# Patient Record
Sex: MALE | Race: BLACK OR AFRICAN AMERICAN | Employment: OTHER | URBAN - METROPOLITAN AREA
[De-identification: names, ages, dates, MRNs, and addresses within clinical notes are randomized per-mention and may not be internally consistent; named-entity substitution may affect disease eponyms.]

---

## 2018-08-01 ENCOUNTER — OFFICE VISIT (OUTPATIENT)
Dept: PAIN MANAGEMENT | Age: 48
End: 2018-08-01
Payer: MEDICARE

## 2018-08-01 ENCOUNTER — PREP FOR PROCEDURE (OUTPATIENT)
Dept: PAIN MANAGEMENT | Age: 48
End: 2018-08-01

## 2018-08-01 ENCOUNTER — HOSPITAL ENCOUNTER (OUTPATIENT)
Age: 48
Discharge: HOME | End: 2018-08-03
Payer: MEDICARE

## 2018-08-01 VITALS
HEART RATE: 88 BPM | HEIGHT: 69 IN | SYSTOLIC BLOOD PRESSURE: 138 MMHG | TEMPERATURE: 98.6 F | RESPIRATION RATE: 18 BRPM | DIASTOLIC BLOOD PRESSURE: 82 MMHG | WEIGHT: 254 LBS | BODY MASS INDEX: 37.62 KG/M2

## 2018-08-01 DIAGNOSIS — M47.816 LUMBAR SPONDYLOSIS: ICD-10-CM

## 2018-08-01 DIAGNOSIS — M54.16 LUMBAR RADICULOPATHY: ICD-10-CM

## 2018-08-01 DIAGNOSIS — M47.816 LUMBAR FACET ARTHROPATHY: Primary | ICD-10-CM

## 2018-08-01 DIAGNOSIS — G89.4 CHRONIC PAIN SYNDROME: ICD-10-CM

## 2018-08-01 DIAGNOSIS — M51.9 LUMBAR DISC DISORDER: ICD-10-CM

## 2018-08-01 PROCEDURE — G0480 DRUG TEST DEF 1-7 CLASSES: HCPCS

## 2018-08-01 PROCEDURE — 81005 URINALYSIS: CPT

## 2018-08-01 PROCEDURE — 80307 DRUG TEST PRSMV CHEM ANLYZR: CPT

## 2018-08-01 PROCEDURE — G8417 CALC BMI ABV UP PARAM F/U: HCPCS | Performed by: PAIN MEDICINE

## 2018-08-01 PROCEDURE — 99203 OFFICE O/P NEW LOW 30 MIN: CPT | Performed by: PAIN MEDICINE

## 2018-08-01 PROCEDURE — G8427 DOCREV CUR MEDS BY ELIG CLIN: HCPCS | Performed by: PAIN MEDICINE

## 2018-08-01 PROCEDURE — 4004F PT TOBACCO SCREEN RCVD TLK: CPT | Performed by: PAIN MEDICINE

## 2018-08-01 PROCEDURE — 3017F COLORECTAL CA SCREEN DOC REV: CPT | Performed by: PAIN MEDICINE

## 2018-08-01 PROCEDURE — 99204 OFFICE O/P NEW MOD 45 MIN: CPT | Performed by: PAIN MEDICINE

## 2018-08-01 RX ORDER — SODIUM CHLORIDE 0.9 % (FLUSH) 0.9 %
10 SYRINGE (ML) INJECTION PRN
Status: CANCELLED | OUTPATIENT
Start: 2018-08-01 | End: 2019-08-01

## 2018-08-01 RX ORDER — NABUMETONE 500 MG/1
500 TABLET, FILM COATED ORAL 2 TIMES DAILY
Qty: 60 TABLET | Refills: 0 | Status: SHIPPED
Start: 2018-08-01 | End: 2020-04-20

## 2018-08-01 RX ORDER — SODIUM CHLORIDE 0.9 % (FLUSH) 0.9 %
10 SYRINGE (ML) INJECTION EVERY 12 HOURS SCHEDULED
Status: CANCELLED | OUTPATIENT
Start: 2018-08-01 | End: 2019-08-01

## 2018-08-01 NOTE — PROGRESS NOTES
[address]        [address] [employer], [address] [facility]      [phone]          Consult Note      Patient:  Roro Pérez,  1970    Date of Service:  18    Requesting Physician:  TERRY Wheeler    Reason for Consult:      Patient presents with complaints of Left thigh pain that started 1 years ago and has been getting worse    HISTORY OF PRESENT ILLNESS:      Pain is constant and is described as aching, sharp, stabbing and shooting. Pain does not radiate. He  has numbness, tingling of the Left thigh and does not have bladder or bowel dysfunction. Alleviating factors include: nothing. Aggravating factors include:  walking, standing, sitting. He has not been on anticoagulation medications to include none and has not been on herbal supplements. He is not diabetic. Imaging: MRI Lumbar spine 2018  At L4-5 there is a small Left foraminal disc protrusion abutting the exiting L4 nerve  There is moderate to severe right foraminal stenosis   At L5-S1 there is small central disc protrusion indenting the thecal. There is oderat right and mild Left foraminal stenosis     Previous treatments: medications. .      Past Medical History:   Diagnosis Date    Acid reflux     Asthma     Asthma     Hypertension     Pancreatitis     Prediabetes     Psychiatric problem     depressed    Substance abuse     alcohol and cocaine       Past Surgical History:   Procedure Laterality Date    COLONOSCOPY       neg    WISDOM TOOTH EXTRACTION         Prior to Admission medications    Medication Sig Start Date End Date Taking? Authorizing Provider   LYRICA 75 MG capsule Take 75 mg by mouth daily. . 18  Yes Historical Provider, MD   ipratropium-albuterol (DUONEB) 0.5-2.5 (3) MG/3ML SOLN nebulizer solution Take 3 mLs by nebulization every 4 hours as needed for Shortness of Breath 18  Yes Sarthak Mosqueda MD   tamsulosin (FLOMAX) 0.4 MG capsule Take 0.4 mg by mouth daily   Yes Historical Provider, MD   Fluticasone Furoate-Vilanterol 200-25 MCG/INH AEPB Inhale 1 puff into the lungs daily 1/16/18  Yes Kyle Merida MD   Umeclidinium Bromide 62.5 MCG/INH AEPB Inhale 1 puff into the lungs daily 1/16/18  Yes Kyle Merida MD   metoprolol succinate (TOPROL XL) 100 MG extended release tablet Take by mouth daily   Yes Historical Provider, MD   RaNITidine HCl (ZANTAC PO) Take by mouth   Yes Historical Provider, MD   Fluticasone Propionate (FLONASE NA) by Nasal route   Yes Historical Provider, MD   B Complex Vitamins (B-COMPLEX/B-12) TABS Take  by mouth. Yes Historical Provider, MD   montelukast (SINGULAIR) 10 MG tablet Take 1 tablet by mouth nightly. 1/16/15  Yes Savana Maya MD   Multiple Vitamins-Minerals (RA ONE DAILY MENS MULTI) TABS Take 1 tablet by mouth daily. 1/16/15  Yes Savana Maya MD   buPROPion SR [facility] SR) 150 MG SR tablet Take 1 tablet by mouth 2 times daily. 1/16/15  Yes Savana Maya MD   QUEtiapine (SEROQUEL) 25 MG tablet Take 1 tablet by mouth daily as needed. Patient taking differently: Take 100 mg by mouth daily as needed  1/16/15  Yes Savana Maya MD   albuterol (PROVENTIL HFA) 108 (90 BASE) MCG/ACT inhaler Inhale 2 puffs into the lungs every 6 hours as needed for Wheezing. 1/16/15  Yes Savana Maya MD   amLODIPine (NORVASC) 10 MG tablet Take 1 tablet by mouth daily.  1/16/15  Yes Savana Maya MD       No Known Allergies    Social History     Social History    Marital status: Single     Spouse name: N/A    Number of children: N/A    Years of education: N/A     Occupational History          Social History Main Topics    Smoking status: Current Every Day Smoker     Packs/day: 1.00     Years: 25.00     Types: Cigarettes    Smokeless tobacco: Never Used    Alcohol use Yes      Comment: once per month    Drug use: No    Sexual activity: Not on file     Other Topics Concern    Not on file Social History Narrative    No narrative on file       Family History   Problem Relation Age of Onset    Other Sister     High Blood Pressure Maternal Grandmother     Other Maternal Grandmother        REVIEW OF SYSTEMS:     Patient specifically denies fever/chills, chest pain, shortness of breath, new bowel or bladder complaints. All other review of systems was negative. PHYSICAL EXAMINATION:      /82   Pulse 88   Temp 98.6 °F (37 °C)   Resp 18   Ht 5' 9\" (1.753 m)   Wt 254 lb (115.2 kg)   BMI 37.51 kg/m²     General:      General appearance:   pleasant and well-hydrated. , in moderate distress and A & O x3  Build:Overweight  Function:Rises from a seated position easily. HEENT:    Head:normocephalic and atraumatic  Pupils:regular, round and equal.  Sclera: icterus absent,     Lungs:    Breathing:Breathing Pattern: regular, no distress    Abdomen:    Shape:obese and non-distended  Tenderness:none  Guarding:none    Lumbar spine:    Spine inspection:normal   CVA tenderness:No   Palpation:normal, facet loading, left, right and negative. Range of motion:normal not Lateral bending, flexion, extension rotation bilateral and is not painful. Musculoskeletal:    Trigger points in Paraveteral:absent bilaterally  SI joint tenderness:negative right, negative left              MONE test:not done right, not done             left  Piriformis tenderness:negative right, negative left  Trochanteric bursa tenderness:negative right, negative left  SLR:negative right, negative left, sitting     Extremities:    Tremors:None bilaterally upper and lower  Range of motion:pain with internal rotation of hips negative.   Intact:Yes  Edema:Normal    Neurological:    Sensory:diminished to light touch Left L4 dermatome   Motor:                  Right Quadriceps5/5          Left Quadriceps5/5           Right Gastrocnemius5/5    Left Gastrocnemius5/5  Right Ant Tibialis5/5  Left Ant Tibialis5/5  Reflexes:    Right

## 2018-08-02 LAB — SPECIFIC GRAVITY UA: >=1.03 (ref 1–1.03)

## 2018-08-04 LAB
7-AMINOCLONAZEPAM, URINE: <5 NG/ML
ALPHA-HYDROXYALPRAZOLAM, URINE: <5 NG/ML
ALPHA-HYDROXYMIDAZOLAM, URINE: <20 NG/ML
ALPRAZOLAM, URINE: <5 NG/ML
CHLORDIAZEPOXIDE, URINE: <20 NG/ML
CLONAZEPAM, URINE: <5 NG/ML
DIAZEPAM, URINE: <20 NG/ML
LORAZEPAM, URINE: <20 NG/ML
MIDAZOLAM, URINE: <20 NG/ML
NORDIAZEPAM, URINE: <20 NG/ML
OXAZEPAM, URINE: <20 NG/ML
TEMAZEPAM, URINE: <20 NG/ML

## 2018-08-05 LAB
6AM URINE: <10 NG/ML
CODEINE, URINE: <20 NG/ML
HYDROCODONE, URINE: <20 NG/ML
HYDROMORPHONE, URINE: <20 NG/ML
MORPHINE URINE: <20 NG/ML
NORHYDROCODONE, URINE: <20 NG/ML
NOROXYCODONE, URINE: <20 NG/ML
NOROXYMORPHONE, URINE: <20 NG/ML
OXYCODONE, URINE CONFIRMATION: <20 NG/ML
OXYMORPHONE, URINE: <20 NG/ML

## 2018-08-06 LAB
Lab: NORMAL
REPORT: NORMAL
THIS TEST SENT TO: NORMAL

## 2018-08-23 RX ORDER — TRAMADOL HYDROCHLORIDE 50 MG/1
50 TABLET ORAL EVERY 6 HOURS PRN
COMMUNITY
End: 2018-10-24 | Stop reason: SDUPTHER

## 2018-08-27 ENCOUNTER — HOSPITAL ENCOUNTER (OUTPATIENT)
Dept: OPERATING ROOM | Age: 48
Setting detail: OUTPATIENT SURGERY
Discharge: HOME | End: 2018-08-27
Attending: PAIN MEDICINE
Payer: MEDICARE

## 2018-08-27 ENCOUNTER — HOSPITAL ENCOUNTER (OUTPATIENT)
Age: 48
Setting detail: OUTPATIENT SURGERY
Discharge: HOME | End: 2018-08-27
Attending: PAIN MEDICINE | Admitting: PAIN MEDICINE
Payer: MEDICARE

## 2018-08-27 VITALS
RESPIRATION RATE: 16 BRPM | TEMPERATURE: 98 F | OXYGEN SATURATION: 93 % | HEART RATE: 96 BPM | DIASTOLIC BLOOD PRESSURE: 80 MMHG | SYSTOLIC BLOOD PRESSURE: 130 MMHG

## 2018-08-27 DIAGNOSIS — M54.16 LUMBAR RADICULOPATHY: ICD-10-CM

## 2018-08-27 PROCEDURE — 2500000003 HC RX 250 WO HCPCS: Performed by: PAIN MEDICINE

## 2018-08-27 PROCEDURE — 3600000005 HC SURGERY LEVEL 5 BASE: Performed by: PAIN MEDICINE

## 2018-08-27 PROCEDURE — 3209999900 FLUORO FOR SURGICAL PROCEDURES

## 2018-08-27 PROCEDURE — 2709999900 HC NON-CHARGEABLE SUPPLY: Performed by: PAIN MEDICINE

## 2018-08-27 PROCEDURE — 62323 NJX INTERLAMINAR LMBR/SAC: CPT | Performed by: PAIN MEDICINE

## 2018-08-27 PROCEDURE — 6360000004 HC RX CONTRAST MEDICATION: Performed by: PAIN MEDICINE

## 2018-08-27 PROCEDURE — 6360000002 HC RX W HCPCS: Performed by: PAIN MEDICINE

## 2018-08-27 PROCEDURE — 7100000011 HC PHASE II RECOVERY - ADDTL 15 MIN: Performed by: PAIN MEDICINE

## 2018-08-27 PROCEDURE — 7100000010 HC PHASE II RECOVERY - FIRST 15 MIN: Performed by: PAIN MEDICINE

## 2018-08-27 RX ORDER — LIDOCAINE HYDROCHLORIDE 5 MG/ML
INJECTION, SOLUTION INFILTRATION; INTRAVENOUS PRN
Status: DISCONTINUED | OUTPATIENT
Start: 2018-08-27 | End: 2018-08-27 | Stop reason: HOSPADM

## 2018-08-27 RX ORDER — SODIUM CHLORIDE 0.9 % (FLUSH) 0.9 %
10 SYRINGE (ML) INJECTION PRN
Status: DISCONTINUED | OUTPATIENT
Start: 2018-08-27 | End: 2018-08-27 | Stop reason: HOSPADM

## 2018-08-27 RX ORDER — SODIUM CHLORIDE 0.9 % (FLUSH) 0.9 %
10 SYRINGE (ML) INJECTION EVERY 12 HOURS SCHEDULED
Status: DISCONTINUED | OUTPATIENT
Start: 2018-08-27 | End: 2018-08-27 | Stop reason: HOSPADM

## 2018-08-27 ASSESSMENT — PAIN - FUNCTIONAL ASSESSMENT: PAIN_FUNCTIONAL_ASSESSMENT: 0-10

## 2018-08-27 ASSESSMENT — PAIN DESCRIPTION - DESCRIPTORS: DESCRIPTORS: ACHING;DISCOMFORT

## 2018-08-27 ASSESSMENT — PAIN SCALES - GENERAL
PAINLEVEL_OUTOF10: 0
PAINLEVEL_OUTOF10: 0

## 2018-08-27 NOTE — OP NOTE
2018    Patient: Christy Perez  :  1970  Age:  46 y.o. Sex:  male     PRE-OPERATIVE DIAGNOSIS: Lumbar disc displacement, lumbar radiculopathy. POST-OPERATIVE DIAGNOSIS: Same. PROCEDURE: Fluoroscopic guided therapeutic lumbar epidural steroid injection at the L4-5 level (# 1). SURGEON: GREGORY Kumar M.D.. ANESTHESIA: Local    ESTIMATED BLOOD LOSS: None.  ______________________________________________________________________    BRIEF HISTORY:  Christy Perez comes in today for first lumbar epidural injection at L4-5 level. The potential complications of this procedure were discussed with him again today. He has elected to undergo the aforementioned procedure. Ro complete History & Physical examination were reviewed in depth, a copy of which is in the chart. DESCRIPTION OF PROCEDURE:    After confirming written and informed consent, a time-out was performed and Ro name and date of birth, the procedure to be performed as well as the plan for the location of the needle insertion were confirmed. The patient was brought into the procedure room and placed in the prone position on the fluoroscopy table. A pillow was placed under the patient's lower abdomen/upper pelvis to increase lumbar interlaminar space. Standard monitors were placed, and vital signs were observed throughout the procedure. The area of the lumbar spine was prepped with chloraprep and draped in a sterile manner. The L4-5 interspace was identified and marked under AP fluoroscopy. The skin and subcutaneous tissues at the above level were anesthestized with 0.5% lidocaine. With intermittent fluoroscopy, an # 18 gauge 3 1/2 tuohy epidural needle was inserted and directed toward the interlaminar space. The needle was slowly advanced using loss of resistance technique and 5 cc glass syringe  until the tip of the epidural needle has passed through the ligamentum flavum and entered the epidural space.  AP and lateral fluoroscopic imaging is performed to verify that the epidural needle is properly placed. Negative aspiration of blood and CSF was confirmed. 0.5 ml of omnipaque 240 was used for confirmation of even epidural spread under both live and AP fluoroscopy. After negative aspiration, a solution of 0.5 % Lidocaine 3 ml and 40 mg DepoMedrol was easily injected. The needle was gently removed intact . The patient neck was cleaned and a Band-Aid was placed over the needle insertion point. Disposition the patient tolerated the procedure well and there were no complications . Vital signs remained stable throughout the procedure. The patient was escorted to the recovery area where they remained until discharge and written discharge instructions for the procedure were given. Plan: Skye Naqvi will return to our pain management center as scheduled.      Kim Vaca MD

## 2018-08-27 NOTE — H&P
[address]  0 [facility], [address] [place] [address], [facility]  [phone]    Procedure History & Physical      Ruth Ann Ortega     HPI:    Patient  is here for low back and Left leg for LESI L4-5  Labs/imaging studies reviewed   All question and concerns addressed including R/B/A associated with the procedure    Past Medical History:   Diagnosis Date    Acid reflux     Asthma     Asthma     Hypertension     Pancreatitis     Prediabetes     Psychiatric problem     depressed    Substance abuse     alcohol and cocaine       Past Surgical History:   Procedure Laterality Date    COLONOSCOPY      2010 neg    WISDOM TOOTH EXTRACTION         Prior to Admission medications    Medication Sig Start Date End Date Taking? Authorizing Provider   traMADol (ULTRAM) 50 MG tablet Take 50 mg by mouth every 6 hours as needed for Pain. .   Yes Historical Provider, MD   aspirin 81 MG tablet Take 81 mg by mouth daily   Yes Historical Provider, MD   nabumetone (RELAFEN) 500 MG tablet Take 1 tablet by mouth 2 times daily 8/1/18 8/31/18 Yes Dm Davidson MD   ipratropium-albuterol (DUONEB) 0.5-2.5 (3) MG/3ML SOLN nebulizer solution Take 3 mLs by nebulization every 4 hours as needed for Shortness of Breath 2/22/18  Yes Caroline Springer MD   tamsulosin (FLOMAX) 0.4 MG capsule Take 0.4 mg by mouth daily   Yes Historical Provider, MD   Fluticasone Furoate-Vilanterol 200-25 MCG/INH AEPB Inhale 1 puff into the lungs daily 1/16/18  Yes Caroline Springer MD   Umeclidinium Bromide 62.5 MCG/INH AEPB Inhale 1 puff into the lungs daily 1/16/18  Yes Caroline Springer MD   metoprolol succinate (TOPROL XL) 100 MG extended release tablet Take by mouth daily   Yes Historical Provider, MD   RaNITidine HCl (ZANTAC PO) Take by mouth   Yes Historical Provider, MD   Fluticasone Propionate (FLONASE NA) by Nasal route   Yes Historical Provider, MD   montelukast (SINGULAIR) 10 MG tablet Take 1 tablet by mouth nightly. 1/16/15  Yes Savana Maya MD   Multiple Vitamins-Minerals (RA ONE DAILY MENS MULTI) TABS Take 1 tablet by mouth daily. 1/16/15  Yes Savana Maya MD   buPROPion SR [facility] SR) 150 MG SR tablet Take 1 tablet by mouth 2 times daily. 1/16/15  Yes Savana Maya MD   QUEtiapine (SEROQUEL) 25 MG tablet Take 1 tablet by mouth daily as needed. Patient taking differently: Take 100 mg by mouth daily as needed  1/16/15  Yes Savana Maya MD   albuterol (PROVENTIL HFA) 108 (90 BASE) MCG/ACT inhaler Inhale 2 puffs into the lungs every 6 hours as needed for Wheezing. 1/16/15  Yes Savana Maya MD   amLODIPine (NORVASC) 10 MG tablet Take 1 tablet by mouth daily. 1/16/15  Yes Savana Maya MD       No Known Allergies    Social History     Social History    Marital status: Single     Spouse name: N/A    Number of children: N/A    Years of education: N/A     Occupational History          Social History Main Topics    Smoking status: Current Every Day Smoker     Packs/day: 1.00     Years: 25.00     Types: Cigarettes    Smokeless tobacco: Never Used    Alcohol use Yes      Comment: once per month    Drug use: No    Sexual activity: Not on file     Other Topics Concern    Not on file     Social History Narrative    No narrative on file       Family History   Problem Relation Age of Onset    Other Sister     High Blood Pressure Maternal Grandmother     Other Maternal Grandmother          REVIEW OF SYSTEMS:    CONSTITUTIONAL:  negative for  fevers, chills, sweats and fatigue    RESPIRATORY:  negative for  dry cough, cough with sputum, dyspnea, wheezing and chest pain    CARDIOVASCULAR:  negative for chest pain, dyspnea, palpitations, syncope    GASTROINTESTINAL:  negative for nausea, vomiting, change in bowel habits, diarrhea, constipation and abdominal pain    MUSCULOSKELETAL: negative for muscle weakness    SKIN: negative for itching or rashes.     BEHAVIOR/PSYCH:  negative for poor appetite, increased

## 2018-09-27 ENCOUNTER — OFFICE VISIT (OUTPATIENT)
Dept: PAIN MANAGEMENT | Age: 48
End: 2018-09-27
Payer: MEDICARE

## 2018-09-27 VITALS
SYSTOLIC BLOOD PRESSURE: 112 MMHG | HEART RATE: 78 BPM | DIASTOLIC BLOOD PRESSURE: 84 MMHG | RESPIRATION RATE: 18 BRPM | TEMPERATURE: 98 F

## 2018-09-27 DIAGNOSIS — M47.816 LUMBAR SPONDYLOSIS: ICD-10-CM

## 2018-09-27 DIAGNOSIS — M47.816 LUMBAR FACET ARTHROPATHY: ICD-10-CM

## 2018-09-27 DIAGNOSIS — M54.16 LUMBAR RADICULOPATHY: ICD-10-CM

## 2018-09-27 DIAGNOSIS — M51.9 LUMBAR DISC DISORDER: Primary | ICD-10-CM

## 2018-09-27 DIAGNOSIS — G89.4 CHRONIC PAIN SYNDROME: ICD-10-CM

## 2018-09-27 PROCEDURE — 99213 OFFICE O/P EST LOW 20 MIN: CPT | Performed by: PAIN MEDICINE

## 2018-09-27 PROCEDURE — G8417 CALC BMI ABV UP PARAM F/U: HCPCS | Performed by: PAIN MEDICINE

## 2018-09-27 PROCEDURE — G8427 DOCREV CUR MEDS BY ELIG CLIN: HCPCS | Performed by: PAIN MEDICINE

## 2018-09-27 PROCEDURE — 99214 OFFICE O/P EST MOD 30 MIN: CPT | Performed by: PAIN MEDICINE

## 2018-09-27 PROCEDURE — 4004F PT TOBACCO SCREEN RCVD TLK: CPT | Performed by: PAIN MEDICINE

## 2018-09-27 PROCEDURE — 3017F COLORECTAL CA SCREEN DOC REV: CPT | Performed by: PAIN MEDICINE

## 2018-09-27 RX ORDER — TRAMADOL HYDROCHLORIDE 50 MG/1
50 TABLET ORAL DAILY PRN
Qty: 30 TABLET | Refills: 0 | Status: SHIPPED | OUTPATIENT
Start: 2018-09-27 | End: 2018-10-27

## 2018-09-27 RX ORDER — IBUPROFEN 200 MG
400 TABLET ORAL EVERY 6 HOURS PRN
Status: ON HOLD | COMMUNITY
End: 2020-11-14

## 2018-09-27 RX ORDER — ACETAMINOPHEN 500 MG
1500 TABLET ORAL PRN
Status: ON HOLD | COMMUNITY
End: 2020-04-23 | Stop reason: HOSPADM

## 2018-09-27 NOTE — PROGRESS NOTES
[address]  [address] [facility], [address] [place] [address] Johnny  [phone]    Follow up Note      Cherise Salinas     Date of Visit:  9/27/2018    CC:  Patient presents for follow up   Chief Complaint   Patient presents with    Other     left thigh       HPI:    Pain is unchanged    Change in quality of symptoms:no. Medication side effects:not applicable . Recent diagnostic testing:none. Recent interventional procedures:LESI moderate but will    He has not been on anticoagulation medications to include none and has not been on herbal supplements. He is not diabetic.     Imaging: MRI Lumbar spine 01/2018  At L4-5 there is a small Left foraminal disc protrusion abutting the exiting L4 nerve  There is moderate to severe right foraminal stenosis   At L5-S1 there is small central disc protrusion indenting the thecal. There is moderat right and mild Left foraminal stenosis      Previous treatments: medications. .        Potential Aberrant Drug-Related Behavior:  No     Urine Drug Screening:  First office visit urine screen showed no narcotics    OARRS report:  09/2018 consistent     Past Medical History:   Diagnosis Date    Acid reflux     Asthma     Asthma     Hypertension     Pancreatitis     Prediabetes     Psychiatric problem     depressed    Substance abuse     alcohol and cocaine       Past Surgical History:   Procedure Laterality Date    COLONOSCOPY      2010 neg    NERVE BLOCK Left 08/27/2018    lumbar epidural #1 paramedian    [place] NJX DX/THER SBST INTRLMNR LMBR/SAC W/IMG GDN Left 8/27/2018    LUMBAR EPIDURAL STEROID INJECTION L4-5 LEFT PARAMEDIAN #1 performed by Blanca Menon MD at [place] [address]         Prior to Admission medications    Medication Sig Start Date End Date Taking? Authorizing Provider   traMADol (ULTRAM) 50 MG tablet Take 50 mg by mouth every 6 hours as needed for Pain. Estela Farias     Historical Provider, MD   aspirin 81 MG tablet Take Smoker     Packs/day: 1.00     Years: 25.00     Types: Cigarettes    Smokeless tobacco: Never Used    Alcohol use Yes      Comment: once per month    Drug use: No    Sexual activity: Not on file     Other Topics Concern    Not on file     Social History Narrative    No narrative on file       Family History   Problem Relation Age of Onset    Other Sister     High Blood Pressure Maternal Grandmother     Other Maternal Grandmother        REVIEW OF SYSTEMS:     Aki Humphrey denies fever/chills, chest pain, shortness of breath, new bowel or bladder complaints. All other review of systems was negative. PHYSICAL EXAMINATION:      /84   Pulse 78   Temp 98 °F (36.7 °C) (Oral)   Resp 18        General:       General appearance:   pleasant and well-hydrated. , in moderate distress and A & O x3  Build:Overweight  Function:Rises from a seated position easily.     HEENT:     Head:normocephalic and atraumatic  Pupils:regular, round and equal.  Sclera: icterus absent,      Lungs:     Breathing:Breathing Pattern: regular, no distress     Abdomen:     Shape:obese and non-distended  Tenderness:none  Guarding:none     Lumbar spine:     Spine inspection:normal   CVA tenderness:No   Palpation:normal, facet loading, left, right and negative. Range of motion:normal not Lateral bending, flexion, extension rotation bilateral and is not painful.     Musculoskeletal:     Trigger points in Paraveteral:absent bilaterally  SI joint tenderness:negative right, negative left              MONE test:not done right, not done             left  Piriformis tenderness:negative right, negative left  Trochanteric bursa tenderness:negative right, negative left  SLR:negative right, negative left, sitting      Extremities:     Tremors:None bilaterally upper and lower  Range of motion:pain with internal rotation of hips negative.   Intact:Yes  Edema:Normal     Neurological:     Sensory:diminished to light touch lateral aspect of Left thigh  Motor:                  Right Quadriceps5/5          Left Quadriceps5/5           Right Gastrocnemius5/5    Left Gastrocnemius5/5  Right Ant Tibialis5/5  Left Ant Tibialis5/5  Reflexes:    Right Quadriceps reflex2+  Left Quadriceps reflex2+  Right Achilles reflex2+  Left Achilles reflex2+  Gait:normal     Dermatology:     Skin:no unusual rashes and no skin lesions    Assessment/Plan:  Left thigh pain, lumbar radiculopathy vs lateral femoral cutaneous nerve neuralgia   Patient is s/p LESI with moderate response but it didn't last   Will hold off on repeating   Will refer patient to [address] [place] [address] for EMG  Consider referral  for evaluation   Patient had tried and failed Neurontin and Lyrica  Will start patient on Ultram 50 mg QD PRN   Patient didn't dispense Relafen 500 mg BID  Reviewed first office visit urine screen   OARRS report reviewed  Patient encouraged to stay active and to lose weight, will re consider referral to physical therapy next office visit   Treatment plan discussed with the patient including medications side effects         ccreferring francheska Muller M.D.

## 2018-09-28 ENCOUNTER — TELEPHONE (OUTPATIENT)
Dept: PHYSICAL MEDICINE AND REHAB | Age: 48
End: 2018-09-28

## 2018-10-17 ENCOUNTER — OFFICE VISIT (OUTPATIENT)
Dept: PHYSICAL MEDICINE AND REHAB | Age: 48
End: 2018-10-17
Payer: MEDICARE

## 2018-10-17 VITALS — BODY MASS INDEX: 38.51 KG/M2 | HEIGHT: 69 IN | WEIGHT: 260 LBS

## 2018-10-17 DIAGNOSIS — M54.16 LUMBAR RADICULOPATHY: ICD-10-CM

## 2018-10-17 PROCEDURE — 99202 OFFICE O/P NEW SF 15 MIN: CPT | Performed by: PHYSICAL MEDICINE & REHABILITATION

## 2018-10-17 PROCEDURE — 95886 MUSC TEST DONE W/N TEST COMP: CPT | Performed by: PHYSICAL MEDICINE & REHABILITATION

## 2018-10-17 PROCEDURE — G8417 CALC BMI ABV UP PARAM F/U: HCPCS | Performed by: PHYSICAL MEDICINE & REHABILITATION

## 2018-10-17 PROCEDURE — 95909 NRV CNDJ TST 5-6 STUDIES: CPT | Performed by: PHYSICAL MEDICINE & REHABILITATION

## 2018-10-17 PROCEDURE — G8427 DOCREV CUR MEDS BY ELIG CLIN: HCPCS | Performed by: PHYSICAL MEDICINE & REHABILITATION

## 2018-10-17 PROCEDURE — 3017F COLORECTAL CA SCREEN DOC REV: CPT | Performed by: PHYSICAL MEDICINE & REHABILITATION

## 2018-10-17 PROCEDURE — 4004F PT TOBACCO SCREEN RCVD TLK: CPT | Performed by: PHYSICAL MEDICINE & REHABILITATION

## 2018-10-17 PROCEDURE — G8484 FLU IMMUNIZE NO ADMIN: HCPCS | Performed by: PHYSICAL MEDICINE & REHABILITATION

## 2018-10-17 NOTE — PROGRESS NOTES
A. Ing ligament - Thigh   32       Motor NCS      Nerve / Sites Muscle Onset Amplitude Segments Distance Velocity Temp.     ms mV  cm m/s °C   L Peroneal - EDB      Ankle EDB 3.96 9.0 Ankle - EDB 8  31.5      Fib head EDB 11.67 8.5 Fib head - Ankle 35 45 31.5      Above Knee EDB 13.91 8.0 Above Knee - Fib head 10 45 31.5   L Tibial - AH      Ankle AH 3.91 2.6* Ankle - AH 8  32.3      Pop fossa AH 13.18 1.7* Pop fossa - Ankle 41 44 32.3   L Femoral - Vastus Med      B. Ing Lig Vastus Med 3.80 11.0 B. Ing Lig - Vastus Med   32.1       F Wave      Nerve Fmin % F    ms %   L Peroneal - EDB 48.33 70   L Tibial - AH 50.68 80       H Reflex      Nerve H Lat    ms   R Tibial - Soleus 30.47   L Tibial - Soleus 34.11*     EMG      EMG Summary Table     Spontaneous MUAP Recruitment   Muscle Nerve Roots IA Fib PSW Fasc Amp Dur. PPP Pattern   L. Vastus medialis Femoral L2-L4 N None None None N N N N   L. Tibialis anterior Deep peroneal (Fibular) L4-L5 N None None None N N N N   L. Gastrocnemius (Medial head) Tibial S1-S2 N None None None N N N N   L. Extensor hallucis longus Deep peroneal (Fibular) L5-S1 N None 2+ None N N N Reduced   L. Lumbar paraspinals (mid)  - N None None None N N N N   L. Lumbar paraspinals (low)  - N 1+ 1+ None N N N N   L. Gluteus medius Superior gluteal L4-S1 N None None None N N N N   L. Gluteus milagros Inferior gluteal L5-S2 N None None None N N N Reduced          Pain was assessed/reassessed during EMG and managed with appropriate intervention throughout the entire procedure. The patient was instructed in post procedure care. Technical Considerations:  Obesity  Yes, Poor tolerance to test Yes, Skin callous No, Skin breakdown No, Edema No    Summary of Findings:   Nerve conduction studies:   · The following nerve conduction studies were abnormal:   · Left tibial motor amplitude was reduced. · The left tibial h reflex was prolonged.    · All other nerve conduction studies listed in the table above

## 2018-10-24 ENCOUNTER — OFFICE VISIT (OUTPATIENT)
Dept: PAIN MANAGEMENT | Age: 48
End: 2018-10-24
Payer: MEDICARE

## 2018-10-24 VITALS
DIASTOLIC BLOOD PRESSURE: 74 MMHG | RESPIRATION RATE: 18 BRPM | SYSTOLIC BLOOD PRESSURE: 110 MMHG | TEMPERATURE: 98.1 F | HEIGHT: 69 IN | HEART RATE: 72 BPM | WEIGHT: 262 LBS | BODY MASS INDEX: 38.8 KG/M2

## 2018-10-24 DIAGNOSIS — M54.16 LUMBAR RADICULOPATHY: ICD-10-CM

## 2018-10-24 DIAGNOSIS — M47.816 LUMBAR SPONDYLOSIS: ICD-10-CM

## 2018-10-24 DIAGNOSIS — M47.816 LUMBAR FACET ARTHROPATHY: ICD-10-CM

## 2018-10-24 DIAGNOSIS — G57.12 MERALGIA PARAESTHETICA, LEFT: ICD-10-CM

## 2018-10-24 DIAGNOSIS — M51.9 LUMBAR DISC DISORDER: Primary | ICD-10-CM

## 2018-10-24 DIAGNOSIS — G89.4 CHRONIC PAIN SYNDROME: ICD-10-CM

## 2018-10-24 PROCEDURE — 3017F COLORECTAL CA SCREEN DOC REV: CPT | Performed by: PAIN MEDICINE

## 2018-10-24 PROCEDURE — 99213 OFFICE O/P EST LOW 20 MIN: CPT | Performed by: PAIN MEDICINE

## 2018-10-24 PROCEDURE — 99214 OFFICE O/P EST MOD 30 MIN: CPT | Performed by: PAIN MEDICINE

## 2018-10-24 PROCEDURE — 4004F PT TOBACCO SCREEN RCVD TLK: CPT | Performed by: PAIN MEDICINE

## 2018-10-24 PROCEDURE — G8417 CALC BMI ABV UP PARAM F/U: HCPCS | Performed by: PAIN MEDICINE

## 2018-10-24 PROCEDURE — G8484 FLU IMMUNIZE NO ADMIN: HCPCS | Performed by: PAIN MEDICINE

## 2018-10-24 PROCEDURE — G8427 DOCREV CUR MEDS BY ELIG CLIN: HCPCS | Performed by: PAIN MEDICINE

## 2018-10-24 RX ORDER — GABAPENTIN 100 MG/1
CAPSULE ORAL
Qty: 90 CAPSULE | Refills: 0 | Status: SHIPPED
Start: 2018-10-24 | End: 2020-05-13 | Stop reason: DRUGHIGH

## 2018-10-24 RX ORDER — TRAMADOL HYDROCHLORIDE 50 MG/1
50 TABLET ORAL DAILY PRN
Qty: 30 TABLET | Refills: 0 | Status: SHIPPED | OUTPATIENT
Start: 2018-10-24 | End: 2018-11-23

## 2018-10-24 NOTE — PROGRESS NOTES
[address]  [address] [facility], [address] [place] [address] [place]  [phone]    Follow up Note      Shawna Waterman     Date of Visit:  10/24/2018    CC:  Patient presents for follow up   Chief Complaint   Patient presents with    Pain     HPI:    Pain is unchanged    Change in quality of symptoms:no. Medication side effects:not applicable . Recent diagnostic testing:EMG Of Left lower extremity  Recent interventional procedures:none    He has not been on anticoagulation medications to include none and has not been on herbal supplements. He is not diabetic.     Imaging:   MRI Lumbar spine 01/2018  At L4-5 there is a small Left foraminal disc protrusion abutting the exiting L4 nerve  There is moderate to severe right foraminal stenosis   At L5-S1 there is small central disc protrusion indenting the thecal. There is moderat right and mild Left foraminal stenosis      EMG Left lower extremity: 10/2018  Left S1 radiculopathy  Previous treatments: medications. .        Potential Aberrant Drug-Related Behavior:  No     Urine Drug Screening:  First office visit urine screen showed no narcotics    OARRS report:  09/2018 consistent   10/2018 consistent     Past Medical History:   Diagnosis Date    Acid reflux     Asthma     Asthma     Hypertension     Pancreatitis     Prediabetes     Psychiatric problem     depressed    Substance abuse ([facility] [address]     alcohol and cocaine       Past Surgical History:   Procedure Laterality Date    COLONOSCOPY      2010 neg    NERVE BLOCK Left 08/27/2018    lumbar epidural #1 paramedian    [place] NJX DX/THER SBST INTRLMNR LMBR/SAC W/IMG GDN Left 8/27/2018    LUMBAR EPIDURAL STEROID INJECTION L4-5 LEFT PARAMEDIAN #1 performed by Michelle Unger MD at [place] [address]         Prior to Admission medications    Medication Sig Start Date End Date Taking?  Authorizing Provider   ibuprofen (ADVIL;MOTRIN) 200 MG tablet Take 400 mg by mouth as

## 2018-11-16 DIAGNOSIS — G89.4 CHRONIC PAIN SYNDROME: ICD-10-CM

## 2018-11-16 RX ORDER — TRAMADOL HYDROCHLORIDE 50 MG/1
50 TABLET ORAL DAILY PRN
Qty: 17 TABLET | Refills: 0 | OUTPATIENT
Start: 2018-11-23 | End: 2018-12-10

## 2018-11-16 RX ORDER — GABAPENTIN 100 MG/1
CAPSULE ORAL
Qty: 68 CAPSULE | Refills: 0 | OUTPATIENT
Start: 2018-11-23

## 2020-04-20 ENCOUNTER — APPOINTMENT (OUTPATIENT)
Dept: GENERAL RADIOLOGY | Age: 50
DRG: 191 | End: 2020-04-20
Payer: MEDICARE

## 2020-04-20 ENCOUNTER — HOSPITAL ENCOUNTER (INPATIENT)
Age: 50
LOS: 3 days | Discharge: HOME | DRG: 191 | End: 2020-04-23
Attending: EMERGENCY MEDICINE | Admitting: FAMILY MEDICINE
Payer: MEDICARE

## 2020-04-20 ENCOUNTER — APPOINTMENT (OUTPATIENT)
Dept: CT IMAGING | Age: 50
DRG: 191 | End: 2020-04-20
Payer: MEDICARE

## 2020-04-20 PROBLEM — J44.1 COPD WITH EXACERBATION (HCC): Status: ACTIVE | Noted: 2020-04-20

## 2020-04-20 PROBLEM — R07.89 OTHER CHEST PAIN: Status: ACTIVE | Noted: 2020-04-20

## 2020-04-20 PROBLEM — E87.20 LACTIC ACIDOSIS: Status: ACTIVE | Noted: 2020-04-20

## 2020-04-20 PROBLEM — E11.9 TYPE 2 DIABETES MELLITUS, WITHOUT LONG-TERM CURRENT USE OF INSULIN (HCC): Status: ACTIVE | Noted: 2020-04-20

## 2020-04-20 LAB
ADENOVIRUS BY PCR: NOT DETECTED
ANION GAP SERPL CALCULATED.3IONS-SCNC: 21 MMOL/L (ref 7–16)
B.E.: -0.1 MMOL/L (ref -3–0)
BASOPHILS ABSOLUTE: 0.03 E9/L (ref 0–0.2)
BASOPHILS RELATIVE PERCENT: 0.3 % (ref 0–2)
BORDETELLA PARAPERTUSSIS BY PCR: NOT DETECTED
BORDETELLA PERTUSSIS BY PCR: NOT DETECTED
BUN BLDV-MCNC: 8 MG/DL (ref 6–20)
CALCIUM SERPL-MCNC: 8.6 MG/DL (ref 8.6–10.2)
CHLAMYDOPHILIA PNEUMONIAE BY PCR: NOT DETECTED
CHLORIDE BLD-SCNC: 92 MMOL/L (ref 98–107)
CHOLESTEROL, TOTAL: 156 MG/DL (ref 0–199)
CO2: 24 MMOL/L (ref 22–29)
CORONAVIRUS 229E BY PCR: NOT DETECTED
CORONAVIRUS HKU1 BY PCR: NOT DETECTED
CORONAVIRUS NL63 BY PCR: NOT DETECTED
CORONAVIRUS OC43 BY PCR: NOT DETECTED
CREAT SERPL-MCNC: 0.8 MG/DL (ref 0.7–1.2)
DELIVERY SYSTEMS: ABNORMAL
DEVICE: ABNORMAL
EKG ATRIAL RATE: 123 BPM
EKG P AXIS: 67 DEGREES
EKG P-R INTERVAL: 162 MS
EKG Q-T INTERVAL: 310 MS
EKG QRS DURATION: 86 MS
EKG QTC CALCULATION (BAZETT): 443 MS
EKG R AXIS: -58 DEGREES
EKG T AXIS: 73 DEGREES
EKG VENTRICULAR RATE: 123 BPM
EOSINOPHILS ABSOLUTE: 0.01 E9/L (ref 0.05–0.5)
EOSINOPHILS RELATIVE PERCENT: 0.1 % (ref 0–6)
GFR AFRICAN AMERICAN: >60
GFR NON-AFRICAN AMERICAN: >60 ML/MIN/1.73
GLUCOSE BLD-MCNC: 163 MG/DL (ref 74–99)
HCO3 ARTERIAL: 24.8 MMOL/L (ref 22–26)
HCT VFR BLD CALC: 42.9 % (ref 37–54)
HDLC SERPL-MCNC: 58 MG/DL
HEMOGLOBIN: 13.6 G/DL (ref 12.5–16.5)
HUMAN METAPNEUMOVIRUS BY PCR: NOT DETECTED
HUMAN RHINOVIRUS/ENTEROVIRUS BY PCR: NOT DETECTED
IMMATURE GRANULOCYTES #: 0.11 E9/L
IMMATURE GRANULOCYTES %: 1.1 % (ref 0–5)
INFLUENZA A BY PCR: NOT DETECTED
INFLUENZA A BY PCR: NOT DETECTED
INFLUENZA B BY PCR: NOT DETECTED
INFLUENZA B BY PCR: NOT DETECTED
INR BLD: 1
LACTIC ACID, SEPSIS: 3.2 MMOL/L (ref 0.5–1.9)
LACTIC ACID, SEPSIS: 5.1 MMOL/L (ref 0.5–1.9)
LACTIC ACID: 4 MMOL/L (ref 0.5–2.2)
LACTIC ACID: 7.3 MMOL/L (ref 0.5–2.2)
LDL CHOLESTEROL CALCULATED: 76 MG/DL (ref 0–99)
LYMPHOCYTES ABSOLUTE: 2.34 E9/L (ref 1.5–4)
LYMPHOCYTES RELATIVE PERCENT: 23.2 % (ref 20–42)
MCH RBC QN AUTO: 28 PG (ref 26–35)
MCHC RBC AUTO-ENTMCNC: 31.7 % (ref 32–34.5)
MCV RBC AUTO: 88.3 FL (ref 80–99.9)
METER GLUCOSE: 257 MG/DL (ref 74–99)
METER GLUCOSE: 278 MG/DL (ref 74–99)
METER GLUCOSE: 316 MG/DL (ref 74–99)
MONOCYTES ABSOLUTE: 0.99 E9/L (ref 0.1–0.95)
MONOCYTES RELATIVE PERCENT: 9.8 % (ref 2–12)
MYCOPLASMA PNEUMONIAE BY PCR: NOT DETECTED
NEUTROPHILS ABSOLUTE: 6.61 E9/L (ref 1.8–7.3)
NEUTROPHILS RELATIVE PERCENT: 65.5 % (ref 43–80)
O2 SATURATION: 85.3 % (ref 92–98.5)
OPERATOR ID: 3342
PARAINFLUENZA VIRUS 1 BY PCR: NOT DETECTED
PARAINFLUENZA VIRUS 2 BY PCR: NOT DETECTED
PARAINFLUENZA VIRUS 3 BY PCR: NOT DETECTED
PARAINFLUENZA VIRUS 4 BY PCR: NOT DETECTED
PATIENT TEMP: 37
PCO2 (TEMP CORRECTED): 40.5 MMHG (ref 35–45)
PDW BLD-RTO: 14.5 FL (ref 11.5–15)
PH (TEMPERATURE CORRECTED): 7.39 (ref 7.35–7.45)
PLATELET # BLD: 320 E9/L (ref 130–450)
PMV BLD AUTO: 9.3 FL (ref 7–12)
PO2 (TEMP CORRECTED): 50.7 MMHG (ref 60–100)
POTASSIUM SERPL-SCNC: 3.6 MMOL/L (ref 3.5–5)
PRO-BNP: 61 PG/ML (ref 0–125)
PROTHROMBIN TIME: 11.9 SEC (ref 9.3–12.4)
RBC # BLD: 4.86 E12/L (ref 3.8–5.8)
RESPIRATORY SYNCYTIAL VIRUS BY PCR: NOT DETECTED
SARS-COV-2, NAAT: NOT DETECTED
SODIUM BLD-SCNC: 137 MMOL/L (ref 132–146)
SOURCE, BLOOD GAS: ABNORMAL
TOTAL CK: 924 U/L (ref 20–200)
TRIGL SERPL-MCNC: 109 MG/DL (ref 0–149)
TROPONIN: <0.01 NG/ML (ref 0–0.03)
VLDLC SERPL CALC-MCNC: 22 MG/DL
WBC # BLD: 10.1 E9/L (ref 4.5–11.5)

## 2020-04-20 PROCEDURE — G0378 HOSPITAL OBSERVATION PER HR: HCPCS

## 2020-04-20 PROCEDURE — 85610 PROTHROMBIN TIME: CPT

## 2020-04-20 PROCEDURE — 99285 EMERGENCY DEPT VISIT HI MDM: CPT

## 2020-04-20 PROCEDURE — 80061 LIPID PANEL: CPT

## 2020-04-20 PROCEDURE — 36415 COLL VENOUS BLD VENIPUNCTURE: CPT

## 2020-04-20 PROCEDURE — 94640 AIRWAY INHALATION TREATMENT: CPT

## 2020-04-20 PROCEDURE — 6370000000 HC RX 637 (ALT 250 FOR IP): Performed by: STUDENT IN AN ORGANIZED HEALTH CARE EDUCATION/TRAINING PROGRAM

## 2020-04-20 PROCEDURE — 99222 1ST HOSP IP/OBS MODERATE 55: CPT | Performed by: FAMILY MEDICINE

## 2020-04-20 PROCEDURE — 2060000000 HC ICU INTERMEDIATE R&B

## 2020-04-20 PROCEDURE — 6370000000 HC RX 637 (ALT 250 FOR IP): Performed by: FAMILY MEDICINE

## 2020-04-20 PROCEDURE — 0100U HC RESPIRPTHGN MULT REV TRANS & AMP PRB TECH 21 TRGT: CPT

## 2020-04-20 PROCEDURE — 6360000002 HC RX W HCPCS: Performed by: STUDENT IN AN ORGANIZED HEALTH CARE EDUCATION/TRAINING PROGRAM

## 2020-04-20 PROCEDURE — 2580000003 HC RX 258: Performed by: STUDENT IN AN ORGANIZED HEALTH CARE EDUCATION/TRAINING PROGRAM

## 2020-04-20 PROCEDURE — 85025 COMPLETE CBC W/AUTO DIFF WBC: CPT

## 2020-04-20 PROCEDURE — 87040 BLOOD CULTURE FOR BACTERIA: CPT

## 2020-04-20 PROCEDURE — 80048 BASIC METABOLIC PNL TOTAL CA: CPT

## 2020-04-20 PROCEDURE — 87502 INFLUENZA DNA AMP PROBE: CPT

## 2020-04-20 PROCEDURE — 84484 ASSAY OF TROPONIN QUANT: CPT

## 2020-04-20 PROCEDURE — 71045 X-RAY EXAM CHEST 1 VIEW: CPT

## 2020-04-20 PROCEDURE — U0002 COVID-19 LAB TEST NON-CDC: HCPCS

## 2020-04-20 PROCEDURE — 2580000003 HC RX 258: Performed by: FAMILY MEDICINE

## 2020-04-20 PROCEDURE — 94660 CPAP INITIATION&MGMT: CPT

## 2020-04-20 PROCEDURE — 83880 ASSAY OF NATRIURETIC PEPTIDE: CPT

## 2020-04-20 PROCEDURE — 82803 BLOOD GASES ANY COMBINATION: CPT

## 2020-04-20 PROCEDURE — 82550 ASSAY OF CK (CPK): CPT

## 2020-04-20 PROCEDURE — 96374 THER/PROPH/DIAG INJ IV PUSH: CPT

## 2020-04-20 PROCEDURE — 71250 CT THORAX DX C-: CPT

## 2020-04-20 PROCEDURE — 94761 N-INVAS EAR/PLS OXIMETRY MLT: CPT

## 2020-04-20 PROCEDURE — 83605 ASSAY OF LACTIC ACID: CPT

## 2020-04-20 PROCEDURE — 82962 GLUCOSE BLOOD TEST: CPT

## 2020-04-20 PROCEDURE — 93010 ELECTROCARDIOGRAM REPORT: CPT | Performed by: INTERNAL MEDICINE

## 2020-04-20 PROCEDURE — 93005 ELECTROCARDIOGRAM TRACING: CPT | Performed by: EMERGENCY MEDICINE

## 2020-04-20 RX ORDER — 0.9 % SODIUM CHLORIDE 0.9 %
500 INTRAVENOUS SOLUTION INTRAVENOUS ONCE
Status: COMPLETED | OUTPATIENT
Start: 2020-04-20 | End: 2020-04-20

## 2020-04-20 RX ORDER — ARFORMOTEROL TARTRATE 15 UG/2ML
15 SOLUTION RESPIRATORY (INHALATION) 2 TIMES DAILY
Status: DISCONTINUED | OUTPATIENT
Start: 2020-04-20 | End: 2020-04-23 | Stop reason: HOSPADM

## 2020-04-20 RX ORDER — BUDESONIDE AND FORMOTEROL FUMARATE DIHYDRATE 160; 4.5 UG/1; UG/1
2 AEROSOL RESPIRATORY (INHALATION) 2 TIMES DAILY
Status: DISCONTINUED | OUTPATIENT
Start: 2020-04-20 | End: 2020-04-20 | Stop reason: CLARIF

## 2020-04-20 RX ORDER — ALBUTEROL SULFATE 2.5 MG/3ML
7.5 SOLUTION RESPIRATORY (INHALATION) ONCE
Status: COMPLETED | OUTPATIENT
Start: 2020-04-20 | End: 2020-04-20

## 2020-04-20 RX ORDER — PROMETHAZINE HYDROCHLORIDE 25 MG/1
12.5 TABLET ORAL EVERY 6 HOURS PRN
Status: DISCONTINUED | OUTPATIENT
Start: 2020-04-20 | End: 2020-04-23 | Stop reason: HOSPADM

## 2020-04-20 RX ORDER — PANTOPRAZOLE SODIUM 40 MG/1
40 TABLET, DELAYED RELEASE ORAL DAILY
Status: DISCONTINUED | OUTPATIENT
Start: 2020-04-20 | End: 2020-04-23 | Stop reason: HOSPADM

## 2020-04-20 RX ORDER — IPRATROPIUM BROMIDE AND ALBUTEROL SULFATE 2.5; .5 MG/3ML; MG/3ML
1 SOLUTION RESPIRATORY (INHALATION) EVERY 4 HOURS
Status: DISCONTINUED | OUTPATIENT
Start: 2020-04-20 | End: 2020-04-22

## 2020-04-20 RX ORDER — SODIUM CHLORIDE 0.9 % (FLUSH) 0.9 %
10 SYRINGE (ML) INJECTION EVERY 12 HOURS SCHEDULED
Status: DISCONTINUED | OUTPATIENT
Start: 2020-04-20 | End: 2020-04-23 | Stop reason: HOSPADM

## 2020-04-20 RX ORDER — NICOTINE POLACRILEX 4 MG
15 LOZENGE BUCCAL PRN
Status: DISCONTINUED | OUTPATIENT
Start: 2020-04-20 | End: 2020-04-23 | Stop reason: HOSPADM

## 2020-04-20 RX ORDER — 0.9 % SODIUM CHLORIDE 0.9 %
1000 INTRAVENOUS SOLUTION INTRAVENOUS ONCE
Status: COMPLETED | OUTPATIENT
Start: 2020-04-20 | End: 2020-04-20

## 2020-04-20 RX ORDER — GABAPENTIN 100 MG/1
200 CAPSULE ORAL 2 TIMES DAILY
Status: DISCONTINUED | OUTPATIENT
Start: 2020-04-20 | End: 2020-04-23 | Stop reason: HOSPADM

## 2020-04-20 RX ORDER — QUETIAPINE FUMARATE 100 MG/1
100 TABLET, FILM COATED ORAL NIGHTLY
Status: DISCONTINUED | OUTPATIENT
Start: 2020-04-20 | End: 2020-04-23 | Stop reason: HOSPADM

## 2020-04-20 RX ORDER — 0.9 % SODIUM CHLORIDE 0.9 %
1000 INTRAVENOUS SOLUTION INTRAVENOUS ONCE
Status: DISCONTINUED | OUTPATIENT
Start: 2020-04-20 | End: 2020-04-20

## 2020-04-20 RX ORDER — ASPIRIN 81 MG/1
81 TABLET, CHEWABLE ORAL DAILY
Status: DISCONTINUED | OUTPATIENT
Start: 2020-04-20 | End: 2020-04-23 | Stop reason: HOSPADM

## 2020-04-20 RX ORDER — ACETAMINOPHEN 650 MG/1
650 SUPPOSITORY RECTAL EVERY 6 HOURS PRN
Status: DISCONTINUED | OUTPATIENT
Start: 2020-04-20 | End: 2020-04-23 | Stop reason: HOSPADM

## 2020-04-20 RX ORDER — ACETAMINOPHEN 325 MG/1
650 TABLET ORAL EVERY 6 HOURS PRN
Status: DISCONTINUED | OUTPATIENT
Start: 2020-04-20 | End: 2020-04-23 | Stop reason: HOSPADM

## 2020-04-20 RX ORDER — PREDNISONE 20 MG/1
20 TABLET ORAL DAILY
Status: DISCONTINUED | OUTPATIENT
Start: 2020-04-20 | End: 2020-04-20

## 2020-04-20 RX ORDER — QUETIAPINE FUMARATE 25 MG/1
50 TABLET, FILM COATED ORAL NIGHTLY
Status: DISCONTINUED | OUTPATIENT
Start: 2020-04-20 | End: 2020-04-20

## 2020-04-20 RX ORDER — SODIUM CHLORIDE 0.9 % (FLUSH) 0.9 %
10 SYRINGE (ML) INJECTION PRN
Status: DISCONTINUED | OUTPATIENT
Start: 2020-04-20 | End: 2020-04-23 | Stop reason: HOSPADM

## 2020-04-20 RX ORDER — DEXTROSE MONOHYDRATE 25 G/50ML
12.5 INJECTION, SOLUTION INTRAVENOUS PRN
Status: DISCONTINUED | OUTPATIENT
Start: 2020-04-20 | End: 2020-04-23 | Stop reason: HOSPADM

## 2020-04-20 RX ORDER — PANTOPRAZOLE SODIUM 40 MG/1
40 TABLET, DELAYED RELEASE ORAL DAILY
Status: ON HOLD | COMMUNITY
End: 2020-11-14

## 2020-04-20 RX ORDER — PREDNISONE 20 MG/1
40 TABLET ORAL DAILY
Status: DISCONTINUED | OUTPATIENT
Start: 2020-04-21 | End: 2020-04-22

## 2020-04-20 RX ORDER — METHYLPREDNISOLONE SODIUM SUCCINATE 125 MG/2ML
125 INJECTION, POWDER, LYOPHILIZED, FOR SOLUTION INTRAMUSCULAR; INTRAVENOUS ONCE
Status: COMPLETED | OUTPATIENT
Start: 2020-04-20 | End: 2020-04-20

## 2020-04-20 RX ORDER — POLYETHYLENE GLYCOL 3350 17 G/17G
17 POWDER, FOR SOLUTION ORAL DAILY
Status: DISCONTINUED | OUTPATIENT
Start: 2020-04-20 | End: 2020-04-23 | Stop reason: HOSPADM

## 2020-04-20 RX ORDER — AMLODIPINE BESYLATE 10 MG/1
10 TABLET ORAL DAILY
Status: DISCONTINUED | OUTPATIENT
Start: 2020-04-20 | End: 2020-04-23 | Stop reason: HOSPADM

## 2020-04-20 RX ORDER — BUDESONIDE 0.5 MG/2ML
0.5 INHALANT ORAL 2 TIMES DAILY
Status: DISCONTINUED | OUTPATIENT
Start: 2020-04-20 | End: 2020-04-23 | Stop reason: HOSPADM

## 2020-04-20 RX ORDER — NICOTINE 21 MG/24HR
1 PATCH, TRANSDERMAL 24 HOURS TRANSDERMAL DAILY
Status: DISCONTINUED | OUTPATIENT
Start: 2020-04-21 | End: 2020-04-20

## 2020-04-20 RX ORDER — TAMSULOSIN HYDROCHLORIDE 0.4 MG/1
0.4 CAPSULE ORAL DAILY
Status: DISCONTINUED | OUTPATIENT
Start: 2020-04-20 | End: 2020-04-22

## 2020-04-20 RX ORDER — SODIUM CHLORIDE 9 MG/ML
INJECTION, SOLUTION INTRAVENOUS CONTINUOUS
Status: DISCONTINUED | OUTPATIENT
Start: 2020-04-20 | End: 2020-04-21

## 2020-04-20 RX ORDER — ONDANSETRON 2 MG/ML
4 INJECTION INTRAMUSCULAR; INTRAVENOUS EVERY 6 HOURS PRN
Status: DISCONTINUED | OUTPATIENT
Start: 2020-04-20 | End: 2020-04-23 | Stop reason: HOSPADM

## 2020-04-20 RX ORDER — BUDESONIDE AND FORMOTEROL FUMARATE DIHYDRATE 160; 4.5 UG/1; UG/1
2 AEROSOL RESPIRATORY (INHALATION) 2 TIMES DAILY
Status: ON HOLD | COMMUNITY
End: 2020-06-30 | Stop reason: HOSPADM

## 2020-04-20 RX ORDER — POLYETHYLENE GLYCOL 3350 17 G/17G
17 POWDER, FOR SOLUTION ORAL DAILY PRN
Status: DISCONTINUED | OUTPATIENT
Start: 2020-04-20 | End: 2020-04-20

## 2020-04-20 RX ORDER — DEXTROSE MONOHYDRATE 50 MG/ML
100 INJECTION, SOLUTION INTRAVENOUS PRN
Status: DISCONTINUED | OUTPATIENT
Start: 2020-04-20 | End: 2020-04-23 | Stop reason: HOSPADM

## 2020-04-20 RX ORDER — NICOTINE 21 MG/24HR
1 PATCH, TRANSDERMAL 24 HOURS TRANSDERMAL DAILY
Status: DISCONTINUED | OUTPATIENT
Start: 2020-04-20 | End: 2020-04-23 | Stop reason: HOSPADM

## 2020-04-20 RX ADMIN — INSULIN LISPRO 3 UNITS: 100 INJECTION, SOLUTION INTRAVENOUS; SUBCUTANEOUS at 16:58

## 2020-04-20 RX ADMIN — INSULIN LISPRO 2 UNITS: 100 INJECTION, SOLUTION INTRAVENOUS; SUBCUTANEOUS at 20:51

## 2020-04-20 RX ADMIN — ENOXAPARIN SODIUM 40 MG: 40 INJECTION SUBCUTANEOUS at 15:02

## 2020-04-20 RX ADMIN — AMLODIPINE BESYLATE 10 MG: 10 TABLET ORAL at 13:34

## 2020-04-20 RX ADMIN — BUDESONIDE 500 MCG: 0.5 INHALANT RESPIRATORY (INHALATION) at 20:27

## 2020-04-20 RX ADMIN — IPRATROPIUM BROMIDE AND ALBUTEROL SULFATE 3 ML: .5; 3 SOLUTION RESPIRATORY (INHALATION) at 16:13

## 2020-04-20 RX ADMIN — IPRATROPIUM BROMIDE AND ALBUTEROL SULFATE 3 ML: .5; 3 SOLUTION RESPIRATORY (INHALATION) at 20:28

## 2020-04-20 RX ADMIN — GABAPENTIN 200 MG: 100 CAPSULE ORAL at 20:47

## 2020-04-20 RX ADMIN — INSULIN HUMAN 8 UNITS: 100 INJECTION, SUSPENSION SUBCUTANEOUS at 14:26

## 2020-04-20 RX ADMIN — SODIUM CHLORIDE: 9 INJECTION, SOLUTION INTRAVENOUS at 22:43

## 2020-04-20 RX ADMIN — INSULIN LISPRO 4 UNITS: 100 INJECTION, SOLUTION INTRAVENOUS; SUBCUTANEOUS at 13:48

## 2020-04-20 RX ADMIN — METHYLPREDNISOLONE SODIUM SUCCINATE 125 MG: 125 INJECTION, POWDER, FOR SOLUTION INTRAMUSCULAR; INTRAVENOUS at 06:24

## 2020-04-20 RX ADMIN — GABAPENTIN 200 MG: 100 CAPSULE ORAL at 13:34

## 2020-04-20 RX ADMIN — SODIUM CHLORIDE 1000 ML: 9 INJECTION, SOLUTION INTRAVENOUS at 06:21

## 2020-04-20 RX ADMIN — ARFORMOTEROL TARTRATE 15 MCG: 15 SOLUTION RESPIRATORY (INHALATION) at 20:28

## 2020-04-20 RX ADMIN — PREDNISONE 20 MG: 20 TABLET ORAL at 13:46

## 2020-04-20 RX ADMIN — ASPIRIN 81 MG 81 MG: 81 TABLET ORAL at 13:34

## 2020-04-20 RX ADMIN — QUETIAPINE FUMARATE 100 MG: 100 TABLET ORAL at 20:47

## 2020-04-20 RX ADMIN — PANTOPRAZOLE SODIUM 40 MG: 40 TABLET, DELAYED RELEASE ORAL at 13:34

## 2020-04-20 RX ADMIN — SODIUM CHLORIDE: 9 INJECTION, SOLUTION INTRAVENOUS at 16:18

## 2020-04-20 RX ADMIN — ALBUTEROL SULFATE 7.5 MG: 2.5 SOLUTION RESPIRATORY (INHALATION) at 06:25

## 2020-04-20 RX ADMIN — TAMSULOSIN HYDROCHLORIDE 0.4 MG: 0.4 CAPSULE ORAL at 13:46

## 2020-04-20 RX ADMIN — IPRATROPIUM BROMIDE AND ALBUTEROL SULFATE 3 ML: .5; 3 SOLUTION RESPIRATORY (INHALATION) at 13:08

## 2020-04-20 RX ADMIN — SODIUM CHLORIDE 500 ML: 9 INJECTION, SOLUTION INTRAVENOUS at 15:38

## 2020-04-20 RX ADMIN — SODIUM CHLORIDE 1000 ML: 9 INJECTION, SOLUTION INTRAVENOUS at 07:01

## 2020-04-20 RX ADMIN — BISACODYL 5 MG: 5 TABLET, COATED ORAL at 13:46

## 2020-04-20 ASSESSMENT — ENCOUNTER SYMPTOMS
WHEEZING: 0
CHEST TIGHTNESS: 1
BLOOD IN STOOL: 0
BACK PAIN: 0
SHORTNESS OF BREATH: 1
DIARRHEA: 0
COLOR CHANGE: 0
SORE THROAT: 0
NAUSEA: 0
CONSTIPATION: 0
COUGH: 0
ABDOMINAL PAIN: 0
VOMITING: 0
RHINORRHEA: 0
PHOTOPHOBIA: 0
CONSTIPATION: 1

## 2020-04-20 NOTE — H&P
shortness of breath. Negative for cough and wheezing. Cardiovascular: Negative for chest pain and leg swelling. Gastrointestinal: Positive for constipation. Negative for abdominal pain, diarrhea and vomiting. Genitourinary: Negative for dysuria and hematuria. Musculoskeletal: Negative for back pain and myalgias. Skin: Negative for color change and pallor. Neurological: Negative for dizziness, seizures, speech difficulty, weakness, light-headedness and headaches. Past Medical History:   Diagnosis Date    Acid reflux     Asthma     Asthma     Hypertension     Pancreatitis     Prediabetes     Psychiatric problem     depressed    Substance abuse ([facility] [address]     alcohol and cocaine         Past Surgical History:   Procedure Laterality Date    COLONOSCOPY      2010 neg    NERVE BLOCK Left 08/27/2018    lumbar epidural #1 paramedian    GA NJX DX/THER SBST INTRLMNR LMBR/SAC W/IMG GDN Left 8/27/2018    LUMBAR EPIDURAL STEROID INJECTION L4-5 LEFT PARAMEDIAN #1 performed by Yrn Parker MD at [address] [place] [address]         Medications Prior to Admission:    Prior to Admission medications    Medication Sig Start Date End Date Taking? Authorizing Provider   pantoprazole (PROTONIX) 40 MG tablet Take 40 mg by mouth daily   Yes Historical Provider, MD   metFORMIN (GLUCOPHAGE) 1000 MG tablet Take 1,000 mg by mouth 2 times daily (with meals)   Yes Historical Provider, MD   budesonide-formoterol (SYMBICORT) 160-4.5 MCG/ACT AERO Inhale 2 puffs into the lungs 2 times daily   Yes Historical Provider, MD   gabapentin (NEURONTIN) 100 MG capsule Two tablets QHS for three days then 2 capsules BID. Patient taking differently: Take 200 mg by mouth 2 times daily.  Two tablets QHS for three days then 2 capsules BID 10/24/18 4/20/20 Yes Yrn Parker MD   ibuprofen (ADVIL;MOTRIN) 200 MG tablet Take 400 mg by mouth as needed for Pain   Yes Historical Provider, MD   acetaminophen (TYLENOL) 500 MG motion. No neck rigidity. Cardiovascular:      Rate and Rhythm: Normal rate and regular rhythm. Heart sounds: Normal heart sounds. No murmur. Pulmonary:      Effort: Pulmonary effort is normal.      Breath sounds: No wheezing. Comments: Breath sounds diminished in all fields bilaterally but no appreciable wheezing. Abdominal:      General: Bowel sounds are normal.      Palpations: Abdomen is soft. Tenderness: There is no abdominal tenderness. There is no guarding. Musculoskeletal: Normal range of motion. General: No swelling. Skin:     General: Skin is warm and dry. Findings: No bruising or rash. Neurological:      General: No focal deficit present. Mental Status: He is alert and oriented to person, place, and time.          LABS:  Recent Results (from the past 24 hour(s))   EKG 12 Lead    Collection Time: 04/20/20  5:32 AM   Result Value Ref Range    Ventricular Rate 123 BPM    Atrial Rate 123 BPM    P-R Interval 162 ms    QRS Duration 86 ms    Q-T Interval 310 ms    QTc Calculation (Bazett) 443 ms    P Axis 67 degrees    R Axis -58 degrees    T Axis 73 degrees   CBC auto differential    Collection Time: 04/20/20  5:40 AM   Result Value Ref Range    WBC 10.1 4.5 - 11.5 E9/L    RBC 4.86 3.80 - 5.80 E12/L    Hemoglobin 13.6 12.5 - 16.5 g/dL    Hematocrit 42.9 37.0 - 54.0 %    MCV 88.3 80.0 - 99.9 fL    MCH 28.0 26.0 - 35.0 pg    MCHC 31.7 (L) 32.0 - 34.5 %    RDW 14.5 11.5 - 15.0 fL    Platelets 516 635 - 764 E9/L    MPV 9.3 7.0 - 12.0 fL    Neutrophils % 65.5 43.0 - 80.0 %    Immature Granulocytes % 1.1 0.0 - 5.0 %    Lymphocytes % 23.2 20.0 - 42.0 %    Monocytes % 9.8 2.0 - 12.0 %    Eosinophils % 0.1 0.0 - 6.0 %    Basophils % 0.3 0.0 - 2.0 %    Neutrophils Absolute 6.61 1.80 - 7.30 E9/L    Immature Granulocytes # 0.11 E9/L    Lymphocytes Absolute 2.34 1.50 - 4.00 E9/L    Monocytes Absolute 0.99 (H) 0.10 - 0.95 E9/L    Eosinophils Absolute 0.01 (L) 0.05 - 0.50 E9/L Basophils Absolute 0.03 0.00 - 0.20 E9/L   Troponin    Collection Time: 04/20/20  5:40 AM   Result Value Ref Range    Troponin <0.01 0.00 - 0.03 ng/mL   Protime-INR    Collection Time: 04/20/20  5:40 AM   Result Value Ref Range    Protime 11.9 9.3 - 12.4 sec    INR 1.0    Rapid influenza A/B antigens    Collection Time: 04/20/20  5:40 AM   Result Value Ref Range    Influenza A by PCR Not Detected Not Detected    Influenza B by PCR Not Detected Not Detected   Brain Natriuretic Peptide    Collection Time: 04/20/20  5:40 AM   Result Value Ref Range    Pro-BNP 61 0 - 125 pg/mL   Lactic Acid, Plasma    Collection Time: 04/20/20  5:40 AM   Result Value Ref Range    Lactic Acid 7.3 (HH) 0.5 - 2.2 mmol/L   Basic Metabolic Panel    Collection Time: 04/20/20  5:40 AM   Result Value Ref Range    Sodium 137 132 - 146 mmol/L    Potassium 3.6 3.5 - 5.0 mmol/L    Chloride 92 (L) 98 - 107 mmol/L    CO2 24 22 - 29 mmol/L    Anion Gap 21 (H) 7 - 16 mmol/L    Glucose 163 (H) 74 - 99 mg/dL    BUN 8 6 - 20 mg/dL    CREATININE 0.8 0.7 - 1.2 mg/dL    GFR Non-African American >60 >=60 mL/min/1.73    GFR African American >60     Calcium 8.6 8.6 - 10.2 mg/dL   Respiratory Panel, Molecular    Collection Time: 04/20/20  5:40 AM   Result Value Ref Range    Adenovirus by PCR Not Detected Not Detected    Bordetella parapertussis by PCR Not Detected Not Detected    Bordetella pertussis by PCR Not Detected Not Detected    Chlamydophilia pneumoniae by PCR Not Detected Not Detected    Coronavirus 229E by PCR Not Detected Not Detected    Coronavirus HKU1 by PCR Not Detected Not Detected    Coronavirus NL63 by PCR Not Detected Not Detected    Coronavirus OC43 by PCR Not Detected Not Detected    Human Metapneumovirus by PCR Not Detected Not Detected    Human Rhinovirus/Enterovirus by PCR Not Detected Not Detected    Influenza A by PCR Not Detected Not Detected    Influenza B by PCR Not Detected Not Detected    Mycoplasma pneumoniae by PCR Not Detected

## 2020-04-20 NOTE — ED PROVIDER NOTES
Patient is 64-year-old male with past medical history significant for hypertension and COPD who presents emergency department with complaint of shortness of breath. States that he said shortness of breath for the past 1 to 2 days. States also that he is having chest tightness associated with it. Is particularly worse with exertion. States that he will notice COPD exacerbations when he starts going up stairs and he cannot catch his breath. Patient has been using inhalers, but has been away from his nebulizer for the past several days. Believes that this is what put him into an exacerbation. On presentation he is also ox is 93% on room air. His heart rate is 123. Patient has not been at home for the past few days and that is why has been away from his nebulizer. Denies any fevers, abdominal pain, nausea/vomiting, change in urination or defecation. Patient states that typically when this happens he ends up getting admitted for a day or 2. Denies any contact with sick individuals. Patient appears to be having a COPD exacerbation as opposed to possible COVID-19. Patient was placed on isolation for possible COVID-19. PPE was utilized during this encounter. Patient states he will have an occasional cough with yellow sputum production. Takes daily aspirin. Review of Systems   Constitutional: Negative for diaphoresis and fever. HENT: Negative for congestion, rhinorrhea and sore throat. Eyes: Negative for visual disturbance. Respiratory: Positive for chest tightness and shortness of breath. Negative for cough and wheezing. Cardiovascular: Negative for chest pain, palpitations and leg swelling. Gastrointestinal: Negative for abdominal pain, blood in stool, constipation, diarrhea, nausea and vomiting. Endocrine: Negative for polyuria. Genitourinary: Negative for decreased urine volume, discharge and dysuria. Musculoskeletal: Negative for back pain, neck pain and neck stiffness. No swelling, tenderness, deformity or signs of injury. Right lower leg: No edema. Left lower leg: No edema. Comments: No swelling noted in lower extremities. Lymphadenopathy:      Cervical: No cervical adenopathy. Skin:     General: Skin is warm and dry. Findings: No erythema or rash. Neurological:      General: No focal deficit present. Mental Status: He is alert and oriented to person, place, and time. Cranial Nerves: No cranial nerve deficit. Sensory: No sensory deficit. Psychiatric:         Mood and Affect: Mood normal.         Behavior: Behavior normal.          Procedures     MDM     ED Course as of Apr 20 1133   Mon Apr 20, 2020   0671 Informed by lab that lactic acid is elevated. [DH]   9784 Patient was ambulated and pulse ox dropped to 85%. [DH]   4995 EKG: This EKG is signed and interpreted by me. Rate: 123  Rhythm: Sinus  Interpretation: sinus tachycardia, nonspecific T wave changes, normal MS internal, normal QRS  Comparison: no previous EKG available      [JA]   0830 [facility] medicine will not admit until negative COVID PCR    [JA]      ED Course User Index  [DH] Linda Taylor DO  [JA] Clau Olivia MD        EKG: This EKG is signed and interpreted by me. Rate: 123  Rhythm: Sinus  Interpretation: Sinus tachycardia with a left axis deviation. No ST elevation or depression. No sign of acute ischemia. QTC is 443. Comparison: stable as compared to patient's most recent EKG    ED Course as of Apr 20 1133   Mon Apr 20, 2020   4137 Informed by lab that lactic acid is elevated. [DH]   7660 Patient was ambulated and pulse ox dropped to 85%. [DH]   6007 EKG: This EKG is signed and interpreted by me.     Rate: 123  Rhythm: Sinus  Interpretation: sinus tachycardia, nonspecific T wave changes, normal MS internal, normal QRS  Comparison: no previous EKG available      [JA]   0830 [facility] will not admit until negative COVID PCR    [JA] ED Course User Index  [DH] Maria A Wheat DO  [JA] Mariposa Mallory MD       --------------------------------------------- PAST HISTORY ---------------------------------------------  Past Medical History:  has a past medical history of Acid reflux, Asthma, Asthma, Hypertension, Pancreatitis, Prediabetes, Psychiatric problem, and Substance abuse ([facility] [address] Past Surgical History:  has a past surgical history that includes [place] tooth extraction; Colonoscopy; Nerve Block (Left, 08/27/2018); and pr njx dx/ther sbst intrlmnr lmbr/sac w/img gdn (Left, 8/27/2018). Social History:  reports that he has been smoking cigarettes. He has a 25.00 pack-year smoking history. He has never used smokeless tobacco. He reports current alcohol use. He reports that he does not use drugs. Family History: family history includes High Blood Pressure in his maternal grandmother; Other in his maternal grandmother and sister. The patients home medications have been reviewed.     Allergies: Dust mite extract    -------------------------------------------------- RESULTS -------------------------------------------------    LABS:  Results for orders placed or performed during the hospital encounter of 04/20/20   Rapid influenza A/B antigens   Result Value Ref Range    Influenza A by PCR Not Detected Not Detected    Influenza B by PCR Not Detected Not Detected   Respiratory Panel, Molecular   Result Value Ref Range    Adenovirus by PCR Not Detected Not Detected    Bordetella parapertussis by PCR Not Detected Not Detected    Bordetella pertussis by PCR Not Detected Not Detected    Chlamydophilia pneumoniae by PCR Not Detected Not Detected    Coronavirus 229E by PCR Not Detected Not Detected    Coronavirus HKU1 by PCR Not Detected Not Detected    Coronavirus NL63 by PCR Not Detected Not Detected    Coronavirus OC43 by PCR Not Detected Not Detected    Human Metapneumovirus by PCR Not Detected Not Detected    Human Rhinovirus/Enterovirus by PCR Not Detected Not Detected    Influenza A by PCR Not Detected Not Detected    Influenza B by PCR Not Detected Not Detected    Mycoplasma pneumoniae by PCR Not Detected Not Detected    Parainfluenza Virus 1 by PCR Not Detected Not Detected    Parainfluenza Virus 2 by PCR Not Detected Not Detected    Parainfluenza Virus 3 by PCR Not Detected Not Detected    Parainfluenza Virus 4 by PCR Not Detected Not Detected    Respiratory Syncytial Virus by PCR Not Detected Not Detected   CBC auto differential   Result Value Ref Range    WBC 10.1 4.5 - 11.5 E9/L    RBC 4.86 3.80 - 5.80 E12/L    Hemoglobin 13.6 12.5 - 16.5 g/dL    Hematocrit 42.9 37.0 - 54.0 %    MCV 88.3 80.0 - 99.9 fL    MCH 28.0 26.0 - 35.0 pg    MCHC 31.7 (L) 32.0 - 34.5 %    RDW 14.5 11.5 - 15.0 fL    Platelets 164 290 - 521 E9/L    MPV 9.3 7.0 - 12.0 fL    Neutrophils % 65.5 43.0 - 80.0 %    Immature Granulocytes % 1.1 0.0 - 5.0 %    Lymphocytes % 23.2 20.0 - 42.0 %    Monocytes % 9.8 2.0 - 12.0 %    Eosinophils % 0.1 0.0 - 6.0 %    Basophils % 0.3 0.0 - 2.0 %    Neutrophils Absolute 6.61 1.80 - 7.30 E9/L    Immature Granulocytes # 0.11 E9/L    Lymphocytes Absolute 2.34 1.50 - 4.00 E9/L    Monocytes Absolute 0.99 (H) 0.10 - 0.95 E9/L    Eosinophils Absolute 0.01 (L) 0.05 - 0.50 E9/L    Basophils Absolute 0.03 0.00 - 0.20 E9/L   Troponin   Result Value Ref Range    Troponin <0.01 0.00 - 0.03 ng/mL   Protime-INR   Result Value Ref Range    Protime 11.9 9.3 - 12.4 sec    INR 1.0    Brain Natriuretic Peptide   Result Value Ref Range    Pro-BNP 61 0 - 125 pg/mL   Lactic Acid, Plasma   Result Value Ref Range    Lactic Acid 7.3 (HH) 0.5 - 2.2 mmol/L   Basic Metabolic Panel   Result Value Ref Range    Sodium 137 132 - 146 mmol/L    Potassium 3.6 3.5 - 5.0 mmol/L    Chloride 92 (L) 98 - 107 mmol/L    CO2 24 22 - 29 mmol/L    Anion Gap 21 (H) 7 - 16 mmol/L    Glucose 163 (H) 74 - 99 mg/dL    BUN 8 6 - 20 mg/dL    CREATININE 0.8 0.7 - 1.2 mg/dL    GFR Non-African American >60 >=60 mL/min/1.73    GFR African American >60     Calcium 8.6 8.6 - 10.2 mg/dL   Arterial Blood Gas, Respiratory Only   Result Value Ref Range    Source: Arterial     Pt Temp 37.0     PH (TEMPERATURE CORRECTED) 7.395 7.350 - 7.450    PCO2 (TEMP CORRECTED) 40.5 35.0 - 45.0 mmHg    PO2 (TEMP CORRECTED) 50.7 (L) 60.0 - 100.0 mmHg    HCO3, Arterial 24.8 22.0 - 26.0 mmol/L    B.E. -0.1 -3.0 - 0.0 mmol/L    O2 Sat 85.3 (L) 92.0 - 98.5 %     ID 3,342     DEVICE [id number]     Delivery [employer]    COVID-19   Result Value Ref Range    SARS-CoV-2, NAAT Not Detected Not Detected   EKG 12 Lead   Result Value Ref Range    Ventricular Rate 123 BPM    Atrial Rate 123 BPM    P-R Interval 162 ms    QRS Duration 86 ms    Q-T Interval 310 ms    QTc Calculation (Bazett) 443 ms    P Axis 67 degrees    R Axis -58 degrees    T Axis 73 degrees       RADIOLOGY:  CT Chest WO Contrast   Final Result   No acute process         XR CHEST PORTABLE   Final Result   Normal chest                       ------------------------- NURSING NOTES AND VITALS REVIEWED ---------------------------  Date / Time Roomed:  4/20/2020  5:17 AM  ED Bed Assignment:  04/04    The nursing notes within the ED encounter and vital signs as below have been reviewed.      Patient Vitals for the past 24 hrs:   BP Temp Temp src Pulse Resp SpO2 Height Weight   04/20/20 1039 (!) 155/93 97.9 °F (36.6 °C) Oral 100 18 95 % -- --   04/20/20 0738 104/67 -- -- 115 18 92 % -- --   04/20/20 0723 132/82 97.7 °F (36.5 °C) Oral 120 18 (!) 87 % -- --   04/20/20 0640 -- -- -- -- -- 96 % -- --   04/20/20 0625 -- -- -- -- -- 94 % -- --   04/20/20 0525 133/81 98.7 °F (37.1 °C) -- 123 18 93 % 5' 9\" (1.753 m) 270 lb (122.5 kg)       Oxygen Saturation Interpretation: Improved after treatment    ------------------------------------------ PROGRESS NOTES ------------------------------------------    Counseling:  I have spoken with the

## 2020-04-21 LAB
AMPHETAMINE SCREEN, URINE: NOT DETECTED
ANION GAP SERPL CALCULATED.3IONS-SCNC: 9 MMOL/L (ref 7–16)
BARBITURATE SCREEN URINE: NOT DETECTED
BASOPHILS ABSOLUTE: 0.01 E9/L (ref 0–0.2)
BASOPHILS RELATIVE PERCENT: 0.1 % (ref 0–2)
BENZODIAZEPINE SCREEN, URINE: NOT DETECTED
BILIRUBIN URINE: NEGATIVE
BLOOD, URINE: NEGATIVE
BUN BLDV-MCNC: 6 MG/DL (ref 6–20)
CALCIUM SERPL-MCNC: 9.2 MG/DL (ref 8.6–10.2)
CANNABINOID SCREEN URINE: NOT DETECTED
CHLORIDE BLD-SCNC: 103 MMOL/L (ref 98–107)
CLARITY: CLEAR
CO2: 29 MMOL/L (ref 22–29)
COCAINE METABOLITE SCREEN URINE: POSITIVE
COLOR: YELLOW
CREAT SERPL-MCNC: 0.8 MG/DL (ref 0.7–1.2)
EKG ATRIAL RATE: 96 BPM
EKG P AXIS: 56 DEGREES
EKG P-R INTERVAL: 152 MS
EKG Q-T INTERVAL: 362 MS
EKG QRS DURATION: 98 MS
EKG QTC CALCULATION (BAZETT): 457 MS
EKG R AXIS: -17 DEGREES
EKG T AXIS: 71 DEGREES
EKG VENTRICULAR RATE: 96 BPM
EOSINOPHILS ABSOLUTE: 0 E9/L (ref 0.05–0.5)
EOSINOPHILS RELATIVE PERCENT: 0 % (ref 0–6)
FENTANYL SCREEN, URINE: NOT DETECTED
GFR AFRICAN AMERICAN: >60
GFR NON-AFRICAN AMERICAN: >60 ML/MIN/1.73
GLUCOSE BLD-MCNC: 176 MG/DL (ref 74–99)
GLUCOSE URINE: 100 MG/DL
HCT VFR BLD CALC: 46.3 % (ref 37–54)
HEMOGLOBIN: 14.2 G/DL (ref 12.5–16.5)
IMMATURE GRANULOCYTES #: 0.11 E9/L
IMMATURE GRANULOCYTES %: 0.9 % (ref 0–5)
KETONES, URINE: NEGATIVE MG/DL
LACTIC ACID: 1.7 MMOL/L (ref 0.5–2.2)
LEUKOCYTE ESTERASE, URINE: NEGATIVE
LYMPHOCYTES ABSOLUTE: 1.2 E9/L (ref 1.5–4)
LYMPHOCYTES RELATIVE PERCENT: 10 % (ref 20–42)
Lab: ABNORMAL
MCH RBC QN AUTO: 28 PG (ref 26–35)
MCHC RBC AUTO-ENTMCNC: 30.7 % (ref 32–34.5)
MCV RBC AUTO: 91.3 FL (ref 80–99.9)
METER GLUCOSE: 149 MG/DL (ref 74–99)
METER GLUCOSE: 184 MG/DL (ref 74–99)
METER GLUCOSE: 193 MG/DL (ref 74–99)
METHADONE SCREEN, URINE: NOT DETECTED
MONOCYTES ABSOLUTE: 0.85 E9/L (ref 0.1–0.95)
MONOCYTES RELATIVE PERCENT: 7.1 % (ref 2–12)
NEUTROPHILS ABSOLUTE: 9.87 E9/L (ref 1.8–7.3)
NEUTROPHILS RELATIVE PERCENT: 81.9 % (ref 43–80)
NITRITE, URINE: NEGATIVE
OPIATE SCREEN URINE: NOT DETECTED
OXYCODONE URINE: NOT DETECTED
PDW BLD-RTO: 15.9 FL (ref 11.5–15)
PH UA: 6.5 (ref 5–9)
PHENCYCLIDINE SCREEN URINE: NOT DETECTED
PLATELET # BLD: 302 E9/L (ref 130–450)
PMV BLD AUTO: 9.7 FL (ref 7–12)
POTASSIUM REFLEX MAGNESIUM: 3.7 MMOL/L (ref 3.5–5)
PROTEIN UA: NEGATIVE MG/DL
RBC # BLD: 5.07 E12/L (ref 3.8–5.8)
SODIUM BLD-SCNC: 141 MMOL/L (ref 132–146)
SPECIFIC GRAVITY UA: 1.01 (ref 1–1.03)
UROBILINOGEN, URINE: 1 E.U./DL
WBC # BLD: 12 E9/L (ref 4.5–11.5)

## 2020-04-21 PROCEDURE — 82962 GLUCOSE BLOOD TEST: CPT

## 2020-04-21 PROCEDURE — 93010 ELECTROCARDIOGRAM REPORT: CPT | Performed by: INTERNAL MEDICINE

## 2020-04-21 PROCEDURE — 85025 COMPLETE CBC W/AUTO DIFF WBC: CPT

## 2020-04-21 PROCEDURE — 2700000000 HC OXYGEN THERAPY PER DAY

## 2020-04-21 PROCEDURE — 36415 COLL VENOUS BLD VENIPUNCTURE: CPT

## 2020-04-21 PROCEDURE — 6360000002 HC RX W HCPCS: Performed by: STUDENT IN AN ORGANIZED HEALTH CARE EDUCATION/TRAINING PROGRAM

## 2020-04-21 PROCEDURE — 81003 URINALYSIS AUTO W/O SCOPE: CPT

## 2020-04-21 PROCEDURE — 80048 BASIC METABOLIC PNL TOTAL CA: CPT

## 2020-04-21 PROCEDURE — 2580000003 HC RX 258: Performed by: STUDENT IN AN ORGANIZED HEALTH CARE EDUCATION/TRAINING PROGRAM

## 2020-04-21 PROCEDURE — 2580000003 HC RX 258: Performed by: FAMILY MEDICINE

## 2020-04-21 PROCEDURE — 94660 CPAP INITIATION&MGMT: CPT

## 2020-04-21 PROCEDURE — 2060000000 HC ICU INTERMEDIATE R&B

## 2020-04-21 PROCEDURE — 83605 ASSAY OF LACTIC ACID: CPT

## 2020-04-21 PROCEDURE — 6370000000 HC RX 637 (ALT 250 FOR IP): Performed by: STUDENT IN AN ORGANIZED HEALTH CARE EDUCATION/TRAINING PROGRAM

## 2020-04-21 PROCEDURE — 80307 DRUG TEST PRSMV CHEM ANLYZR: CPT

## 2020-04-21 PROCEDURE — 94640 AIRWAY INHALATION TREATMENT: CPT

## 2020-04-21 PROCEDURE — 93005 ELECTROCARDIOGRAM TRACING: CPT | Performed by: STUDENT IN AN ORGANIZED HEALTH CARE EDUCATION/TRAINING PROGRAM

## 2020-04-21 PROCEDURE — 6370000000 HC RX 637 (ALT 250 FOR IP): Performed by: FAMILY MEDICINE

## 2020-04-21 PROCEDURE — 99232 SBSQ HOSP IP/OBS MODERATE 35: CPT | Performed by: FAMILY MEDICINE

## 2020-04-21 RX ORDER — SENNA PLUS 8.6 MG/1
1 TABLET ORAL DAILY
Status: DISCONTINUED | OUTPATIENT
Start: 2020-04-21 | End: 2020-04-23 | Stop reason: HOSPADM

## 2020-04-21 RX ADMIN — GABAPENTIN 200 MG: 100 CAPSULE ORAL at 08:43

## 2020-04-21 RX ADMIN — IPRATROPIUM BROMIDE AND ALBUTEROL SULFATE 3 ML: .5; 3 SOLUTION RESPIRATORY (INHALATION) at 15:43

## 2020-04-21 RX ADMIN — ASPIRIN 81 MG 81 MG: 81 TABLET ORAL at 08:43

## 2020-04-21 RX ADMIN — INSULIN LISPRO 2 UNITS: 100 INJECTION, SOLUTION INTRAVENOUS; SUBCUTANEOUS at 11:48

## 2020-04-21 RX ADMIN — PREDNISONE 40 MG: 20 TABLET ORAL at 08:43

## 2020-04-21 RX ADMIN — GABAPENTIN 200 MG: 100 CAPSULE ORAL at 21:11

## 2020-04-21 RX ADMIN — POLYETHYLENE GLYCOL 3350 17 G: 17 POWDER, FOR SOLUTION ORAL at 22:55

## 2020-04-21 RX ADMIN — ARFORMOTEROL TARTRATE 15 MCG: 15 SOLUTION RESPIRATORY (INHALATION) at 08:53

## 2020-04-21 RX ADMIN — BISACODYL 5 MG: 5 TABLET, COATED ORAL at 08:43

## 2020-04-21 RX ADMIN — BUDESONIDE 500 MCG: 0.5 INHALANT RESPIRATORY (INHALATION) at 19:58

## 2020-04-21 RX ADMIN — IPRATROPIUM BROMIDE AND ALBUTEROL SULFATE 3 ML: .5; 3 SOLUTION RESPIRATORY (INHALATION) at 19:57

## 2020-04-21 RX ADMIN — INSULIN LISPRO 2 UNITS: 100 INJECTION, SOLUTION INTRAVENOUS; SUBCUTANEOUS at 16:56

## 2020-04-21 RX ADMIN — BUDESONIDE 500 MCG: 0.5 INHALANT RESPIRATORY (INHALATION) at 08:53

## 2020-04-21 RX ADMIN — ARFORMOTEROL TARTRATE 15 MCG: 15 SOLUTION RESPIRATORY (INHALATION) at 20:00

## 2020-04-21 RX ADMIN — SODIUM CHLORIDE: 9 INJECTION, SOLUTION INTRAVENOUS at 05:13

## 2020-04-21 RX ADMIN — IPRATROPIUM BROMIDE AND ALBUTEROL SULFATE 3 ML: .5; 3 SOLUTION RESPIRATORY (INHALATION) at 01:35

## 2020-04-21 RX ADMIN — INSULIN LISPRO 1 UNITS: 100 INJECTION, SOLUTION INTRAVENOUS; SUBCUTANEOUS at 21:12

## 2020-04-21 RX ADMIN — QUETIAPINE FUMARATE 100 MG: 100 TABLET ORAL at 21:11

## 2020-04-21 RX ADMIN — INSULIN HUMAN 12 UNITS: 100 INJECTION, SUSPENSION SUBCUTANEOUS at 10:25

## 2020-04-21 RX ADMIN — INSULIN LISPRO 2 UNITS: 100 INJECTION, SOLUTION INTRAVENOUS; SUBCUTANEOUS at 08:46

## 2020-04-21 RX ADMIN — IPRATROPIUM BROMIDE AND ALBUTEROL SULFATE 3 ML: .5; 3 SOLUTION RESPIRATORY (INHALATION) at 12:10

## 2020-04-21 RX ADMIN — PANTOPRAZOLE SODIUM 40 MG: 40 TABLET, DELAYED RELEASE ORAL at 06:48

## 2020-04-21 RX ADMIN — AMLODIPINE BESYLATE 10 MG: 10 TABLET ORAL at 08:43

## 2020-04-21 RX ADMIN — TAMSULOSIN HYDROCHLORIDE 0.4 MG: 0.4 CAPSULE ORAL at 08:43

## 2020-04-21 RX ADMIN — IPRATROPIUM BROMIDE AND ALBUTEROL SULFATE 3 ML: .5; 3 SOLUTION RESPIRATORY (INHALATION) at 08:53

## 2020-04-21 RX ADMIN — ENOXAPARIN SODIUM 40 MG: 40 INJECTION SUBCUTANEOUS at 14:06

## 2020-04-21 RX ADMIN — Medication 10 ML: at 22:40

## 2020-04-21 RX ADMIN — IPRATROPIUM BROMIDE AND ALBUTEROL SULFATE 3 ML: .5; 3 SOLUTION RESPIRATORY (INHALATION) at 04:44

## 2020-04-21 NOTE — PLAN OF CARE
Problem: Fluid and Electrolyte Imbalance  Goal: Fluid and electrolyte balance are achieved/maintained  4/21/2020 0333 by Claudia Rojo RN  Outcome: Met This Shift  4/20/2020 1734 by Nathalie Dowd RN  Outcome: Met This Shift     Problem: Gas Exchange - Impaired:  Goal: Able to breathe comfortably  Description: Able to breathe comfortably     Outcome: Met This Shift     Problem: Pain:  Goal: Pain level will decrease  Description: Pain level will decrease     Outcome: Met This Shift     Problem: FALL RISK  Goal: Absence of falls  Outcome: Met This Shift

## 2020-04-21 NOTE — PROGRESS NOTES
P Quality Flow/Interdisciplinary Rounds Progress Note        Quality Flow Rounds held on April 21, 2020    Disciplines Attending:  Bedside Nurse, ,  and Nursing Unit Leadership    Genesis Muniz was admitted on 4/20/2020  5:17 AM    Anticipated Discharge Date:  Expected Discharge Date: 04/23/20    Disposition:    Jaycob Score:  Jaycob Scale Score: 23    Readmission Risk              Risk of Unplanned Readmission:        11           Discussed patient goal for the day, patient clinical progression, and barriers to discharge.   The following Goal(s) of the Day/Commitment(s) have been identified:  discharge planning      Barrett Curling  April 21, 2020

## 2020-04-22 LAB
ANION GAP SERPL CALCULATED.3IONS-SCNC: 11 MMOL/L (ref 7–16)
BASOPHILS ABSOLUTE: 0.02 E9/L (ref 0–0.2)
BASOPHILS RELATIVE PERCENT: 0.2 % (ref 0–2)
BUN BLDV-MCNC: 7 MG/DL (ref 6–20)
CALCIUM SERPL-MCNC: 8.7 MG/DL (ref 8.6–10.2)
CHLORIDE BLD-SCNC: 100 MMOL/L (ref 98–107)
CO2: 29 MMOL/L (ref 22–29)
CREAT SERPL-MCNC: 0.7 MG/DL (ref 0.7–1.2)
EOSINOPHILS ABSOLUTE: 0.01 E9/L (ref 0.05–0.5)
EOSINOPHILS RELATIVE PERCENT: 0.1 % (ref 0–6)
GFR AFRICAN AMERICAN: >60
GFR NON-AFRICAN AMERICAN: >60 ML/MIN/1.73
GLUCOSE BLD-MCNC: 155 MG/DL (ref 74–99)
HCT VFR BLD CALC: 43.9 % (ref 37–54)
HEMOGLOBIN: 13.5 G/DL (ref 12.5–16.5)
IMMATURE GRANULOCYTES #: 0.12 E9/L
IMMATURE GRANULOCYTES %: 1 % (ref 0–5)
LYMPHOCYTES ABSOLUTE: 2.18 E9/L (ref 1.5–4)
LYMPHOCYTES RELATIVE PERCENT: 18.8 % (ref 20–42)
MAGNESIUM: 2.2 MG/DL (ref 1.6–2.6)
MCH RBC QN AUTO: 28.2 PG (ref 26–35)
MCHC RBC AUTO-ENTMCNC: 30.8 % (ref 32–34.5)
MCV RBC AUTO: 91.8 FL (ref 80–99.9)
METER GLUCOSE: 134 MG/DL (ref 74–99)
METER GLUCOSE: 153 MG/DL (ref 74–99)
METER GLUCOSE: 166 MG/DL (ref 74–99)
METER GLUCOSE: 197 MG/DL (ref 74–99)
MONOCYTES ABSOLUTE: 0.71 E9/L (ref 0.1–0.95)
MONOCYTES RELATIVE PERCENT: 6.1 % (ref 2–12)
NEUTROPHILS ABSOLUTE: 8.58 E9/L (ref 1.8–7.3)
NEUTROPHILS RELATIVE PERCENT: 73.8 % (ref 43–80)
PDW BLD-RTO: 15.9 FL (ref 11.5–15)
PLATELET # BLD: 273 E9/L (ref 130–450)
PMV BLD AUTO: 9.5 FL (ref 7–12)
POTASSIUM REFLEX MAGNESIUM: 3.2 MMOL/L (ref 3.5–5)
RBC # BLD: 4.78 E12/L (ref 3.8–5.8)
SODIUM BLD-SCNC: 140 MMOL/L (ref 132–146)
WBC # BLD: 11.6 E9/L (ref 4.5–11.5)

## 2020-04-22 PROCEDURE — 80048 BASIC METABOLIC PNL TOTAL CA: CPT

## 2020-04-22 PROCEDURE — 2580000003 HC RX 258: Performed by: STUDENT IN AN ORGANIZED HEALTH CARE EDUCATION/TRAINING PROGRAM

## 2020-04-22 PROCEDURE — 99232 SBSQ HOSP IP/OBS MODERATE 35: CPT | Performed by: FAMILY MEDICINE

## 2020-04-22 PROCEDURE — 2060000000 HC ICU INTERMEDIATE R&B

## 2020-04-22 PROCEDURE — 6370000000 HC RX 637 (ALT 250 FOR IP): Performed by: FAMILY MEDICINE

## 2020-04-22 PROCEDURE — 6370000000 HC RX 637 (ALT 250 FOR IP): Performed by: STUDENT IN AN ORGANIZED HEALTH CARE EDUCATION/TRAINING PROGRAM

## 2020-04-22 PROCEDURE — 2700000000 HC OXYGEN THERAPY PER DAY

## 2020-04-22 PROCEDURE — 6360000002 HC RX W HCPCS: Performed by: STUDENT IN AN ORGANIZED HEALTH CARE EDUCATION/TRAINING PROGRAM

## 2020-04-22 PROCEDURE — 83735 ASSAY OF MAGNESIUM: CPT

## 2020-04-22 PROCEDURE — 82962 GLUCOSE BLOOD TEST: CPT

## 2020-04-22 PROCEDURE — 36415 COLL VENOUS BLD VENIPUNCTURE: CPT

## 2020-04-22 PROCEDURE — 94640 AIRWAY INHALATION TREATMENT: CPT

## 2020-04-22 PROCEDURE — 85025 COMPLETE CBC W/AUTO DIFF WBC: CPT

## 2020-04-22 PROCEDURE — 94660 CPAP INITIATION&MGMT: CPT

## 2020-04-22 RX ORDER — METHYLPREDNISOLONE SODIUM SUCCINATE 125 MG/2ML
60 INJECTION, POWDER, LYOPHILIZED, FOR SOLUTION INTRAMUSCULAR; INTRAVENOUS 2 TIMES DAILY
Status: DISCONTINUED | OUTPATIENT
Start: 2020-04-22 | End: 2020-04-23 | Stop reason: HOSPADM

## 2020-04-22 RX ORDER — POTASSIUM CHLORIDE 20 MEQ/1
40 TABLET, EXTENDED RELEASE ORAL ONCE
Status: COMPLETED | OUTPATIENT
Start: 2020-04-22 | End: 2020-04-22

## 2020-04-22 RX ORDER — TAMSULOSIN HYDROCHLORIDE 0.4 MG/1
0.4 CAPSULE ORAL 2 TIMES DAILY
Status: DISCONTINUED | OUTPATIENT
Start: 2020-04-22 | End: 2020-04-23 | Stop reason: HOSPADM

## 2020-04-22 RX ORDER — IPRATROPIUM BROMIDE AND ALBUTEROL SULFATE 2.5; .5 MG/3ML; MG/3ML
1 SOLUTION RESPIRATORY (INHALATION)
Status: DISCONTINUED | OUTPATIENT
Start: 2020-04-22 | End: 2020-04-23 | Stop reason: HOSPADM

## 2020-04-22 RX ORDER — POTASSIUM CHLORIDE 20 MEQ/1
40 TABLET, EXTENDED RELEASE ORAL 2 TIMES DAILY WITH MEALS
Status: DISCONTINUED | OUTPATIENT
Start: 2020-04-22 | End: 2020-04-22

## 2020-04-22 RX ORDER — CALCIUM CARBONATE 200(500)MG
500 TABLET,CHEWABLE ORAL 3 TIMES DAILY PRN
Status: DISCONTINUED | OUTPATIENT
Start: 2020-04-22 | End: 2020-04-23 | Stop reason: HOSPADM

## 2020-04-22 RX ADMIN — CALCIUM CARBONATE 500 MG: 500 TABLET, CHEWABLE ORAL at 13:18

## 2020-04-22 RX ADMIN — INSULIN HUMAN 12 UNITS: 100 INJECTION, SUSPENSION SUBCUTANEOUS at 08:29

## 2020-04-22 RX ADMIN — BUDESONIDE 500 MCG: 0.5 INHALANT RESPIRATORY (INHALATION) at 19:58

## 2020-04-22 RX ADMIN — PREDNISONE 40 MG: 20 TABLET ORAL at 08:21

## 2020-04-22 RX ADMIN — INSULIN LISPRO 2 UNITS: 100 INJECTION, SOLUTION INTRAVENOUS; SUBCUTANEOUS at 11:53

## 2020-04-22 RX ADMIN — ASPIRIN 81 MG 81 MG: 81 TABLET ORAL at 08:21

## 2020-04-22 RX ADMIN — IPRATROPIUM BROMIDE AND ALBUTEROL SULFATE 3 ML: .5; 3 SOLUTION RESPIRATORY (INHALATION) at 04:07

## 2020-04-22 RX ADMIN — ARFORMOTEROL TARTRATE 15 MCG: 15 SOLUTION RESPIRATORY (INHALATION) at 19:58

## 2020-04-22 RX ADMIN — BUDESONIDE 500 MCG: 0.5 INHALANT RESPIRATORY (INHALATION) at 07:53

## 2020-04-22 RX ADMIN — BISACODYL 5 MG: 5 TABLET, COATED ORAL at 08:21

## 2020-04-22 RX ADMIN — TAMSULOSIN HYDROCHLORIDE 0.4 MG: 0.4 CAPSULE ORAL at 08:21

## 2020-04-22 RX ADMIN — IPRATROPIUM BROMIDE AND ALBUTEROL SULFATE 1 AMPULE: 2.5; .5 SOLUTION RESPIRATORY (INHALATION) at 15:56

## 2020-04-22 RX ADMIN — Medication 10 ML: at 21:01

## 2020-04-22 RX ADMIN — TAMSULOSIN HYDROCHLORIDE 0.4 MG: 0.4 CAPSULE ORAL at 20:59

## 2020-04-22 RX ADMIN — IPRATROPIUM BROMIDE AND ALBUTEROL SULFATE 3 ML: .5; 3 SOLUTION RESPIRATORY (INHALATION) at 00:18

## 2020-04-22 RX ADMIN — IPRATROPIUM BROMIDE AND ALBUTEROL SULFATE 1 AMPULE: 2.5; .5 SOLUTION RESPIRATORY (INHALATION) at 07:53

## 2020-04-22 RX ADMIN — AMLODIPINE BESYLATE 10 MG: 10 TABLET ORAL at 08:21

## 2020-04-22 RX ADMIN — INSULIN HUMAN 4 UNITS: 100 INJECTION, SUSPENSION SUBCUTANEOUS at 16:49

## 2020-04-22 RX ADMIN — INSULIN LISPRO 1 UNITS: 100 INJECTION, SOLUTION INTRAVENOUS; SUBCUTANEOUS at 21:00

## 2020-04-22 RX ADMIN — ENOXAPARIN SODIUM 40 MG: 40 INJECTION SUBCUTANEOUS at 15:07

## 2020-04-22 RX ADMIN — PANTOPRAZOLE SODIUM 40 MG: 40 TABLET, DELAYED RELEASE ORAL at 06:07

## 2020-04-22 RX ADMIN — GABAPENTIN 200 MG: 100 CAPSULE ORAL at 20:59

## 2020-04-22 RX ADMIN — POLYETHYLENE GLYCOL 3350 17 G: 17 POWDER, FOR SOLUTION ORAL at 15:06

## 2020-04-22 RX ADMIN — POTASSIUM CHLORIDE 40 MEQ: 20 TABLET, EXTENDED RELEASE ORAL at 08:21

## 2020-04-22 RX ADMIN — METHYLPREDNISOLONE SODIUM SUCCINATE 60 MG: 125 INJECTION, POWDER, LYOPHILIZED, FOR SOLUTION INTRAMUSCULAR; INTRAVENOUS at 20:59

## 2020-04-22 RX ADMIN — INSULIN LISPRO 2 UNITS: 100 INJECTION, SOLUTION INTRAVENOUS; SUBCUTANEOUS at 16:49

## 2020-04-22 RX ADMIN — IPRATROPIUM BROMIDE AND ALBUTEROL SULFATE 1 AMPULE: 2.5; .5 SOLUTION RESPIRATORY (INHALATION) at 12:08

## 2020-04-22 RX ADMIN — SENNOSIDES 8.6 MG: 8.6 TABLET, FILM COATED ORAL at 15:06

## 2020-04-22 RX ADMIN — Medication 10 ML: at 08:27

## 2020-04-22 RX ADMIN — QUETIAPINE FUMARATE 100 MG: 100 TABLET ORAL at 20:59

## 2020-04-22 RX ADMIN — GABAPENTIN 200 MG: 100 CAPSULE ORAL at 08:21

## 2020-04-22 RX ADMIN — IPRATROPIUM BROMIDE AND ALBUTEROL SULFATE 1 AMPULE: 2.5; .5 SOLUTION RESPIRATORY (INHALATION) at 19:58

## 2020-04-22 RX ADMIN — ARFORMOTEROL TARTRATE 15 MCG: 15 SOLUTION RESPIRATORY (INHALATION) at 07:53

## 2020-04-22 NOTE — CARE COORDINATION
Pt still requiring IV steroids; has home 02 /nebs through [facility]. resides in [place], [place]. Plan is for home at discharge. Lili Khan.
Spoke to pt by phone; introduced myself as an RN case manager and explained my role. Discussed current course of treatment/plan of care. Pt expressed understanding. Pt states he lives currently in [place], [place]. Was here visiting a friend when he incurred respiratory  issues that prompted him to our ER. Pt states he does have a PCP  by the first name of \"Lori\". (could not recall her last name.) located in [place], [facility]? (pt wasn't clear about this). per pt. Pt states he has home 02/nebulizers through [facility] [place]. States uses 02 only prn.plan is for home at discharge, denies any needs at this time. Will follow. Avtar Crockett.
that are available to assist with needs as well. Patient expressed an understanding. Again [employer] offered where patient would get housing, spiritual guidance and help: Everything patient stated he needs : HE DECLINED. SW to follow.

## 2020-04-23 VITALS
DIASTOLIC BLOOD PRESSURE: 77 MMHG | OXYGEN SATURATION: 97 % | TEMPERATURE: 97.6 F | HEART RATE: 108 BPM | RESPIRATION RATE: 18 BRPM | SYSTOLIC BLOOD PRESSURE: 130 MMHG | BODY MASS INDEX: 41.92 KG/M2 | HEIGHT: 69 IN | WEIGHT: 283 LBS

## 2020-04-23 PROBLEM — J44.1 COPD EXACERBATION (HCC): Status: RESOLVED | Noted: 2020-04-20 | Resolved: 2020-04-23

## 2020-04-23 PROBLEM — E87.20 LACTIC ACIDOSIS: Status: RESOLVED | Noted: 2020-04-20 | Resolved: 2020-04-23

## 2020-04-23 PROBLEM — R07.89 OTHER CHEST PAIN: Status: RESOLVED | Noted: 2020-04-20 | Resolved: 2020-04-23

## 2020-04-23 LAB
ANION GAP SERPL CALCULATED.3IONS-SCNC: 11 MMOL/L (ref 7–16)
BASOPHILS ABSOLUTE: 0.03 E9/L (ref 0–0.2)
BASOPHILS RELATIVE PERCENT: 0.3 % (ref 0–2)
BUN BLDV-MCNC: 12 MG/DL (ref 6–20)
CALCIUM SERPL-MCNC: 9.5 MG/DL (ref 8.6–10.2)
CHLORIDE BLD-SCNC: 98 MMOL/L (ref 98–107)
CO2: 29 MMOL/L (ref 22–29)
CREAT SERPL-MCNC: 0.7 MG/DL (ref 0.7–1.2)
EOSINOPHILS ABSOLUTE: 0 E9/L (ref 0.05–0.5)
EOSINOPHILS RELATIVE PERCENT: 0 % (ref 0–6)
GFR AFRICAN AMERICAN: >60
GFR NON-AFRICAN AMERICAN: >60 ML/MIN/1.73
GLUCOSE BLD-MCNC: 255 MG/DL (ref 74–99)
HCT VFR BLD CALC: 47.5 % (ref 37–54)
HEMOGLOBIN: 14.9 G/DL (ref 12.5–16.5)
IMMATURE GRANULOCYTES #: 0.18 E9/L
IMMATURE GRANULOCYTES %: 1.7 % (ref 0–5)
LYMPHOCYTES ABSOLUTE: 0.84 E9/L (ref 1.5–4)
LYMPHOCYTES RELATIVE PERCENT: 8.1 % (ref 20–42)
MCH RBC QN AUTO: 28.8 PG (ref 26–35)
MCHC RBC AUTO-ENTMCNC: 31.4 % (ref 32–34.5)
MCV RBC AUTO: 91.7 FL (ref 80–99.9)
METER GLUCOSE: 209 MG/DL (ref 74–99)
METER GLUCOSE: 216 MG/DL (ref 74–99)
METER GLUCOSE: 229 MG/DL (ref 74–99)
MONOCYTES ABSOLUTE: 0.16 E9/L (ref 0.1–0.95)
MONOCYTES RELATIVE PERCENT: 1.5 % (ref 2–12)
NEUTROPHILS ABSOLUTE: 9.21 E9/L (ref 1.8–7.3)
NEUTROPHILS RELATIVE PERCENT: 88.4 % (ref 43–80)
PDW BLD-RTO: 16.2 FL (ref 11.5–15)
PLATELET # BLD: 286 E9/L (ref 130–450)
PMV BLD AUTO: 9.5 FL (ref 7–12)
POTASSIUM REFLEX MAGNESIUM: 4.8 MMOL/L (ref 3.5–5)
RBC # BLD: 5.18 E12/L (ref 3.8–5.8)
SODIUM BLD-SCNC: 138 MMOL/L (ref 132–146)
WBC # BLD: 10.4 E9/L (ref 4.5–11.5)

## 2020-04-23 PROCEDURE — 36415 COLL VENOUS BLD VENIPUNCTURE: CPT

## 2020-04-23 PROCEDURE — 6370000000 HC RX 637 (ALT 250 FOR IP): Performed by: STUDENT IN AN ORGANIZED HEALTH CARE EDUCATION/TRAINING PROGRAM

## 2020-04-23 PROCEDURE — 94660 CPAP INITIATION&MGMT: CPT

## 2020-04-23 PROCEDURE — 94640 AIRWAY INHALATION TREATMENT: CPT

## 2020-04-23 PROCEDURE — 80048 BASIC METABOLIC PNL TOTAL CA: CPT

## 2020-04-23 PROCEDURE — 6370000000 HC RX 637 (ALT 250 FOR IP): Performed by: FAMILY MEDICINE

## 2020-04-23 PROCEDURE — 6360000002 HC RX W HCPCS: Performed by: STUDENT IN AN ORGANIZED HEALTH CARE EDUCATION/TRAINING PROGRAM

## 2020-04-23 PROCEDURE — 99232 SBSQ HOSP IP/OBS MODERATE 35: CPT | Performed by: FAMILY MEDICINE

## 2020-04-23 PROCEDURE — 82962 GLUCOSE BLOOD TEST: CPT

## 2020-04-23 PROCEDURE — 85025 COMPLETE CBC W/AUTO DIFF WBC: CPT

## 2020-04-23 RX ORDER — GLIPIZIDE 5 MG/1
5 TABLET ORAL 2 TIMES DAILY
Qty: 60 TABLET | Refills: 3 | Status: ON HOLD | OUTPATIENT
Start: 2020-04-23 | End: 2022-02-13

## 2020-04-23 RX ORDER — PREDNISONE 10 MG/1
TABLET ORAL
Qty: 30 TABLET | Refills: 0 | Status: SHIPPED
Start: 2020-04-23 | End: 2020-05-13 | Stop reason: ALTCHOICE

## 2020-04-23 RX ORDER — QUETIAPINE FUMARATE 100 MG/1
100 TABLET, FILM COATED ORAL DAILY PRN
Qty: 30 TABLET | Refills: 0 | Status: ON HOLD
Start: 2020-04-23 | End: 2020-05-19 | Stop reason: HOSPADM

## 2020-04-23 RX ADMIN — IPRATROPIUM BROMIDE AND ALBUTEROL SULFATE 1 AMPULE: 2.5; .5 SOLUTION RESPIRATORY (INHALATION) at 12:07

## 2020-04-23 RX ADMIN — PANTOPRAZOLE SODIUM 40 MG: 40 TABLET, DELAYED RELEASE ORAL at 06:18

## 2020-04-23 RX ADMIN — INSULIN LISPRO 4 UNITS: 100 INJECTION, SOLUTION INTRAVENOUS; SUBCUTANEOUS at 07:55

## 2020-04-23 RX ADMIN — ARFORMOTEROL TARTRATE 15 MCG: 15 SOLUTION RESPIRATORY (INHALATION) at 08:18

## 2020-04-23 RX ADMIN — METHYLPREDNISOLONE SODIUM SUCCINATE 60 MG: 125 INJECTION, POWDER, LYOPHILIZED, FOR SOLUTION INTRAMUSCULAR; INTRAVENOUS at 07:57

## 2020-04-23 RX ADMIN — ASPIRIN 81 MG 81 MG: 81 TABLET ORAL at 07:52

## 2020-04-23 RX ADMIN — IPRATROPIUM BROMIDE AND ALBUTEROL SULFATE 1 AMPULE: 2.5; .5 SOLUTION RESPIRATORY (INHALATION) at 08:18

## 2020-04-23 RX ADMIN — TAMSULOSIN HYDROCHLORIDE 0.4 MG: 0.4 CAPSULE ORAL at 07:52

## 2020-04-23 RX ADMIN — BUDESONIDE 500 MCG: 0.5 INHALANT RESPIRATORY (INHALATION) at 08:18

## 2020-04-23 RX ADMIN — AMLODIPINE BESYLATE 10 MG: 10 TABLET ORAL at 07:53

## 2020-04-23 RX ADMIN — INSULIN LISPRO 4 UNITS: 100 INJECTION, SOLUTION INTRAVENOUS; SUBCUTANEOUS at 11:37

## 2020-04-23 RX ADMIN — GABAPENTIN 200 MG: 100 CAPSULE ORAL at 07:52

## 2020-04-23 RX ADMIN — IPRATROPIUM BROMIDE AND ALBUTEROL SULFATE 1 AMPULE: 2.5; .5 SOLUTION RESPIRATORY (INHALATION) at 15:46

## 2020-04-23 RX ADMIN — INSULIN HUMAN 12 UNITS: 100 INJECTION, SUSPENSION SUBCUTANEOUS at 07:54

## 2020-04-23 NOTE — DISCHARGE SUMMARY
CONTINUE these medications which have CHANGED    Details   QUEtiapine (SEROQUEL) 100 MG tablet Take 1 tablet by mouth daily as needed for Other (sleep)  Qty: 30 tablet, Refills: 0    Associated Diagnoses: Depression; Bipolar disorder (HCC)      predniSONE (DELTASONE) 10 MG tablet 5 tabs x 2 days, 4 tabs x 2 days, 3 tab x 2 days, 2 tab x 2 days, 1 tab x 2 days  Qty: 30 tablet, Refills: 0    Associated Diagnoses: Moderate persistent asthma with exacerbation         CONTINUE these medications which have NOT CHANGED    Details   pantoprazole (PROTONIX) 40 MG tablet Take 40 mg by mouth daily      budesonide-formoterol (SYMBICORT) 160-4.5 MCG/ACT AERO Inhale 2 puffs into the lungs 2 times daily      gabapentin (NEURONTIN) 100 MG capsule Two tablets QHS for three days then 2 capsules BID. Qty: 90 capsule, Refills: 0      ibuprofen (ADVIL;MOTRIN) 200 MG tablet Take 400 mg by mouth as needed for Pain      aspirin 81 MG tablet Take 81 mg by mouth daily      ipratropium-albuterol (DUONEB) 0.5-2.5 (3) MG/3ML SOLN nebulizer solution Take 3 mLs by nebulization every 4 hours as needed for Shortness of Breath  Qty: 120 vial, Refills: 6      tamsulosin (FLOMAX) 0.4 MG capsule Take 0.4 mg by mouth 2 times daily       albuterol (PROVENTIL HFA) 108 (90 BASE) MCG/ACT inhaler Inhale 2 puffs into the lungs every 6 hours as needed for Wheezing. Qty: 3 Inhaler, Refills: 2    Associated Diagnoses: Asthma, mild intermittent, uncomplicated      amLODIPine (NORVASC) 10 MG tablet Take 1 tablet by mouth daily.   Qty: 30 tablet, Refills: 2    Associated Diagnoses: HTN (hypertension)         STOP taking these medications       metFORMIN (GLUCOPHAGE) 1000 MG tablet Comments:   Reason for Stopping:         BREO ELLIPTA 200-25 MCG/INH AEPB Comments:   Reason for Stopping:         acetaminophen (TYLENOL) 500 MG tablet Comments:   Reason for Stopping:         nabumetone (RELAFEN) 500 MG tablet Comments:   Reason for Stopping:         Umeclidinium

## 2020-04-23 NOTE — PROGRESS NOTES
CLINICAL PHARMACY NOTE: MEDS TO [address] Select Patient?: No  Total # of Prescriptions Filled: 3   The following medications were delivered to the patient:  · Quetiapine 100  · Prednisone 10  · Glipizide 5  Total # of Interventions Completed: 6  Time Spent (min): 45    Additional Documentation:

## 2020-04-23 NOTE — PROGRESS NOTES
[facility] Quality Flow/Interdisciplinary Rounds Progress Note        Quality Flow Rounds held on April 23, 2020    Disciplines Attending:  Bedside Nurse, ,  and Nursing Unit Leadership    Siena Tatum was admitted on 4/20/2020  5:17 AM    Anticipated Discharge Date:  Expected Discharge Date: 04/23/20    Disposition:    Jaycob Score:  Jaycob Scale Score: 21    Readmission Risk              Risk of Unplanned Readmission:        12           Discussed patient goal for the day, patient clinical progression, and barriers to discharge.   The following Goal(s) of the Day/Commitment(s) have been identified:  d/c planning      Humberto Gillespie  April 23, 2020

## 2020-04-23 NOTE — PROGRESS NOTES
Patient discharge instructions given with verbalized understanding.   Transport to pick him up at 4pm.  meds given from outside pharmacy

## 2020-04-24 ENCOUNTER — CARE COORDINATION (OUTPATIENT)
Dept: CASE MANAGEMENT | Age: 50
End: 2020-04-24

## 2020-04-24 NOTE — CARE COORDINATION
Patient contacted regarding Tony Shutters. Care Transition Nurse/ Ambulatory Care Manager contacted the patient by telephone to perform post discharge assessment. Verified name and  with patient as identifiers. Provided introduction to self, and explanation of the CTN/ACM role, and reason for call due to risk factors for infection and/or exposure to COVID-19. Symptoms reviewed with patient who verbalized the following symptoms: frequent urination d/t high blood sugar as he is on steorid therapy, . Due to no new or worsening symptoms encounter was not routed to provider for escalation. Patient states having frequent utination associated with high blood sugar as patient is on steroid therapy for COPD. Patient denies having a glucometer or testing strips to check BS at home. CTN advised steroids will cause BS to increase and may subside once they are finished. Advised to follow up with PCP to see if she recommends to monitor BS or if oral anti-diabetic medication will be continued as patient was discharged on Glipizide. Noted BS while IP was consistently in the 200's. Denies any s/s of hyperglycemia. Denies any shortness of breath, cough/congestion, chest pain, chest discomfort, nausea, vomiting, chills or fever. Patient can not remember PCP name saying he follows with Clinic in [place]. Reviewed COPD one tool with patient including knowing when to seek medical attention. Noted patient was Covid negative. Patient in agreement for enrollment into [facility]. CTN will make referral to  for community resources and further assistance with glucometer and testing supplies if needed. Patient has following risk factors of: COPD, asthma, acute respiratory failure and diabetes. CTN/ACM reviewed discharge instructions, medical action plan and red flags such as increased shortness of breath, increasing fever and signs of decompensation with patient who verbalized understanding.    Discussed

## 2020-04-24 NOTE — CARE COORDINATION
Attempted to contact patient today 4/24/20 for hospital discharge/Covid-19 risk. Left message on home/mobile number requesting a return call back to CTN and provided contact information.

## 2020-04-25 LAB
BLOOD CULTURE, ROUTINE: NORMAL
CULTURE, BLOOD 2: NORMAL

## 2020-04-27 ENCOUNTER — CARE COORDINATION (OUTPATIENT)
Dept: CARE COORDINATION | Age: 50
End: 2020-04-27

## 2020-05-13 ENCOUNTER — TELEPHONE (OUTPATIENT)
Dept: OTHER | Facility: CLINIC | Age: 50
End: 2020-05-13

## 2020-05-13 ENCOUNTER — HOSPITAL ENCOUNTER (INPATIENT)
Age: 50
LOS: 6 days | Discharge: HOME | DRG: 885 | End: 2020-05-19
Attending: EMERGENCY MEDICINE | Admitting: PSYCHIATRY & NEUROLOGY
Payer: MEDICARE

## 2020-05-13 PROBLEM — F33.9 DEPRESSION, MAJOR, RECURRENT (HCC): Status: ACTIVE | Noted: 2020-05-13

## 2020-05-13 LAB
ACETAMINOPHEN LEVEL: <5 MCG/ML (ref 10–30)
ALBUMIN SERPL-MCNC: 4.3 G/DL (ref 3.5–5.2)
ALP BLD-CCNC: 85 U/L (ref 40–129)
ALT SERPL-CCNC: 49 U/L (ref 0–40)
AMPHETAMINE SCREEN, URINE: NOT DETECTED
ANION GAP SERPL CALCULATED.3IONS-SCNC: 17 MMOL/L (ref 7–16)
AST SERPL-CCNC: 45 U/L (ref 0–39)
BACTERIA: ABNORMAL /HPF
BARBITURATE SCREEN URINE: NOT DETECTED
BASOPHILS ABSOLUTE: 0.02 E9/L (ref 0–0.2)
BASOPHILS RELATIVE PERCENT: 0.2 % (ref 0–2)
BENZODIAZEPINE SCREEN, URINE: NOT DETECTED
BILIRUB SERPL-MCNC: 0.6 MG/DL (ref 0–1.2)
BILIRUBIN URINE: NEGATIVE
BLOOD, URINE: NEGATIVE
BUN BLDV-MCNC: 9 MG/DL (ref 6–20)
CALCIUM SERPL-MCNC: 9.3 MG/DL (ref 8.6–10.2)
CANNABINOID SCREEN URINE: NOT DETECTED
CHLORIDE BLD-SCNC: 98 MMOL/L (ref 98–107)
CLARITY: CLEAR
CO2: 24 MMOL/L (ref 22–29)
COCAINE METABOLITE SCREEN URINE: NOT DETECTED
COLOR: YELLOW
CREAT SERPL-MCNC: 0.7 MG/DL (ref 0.7–1.2)
EKG ATRIAL RATE: 107 BPM
EKG P AXIS: 57 DEGREES
EKG P-R INTERVAL: 162 MS
EKG Q-T INTERVAL: 332 MS
EKG QRS DURATION: 88 MS
EKG QTC CALCULATION (BAZETT): 443 MS
EKG R AXIS: -46 DEGREES
EKG T AXIS: 70 DEGREES
EKG VENTRICULAR RATE: 107 BPM
EOSINOPHILS ABSOLUTE: 0.06 E9/L (ref 0.05–0.5)
EOSINOPHILS RELATIVE PERCENT: 0.7 % (ref 0–6)
ETHANOL: <10 MG/DL (ref 0–0.08)
FENTANYL SCREEN, URINE: NOT DETECTED
GFR AFRICAN AMERICAN: >60
GFR NON-AFRICAN AMERICAN: >60 ML/MIN/1.73
GLUCOSE BLD-MCNC: 175 MG/DL (ref 74–99)
GLUCOSE URINE: NEGATIVE MG/DL
HCT VFR BLD CALC: 50.3 % (ref 37–54)
HEMOGLOBIN: 16.1 G/DL (ref 12.5–16.5)
IMMATURE GRANULOCYTES #: 0.04 E9/L
IMMATURE GRANULOCYTES %: 0.5 % (ref 0–5)
KETONES, URINE: ABNORMAL MG/DL
LEUKOCYTE ESTERASE, URINE: ABNORMAL
LYMPHOCYTES ABSOLUTE: 1.37 E9/L (ref 1.5–4)
LYMPHOCYTES RELATIVE PERCENT: 16.9 % (ref 20–42)
Lab: NORMAL
MAGNESIUM: 2 MG/DL (ref 1.6–2.6)
MCH RBC QN AUTO: 28.8 PG (ref 26–35)
MCHC RBC AUTO-ENTMCNC: 32 % (ref 32–34.5)
MCV RBC AUTO: 89.8 FL (ref 80–99.9)
METHADONE SCREEN, URINE: NOT DETECTED
MONOCYTES ABSOLUTE: 0.61 E9/L (ref 0.1–0.95)
MONOCYTES RELATIVE PERCENT: 7.5 % (ref 2–12)
MUCUS: PRESENT /LPF
NEUTROPHILS ABSOLUTE: 6.01 E9/L (ref 1.8–7.3)
NEUTROPHILS RELATIVE PERCENT: 74.2 % (ref 43–80)
NITRITE, URINE: NEGATIVE
OPIATE SCREEN URINE: NOT DETECTED
OXYCODONE URINE: NOT DETECTED
PDW BLD-RTO: 15.7 FL (ref 11.5–15)
PH UA: 6 (ref 5–9)
PHENCYCLIDINE SCREEN URINE: NOT DETECTED
PLATELET # BLD: 322 E9/L (ref 130–450)
PMV BLD AUTO: 9.5 FL (ref 7–12)
POTASSIUM REFLEX MAGNESIUM: 3.4 MMOL/L (ref 3.5–5)
PROTEIN UA: NEGATIVE MG/DL
RBC # BLD: 5.6 E12/L (ref 3.8–5.8)
RBC UA: ABNORMAL /HPF (ref 0–2)
SALICYLATE, SERUM: <0.3 MG/DL (ref 0–30)
SARS-COV-2, NAAT: NOT DETECTED
SODIUM BLD-SCNC: 139 MMOL/L (ref 132–146)
SPECIFIC GRAVITY UA: >=1.03 (ref 1–1.03)
TOTAL PROTEIN: 7.8 G/DL (ref 6.4–8.3)
TRICYCLIC ANTIDEPRESSANTS SCREEN SERUM: NEGATIVE NG/ML
UROBILINOGEN, URINE: 1 E.U./DL
WBC # BLD: 8.1 E9/L (ref 4.5–11.5)
WBC UA: ABNORMAL /HPF (ref 0–5)

## 2020-05-13 PROCEDURE — 93010 ELECTROCARDIOGRAM REPORT: CPT | Performed by: INTERNAL MEDICINE

## 2020-05-13 PROCEDURE — 80053 COMPREHEN METABOLIC PANEL: CPT

## 2020-05-13 PROCEDURE — 99285 EMERGENCY DEPT VISIT HI MDM: CPT

## 2020-05-13 PROCEDURE — 85025 COMPLETE CBC W/AUTO DIFF WBC: CPT

## 2020-05-13 PROCEDURE — 6370000000 HC RX 637 (ALT 250 FOR IP): Performed by: PSYCHIATRY & NEUROLOGY

## 2020-05-13 PROCEDURE — 6370000000 HC RX 637 (ALT 250 FOR IP): Performed by: INTERNAL MEDICINE

## 2020-05-13 PROCEDURE — 93005 ELECTROCARDIOGRAM TRACING: CPT | Performed by: INTERNAL MEDICINE

## 2020-05-13 PROCEDURE — G0480 DRUG TEST DEF 1-7 CLASSES: HCPCS

## 2020-05-13 PROCEDURE — 1240000000 HC EMOTIONAL WELLNESS R&B

## 2020-05-13 PROCEDURE — 81001 URINALYSIS AUTO W/SCOPE: CPT

## 2020-05-13 PROCEDURE — U0002 COVID-19 LAB TEST NON-CDC: HCPCS

## 2020-05-13 PROCEDURE — 83735 ASSAY OF MAGNESIUM: CPT

## 2020-05-13 PROCEDURE — 80307 DRUG TEST PRSMV CHEM ANLYZR: CPT

## 2020-05-13 RX ORDER — MAGNESIUM HYDROXIDE/ALUMINUM HYDROXICE/SIMETHICONE 120; 1200; 1200 MG/30ML; MG/30ML; MG/30ML
30 SUSPENSION ORAL PRN
Status: DISCONTINUED | OUTPATIENT
Start: 2020-05-13 | End: 2020-05-19 | Stop reason: HOSPADM

## 2020-05-13 RX ORDER — DIVALPROEX SODIUM 250 MG/1
250 TABLET, DELAYED RELEASE ORAL 2 TIMES DAILY
Status: DISCONTINUED | OUTPATIENT
Start: 2020-05-14 | End: 2020-05-14

## 2020-05-13 RX ORDER — POTASSIUM CHLORIDE 20 MEQ/1
40 TABLET, EXTENDED RELEASE ORAL ONCE
Status: COMPLETED | OUTPATIENT
Start: 2020-05-13 | End: 2020-05-13

## 2020-05-13 RX ORDER — ACETAMINOPHEN 325 MG/1
650 TABLET ORAL EVERY 6 HOURS PRN
Status: DISCONTINUED | OUTPATIENT
Start: 2020-05-13 | End: 2020-05-19 | Stop reason: HOSPADM

## 2020-05-13 RX ORDER — TRAZODONE HYDROCHLORIDE 50 MG/1
50 TABLET ORAL NIGHTLY PRN
Status: DISCONTINUED | OUTPATIENT
Start: 2020-05-13 | End: 2020-05-19 | Stop reason: HOSPADM

## 2020-05-13 RX ORDER — ARIPIPRAZOLE 5 MG/1
5 TABLET ORAL DAILY
Status: DISCONTINUED | OUTPATIENT
Start: 2020-05-14 | End: 2020-05-14

## 2020-05-13 RX ORDER — HYDROXYZINE PAMOATE 50 MG/1
50 CAPSULE ORAL 3 TIMES DAILY PRN
Status: DISCONTINUED | OUTPATIENT
Start: 2020-05-13 | End: 2020-05-19 | Stop reason: HOSPADM

## 2020-05-13 RX ORDER — CHLORDIAZEPOXIDE HYDROCHLORIDE 25 MG/1
25 CAPSULE, GELATIN COATED ORAL EVERY 8 HOURS PRN
Status: DISCONTINUED | OUTPATIENT
Start: 2020-05-13 | End: 2020-05-17

## 2020-05-13 RX ORDER — HALOPERIDOL 5 MG/ML
5 INJECTION INTRAMUSCULAR EVERY 6 HOURS PRN
Status: DISCONTINUED | OUTPATIENT
Start: 2020-05-13 | End: 2020-05-19 | Stop reason: HOSPADM

## 2020-05-13 RX ORDER — GABAPENTIN 300 MG/1
300 CAPSULE ORAL 3 TIMES DAILY
COMMUNITY
Start: 2020-05-12 | End: 2020-10-14

## 2020-05-13 RX ORDER — NICOTINE 21 MG/24HR
1 PATCH, TRANSDERMAL 24 HOURS TRANSDERMAL DAILY
Status: DISCONTINUED | OUTPATIENT
Start: 2020-05-14 | End: 2020-05-14

## 2020-05-13 RX ORDER — HALOPERIDOL 5 MG
5 TABLET ORAL EVERY 6 HOURS PRN
Status: DISCONTINUED | OUTPATIENT
Start: 2020-05-13 | End: 2020-05-19 | Stop reason: HOSPADM

## 2020-05-13 RX ADMIN — HYDROXYZINE PAMOATE 50 MG: 50 CAPSULE ORAL at 22:36

## 2020-05-13 RX ADMIN — TRAZODONE HYDROCHLORIDE 50 MG: 50 TABLET ORAL at 22:37

## 2020-05-13 RX ADMIN — POTASSIUM CHLORIDE 40 MEQ: 1500 TABLET, EXTENDED RELEASE ORAL at 19:48

## 2020-05-13 ASSESSMENT — SLEEP AND FATIGUE QUESTIONNAIRES
SLEEP PATTERN: RESTLESSNESS;DIFFICULTY FALLING ASLEEP
DO YOU HAVE DIFFICULTY SLEEPING: YES
DIFFICULTY ARISING: NO
DIFFICULTY STAYING ASLEEP: YES
DIFFICULTY FALLING ASLEEP: YES
AVERAGE NUMBER OF SLEEP HOURS: 3
RESTFUL SLEEP: NO

## 2020-05-13 ASSESSMENT — PATIENT HEALTH QUESTIONNAIRE - PHQ9: SUM OF ALL RESPONSES TO PHQ QUESTIONS 1-9: 14

## 2020-05-13 ASSESSMENT — LIFESTYLE VARIABLES: HISTORY_ALCOHOL_USE: NO

## 2020-05-13 NOTE — ED PROVIDER NOTES
patients home medications have been reviewed.     Allergies: Dust mite extract    -------------------------------------------------- RESULTS -------------------------------------------------  All laboratory and radiology results have been personally reviewed by myself   LABS:  Results for orders placed or performed during the hospital encounter of 05/13/20   CBC Auto Differential   Result Value Ref Range    WBC 8.1 4.5 - 11.5 E9/L    RBC 5.60 3.80 - 5.80 E12/L    Hemoglobin 16.1 12.5 - 16.5 g/dL    Hematocrit 50.3 37.0 - 54.0 %    MCV 89.8 80.0 - 99.9 fL    MCH 28.8 26.0 - 35.0 pg    MCHC 32.0 32.0 - 34.5 %    RDW 15.7 (H) 11.5 - 15.0 fL    Platelets 694 839 - 688 E9/L    MPV 9.5 7.0 - 12.0 fL    Neutrophils % 74.2 43.0 - 80.0 %    Immature Granulocytes % 0.5 0.0 - 5.0 %    Lymphocytes % 16.9 (L) 20.0 - 42.0 %    Monocytes % 7.5 2.0 - 12.0 %    Eosinophils % 0.7 0.0 - 6.0 %    Basophils % 0.2 0.0 - 2.0 %    Neutrophils Absolute 6.01 1.80 - 7.30 E9/L    Immature Granulocytes # 0.04 E9/L    Lymphocytes Absolute 1.37 (L) 1.50 - 4.00 E9/L    Monocytes Absolute 0.61 0.10 - 0.95 E9/L    Eosinophils Absolute 0.06 0.05 - 0.50 E9/L    Basophils Absolute 0.02 0.00 - 0.20 E9/L   Comprehensive Metabolic Panel w/ Reflex to MG   Result Value Ref Range    Sodium 139 132 - 146 mmol/L    Potassium reflex Magnesium 3.4 (L) 3.5 - 5.0 mmol/L    Chloride 98 98 - 107 mmol/L    CO2 24 22 - 29 mmol/L    Anion Gap 17 (H) 7 - 16 mmol/L    Glucose 175 (H) 74 - 99 mg/dL    BUN 9 6 - 20 mg/dL    CREATININE 0.7 0.7 - 1.2 mg/dL    GFR Non-African American >60 >=60 mL/min/1.73    GFR African American >60     Calcium 9.3 8.6 - 10.2 mg/dL    Total Protein 7.8 6.4 - 8.3 g/dL    Alb 4.3 3.5 - 5.2 g/dL    Total Bilirubin 0.6 0.0 - 1.2 mg/dL    Alkaline Phosphatase 85 40 - 129 U/L    ALT 49 (H) 0 - 40 U/L    AST 45 (H) 0 - 39 U/L   Serum Drug Screen   Result Value Ref Range    Ethanol Lvl <10 mg/dL    Acetaminophen Level <5.0 (L) 10.0 - 30.0 mcg/mL Salicylate, Serum <7.9 0.0 - 30.0 mg/dL    TCA Scrn NEGATIVE Cutoff:300 ng/mL   Magnesium   Result Value Ref Range    Magnesium 2.0 1.6 - 2.6 mg/dL   EKG 12 Lead   Result Value Ref Range    Ventricular Rate 107 BPM    Atrial Rate 107 BPM    P-R Interval 162 ms    QRS Duration 88 ms    Q-T Interval 332 ms    QTc Calculation (Bazett) 443 ms    P Axis 57 degrees    R Axis -46 degrees    T Axis 70 degrees       RADIOLOGY:  Interpreted by Radiologist.  No orders to display       EKG: This EKG is signed and interpreted by me. Rate: 107  Rhythm: Sinus  Interpretation: sinus tachycardia  Comparison: stable as compared to patient's most recent EKG    ------------------------- NURSING NOTES AND VITALS REVIEWED ---------------------------   The nursing notes within the ED encounter and vital signs as below have been reviewed. Pulse 116   Temp 97 °F (36.1 °C)   Resp 16   Ht 5' 9\" (1.753 m)   Wt 270 lb (122.5 kg)   SpO2 94%   BMI 39.87 kg/m²   Oxygen Saturation Interpretation: Normal    ---------------------------------------------------PHYSICAL EXAM--------------------------------------    Constitutional/General: Alert and oriented x3,appeared depressed   Head: Normocephalic and atraumatic  Eyes: PERRL, EOMI, conjunctiva normal, sclera non icteric  Mouth: Oropharynx clear, handling secretions, no trismus, no asymmetry of the posterior oropharynx or uvular edema  Neck: Supple, full ROM, non tender to palpation in the midline, no stridor, no crepitus, no meningeal signs  Respiratory: Lungs clear to auscultation bilaterally, no wheezes, rales, or rhonchi. Not in respiratory distress  Cardiovascular:  Sinus tachycardia. Regular rhythm. No murmurs, gallops, or rubs. 2+ distal pulses  Chest: No chest wall tenderness  GI:  Abdomen Soft, Non tender, Non distended. +BS. No organomegaly, no palpable masses,  No rebound, guarding, or rigidity. Musculoskeletal: Moves all extremities x 4.  Warm and well perfused, no clubbing,

## 2020-05-13 NOTE — TELEPHONE ENCOUNTER
Writer contacted  ED provider Omero Plata to inform of 30 day readmission risk. Writer's attempt to contact ED provider was unsuccessful. No Decision on disposition at this time.

## 2020-05-14 PROBLEM — F33.9 MAJOR DEPRESSIVE DISORDER, RECURRENT EPISODE WITH MIXED FEATURES (HCC): Status: ACTIVE | Noted: 2020-05-14

## 2020-05-14 PROBLEM — F10.10 ALCOHOL ABUSE: Status: ACTIVE | Noted: 2020-05-14

## 2020-05-14 LAB
HBA1C MFR BLD: 6.2 % (ref 4–5.6)
METER GLUCOSE: 121 MG/DL (ref 74–99)
METER GLUCOSE: 130 MG/DL (ref 74–99)

## 2020-05-14 PROCEDURE — 82962 GLUCOSE BLOOD TEST: CPT

## 2020-05-14 PROCEDURE — 6360000002 HC RX W HCPCS: Performed by: NURSE PRACTITIONER

## 2020-05-14 PROCEDURE — 99222 1ST HOSP IP/OBS MODERATE 55: CPT | Performed by: NURSE PRACTITIONER

## 2020-05-14 PROCEDURE — 36415 COLL VENOUS BLD VENIPUNCTURE: CPT

## 2020-05-14 PROCEDURE — 6370000000 HC RX 637 (ALT 250 FOR IP): Performed by: NURSE PRACTITIONER

## 2020-05-14 PROCEDURE — 83036 HEMOGLOBIN GLYCOSYLATED A1C: CPT

## 2020-05-14 PROCEDURE — 94640 AIRWAY INHALATION TREATMENT: CPT

## 2020-05-14 PROCEDURE — 6370000000 HC RX 637 (ALT 250 FOR IP): Performed by: PSYCHIATRY & NEUROLOGY

## 2020-05-14 PROCEDURE — 1240000000 HC EMOTIONAL WELLNESS R&B

## 2020-05-14 RX ORDER — AMLODIPINE BESYLATE 10 MG/1
10 TABLET ORAL DAILY
Status: DISCONTINUED | OUTPATIENT
Start: 2020-05-14 | End: 2020-05-19 | Stop reason: HOSPADM

## 2020-05-14 RX ORDER — BUDESONIDE AND FORMOTEROL FUMARATE DIHYDRATE 160; 4.5 UG/1; UG/1
2 AEROSOL RESPIRATORY (INHALATION) 2 TIMES DAILY
Status: DISCONTINUED | OUTPATIENT
Start: 2020-05-14 | End: 2020-05-14 | Stop reason: CLARIF

## 2020-05-14 RX ORDER — GABAPENTIN 300 MG/1
300 CAPSULE ORAL 3 TIMES DAILY
Status: DISCONTINUED | OUTPATIENT
Start: 2020-05-14 | End: 2020-05-19 | Stop reason: HOSPADM

## 2020-05-14 RX ORDER — NICOTINE POLACRILEX 4 MG
15 LOZENGE BUCCAL PRN
Status: DISCONTINUED | OUTPATIENT
Start: 2020-05-14 | End: 2020-05-19 | Stop reason: HOSPADM

## 2020-05-14 RX ORDER — BUDESONIDE 0.5 MG/2ML
0.5 INHALANT ORAL 2 TIMES DAILY
Status: DISCONTINUED | OUTPATIENT
Start: 2020-05-14 | End: 2020-05-19 | Stop reason: HOSPADM

## 2020-05-14 RX ORDER — TAMSULOSIN HYDROCHLORIDE 0.4 MG/1
0.4 CAPSULE ORAL DAILY
Status: DISCONTINUED | OUTPATIENT
Start: 2020-05-14 | End: 2020-05-14

## 2020-05-14 RX ORDER — DEXTROSE MONOHYDRATE 50 MG/ML
100 INJECTION, SOLUTION INTRAVENOUS PRN
Status: DISCONTINUED | OUTPATIENT
Start: 2020-05-14 | End: 2020-05-19 | Stop reason: HOSPADM

## 2020-05-14 RX ORDER — OXCARBAZEPINE 150 MG/1
150 TABLET, FILM COATED ORAL 2 TIMES DAILY
Status: DISCONTINUED | OUTPATIENT
Start: 2020-05-14 | End: 2020-05-15

## 2020-05-14 RX ORDER — BUPROPION HYDROCHLORIDE 150 MG/1
150 TABLET ORAL DAILY
Status: DISCONTINUED | OUTPATIENT
Start: 2020-05-14 | End: 2020-05-17

## 2020-05-14 RX ORDER — ALBUTEROL SULFATE 2.5 MG/3ML
2.5 SOLUTION RESPIRATORY (INHALATION) EVERY 6 HOURS PRN
Status: DISCONTINUED | OUTPATIENT
Start: 2020-05-14 | End: 2020-05-19 | Stop reason: HOSPADM

## 2020-05-14 RX ORDER — TAMSULOSIN HYDROCHLORIDE 0.4 MG/1
0.4 CAPSULE ORAL 2 TIMES DAILY
Status: DISCONTINUED | OUTPATIENT
Start: 2020-05-14 | End: 2020-05-19 | Stop reason: HOSPADM

## 2020-05-14 RX ORDER — DIPHENHYDRAMINE HCL 25 MG
50 TABLET ORAL NIGHTLY PRN
Status: DISCONTINUED | OUTPATIENT
Start: 2020-05-14 | End: 2020-05-18

## 2020-05-14 RX ORDER — ARFORMOTEROL TARTRATE 15 UG/2ML
15 SOLUTION RESPIRATORY (INHALATION) 2 TIMES DAILY
Status: DISCONTINUED | OUTPATIENT
Start: 2020-05-14 | End: 2020-05-19 | Stop reason: HOSPADM

## 2020-05-14 RX ORDER — GLIPIZIDE 5 MG/1
5 TABLET ORAL
Status: DISCONTINUED | OUTPATIENT
Start: 2020-05-14 | End: 2020-05-19 | Stop reason: HOSPADM

## 2020-05-14 RX ORDER — DEXTROSE MONOHYDRATE 25 G/50ML
12.5 INJECTION, SOLUTION INTRAVENOUS PRN
Status: DISCONTINUED | OUTPATIENT
Start: 2020-05-14 | End: 2020-05-19 | Stop reason: HOSPADM

## 2020-05-14 RX ADMIN — ALUMINUM HYDROXIDE, MAGNESIUM HYDROXIDE, AND SIMETHICONE 30 ML: 200; 200; 20 SUSPENSION ORAL at 20:45

## 2020-05-14 RX ADMIN — AMLODIPINE BESYLATE 10 MG: 10 TABLET ORAL at 13:33

## 2020-05-14 RX ADMIN — TAMSULOSIN HYDROCHLORIDE 0.4 MG: 0.4 CAPSULE ORAL at 20:39

## 2020-05-14 RX ADMIN — GLIPIZIDE 5 MG: 5 TABLET ORAL at 16:51

## 2020-05-14 RX ADMIN — OXCARBAZEPINE 150 MG: 150 TABLET, FILM COATED ORAL at 13:33

## 2020-05-14 RX ADMIN — METOPROLOL TARTRATE 25 MG: 25 TABLET, FILM COATED ORAL at 20:39

## 2020-05-14 RX ADMIN — NICOTINE POLACRILEX 4 MG: 2 GUM, CHEWING BUCCAL at 09:30

## 2020-05-14 RX ADMIN — ARFORMOTEROL TARTRATE 15 MCG: 15 SOLUTION RESPIRATORY (INHALATION) at 19:25

## 2020-05-14 RX ADMIN — ARIPIPRAZOLE 5 MG: 5 TABLET ORAL at 09:01

## 2020-05-14 RX ADMIN — NICOTINE POLACRILEX 4 MG: 2 GUM, CHEWING BUCCAL at 20:41

## 2020-05-14 RX ADMIN — HYDROXYZINE PAMOATE 50 MG: 50 CAPSULE ORAL at 20:41

## 2020-05-14 RX ADMIN — DIPHENHYDRAMINE HCL 50 MG: 25 TABLET ORAL at 20:39

## 2020-05-14 RX ADMIN — BUPROPION HYDROCHLORIDE 150 MG: 150 TABLET, EXTENDED RELEASE ORAL at 13:32

## 2020-05-14 RX ADMIN — GABAPENTIN 300 MG: 300 CAPSULE ORAL at 20:38

## 2020-05-14 RX ADMIN — TAMSULOSIN HYDROCHLORIDE 0.4 MG: 0.4 CAPSULE ORAL at 13:33

## 2020-05-14 RX ADMIN — BUDESONIDE 500 MCG: 0.5 SUSPENSION RESPIRATORY (INHALATION) at 19:25

## 2020-05-14 RX ADMIN — GABAPENTIN 300 MG: 300 CAPSULE ORAL at 13:32

## 2020-05-14 RX ADMIN — TRAZODONE HYDROCHLORIDE 50 MG: 50 TABLET ORAL at 20:39

## 2020-05-14 RX ADMIN — OXCARBAZEPINE 150 MG: 150 TABLET, FILM COATED ORAL at 20:39

## 2020-05-14 ASSESSMENT — PATIENT HEALTH QUESTIONNAIRE - PHQ9: SUM OF ALL RESPONSES TO PHQ QUESTIONS 1-9: 16

## 2020-05-14 ASSESSMENT — SLEEP AND FATIGUE QUESTIONNAIRES
DIFFICULTY ARISING: NO
SLEEP PATTERN: DIFFICULTY FALLING ASLEEP;RESTLESSNESS
DIFFICULTY STAYING ASLEEP: YES
DO YOU HAVE DIFFICULTY SLEEPING: YES
AVERAGE NUMBER OF SLEEP HOURS: 5
DO YOU USE A SLEEP AID: NO
RESTFUL SLEEP: NO
DIFFICULTY FALLING ASLEEP: YES

## 2020-05-14 ASSESSMENT — LIFESTYLE VARIABLES: HISTORY_ALCOHOL_USE: NO

## 2020-05-14 ASSESSMENT — PAIN SCALES - GENERAL
PAINLEVEL_OUTOF10: 0
PAINLEVEL_OUTOF10: 0

## 2020-05-14 NOTE — GROUP NOTE
Group Therapy Note    Date: 5/14/2020    Group Start Time: 1115  Group End Time: 1200  Group Topic: Cognitive Skills    [facility]    LEXUS Raphael        Group Therapy Note    Attendees: 13         Patient's Goal:  Pt will be able to discuss importance of I feel statements and using format of communication sandwich to address difficult issues. Notes:  Pt active in class discussion. Status After Intervention:  Improved    Participation Level:  Active Listener and Interactive    Participation Quality: Appropriate, Attentive, Sharing and Supportive      Speech:  normal      Thought Process/Content: Logical      Affective Functioning: Blunted      Mood: depressed      Level of consciousness:  Alert, Oriented x4 and Attentive      Response to Learning: Able to verbalize current knowledge/experience, Able to verbalize/acknowledge new learning and Progressing to goal      Endings: None Reported    Modes of Intervention: Education, Support, Socialization and Problem-solving      Discipline Responsible: /Counselor      Signature:  LEXUS Ibarra

## 2020-05-14 NOTE — PLAN OF CARE
Patient resting quietly with eyes closed. No S/S of distress noted or observed. Prn medications given for sleep and anxiety. Will continue to monitor, provide needs and safe environment with continue rounding every 15 minutes.

## 2020-05-14 NOTE — PLAN OF CARE
Out on the unit , social and interacting with peers. Voicing concerns about his meds and how he wants them changed. States he wants benedryl not deseryl. Attending groups and unit activities. Denies suicidal thoughts or intent to harm himself or others. No voiced delusions. Denies hallucinations.

## 2020-05-14 NOTE — PROGRESS NOTES
Pt denies suicidal ideations, homicidal ideations and hallucinations. Pt is pleasant and cooperative. Pt stated her depression was getting worse d/t financial stressors. Pt stated his stimulus check when to his ex for his back child support. Pt stated his fleeting SI was increasing and started to have intent wanting to drink himself to death. Pt states he does not drink. Pt states spouse won't let him drink at home. Pt is pleasant and cooperative. Pt states he wants \"my meds to get straight. I ain't in no rush. \" Pt presents sad and depressed. Oriented to room and unit. Will continue to monitor. `[facility]  Admission Note     Admission Type:   Admission Type: Involuntary    Reason for admission:  Reason for Admission: \"depressed. I had no motivation. I couldn't sleep. I felt hopeless. I didn't have the will to want to do anything. Like I had no life, it got really bad these past few weeks. Thats why I'm here, I need to get out of this the way I am. If i haven't come, I would've been dead in a month with these thoughts. Ya know. \"     PATIENT STRENGTHS:  Strengths: No significant Physical Illness    Patient Strengths and Limitations:  Limitations: Hopeless about future    Addictive Behavior:   Addictive Behavior  In the past 3 months, have you felt or has someone told you that you have a problem with:  : None  Do you have a history of Chemical Use?: No  Do you have a history of Alcohol Use?: No  Do you have a history of Street Drug Abuse?: No  Histroy of Prescripton Drug Abuse?: No    Medical Problems:   Past Medical History:   Diagnosis Date    Acid reflux     Asthma     Asthma     [facility]     Hypertension     Pancreatitis     Prediabetes     Psychiatric problem     depressed    Sleep apnea     Substance abuse ([facility]     alcohol and cocaine       Status EXAM:  Status and Exam  Normal: No  Facial Expression: Sad  Affect: Appropriate  Level of followings belongings:  Dentures: None  Vision - Corrective Lenses: None  Hearing Aid: None  Jewelry: Necklace(gold)  Body Piercings Removed: N/A  Clothing: Other (Comment)  Were All Patient Medications Collected?: Yes(inhaler)  Other Valuables: Cell phone, Kasper Nestor, Other (Comment)(, )     Valuables sent home with n/a. Valuables placed in safe in security envelope, number:  See soft chart- Pt has home medications in med room and wallet with ccards locked on [address]. Patient's home medications were collected yes. Patient oriented to surroundings and program expectations and copy of patient rights given. Received admission packet:  yes. Consents reviewed, signed yes. Refused n/a. Patient verbalize understanding:  yes.     Patient education on precautions: yes                   Rosalia Rivera RN
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
## 2020-05-15 LAB
METER GLUCOSE: 145 MG/DL (ref 74–99)
METER GLUCOSE: 151 MG/DL (ref 74–99)
METER GLUCOSE: 157 MG/DL (ref 74–99)
METER GLUCOSE: 96 MG/DL (ref 74–99)

## 2020-05-15 PROCEDURE — 6370000000 HC RX 637 (ALT 250 FOR IP): Performed by: PSYCHIATRY & NEUROLOGY

## 2020-05-15 PROCEDURE — 6360000002 HC RX W HCPCS: Performed by: NURSE PRACTITIONER

## 2020-05-15 PROCEDURE — 82962 GLUCOSE BLOOD TEST: CPT

## 2020-05-15 PROCEDURE — 99232 SBSQ HOSP IP/OBS MODERATE 35: CPT | Performed by: NURSE PRACTITIONER

## 2020-05-15 PROCEDURE — 6370000000 HC RX 637 (ALT 250 FOR IP): Performed by: INTERNAL MEDICINE

## 2020-05-15 PROCEDURE — 6370000000 HC RX 637 (ALT 250 FOR IP): Performed by: NURSE PRACTITIONER

## 2020-05-15 PROCEDURE — 1240000000 HC EMOTIONAL WELLNESS R&B

## 2020-05-15 PROCEDURE — 94640 AIRWAY INHALATION TREATMENT: CPT

## 2020-05-15 RX ORDER — ASPIRIN 81 MG/1
81 TABLET, CHEWABLE ORAL DAILY
Status: DISCONTINUED | OUTPATIENT
Start: 2020-05-15 | End: 2020-05-19 | Stop reason: HOSPADM

## 2020-05-15 RX ORDER — OXCARBAZEPINE 300 MG/1
300 TABLET, FILM COATED ORAL 2 TIMES DAILY
Status: DISCONTINUED | OUTPATIENT
Start: 2020-05-15 | End: 2020-05-19 | Stop reason: HOSPADM

## 2020-05-15 RX ORDER — TAMSULOSIN HYDROCHLORIDE 0.4 MG/1
0.4 CAPSULE ORAL 2 TIMES DAILY
Status: DISCONTINUED | OUTPATIENT
Start: 2020-05-15 | End: 2020-05-15 | Stop reason: SDUPTHER

## 2020-05-15 RX ORDER — LANOLIN ALCOHOL/MO/W.PET/CERES
3 CREAM (GRAM) TOPICAL NIGHTLY
Status: DISCONTINUED | OUTPATIENT
Start: 2020-05-15 | End: 2020-05-17

## 2020-05-15 RX ORDER — IPRATROPIUM BROMIDE AND ALBUTEROL SULFATE 2.5; .5 MG/3ML; MG/3ML
1 SOLUTION RESPIRATORY (INHALATION) EVERY 4 HOURS PRN
Status: DISCONTINUED | OUTPATIENT
Start: 2020-05-15 | End: 2020-05-19 | Stop reason: HOSPADM

## 2020-05-15 RX ORDER — GLIPIZIDE 5 MG/1
5 TABLET ORAL 2 TIMES DAILY
Status: DISCONTINUED | OUTPATIENT
Start: 2020-05-15 | End: 2020-05-15 | Stop reason: SDUPTHER

## 2020-05-15 RX ORDER — AMLODIPINE BESYLATE 10 MG/1
10 TABLET ORAL DAILY
Status: DISCONTINUED | OUTPATIENT
Start: 2020-05-15 | End: 2020-05-15 | Stop reason: SDUPTHER

## 2020-05-15 RX ORDER — PANTOPRAZOLE SODIUM 40 MG/1
40 TABLET, DELAYED RELEASE ORAL DAILY
Status: DISCONTINUED | OUTPATIENT
Start: 2020-05-15 | End: 2020-05-19 | Stop reason: HOSPADM

## 2020-05-15 RX ADMIN — INSULIN LISPRO 1 UNITS: 100 INJECTION, SOLUTION INTRAVENOUS; SUBCUTANEOUS at 21:05

## 2020-05-15 RX ADMIN — TRAZODONE HYDROCHLORIDE 50 MG: 50 TABLET ORAL at 20:53

## 2020-05-15 RX ADMIN — PANTOPRAZOLE SODIUM 40 MG: 40 TABLET, DELAYED RELEASE ORAL at 08:59

## 2020-05-15 RX ADMIN — AMLODIPINE BESYLATE 10 MG: 10 TABLET ORAL at 08:59

## 2020-05-15 RX ADMIN — GABAPENTIN 300 MG: 300 CAPSULE ORAL at 13:08

## 2020-05-15 RX ADMIN — HYDROXYZINE PAMOATE 50 MG: 50 CAPSULE ORAL at 20:54

## 2020-05-15 RX ADMIN — NICOTINE POLACRILEX 4 MG: 2 GUM, CHEWING BUCCAL at 13:19

## 2020-05-15 RX ADMIN — CHLORDIAZEPOXIDE HYDROCHLORIDE 25 MG: 25 CAPSULE ORAL at 01:57

## 2020-05-15 RX ADMIN — TAMSULOSIN HYDROCHLORIDE 0.4 MG: 0.4 CAPSULE ORAL at 08:59

## 2020-05-15 RX ADMIN — BUDESONIDE 500 MCG: 0.5 SUSPENSION RESPIRATORY (INHALATION) at 09:35

## 2020-05-15 RX ADMIN — GLIPIZIDE 5 MG: 5 TABLET ORAL at 06:37

## 2020-05-15 RX ADMIN — GABAPENTIN 300 MG: 300 CAPSULE ORAL at 08:59

## 2020-05-15 RX ADMIN — ALUMINUM HYDROXIDE, MAGNESIUM HYDROXIDE, AND SIMETHICONE 30 ML: 200; 200; 20 SUSPENSION ORAL at 01:46

## 2020-05-15 RX ADMIN — ARFORMOTEROL TARTRATE 15 MCG: 15 SOLUTION RESPIRATORY (INHALATION) at 09:34

## 2020-05-15 RX ADMIN — OXCARBAZEPINE 300 MG: 300 TABLET, FILM COATED ORAL at 20:53

## 2020-05-15 RX ADMIN — TAMSULOSIN HYDROCHLORIDE 0.4 MG: 0.4 CAPSULE ORAL at 20:52

## 2020-05-15 RX ADMIN — MELATONIN 3 MG ORAL TABLET 3 MG: 3 TABLET ORAL at 21:04

## 2020-05-15 RX ADMIN — GLIPIZIDE 5 MG: 5 TABLET ORAL at 15:22

## 2020-05-15 RX ADMIN — NICOTINE POLACRILEX 4 MG: 2 GUM, CHEWING BUCCAL at 20:53

## 2020-05-15 RX ADMIN — BUPROPION HYDROCHLORIDE 150 MG: 150 TABLET, EXTENDED RELEASE ORAL at 08:59

## 2020-05-15 RX ADMIN — OXCARBAZEPINE 150 MG: 150 TABLET, FILM COATED ORAL at 08:59

## 2020-05-15 RX ADMIN — GABAPENTIN 300 MG: 300 CAPSULE ORAL at 20:52

## 2020-05-15 RX ADMIN — ASPIRIN 81 MG 81 MG: 81 TABLET ORAL at 21:02

## 2020-05-15 ASSESSMENT — PAIN SCALES - GENERAL: PAINLEVEL_OUTOF10: 0

## 2020-05-15 NOTE — PLAN OF CARE
Problem: Suicide risk  Goal: Provide patient with safe environment  Description: Provide patient with safe environment  5/15/2020 1121 by Chito Acosta RN  Outcome: Ongoing  5/15/2020 0203 by Florecita Guevara RN  Outcome: Met This Shift     Problem: Altered Mood, Depressive Behavior:  Goal: Able to verbalize acceptance of life and situations over which he or she has no control  Description: Able to verbalize acceptance of life and situations over which he or she has no control  5/15/2020 1121 by Chito Acosta RN  Outcome: Ongoing  5/15/2020 0203 by Florecita Guevara RN  Outcome: Met This Shift  Goal: Able to verbalize and/or display a decrease in depressive symptoms  Description: Able to verbalize and/or display a decrease in depressive symptoms  Outcome: Ongoing  Goal: Ability to disclose and discuss suicidal ideas will improve  Description: Ability to disclose and discuss suicidal ideas will improve  Outcome: Ongoing

## 2020-05-15 NOTE — CONSULTS
without murmurs, rubs or gallops. Abdomen: Soft, non-tender, non-distended with normal bowel sounds. Musculoskeletal:  No clubbing, cyanosis or edema bilaterally. Full range of motion without deformity. Skin: Skin color, texture, turgor normal.  No rashes or lesions. Neurologic:  Neurovascularly intact without any focal sensory/motor deficits. Cranial nerves: II-XII intact, grossly non-focal.  Psychiatric:  Alert and oriented, thought content appropriate, normal insight  Capillary Refill: Brisk,< 3 seconds   Peripheral Pulses: +2 palpable, equal bilaterally         Labs:     Recent Labs     05/13/20  1353   WBC 8.1   HGB 16.1   HCT 50.3        Recent Labs     05/13/20  1353      K 3.4*   CL 98   CO2 24   BUN 9   CREATININE 0.7   CALCIUM 9.3     Recent Labs     05/13/20  1353   AST 45*   ALT 49*   BILITOT 0.6   ALKPHOS 85     No results for input(s): INR in the last 72 hours. No results for input(s): Gareld Junk in the last 72 hours. Urinalysis:      Lab Results   Component Value Date    NITRU Negative 05/13/2020    WBCUA 2-5 05/13/2020    BACTERIA NONE SEEN 05/13/2020    RBCUA NONE 05/13/2020    RBCUA NONE 02/13/2014    BLOODU Negative 05/13/2020    SPECGRAV >=1.030 05/13/2020    GLUCOSEU Negative 05/13/2020         ASSESSMENT:    Active Hospital Problems    Diagnosis Date Noted    [facility] depressive disorder, [facility] episode [facility] [F33.9] 05/14/2020    Alcohol abuse [F10.10] 05/14/2020   COPD  Diabetes  Hypertension    PLAN:  Continue inhaled steroids  PRN nebulizer treatments due to COPD. Sugar screens with insulin for coverage  Continue home antihypertensives  DVT Prophylaxis: Ambulation  Diet: DIET CARB CONTROL;  Code Status: Full Code    PT/OT Eval Status:N/A  Dispo -goal home       Jericho Patel DO    Thank you No primary care provider on file. for the opportunity to be involved in this patient's care.  If you have any questions or concerns please feel free to contact me at [phone].

## 2020-05-15 NOTE — PROGRESS NOTES
Attended afternoon meet and greet. Informed of staffing changes and evening expectations. Participated in recreation activity of choice, either watch movie, cards or play [employer]. Patient 1 of 11.

## 2020-05-15 NOTE — GROUP NOTE
Group Therapy Note    Date: 5/15/2020    Group Start Time: 1110  Group End Time: 1150  Group Topic: Cognitive Skills    [facility]    LEXUS Raphael        Group Therapy Note    Attendees: 17         Patient's Goal:  Pt will be able to identify negative coping skills that make problems worse and be able to identify effective coping skills    Notes: Pt was active in class discussion of coping skills. Status After Intervention:  Improved    Participation Level:  Active Listener and Interactive    Participation Quality: Appropriate, attentive, and supportive      Speech:  normal      Thought Process/Content: Logical      Affective Functioning: Congruent    Mood: euthymic      Level of consciousness:  Alert, Oriented x4 and Attentive      Response to Learning: Able to verbalize current knowledge/experience, Able to verbalize/acknowledge new learning and Progressing to goal      Endings: None Reported    Modes of Intervention: Education, Support, Socialization and Problem-solving      Discipline Responsible: /Counselor      Signature:  LEXUS [place]

## 2020-05-15 NOTE — GROUP NOTE
Group Therapy Note    Date: 5/15/2020    Group Start Time: 1000  Group End Time: 2953  Group Topic: Psychoeducation    [facility]    Maria Elena Wilkerson, CTRS        Group Therapy Note    Number of participants: 13  Type of group: Psychoeducation  Mode of intervention: Education, Support, Socialization, Exploration, Clarifying, and Problem-solving  Topic: Overcoming Avoidance   Objective: Pt will identify ways to overcome avoidance in recovery. Notes:  Pt was interactive during group sharing ways to overcome avoidance in recovery. Pt gave support and feedback to others    Status After Intervention:  Improved    Participation Level:  Active Listener and Interactive    Participation Quality: Appropriate, Attentive, Sharing and Supportive      Speech:  normal      Thought Process/Content: Logical      Affective Functioning: Congruent      Mood: euthymic      Level of consciousness:  Alert, Oriented x4 and Attentive      Response to Learning: Able to verbalize current knowledge/experience, Able to verbalize/acknowledge new learning, Able to retain information, Capable of insight, Able to change behavior and Progressing to goal      Endings: None Reported    Modes of Intervention: Education, Support, Socialization, Exploration, Clarifying and Problem-solving

## 2020-05-15 NOTE — PROGRESS NOTES
Attended community meeting shared goal for the day as to communicate with dr and use my notes when with him.

## 2020-05-15 NOTE — PROGRESS NOTES
hydroxide (MILK OF MAGNESIA) 400 MG/5ML suspension 30 mL, 30 mL, Oral, Daily PRN, Samantha Sam MD    aluminum & magnesium hydroxide-simethicone (MAALOX) 200-200-20 MG/5ML suspension 30 mL, 30 mL, Oral, PRN, Samantha Sam MD, 30 mL at 05/15/20 0146    chlordiazePOXIDE (LIBRIUM) capsule 25 mg, 25 mg, Oral, Q8H PRN, Samantha Sam MD, 25 mg at 05/15/20 0157      Examination:  /82   Pulse 87   Temp 97.5 °F (36.4 °C)   Resp 18   Ht 5' 9\" (1.753 m)   Wt 266 lb 9 oz (120.9 kg)   SpO2 100%   BMI 39.36 kg/m²   Gait - steady  Medication side effects(SE): denies    Mental Status Examination:    Level of consciousness:  within normal limits   Appearance:  fair grooming and fair hygiene  Behavior/Motor:  no abnormalities noted  Attitude toward examiner:  cooperative  Speech:  spontaneous, normal rate and normal volume   Mood: \" I am doing okay. \"  Affect: Mood incongruent anxious  Thought processes:  perservative   Thought content: Devoid of any auditory visualizations delusions or any other perceptual abnormalities  Cognition:  oriented to person, place, and time   Concentration intact  Insight Limited  Judgement Limited    ASSESSMENT:   Patient symptoms are:  [] Well controlled  [] Improving  [] Worsening  [x] No change      Diagnosis:   Principal Problem:    Major depressive disorder, recurrent episode with mixed features ([facility]  Active Problems:    Alcohol abuse  Resolved Problems:    * No resolved hospital problems.  *      LABS:    Recent Labs     05/13/20  1353   WBC 8.1   HGB 16.1        Recent Labs     05/13/20  1353      K 3.4*   CL 98   CO2 24   BUN 9   CREATININE 0.7   GLUCOSE 175*     Recent Labs     05/13/20  1353   BILITOT 0.6   ALKPHOS 85   AST 45*   ALT 49*     Lab Results   Component Value Date    LABAMPH NOT DETECTED 05/13/2020    BARBSCNU NOT DETECTED 05/13/2020    LABBENZ NOT DETECTED 05/13/2020    LABBENZ SEE BELOW 09/13/2014    CANNAB NOT DETECTED  05/19/2013

## 2020-05-16 LAB
METER GLUCOSE: 120 MG/DL (ref 74–99)
METER GLUCOSE: 143 MG/DL (ref 74–99)
METER GLUCOSE: 172 MG/DL (ref 74–99)
METER GLUCOSE: 173 MG/DL (ref 74–99)

## 2020-05-16 PROCEDURE — 99232 SBSQ HOSP IP/OBS MODERATE 35: CPT | Performed by: NURSE PRACTITIONER

## 2020-05-16 PROCEDURE — 6370000000 HC RX 637 (ALT 250 FOR IP): Performed by: NURSE PRACTITIONER

## 2020-05-16 PROCEDURE — 6370000000 HC RX 637 (ALT 250 FOR IP): Performed by: PSYCHIATRY & NEUROLOGY

## 2020-05-16 PROCEDURE — 1240000000 HC EMOTIONAL WELLNESS R&B

## 2020-05-16 PROCEDURE — 6370000000 HC RX 637 (ALT 250 FOR IP): Performed by: INTERNAL MEDICINE

## 2020-05-16 PROCEDURE — 6360000002 HC RX W HCPCS: Performed by: NURSE PRACTITIONER

## 2020-05-16 PROCEDURE — 94640 AIRWAY INHALATION TREATMENT: CPT

## 2020-05-16 PROCEDURE — 82962 GLUCOSE BLOOD TEST: CPT

## 2020-05-16 RX ADMIN — OXCARBAZEPINE 300 MG: 300 TABLET, FILM COATED ORAL at 20:54

## 2020-05-16 RX ADMIN — TRAZODONE HYDROCHLORIDE 50 MG: 50 TABLET ORAL at 20:54

## 2020-05-16 RX ADMIN — BUPROPION HYDROCHLORIDE 150 MG: 150 TABLET, EXTENDED RELEASE ORAL at 08:48

## 2020-05-16 RX ADMIN — PANTOPRAZOLE SODIUM 40 MG: 40 TABLET, DELAYED RELEASE ORAL at 06:44

## 2020-05-16 RX ADMIN — AMLODIPINE BESYLATE 10 MG: 10 TABLET ORAL at 08:48

## 2020-05-16 RX ADMIN — TAMSULOSIN HYDROCHLORIDE 0.4 MG: 0.4 CAPSULE ORAL at 08:49

## 2020-05-16 RX ADMIN — OXCARBAZEPINE 300 MG: 300 TABLET, FILM COATED ORAL at 08:48

## 2020-05-16 RX ADMIN — GABAPENTIN 300 MG: 300 CAPSULE ORAL at 14:12

## 2020-05-16 RX ADMIN — INSULIN LISPRO 1 UNITS: 100 INJECTION, SOLUTION INTRAVENOUS; SUBCUTANEOUS at 08:46

## 2020-05-16 RX ADMIN — MELATONIN 3 MG ORAL TABLET 3 MG: 3 TABLET ORAL at 20:54

## 2020-05-16 RX ADMIN — BUDESONIDE 500 MCG: 0.5 SUSPENSION RESPIRATORY (INHALATION) at 09:10

## 2020-05-16 RX ADMIN — INSULIN LISPRO 1 UNITS: 100 INJECTION, SOLUTION INTRAVENOUS; SUBCUTANEOUS at 16:29

## 2020-05-16 RX ADMIN — GABAPENTIN 300 MG: 300 CAPSULE ORAL at 08:48

## 2020-05-16 RX ADMIN — GLIPIZIDE 5 MG: 5 TABLET ORAL at 16:31

## 2020-05-16 RX ADMIN — GABAPENTIN 300 MG: 300 CAPSULE ORAL at 20:54

## 2020-05-16 RX ADMIN — GLIPIZIDE 5 MG: 5 TABLET ORAL at 06:49

## 2020-05-16 RX ADMIN — HYDROXYZINE PAMOATE 50 MG: 50 CAPSULE ORAL at 20:54

## 2020-05-16 RX ADMIN — TAMSULOSIN HYDROCHLORIDE 0.4 MG: 0.4 CAPSULE ORAL at 20:54

## 2020-05-16 RX ADMIN — NICOTINE POLACRILEX 4 MG: 2 GUM, CHEWING BUCCAL at 07:13

## 2020-05-16 RX ADMIN — NICOTINE POLACRILEX 4 MG: 2 GUM, CHEWING BUCCAL at 20:54

## 2020-05-16 RX ADMIN — ARFORMOTEROL TARTRATE 15 MCG: 15 SOLUTION RESPIRATORY (INHALATION) at 09:10

## 2020-05-16 RX ADMIN — INSULIN LISPRO 1 UNITS: 100 INJECTION, SOLUTION INTRAVENOUS; SUBCUTANEOUS at 20:53

## 2020-05-16 RX ADMIN — ASPIRIN 81 MG 81 MG: 81 TABLET ORAL at 08:49

## 2020-05-16 ASSESSMENT — PAIN SCALES - GENERAL: PAINLEVEL_OUTOF10: 0

## 2020-05-16 NOTE — PROGRESS NOTES
BEHAVIORAL HEALTH FOLLOW-UP NOTE     5/16/2020     Patient was seen and examined in person, Chart reviewed   Patient's case discussed with staff/team    Chief Complaint: I am on too many medications     Interim History: I met with the patient after group today. I spoke with the patient at length today \"a minimum of 45 minutes \". The patient talked to me at painstaking length of how he feels that he is on too many medications at this time and he does not want to take so many medications or so many pills and that he wants to be put on something called Adilene which later I discovered he was meeting Levlr. I explained to the patient this is antipsychotic and this medication might not be the most appropriate for him at this time. The patient then went on at length regarding antipsychotic medications.   The patient states that he wants one medication for his depression 1 medication for sleep and that is all he is going to take when he leaves the hospital.  Patient is hyperverbal to say the least  Appetite:   [x] Normal/Unchanged  [] Increased  [] Decreased      Sleep:       [x] Normal/Unchanged  [] Fair       [] Poor              Energy:    [x] Normal/Unchanged  [] Increased  [] Decreased        SI [] Present  [x] Absent    HI  []Present  [x] Absent     Aggression:  [] yes  [x] no    Patient is [x] able  [] unable to CONTRACT FOR SAFETY     PAST MEDICAL/PSYCHIATRIC HISTORY:   Past Medical History:   Diagnosis Date    Acid reflux     Asthma     Asthma     [facility] [address]     Hypertension     Pancreatitis     Prediabetes     Psychiatric problem     depressed    Sleep apnea     [facility]     alcohol and cocaine       FAMILY/SOCIAL HISTORY:  Family History   Problem Relation Age of Onset    Other Sister     High Blood Pressure Maternal Grandmother     Other Maternal Grandmother      Social History     Socioeconomic History    Marital status: Single     Spouse CNP    budesonide (PULMICORT) nebulizer suspension 500 mcg, 0.5 mg, Nebulization, BID, 500 mcg at 05/16/20 0910 **AND** Arformoterol Tartrate (BROVANA) nebulizer solution 15 mcg, 15 mcg, Nebulization, BID, Marylen Saber Dellick, APRN - CNP, 15 mcg at 05/16/20 0910    diphenhydrAMINE (BENADRYL) tablet 50 mg, 50 mg, Oral, Nightly PRN, AnaMIKE Love - CNP, 50 mg at 05/14/20 2039    acetaminophen (TYLENOL) tablet 650 mg, 650 mg, Oral, Q6H PRN, Tova Simms MD    hydrOXYzine (VISTARIL) capsule 50 mg, 50 mg, Oral, TID PRN, Tova Simms MD, 50 mg at 05/15/20 2054    haloperidol lactate (HALDOL) injection 5 mg, 5 mg, Intramuscular, Q6H PRN **OR** haloperidol (HALDOL) tablet 5 mg, 5 mg, Oral, Q6H PRN, Tova Simms MD    traZODone (DESYREL) tablet 50 mg, 50 mg, Oral, Nightly PRN, Tova Simms MD, 50 mg at 05/15/20 2053    magnesium hydroxide (MILK OF MAGNESIA) 400 MG/5ML suspension 30 mL, 30 mL, Oral, Daily PRN, Tova Simms MD    aluminum & magnesium hydroxide-simethicone (MAALOX) 200-200-20 MG/5ML suspension 30 mL, 30 mL, Oral, PRN, Tova Simms MD, 30 mL at 05/15/20 0146    chlordiazePOXIDE (LIBRIUM) capsule 25 mg, 25 mg, Oral, Q8H PRN, Tova Simms MD, 25 mg at 05/15/20 0157      Examination:  /86   Pulse 86   Temp 98.8 °F (37.1 °C)   Resp 16   Ht 5' 9\" (1.753 m)   Wt 266 lb 9 oz (120.9 kg)   SpO2 95%   BMI 39.36 kg/m²   Gait - steady  Medication side effects(SE): denies    Mental Status Examination:    Level of consciousness:  within normal limits   Appearance:  fair grooming and fair hygiene  Behavior/Motor:  no abnormalities noted  Attitude toward examiner:  cooperative  Speech:  spontaneous, normal rate and normal volume   Mood: \" I am doing okay. \"  Affect: Mood incongruent anxious  Thought processes:  perservative   Thought content: Devoid of any auditory visualizations delusions or any other perceptual abnormalities  Cognition:  oriented to person, 5 to 7 days based on stability            Electronically signed by MIKE Overton CNP on 5/16/2020 at 2:13 PM

## 2020-05-17 LAB
METER GLUCOSE: 115 MG/DL (ref 74–99)
METER GLUCOSE: 134 MG/DL (ref 74–99)
METER GLUCOSE: 178 MG/DL (ref 74–99)
METER GLUCOSE: 191 MG/DL (ref 74–99)

## 2020-05-17 PROCEDURE — 82962 GLUCOSE BLOOD TEST: CPT

## 2020-05-17 PROCEDURE — 6370000000 HC RX 637 (ALT 250 FOR IP): Performed by: NURSE PRACTITIONER

## 2020-05-17 PROCEDURE — 6370000000 HC RX 637 (ALT 250 FOR IP): Performed by: INTERNAL MEDICINE

## 2020-05-17 PROCEDURE — 99232 SBSQ HOSP IP/OBS MODERATE 35: CPT | Performed by: NURSE PRACTITIONER

## 2020-05-17 PROCEDURE — 94640 AIRWAY INHALATION TREATMENT: CPT

## 2020-05-17 PROCEDURE — 6370000000 HC RX 637 (ALT 250 FOR IP): Performed by: PSYCHIATRY & NEUROLOGY

## 2020-05-17 PROCEDURE — 1240000000 HC EMOTIONAL WELLNESS R&B

## 2020-05-17 PROCEDURE — 6360000002 HC RX W HCPCS: Performed by: NURSE PRACTITIONER

## 2020-05-17 RX ORDER — BUPROPION HYDROCHLORIDE 150 MG/1
300 TABLET ORAL DAILY
Status: DISCONTINUED | OUTPATIENT
Start: 2020-05-18 | End: 2020-05-19 | Stop reason: HOSPADM

## 2020-05-17 RX ORDER — BUSPIRONE HYDROCHLORIDE 10 MG/1
10 TABLET ORAL 3 TIMES DAILY PRN
Status: DISCONTINUED | OUTPATIENT
Start: 2020-05-17 | End: 2020-05-19 | Stop reason: HOSPADM

## 2020-05-17 RX ORDER — LANOLIN ALCOHOL/MO/W.PET/CERES
6 CREAM (GRAM) TOPICAL NIGHTLY
Status: DISCONTINUED | OUTPATIENT
Start: 2020-05-17 | End: 2020-05-19 | Stop reason: HOSPADM

## 2020-05-17 RX ADMIN — INSULIN LISPRO 1 UNITS: 100 INJECTION, SOLUTION INTRAVENOUS; SUBCUTANEOUS at 16:24

## 2020-05-17 RX ADMIN — GABAPENTIN 300 MG: 300 CAPSULE ORAL at 20:42

## 2020-05-17 RX ADMIN — PANTOPRAZOLE SODIUM 40 MG: 40 TABLET, DELAYED RELEASE ORAL at 06:33

## 2020-05-17 RX ADMIN — GLIPIZIDE 5 MG: 5 TABLET ORAL at 15:51

## 2020-05-17 RX ADMIN — MELATONIN 3 MG ORAL TABLET 6 MG: 3 TABLET ORAL at 20:42

## 2020-05-17 RX ADMIN — INSULIN LISPRO 1 UNITS: 100 INJECTION, SOLUTION INTRAVENOUS; SUBCUTANEOUS at 20:36

## 2020-05-17 RX ADMIN — GLIPIZIDE 5 MG: 5 TABLET ORAL at 06:33

## 2020-05-17 RX ADMIN — AMLODIPINE BESYLATE 10 MG: 10 TABLET ORAL at 08:16

## 2020-05-17 RX ADMIN — BUSPIRONE HYDROCHLORIDE 10 MG: 10 TABLET ORAL at 22:32

## 2020-05-17 RX ADMIN — NICOTINE POLACRILEX 4 MG: 2 GUM, CHEWING BUCCAL at 08:40

## 2020-05-17 RX ADMIN — BUPROPION HYDROCHLORIDE 150 MG: 150 TABLET, EXTENDED RELEASE ORAL at 08:17

## 2020-05-17 RX ADMIN — BUSPIRONE HYDROCHLORIDE 10 MG: 10 TABLET ORAL at 14:03

## 2020-05-17 RX ADMIN — BUDESONIDE 500 MCG: 0.5 SUSPENSION RESPIRATORY (INHALATION) at 12:50

## 2020-05-17 RX ADMIN — GABAPENTIN 300 MG: 300 CAPSULE ORAL at 08:17

## 2020-05-17 RX ADMIN — GABAPENTIN 300 MG: 300 CAPSULE ORAL at 15:51

## 2020-05-17 RX ADMIN — ARFORMOTEROL TARTRATE 15 MCG: 15 SOLUTION RESPIRATORY (INHALATION) at 12:51

## 2020-05-17 RX ADMIN — NICOTINE POLACRILEX 4 MG: 2 GUM, CHEWING BUCCAL at 16:47

## 2020-05-17 RX ADMIN — NICOTINE POLACRILEX 4 MG: 2 GUM, CHEWING BUCCAL at 22:32

## 2020-05-17 RX ADMIN — ARFORMOTEROL TARTRATE 15 MCG: 15 SOLUTION RESPIRATORY (INHALATION) at 22:45

## 2020-05-17 RX ADMIN — TAMSULOSIN HYDROCHLORIDE 0.4 MG: 0.4 CAPSULE ORAL at 20:42

## 2020-05-17 RX ADMIN — ASPIRIN 81 MG 81 MG: 81 TABLET ORAL at 08:17

## 2020-05-17 RX ADMIN — BUDESONIDE 500 MCG: 0.5 SUSPENSION RESPIRATORY (INHALATION) at 22:45

## 2020-05-17 RX ADMIN — TAMSULOSIN HYDROCHLORIDE 0.4 MG: 0.4 CAPSULE ORAL at 08:17

## 2020-05-17 RX ADMIN — OXCARBAZEPINE 300 MG: 300 TABLET, FILM COATED ORAL at 08:17

## 2020-05-17 RX ADMIN — OXCARBAZEPINE 300 MG: 300 TABLET, FILM COATED ORAL at 20:41

## 2020-05-17 ASSESSMENT — PAIN SCALES - GENERAL: PAINLEVEL_OUTOF10: 0

## 2020-05-17 NOTE — GROUP NOTE
Group Therapy Note    Date: 5/17/2020    Group Start Time: 1100  Group End Time: 8414  Group Topic: Psychoeducation    [facility]    Maria Elena Wilkerson, CTRS        Group Therapy Note    Number of participants: 13  Type of group: Psychoeducation  Mode of intervention: Education, Support, Socialization, Exploration, Clarifying, and Problem-solving  Topic: Gratitude  Objective: Pt will identify what they are grateful for in recovery. Notes:  Pt was interactive during group sharing what he is grateful for in recovery. Pt gave support and feedback to others. Status After Intervention:  Improved    Participation Level:  Active Listener and Interactive    Participation Quality: Appropriate, Attentive, Sharing and Supportive      Speech:  normal      Thought Process/Content: Logical      Affective Functioning: Congruent      Mood: euthymic      Level of consciousness:  Alert, Oriented x4 and Attentive      Response to Learning: Able to verbalize current knowledge/experience, Able to verbalize/acknowledge new learning, Able to retain information, Capable of insight, Able to change behavior and Progressing to goal      Endings: None Reported    Modes of Intervention: Education, Support, Socialization, Exploration, Clarifying and Problem-solving

## 2020-05-17 NOTE — PROGRESS NOTES
Food insecurity     Worry: Not on file     Inability: Not on file    Transportation needs     Medical: Not on file     Non-medical: Not on file   Tobacco Use    Smoking status: Current Every Day Smoker     Packs/day: 1.00     Years: 28.00     Pack years: 28.00     Types: Cigarettes, Cigars    Smokeless tobacco: Never Used    Tobacco comment: quit cigarettes 3 weeks ago. 5 cigars a week now. Substance and Sexual Activity    Alcohol use: Yes     Comment: past month    Drug use: Yes     Types: Cocaine     Comment: past week 4/20/20 cocaine    Sexual activity: Not on file   Lifestyle    Physical activity     Days per week: Not on file     Minutes per session: Not on file    Stress: Not on file   Relationships    Social connections     Talks on phone: Not on file     Gets together: Not on file     Attends Gnosticist service: Not on file     Active member of club or organization: Not on file     Attends meetings of clubs or organizations: Not on file     Relationship status: Not on file    Intimate partner violence     Fear of current or ex partner: Not on file     Emotionally abused: Not on file     Physically abused: Not on file     Forced sexual activity: Not on file   Other Topics Concern    Not on file   Social History Narrative    Not on file           ROS:  [x] All negative/unchanged except if checked.  Explain positive(checked items) below:  [] Constitutional  [] Eyes  [] Ear/Nose/Mouth/Throat  [] Respiratory  [] CV  [] GI  []   [] Musculoskeletal  [] Skin/Breast  [] Neurological  [] Endocrine  [] Heme/Lymph  [] Allergic/Immunologic    Explanation:     MEDICATIONS:    Current Facility-Administered Medications:     busPIRone (BUSPAR) tablet 10 mg, 10 mg, Oral, TID PRN, MIKE Jean-Baptiste CNP    [START ON 5/18/2020] buPROPion (WELLBUTRIN XL) extended release tablet 300 mg, 300 mg, Oral, Daily, MIKE Jean-Baptiste CNP    melatonin tablet 6 mg, 6 mg, Oral, Nightly, MIKE Jean-Baptiste CNP    pantoprazole (PROTONIX) tablet 40 mg, 40 mg, Oral, Daily, Francisco MIKE Green - CNP, 40 mg at 05/17/20 8314    OXcarbazepine (TRILEPTAL) tablet 300 mg, 300 mg, Oral, BID, Francisco MIKE Green - CNP, 584 mg at 05/17/20 4725    aspirin chewable tablet 81 mg, 81 mg, Oral, Daily, Clover Amie, DO, 81 mg at 05/17/20 0817    ipratropium-albuterol (DUONEB) nebulizer solution 3 mL, 1 vial, Nebulization, Q4H PRN, Clover Amie, DO    insulin lispro (HUMALOG) injection vial 0-6 Units, 0-6 Units, Subcutaneous, TID WC, Clover Amie, DO, 1 Units at 05/16/20 1629    insulin lispro (HUMALOG) injection vial 0-3 Units, 0-3 Units, Subcutaneous, Nightly, Clover Amie, DO, 1 Units at 05/16/20 2053    nicotine polacrilex (NICORETTE) gum 4 mg, 4 mg, Oral, PRN, Yaw Angulo MD, 4 mg at 05/17/20 0840    metoprolol tartrate (LOPRESSOR) tablet 25 mg, 25 mg, Oral, BID, Yaw Angulo MD, 25 mg at 05/14/20 2039    amLODIPine (NORVASC) tablet 10 mg, 10 mg, Oral, Daily, Yaw Angulo MD, 10 mg at 05/17/20 0816    glipiZIDE (GLUCOTROL) tablet 5 mg, 5 mg, Oral, BID AC, Yaw Angulo MD, 5 mg at 05/17/20 2733    glucose (GLUTOSE) 40 % oral gel 15 g, 15 g, Oral, PRN, Yaw Angulo MD    dextrose 50 % IV solution, 12.5 g, Intravenous, PRN, Yaw Angulo MD    glucagon (rDNA) injection 1 mg, 1 mg, Intramuscular, PRN, Yaw Angulo MD    dextrose 5 % solution, 100 mL/hr, Intravenous, PRN, Yaw Angulo MD    [facility]) capsule 0.4 mg, 0.4 mg, Oral, BID, Yaw Angulo MD, 0.4 mg at 05/17/20 1836    gabapentin (NEURONTIN) capsule 300 mg, 300 mg, Oral, TID, MIKE Blake CNP, 739 mg at 05/17/20 0817    albuterol (PROVENTIL) nebulizer solution 2.5 mg, 2.5 mg, Nebulization, T1O PRN, MIKE Blake CNP    budesonide (PULMICORT) nebulizer suspension 500 mcg, 0.5 mg, Nebulization, BID, 500 mcg at 05/17/20 1250 **AND** Arformoterol Tartrate (BROVANA) nebulizer solution 15 mcg, 15 mcg, Nebulization, BID, Christopher Samuel Arredondo, APRN - CNP, 15 mcg at 05/17/20 1251    diphenhydrAMINE (BENADRYL) tablet 50 mg, 50 mg, Oral, Nightly PRN, Domingo Rivera, APRN - CNP, 50 mg at 05/14/20 2039    acetaminophen (TYLENOL) tablet 650 mg, 650 mg, Oral, Q6H PRN, Sylvester Nath MD    hydrOXYzine (VISTARIL) capsule 50 mg, 50 mg, Oral, TID PRN, Sylvester Nath MD, 50 mg at 05/16/20 2054    haloperidol lactate (HALDOL) injection 5 mg, 5 mg, Intramuscular, Q6H PRN **OR** haloperidol (HALDOL) tablet 5 mg, 5 mg, Oral, Q6H PRN, Sylvester Nath MD    traZODone (DESYREL) tablet 50 mg, 50 mg, Oral, Nightly PRN, Sylvester Nath MD, 50 mg at 05/16/20 2054    magnesium hydroxide (MILK OF MAGNESIA) 400 MG/5ML suspension 30 mL, 30 mL, Oral, Daily PRN, Sylvester Nath MD    aluminum & magnesium hydroxide-simethicone (MAALOX) 200-200-20 MG/5ML suspension 30 mL, 30 mL, Oral, PRN, Sylvester Nath MD, 30 mL at 05/15/20 0146      Examination:  /78   Pulse 78   Temp 97.8 °F (36.6 °C)   Resp 18   Ht 5' 9\" (1.753 m)   Wt 266 lb 9 oz (120.9 kg)   SpO2 98%   BMI 39.36 kg/m²   Gait - steady  Medication side effects(SE): denies    Mental Status Examination:    Level of consciousness:  within normal limits   Appearance:  fair grooming and fair hygiene  Behavior/Motor:  no abnormalities noted  Attitude toward examiner:  cooperative  Speech:  spontaneous, normal rate and normal volume   Mood: \" I am doing okay. \"  Affect: Mood incongruent anxious  Thought processes:  perservative   Thought content: Devoid of any auditory visualizations delusions or any other perceptual abnormalities  Cognition:  oriented to person, place, and time   Concentration intact  Insight Limited  Judgement Limited    ASSESSMENT:   Patient symptoms are:  [] Well controlled  [x] Improving  [] Worsening  [] No change      Diagnosis:   Principal Problem:    Major depressive disorder, recurrent episode with mixed features [facility])  Active Problems:    Alcohol abuse  Resolved Problems:    * No resolved hospital problems. *      LABS:    No results for input(s): WBC, HGB, PLT in the last 72 hours. No results for input(s): NA, K, CL, CO2, BUN, CREATININE, GLUCOSE in the last 72 hours. No results for input(s): BILITOT, ALKPHOS, AST, ALT in the last 72 hours. Lab Results   Component Value Date    LABAMPH NOT DETECTED 05/13/2020    BARBSCNU NOT DETECTED 05/13/2020    LABBENZ NOT DETECTED 05/13/2020    LABBENZ SEE BELOW 09/13/2014    CANNAB NOT DETECTED  05/19/2013    COCAINESCRN NOT DETECTED 05/19/2013    LABMETH NOT DETECTED 05/13/2020    OPIATESCREENURINE NOT DETECTED 05/13/2020    PHENCYCLIDINESCREENURINE NOT DETECTED 05/13/2020    PPXUR NOT DETECTED 05/19/2013    ETOH <10 05/13/2020     Lab Results   Component Value Date    TSH 2.640 09/13/2014     No results found for: LITHIUM  No results found for: VALPROATE, CBMZ        Treatment Plan:  Reviewed current Medications with the patient. Risks, benefits, side effects, drug-to-drug interactions and alternatives to treatment were discussed. Collateral information:   CD evaluation  Encourage patient to attend group and other milieu activities.   Discharge planning discussed with the patient and treatment team.  BuSpar 10 mg 3 times daily as needed for anxiety symptoms  Continue Wellbutrin  mg daily for depression  Increase Trileptal to 300 mg twice daily for mood stabilization    PSYCHOTHERAPY/COUNSELING:  [x] Therapeutic interview  [x] Supportive  [] CBT  [] Ongoing  [] Other    [x] Patient continues to need, on a daily basis, active treatment furnished directly by or requiring the supervision of inpatient psychiatric personnel      Anticipated Length of stay: 5 to 7 days based on stability            Electronically signed by MIKE Sandoval CNP on 5/17/2020 at 2:03 PM

## 2020-05-18 PROBLEM — E87.6 HYPOKALEMIA: Status: ACTIVE | Noted: 2020-05-18

## 2020-05-18 LAB
ANION GAP SERPL CALCULATED.3IONS-SCNC: 13 MMOL/L (ref 7–16)
BUN BLDV-MCNC: 11 MG/DL (ref 6–20)
CALCIUM SERPL-MCNC: 9.6 MG/DL (ref 8.6–10.2)
CHLORIDE BLD-SCNC: 99 MMOL/L (ref 98–107)
CO2: 31 MMOL/L (ref 22–29)
CREAT SERPL-MCNC: 0.7 MG/DL (ref 0.7–1.2)
GFR AFRICAN AMERICAN: >60
GFR NON-AFRICAN AMERICAN: >60 ML/MIN/1.73
GLUCOSE BLD-MCNC: 142 MG/DL (ref 74–99)
METER GLUCOSE: 110 MG/DL (ref 74–99)
METER GLUCOSE: 137 MG/DL (ref 74–99)
METER GLUCOSE: 209 MG/DL (ref 74–99)
METER GLUCOSE: 216 MG/DL (ref 74–99)
POTASSIUM REFLEX MAGNESIUM: 3.9 MMOL/L (ref 3.5–5)
SODIUM BLD-SCNC: 143 MMOL/L (ref 132–146)

## 2020-05-18 PROCEDURE — 6370000000 HC RX 637 (ALT 250 FOR IP): Performed by: PSYCHIATRY & NEUROLOGY

## 2020-05-18 PROCEDURE — 36415 COLL VENOUS BLD VENIPUNCTURE: CPT

## 2020-05-18 PROCEDURE — 6370000000 HC RX 637 (ALT 250 FOR IP): Performed by: NURSE PRACTITIONER

## 2020-05-18 PROCEDURE — 6370000000 HC RX 637 (ALT 250 FOR IP): Performed by: INTERNAL MEDICINE

## 2020-05-18 PROCEDURE — 94640 AIRWAY INHALATION TREATMENT: CPT

## 2020-05-18 PROCEDURE — 82962 GLUCOSE BLOOD TEST: CPT

## 2020-05-18 PROCEDURE — 80048 BASIC METABOLIC PNL TOTAL CA: CPT

## 2020-05-18 PROCEDURE — 1240000000 HC EMOTIONAL WELLNESS R&B

## 2020-05-18 PROCEDURE — 6360000002 HC RX W HCPCS: Performed by: NURSE PRACTITIONER

## 2020-05-18 PROCEDURE — 99232 SBSQ HOSP IP/OBS MODERATE 35: CPT | Performed by: NURSE PRACTITIONER

## 2020-05-18 RX ADMIN — ASPIRIN 81 MG 81 MG: 81 TABLET ORAL at 09:39

## 2020-05-18 RX ADMIN — BUPROPION HYDROCHLORIDE 300 MG: 150 TABLET, EXTENDED RELEASE ORAL at 09:39

## 2020-05-18 RX ADMIN — GABAPENTIN 300 MG: 300 CAPSULE ORAL at 13:25

## 2020-05-18 RX ADMIN — PANTOPRAZOLE SODIUM 40 MG: 40 TABLET, DELAYED RELEASE ORAL at 06:54

## 2020-05-18 RX ADMIN — TAMSULOSIN HYDROCHLORIDE 0.4 MG: 0.4 CAPSULE ORAL at 09:39

## 2020-05-18 RX ADMIN — HYDROXYZINE PAMOATE 50 MG: 50 CAPSULE ORAL at 21:30

## 2020-05-18 RX ADMIN — BUDESONIDE 500 MCG: 0.5 SUSPENSION RESPIRATORY (INHALATION) at 09:10

## 2020-05-18 RX ADMIN — BUDESONIDE 500 MCG: 0.5 SUSPENSION RESPIRATORY (INHALATION) at 20:50

## 2020-05-18 RX ADMIN — GLIPIZIDE 5 MG: 5 TABLET ORAL at 15:58

## 2020-05-18 RX ADMIN — AMLODIPINE BESYLATE 10 MG: 10 TABLET ORAL at 09:39

## 2020-05-18 RX ADMIN — NICOTINE POLACRILEX 4 MG: 2 GUM, CHEWING BUCCAL at 21:41

## 2020-05-18 RX ADMIN — BUSPIRONE HYDROCHLORIDE 10 MG: 10 TABLET ORAL at 09:39

## 2020-05-18 RX ADMIN — ARFORMOTEROL TARTRATE 15 MCG: 15 SOLUTION RESPIRATORY (INHALATION) at 09:00

## 2020-05-18 RX ADMIN — INSULIN LISPRO 1 UNITS: 100 INJECTION, SOLUTION INTRAVENOUS; SUBCUTANEOUS at 21:39

## 2020-05-18 RX ADMIN — INSULIN LISPRO 2 UNITS: 100 INJECTION, SOLUTION INTRAVENOUS; SUBCUTANEOUS at 16:03

## 2020-05-18 RX ADMIN — GABAPENTIN 300 MG: 300 CAPSULE ORAL at 20:39

## 2020-05-18 RX ADMIN — GABAPENTIN 300 MG: 300 CAPSULE ORAL at 09:39

## 2020-05-18 RX ADMIN — MELATONIN 3 MG ORAL TABLET 6 MG: 3 TABLET ORAL at 20:37

## 2020-05-18 RX ADMIN — GLIPIZIDE 5 MG: 5 TABLET ORAL at 06:54

## 2020-05-18 RX ADMIN — ARFORMOTEROL TARTRATE 15 MCG: 15 SOLUTION RESPIRATORY (INHALATION) at 20:50

## 2020-05-18 RX ADMIN — OXCARBAZEPINE 300 MG: 300 TABLET, FILM COATED ORAL at 09:38

## 2020-05-18 RX ADMIN — TRAZODONE HYDROCHLORIDE 50 MG: 50 TABLET ORAL at 20:37

## 2020-05-18 RX ADMIN — NICOTINE POLACRILEX 4 MG: 2 GUM, CHEWING BUCCAL at 06:56

## 2020-05-18 RX ADMIN — TAMSULOSIN HYDROCHLORIDE 0.4 MG: 0.4 CAPSULE ORAL at 20:39

## 2020-05-18 ASSESSMENT — PAIN SCALES - GENERAL
PAINLEVEL_OUTOF10: 0
PAINLEVEL_OUTOF10: 0

## 2020-05-18 NOTE — PROGRESS NOTES
Attended afternoon meet and greet. Pleasant and engaged in group, willing to participate in trivia. Informed of shift change and evening activities. Patient 1 of 12.

## 2020-05-18 NOTE — PROGRESS NOTES
BEHAVIORAL HEALTH FOLLOW-UP NOTE     5/18/2020     Patient was seen and examined in person, Chart reviewed   Patient's case discussed with staff/team    Chief Complaint: Preoccupied with his medications    Interim History: Patient is up on the unit he reports that he started medication BuSpar to help him with his panic attacks. He is very bright social pleasant he is on the unit attending groups and socializing with peers. His speech is calmer less pressured today. He is less hyperverbal we talked at length about the the problem he is having with the mother of his daughter and not sure if he will be able to return home to her. He is hopeful to stepped on the crisis stabilization unit on discharge. He denies any auditory visual hallucinations there are no overt or covert signs of psychosis.             Appetite:   [x] Normal/Unchanged  [] Increased  [] Decreased      Sleep:       [x] Normal/Unchanged  [] Fair       [] Poor              Energy:    [x] Normal/Unchanged  [] Increased  [] Decreased        SI [] Present  [x] Absent    HI  []Present  [x] Absent     Aggression:  [] yes  [x] no    Patient is [x] able  [] unable to CONTRACT FOR SAFETY     PAST MEDICAL/PSYCHIATRIC HISTORY:   Past Medical History:   Diagnosis Date    Acid reflux     Asthma     Asthma     [facility]) [facility] [address]     Hypertension     Pancreatitis     Prediabetes     Psychiatric problem     depressed    Sleep apnea     Substance abuse ([facility] [address]     alcohol and cocaine       FAMILY/SOCIAL HISTORY:  Family History   Problem Relation Age of Onset    Other Sister     High Blood Pressure Maternal Grandmother     Other Maternal Grandmother      Social History     Socioeconomic History    Marital status: Single     Spouse name: Not on file    Number of children: 3    Years of education: 15    Highest education level: Not on file   Occupational History    Occupation:    Social Needs    Financial resource strain: Not on file    Food insecurity     Worry: Not on file     Inability: Not on file    Transportation needs     Medical: Not on file     Non-medical: Not on file   Tobacco Use    Smoking status: Current Every Day Smoker     Packs/day: 1.00     Years: 28.00     Pack years: 28.00     Types: Cigarettes, Cigars    Smokeless tobacco: Never Used    Tobacco comment: quit cigarettes 3 weeks ago. 5 cigars a week now. Substance and Sexual Activity    Alcohol use: Yes     Comment: past month    Drug use: Yes     Types: Cocaine     Comment: past week 4/20/20 cocaine    Sexual activity: Not on file   Lifestyle    Physical activity     Days per week: Not on file     Minutes per session: Not on file    Stress: Not on file   Relationships    Social connections     Talks on phone: Not on file     Gets together: Not on file     Attends Buddhism service: Not on file     Active member of club or organization: Not on file     Attends meetings of clubs or organizations: Not on file     Relationship status: Not on file    Intimate partner violence     Fear of current or ex partner: Not on file     Emotionally abused: Not on file     Physically abused: Not on file     Forced sexual activity: Not on file   Other Topics Concern    Not on file   Social History Narrative    Not on file           ROS:  [x] All negative/unchanged except if checked.  Explain positive(checked items) below:  [] Constitutional  [] Eyes  [] Ear/Nose/Mouth/Throat  [] Respiratory  [] CV  [] GI  []   [] Musculoskeletal  [] Skin/Breast  [] Neurological  [] Endocrine  [] Heme/Lymph  [] Allergic/Immunologic    Explanation:     MEDICATIONS:    Current Facility-Administered Medications:     busPIRone (BUSPAR) tablet 10 mg, 10 mg, Oral, TID PRN, Ulus Geoffrey APRN - CNP, 10 mg at 05/18/20 0939    buPROPion (WELLBUTRIN XL) extended release tablet 300 mg, 300 mg, Oral, Daily, Ulus Stank, APRN - CNP, 300 mg at 05/18/20 0939    melatonin Principal Problem:    Major depressive disorder, recurrent episode with mixed features ([facility] [address]  Active Problems:    HTN (hypertension)    Asthma    Chronic pain syndrome    Type 2 diabetes mellitus, without long-term current use of insulin (HCC)    Alcohol abuse    Hypokalemia  Resolved Problems:    * No resolved hospital problems. *      LABS:    No results for input(s): WBC, HGB, PLT in the last 72 hours. No results for input(s): NA, K, CL, CO2, BUN, CREATININE, GLUCOSE in the last 72 hours. No results for input(s): BILITOT, ALKPHOS, AST, ALT in the last 72 hours. Lab Results   Component Value Date    LABAMPH NOT DETECTED 05/13/2020    BARBSCNU NOT DETECTED 05/13/2020    LABBENZ NOT DETECTED 05/13/2020    LABBENZ SEE BELOW 09/13/2014    CANNAB NOT DETECTED  05/19/2013    COCAINESCRN NOT DETECTED 05/19/2013    LABMETH NOT DETECTED 05/13/2020    OPIATESCREENURINE NOT DETECTED 05/13/2020    PHENCYCLIDINESCREENURINE NOT DETECTED 05/13/2020    PPXUR NOT DETECTED 05/19/2013    ETOH <10 05/13/2020     Lab Results   Component Value Date    TSH 2.640 09/13/2014     No results found for: LITHIUM  No results found for: VALPROATE, CBMZ        Treatment Plan:  Reviewed current Medications with the patient. Risks, benefits, side effects, drug-to-drug interactions and alternatives to treatment were discussed. Collateral information:   CD evaluation  Encourage patient to attend group and other milieu activities.   Discharge planning discussed with the patient and treatment team.  BuSpar 10 mg 3 times daily as needed for anxiety symptoms  Continue Wellbutrin  mg daily for depression  Continue Trileptal to 300 mg twice daily for mood stabilization    PSYCHOTHERAPY/COUNSELING:  [x] Therapeutic interview  [x] Supportive  [] CBT  [] Ongoing  [] Other    [x] Patient continues to need, on a daily basis, active treatment furnished directly by or requiring the supervision of inpatient psychiatric personnel      Anticipated

## 2020-05-18 NOTE — PLAN OF CARE
Pt. Denies SI, HI, and hallucinations this shift. Pt. Denies physical complaints. Pt. Is anxious and states he is concerned with panic attacks. Pt. Is hyper verbal at this time.

## 2020-05-18 NOTE — PROGRESS NOTES
Attended community meeting shared goal for the day as to talk to the dr and try to get my meds straight.

## 2020-05-18 NOTE — PROGRESS NOTES
Hospitalist Progress Note      PCP: No primary care provider on file. Date of Admission: 5/13/2020    Chief Complaint: medical management    Hospital Course:   44 y.o. male who presented to [address] [place] [address] with diabetes, COPD, and hypertension. We were consulted for management of COPD. Patient currently said that he really not short of breath. He has nebulizers and inhaled steroids ordered. He denies any cough or chest pain.   5/18/20: BGL stable on current regimen. Will repeat BMP to check potassium. Subjective:   Patient seen in ding. He has no complaints at the present time. Medications:  Reviewed    Infusion Medications    dextrose       Scheduled Medications    buPROPion  300 mg Oral Daily    melatonin  6 mg Oral Nightly    pantoprazole  40 mg Oral Daily    OXcarbazepine  300 mg Oral BID    aspirin  81 mg Oral Daily    insulin lispro  0-6 Units Subcutaneous TID WC    insulin lispro  0-3 Units Subcutaneous Nightly    metoprolol tartrate  25 mg Oral BID    amLODIPine  10 mg Oral Daily    glipiZIDE  5 mg Oral BID AC    tamsulosin  0.4 mg Oral BID    gabapentin  300 mg Oral TID    budesonide  0.5 mg Nebulization BID    And    Arformoterol Tartrate  15 mcg Nebulization BID     PRN Meds: busPIRone, ipratropium-albuterol, nicotine polacrilex, glucose, dextrose, glucagon (rDNA), dextrose, albuterol, diphenhydrAMINE, acetaminophen, hydrOXYzine, haloperidol lactate **OR** haloperidol, traZODone, magnesium hydroxide, aluminum & magnesium hydroxide-simethicone    No intake or output data in the 24 hours ending 05/18/20 0942    Exam:    BP (!) 133/90   Pulse 98   Temp 97.7 °F (36.5 °C)   Resp 16   Ht 5' 9\" (1.753 m)   Wt 266 lb 9 oz (120.9 kg)   SpO2 98%   BMI 39.36 kg/m²     General appearance: No apparent distress, appears stated age and cooperative. HEENT: Pupils equal, round, and reactive to light. Conjunctivae/corneas clear.   Neck: Supple, with full range of

## 2020-05-18 NOTE — CARE COORDINATION
Sw met with this pt to discuss discharge planning. 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 Pt asked this sw information regarding the CSU, and sw offered information back. Sw and pt reviewing discharge information such as: follow up appointments, using help network in the community, etc. Pt agreeable. Sw offered to assist as needed.

## 2020-05-19 VITALS
OXYGEN SATURATION: 98 % | TEMPERATURE: 96.8 F | SYSTOLIC BLOOD PRESSURE: 127 MMHG | HEIGHT: 69 IN | DIASTOLIC BLOOD PRESSURE: 82 MMHG | HEART RATE: 84 BPM | RESPIRATION RATE: 18 BRPM | BODY MASS INDEX: 39.48 KG/M2 | WEIGHT: 266.56 LBS

## 2020-05-19 LAB
METER GLUCOSE: 128 MG/DL (ref 74–99)
METER GLUCOSE: 140 MG/DL (ref 74–99)

## 2020-05-19 PROCEDURE — 6370000000 HC RX 637 (ALT 250 FOR IP): Performed by: INTERNAL MEDICINE

## 2020-05-19 PROCEDURE — 94640 AIRWAY INHALATION TREATMENT: CPT

## 2020-05-19 PROCEDURE — 6370000000 HC RX 637 (ALT 250 FOR IP): Performed by: NURSE PRACTITIONER

## 2020-05-19 PROCEDURE — 6370000000 HC RX 637 (ALT 250 FOR IP): Performed by: PSYCHIATRY & NEUROLOGY

## 2020-05-19 PROCEDURE — 82962 GLUCOSE BLOOD TEST: CPT

## 2020-05-19 PROCEDURE — 99239 HOSP IP/OBS DSCHRG MGMT >30: CPT | Performed by: NURSE PRACTITIONER

## 2020-05-19 PROCEDURE — 6360000002 HC RX W HCPCS: Performed by: NURSE PRACTITIONER

## 2020-05-19 RX ORDER — BUPROPION HYDROCHLORIDE 300 MG/1
300 TABLET ORAL DAILY
Qty: 30 TABLET | Refills: 0 | Status: SHIPPED | OUTPATIENT
Start: 2020-05-20 | End: 2020-10-14

## 2020-05-19 RX ORDER — ALBUTEROL SULFATE 90 UG/1
2 AEROSOL, METERED RESPIRATORY (INHALATION) EVERY 6 HOURS PRN
Qty: 3 INHALER | Refills: 2 | Status: ON HOLD | OUTPATIENT
Start: 2020-05-19 | End: 2020-11-14

## 2020-05-19 RX ORDER — OXCARBAZEPINE 300 MG/1
300 TABLET, FILM COATED ORAL 2 TIMES DAILY
Qty: 60 TABLET | Refills: 0 | Status: SHIPPED | OUTPATIENT
Start: 2020-05-19 | End: 2020-10-14

## 2020-05-19 RX ORDER — LANOLIN ALCOHOL/MO/W.PET/CERES
6 CREAM (GRAM) TOPICAL NIGHTLY
Qty: 60 TABLET | Refills: 0 | Status: SHIPPED | OUTPATIENT
Start: 2020-05-19 | End: 2020-10-14

## 2020-05-19 RX ORDER — IPRATROPIUM BROMIDE AND ALBUTEROL SULFATE 2.5; .5 MG/3ML; MG/3ML
1 SOLUTION RESPIRATORY (INHALATION) EVERY 4 HOURS PRN
Qty: 120 VIAL | Refills: 6 | Status: SHIPPED | OUTPATIENT
Start: 2020-05-19 | End: 2020-10-14

## 2020-05-19 RX ORDER — BUPROPION HYDROCHLORIDE 300 MG/1
300 TABLET ORAL DAILY
Qty: 30 TABLET | Refills: 0 | Status: SHIPPED | OUTPATIENT
Start: 2020-05-20 | End: 2020-05-19

## 2020-05-19 RX ORDER — BUSPIRONE HYDROCHLORIDE 10 MG/1
10 TABLET ORAL 3 TIMES DAILY PRN
Qty: 42 TABLET | Refills: 0 | Status: SHIPPED | OUTPATIENT
Start: 2020-05-19 | End: 2020-06-02

## 2020-05-19 RX ADMIN — METOPROLOL TARTRATE 25 MG: 25 TABLET, FILM COATED ORAL at 09:20

## 2020-05-19 RX ADMIN — AMLODIPINE BESYLATE 10 MG: 10 TABLET ORAL at 09:19

## 2020-05-19 RX ADMIN — BUDESONIDE 500 MCG: 0.5 SUSPENSION RESPIRATORY (INHALATION) at 09:30

## 2020-05-19 RX ADMIN — BUPROPION HYDROCHLORIDE 300 MG: 150 TABLET, EXTENDED RELEASE ORAL at 09:20

## 2020-05-19 RX ADMIN — ASPIRIN 81 MG 81 MG: 81 TABLET ORAL at 09:19

## 2020-05-19 RX ADMIN — NICOTINE POLACRILEX 4 MG: 2 GUM, CHEWING BUCCAL at 10:42

## 2020-05-19 RX ADMIN — TAMSULOSIN HYDROCHLORIDE 0.4 MG: 0.4 CAPSULE ORAL at 09:20

## 2020-05-19 RX ADMIN — ARFORMOTEROL TARTRATE 15 MCG: 15 SOLUTION RESPIRATORY (INHALATION) at 09:30

## 2020-05-19 RX ADMIN — GABAPENTIN 300 MG: 300 CAPSULE ORAL at 09:20

## 2020-05-19 RX ADMIN — INSULIN LISPRO 1 UNITS: 100 INJECTION, SOLUTION INTRAVENOUS; SUBCUTANEOUS at 12:42

## 2020-05-19 RX ADMIN — GLIPIZIDE 5 MG: 5 TABLET ORAL at 06:54

## 2020-05-19 RX ADMIN — PANTOPRAZOLE SODIUM 40 MG: 40 TABLET, DELAYED RELEASE ORAL at 06:54

## 2020-05-19 RX ADMIN — GABAPENTIN 300 MG: 300 CAPSULE ORAL at 16:56

## 2020-05-19 ASSESSMENT — PAIN SCALES - GENERAL: PAINLEVEL_OUTOF10: 0

## 2020-05-19 NOTE — PLAN OF CARE
Denies SI/HI   denies hallucinations    cooperative with meds except refusing trileptal  pleasant on approach   attending groups   will continue to monitor

## 2020-05-19 NOTE — PROGRESS NOTES
Attended afternoon meet and greet. Pleasant and engaged in group, enjoyed music activity. Aware of evening changes and activities. Patient was 1 of 15 in attendance.

## 2020-05-19 NOTE — PROGRESS NOTES
Group Therapy Note     Date: 5/19/2020     Group Start Time: 1115  Group End Time: 1200  Group Topic: Cognitive Skills     [facility]    GABINO Beltrán           Group Therapy Note     Attendees: 13           Patient's Goal:  Pt will be able to identify healthy social supports and different types of social supports     Notes: Pt was active in class discussion of coping skills.     Status After Intervention:  Improved     Participation Level:  Active Listener and Interactive     Participation Quality: Appropriate, attentive, and supportive        Speech:  normal        Thought Process/Content: Logical        Affective Functioning: Congruent     Mood: euthymic        Level of consciousness:  Alert, Oriented x4 and Attentive        Response to Learning: Able to verbalize current knowledge/experience, able to verbalize/acknowledge new learning and progressing to goal        Endings: None Reported     Modes of Intervention: Education, Support, Socialization and Problem-solving        Discipline Responsible: /Counselor        Signature: GABINO Beltrán

## 2020-05-20 NOTE — PROGRESS NOTES
CLINICAL PHARMACY NOTE: MEDS TO [address] Select Patient?: No  Total # of Prescriptions Filled: 10   The following medications were delivered to the patient:  · Oxcarbazepine 300  · Bupropion   · Buspirone 10  · Albuterol HFA  · Glipizide 5  · Ipratropium Albuterol 0.5/2.5  · Gabapentin 300  · Tamsulosin 4  · Amlodipine 10  Total # of Interventions Completed: 4  Time Spent (min): 60    Additional Documentation:

## 2020-06-25 ENCOUNTER — APPOINTMENT (OUTPATIENT)
Dept: GENERAL RADIOLOGY | Age: 50
DRG: 917 | End: 2020-06-25
Payer: MEDICARE

## 2020-06-25 ENCOUNTER — APPOINTMENT (OUTPATIENT)
Dept: CT IMAGING | Age: 50
DRG: 917 | End: 2020-06-25
Payer: MEDICARE

## 2020-06-25 ENCOUNTER — HOSPITAL ENCOUNTER (INPATIENT)
Age: 50
LOS: 6 days | Discharge: HOME | DRG: 917 | End: 2020-07-01
Attending: EMERGENCY MEDICINE | Admitting: FAMILY MEDICINE
Payer: MEDICARE

## 2020-06-25 PROBLEM — J96.02 ACUTE RESPIRATORY FAILURE WITH HYPOXIA AND HYPERCAPNIA (HCC): Status: ACTIVE | Noted: 2020-06-25

## 2020-06-25 PROBLEM — J96.01 ACUTE RESPIRATORY FAILURE WITH HYPOXIA AND HYPERCAPNIA (HCC): Status: ACTIVE | Noted: 2020-06-25

## 2020-06-25 LAB
AADO2: 235.4 MMHG
ACETAMINOPHEN LEVEL: <5 MCG/ML (ref 10–30)
ALBUMIN SERPL-MCNC: 4.1 G/DL (ref 3.5–5.2)
ALP BLD-CCNC: 97 U/L (ref 40–129)
ALT SERPL-CCNC: 132 U/L (ref 0–40)
AMPHETAMINE SCREEN, URINE: NOT DETECTED
ANION GAP SERPL CALCULATED.3IONS-SCNC: 11 MMOL/L (ref 7–16)
AST SERPL-CCNC: 206 U/L (ref 0–39)
B.E.: -3.3 MMOL/L (ref -3–3)
B.E.: 2 MMOL/L (ref -3–3)
BACTERIA: ABNORMAL /HPF
BARBITURATE SCREEN URINE: NOT DETECTED
BASOPHILS ABSOLUTE: 0.05 E9/L (ref 0–0.2)
BASOPHILS RELATIVE PERCENT: 0.5 % (ref 0–2)
BENZODIAZEPINE SCREEN, URINE: NOT DETECTED
BILIRUB SERPL-MCNC: 0.6 MG/DL (ref 0–1.2)
BILIRUBIN DIRECT: <0.2 MG/DL (ref 0–0.3)
BILIRUBIN URINE: ABNORMAL
BILIRUBIN, INDIRECT: ABNORMAL MG/DL (ref 0–1)
BLOOD, URINE: ABNORMAL
BUN BLDV-MCNC: 6 MG/DL (ref 6–20)
CALCIUM SERPL-MCNC: 9 MG/DL (ref 8.6–10.2)
CANNABINOID SCREEN URINE: NOT DETECTED
CHLORIDE BLD-SCNC: 93 MMOL/L (ref 98–107)
CLARITY: CLEAR
CO2: 33 MMOL/L (ref 22–29)
COARSE CASTS, UA: ABNORMAL /LPF (ref 0–2)
COCAINE METABOLITE SCREEN URINE: POSITIVE
COHB: 1.9 % (ref 0–1.5)
COHB: 5.6 % (ref 0–1.5)
COLOR: YELLOW
CREAT SERPL-MCNC: 0.8 MG/DL (ref 0.7–1.2)
CRITICAL: ABNORMAL
CRITICAL: ABNORMAL
DATE ANALYZED: ABNORMAL
DATE ANALYZED: ABNORMAL
DATE OF COLLECTION: ABNORMAL
DATE OF COLLECTION: ABNORMAL
EKG ATRIAL RATE: 109 BPM
EKG P AXIS: 61 DEGREES
EKG P-R INTERVAL: 148 MS
EKG Q-T INTERVAL: 340 MS
EKG QRS DURATION: 98 MS
EKG QTC CALCULATION (BAZETT): 457 MS
EKG R AXIS: -56 DEGREES
EKG T AXIS: 73 DEGREES
EKG VENTRICULAR RATE: 109 BPM
EOSINOPHILS ABSOLUTE: 0.35 E9/L (ref 0.05–0.5)
EOSINOPHILS RELATIVE PERCENT: 3.4 % (ref 0–6)
ETHANOL: <10 MG/DL (ref 0–0.08)
FENTANYL SCREEN, URINE: NOT DETECTED
FIO2: 100 %
GFR AFRICAN AMERICAN: >60
GFR NON-AFRICAN AMERICAN: >60 ML/MIN/1.73
GLUCOSE BLD-MCNC: 110 MG/DL (ref 74–99)
GLUCOSE URINE: NEGATIVE MG/DL
HCO3: 25.8 MMOL/L (ref 22–26)
HCO3: 29 MMOL/L (ref 22–26)
HCT VFR BLD CALC: 52.9 % (ref 37–54)
HEMOGLOBIN: 16.4 G/DL (ref 12.5–16.5)
HHB: 0 % (ref 0–5)
HHB: 1 % (ref 0–5)
HYALINE CASTS: ABNORMAL /LPF (ref 0–2)
IMMATURE GRANULOCYTES #: 0.05 E9/L
IMMATURE GRANULOCYTES %: 0.5 % (ref 0–5)
INR BLD: 1
KETONES, URINE: ABNORMAL MG/DL
LAB: ABNORMAL
LAB: ABNORMAL
LACTIC ACID: 1 MMOL/L (ref 0.5–2.2)
LACTIC ACID: 1.3 MMOL/L (ref 0.5–2.2)
LEUKOCYTE ESTERASE, URINE: NEGATIVE
LIPASE: 15 U/L (ref 13–60)
LYMPHOCYTES ABSOLUTE: 2.11 E9/L (ref 1.5–4)
LYMPHOCYTES RELATIVE PERCENT: 20.3 % (ref 20–42)
Lab: ABNORMAL
MCH RBC QN AUTO: 27.7 PG (ref 26–35)
MCHC RBC AUTO-ENTMCNC: 31 % (ref 32–34.5)
MCV RBC AUTO: 89.5 FL (ref 80–99.9)
METER GLUCOSE: 145 MG/DL (ref 74–99)
METHADONE SCREEN, URINE: NOT DETECTED
METHB: 0.4 % (ref 0–1.5)
METHB: 0.5 % (ref 0–1.5)
MODE: ABNORMAL
MODE: AC
MONOCYTES ABSOLUTE: 0.82 E9/L (ref 0.1–0.95)
MONOCYTES RELATIVE PERCENT: 7.9 % (ref 2–12)
NEUTROPHILS ABSOLUTE: 7 E9/L (ref 1.8–7.3)
NEUTROPHILS RELATIVE PERCENT: 67.4 % (ref 43–80)
NITRITE, URINE: NEGATIVE
O2 CONTENT: 21.7 ML/DL
O2 CONTENT: 22.1 ML/DL
O2 SATURATION: 100 % (ref 92–98.5)
O2 SATURATION: 98.9 % (ref 92–98.5)
O2HB: 93 % (ref 94–97)
O2HB: 97.6 % (ref 94–97)
OPERATOR ID: 1394
OPERATOR ID: 366
OPIATE SCREEN URINE: NOT DETECTED
OXYCODONE URINE: NOT DETECTED
PATIENT TEMP: 37 C
PATIENT TEMP: 37 C
PCO2: 54.3 MMHG (ref 35–45)
PCO2: 63.2 MMHG (ref 35–45)
PDW BLD-RTO: 15.4 FL (ref 11.5–15)
PEEP/CPAP: 8 CMH2O
PFO2: 3.98 MMHG/%
PH BLOOD GAS: 7.23 (ref 7.35–7.45)
PH BLOOD GAS: 7.34 (ref 7.35–7.45)
PH UA: 5.5 (ref 5–9)
PHENCYCLIDINE SCREEN URINE: NOT DETECTED
PLATELET # BLD: 317 E9/L (ref 130–450)
PMV BLD AUTO: 9.8 FL (ref 7–12)
PO2: 162.6 MMHG (ref 75–100)
PO2: 398.3 MMHG (ref 75–100)
POTASSIUM REFLEX MAGNESIUM: 4.3 MMOL/L (ref 3.5–5)
PRO-BNP: 217 PG/ML (ref 0–125)
PROCALCITONIN: 0.05 NG/ML (ref 0–0.08)
PROTEIN UA: 100 MG/DL
PROTHROMBIN TIME: 11.9 SEC (ref 9.3–12.4)
RBC # BLD: 5.91 E12/L (ref 3.8–5.8)
RBC UA: ABNORMAL /HPF (ref 0–2)
REASON FOR REJECTION: NORMAL
REJECTED TEST: NORMAL
RI(T): 59 %
RR MECHANICAL: 18 B/MIN
SALICYLATE, SERUM: <0.3 MG/DL (ref 0–30)
SARS-COV-2, NAAT: NOT DETECTED
SEDIMENTATION RATE, ERYTHROCYTE: 1 MM/HR (ref 0–15)
SODIUM BLD-SCNC: 137 MMOL/L (ref 132–146)
SOURCE, BLOOD GAS: ABNORMAL
SOURCE, BLOOD GAS: ABNORMAL
SPECIFIC GRAVITY UA: >=1.03 (ref 1–1.03)
THB: 15.4 G/DL (ref 11.5–16.5)
THB: 16.4 G/DL (ref 11.5–16.5)
TIME ANALYZED: 1534
TIME ANALYZED: 725
TOTAL CK: 1982 U/L (ref 20–200)
TOTAL CK: 3244 U/L (ref 20–200)
TOTAL CK: 5298 U/L (ref 20–200)
TOTAL PROTEIN: 7.9 G/DL (ref 6.4–8.3)
TRICYCLIC ANTIDEPRESSANTS SCREEN SERUM: NEGATIVE NG/ML
TROPONIN: <0.01 NG/ML (ref 0–0.03)
TROPONIN: <0.01 NG/ML (ref 0–0.03)
UROBILINOGEN, URINE: 1 E.U./DL
VT MECHANICAL: 550 ML
WBC # BLD: 10.4 E9/L (ref 4.5–11.5)
WBC UA: ABNORMAL /HPF (ref 0–5)

## 2020-06-25 PROCEDURE — 84484 ASSAY OF TROPONIN QUANT: CPT

## 2020-06-25 PROCEDURE — 87070 CULTURE OTHR SPECIMN AEROBIC: CPT

## 2020-06-25 PROCEDURE — 82550 ASSAY OF CK (CPK): CPT

## 2020-06-25 PROCEDURE — U0002 COVID-19 LAB TEST NON-CDC: HCPCS

## 2020-06-25 PROCEDURE — 6370000000 HC RX 637 (ALT 250 FOR IP): Performed by: INTERNAL MEDICINE

## 2020-06-25 PROCEDURE — 6370000000 HC RX 637 (ALT 250 FOR IP): Performed by: EMERGENCY MEDICINE

## 2020-06-25 PROCEDURE — 87206 SMEAR FLUORESCENT/ACID STAI: CPT

## 2020-06-25 PROCEDURE — 96365 THER/PROPH/DIAG IV INF INIT: CPT

## 2020-06-25 PROCEDURE — 51702 INSERT TEMP BLADDER CATH: CPT

## 2020-06-25 PROCEDURE — 71045 X-RAY EXAM CHEST 1 VIEW: CPT

## 2020-06-25 PROCEDURE — 82962 GLUCOSE BLOOD TEST: CPT

## 2020-06-25 PROCEDURE — 36556 INSERT NON-TUNNEL CV CATH: CPT

## 2020-06-25 PROCEDURE — 93010 ELECTROCARDIOGRAM REPORT: CPT | Performed by: INTERNAL MEDICINE

## 2020-06-25 PROCEDURE — 02HV33Z INSERTION OF INFUSION DEVICE INTO SUPERIOR VENA CAVA, PERCUTANEOUS APPROACH: ICD-10-PCS | Performed by: INTERNAL MEDICINE

## 2020-06-25 PROCEDURE — 83690 ASSAY OF LIPASE: CPT

## 2020-06-25 PROCEDURE — 80076 HEPATIC FUNCTION PANEL: CPT

## 2020-06-25 PROCEDURE — 5A1945Z RESPIRATORY VENTILATION, 24-96 CONSECUTIVE HOURS: ICD-10-PCS | Performed by: EMERGENCY MEDICINE

## 2020-06-25 PROCEDURE — 2000000000 HC ICU R&B

## 2020-06-25 PROCEDURE — 84145 PROCALCITONIN (PCT): CPT

## 2020-06-25 PROCEDURE — 80048 BASIC METABOLIC PNL TOTAL CA: CPT

## 2020-06-25 PROCEDURE — 94002 VENT MGMT INPAT INIT DAY: CPT

## 2020-06-25 PROCEDURE — 2500000003 HC RX 250 WO HCPCS: Performed by: EMERGENCY MEDICINE

## 2020-06-25 PROCEDURE — 85651 RBC SED RATE NONAUTOMATED: CPT

## 2020-06-25 PROCEDURE — 85025 COMPLETE CBC W/AUTO DIFF WBC: CPT

## 2020-06-25 PROCEDURE — 93005 ELECTROCARDIOGRAM TRACING: CPT | Performed by: EMERGENCY MEDICINE

## 2020-06-25 PROCEDURE — 6360000002 HC RX W HCPCS: Performed by: INTERNAL MEDICINE

## 2020-06-25 PROCEDURE — 70450 CT HEAD/BRAIN W/O DYE: CPT

## 2020-06-25 PROCEDURE — G0480 DRUG TEST DEF 1-7 CLASSES: HCPCS

## 2020-06-25 PROCEDURE — 94660 CPAP INITIATION&MGMT: CPT

## 2020-06-25 PROCEDURE — 96375 TX/PRO/DX INJ NEW DRUG ADDON: CPT

## 2020-06-25 PROCEDURE — 80307 DRUG TEST PRSMV CHEM ANLYZR: CPT

## 2020-06-25 PROCEDURE — 87040 BLOOD CULTURE FOR BACTERIA: CPT

## 2020-06-25 PROCEDURE — 2580000003 HC RX 258: Performed by: EMERGENCY MEDICINE

## 2020-06-25 PROCEDURE — 6360000002 HC RX W HCPCS: Performed by: FAMILY MEDICINE

## 2020-06-25 PROCEDURE — 96367 TX/PROPH/DG ADDL SEQ IV INF: CPT

## 2020-06-25 PROCEDURE — C9113 INJ PANTOPRAZOLE SODIUM, VIA: HCPCS | Performed by: EMERGENCY MEDICINE

## 2020-06-25 PROCEDURE — 99223 1ST HOSP IP/OBS HIGH 75: CPT | Performed by: FAMILY MEDICINE

## 2020-06-25 PROCEDURE — 6360000002 HC RX W HCPCS: Performed by: EMERGENCY MEDICINE

## 2020-06-25 PROCEDURE — 81001 URINALYSIS AUTO W/SCOPE: CPT

## 2020-06-25 PROCEDURE — 87081 CULTURE SCREEN ONLY: CPT

## 2020-06-25 PROCEDURE — C1751 CATH, INF, PER/CENT/MIDLINE: HCPCS

## 2020-06-25 PROCEDURE — 85610 PROTHROMBIN TIME: CPT

## 2020-06-25 PROCEDURE — 99285 EMERGENCY DEPT VISIT HI MDM: CPT

## 2020-06-25 PROCEDURE — 83605 ASSAY OF LACTIC ACID: CPT

## 2020-06-25 PROCEDURE — 96368 THER/DIAG CONCURRENT INF: CPT

## 2020-06-25 PROCEDURE — 83880 ASSAY OF NATRIURETIC PEPTIDE: CPT

## 2020-06-25 PROCEDURE — 2500000003 HC RX 250 WO HCPCS

## 2020-06-25 PROCEDURE — 36415 COLL VENOUS BLD VENIPUNCTURE: CPT

## 2020-06-25 PROCEDURE — 2580000003 HC RX 258: Performed by: FAMILY MEDICINE

## 2020-06-25 PROCEDURE — 82805 BLOOD GASES W/O2 SATURATION: CPT

## 2020-06-25 PROCEDURE — 94640 AIRWAY INHALATION TREATMENT: CPT

## 2020-06-25 PROCEDURE — 94664 DEMO&/EVAL PT USE INHALER: CPT

## 2020-06-25 PROCEDURE — 0BH18EZ INSERTION OF ENDOTRACHEAL AIRWAY INTO TRACHEA, VIA NATURAL OR ARTIFICIAL OPENING ENDOSCOPIC: ICD-10-PCS | Performed by: EMERGENCY MEDICINE

## 2020-06-25 PROCEDURE — 36592 COLLECT BLOOD FROM PICC: CPT

## 2020-06-25 RX ORDER — IPRATROPIUM BROMIDE AND ALBUTEROL SULFATE 2.5; .5 MG/3ML; MG/3ML
1 SOLUTION RESPIRATORY (INHALATION)
Status: COMPLETED | OUTPATIENT
Start: 2020-06-25 | End: 2020-06-25

## 2020-06-25 RX ORDER — MIDAZOLAM HYDROCHLORIDE 1 MG/ML
4 INJECTION INTRAMUSCULAR; INTRAVENOUS ONCE
Status: COMPLETED | OUTPATIENT
Start: 2020-06-25 | End: 2020-06-25

## 2020-06-25 RX ORDER — PANTOPRAZOLE SODIUM 40 MG/10ML
40 INJECTION, POWDER, LYOPHILIZED, FOR SOLUTION INTRAVENOUS DAILY
Status: DISCONTINUED | OUTPATIENT
Start: 2020-06-25 | End: 2020-07-01

## 2020-06-25 RX ORDER — SODIUM CHLORIDE 0.9 % (FLUSH) 0.9 %
10 SYRINGE (ML) INJECTION PRN
Status: DISCONTINUED | OUTPATIENT
Start: 2020-06-25 | End: 2020-06-29 | Stop reason: SDUPTHER

## 2020-06-25 RX ORDER — SODIUM CHLORIDE 9 MG/ML
INJECTION, SOLUTION INTRAVENOUS ONCE
Status: COMPLETED | OUTPATIENT
Start: 2020-06-25 | End: 2020-06-25

## 2020-06-25 RX ORDER — PROPOFOL 10 MG/ML
10 INJECTION, EMULSION INTRAVENOUS ONCE
Status: COMPLETED | OUTPATIENT
Start: 2020-06-25 | End: 2020-06-25

## 2020-06-25 RX ORDER — DEXTROSE MONOHYDRATE 25 G/50ML
12.5 INJECTION, SOLUTION INTRAVENOUS PRN
Status: DISCONTINUED | OUTPATIENT
Start: 2020-06-25 | End: 2020-07-01 | Stop reason: HOSPADM

## 2020-06-25 RX ORDER — SUCCINYLCHOLINE CHLORIDE 20 MG/ML
INJECTION INTRAMUSCULAR; INTRAVENOUS
Status: COMPLETED
Start: 2020-06-25 | End: 2020-06-25

## 2020-06-25 RX ORDER — THIAMINE MONONITRATE (VIT B1) 100 MG
100 TABLET ORAL DAILY
Status: DISCONTINUED | OUTPATIENT
Start: 2020-06-25 | End: 2020-06-25

## 2020-06-25 RX ORDER — IPRATROPIUM BROMIDE AND ALBUTEROL SULFATE 2.5; .5 MG/3ML; MG/3ML
1 SOLUTION RESPIRATORY (INHALATION)
Status: DISCONTINUED | OUTPATIENT
Start: 2020-06-25 | End: 2020-06-29

## 2020-06-25 RX ORDER — MAGNESIUM SULFATE IN WATER 40 MG/ML
2 INJECTION, SOLUTION INTRAVENOUS ONCE
Status: COMPLETED | OUTPATIENT
Start: 2020-06-25 | End: 2020-06-25

## 2020-06-25 RX ORDER — ETOMIDATE 2 MG/ML
30 INJECTION INTRAVENOUS ONCE
Status: COMPLETED | OUTPATIENT
Start: 2020-06-25 | End: 2020-06-25

## 2020-06-25 RX ORDER — METHYLPREDNISOLONE SODIUM SUCCINATE 125 MG/2ML
125 INJECTION, POWDER, LYOPHILIZED, FOR SOLUTION INTRAMUSCULAR; INTRAVENOUS ONCE
Status: COMPLETED | OUTPATIENT
Start: 2020-06-25 | End: 2020-06-25

## 2020-06-25 RX ORDER — SODIUM CHLORIDE 0.9 % (FLUSH) 0.9 %
10 SYRINGE (ML) INJECTION PRN
Status: DISCONTINUED | OUTPATIENT
Start: 2020-06-25 | End: 2020-07-01 | Stop reason: HOSPADM

## 2020-06-25 RX ORDER — ONDANSETRON 2 MG/ML
4 INJECTION INTRAMUSCULAR; INTRAVENOUS EVERY 6 HOURS PRN
Status: DISCONTINUED | OUTPATIENT
Start: 2020-06-25 | End: 2020-07-01 | Stop reason: HOSPADM

## 2020-06-25 RX ORDER — ACETAMINOPHEN 325 MG/1
650 TABLET ORAL EVERY 6 HOURS PRN
Status: DISCONTINUED | OUTPATIENT
Start: 2020-06-25 | End: 2020-07-01 | Stop reason: HOSPADM

## 2020-06-25 RX ORDER — SODIUM CHLORIDE 0.9 % (FLUSH) 0.9 %
10 SYRINGE (ML) INJECTION EVERY 12 HOURS SCHEDULED
Status: DISCONTINUED | OUTPATIENT
Start: 2020-06-25 | End: 2020-07-01 | Stop reason: HOSPADM

## 2020-06-25 RX ORDER — THIAMINE HYDROCHLORIDE 100 MG/ML
100 INJECTION, SOLUTION INTRAMUSCULAR; INTRAVENOUS DAILY
Status: DISCONTINUED | OUTPATIENT
Start: 2020-06-25 | End: 2020-07-01

## 2020-06-25 RX ORDER — SODIUM CHLORIDE 9 MG/ML
INJECTION, SOLUTION INTRAVENOUS CONTINUOUS
Status: DISCONTINUED | OUTPATIENT
Start: 2020-06-25 | End: 2020-06-27

## 2020-06-25 RX ORDER — NALOXONE HYDROCHLORIDE 0.4 MG/ML
2 INJECTION, SOLUTION INTRAMUSCULAR; INTRAVENOUS; SUBCUTANEOUS ONCE
Status: COMPLETED | OUTPATIENT
Start: 2020-06-25 | End: 2020-06-25

## 2020-06-25 RX ORDER — PROMETHAZINE HYDROCHLORIDE 25 MG/1
12.5 TABLET ORAL EVERY 6 HOURS PRN
Status: DISCONTINUED | OUTPATIENT
Start: 2020-06-25 | End: 2020-07-01 | Stop reason: HOSPADM

## 2020-06-25 RX ORDER — CHLORHEXIDINE GLUCONATE 0.12 MG/ML
15 RINSE ORAL 2 TIMES DAILY
Status: DISCONTINUED | OUTPATIENT
Start: 2020-06-25 | End: 2020-06-28

## 2020-06-25 RX ORDER — ETOMIDATE 2 MG/ML
INJECTION INTRAVENOUS
Status: COMPLETED
Start: 2020-06-25 | End: 2020-06-25

## 2020-06-25 RX ORDER — ACETAMINOPHEN 650 MG/1
650 SUPPOSITORY RECTAL EVERY 6 HOURS PRN
Status: DISCONTINUED | OUTPATIENT
Start: 2020-06-25 | End: 2020-07-01 | Stop reason: HOSPADM

## 2020-06-25 RX ORDER — POLYETHYLENE GLYCOL 3350 17 G/17G
17 POWDER, FOR SOLUTION ORAL DAILY PRN
Status: DISCONTINUED | OUTPATIENT
Start: 2020-06-25 | End: 2020-07-01 | Stop reason: HOSPADM

## 2020-06-25 RX ORDER — PROPOFOL 10 MG/ML
10 INJECTION, EMULSION INTRAVENOUS
Status: DISCONTINUED | OUTPATIENT
Start: 2020-06-25 | End: 2020-06-28

## 2020-06-25 RX ORDER — METHYLPREDNISOLONE SODIUM SUCCINATE 40 MG/ML
40 INJECTION, POWDER, LYOPHILIZED, FOR SOLUTION INTRAMUSCULAR; INTRAVENOUS EVERY 6 HOURS
Status: DISCONTINUED | OUTPATIENT
Start: 2020-06-25 | End: 2020-06-25

## 2020-06-25 RX ORDER — MIDAZOLAM HYDROCHLORIDE 1 MG/ML
2 INJECTION INTRAMUSCULAR; INTRAVENOUS EVERY 4 HOURS PRN
Status: DISCONTINUED | OUTPATIENT
Start: 2020-06-25 | End: 2020-07-01 | Stop reason: HOSPADM

## 2020-06-25 RX ORDER — SODIUM CHLORIDE 0.9 % (FLUSH) 0.9 %
10 SYRINGE (ML) INJECTION EVERY 12 HOURS SCHEDULED
Status: DISCONTINUED | OUTPATIENT
Start: 2020-06-25 | End: 2020-06-29 | Stop reason: SDUPTHER

## 2020-06-25 RX ORDER — NICOTINE POLACRILEX 4 MG
15 LOZENGE BUCCAL PRN
Status: DISCONTINUED | OUTPATIENT
Start: 2020-06-25 | End: 2020-07-01 | Stop reason: HOSPADM

## 2020-06-25 RX ORDER — DEXTROSE MONOHYDRATE 50 MG/ML
100 INJECTION, SOLUTION INTRAVENOUS PRN
Status: DISCONTINUED | OUTPATIENT
Start: 2020-06-25 | End: 2020-07-01 | Stop reason: HOSPADM

## 2020-06-25 RX ORDER — ROCURONIUM BROMIDE 10 MG/ML
100 INJECTION, SOLUTION INTRAVENOUS ONCE
Status: COMPLETED | OUTPATIENT
Start: 2020-06-25 | End: 2020-06-25

## 2020-06-25 RX ORDER — METHYLPREDNISOLONE SODIUM SUCCINATE 125 MG/2ML
60 INJECTION, POWDER, LYOPHILIZED, FOR SOLUTION INTRAMUSCULAR; INTRAVENOUS EVERY 6 HOURS
Status: DISCONTINUED | OUTPATIENT
Start: 2020-06-25 | End: 2020-06-27

## 2020-06-25 RX ORDER — SUCCINYLCHOLINE CHLORIDE 20 MG/ML
125 INJECTION INTRAMUSCULAR; INTRAVENOUS ONCE
Status: COMPLETED | OUTPATIENT
Start: 2020-06-25 | End: 2020-06-25

## 2020-06-25 RX ADMIN — WATER 1 G: 1 INJECTION INTRAMUSCULAR; INTRAVENOUS; SUBCUTANEOUS at 08:47

## 2020-06-25 RX ADMIN — METHYLPREDNISOLONE SODIUM SUCCINATE 60 MG: 125 INJECTION, POWDER, LYOPHILIZED, FOR SOLUTION INTRAMUSCULAR; INTRAVENOUS at 18:44

## 2020-06-25 RX ADMIN — SODIUM CHLORIDE, PRESERVATIVE FREE 10 ML: 5 INJECTION INTRAVENOUS at 22:03

## 2020-06-25 RX ADMIN — Medication 25 MCG/HR: at 13:26

## 2020-06-25 RX ADMIN — MAGNESIUM SULFATE IN WATER 2 G: 40 INJECTION, SOLUTION INTRAVENOUS at 07:10

## 2020-06-25 RX ADMIN — MIDAZOLAM HYDROCHLORIDE 4 MG: 1 INJECTION, SOLUTION INTRAMUSCULAR; INTRAVENOUS at 15:36

## 2020-06-25 RX ADMIN — CHLORHEXIDINE GLUCONATE 0.12% ORAL RINSE 15 ML: 1.2 LIQUID ORAL at 22:03

## 2020-06-25 RX ADMIN — PROPOFOL 50 MCG/KG/MIN: 10 INJECTION, EMULSION INTRAVENOUS at 17:11

## 2020-06-25 RX ADMIN — SUCCINYLCHOLINE CHLORIDE 125 MG: 20 INJECTION INTRAMUSCULAR; INTRAVENOUS at 10:22

## 2020-06-25 RX ADMIN — AZITHROMYCIN MONOHYDRATE 500 MG: 500 INJECTION, POWDER, LYOPHILIZED, FOR SOLUTION INTRAVENOUS at 08:46

## 2020-06-25 RX ADMIN — IPRATROPIUM BROMIDE AND ALBUTEROL SULFATE 1 AMPULE: .5; 3 SOLUTION RESPIRATORY (INHALATION) at 08:22

## 2020-06-25 RX ADMIN — SODIUM CHLORIDE: 9 INJECTION, SOLUTION INTRAVENOUS at 10:38

## 2020-06-25 RX ADMIN — PROPOFOL 50 MCG/KG/MIN: 10 INJECTION, EMULSION INTRAVENOUS at 22:26

## 2020-06-25 RX ADMIN — Medication 50 MCG/HR: at 18:44

## 2020-06-25 RX ADMIN — SODIUM CHLORIDE: 9 INJECTION, SOLUTION INTRAVENOUS at 06:56

## 2020-06-25 RX ADMIN — INSULIN LISPRO 1 UNITS: 100 INJECTION, SOLUTION INTRAVENOUS; SUBCUTANEOUS at 18:40

## 2020-06-25 RX ADMIN — IPRATROPIUM BROMIDE AND ALBUTEROL SULFATE 1 AMPULE: 2.5; .5 SOLUTION RESPIRATORY (INHALATION) at 12:43

## 2020-06-25 RX ADMIN — THIAMINE HYDROCHLORIDE: 100 INJECTION, SOLUTION INTRAMUSCULAR; INTRAVENOUS at 10:36

## 2020-06-25 RX ADMIN — ETOMIDATE 30 MG: 2 INJECTION INTRAVENOUS at 10:38

## 2020-06-25 RX ADMIN — ETOMIDATE 30 MG: 2 INJECTION, SOLUTION INTRAVENOUS at 10:38

## 2020-06-25 RX ADMIN — PROPOFOL INJECTABLE EMULSION 10 MCG/KG/MIN: 10 INJECTION, EMULSION INTRAVENOUS at 10:06

## 2020-06-25 RX ADMIN — MIDAZOLAM HYDROCHLORIDE 4 MG: 1 INJECTION, SOLUTION INTRAMUSCULAR; INTRAVENOUS at 10:18

## 2020-06-25 RX ADMIN — SODIUM CHLORIDE: 9 INJECTION, SOLUTION INTRAVENOUS at 15:35

## 2020-06-25 RX ADMIN — PANTOPRAZOLE SODIUM 40 MG: 40 INJECTION, POWDER, FOR SOLUTION INTRAVENOUS at 13:05

## 2020-06-25 RX ADMIN — Medication 10 ML: at 22:04

## 2020-06-25 RX ADMIN — PROPOFOL 50 MCG/KG/MIN: 10 INJECTION, EMULSION INTRAVENOUS at 13:41

## 2020-06-25 RX ADMIN — METHYLPREDNISOLONE SODIUM SUCCINATE 125 MG: 125 INJECTION, POWDER, LYOPHILIZED, FOR SOLUTION INTRAMUSCULAR; INTRAVENOUS at 13:07

## 2020-06-25 RX ADMIN — SODIUM CHLORIDE 150 ML/HR: 9 INJECTION, SOLUTION INTRAVENOUS at 18:45

## 2020-06-25 RX ADMIN — PROPOFOL 50 MCG/KG/MIN: 10 INJECTION, EMULSION INTRAVENOUS at 19:19

## 2020-06-25 RX ADMIN — IPRATROPIUM BROMIDE AND ALBUTEROL SULFATE 1 AMPULE: .5; 3 SOLUTION RESPIRATORY (INHALATION) at 08:24

## 2020-06-25 RX ADMIN — ENOXAPARIN SODIUM 40 MG: 40 INJECTION SUBCUTANEOUS at 13:05

## 2020-06-25 RX ADMIN — NALOXONE HYDROCHLORIDE 2 MG: 0.4 INJECTION, SOLUTION INTRAMUSCULAR; INTRAVENOUS; SUBCUTANEOUS at 06:43

## 2020-06-25 RX ADMIN — IPRATROPIUM BROMIDE AND ALBUTEROL SULFATE 1 AMPULE: 2.5; .5 SOLUTION RESPIRATORY (INHALATION) at 20:10

## 2020-06-25 RX ADMIN — IPRATROPIUM BROMIDE AND ALBUTEROL SULFATE 1 AMPULE: .5; 3 SOLUTION RESPIRATORY (INHALATION) at 08:23

## 2020-06-25 RX ADMIN — ROCURONIUM BROMIDE 100 MG: 50 INJECTION INTRAVENOUS at 10:42

## 2020-06-25 ASSESSMENT — PULMONARY FUNCTION TESTS
PIF_VALUE: 26
PIF_VALUE: 28
PIF_VALUE: 27
PIF_VALUE: 26
PIF_VALUE: 39
PIF_VALUE: 26
PIF_VALUE: 30
PIF_VALUE: 29

## 2020-06-25 ASSESSMENT — PAIN SCALES - GENERAL
PAINLEVEL_OUTOF10: 0
PAINLEVEL_OUTOF10: 3
PAINLEVEL_OUTOF10: 7
PAINLEVEL_OUTOF10: 0

## 2020-06-25 NOTE — ED NOTES
Patient returned from CT via cart with respiratory. Report called to ICU nurse Anne Fritz.      Heavenly Laura RN  06/25/20 4615

## 2020-06-25 NOTE — H&P
[facility] History and Physical      CHIEF COMPLAINT:  Difficulty breathing    History of Present Illness:  Information obtained from ED note due to patient being intubated and sedated. Patient is a 43 y/o male presenting to the ED for SOB beginning prior to arrival.  His PMH is significant for JANESSA, HTN, substance abuse, tobacco abuse. He notes alcohol use last night-unknown amount and snorted several lines of cocaine. EMS brought him to the ER from the [facility] where he has been living. He does admit to cough and wheezing as well as SOB and he was lethargic. No fever or chills. He was confused and oriented to person and place only. His O2 sat on RA was 76% at the motel. He was given duonebs and solumedrol by ems. He maintained in the low 80s on 6 L NC once in the ED. Bipap was used initially with some success, however he became more somnolent with desaturations and was subsequently intubated by the ED physician. Informant(s) for H&P:ED     REVIEW OF SYSTEMS:  A comprehensive review of systems was negative except for: what is in the HPI      PMH:  Past Medical History:   Diagnosis Date    Acid reflux     Asthma     Asthma     [facility]ronic obstructive [facility] [address]     Hypertension     Pancreatitis     Prediabetes     Psychiatric problem     depressed    Sleep apnea     Substance abuse ([facility] [address]     alcohol and cocaine       Surgical History:  Past Surgical History:   Procedure Laterality Date    COLONOSCOPY      2010 neg    NERVE BLOCK Left 08/27/2018    lumbar epidural #1 paramedian    FL NJX DX/THER SBST INTRLMNR LMBR/SAC W/IMG GDN Left 8/27/2018    LUMBAR EPIDURAL STEROID INJECTION L4-5 LEFT PARAMEDIAN #1 performed by Anson Dennison MD at [address] [place] [address]         Medications Prior to Admission:    Prior to Admission medications    Medication Sig Start Date End Date Taking?  Authorizing Provider   ipratropium-albuterol (DUONEB) 0.5-2.5 (3) MG/3ML SOLN nebulizer solution Take 3 mLs by nebulization every 4 hours as needed for Shortness of Breath 5/19/20  Yes MIKE Burns   albuterol sulfate HFA (PROVENTIL HFA) 108 (90 Base) MCG/ACT inhaler Inhale 2 puffs into the lungs every 6 hours as needed for Wheezing 5/19/20  Yes MIKE Burns   metoprolol tartrate (LOPRESSOR) 25 MG tablet Take 25 mg by mouth 2 times daily   Yes Historical Provider, MD   gabapentin (NEURONTIN) 300 MG capsule Take 300 mg by mouth 3 times daily. 5/12/20  Yes Historical Provider, MD   glipiZIDE (GLUCOTROL) 5 MG tablet Take 1 tablet by mouth 2 times daily 4/23/20  Yes Francis Kanner, MD   pantoprazole (PROTONIX) 40 MG tablet Take 40 mg by mouth daily   Yes Historical Provider, MD   budesonide-formoterol (SYMBICORT) 160-4.5 MCG/ACT AERO Inhale 2 puffs into the lungs 2 times daily   Yes Historical Provider, MD   ibuprofen (ADVIL;MOTRIN) 200 MG tablet Take 400 mg by mouth as needed for Pain   Yes Historical Provider, MD   aspirin 81 MG tablet Take 81 mg by mouth daily   Yes Historical Provider, MD   tamsulosin (FLOMAX) 0.4 MG capsule Take 0.4 mg by mouth 2 times daily    Yes Historical Provider, MD   amLODIPine (NORVASC) 10 MG tablet Take 1 tablet by mouth daily. 1/16/15  Yes Noreen Rangel MD   OXcarbazepine (TRILEPTAL) 300 MG tablet Take 1 tablet by mouth 2 times daily 5/19/20 0/75/48  MIKE Allison CNP   nicotine polacrilex (NICORETTE) 4 MG gum Take 1 each by mouth as needed for Smoking cessation 5/19/20 5/86/82  MIKE Allison CNP   melatonin 3 MG TABS tablet Take 2 tablets by mouth nightly 5/19/20 2/51/86  MIKE Allison CNP   buPROPion (WELLBUTRIN XL) 300 MG extended release tablet Take 1 tablet by mouth daily 5/20/20 2/84/35  Refugia Au Dellick, APRN - CNP       Allergies:    Dust mite extract    Social History:    reports that he has been smoking cigarettes and cigars.  He has a 28.00 pack-year smoking history. He has never used smokeless tobacco. He reports current alcohol use. He reports current drug use. Drugs:  and Cocaine. Family History:   family history includes High Blood Pressure in his maternal grandmother; Other in his maternal grandmother and sister. PHYSICAL EXAM:  Vitals:  /79   Pulse 105   Temp 98.2 °F (36.8 °C) (Axillary)   Resp 14   Ht 5' 6\" (1.676 m)   Wt 260 lb 11.2 oz (118.3 kg)   SpO2 99%   BMI 42.08 kg/m²     General Appearance: intubated and sedated. Peripheral lines in place. Is currently in soft restraints. Skin: warm and dry  Head: normocephalic and atraumatic  Eyes: pupils equal, round, and reactive to light  Pulmonary/Chest: clear to auscultation bilaterally- bilateral scattered rales, normal air movement, no respiratory distress but is on vent  Cardiovascular: normal rate, normal S1 and S2 and no carotid bruits  Abdomen: soft, non-tender, non-distended, normal bowel sounds, no masses or organomegaly  Extremities: no cyanosis, no clubbing and no edema  Neurologic: no cranial nerve deficit and speech normal        LABS:  Recent Labs     06/25/20  0635      K 4.3   CL 93*   CO2 33*   BUN 6   CREATININE 0.8   GLUCOSE 110*   CALCIUM 9.0       Recent Labs     06/25/20  0635   WBC 10.4   RBC 5.91*   HGB 16.4   HCT 52.9   MCV 89.5   MCH 27.7   MCHC 31.0*   RDW 15.4*      MPV 9.8       No results for input(s): POCGLU in the last 72 hours. Results for Blayne Jay (MRN [id number]) as of 6/25/2020 11:50   Ref. Range 6/25/2020 06:35   Total CK Latest Ref Range: 20 - 200 U/L 5,298 (H)   Pro-BNP Latest Ref Range: 0 - 125 pg/mL 217 (H)   Troponin Latest Ref Range: 0.00 - 0.03 ng/mL <0.01     Results for Blayne Jay (MRN [id number]) as of 6/25/2020 11:50   Ref.  Range 6/25/2020 07:25   Source: Unknown Blood Arterial   pH, Blood Gas Latest Ref Range: 7.350 - 7.450  7.229 (L)   PCO2 Latest Ref Range: 35.0 - 45.0 mmHg 63.2 (H)   pO2 Latest Ref Range: 75.0 - 100.0 mmHg 162.6 (H)   HCO3 Latest Ref Range: 22.0 - 26.0 mmol/L 25.8   Base Excess Latest Ref Range: -3.0 - 3.0 mmol/L -3.3 (L)   O2 Sat Latest Ref Range: 92.0 - 98.5 % 98.9 (H)   tHb (est) Latest Ref Range: 11.5 - 16.5 g/dL 16.4   O2Hb Latest Ref Range: 94.0 - 97.0 % 93.0 (L)   COHb Latest Ref Range: 0.0 - 1.5 % 5.6 (H)   MetHb Latest Ref Range: 0.0 - 1.5 % 0.4   HHb Latest Ref Range: 0.0 - 5.0 % 1.0   O2 Content Latest Units: mL/dL 21.7   Pt Temp Latest Units: C 37.0   Critical(s) Notified Unknown . No Critical Values   Time Analyzed Unknown 0725   Mode Unknown NRB 15L     Radiology:   CT Head WO Contrast   Final Result      No acute intracranial abnormality. XR CHEST PORTABLE   Final Result      1. The life support lines and tubes appear well positioned. 2. No acute cardiopulmonary abnormality. XR Chest Abdomen Ng Placement   Final Result    1. No acute cardiopulmonary abnormality. 2. The life support lines and tubes appear well positioned. XR CHEST PORTABLE   Final Result   No acute cardiopulmonary abnormality. EKG:     Sinus tachycardia  Left axis deviation  Abnormal ECG  When compared with ECG of 21-APR-2020 10:22,  No significant change was found         ASSESSMENT:      Active Problems:    Acute respiratory failure with hypoxia and hypercapnia (HCC)  Resolved Problems:    * No resolved hospital problems. *      PLAN:    1. Acute respiratory failure with hypoxia and hypercapnia-acute onset   Now intubated and sedated   Admission to ICU for vent management    2. COPD exacerbation-chronic emphysema with acute exacerbation   IV abx given in ED-zithromax, rocephin     3. Polysubstance abuse-chronic issues   Admits to cocaine and alcohol   Did receive narcan in ED   [place]  4. Non-traumatic rhabdomyolysis   CK 5,298   IVF   Continue to monitor CK    5.   Respiratory acidosis-secondary to Acute Respiratory Failure and COPD exacerbation          Code Status: full code  DVT prophylaxis:

## 2020-06-25 NOTE — CONSULTS
Critical Care Admit/Consult Note         Patient - Kiki Chu   MRN -  [id number]   Acct # - [de-identified]   - 1970      Date of Admission -  2020  6:08 AM  Date of evaluation -  20206/0206-A   Hospital Day - 0            ADMIT/CONSULT DETAILS     Reason for Admit/Consult   Acute Hypoxic Respiratory Failure  AMS    Consulting Service/Physician   Consulting - Jenna Tenorio MD  Primary Care Physician - No primary care provider on file. Attending Critical Care Physician: Dr. Rolanda Manley    HPI   The patient is a 44 y.o. male with significant past medical history of HTN, COPD, JANESSA, GERD, pancreatitis, depression and polysubstance abuse (known tobacco, EtOH and cocaine) who presented to the ED today altered and in respiratory distress. Per the ER note, the patient was staying locally at the [facility] where he had been using cocaine and drinking alcohol over the last 24 hours. This morning he began feeling short of breath and called EMS. On their arrival his SpO2 was reportedly 76% on RA. He was given DuoNebs and Solumedrol in route to the hospital. On arrival to the ER he was placed on 6L NC and his SpO2 josh to the low 80s. He became progressively more altered and was eventually intubated. Dr. Elkin Carreno is the primary physician on the case and Dr. Rolanda Manley accepted the patient to the ICU.        Past Medical History         Diagnosis Date    Acid reflux     Asthma     Asthma     [facility]ronic obstructive [facility] disease) [facility] [address]     Hypertension     Pancreatitis     Prediabetes     Psychiatric problem     depressed    Sleep apnea     Substance abuse ([facility] [address]     alcohol and cocaine        Past Surgical History           Procedure Laterality Date    COLONOSCOPY       neg    NERVE BLOCK Left 2018    lumbar epidural #1 paramedian    ME NJX DX/THER SBST INTRLMNR LMBR/SAC W/IMG GDN Left 2018    LUMBAR EPIDURAL STEROID INJECTION L4-5 LEFT PARAMEDIAN #1 performed by Iram Martinez Luzmaria Doyle MD at [facility] EXTRACTION           Lines and Devices   Peripheral IVs    Current Medications   Current Medications    ipratropium-albuterol  1 ampule Inhalation Q6H WA    methylPREDNISolone  125 mg Intravenous Once    sodium chloride flush  10 mL Intravenous 2 times per day    enoxaparin  40 mg Subcutaneous Daily    [START ON 6/26/2020] cefTRIAXone (ROCEPHIN) IV  1 g Intravenous Q24H    [START ON 6/26/2020] azithromycin  500 mg Intravenous Q24H    methylPREDNISolone  60 mg Intravenous Q6H    pantoprazole  40 mg Intravenous Daily     sodium chloride flush, acetaminophen **OR** acetaminophen, polyethylene glycol, promethazine **OR** ondansetron  IV Drips/Infusions   sodium chloride      propofol       Home Medications  Medications Prior to Admission: ipratropium-albuterol (DUONEB) 0.5-2.5 (3) MG/3ML SOLN nebulizer solution, Take 3 mLs by nebulization every 4 hours as needed for Shortness of Breath  albuterol sulfate HFA (PROVENTIL HFA) 108 (90 Base) MCG/ACT inhaler, Inhale 2 puffs into the lungs every 6 hours as needed for Wheezing  metoprolol tartrate (LOPRESSOR) 25 MG tablet, Take 25 mg by mouth 2 times daily  gabapentin (NEURONTIN) 300 MG capsule, Take 300 mg by mouth 3 times daily. glipiZIDE (GLUCOTROL) 5 MG tablet, Take 1 tablet by mouth 2 times daily  pantoprazole (PROTONIX) 40 MG tablet, Take 40 mg by mouth daily  budesonide-formoterol (SYMBICORT) 160-4.5 MCG/ACT AERO, Inhale 2 puffs into the lungs 2 times daily  ibuprofen (ADVIL;MOTRIN) 200 MG tablet, Take 400 mg by mouth as needed for Pain  aspirin 81 MG tablet, Take 81 mg by mouth daily  tamsulosin (FLOMAX) 0.4 MG capsule, Take 0.4 mg by mouth 2 times daily   amLODIPine (NORVASC) 10 MG tablet, Take 1 tablet by mouth daily.   OXcarbazepine (TRILEPTAL) 300 MG tablet, Take 1 tablet by mouth 2 times daily  nicotine polacrilex (NICORETTE) 4 MG gum, Take 1 each by mouth as needed for Smoking cessation  melatonin 3 MG TABS tablet, Take 2 tablets by mouth nightly  buPROPion (WELLBUTRIN XL) 300 MG extended release tablet, Take 1 tablet by mouth daily    Diet/Nutrition   Diet NPO Effective Now    Allergies   Dust mite extract    Social History   Tobacco   reports that he has been smoking cigarettes and cigars. He has a 28.00 pack-year smoking history. He has never used smokeless tobacco.    Alcohol     reports current alcohol use. Occupational history :    Family History         Problem Relation Age of Onset    Other Sister     High Blood Pressure Maternal Grandmother     Other Maternal Grandmother        Sleep History   Hx JANESSA - unknown if on CPAP HS    ROS     REVIEW OF SYSTEMS:  Review of Systems   Unable to perform ROS: Intubated     Mechanical Ventilation Data   VENT SETTINGS (Comprehensive)  Vent Information  $Ventilation: $Initial Day  Skin Assessment: Clean, dry, & intact  Equipment ID: [id number]  Vent Type: 840  Vent Mode: AC/VC  Vt Ordered: 550 mL  Rate Set: 18 bmp  Peak Flow: 60 L/min  Pressure Support: 0 cmH20  FiO2 : 100 %  SpO2: 100 %  SpO2/FiO2 ratio: 100  Sensitivity: 3  PEEP/CPAP: 8  I Time/ I Time %: 0 s  Humidification Source: Heated wire  Humidification Temp: 37  Humidification Temp Measured: 37  Additional Respiratory  Assessments  Pulse: 91  Resp: 17  SpO2: 100 %  Humidification Source: Heated wire  Humidification Temp: 37    ABG  Lab Results   Component Value Date    PH 7.229 2020    PCO2 63.2 2020    PO2 162.6 2020    HCO3 25.8 2020    O2SAT 98.9 2020     Lab Results   Component Value Date    MODE NRB 15L 2020           Vitals    height is 5' 6\" (1.676 m) and weight is 260 lb 11.2 oz (118.3 kg). His axillary temperature is 98.2 °F (36.8 °C). His blood pressure is 133/79 and his pulse is 91. His respiration is 17 and oxygen saturation is 100%.        Temperature Range: Temp: 98.2 °F (36.8 °C) Temp  Av.4 °F (36.9 °C)  Min: 98.2 °F (36.8 °C)  Max: 98.6 °F (37 °C)  BP Range: Systolic (66AIE), GUU:080 , Min:114 , EMZ:550     Diastolic (72JCI), NBC:38, Min:77, Max:94    Pulse Range: Pulse  Av.1  Min: 86  Max: 110  Respiration Range: Resp  Av.8  Min: 14  Max: 20  Current Pulse Ox[de-identified]  SpO2: 100 %  24HR Pulse Ox Range:  SpO2  Av.5 %  Min: 81 %  Max: 100 %  Oxygen Amount and Delivery: O2 Flow Rate (L/min): 2 L/min      I/O (24 Hours)    Patient Vitals for the past 8 hrs:   BP Temp Temp src Pulse Resp SpO2 Height Weight   20 1244 -- -- -- 91 17 100 % -- --   20 1047 133/79 -- -- 105 -- 99 % -- --   20 1004 -- -- -- 86 -- 100 % -- --   20 0926 114/82 98.2 °F (36.8 °C) Axillary 100 -- 94 % -- --   20 0824 -- -- -- -- -- 98 % -- --   20 0823 -- -- -- -- -- 97 % -- --   20 0822 -- -- -- -- 14 97 % -- --   20 0752 131/77 -- -- 96 -- 98 % -- --   20 0657 (!) 150/94 -- -- 106 20 98 % -- --   20 0651 -- -- -- -- -- 100 % -- --   20 0629 -- -- -- -- -- -- 5' 6\" (1.676 m) 260 lb 11.2 oz (118.3 kg)   20 0623 (!) 147/81 98.6 °F (37 °C) Oral 110 20 (!) 81 % -- --     No intake or output data in the 24 hours ending 20 1259  No intake/output data recorded.      Patient Vitals for the past 96 hrs (Last 3 readings):   Weight   20 0629 260 lb 11.2 oz (118.3 kg)         Drains/Tubes Outputs  [place] Catheter placed     Exam     PHYSICAL EXAM:    General appearance - sedated in bed, appears stated age  Mental status - patient is intubated and sedated; RASS -1  Eyes - PERRL, EOMI b/l, sclera anicteric  Ears - external ear normal  Nose - normal and patent, no erythema, discharge or polyps  Mouth - ET tube in place, no perioral lesions noted  Neck - supple, no significant adenopathy  Chest - Coarse breath sounds b/l with scant wheezes, no rales or rhonchi appreciated, symmetric air entry with good air movement noted throughout  Heart - RRR with no m/g/r noted, normal S1, S2   Abdomen - soft, nontender, nondistended, no masses or organomegaly  Neurological - sedated  Extremities - UE and LE distal pulses intact b/l +2/4; no clubbing or cyanosis; no peripheral edema noted  Skin - normal coloration and turgor, no rashes, no suspicious skin lesions noted     Data   Old records and images have been reviewed    Lab Results   CBC     Lab Results   Component Value Date    WBC 10.4 2020    RBC 5.91 2020    HGB 16.4 2020    HCT 52.9 2020     2020    MCV 89.5 2020    MCH 27.7 2020    MCHC 31.0 2020    RDW 15.4 2020    SEGSPCT 64 2014    BANDSPCT 1 2014    LYMPHOPCT 20.3 2020    MONOPCT 7.9 2020    BASOPCT 0.5 2020    MONOSABS 0.82 2020    LYMPHSABS 2.11 2020    EOSABS 0.35 2020    BASOSABS 0.05 2020       BMP   Lab Results   Component Value Date     2020    K 4.3 2020    CL 93 2020    CO2 33 2020    BUN 6 2020    CREATININE 0.8 2020    GLUCOSE 110 2020    CALCIUM 9.0 2020    IONCA 1.23 03/15/2013       LFTS  Lab Results   Component Value Date    ALKPHOS 97 2020     2020     2020    PROT 7.9 2020    BILITOT 0.6 2020    BILIDIR <0.2 2020    IBILI see below 2020    LABALBU 4.1 2020       INR  Recent Labs     20  0635   PROTIME 11.9   INR 1.0       APTT  No results for input(s): APTT in the last 72 hours. Lactic Acid  Lab Results   Component Value Date    LACTA 1.3 2020    LACTA 1.7 2020    LACTA 4.0 2020        BNP   No results for input(s): BNP in the last 72 hours. Cultures     No results for input(s): BC in the last 72 hours. No results for input(s): Edelmira Poet in the last 72 hours. No results for input(s): LABURIN in the last 72 hours.           Radiology   Ct Head Wo Contrast    Result Date: 2020  Patient MRN:  [id number] : 1970 Age: 44 years Gender: Male Order Date: 2020 9:30 AM EXAM: CT HEAD WO CONTRAST INDICATION:  altered mental altered mental  COMPARISON: CT head without contrast, 2014. FINDINGS: PARENCHYMA:No mass effect, edema or hemorrhage. The brain is normal in volume. No significant chronic microvascular ischemic changes appreciated. VENTRICLES: No hydrocephalus. Incidental note is made of cavum septum pellucidum and vergae. CALVARIUM:The calvarium is intact without fracture or focal lesion. SINUSES: Mild mucosal thickening in the paranasal sinuses, especially the ethmoid sinuses. No acute intracranial abnormality. Xr Chest Portable    Result Date: 2020  Patient MRN: [id number] : 1970 Age:  44 years Gender: Male Order Date: 2020 10:00 AM Exam: XR CHEST PORTABLE Indication:   intubate intubate Comparison: Chest one view, 2020, 6:43 a.m. FINDINGS: The lungs are clear. The cardiomediastinal contour is unremarkable. No pneumothorax or pleural effusion. Interval insertion of endotracheal tube, the tip of which is approximately 3 cm above the oliva. The tip of the nasogastric tube is below the diaphragm and beyond the field-of-view of this study. 1. The life support lines and tubes appear well positioned. 2. No acute cardiopulmonary abnormality. Xr Chest Portable    Result Date: 2020  Patient MRN: [id number] : 1970 Age:  44 years Gender: Male Order Date: 2020 6:15 AM Exam: XR CHEST PORTABLE Indication:   short of breath short of breath Comparison: Chest radiograph and CT, 2020 FINDINGS: The lungs are clear. The cardiomediastinal contour is unremarkable. No pneumothorax or pleural effusion. Evaluation of the bones reveals no fracture or destructive lesion. No acute cardiopulmonary abnormality.     Xr Chest Abdomen Ng Placement    Result Date: 2020  Patient MRN: [id number] : 1970 Age:  44 years Gender: Male Order Date: 2020 10:30 AM Exam: XR CHEST ABDOMEN NG PLACEMENT Indication:   ng placement ng placement Comparison: None. FINDINGS: The lungs are clear. The cardiomediastinal contour is unremarkable. Derril [place] No pneumothorax or pleural effusion. The endotracheal tube is well positioned, with the tip approximately 3 cm above the oliva. Tip of the nasogastric tube overlies the stomach. 1. No acute cardiopulmonary abnormality. 2. The life support lines and tubes appear well positioned. SYSTEMS ASSESSMENT    Neuro   AMS   - Head CT negative for acute intracranial pathology  Sedated with Propofol and Fentanyl   - Versed prn   Titrate to RASS -2 to -3  Hx depression   - Home Meds: Wellbutrin XL  Other Home Meds: Melatonin, Gabapentin, Oxcarbazepine    Respiratory   Hx of COPD   - noncompliance with meds  Current everyday smoker  Acute Hypoxic Respiratory Failure   - Intubated in the ER - Etomidate and Succinylcholine then Rocuronium for continued agitation   - Vent settings as above   - Wean oxygen as tolerated.  Keep O2 sat 90-92%  Solumedrol 125 mg IV once then 60 mg IV Q6h   DuoNebs Q6h when awake  Initial ABG showed respiratory acidosis   - repeat once today then daily    Cardiovascular   Hx of HTN   - Home Meds: Lopressor 25 mg BID, Norvasc 10 mg QD  No other cardiac hx  Hypertensive here   - monitor closely and treat as appropriate  EKG: Sinus Tachycardia with LAD; QTc 457    Gastrointestinal   Hx GERD   - takes Protonix at home  Protonix 40 mg IV QD for prophylaxis  Elevated Liver Enzymes   - ,    - Chronic EtOH abuse  [facility] protocol  Hx pancreatitis   - Lipase 15    Renal   No hx kidney disease  Trend BMP   - BUN 6, Cr 0.8  Monitor electrolytes closely   - replete as necessary  CK 5298   - likely cocaine-induced rhabdo   - will continue to monitor CK and CK isoenzymes     Infectious Disease   WBC 10.4   - monitor closely with daily CBCs  Lactic 1.3    - monitor daily  Sed Rate 1  COVID negative  CXR negative for acute cardiopulmonary pathology   - Being treated as possible CAP   - Will continue Zithromax 500 mg IV QD and Rocephin 1 g IV QD  CXR in the morning    Hematology/Oncology   H&H 16.4/52.9  Platelets 687  DVT Prophylaxis   - Lovenox 40 mg SC QD  INR 1    Endocrine   No DM diagnosis, but patient on Glipizide at home  Glucose 110    - continue to monitor closely   - patient on steroids  Will start SSI Low Dose Protocol    Social/Spiritual/DNR/Other   CODE STATUS: Full  Drug screens pending    PLAN:     WEAN PER PROTOCOL:  [] No   [x] Yes  [] N/A    DISCONTINUE ANY LABS:   [x] No   [] Yes    ICU PROPHYLAXIS:  Stress ulcer:  [x] PPI Agent  [] O2Dcfcw [] Sucralfate  [] Other:  VTE:   [x] Enoxaparin  [] Unfract. Heparin Subcut  [] EPC Cuffs    NUTRITION:  [x] NPO [] Tube Feeding (Specify: ) [] TPN  [] PO (Diet: Diet NPO Effective Now)    HOME MEDICATIONS RECONCILED: [] No  [x] Yes    INSULIN DRIP:   [x] No   [] Yes    CONSULTATION NEEDED:  [x] No   [] Yes    FAMILY UPDATED:    [] No   [x] Yes    TRANSFER OUT OF ICU:   [x] No   [] Yes    ADDITIONAL PLAN:    Syliva Lennox, DO, PGY1.                       6/25/2020, 12:59 PM    Attending Physician Attestation: Dr. Shauna Ortez    Thank you very much for allowing me to see this patient in consultation and follow up. I personally saw, examined and provided care for the patient. Radiographs, labs and medication list were reviewed by me independently. I spoke with bedside nursing, respiratory therapists and consultants. Critical care services and times documented are independent of procedures and multidisciplinary rounds with Residents. Additionally comprehensive, multidisciplinary rounds were conducted with the MICU team. The case was discussed in detail and plans for care were established. Review of Residents documentation was conducted and revisions were made as appropriate. I agree with the the above documented information.      Physical Examination:  Vitals:   Vitals:    06/25/20 1200 06/25/20 1244 06/25/20 1300 06/25/20 1400   BP: (!) 148/91  (!) 149/96 (!) 146/82   Pulse: 114 91 82 84   Resp: (!) 34 17 18 17   Temp: 98.3 °F (36.8 °C)      TempSrc: Axillary      SpO2: 94% 100% 100% 100%   Weight:       Height:          General: No Acute Distress  HEENT: normocephalic, atruamatic  CVS: RRR, S1, S2, No S3 or S4  Respiratory: decreased breath sounds at lung bases, no focal wheezes noted  Extremities: no clubbing/cyanosis/edema    ASSESSMENT:  1.) Acute Hypoxic Respiratory Failure - on mechanical ventilation  2.) Cocaine Pneumonitis   3.) Medication Non-Compliance  4.) H/O Drug Abuse   5.) H/O COPD    In addition the following applies:    Check: pan culture, troponins, EKG  Medication Alterations: thiamine, folic acid   Procedures: N/A  Imaging: Reviewed  New Consultations: N/A  VENT: ACVC    Access: Peripheral   Consults: N/A  Drips: Propofol, Fentanyl   Nutrition: NPO  ABX: rocephin, azithromycin     - IV steroids solu-medrol 125 mg IV x 1 with solu-medrol 60 mg IV q 6 hrs thereafter, duoneb q 6 hrs scheduled   - supportive care  - wean vent as able     Thank you for allowing me to participate in the care of this patient. Care reviewed with nursing staff, medical and surgical specialty care, primary care and the patient's family as available. Restraints are ordered when the patient can do harm to him/herself by pulling out devices. Critical Care Time: > 35 minutes excluding procedures    Brett Mazariegos M.D.     Brett Mazariegos  6/25/2020  2:56 PM

## 2020-06-25 NOTE — PROCEDURES
Central Line Placement Procedure Note    Indication: vascular access    Consent: The spouse was counseled regarding the procedure, its indications, risks, potential complications and alternatives, and any questions were answered. Consent was obtained to proceed. Procedure: The patient was positioned appropriately and the skin over the left internal jugular vein was prepped with betadine and draped in a sterile fashion. Local anesthesia was not performed due to the emergent nature of this procedure. A large bore needle was used to identify the vein. A guide wire was then inserted into the vein through the needle. A triple lumen catheter was then inserted into the vessel over the guide wire using the Seldinger technique. All ports showed good, free flowing blood return and were flushed with saline solution. The catheter was then securely fastened to the skin with suture at 18 cm. Two sutures were placed into the proximal eyelets. An antibiotic disk was placed and the site was then covered with a sterile dressing. A post procedure X-ray was ordered and showed good line position. The patient tolerated the procedure well. Complications: None    Ana Richardson DO, PGY1    Attending Physician Attestation: Dr. Omero Lopez    Thank you very much for allowing me to see this patient in consultation and follow up. I personally saw, examined and provided care for the patient. Radiographs, labs and medication list were reviewed by me independently. I spoke with bedside nursing, respiratory therapists and consultants. Critical care services and times documented are independent of procedures and multidisciplinary rounds with Residents. Additionally comprehensive, multidisciplinary rounds were conducted with the MICU team. The case was discussed in detail and plans for care were established. Review of Residents documentation was conducted and revisions were made as appropriate.  I agree with the the above documented information. I was personally present during the completion of procedure.     Adelina Castillo  6/25/2020  3:45 PM

## 2020-06-25 NOTE — CONSULTS
Jossy Becker M.D.,[facility]  Mayelin Carcamo D.O., F.A.C.O.BISHNU., Ceasar Ann M.D. Neetu Vernon M.D., Delvin Ramos M.D. Patient:  Mary Puga 44 y.o. male MRN: [id number]     Date of Service: 6/25/2020      PULMONARY CONSULTATION    Reason for Consultation: respiratory failure  Referring Physician: Dr. Jeet Mcintosh    Communication with the referring physician will be sent via the electronic medical record. Chief Complaint: shortness of breath    CODE STATUS: Full    SUBJECTIVE:  HPI:  Patient is sedated, history obtained from medical chart. Mary Puga is a 44 y.o. male with a past medical history of hypertension, COPD, JANESSA, GERD, pancreatitis, depression and polysubstance abuse who presented to the ED with altered mentation and respiratory distress. He had been drinking alcohol and snorting cocaine at the [facility]. He was short of breath, lethargic, and wheezing. He had a cough as well. He was confused, alert only to person and place. Today, he is sedated on Diprivan, and Fentanyl. ETT tube is 26cm at the lip. He has a banana bag running. Lung sounds are slightly coarse with few scattered wheezes.        Past Medical History:   Diagnosis Date    Acid reflux     Asthma     Asthma     COPD (chronic obstructive pulmonary disease) (HCC)     Hypertension     Pancreatitis     Prediabetes     Psychiatric problem     depressed    Sleep apnea     Substance abuse ([facility] [address]     alcohol and cocaine       Past Surgical History:   Procedure Laterality Date    COLONOSCOPY      2010 neg    NERVE BLOCK Left 08/27/2018    lumbar epidural #1 paramedian    ID NJX DX/THER SBST INTRLMNR LMBR/SAC W/IMG GDN Left 8/27/2018    LUMBAR EPIDURAL STEROID INJECTION L4-5 LEFT PARAMEDIAN #1 performed by Africa Owen MD at [facility]         Family History   Problem Relation Age of Onset    Other Sister     High Blood Pressure Maternal Grandmother     Other is  history of recreational or IV drug use. There is not hot tub exposure. The patient does not have any exotic pets, turtles or exotic birds. Vaccines:    Influenza:  Up-to-date  Pneumococcal Polysaccharide:  2015  Immunization History   Administered Date(s) Administered    Influenza Virus Vaccine 02/18/2015, 10/16/2019    Pneumococcal Polysaccharide ([id number]) 02/18/2015        Home Meds: Medications Prior to Admission: ipratropium-albuterol (DUONEB) 0.5-2.5 (3) MG/3ML SOLN nebulizer solution, Take 3 mLs by nebulization every 4 hours as needed for Shortness of Breath  albuterol sulfate HFA (PROVENTIL HFA) 108 (90 Base) MCG/ACT inhaler, Inhale 2 puffs into the lungs every 6 hours as needed for Wheezing  metoprolol tartrate (LOPRESSOR) 25 MG tablet, Take 25 mg by mouth 2 times daily  gabapentin (NEURONTIN) 300 MG capsule, Take 300 mg by mouth 3 times daily. glipiZIDE (GLUCOTROL) 5 MG tablet, Take 1 tablet by mouth 2 times daily  pantoprazole (PROTONIX) 40 MG tablet, Take 40 mg by mouth daily  budesonide-formoterol (SYMBICORT) 160-4.5 MCG/ACT AERO, Inhale 2 puffs into the lungs 2 times daily  ibuprofen (ADVIL;MOTRIN) 200 MG tablet, Take 400 mg by mouth as needed for Pain  aspirin 81 MG tablet, Take 81 mg by mouth daily  tamsulosin (FLOMAX) 0.4 MG capsule, Take 0.4 mg by mouth 2 times daily   amLODIPine (NORVASC) 10 MG tablet, Take 1 tablet by mouth daily.   OXcarbazepine (TRILEPTAL) 300 MG tablet, Take 1 tablet by mouth 2 times daily  nicotine polacrilex (NICORETTE) 4 MG gum, Take 1 each by mouth as needed for Smoking cessation  melatonin 3 MG TABS tablet, Take 2 tablets by mouth nightly  buPROPion (WELLBUTRIN XL) 300 MG extended release tablet, Take 1 tablet by mouth daily    CURRENT MEDS :  Scheduled Meds:   ipratropium-albuterol  1 ampule Inhalation Q6H WA    sodium chloride flush  10 mL Intravenous 2 times per day    enoxaparin  40 mg Subcutaneous Daily    [START ON 6/26/2020] cefTRIAXone no focal deficits    Pulmonary Function Testing personally reviewed and interpreted  None on file    Imaging personally reviewed:  6/25/2020  The lungs are clear. The cardiomediastinal contour is unremarkable. Kenedy Sorrow No pneumothorax or pleural effusion.       The endotracheal tube is well positioned, with the tip approximately 3   cm above the oliva. Tip of the nasogastric tube overlies the stomach. Echo:  4/28/2014    Labs:  Lab Results   Component Value Date    WBC 10.4 06/25/2020    HGB 16.4 06/25/2020    HCT 52.9 06/25/2020    MCV 89.5 06/25/2020    MCH 27.7 06/25/2020    MCHC 31.0 06/25/2020    RDW 15.4 06/25/2020     06/25/2020    MPV 9.8 06/25/2020     Lab Results   Component Value Date     06/25/2020    K 4.3 06/25/2020    CL 93 06/25/2020    CO2 33 06/25/2020    BUN 6 06/25/2020    CREATININE 0.8 06/25/2020    LABALBU 4.1 06/25/2020    CALCIUM 9.0 06/25/2020    GFRAA >60 06/25/2020    LABGLOM >60 06/25/2020     Lab Results   Component Value Date    PROTIME 11.9 06/25/2020    INR 1.0 06/25/2020     Recent Labs     06/25/20  0635   PROBNP 217*     Recent Labs     06/25/20  0635   TROPONINI <0.01     No results for input(s): PROCAL in the last 72 hours. This SmartLink has not been configured with any valid records. Assessment:  1. Acute respiratory failure with hypoxia and hypercapnia  2. COPD exacerbation  3. Cocaine pneumonitis  4. Polysubstance abuse  5. Medical noncompliance  6. Non-traumatic rhabdomyolysis    Plan:  1. Continue vent support, wean as tolerated  2. Continue sedation, ICU management  3. Solu medrol 60mg IV q6 hours  4. Duonebs q6 hours when awake  5. Zithromax and Rocephin for possible CAP  6. Follow cxr  7. GI/DVT prophylaxis      Thank you for allowing me to participate in the care of Beulah Holter. Please feel free to call with questions.      This plan of care was reviewed in collaboration with Dr. Kia Velasco    Electronically signed by MIKE Bentley - CNP

## 2020-06-25 NOTE — ED PROVIDER NOTES
extract    -------------------------------------------------- RESULTS -------------------------------------------------  All laboratory and radiology results have been personally reviewed by myself   LABS:  Results for orders placed or performed during the hospital encounter of 06/25/20   CBC Auto Differential   Result Value Ref Range    WBC 10.4 4.5 - 11.5 E9/L    RBC 5.91 (H) 3.80 - 5.80 E12/L    Hemoglobin 16.4 12.5 - 16.5 g/dL    Hematocrit 52.9 37.0 - 54.0 %    MCV 89.5 80.0 - 99.9 fL    MCH 27.7 26.0 - 35.0 pg    MCHC 31.0 (L) 32.0 - 34.5 %    RDW 15.4 (H) 11.5 - 15.0 fL    Platelets 187 575 - 631 E9/L    MPV 9.8 7.0 - 12.0 fL    Neutrophils % 67.4 43.0 - 80.0 %    Immature Granulocytes % 0.5 0.0 - 5.0 %    Lymphocytes % 20.3 20.0 - 42.0 %    Monocytes % 7.9 2.0 - 12.0 %    Eosinophils % 3.4 0.0 - 6.0 %    Basophils % 0.5 0.0 - 2.0 %    Neutrophils Absolute 7.00 1.80 - 7.30 E9/L    Immature Granulocytes # 0.05 E9/L    Lymphocytes Absolute 2.11 1.50 - 4.00 E9/L    Monocytes Absolute 0.82 0.10 - 0.95 E9/L    Eosinophils Absolute 0.35 0.05 - 0.50 E9/L    Basophils Absolute 0.05 0.00 - 0.20 I4/F   Basic Metabolic Panel w/ Reflex to MG   Result Value Ref Range    Sodium 137 132 - 146 mmol/L    Potassium reflex Magnesium 4.3 3.5 - 5.0 mmol/L    Chloride 93 (L) 98 - 107 mmol/L    CO2 33 (H) 22 - 29 mmol/L    Anion Gap 11 7 - 16 mmol/L    Glucose 110 (H) 74 - 99 mg/dL    BUN 6 6 - 20 mg/dL    CREATININE 0.8 0.7 - 1.2 mg/dL    GFR Non-African American >60 >=60 mL/min/1.73    GFR African American >60     Calcium 9.0 8.6 - 10.2 mg/dL   Hepatic Function Panel   Result Value Ref Range    Total Protein 7.9 6.4 - 8.3 g/dL    Alb 4.1 3.5 - 5.2 g/dL    Alkaline Phosphatase 97 40 - 129 U/L     (H) 0 - 40 U/L     (H) 0 - 39 U/L    Total Bilirubin 0.6 0.0 - 1.2 mg/dL    Bilirubin, Direct <0.2 0.0 - 0.3 mg/dL    Bilirubin, Indirect see below 0.0 - 1.0 mg/dL   Lipase   Result Value Ref Range    Lipase 15 13 - 60 U/L Motor: No weakness. Deep Tendon Reflexes:      Reflex Scores:       Patellar reflexes are 2+ on the right side and 2+ on the left side. ------------------------------ ED COURSE/MEDICAL DECISION MAKING----------------------  Medications   0.9 % sodium chloride infusion (has no administration in time range)   sodium chloride 0.9 % 5,722 mL with folic acid 1 mg, adult multi-vitamin with vitamin k 10 mL, thiamine 100 mg (has no administration in time range)   succinylcholine (ANECTINE) 20 MG/ML injection (has no administration in time range)   etomidate (AMIDATE) injection 30 mg (has no administration in time range)   succinylcholine (ANECTINE) injection 125 mg (has no administration in time range)   propofol injection (has no administration in time range)   etomidate (AMIDATE) 2 MG/ML injection (has no administration in time range)   naloxone [facility]) injection 2 mg (2 mg Intravenous Given 6/25/20 0643)   0.9 % sodium chloride infusion ( Intravenous Stopped 6/25/20 0847)   ipratropium-albuterol (DUONEB) nebulizer solution 1 ampule (1 ampule Inhalation Given 6/25/20 0824)   magnesium sulfate 2 g in 50 mL IVPB premix (0 g Intravenous Stopped 6/25/20 0752)   azithromycin (ZITHROMAX) 500 mg in D5W 250ml addavial (500 mg Intravenous New Bag 6/25/20 0846)   cefTRIAXone (ROCEPHIN) 1 g in sterile water 10 mL IV syringe (0 g Intravenous Stopped 6/25/20 0910)     EKG: This EKG is signed and interpreted by me.     SPIB:[id number]  Rate: 109  Rhythm: Sinus  Interpretation: left axis deviation, possible old inf mi qtc 457  Comparison: stable as compared to patient's most recent EKG      ED COURSE:  ED Course as of Jun 25 1005   Thu Jun 25, 2020   0659 Pt more alert and awake post narcan    [RIGO]   0801 Abg noted, covid negative, bipap ordered, duonebs, mag, pt will need admited    [RIGO]   0921 Pt alert and awake thinks hes at [facility], tidal volumes >500 on bipap, seems to eb ventilating well, titrateing fio2 down [RIGO]   1004 Pt was more somnolent and started desatting, we preoxygenated with bipaop and intubated pt    [RIGO]      ED Course User Index  [RIGO] Sirena Wilkinson DO     PROCEDURE  6/25/20       Time: 9543    INTUBATION  Risks, benefits and alternatives if able (for applicable procedures below) described. Performed By: Sirena Wilkinson DO. Indication:  Respiratory failure. Informed consent:  Unable to be obtained due to the emergent nature of this procedure. .  Procedure: Following Preoxygenation the patient was pretreated with etomidate followed by succinylcholine. Intubation was performed after single attempt(s) by direct laryngoscopy using a Glidescope and 7.5mm cuffed endotracheal tube was inserted . Initial post procedure placement:  confirmed by bilateral breath sounds, an end tidal CO2 detector, absence of sounds over the stomach and bilateral breath sounds, ETCO2 detection, and absence of sounds over stomach. Tube Secured @ 22cm at the Lip. Post procedure chest x-ray: has been ordered but is still pending. Procedural Complications: None. Anesthesia Consult:  No.         Critical care:  Please note that the withdrawal or failure to initiate urgent interventions for this patient would likely result in a life threatening deterioration or permanent disability. Accordingly this patient received 70 minutes of critical care time, excluding separately billable procedures. Medical Decision Making:    Pt presented with somnolence and wheezing was oriented to person and place3, pt was given narcan showed improvement,. Pt was given nebs, steroids, bipap. bipap showed improvemen, fio2 was itrated to 40%, tv was 500s, later on re-0eval pt was desatting and somnolent with poor tv. Pt intubated.  Pt accepted to icu by dr Zaynab Joe., I d/w dr Ivis Rodas for pts primary p[ulmonologist. Dr Robin Onofre [id number]  Risks and benefits were discussed with patient for All medications dispensed and given in department as well prescriptions prescribed for home, . The patient elected to take the medicine. Pt instructed on warning signs and precautions for medication side effects. The patient was given warning signs for when to seek medical attention. Counseling: The emergency provider has spoken with the patient and discussed todays results, in addition to providing specific details for the plan of care and counseling regarding the diagnosis and prognosis. Questions are answered at this time and they are agreeable with the plan.      --------------------------------- IMPRESSION AND DISPOSITION ---------------------------------    IMPRESSION  1. Acute respiratory failure with hypoxia and hypercapnia (HCC)    2. COPD exacerbation ([facility] [address]    3. Polysubstance abuse ([facility]    4. Non-traumatic rhabdomyolysis        DISPOSITION  Disposition: Admit to CCU/ICU  Patient condition is fair      NOTE: This report was transcribed using voice recognition software.  Every effort was made to ensure accuracy; however, inadvertent computerized transcription errors may be present         Gonzalo Terrell, DO  06/25/20 [id number], DO  06/25/20 1010

## 2020-06-25 NOTE — ED NOTES
Assumed care of this patient. Alert to stimuli. ABG's redrawn, serum drug redrawn, and nasal swabbed for rapid covid done. Patient placed on Bi Pap by respiratory.        Megan Ambrosio RN  06/25/20 7223

## 2020-06-25 NOTE — ED NOTES
Patient saturation dropping to 80, doctor notified, respiratory notified.      Grayce Merlin, RN  06/25/20 7124

## 2020-06-25 NOTE — PROCEDURES
Date: 6/25/2020    Time: 0822  Patient Placed On BIPAP/CPAP/ Non-Invasive Ventilation? Yes    If no must comment. Facial area red/color change? No           If YES are Blister/Lesion present? No   If yes must notify nursing staff  BIPAP/CPAP skin barrier?   Yes    Skin barrier type:duoderm       Comments: Patient placed on BIPAP in ED        [employer]

## 2020-06-25 NOTE — ED NOTES
Unable to insert frias catheter.   External frias placed on patient     César Christine RN  06/25/20 8092

## 2020-06-26 ENCOUNTER — APPOINTMENT (OUTPATIENT)
Dept: GENERAL RADIOLOGY | Age: 50
DRG: 917 | End: 2020-06-26
Payer: MEDICARE

## 2020-06-26 PROBLEM — E44.0 MODERATE PROTEIN-CALORIE MALNUTRITION (HCC): Chronic | Status: ACTIVE | Noted: 2020-06-26

## 2020-06-26 LAB
AADO2: 203.4 MMHG
ALBUMIN SERPL-MCNC: 3.2 G/DL (ref 3.5–5.2)
ALP BLD-CCNC: 74 U/L (ref 40–129)
ALT SERPL-CCNC: 79 U/L (ref 0–40)
ANION GAP SERPL CALCULATED.3IONS-SCNC: 9 MMOL/L (ref 7–16)
AST SERPL-CCNC: 59 U/L (ref 0–39)
B.E.: 3 MMOL/L (ref -3–3)
BASOPHILS ABSOLUTE: 0.01 E9/L (ref 0–0.2)
BASOPHILS RELATIVE PERCENT: 0.1 % (ref 0–2)
BILIRUB SERPL-MCNC: 0.4 MG/DL (ref 0–1.2)
BUN BLDV-MCNC: 8 MG/DL (ref 6–20)
CALCIUM SERPL-MCNC: 8.1 MG/DL (ref 8.6–10.2)
CHLORIDE BLD-SCNC: 98 MMOL/L (ref 98–107)
CO2: 31 MMOL/L (ref 22–29)
COHB: 1 % (ref 0–1.5)
CREAT SERPL-MCNC: 0.7 MG/DL (ref 0.7–1.2)
CRITICAL: ABNORMAL
DATE ANALYZED: ABNORMAL
DATE OF COLLECTION: ABNORMAL
EOSINOPHILS ABSOLUTE: 0 E9/L (ref 0.05–0.5)
EOSINOPHILS RELATIVE PERCENT: 0 % (ref 0–6)
FIO2: 45 %
GFR AFRICAN AMERICAN: >60
GFR NON-AFRICAN AMERICAN: >60 ML/MIN/1.73
GLUCOSE BLD-MCNC: 160 MG/DL (ref 74–99)
HCO3: 26.5 MMOL/L (ref 22–26)
HCT VFR BLD CALC: 43.7 % (ref 37–54)
HEMOGLOBIN: 13.7 G/DL (ref 12.5–16.5)
HHB: 6.5 % (ref 0–5)
IMMATURE GRANULOCYTES #: 0.03 E9/L
IMMATURE GRANULOCYTES %: 0.3 % (ref 0–5)
LAB: ABNORMAL
LACTIC ACID: 1.1 MMOL/L (ref 0.5–2.2)
LYMPHOCYTES ABSOLUTE: 0.79 E9/L (ref 1.5–4)
LYMPHOCYTES RELATIVE PERCENT: 8 % (ref 20–42)
Lab: ABNORMAL
MAGNESIUM: 2.5 MG/DL (ref 1.6–2.6)
MCH RBC QN AUTO: 28.3 PG (ref 26–35)
MCHC RBC AUTO-ENTMCNC: 31.4 % (ref 32–34.5)
MCV RBC AUTO: 90.3 FL (ref 80–99.9)
METER GLUCOSE: 132 MG/DL (ref 74–99)
METER GLUCOSE: 134 MG/DL (ref 74–99)
METER GLUCOSE: 139 MG/DL (ref 74–99)
METER GLUCOSE: 163 MG/DL (ref 74–99)
METHB: 0.3 % (ref 0–1.5)
MODE: AC
MONOCYTES ABSOLUTE: 0.41 E9/L (ref 0.1–0.95)
MONOCYTES RELATIVE PERCENT: 4.2 % (ref 2–12)
NEUTROPHILS ABSOLUTE: 8.63 E9/L (ref 1.8–7.3)
NEUTROPHILS RELATIVE PERCENT: 87.4 % (ref 43–80)
O2 CONTENT: 18.9 ML/DL
O2 SATURATION: 93.4 % (ref 92–98.5)
O2HB: 92.2 % (ref 94–97)
OPERATOR ID: 1358
PATIENT TEMP: 37 C
PCO2: 37 MMHG (ref 35–45)
PDW BLD-RTO: 14.6 FL (ref 11.5–15)
PEEP/CPAP: 8 CMH2O
PFO2: 1.42 MMHG/%
PH BLOOD GAS: 7.47 (ref 7.35–7.45)
PHOSPHORUS: 2.7 MG/DL (ref 2.5–4.5)
PLATELET # BLD: 281 E9/L (ref 130–450)
PMV BLD AUTO: 9.9 FL (ref 7–12)
PO2: 64.1 MMHG (ref 75–100)
POTASSIUM SERPL-SCNC: 3.7 MMOL/L (ref 3.5–5)
RBC # BLD: 4.84 E12/L (ref 3.8–5.8)
RI(T): 317 %
RR MECHANICAL: 20 B/MIN
SODIUM BLD-SCNC: 138 MMOL/L (ref 132–146)
SOURCE, BLOOD GAS: ABNORMAL
THB: 14.6 G/DL (ref 11.5–16.5)
TIME ANALYZED: 639
TOTAL CK: 1338 U/L (ref 20–200)
TOTAL PROTEIN: 5.8 G/DL (ref 6.4–8.3)
VT MECHANICAL: 550 ML
WBC # BLD: 9.9 E9/L (ref 4.5–11.5)

## 2020-06-26 PROCEDURE — 84100 ASSAY OF PHOSPHORUS: CPT

## 2020-06-26 PROCEDURE — 80053 COMPREHEN METABOLIC PANEL: CPT

## 2020-06-26 PROCEDURE — 2580000003 HC RX 258: Performed by: FAMILY MEDICINE

## 2020-06-26 PROCEDURE — 94003 VENT MGMT INPAT SUBQ DAY: CPT

## 2020-06-26 PROCEDURE — 99233 SBSQ HOSP IP/OBS HIGH 50: CPT | Performed by: FAMILY MEDICINE

## 2020-06-26 PROCEDURE — 2500000003 HC RX 250 WO HCPCS: Performed by: EMERGENCY MEDICINE

## 2020-06-26 PROCEDURE — 36600 WITHDRAWAL OF ARTERIAL BLOOD: CPT

## 2020-06-26 PROCEDURE — 36415 COLL VENOUS BLD VENIPUNCTURE: CPT

## 2020-06-26 PROCEDURE — C9113 INJ PANTOPRAZOLE SODIUM, VIA: HCPCS | Performed by: EMERGENCY MEDICINE

## 2020-06-26 PROCEDURE — 94640 AIRWAY INHALATION TREATMENT: CPT

## 2020-06-26 PROCEDURE — 82962 GLUCOSE BLOOD TEST: CPT

## 2020-06-26 PROCEDURE — 83735 ASSAY OF MAGNESIUM: CPT

## 2020-06-26 PROCEDURE — 71045 X-RAY EXAM CHEST 1 VIEW: CPT

## 2020-06-26 PROCEDURE — 6370000000 HC RX 637 (ALT 250 FOR IP): Performed by: INTERNAL MEDICINE

## 2020-06-26 PROCEDURE — 2000000000 HC ICU R&B

## 2020-06-26 PROCEDURE — 6360000002 HC RX W HCPCS: Performed by: INTERNAL MEDICINE

## 2020-06-26 PROCEDURE — 6370000000 HC RX 637 (ALT 250 FOR IP): Performed by: EMERGENCY MEDICINE

## 2020-06-26 PROCEDURE — 2580000003 HC RX 258: Performed by: EMERGENCY MEDICINE

## 2020-06-26 PROCEDURE — 82550 ASSAY OF CK (CPK): CPT

## 2020-06-26 PROCEDURE — 6360000002 HC RX W HCPCS: Performed by: EMERGENCY MEDICINE

## 2020-06-26 PROCEDURE — 82805 BLOOD GASES W/O2 SATURATION: CPT

## 2020-06-26 PROCEDURE — 83605 ASSAY OF LACTIC ACID: CPT

## 2020-06-26 PROCEDURE — 36592 COLLECT BLOOD FROM PICC: CPT

## 2020-06-26 PROCEDURE — 85025 COMPLETE CBC W/AUTO DIFF WBC: CPT

## 2020-06-26 PROCEDURE — 6360000002 HC RX W HCPCS: Performed by: FAMILY MEDICINE

## 2020-06-26 RX ORDER — POTASSIUM CHLORIDE 29.8 MG/ML
20 INJECTION INTRAVENOUS
Status: COMPLETED | OUTPATIENT
Start: 2020-06-26 | End: 2020-06-26

## 2020-06-26 RX ORDER — FUROSEMIDE 10 MG/ML
40 INJECTION INTRAMUSCULAR; INTRAVENOUS ONCE
Status: COMPLETED | OUTPATIENT
Start: 2020-06-26 | End: 2020-06-26

## 2020-06-26 RX ADMIN — IPRATROPIUM BROMIDE AND ALBUTEROL SULFATE 1 AMPULE: 2.5; .5 SOLUTION RESPIRATORY (INHALATION) at 07:57

## 2020-06-26 RX ADMIN — Medication 50 MCG/HR: at 22:28

## 2020-06-26 RX ADMIN — POTASSIUM CHLORIDE 20 MEQ: 29.8 INJECTION, SOLUTION INTRAVENOUS at 10:18

## 2020-06-26 RX ADMIN — AZITHROMYCIN MONOHYDRATE 500 MG: 500 INJECTION, POWDER, LYOPHILIZED, FOR SOLUTION INTRAVENOUS at 09:23

## 2020-06-26 RX ADMIN — METHYLPREDNISOLONE SODIUM SUCCINATE 60 MG: 125 INJECTION, POWDER, LYOPHILIZED, FOR SOLUTION INTRAMUSCULAR; INTRAVENOUS at 01:07

## 2020-06-26 RX ADMIN — SODIUM CHLORIDE, PRESERVATIVE FREE 10 ML: 5 INJECTION INTRAVENOUS at 21:50

## 2020-06-26 RX ADMIN — PROPOFOL 45 MCG/KG/MIN: 10 INJECTION, EMULSION INTRAVENOUS at 05:23

## 2020-06-26 RX ADMIN — METHYLPREDNISOLONE SODIUM SUCCINATE 60 MG: 125 INJECTION, POWDER, LYOPHILIZED, FOR SOLUTION INTRAMUSCULAR; INTRAVENOUS at 05:07

## 2020-06-26 RX ADMIN — THIAMINE HYDROCHLORIDE 100 MG: 100 INJECTION, SOLUTION INTRAMUSCULAR; INTRAVENOUS at 09:24

## 2020-06-26 RX ADMIN — POTASSIUM CHLORIDE 20 MEQ: 29.8 INJECTION, SOLUTION INTRAVENOUS at 11:38

## 2020-06-26 RX ADMIN — CHLORHEXIDINE GLUCONATE 0.12% ORAL RINSE 15 ML: 1.2 LIQUID ORAL at 09:24

## 2020-06-26 RX ADMIN — Medication 50 MCG/HR: at 14:05

## 2020-06-26 RX ADMIN — IPRATROPIUM BROMIDE AND ALBUTEROL SULFATE 1 AMPULE: 2.5; .5 SOLUTION RESPIRATORY (INHALATION) at 19:49

## 2020-06-26 RX ADMIN — PROPOFOL 45 MCG/KG/MIN: 10 INJECTION, EMULSION INTRAVENOUS at 07:06

## 2020-06-26 RX ADMIN — SODIUM CHLORIDE: 9 INJECTION, SOLUTION INTRAVENOUS at 01:12

## 2020-06-26 RX ADMIN — PROPOFOL 45 MCG/KG/MIN: 10 INJECTION, EMULSION INTRAVENOUS at 10:20

## 2020-06-26 RX ADMIN — CHLORHEXIDINE GLUCONATE 0.12% ORAL RINSE 15 ML: 1.2 LIQUID ORAL at 21:50

## 2020-06-26 RX ADMIN — PROPOFOL 45 MCG/KG/MIN: 10 INJECTION, EMULSION INTRAVENOUS at 22:16

## 2020-06-26 RX ADMIN — IPRATROPIUM BROMIDE AND ALBUTEROL SULFATE 1 AMPULE: 2.5; .5 SOLUTION RESPIRATORY (INHALATION) at 12:53

## 2020-06-26 RX ADMIN — INSULIN LISPRO 1 UNITS: 100 INJECTION, SOLUTION INTRAVENOUS; SUBCUTANEOUS at 01:07

## 2020-06-26 RX ADMIN — Medication 50 MCG/HR: at 03:28

## 2020-06-26 RX ADMIN — PROPOFOL 50 MCG/KG/MIN: 10 INJECTION, EMULSION INTRAVENOUS at 01:12

## 2020-06-26 RX ADMIN — METHYLPREDNISOLONE SODIUM SUCCINATE 60 MG: 125 INJECTION, POWDER, LYOPHILIZED, FOR SOLUTION INTRAMUSCULAR; INTRAVENOUS at 11:57

## 2020-06-26 RX ADMIN — FUROSEMIDE 40 MG: 10 INJECTION, SOLUTION INTRAMUSCULAR; INTRAVENOUS at 09:24

## 2020-06-26 RX ADMIN — Medication 10 ML: at 09:27

## 2020-06-26 RX ADMIN — SODIUM CHLORIDE, PRESERVATIVE FREE 10 ML: 5 INJECTION INTRAVENOUS at 09:25

## 2020-06-26 RX ADMIN — METHYLPREDNISOLONE SODIUM SUCCINATE 60 MG: 125 INJECTION, POWDER, LYOPHILIZED, FOR SOLUTION INTRAMUSCULAR; INTRAVENOUS at 18:00

## 2020-06-26 RX ADMIN — ENOXAPARIN SODIUM 40 MG: 40 INJECTION SUBCUTANEOUS at 13:53

## 2020-06-26 RX ADMIN — PROPOFOL 45 MCG/KG/MIN: 10 INJECTION, EMULSION INTRAVENOUS at 19:50

## 2020-06-26 RX ADMIN — WATER 1 G: 1 INJECTION INTRAMUSCULAR; INTRAVENOUS; SUBCUTANEOUS at 08:30

## 2020-06-26 RX ADMIN — PANTOPRAZOLE SODIUM 40 MG: 40 INJECTION, POWDER, FOR SOLUTION INTRAVENOUS at 09:23

## 2020-06-26 RX ADMIN — PROPOFOL 40 MCG/KG/MIN: 10 INJECTION, EMULSION INTRAVENOUS at 16:48

## 2020-06-26 RX ADMIN — SODIUM CHLORIDE: 9 INJECTION, SOLUTION INTRAVENOUS at 08:10

## 2020-06-26 ASSESSMENT — PULMONARY FUNCTION TESTS
PIF_VALUE: 32
PIF_VALUE: 26
PIF_VALUE: 24
PIF_VALUE: 29
PIF_VALUE: 26
PIF_VALUE: 25
PIF_VALUE: 28
PIF_VALUE: 26
PIF_VALUE: 35
PIF_VALUE: 27
PIF_VALUE: 29
PIF_VALUE: 25
PIF_VALUE: 26
PIF_VALUE: 26
PIF_VALUE: 27
PIF_VALUE: 29
PIF_VALUE: 26

## 2020-06-26 ASSESSMENT — PAIN SCALES - GENERAL
PAINLEVEL_OUTOF10: 0
PAINLEVEL_OUTOF10: 0

## 2020-06-26 NOTE — PROGRESS NOTES
DAILY VENTILATOR WEANING ASSESSMENT PERFORMED    P/FIO2 Ratio =  142        (<100= do not Wean)                  Cs =  51                        (<32= Instability)  Plat. Pressure = 24  MV =12.2  RSBI =    Instabilities:       Cardiovascular =       CNS =       Respiratory =       Metabolic =    Parameters    no    Wean per protocol  yes    Ask Physician for a weaning plan yes    Additional Comments:     Performed by Vega Jackson RRT      Reference Table:    Cardiovascular     CNS      1. Mean BP less than or equal to 75   1. Neuromuscular blockade  2. Heart Rate greater than 130   2. RASS of -3, -4, -5  3. Myocardial Ischemia    3. RASS of +3, +4  4. Mechanical Assist Device    4. ICP greater than 15 or             Intracranial Hypertension         Respiratory      Metabolic  1. PEEP equal to or greater than 10cm/H20  1. Temp. (8hrs) less than 95 or > 103  2. Respiratory Rate greater than 35   2. WBC < 5000 or > 45194  3. Minute Volume greater than 15L  4. pH less than 7.30  5.  Deteriorating chest X-ray

## 2020-06-26 NOTE — PROGRESS NOTES
Intensive Care Daily Quality Rounding Checklist      ICU Team Members: Dr. Lisbeth Bah, Nisha  [facility] (residents), charge nurse, bedside, respiratory therapist    ICU Day #: NUMBER: 2    Intubation Date: June 25    Ventilator Day #: NUMBER: 2    Central Line Insertion Date: Luci 25        Day #: NUMBER: 2     Arterial Line Insertion Date: N/A      Day #: N/A    DVT Prophylaxis: Lovenox    GI Prophylaxis: Protonix    [place] Catheter Insertion Date: Luci 25       Day #: 2      Continued need (if yes, reason documented and discussed with physician): yes, I&O    Skin Issues/ Wounds and ordered treatment discussed on rounds: No issues, SOS precautions    Goals/ Plans for the Day: Daily labs, wean vent as able, diurese patient, replace electrolytes, soft wrist restraints to prevent pulling, consult dietitian for tube feed recommendations

## 2020-06-26 NOTE — PROGRESS NOTES
Patient belongings in closet of room. Clear bag contents: White shorts   2 pieces nicorette gum  Black cloth face mask  1 pack ely cigarettes 4 in pack  Albuterol inhaler with 20 puffs remaining  Cellular phone in black case (unable to be powered off)  Red shirt with white trim  Grey under ware  [employer]eb[employer] tennis shoes white 2  White socks 2  Yellow colored necklace  Loose coins:9 quarters, 5 dimes, 4 nickels, 2 pennies    Bill fold with multiple cards (placed inside of shoe)  Drivers license  Direct express card  [employer] national bank card  [employer] happy card  [employer] Rx card   card  Valentin card  [place] direction card  miscellaneous folded papers in center fold    NEERAJ Frias (Trailmaker classic)  Front pocket:  blue stretchy cord with 2 keys attached  Reading glasses black rimmed  Main Pocket:  Mr.Good duran  3 cigars in packages  3 packs watermelon extra gum (5 stick packs each)  1 large 15 stick pack of extra gum spearmint  One touch Verio owners book  1 white  cord with wall plug  [employer] with miscellaneous papers inside.

## 2020-06-26 NOTE — PROGRESS NOTES
Christel Claude, M.D.,[facility]  María Donaldson D.O., F.A.C.O.I., Do Bergeron M.D. Jackie Kingston M.D., Kaelyn Contreras M.D. Seb Dey D.O. Daily Pulmonary Progress Note    Patient:  Nas Casillas 44 y.o. male MRN: [id number]     Date of Service: 6/26/2020      Synopsis     We are following patient for respiratory failure, possible cocaine pneumonitis    \"CC\" shortness of breath    Code status: full      Subjective      Patient was seen and examined. intubated on mechanical ventilation and sedation. Not able to follow commands. Increased sedation for agitation.        Review of Systems:   Not able to obtain     24-hour events:  None     Objective   Vitals: BP (!) 146/79   Pulse 73   Temp 96.9 °F (36.1 °C) (Temporal)   Resp 20   Ht 5' 6\" (1.676 m)   Wt 266 lb 1.5 oz (120.7 kg)   SpO2 94%   BMI 42.95 kg/m²     I/O:    Intake/Output Summary (Last 24 hours) at 6/26/2020 1514  Last data filed at 6/26/2020 1400  Gross per 24 hour   Intake 4334 ml   Output 3740 ml   Net 594 ml       Vent Information  $Ventilation: $Subsequent Day  Skin Assessment: Clean, dry, & intact  Equipment ID: [id number]  Vent Type: 840  Vent Mode: AC/VC  Vt Ordered: 550 mL  Rate Set: 20 bmp  Peak Flow: 60 L/min  Pressure Support: 0 cmH20  FiO2 : 45 %  SpO2: 94 %  SpO2/FiO2 ratio: 208.89  Sensitivity: 3  PEEP/CPAP: 8  I Time/ I Time %: 0 s  Humidification Source: HME  Humidification Temp: 37  Humidification Temp Measured: 36.9       IPAP: (S) 16 cmH20  CPAP/EPAP: (S) 8 cmH2O     CURRENT MEDS :  Scheduled Meds:   ipratropium-albuterol  1 ampule Inhalation Q6H WA    sodium chloride flush  10 mL Intravenous 2 times per day    enoxaparin  40 mg Subcutaneous Daily    cefTRIAXone (ROCEPHIN) IV  1 g Intravenous Q24H    azithromycin  500 mg Intravenous Q24H    methylPREDNISolone  60 mg Intravenous Q6H    pantoprazole  40 mg Intravenous Daily    chlorhexidine  15 mL Mouth/Throat BID    sodium chloride flush  10 mL Intravenous 2 times per day    thiamine  100 mg Intravenous Daily    insulin lispro  0-6 Units Subcutaneous 4 times per day       Physical Exam:  General Appearance: appears comfortable in no acute distress. HEENT: Normocephalic atraumatic without obvious abnormality orally intubated   Neck: Lips, mucosa, and tongue normal.  Supple, symmetrical, trachea midline, no adenopathy;thyroid:  no enlargement/tenderness/nodules or JVD. Lung: Breath sounds CTA. Respirations   unlabored. Symmetrical expansion. Heart: RRR, normal S1, S2. No MRG  Abdomen: Soft, NT, ND. BS present x 4 quadrants. No bruit or organomegaly. Extremities: Pedal pulses 2+ symmetric b/l. Extremities normal, no cyanosis, clubbing, or edema. Musculokeletal: No joint swelling, no muscle tenderness. ROM normal in all joints of extremities. Neurologic: Mental status: Alert and Oriented X3 . Pertinent/ New Labs and Imaging Studies     Imaging Personally Reviewed:  Findings:   Study is technically limited due to low lung volumes. An endotracheal tube is noted in position with tip projecting   approximately 3.5 cm above the oliva    An NG tube is noted with tip projecting within the stomach. There is a left-sided internal jugular central venous catheter noted   the tip is in the superior vena cava. The heart is enlarged   The lung fields are unremarkable. The aorta is tortuous and ectatic           Impression   Limited study due to low lung volumes. No significant interval change. Stable positioning of support lines and tubes. .       This study was dictated by Jazmine Bryant PA-C and Sandra Cook MD   reviewed and concurred with the findings.             ECHO  2016  Summary   Left ventricular size is grossly normal.   Borderline left ventricular concentric hypertrophy noted. Ejection fraction is visually estimated at 65%. No evidence of left ventricular mass or thrombus noted. No regional wall motion abnormalities seen.    The left atrium is mildly dilated. Interatrial septum appears intact. No evidence of thrombus within left atrium. No evidence of mass within left atrium. Physiologic and/or trace mitral regurgitation is present. No evidence of mitral valve stenosis. Physiologic and/or trace tricuspid regurgitation. RVSP is 26.00 mmHg. Regular rhythm. Labs:  Lab Results   Component Value Date    WBC 9.9 06/26/2020    HGB 13.7 06/26/2020    HCT 43.7 06/26/2020    MCV 90.3 06/26/2020    MCH 28.3 06/26/2020    MCHC 31.4 06/26/2020    RDW 14.6 06/26/2020     06/26/2020    MPV 9.9 06/26/2020     Lab Results   Component Value Date     06/26/2020    K 3.7 06/26/2020    K 4.3 06/25/2020    CL 98 06/26/2020    CO2 31 06/26/2020    BUN 8 06/26/2020    CREATININE 0.7 06/26/2020    LABALBU 3.2 06/26/2020    CALCIUM 8.1 06/26/2020    GFRAA >60 06/26/2020    LABGLOM >60 06/26/2020     Lab Results   Component Value Date    PROTIME 11.9 06/25/2020    INR 1.0 06/25/2020     Recent Labs     06/25/20  0635   PROBNP 217*     Recent Labs     06/25/20  1550   PROCAL 0.05     This SmartLink has not been configured with any valid records. Micro:  Recent Labs     06/25/20  1720   CULTRESP Oral Pharyngeal Carley present     No results for input(s): LABGRAM in the last 72 hours. No results for input(s): LEGUR in the last 72 hours. No results for input(s): STREPNEUMAGU in the last 72 hours. No results for input(s): LP1UAG in the last 72 hours. Assessment:    1. Acute respiratory failure with hypoxia  2. Intubation for airway protection, ventilator dependence  3. Cocaine induced pneumonitis vs chf vs aspiration pneumonia   4. COPD, noncompliance with medication  5. Current every day smoker  6. Substance abuse  7. Hypertension  8. EtOH abuse, history of pancreatitis, elevated LFTs  9. GERD  10. Rhabdomyolysis   11. Medical non compliance      Plan:   1.  Ventilator -assist control rate 20 peep +8 FIO2 45%  PF ratio 142   P

## 2020-06-26 NOTE — PROGRESS NOTES
6/26/2020  10:12 AM           Nutrition Assessment -Consult    Type and Reason for Visit: Initial, Consult(ICU/vent; Consult re: TF rec only)    Nutrition Recommendations: If pt remains intubated and EN needed for nutrition support, recommend:    TF RECOMMENDATION: Low Calorie/High protein TF (Vital HP) to goal rate 45 ml/hr (while on current level of propofol)  This will provide: 1080 ml/d, 1080 kristen (1922 kristen total w/propofol), 95 g pro, 903 ml free water    If propofol weaned off, recommend Consult Dietitian for updated TF recommendation    Nutrition Assessment: Pt w/hx COPD,polysubstance abuse, pancreatits,prediabetes-now intubated/sedated. Will provide TF rec to meet needs (with current propofol level)    Malnutrition Assessment:  · Malnutrition Status: Meets the criteria for moderate malnutrition  · Context: Chronic illness  · Findings of the 6 clinical characteristics of malnutrition (Minimum of 2 out of 6 clinical characteristics is required to make the diagnosis of moderate or severe Protein Calorie Malnutrition based on AND/ASPEN Guidelines):  1. Energy Intake-Less than or equal to 75% of estimated energy requirement, Greater than or equal to 1 month(Staying at [facility] and per EMR review )    2. Weight Loss-7.5% loss or greater, in 3 months  3. Fat Loss- ,  No significant subcutaneous fat loss  4. Muscle Loss- ,  No significant muscle loss  5. Fluid Accumulation-No significant fluid accumulation,    6.  Strength-Not measured    Nutrition Risk Level: High    Nutrition Needs:  · Estimated Daily Total Kcal:  (PSE 2250 kristen x 80%-wk 1, MV 12.2, Tmax 98.7)  · Estimated Daily Protein (g):  (1.3-1.5 g/kg)  · Estimated Daily Fluid (ml/day):  (1 ml/kristen) or per CC    Nutrition Diagnosis:   · Problem:  Moderate malnutrition, In context of social or environmental circumstances  · Etiology: related to Lack or limited access to food(active polysubstance abuse and living environment)     Signs and symptoms:  as evidenced by NPO status due to medical condition, Intubation, Weight loss greater than or equal to 7.5% in 3 months, Diet history of poor intake    Objective Information:  · Nutrition-Focused Physical Findings: sedated, SOB and cough PTA, NGT to LIS, no edema, abdomen WNL, +I/O 1 L, +cocaine on tox screen,   · Wound Type: (abrasion, scabs)  · Current Nutrition Therapies:  · Oral Diet Orders: NPO   · Additional Calories: propofol = 842 kristen  · Anthropometric Measures:  · Ht: 5' 6\" (167.6 cm)   · Current Body Wt: 266 lb (120.7 kg)(6/26 bedwt)  · Admission Body Wt: 260 lb (117.9 kg)(6/25 bedwt)  · Usual Body Wt: 283 lb (128.4 kg)(per April admit (no edema))  · Weight Change: 8% loss in last 2 mo    · Ideal Body Wt: 142 lb (64.4 kg), % Ideal Body 183% (admit)  · BMI Classification: BMI > or equal to 40.0 Obese Class III    Nutrition Interventions:   Continue NPO(Will provide TF rec -Low calorie/High protein TF (Vital HP) while on current level of propofol)  Continued Inpatient Monitoring, Education Not Indicated    Nutrition Evaluation:   · Evaluation: Goals set   · Goals: Nutrition progression   · Monitoring: Nutrition Progression, Skin Integrity, I&O, Mental Status/Confusion, Weight, Pertinent Labs, Monitor Bowel Function      Electronically signed by Jocelyn Weiner RD, CNSC, LD on 6/26/20 at 10:12 AM EDT    Contact Number: [phone]

## 2020-06-26 NOTE — CARE COORDINATION
COVID negative 6/25. Vent/ intubated since 6/25- found @ [facility]. + Cocaine. On iv abx. Attempted to call sister Palmira Perez [phone]- unavailable- will follow up next week. No PCP listed- PCP preservice # placed on discharge instruction. From previous chart review, community resource information for housing, food, Out reach community services, psyche services, rehab services  was given to patient 4/23/20-pt was strongly encouraged to follow through at discharge at that time.  Will follow Nella Spurling

## 2020-06-26 NOTE — PROGRESS NOTES
Critical Care Team - Daily Progress Note      Date and time: 6/26/2020 8:15 AM  Patient's name:  Yelena Landeros  Medical Record Number: [id number]  Patient's account/billing number: [de-identified]  Patient's YOB: 1970  Age: 44 y.o. Date of Admission: 6/25/2020  6:08 AM  Length of stay during current admission: 1      Primary Care Physician: No primary care provider on file.   ICU Attending Physician: Dr. Montana Baptiste    Code Status: Full Code    Reason for ICU admission:  Acute Hypoxic Respiratory Failure  AMS    SUBJECTIVE:     OVERNIGHT EVENTS:     No Events    AWAKE & FOLLOWING COMMANDS:  [x] No   [] Yes    CURRENT VENTILATION STATUS:     [x] Ventilator  [] BIPAP  [] Nasal Cannula [] Room Air      IF INTUBATED, ET TUBE MARKING AT LOWER LIP:       cms    SECRETIONS Amount:  [] Small [x] Moderate  [] Large  [] None  Color:     [x] White [] Colored  [] Bloody    SEDATION:  RAAS Score: -2  [x] Propofol gtt  [] Versed gtt  [] Ativan gtt   [] No Sedation  Fentanyl gtt     PARALYZED:  [x] No    [] Yes    DIARRHEA:                [x] No                [] Yes  (C. Difficile status: [] positive                                                                                                                       [] negative                                                                                                                     [] pending)    VASOPRESSORS:  [x] No    [] Yes    If yes -   [] Levophed       [] Dopamine     [] Vasopressin       [] Dobutamine  [] Phenylephrine         [] Epinephrine    CENTRAL LINES:     [] No   [x] Yes   (Date of Insertion: 6/25  )           If yes -     [] Right IJ     [x] Left IJ [] Right Femoral [] Left Femoral                   [] Right Subclavian [] Left Subclavian       HARGROVE CATHETER:   [] No   [x] Yes  (Date of Insertion: 6/25  )         OBJECTIVE:     VITAL SIGNS:  /66   Pulse 61   Temp 98.1 °F (36.7 °C) (Axillary)   Resp 18   Ht 5' 6\" (1.676 m)   Wt 266 lb 1.5 oz (120.7 kg)   SpO2 95%   BMI 42.95 kg/m²   Tmax over 24 hours:  Temp (24hrs), Av.3 °F (36.8 °C), Min:98.1 °F (36.7 °C), Max:98.7 °F (37.1 °C)      Patient Vitals for the past 6 hrs:   BP Temp Temp src Pulse Resp SpO2   20 0757 -- -- -- -- 18 95 %   20 0750 -- -- -- 61 19 93 %   20 0600 119/66 -- -- 69 19 95 %   20 0500 119/66 -- -- 59 19 94 %   20 0432 -- -- -- 61 20 95 %   20 0400 112/62 98.1 °F (36.7 °C) Axillary 62 20 94 %   20 0300 117/61 -- -- 76 19 91 %         Intake/Output Summary (Last 24 hours) at 2020 0815  Last data filed at 2020 0600  Gross per 24 hour   Intake 2913 ml   Output 1535 ml   Net 1378 ml     Wt Readings from Last 2 Encounters:   20 266 lb 1.5 oz (120.7 kg)   20 283 lb (128.4 kg)     Body mass index is 42.95 kg/m².         PHYSICAL EXAMINATION:    General appearance - sedated in bed, appears stated age  Mental status - patient is intubated and sedated; RASS -2  Eyes - PERRL, sclera anicteric  Ears - external ear normal  Nose - normal and patent, no erythema, discharge or polyps  Mouth - ET tube in place, no perioral lesions noted  Neck - supple, no significant adenopathy  Chest - Coarse breath sounds b/l with mild L-sided wheezes noted, no rales or rhonchi appreciated, symmetric air entry with good air movement noted throughout  Heart - RRR with no m/g/r noted, normal S1, S2   Abdomen - soft, nontender, nondistended, no masses or organomegaly  Neurological - sedated  Extremities - UE and LE distal pulses intact b/l +2/4; no clubbing or cyanosis; trace edema noted in the b/l LEs  Skin - normal coloration and turgor, no rashes, no suspicious skin lesions noted    Any additional physical findings:    MEDICATIONS:    Scheduled Meds:   ipratropium-albuterol  1 ampule Inhalation Q6H WA    sodium chloride flush  10 mL Intravenous 2 times per day    enoxaparin  40 mg Subcutaneous Daily    cefTRIAXone (ROCEPHIN) IV  1 g Intravenous Q24H    azithromycin  500 mg Intravenous Q24H    methylPREDNISolone  60 mg Intravenous Q6H    pantoprazole  40 mg Intravenous Daily    chlorhexidine  15 mL Mouth/Throat BID    sodium chloride flush  10 mL Intravenous 2 times per day    thiamine  100 mg Intravenous Daily    insulin lispro  0-6 Units Subcutaneous 4 times per day     Continuous Infusions:   sodium chloride 150 mL/hr at 06/26/20 0810    propofol 45 mcg/kg/min (06/26/20 0706)    fentaNYL 5 mcg/ml in 0.9%  ml infusion 50 mcg/hr (06/26/20 0328)    dextrose       PRN Meds:   sodium chloride flush, 10 mL, PRN  acetaminophen, 650 mg, Q6H PRN    Or  acetaminophen, 650 mg, Q6H PRN  polyethylene glycol, 17 g, Daily PRN  promethazine, 12.5 mg, Q6H PRN    Or  ondansetron, 4 mg, Q6H PRN  glucose, 15 g, PRN  dextrose, 12.5 g, PRN  glucagon (rDNA), 1 mg, PRN  dextrose, 100 mL/hr, PRN  midazolam, 2 mg, Q4H PRN  sodium chloride flush, 10 mL, PRN          VENT SETTINGS (Comprehensive) (if applicable):  Vent Information  $Ventilation: $Subsequent Day  Skin Assessment: Clean, dry, & intact  Equipment ID: [id number]  Vent Type: 840  Vent Mode: AC/VC  Vt Ordered: 550 mL  Rate Set: 20 bmp  Peak Flow: 60 L/min  Pressure Support: 0 cmH20  FiO2 : 45 %  SpO2: 95 %  SpO2/FiO2 ratio: 211.11  Sensitivity: 3  PEEP/CPAP: 8  I Time/ I Time %: 0 s  Humidification Source: Heated wire  Humidification Temp: 36.9  Humidification Temp Measured: 37  Additional Respiratory  Assessments  Pulse: 61  Resp: 18  SpO2: 95 %  Humidification Source: Heated wire  Humidification Temp: 36.9  Oral Care: Mouth swabbed, Mouth suctioned  Subglottic Suction Done?: Yes  Cuff Pressure (cm H2O): 29 cm H2O    ABG  Lab Results   Component Value Date    PH 7.473 06/26/2020    PCO2 37.0 06/26/2020    PO2 64.1 06/26/2020    HCO3 26.5 06/26/2020    O2SAT 93.4 06/26/2020         Laboratory findings:    Complete Blood Count:   Recent Labs     06/25/20  0635 06/26/20  0509   WBC 10.4 9.9   HGB 16.4 13.7   HCT 52.9 43.7    281        Lactic Acid  Lab Results   Component Value Date    LACTA 1.1 2020    LACTA 1.0 2020    LACTA 1.3 2020        Last 3 Blood Glucose:   Recent Labs     20  0635 20  0509   GLUCOSE 110* 160*         PT/INR:    Lab Results   Component Value Date    PROTIME 11.9 2020    INR 1.0 2020     PTT:    Lab Results   Component Value Date    APTT 36.6 2015       Comprehensive Metabolic Profile:   Recent Labs     20  0635 20  0509    138   K 4.3 3.7   CL 93* 98   CO2 33* 31*   BUN 6 8   CREATININE 0.8 0.7   GLUCOSE 110* 160*   CALCIUM 9.0 8.1*   PROT 7.9 5.8*   LABALBU 4.1 3.2*   BILITOT 0.6 0.4   ALKPHOS 97 74   * 59*   * 79*      Magnesium:   Lab Results   Component Value Date    MG 2.5 2020     Phosphorus:   Lab Results   Component Value Date    PHOS 2.7 2020     Ionized Calcium: No results found for: CAION       Urinalysis:     Troponin:   Recent Labs     20  0635 20  1550   TROPONINI <0.01 <0.01         Cultures     Cultures during this admission:     Blood cultures: x2 drawn                 [] None drawn      [] Negative             []  Positive (Details:  )  Urine Culture:                   [] None drawn      [] Negative             []  Positive (Details:  )  Sputum Culture:               [] None drawn       [] Negative             []  Positive (Details:  )   Endotracheal aspirate:  Collected     [] None drawn       [] Negative             []  Positive (Details:  )     No results for input(s): BC in the last 72 hours. No results for input(s): Michelle Katerin in the last 72 hours. No results for input(s): LABURIN in the last 72 hours.     Other pertinent Labs:    MRSA screen    Radiology/Imaging:   Ct Head Wo Contrast    Result Date: 2020  Patient MRN:  [id number] : 1970 Age: 44 years Gender: Male Order Date:  2020 9:30 AM EXAM: CT HEAD WO CONTRAST INDICATION:  altered mental altered mental  COMPARISON: CT head without contrast, 2014. FINDINGS: PARENCHYMA:No mass effect, edema or hemorrhage. The brain is normal in volume. No significant chronic microvascular ischemic changes appreciated. VENTRICLES: No hydrocephalus. Incidental note is made of cavum septum pellucidum and vergae. CALVARIUM:The calvarium is intact without fracture or focal lesion. SINUSES: Mild mucosal thickening in the paranasal sinuses, especially the ethmoid sinuses. No acute intracranial abnormality. Xr Chest Portable    Result Date: 2020  Patient MRN: [id number] : 1970 Age:  44 years Gender: Male Order Date: 2020 3:15 PM Exam: XR CHEST PORTABLE Number of Images: 1 view Indication:   Post Left IJ CVC placement Post Left IJ CVC placement Comparison: Prior study from 2020 at 1020 hours available. Findings: The lungs are clear. There is no evidence of pulmonary infiltrate or pleural effusion. The pulmonary vascularity is unremarkable. There is a left internal jugular catheter with tip in superior vena cava. The endotracheal tube and nasogastric tube are in satisfactory position. There is no pneumothorax post placement of a left internal jugular catheter. The cardiac, hilar and mediastinal silhouettes are satisfactory. The bony thorax demonstrates no gross abnormality. NO ACUTE CARDIOPULMONARY PROCESS Support lines appears to be in satisfactory position. Xr Chest Portable    Result Date: 2020  Patient MRN: [id number] : 1970 Age:  44 years Gender: Male Order Date: 2020 10:00 AM Exam: XR CHEST PORTABLE Indication:   intubate intubate Comparison: Chest one view, 2020, 6:43 a.m. FINDINGS: The lungs are clear. The cardiomediastinal contour is unremarkable. No pneumothorax or pleural effusion. Interval insertion of endotracheal tube, the tip of which is approximately 3 cm above the oliva.  The tip of the nasogastric tube is below the diaphragm and beyond the field-of-view of this study. 1. The life support lines and tubes appear well positioned. 2. No acute cardiopulmonary abnormality. Xr Chest Portable    Result Date: 2020  Patient MRN: [id number] : 1970 Age:  44 years Gender: Male Order Date: 2020 6:15 AM Exam: XR CHEST PORTABLE Indication:   short of breath short of breath Comparison: Chest radiograph and CT, 2020 FINDINGS: The lungs are clear. The cardiomediastinal contour is unremarkable. No pneumothorax or pleural effusion. Evaluation of the bones reveals no fracture or destructive lesion. No acute cardiopulmonary abnormality. Xr Chest Abdomen Ng Placement    Result Date: 2020  Patient MRN: [id number] : 1970 Age:  44 years Gender: Male Order Date: 2020 10:30 AM Exam: XR CHEST ABDOMEN NG PLACEMENT Indication:   ng placement ng placement Comparison: None. FINDINGS: The lungs are clear. The cardiomediastinal contour is unremarkable. Ricka Distad No pneumothorax or pleural effusion. The endotracheal tube is well positioned, with the tip approximately 3 cm above the oliva. Tip of the nasogastric tube overlies the stomach. 1. No acute cardiopulmonary abnormality. 2. The life support lines and tubes appear well positioned. Chest Xray (2020):    SYSTEMS ASSESSMENT    Neuro   AMS              - Head CT negative for acute intracranial pathology  Sedated with Propofol and Fentanyl              - Versed prn   Titrate to RASS -2 to -3  Hx depression              - Home Meds: Wellbutrin XL  Other Home Meds: Melatonin, Gabapentin, Oxcarbazepine  CIWA protocol     Respiratory   Hx of COPD              - noncompliance with meds  Current everyday smoker  Acute Hypoxic Respiratory Failure - Possible extubation tomorrow              - Intubated in the ER - Etomidate and Succinylcholine then Rocuronium for continued agitation              - Vent settings as above              - Wean oxygen as tolerated. Keep O2 sat 90-92%  Possible cocaine pneumonitis  Solumedrol 125 mg IV once then 60 mg IV Q6h   DuoNebs Q6h when awake  Initial ABG showed respiratory acidosis              - repeat once today then daily  CXR slightly worse today   - continue to monitor     Cardiovascular   Hx of HTN              - Home Meds: Lopressor 25 mg BID, Norvasc 10 mg QD  No other cardiac hx  Hypertensive here              - monitor closely and treat as appropriate  EKG: Sinus Tachycardia with LAD; QTc 457     Gastrointestinal   Hx GERD              - takes Protonix at home  Protonix 40 mg IV QD for prophylaxis  Elevated Liver Enzymes              -  --> 59,  --> 79, improving              - Chronic EtOH abuse    - [facility] protocol  Hx pancreatitis              - Lipase 15     Renal   No hx kidney disease  Trend BMP              - BUN 8, Cr 0.7 today  Monitor electrolytes closely              - replete as necessary  CK 5298 --> 1982 --> 1338              - likely cocaine-induced rhabdo     Infectious Disease   WBC 9.9              - continue to monitor with daily CBCs  Lactic 1.3 --> 1.1              - monitor daily  Sed Rate 1  COVID negative  CXR negative for acute cardiopulmonary pathology              - Being treated as possible CAP              - Will continue Zithromax 500 mg IV QD and Rocephin 1 g IV QD  CXR in the morning     Hematology/Oncology   H&H 16.4/52.9  Platelets 463  DVT Prophylaxis              - Lovenox 40 mg SC QD  INR 1     Endocrine   No DM diagnosis, but patient on Glipizide at home  Glucose 160              - continue to monitor closely              - patient on steroids  Will start SSI Low Dose Protocol     Social/Spiritual/DNR/Other   CODE STATUS: Full  Drug screen positive for cocaine      PLAN:     WEAN PER PROTOCOL:  [] No   [x] Yes  [] N/A    DISCONTINUE ANY LABS:   [x] No   [] Yes    ICU PROPHYLAXIS:  Stress ulcer:  [x] PPI Agent  [] U3Hpwpl [] Sucralfate  [] Other:  VTE:   [x] Enoxaparin  [] Unfract. Heparin Subcut  [] EPC Cuffs    NUTRITION:  [x] NPO [] Tube Feeding (Specify: ) [] TPN  [] PO (Diet: Diet NPO Effective Now)    HOME MEDICATIONS RECONCILED: [] No  [x] Yes    INSULIN DRIP:   [x] No   [] Yes    CONSULTATION NEEDED:  [] No   [x] Yes - Dietary    FAMILY UPDATED:    [] No   [x] Yes    TRANSFER OUT OF ICU:   [x] No   [] Yes    ADDITIONAL PLAN:    1. Possible extubation tomorrow, will discuss transfer at that time    Twan Linder DO, PGY1.                       6/26/2020, 8:15 AM    Attending Physician Attestation: Dr. Makenna Elena    Thank you very much for allowing me to see this patient in consultation and follow up. I personally saw, examined and provided care for the patient. Radiographs, labs and medication list were reviewed by me independently. I spoke with bedside nursing, respiratory therapists and consultants. Critical care services and times documented are independent of procedures and multidisciplinary rounds with Residents. Additionally comprehensive, multidisciplinary rounds were conducted with the MICU team. The case was discussed in detail and plans for care were established. Review of Residents documentation was conducted and revisions were made as appropriate. I agree with the the above documented information.      Physical Examination:  Vitals:   Vitals:    06/26/20 1000 06/26/20 1100 06/26/20 1200 06/26/20 1253   BP: 122/62 116/62 135/75    Pulse: 65 79 69    Resp: 20 20 19 19   Temp:   96.9 °F (36.1 °C)    TempSrc:   Temporal    SpO2: 95% 94% 93% 94%   Weight:       Height:          General: No Acute Distress, Intubated, Sedated   HEENT: normocephalic, atraumatic  CVS: RRR, S1, S2, No S3 or S4  Respiratory: decreased breath sounds at lung bases, no focal wheezes noted  Abd: noft, non-tender, non-distended  Extremities: no clubbing/cyanosis/edema     ASSESSMENT:  1.) Acute Hypoxic Respiratory Failure - on mechanical ventilation  2.) Acute Cocaine Pneumonitis   3.) Medication Non-Compliance  4.) H/O Drug Abuse   5.) H/O COPD  6.) Cocaine Abuse      In addition the following applies:     Check: N/A  Medication Alterations: thiamine, folic acid, CIWA scale, lasix 40 mg IV x 1  Procedures: N/A  Imaging: Reviewed  New Consultations: N/A  VENT: ACVC (DAY 2) 20/550/45/8  Compliance: 51  PPeak 26  Pplateau 64     Access: Peripheral, Left IJ TLC   Consults: Pulmonary   Drips: Propofol, Fentanyl   Nutrition: NPO  ABX: rocephin, azithromycin      - supportive care  - wean vent as able   - look to vent wean in AM    Thank you for allowing me to participate in the care of this patient. Care reviewed with nursing staff, medical and surgical specialty care, primary care and the patient's family as available. Restraints are ordered when the patient can do harm to him/herself by pulling out devices. Critical Care Time: > 35 minutes excluding procedures    ISI Lloyd  6/26/2020  1:32 PM

## 2020-06-26 NOTE — PROGRESS NOTES
[facility] Progress Note    Admitting Date and Time: 6/25/2020  6:08 AM  Admit Dx: Acute respiratory failure with hypoxia and hypercapnia ([address] [J96.01, J96.02]  Acute respiratory failure with hypoxia and hypercapnia (HCC) [J96.01, J96.02]    Subjective:  Patient is being followed for Acute respiratory failure with hypoxia and hypercapnia ([address] [J96.01, J96.02]  Acute respiratory failure with hypoxia and hypercapnia ([address] [J96.01, J96.02]   Pt is intubated and sedated. Spoke with intensivist.  No events. Plan to treat for CAP and cocaine pneumonitis.     ROS: unable to obtain due to intubation and sedation     ipratropium-albuterol  1 ampule Inhalation Q6H WA    sodium chloride flush  10 mL Intravenous 2 times per day    enoxaparin  40 mg Subcutaneous Daily    cefTRIAXone (ROCEPHIN) IV  1 g Intravenous Q24H    azithromycin  500 mg Intravenous Q24H    methylPREDNISolone  60 mg Intravenous Q6H    pantoprazole  40 mg Intravenous Daily    chlorhexidine  15 mL Mouth/Throat BID    sodium chloride flush  10 mL Intravenous 2 times per day    thiamine  100 mg Intravenous Daily    insulin lispro  0-6 Units Subcutaneous 4 times per day     sodium chloride flush, 10 mL, PRN  acetaminophen, 650 mg, Q6H PRN    Or  acetaminophen, 650 mg, Q6H PRN  polyethylene glycol, 17 g, Daily PRN  promethazine, 12.5 mg, Q6H PRN    Or  ondansetron, 4 mg, Q6H PRN  glucose, 15 g, PRN  dextrose, 12.5 g, PRN  glucagon (rDNA), 1 mg, PRN  dextrose, 100 mL/hr, PRN  midazolam, 2 mg, Q4H PRN  sodium chloride flush, 10 mL, PRN         Objective:    /70   Pulse 62   Temp 98.1 °F (36.7 °C) (Axillary)   Resp 20   Ht 5' 6\" (1.676 m)   Wt 266 lb 1.5 oz (120.7 kg)   SpO2 97%   BMI 42.95 kg/m²     General Appearance: alert and oriented to person, place and time and in no acute distress  Skin: warm and dry  Head: normocephalic and atraumatic  Eyes: pupils equal, round, and reactive to light, extraocular eye movements intact, conjunctivae normal  Neck: neck supple and non tender without mass   Pulmonary/Chest: clear to auscultation bilaterally- no wheezes, rales or rhonchi, normal air movement, no respiratory distress  Cardiovascular: normal rate, normal S1 and S2 and no carotid bruits  Abdomen: soft, non-tender, non-distended, normal bowel sounds, no masses or organomegaly  Extremities: no cyanosis, no clubbing and no edema  Neurologic: no cranial nerve deficit and speech normal        Recent Labs     06/25/20  0635 06/26/20  0509    138   K 4.3 3.7   CL 93* 98   CO2 33* 31*   BUN 6 8   CREATININE 0.8 0.7   GLUCOSE 110* 160*   CALCIUM 9.0 8.1*       Recent Labs     06/25/20  0635 06/26/20  0509   WBC 10.4 9.9   RBC 5.91* 4.84   HGB 16.4 13.7   HCT 52.9 43.7   MCV 89.5 90.3   MCH 27.7 28.3   MCHC 31.0* 31.4*   RDW 15.4* 14.6    281   MPV 9.8 9.9       Radiology: morning portable CXR result pending. Assessment:    Active Problems:    Acute respiratory failure with hypoxia and hypercapnia (HCC)  Resolved Problems:    * No resolved hospital problems. *      Plan:  1. Acute respiratory failure with hypoxia-acute onset   Intubated and sedated in the ICU   Wean as tolerated    2. COPD exacerbation-chronic emphysema with acute exacerbation   Continue zithromax, rocephin, solumedrol, duonebs when awake    3. HTN-controlled   Currently on no medications   Continue to monitor    4. Polysubstance abuse-chronic issue   Banana bag given in ED as well as narcan. UDS positive for cocaine    5. Cocaine pneumonitis   CXR pending   Monitor and wean vent as tolerated    6.   Non traumatic Rhabdomyolysis   CK initially 5298, this morning it was 1338   Continue IVF hydration                 Electronically signed by Sushma Cosme MD on 6/26/2020 at 8:31 AM

## 2020-06-27 ENCOUNTER — APPOINTMENT (OUTPATIENT)
Dept: GENERAL RADIOLOGY | Age: 50
DRG: 917 | End: 2020-06-27
Payer: MEDICARE

## 2020-06-27 LAB
AADO2: 168.5 MMHG
ANION GAP SERPL CALCULATED.3IONS-SCNC: 9 MMOL/L (ref 7–16)
B.E.: 2.1 MMOL/L (ref -3–3)
B.E.: 7 MMOL/L (ref -3–0)
BASOPHILS ABSOLUTE: 0.02 E9/L (ref 0–0.2)
BASOPHILS RELATIVE PERCENT: 0.1 % (ref 0–2)
BUN BLDV-MCNC: 12 MG/DL (ref 6–20)
CALCIUM SERPL-MCNC: 8.3 MG/DL (ref 8.6–10.2)
CHLORIDE BLD-SCNC: 100 MMOL/L (ref 98–107)
CO2: 30 MMOL/L (ref 22–29)
COHB: 1.2 % (ref 0–1.5)
CREAT SERPL-MCNC: 0.7 MG/DL (ref 0.7–1.2)
CRITICAL: ABNORMAL
CULTURE, RESPIRATORY: NORMAL
DATE ANALYZED: ABNORMAL
DATE OF COLLECTION: ABNORMAL
DEVICE: ABNORMAL
EOSINOPHILS ABSOLUTE: 0 E9/L (ref 0.05–0.5)
EOSINOPHILS RELATIVE PERCENT: 0 % (ref 0–6)
FIO2 ARTERIAL: 45
FIO2: 40 %
GFR AFRICAN AMERICAN: >60
GFR NON-AFRICAN AMERICAN: >60 ML/MIN/1.73
GLUCOSE BLD-MCNC: 160 MG/DL (ref 74–99)
HCO3 ARTERIAL: 30.3 MMOL/L (ref 22–26)
HCO3: 26 MMOL/L (ref 22–26)
HCT VFR BLD CALC: 43.2 % (ref 37–54)
HEMOGLOBIN: 13.7 G/DL (ref 12.5–16.5)
HHB: 7.8 % (ref 0–5)
IMMATURE GRANULOCYTES #: 0.11 E9/L
IMMATURE GRANULOCYTES %: 0.7 % (ref 0–5)
LAB: ABNORMAL
LYMPHOCYTES ABSOLUTE: 0.69 E9/L (ref 1.5–4)
LYMPHOCYTES RELATIVE PERCENT: 4.6 % (ref 20–42)
Lab: ABNORMAL
MAGNESIUM: 2.6 MG/DL (ref 1.6–2.6)
MCH RBC QN AUTO: 28.4 PG (ref 26–35)
MCHC RBC AUTO-ENTMCNC: 31.7 % (ref 32–34.5)
MCV RBC AUTO: 89.6 FL (ref 80–99.9)
METER GLUCOSE: 140 MG/DL (ref 74–99)
METER GLUCOSE: 143 MG/DL (ref 74–99)
METER GLUCOSE: 218 MG/DL (ref 74–99)
METHB: 0.4 % (ref 0–1.5)
MODE: ABNORMAL
MODE: AC
MONOCYTES ABSOLUTE: 0.56 E9/L (ref 0.1–0.95)
MONOCYTES RELATIVE PERCENT: 3.8 % (ref 2–12)
MRSA CULTURE ONLY: NORMAL
NEUTROPHILS ABSOLUTE: 13.55 E9/L (ref 1.8–7.3)
NEUTROPHILS RELATIVE PERCENT: 90.8 % (ref 43–80)
O2 CONTENT: 20.1 ML/DL
O2 SATURATION: 92.1 % (ref 92–98.5)
O2 SATURATION: 94.8 % (ref 92–98.5)
O2HB: 90.6 % (ref 94–97)
OPERATOR ID: 1358
OPERATOR ID: 420
PATIENT TEMP: 37 C
PCO2 ARTERIAL: 37.7 MMHG (ref 35–45)
PCO2: 38.2 MMHG (ref 35–45)
PDW BLD-RTO: 15.5 FL (ref 11.5–15)
PEEP/CPAP: 5 CMH2O
PFO2: 1.57 MMHG/%
PH BLOOD GAS: 7.45 (ref 7.35–7.45)
PH BLOOD GAS: 7.51 (ref 7.35–7.45)
PHOSPHORUS: 3.4 MG/DL (ref 2.5–4.5)
PLATELET # BLD: 296 E9/L (ref 130–450)
PMV BLD AUTO: 9.6 FL (ref 7–12)
PO2 ARTERIAL: 66.7 MMHG (ref 80–100)
PO2: 62.8 MMHG (ref 75–100)
POSITIVE END EXP PRESS: 8 CMH2O
POTASSIUM SERPL-SCNC: 3.6 MMOL/L (ref 3.5–5)
PS: 8 CMH20
RBC # BLD: 4.82 E12/L (ref 3.8–5.8)
RESPIRATORY RATE: 20 B/MIN
RI(T): 268 %
SMEAR, RESPIRATORY: NORMAL
SODIUM BLD-SCNC: 139 MMOL/L (ref 132–146)
SOURCE, BLOOD GAS: ABNORMAL
SOURCE, BLOOD GAS: ABNORMAL
THB: 15.8 G/DL (ref 11.5–16.5)
TIDAL VOLUME: 550 ML
TIME ANALYZED: 930
TOTAL CK: 357 U/L (ref 20–200)
WBC # BLD: 14.9 E9/L (ref 4.5–11.5)

## 2020-06-27 PROCEDURE — 36600 WITHDRAWAL OF ARTERIAL BLOOD: CPT

## 2020-06-27 PROCEDURE — 2700000000 HC OXYGEN THERAPY PER DAY

## 2020-06-27 PROCEDURE — 94640 AIRWAY INHALATION TREATMENT: CPT

## 2020-06-27 PROCEDURE — 82550 ASSAY OF CK (CPK): CPT

## 2020-06-27 PROCEDURE — 6370000000 HC RX 637 (ALT 250 FOR IP): Performed by: EMERGENCY MEDICINE

## 2020-06-27 PROCEDURE — C9113 INJ PANTOPRAZOLE SODIUM, VIA: HCPCS | Performed by: EMERGENCY MEDICINE

## 2020-06-27 PROCEDURE — 2580000003 HC RX 258: Performed by: EMERGENCY MEDICINE

## 2020-06-27 PROCEDURE — 80048 BASIC METABOLIC PNL TOTAL CA: CPT

## 2020-06-27 PROCEDURE — 6370000000 HC RX 637 (ALT 250 FOR IP): Performed by: INTERNAL MEDICINE

## 2020-06-27 PROCEDURE — 6360000002 HC RX W HCPCS: Performed by: EMERGENCY MEDICINE

## 2020-06-27 PROCEDURE — 83735 ASSAY OF MAGNESIUM: CPT

## 2020-06-27 PROCEDURE — 82805 BLOOD GASES W/O2 SATURATION: CPT

## 2020-06-27 PROCEDURE — 2500000003 HC RX 250 WO HCPCS: Performed by: EMERGENCY MEDICINE

## 2020-06-27 PROCEDURE — 94003 VENT MGMT INPAT SUBQ DAY: CPT

## 2020-06-27 PROCEDURE — 74018 RADEX ABDOMEN 1 VIEW: CPT

## 2020-06-27 PROCEDURE — 82803 BLOOD GASES ANY COMBINATION: CPT

## 2020-06-27 PROCEDURE — 71045 X-RAY EXAM CHEST 1 VIEW: CPT

## 2020-06-27 PROCEDURE — 6360000002 HC RX W HCPCS: Performed by: FAMILY MEDICINE

## 2020-06-27 PROCEDURE — 36415 COLL VENOUS BLD VENIPUNCTURE: CPT

## 2020-06-27 PROCEDURE — 85025 COMPLETE CBC W/AUTO DIFF WBC: CPT

## 2020-06-27 PROCEDURE — 2580000003 HC RX 258: Performed by: FAMILY MEDICINE

## 2020-06-27 PROCEDURE — 99233 SBSQ HOSP IP/OBS HIGH 50: CPT | Performed by: FAMILY MEDICINE

## 2020-06-27 PROCEDURE — 84100 ASSAY OF PHOSPHORUS: CPT

## 2020-06-27 PROCEDURE — 2000000000 HC ICU R&B

## 2020-06-27 PROCEDURE — 82962 GLUCOSE BLOOD TEST: CPT

## 2020-06-27 PROCEDURE — 36592 COLLECT BLOOD FROM PICC: CPT

## 2020-06-27 RX ORDER — HYDRALAZINE HYDROCHLORIDE 20 MG/ML
10 INJECTION INTRAMUSCULAR; INTRAVENOUS EVERY 6 HOURS PRN
Status: DISCONTINUED | OUTPATIENT
Start: 2020-06-27 | End: 2020-07-01 | Stop reason: HOSPADM

## 2020-06-27 RX ORDER — FUROSEMIDE 10 MG/ML
40 INJECTION INTRAMUSCULAR; INTRAVENOUS ONCE
Status: COMPLETED | OUTPATIENT
Start: 2020-06-27 | End: 2020-06-27

## 2020-06-27 RX ORDER — METHYLPREDNISOLONE SODIUM SUCCINATE 40 MG/ML
40 INJECTION, POWDER, LYOPHILIZED, FOR SOLUTION INTRAMUSCULAR; INTRAVENOUS EVERY 6 HOURS
Status: DISCONTINUED | OUTPATIENT
Start: 2020-06-27 | End: 2020-06-28

## 2020-06-27 RX ADMIN — WATER 1 G: 1 INJECTION INTRAMUSCULAR; INTRAVENOUS; SUBCUTANEOUS at 08:13

## 2020-06-27 RX ADMIN — ENOXAPARIN SODIUM 40 MG: 40 INJECTION SUBCUTANEOUS at 14:46

## 2020-06-27 RX ADMIN — PROPOFOL 45 MCG/KG/MIN: 10 INJECTION, EMULSION INTRAVENOUS at 05:07

## 2020-06-27 RX ADMIN — INSULIN LISPRO 1 UNITS: 100 INJECTION, SOLUTION INTRAVENOUS; SUBCUTANEOUS at 11:26

## 2020-06-27 RX ADMIN — FUROSEMIDE 40 MG: 10 INJECTION, SOLUTION INTRAMUSCULAR; INTRAVENOUS at 09:27

## 2020-06-27 RX ADMIN — SODIUM CHLORIDE: 9 INJECTION, SOLUTION INTRAVENOUS at 11:16

## 2020-06-27 RX ADMIN — PANTOPRAZOLE SODIUM 40 MG: 40 INJECTION, POWDER, FOR SOLUTION INTRAVENOUS at 08:13

## 2020-06-27 RX ADMIN — Medication 10 ML: at 08:21

## 2020-06-27 RX ADMIN — Medication 100 MCG/HR: at 02:48

## 2020-06-27 RX ADMIN — SODIUM CHLORIDE, PRESERVATIVE FREE 10 ML: 5 INJECTION INTRAVENOUS at 23:03

## 2020-06-27 RX ADMIN — THIAMINE HYDROCHLORIDE 100 MG: 100 INJECTION, SOLUTION INTRAMUSCULAR; INTRAVENOUS at 08:13

## 2020-06-27 RX ADMIN — METHYLPREDNISOLONE SODIUM SUCCINATE 40 MG: 40 INJECTION, POWDER, LYOPHILIZED, FOR SOLUTION INTRAMUSCULAR; INTRAVENOUS at 17:55

## 2020-06-27 RX ADMIN — PROPOFOL 45 MCG/KG/MIN: 10 INJECTION, EMULSION INTRAVENOUS at 01:28

## 2020-06-27 RX ADMIN — SODIUM CHLORIDE, PRESERVATIVE FREE 10 ML: 5 INJECTION INTRAVENOUS at 08:13

## 2020-06-27 RX ADMIN — IPRATROPIUM BROMIDE AND ALBUTEROL SULFATE 1 AMPULE: 2.5; .5 SOLUTION RESPIRATORY (INHALATION) at 20:16

## 2020-06-27 RX ADMIN — METHYLPREDNISOLONE SODIUM SUCCINATE 60 MG: 125 INJECTION, POWDER, LYOPHILIZED, FOR SOLUTION INTRAMUSCULAR; INTRAVENOUS at 06:23

## 2020-06-27 RX ADMIN — Medication 100 MCG/HR: at 08:12

## 2020-06-27 RX ADMIN — INSULIN LISPRO 2 UNITS: 100 INJECTION, SOLUTION INTRAVENOUS; SUBCUTANEOUS at 16:38

## 2020-06-27 RX ADMIN — AZITHROMYCIN MONOHYDRATE 500 MG: 500 INJECTION, POWDER, LYOPHILIZED, FOR SOLUTION INTRAVENOUS at 08:14

## 2020-06-27 RX ADMIN — METHYLPREDNISOLONE SODIUM SUCCINATE 60 MG: 125 INJECTION, POWDER, LYOPHILIZED, FOR SOLUTION INTRAMUSCULAR; INTRAVENOUS at 01:21

## 2020-06-27 RX ADMIN — METHYLPREDNISOLONE SODIUM SUCCINATE 40 MG: 40 INJECTION, POWDER, LYOPHILIZED, FOR SOLUTION INTRAMUSCULAR; INTRAVENOUS at 11:26

## 2020-06-27 RX ADMIN — IPRATROPIUM BROMIDE AND ALBUTEROL SULFATE 1 AMPULE: 2.5; .5 SOLUTION RESPIRATORY (INHALATION) at 07:48

## 2020-06-27 RX ADMIN — HYDRALAZINE HYDROCHLORIDE 10 MG: 20 INJECTION INTRAMUSCULAR; INTRAVENOUS at 09:27

## 2020-06-27 RX ADMIN — INSULIN LISPRO 1 UNITS: 100 INJECTION, SOLUTION INTRAVENOUS; SUBCUTANEOUS at 06:26

## 2020-06-27 RX ADMIN — IPRATROPIUM BROMIDE AND ALBUTEROL SULFATE 1 AMPULE: 2.5; .5 SOLUTION RESPIRATORY (INHALATION) at 12:25

## 2020-06-27 RX ADMIN — CHLORHEXIDINE GLUCONATE 0.12% ORAL RINSE 15 ML: 1.2 LIQUID ORAL at 08:21

## 2020-06-27 RX ADMIN — MIDAZOLAM HYDROCHLORIDE 2 MG: 1 INJECTION, SOLUTION INTRAMUSCULAR; INTRAVENOUS at 01:05

## 2020-06-27 ASSESSMENT — PULMONARY FUNCTION TESTS
PIF_VALUE: 27
PIF_VALUE: 33
PIF_VALUE: 26
PIF_VALUE: 15
PIF_VALUE: 18
PIF_VALUE: 39
PIF_VALUE: 36
PIF_VALUE: 27
PIF_VALUE: 15
PIF_VALUE: 19
PIF_VALUE: 27
PIF_VALUE: 17
PIF_VALUE: 29
PIF_VALUE: 25
PIF_VALUE: 29

## 2020-06-27 ASSESSMENT — PAIN SCALES - GENERAL
PAINLEVEL_OUTOF10: 0

## 2020-06-27 NOTE — PROGRESS NOTES
Pt looking for money he believed he had on him when EMS was called. Found $123 in white pants pocket. 1 $100 bill, 1 $20 bill, 3 $1 bills.

## 2020-06-27 NOTE — PROGRESS NOTES
Intensive Care Daily Quality Rounding Checklist      ICU Team Members: Dr. Paola Bradley, Dr Kena Davis, charge nurse, bedside, respiratory therapist    ICU Day #: NUMBER: 3    Intubation Date: June 25    Ventilator Day #: extubated 6/26    Central Line Insertion Date: June 25        Day #: NUMBER: 2     Arterial Line Insertion Date: N/A      Day #: N/A    DVT Prophylaxis: Lovenox    GI Prophylaxis: Protonix    Tinoco Catheter Insertion Date: Luci 25       Day #: 2      Continued need (if yes, reason documented and discussed with physician): yes, I&O    Skin Issues/ Wounds and ordered treatment discussed on rounds: No issues, SOS precautions    Goals/ Plans for the Day: Daily labs, wean vent as able, diurese patient, replace electrolytes, BP mediations started and steroids

## 2020-06-27 NOTE — PROGRESS NOTES
Critical Care Team - Daily Progress Note      Date and time: 6/27/2020 11:35 AM  Patient's name:  Dirk Funk  Medical Record Number: [id number]  Patient's account/billing number: [de-identified]  Patient's YOB: 1970  Age: 44 y.o. Date of Admission: 6/25/2020  6:08 AM  Length of stay during current admission: 2      Primary Care Physician: No primary care provider on file.   ICU Attending Physician: Dr. Alexx Gillespie    Code Status: Full Code    Reason for ICU admission:  Acute Hypoxic Respiratory Failure  AMS    SUBJECTIVE:     OVERNIGHT EVENTS:     No Events, remains intubated    AWAKE & FOLLOWING COMMANDS:  [] No   [x] Yes    CURRENT VENTILATION STATUS:     [x] Ventilator  [] BIPAP  [] Nasal Cannula [] Room Air      IF INTUBATED, ET TUBE MARKING AT LOWER LIP:       cms    SECRETIONS Amount:  [] Small [x] Moderate  [] Large  [] None  Color:     [x] White [] Colored  [] Bloody    SEDATION:  RAAS Score: -2  [x] Propofol gtt  [] Versed gtt  [] Ativan gtt   [] No Sedation  Fentanyl gtt     PARALYZED:  [x] No    [] Yes    DIARRHEA:                [x] No                [] Yes  (C. Difficile status: [] positive                                                                                                                       [] negative                                                                                                                     [] pending)    VASOPRESSORS:  [x] No    [] Yes    If yes -   [] Levophed       [] Dopamine     [] Vasopressin       [] Dobutamine  [] Phenylephrine         [] Epinephrine    CENTRAL LINES:     [] No   [x] Yes   (Date of Insertion: 6/25  )           If yes -     [] Right IJ     [x] Left IJ [] Right Femoral [] Left Femoral                   [] Right Subclavian [] Left Subclavian       HARGROVE CATHETER:   [] No   [x] Yes  (Date of Insertion: 6/25  )         OBJECTIVE:     VITAL SIGNS:  BP (!) 173/95   Pulse 130   Temp 98 °F (36.7 °C) (Axillary)   Resp 17   Ht 5' 6\" findings:    MEDICATIONS:    Scheduled Meds:   methylPREDNISolone  40 mg Intravenous Q6H    ipratropium-albuterol  1 ampule Inhalation Q6H WA    sodium chloride flush  10 mL Intravenous 2 times per day    enoxaparin  40 mg Subcutaneous Daily    cefTRIAXone (ROCEPHIN) IV  1 g Intravenous Q24H    azithromycin  500 mg Intravenous Q24H    pantoprazole  40 mg Intravenous Daily    chlorhexidine  15 mL Mouth/Throat BID    sodium chloride flush  10 mL Intravenous 2 times per day    thiamine  100 mg Intravenous Daily    insulin lispro  0-6 Units Subcutaneous 4 times per day     Continuous Infusions:   sodium chloride 150 mL/hr at 06/27/20 1116    propofol Stopped (06/27/20 0750)    fentaNYL 5 mcg/ml in 0.9%  ml infusion Stopped (06/27/20 0915)    dextrose       PRN Meds:   hydrALAZINE, 10 mg, Q6H PRN  sodium chloride flush, 10 mL, PRN  acetaminophen, 650 mg, Q6H PRN    Or  acetaminophen, 650 mg, Q6H PRN  polyethylene glycol, 17 g, Daily PRN  promethazine, 12.5 mg, Q6H PRN    Or  ondansetron, 4 mg, Q6H PRN  glucose, 15 g, PRN  dextrose, 12.5 g, PRN  glucagon (rDNA), 1 mg, PRN  dextrose, 100 mL/hr, PRN  midazolam, 2 mg, Q4H PRN  sodium chloride flush, 10 mL, PRN          VENT SETTINGS (Comprehensive) (if applicable):  Vent Information  $Ventilation: Off Vent  Skin Assessment: Clean, dry, & intact  Equipment ID: [id number]  Vent Type: [phone]  Vent Mode: PS  Vt Ordered: 550 mL  Rate Set: 0 bmp  Peak Flow: 60 L/min  Pressure Support: 8 cmH20  FiO2 : 40 %  SpO2: 91 %  SpO2/FiO2 ratio: 230  Sensitivity: 3  PEEP/CPAP: 5  I Time/ I Time %: 0 s  Humidification Source: Heated wire  Humidification Temp: 37  Humidification Temp Measured: 36.9  Additional Respiratory  Assessments  Pulse: 130  Resp: 17  SpO2: 91 %  Position: Left Side  Humidification Source: Heated wire  Humidification Temp: 37  Oral Care Completed?: Yes  Oral Care: Mouth swabbed, Mouth suctioned, Mouth moisturizer  Subglottic Suction Done?: Yes  Cuff Pressure (cm H2O): 29 cm H2O    ABG  Lab Results   Component Value Date    PH 7.450 06/27/2020    PCO2 38.2 06/27/2020    PO2 62.8 06/27/2020    HCO3 26.0 06/27/2020    O2SAT 92.1 06/27/2020         Laboratory findings:    Complete Blood Count:   Recent Labs     06/25/20  0635 06/26/20  0509 06/27/20  0520   WBC 10.4 9.9 14.9*   HGB 16.4 13.7 13.7   HCT 52.9 43.7 43.2    281 296        Lactic Acid  Lab Results   Component Value Date    LACTA 1.1 06/26/2020    LACTA 1.0 06/25/2020    LACTA 1.3 06/25/2020        Last 3 Blood Glucose:   Recent Labs     06/25/20  0635 06/26/20  0509 06/27/20  0520   GLUCOSE 110* 160* 160*         PT/INR:    Lab Results   Component Value Date    PROTIME 11.9 06/25/2020    INR 1.0 06/25/2020     PTT:    Lab Results   Component Value Date    APTT 36.6 02/17/2015       Comprehensive Metabolic Profile:   Recent Labs     06/25/20  0635 06/26/20  0509 06/27/20  0520    138 139   K 4.3 3.7 3.6   CL 93* 98 100   CO2 33* 31* 30*   BUN 6 8 12   CREATININE 0.8 0.7 0.7   GLUCOSE 110* 160* 160*   CALCIUM 9.0 8.1* 8.3*   PROT 7.9 5.8*  --    LABALBU 4.1 3.2*  --    BILITOT 0.6 0.4  --    ALKPHOS 97 74  --    * 59*  --    * 79*  --       Magnesium:   Lab Results   Component Value Date    MG 2.6 06/27/2020     Phosphorus:   Lab Results   Component Value Date    PHOS 3.4 06/27/2020     Ionized Calcium: No results found for: CAION       Urinalysis:     Troponin:   Recent Labs     06/25/20  0635 06/25/20  1550   TROPONINI <0.01 <0.01         Cultures     Cultures during this admission:     Blood cultures: x2 drawn 6/25                [] None drawn      [] Negative             []  Positive (Details:  )  Urine Culture:                   [] None drawn      [] Negative             []  Positive (Details:  )  Sputum Culture:               [] None drawn       [] Negative             []  Positive (Details:  )   Endotracheal aspirate:  Collected 6/25    [] None drawn       [] Negative []  Positive (Details:  )     Recent Labs     20   BC 24 Hours no growth     Recent Labs     20   BLOODCULT2 24 Hours no growth       No results for input(s): LABURIN in the last 72 hours. Other pertinent Labs:    MRSA screen    Radiology/Imaging:   Ct Head Wo Contrast    Result Date: 2020  Patient MRN:  [id number] : 1970 Age: 44 years Gender: Male Order Date:  2020 9:30 AM EXAM: CT HEAD WO CONTRAST INDICATION:  altered mental altered mental  COMPARISON: CT head without contrast, 2014. FINDINGS: PARENCHYMA:No mass effect, edema or hemorrhage. The brain is normal in volume. No significant chronic microvascular ischemic changes appreciated. VENTRICLES: No hydrocephalus. Incidental note is made of cavum septum pellucidum and vergae. CALVARIUM:The calvarium is intact without fracture or focal lesion. SINUSES: Mild mucosal thickening in the paranasal sinuses, especially the ethmoid sinuses. No acute intracranial abnormality. Xr Chest Portable    Result Date: 2020  Patient MRN: [id number] : 1970 Age:  44 years Gender: Male Order Date: 2020 3:15 PM Exam: XR CHEST PORTABLE Number of Images: 1 view Indication:   Post Left IJ CVC placement Post Left IJ CVC placement Comparison: Prior study from 2020 at 1020 hours available. Findings: The lungs are clear. There is no evidence of pulmonary infiltrate or pleural effusion. The pulmonary vascularity is unremarkable. There is a left internal jugular catheter with tip in superior vena cava. The endotracheal tube and nasogastric tube are in satisfactory position. There is no pneumothorax post placement of a left internal jugular catheter. The cardiac, hilar and mediastinal silhouettes are satisfactory. The bony thorax demonstrates no gross abnormality. NO ACUTE CARDIOPULMONARY PROCESS Support lines appears to be in satisfactory position.      Xr Chest Portable    Result Date: 2020  Patient MRN: [id number] : 1970 Age:  44 years Gender: Male Order Date: 2020 10:00 AM Exam: XR CHEST PORTABLE Indication:   intubate intubate Comparison: Chest one view, 2020, 6:43 a.m. FINDINGS: The lungs are clear. The cardiomediastinal contour is unremarkable. No pneumothorax or pleural effusion. Interval insertion of endotracheal tube, the tip of which is approximately 3 cm above the oliva. The tip of the nasogastric tube is below the diaphragm and beyond the field-of-view of this study. 1. The life support lines and tubes appear well positioned. 2. No acute cardiopulmonary abnormality. Xr Chest Portable    Result Date: 2020  Patient MRN: [id number] : 1970 Age:  44 years Gender: Male Order Date: 2020 6:15 AM Exam: XR CHEST PORTABLE Indication:   short of breath short of breath Comparison: Chest radiograph and CT, 2020 FINDINGS: The lungs are clear. The cardiomediastinal contour is unremarkable. No pneumothorax or pleural effusion. Evaluation of the bones reveals no fracture or destructive lesion. No acute cardiopulmonary abnormality. Xr Chest Abdomen Ng Placement    Result Date: 2020  Patient MRN: [id number] : 1970 Age:  44 years Gender: Male Order Date: 2020 10:30 AM Exam: XR CHEST ABDOMEN NG PLACEMENT Indication:   ng placement ng placement Comparison: None. FINDINGS: The lungs are clear. The cardiomediastinal contour is unremarkable. Cathye Fern No pneumothorax or pleural effusion. The endotracheal tube is well positioned, with the tip approximately 3 cm above the oliva. Tip of the nasogastric tube overlies the stomach. 1. No acute cardiopulmonary abnormality. 2. The life support lines and tubes appear well positioned.         Chest Xray (2020):    SYSTEMS ASSESSMENT    Neuro   AMS              - Head CT negative for acute intracranial pathology  Alert and following commands  RASS 0  Hx depression              - Home Meds: Wellbutrin XL  Other Home Meds: Melatonin, Gabapentin, Oxcarbazepine  [facility] protocol     Respiratory   Hx of COPD              - noncompliance with meds  Current everyday smoker  Acute Hypoxic Respiratory Failure              - Extubate today to nasal cannula              - Wean oxygen as tolerated.  Keep O2 sat 90-92%  Possible cocaine pneumonitis  Solumedrol 40mg q6h - weaning  DuoNebs Q6h when awake  ABG improved     Cardiovascular   Hx of HTN              - Home Meds: Lopressor 25 mg BID, Norvasc 10 mg QD  No other cardiac hx  Hypertensive here              - monitor closely and treat as appropriate  EKG: Sinus Tachycardia with LAD; QTc 457     Gastrointestinal   Hx GERD  Protonix 40 mg IV QD for prophylaxis  Elevated Liver Enzymes              -  --> 59,  --> 79, improving              - Chronic EtOH abuse    - [facility] protocol  Hx pancreatitis              - Lipase 15     Renal   No hx kidney disease  Trend BMP              - BUN 12, Cr 0.7 today  Monitor electrolytes closely              - replete as necessary  CK 5298 --> 357, resolving              - likely cocaine-induced rhabdo     Infectious Disease   WBC 14.9              - continue to monitor with daily CBCs  Lactic acidosis resolved  Sed Rate 1  COVID negative  CXR negative for acute cardiopulmonary pathology              - Being treated as possible CAP              - continue Zithromax 500 mg IV QD and Rocephin 1 g IV QD  CXR slight worsening     Hematology/Oncology   H&H 13.7/43.2  Platelets 604  DVT Prophylaxis              - Lovenox 40 mg SC QD  INR 1     Endocrine   No DM diagnosis, but patient on Glipizide at home  Glucose 160              - continue to monitor closely              - patient on steroids  Continue LDSSI     Social/Spiritual/DNR/Other   CODE STATUS: Full  Drug screen positive for cocaine  Transfer when stable      PLAN:     WEAN PER PROTOCOL:  [] No   [x] Yes  [] N/A    DISCONTINUE ANY LABS:   [x] No   [] Yes    ICU PROPHYLAXIS:  Stress ulcer: [x] PPI Agent  [] [id number] [] Sucralfate  [] Other:  VTE:   [x] Enoxaparin  [] Unfract. Heparin Subcut  [] EPC Cuffs    NUTRITION:  [x] NPO [] Tube Feeding (Specify: ) [] TPN  [] PO (Diet: Diet NPO Effective Now)    HOME MEDICATIONS RECONCILED: [] No  [x] Yes    INSULIN DRIP:   [x] No   [] Yes    CONSULTATION NEEDED:  [] No   [x] Yes - Dietary    FAMILY UPDATED:    [] No   [x] Yes    TRANSFER OUT OF ICU:   [x] No   [] Yes    ADDITIONAL PLAN:        Arlene Jackson MD, PGY1.                       6/27/2020, 11:35 AM    Attending Physician Attestation: Dr. Emory Waldrop    Thank you very much for allowing me to see this patient in consultation and follow up. I personally saw, examined and provided care for the patient. Radiographs, labs and medication list were reviewed by me independently. I spoke with bedside nursing, respiratory therapists and consultants. Critical care services and times documented are independent of procedures and multidisciplinary rounds with Residents. Additionally comprehensive, multidisciplinary rounds were conducted with the MICU team. The case was discussed in detail and plans for care were established. Review of Residents documentation was conducted and revisions were made as appropriate. I agree with the the above documented information.      Physical Examination:  Vitals:   Vitals:    06/27/20 1200 06/27/20 1300 06/27/20 1400 06/27/20 1500   BP: (!) 169/96 (!) 136/96 (!) 175/85 (!) 176/99   Pulse: 97 100 89 103   Resp: 13 17 16 30   Temp: 98.4 °F (36.9 °C)      TempSrc: Oral      SpO2: 91% 92% 92% 92%   Weight:       Height:          General: No Acute Distress  HEENT: normocephalic, atruamatic  CVS: RRR, S1, S2, No S3 or S4  Respiratory: decreased breath sounds at lung bases, no focal wheezes noted  Extremities: no clubbing/cyanosis/edema    ASSESSMENT:  1.) Acute Hypoxic Respiratory Failure - on mechanical ventilation  2.) Acute Cocaine Pneumonitis   3.) Medication Non-Compliance  4.) H/O Drug Abuse   5.) H/O COPD  6.) Cocaine Abuse      In addition the following applies:     Check: N/A  Medication Alterations: thiamine, folic acid, CIWA scale, lasix 40 mg IV x 1 prior to extubation   Procedures: N/A  Imaging: Reviewed  New Consultations: N/A  VENT: ACVC (DAY 3) 20/550/45/8 to PSV/PEEP to extubation and nasal cannula   Compliance: 52  PPeak 29  Pplateau 03      Access: Peripheral, Left IJ TLC   Consults: Pulmonary   Drips: Propofol, Fentanyl   Nutrition: NPO  ABX: rocephin, azithromycin      - supportive care  - supplemental oxygen  - case reviewed with Dr. Peyton Sainz in ICU   - likely for transfer out of ICU level of care in next 24 hrs with deferment of the pulmonary management to Dr. Peyton Sainz and [facility]   - ABG prior to extubation   - solu-medrol to 40 mg IV q 6 hrs     Thank you for allowing me to participate in the care of this patient. Care reviewed with nursing staff, medical and surgical specialty care, primary care and the patient's family as available. Restraints are ordered when the patient can do harm to him/herself by pulling out devices. Critical Care Time: > 35 minutes excluding procedures    Minnie Hawk M.D.     Minnie Hawk  6/27/2020  4:22 PM

## 2020-06-27 NOTE — PLAN OF CARE
Decrease in sensory misperception frequency  Description: Decrease in sensory misperception frequency  Outcome: Met This Shift  Goal: Able to refrain from responding to false sensory perceptions  Description: Able to refrain from responding to false sensory perceptions  Outcome: Met This Shift  Goal: Demonstrates accurate environmental perceptions  Description: Demonstrates accurate environmental perceptions  Outcome: Met This Shift  Goal: Able to distinguish between reality-based and nonreality-based thinking  Description: Able to distinguish between reality-based and nonreality-based thinking  Outcome: Met This Shift  Goal: Able to interrupt nonreality-based thinking  Description: Able to interrupt nonreality-based thinking  Outcome: Met This Shift     Problem: Sleep Pattern Disturbance:  Goal: Appears well-rested  Description: Appears well-rested  Outcome: Met This Shift     Problem: Skin Integrity:  Goal: Will show no infection signs and symptoms  Description: Will show no infection signs and symptoms  Outcome: Met This Shift  Goal: Absence of new skin breakdown  Description: Absence of new skin breakdown  Outcome: Met This Shift     Problem: Falls - Risk of:  Goal: Absence of physical injury  Description: Absence of physical injury  Outcome: Met This Shift  Goal: Will remain free from falls  Description: Will remain free from falls  Outcome: Met This Shift

## 2020-06-27 NOTE — PROGRESS NOTES
[facility] Progress Note    Admitting Date and Time: 6/25/2020  6:08 AM  Admit Dx: Acute respiratory failure with hypoxia and hypercapnia (HCC) [J96.01, J96.02]  Acute respiratory failure with hypoxia and hypercapnia (HCC) [J96.01, J96.02]    Subjective:  Patient is being followed for Acute respiratory failure with hypoxia and hypercapnia ([address] [J96.01, J96.02]  Acute respiratory failure with hypoxia and hypercapnia ([address] [J96.01, J96.02]   Pt is recently extubated and quite anxious. Complains that the room is hot and he feels like there is phlegm in his chest.    Per nursing patient tolerated extubation well.     ROS: denies chest pain, headache, or abdominal pain   ipratropium-albuterol  1 ampule Inhalation Q6H WA    sodium chloride flush  10 mL Intravenous 2 times per day    enoxaparin  40 mg Subcutaneous Daily    cefTRIAXone (ROCEPHIN) IV  1 g Intravenous Q24H    azithromycin  500 mg Intravenous Q24H    methylPREDNISolone  60 mg Intravenous Q6H    pantoprazole  40 mg Intravenous Daily    chlorhexidine  15 mL Mouth/Throat BID    sodium chloride flush  10 mL Intravenous 2 times per day    thiamine  100 mg Intravenous Daily    insulin lispro  0-6 Units Subcutaneous 4 times per day     sodium chloride flush, 10 mL, PRN  acetaminophen, 650 mg, Q6H PRN    Or  acetaminophen, 650 mg, Q6H PRN  polyethylene glycol, 17 g, Daily PRN  promethazine, 12.5 mg, Q6H PRN    Or  ondansetron, 4 mg, Q6H PRN  glucose, 15 g, PRN  dextrose, 12.5 g, PRN  glucagon (rDNA), 1 mg, PRN  dextrose, 100 mL/hr, PRN  midazolam, 2 mg, Q4H PRN  sodium chloride flush, 10 mL, PRN         Objective:    BP (!) 187/96   Pulse 106   Temp 98 °F (36.7 °C) (Axillary)   Resp 12   Ht 5' 6\" (1.676 m)   Wt 266 lb 1.5 oz (120.7 kg)   SpO2 92%   BMI 42.95 kg/m²     General Appearance: alert and oriented to person, place and time and in no acute distress  Skin: warm and dry  Head: normocephalic and atraumatic  Eyes: pupils use +/- pneumonia; continue to monitor; cont abx                 Electronically signed by Rick Balderrama MD on 6/27/2020 at 9:03 AM

## 2020-06-27 NOTE — PROGRESS NOTES
Patient was extubated to 6 liters/min via nasal cannula. Breath Sounds post extubation were diminshed. Stridor was not present post extubation. SPO2 was 92%.       Performed by  Fartun Ortez

## 2020-06-27 NOTE — PLAN OF CARE
Problem: Restraint Use - Nonviolent/Non-Self-Destructive Behavior:  Goal: Absence of restraint-related injury  Description: Absence of restraint-related injury  6/27/2020 0736 by Karissa Cho RN  Outcome: Met This Shift  6/26/2020 2143 by Misty Hawk RN  Outcome: Met This Shift     Problem: Confusion - Acute:  Goal: Absence of continued neurological deterioration signs and symptoms  Description: Absence of continued neurological deterioration signs and symptoms  Outcome: Met This Shift     Problem: Discharge Planning:  Goal: Ability to perform activities of daily living will improve  Description: Ability to perform activities of daily living will improve  Outcome: Met This Shift

## 2020-06-28 ENCOUNTER — APPOINTMENT (OUTPATIENT)
Dept: GENERAL RADIOLOGY | Age: 50
DRG: 917 | End: 2020-06-28
Payer: MEDICARE

## 2020-06-28 LAB
ANION GAP SERPL CALCULATED.3IONS-SCNC: 9 MMOL/L (ref 7–16)
BASOPHILS ABSOLUTE: 0.03 E9/L (ref 0–0.2)
BASOPHILS RELATIVE PERCENT: 0.2 % (ref 0–2)
BUN BLDV-MCNC: 19 MG/DL (ref 6–20)
CALCIUM SERPL-MCNC: 8.8 MG/DL (ref 8.6–10.2)
CHLORIDE BLD-SCNC: 96 MMOL/L (ref 98–107)
CO2: 35 MMOL/L (ref 22–29)
CREAT SERPL-MCNC: 0.8 MG/DL (ref 0.7–1.2)
EOSINOPHILS ABSOLUTE: 0 E9/L (ref 0.05–0.5)
EOSINOPHILS RELATIVE PERCENT: 0 % (ref 0–6)
GFR AFRICAN AMERICAN: >60
GFR NON-AFRICAN AMERICAN: >60 ML/MIN/1.73
GLUCOSE BLD-MCNC: 142 MG/DL (ref 74–99)
HCT VFR BLD CALC: 47.5 % (ref 37–54)
HEMOGLOBIN: 14.6 G/DL (ref 12.5–16.5)
IMMATURE GRANULOCYTES #: 0.13 E9/L
IMMATURE GRANULOCYTES %: 0.7 % (ref 0–5)
LYMPHOCYTES ABSOLUTE: 1.41 E9/L (ref 1.5–4)
LYMPHOCYTES RELATIVE PERCENT: 8 % (ref 20–42)
MAGNESIUM: 2.4 MG/DL (ref 1.6–2.6)
MCH RBC QN AUTO: 27.6 PG (ref 26–35)
MCHC RBC AUTO-ENTMCNC: 30.7 % (ref 32–34.5)
MCV RBC AUTO: 89.8 FL (ref 80–99.9)
METER GLUCOSE: 155 MG/DL (ref 74–99)
METER GLUCOSE: 215 MG/DL (ref 74–99)
METER GLUCOSE: 96 MG/DL (ref 74–99)
MONOCYTES ABSOLUTE: 1.08 E9/L (ref 0.1–0.95)
MONOCYTES RELATIVE PERCENT: 6.2 % (ref 2–12)
NEUTROPHILS ABSOLUTE: 14.9 E9/L (ref 1.8–7.3)
NEUTROPHILS RELATIVE PERCENT: 84.9 % (ref 43–80)
PDW BLD-RTO: 15.6 FL (ref 11.5–15)
PHOSPHORUS: 5.8 MG/DL (ref 2.5–4.5)
PLATELET # BLD: 294 E9/L (ref 130–450)
PMV BLD AUTO: 9.5 FL (ref 7–12)
POTASSIUM SERPL-SCNC: 3.5 MMOL/L (ref 3.5–5)
RBC # BLD: 5.29 E12/L (ref 3.8–5.8)
SODIUM BLD-SCNC: 140 MMOL/L (ref 132–146)
WBC # BLD: 17.6 E9/L (ref 4.5–11.5)

## 2020-06-28 PROCEDURE — 6370000000 HC RX 637 (ALT 250 FOR IP): Performed by: FAMILY MEDICINE

## 2020-06-28 PROCEDURE — 82962 GLUCOSE BLOOD TEST: CPT

## 2020-06-28 PROCEDURE — 6370000000 HC RX 637 (ALT 250 FOR IP): Performed by: EMERGENCY MEDICINE

## 2020-06-28 PROCEDURE — 6370000000 HC RX 637 (ALT 250 FOR IP): Performed by: INTERNAL MEDICINE

## 2020-06-28 PROCEDURE — 36592 COLLECT BLOOD FROM PICC: CPT

## 2020-06-28 PROCEDURE — 71045 X-RAY EXAM CHEST 1 VIEW: CPT

## 2020-06-28 PROCEDURE — 6360000002 HC RX W HCPCS: Performed by: INTERNAL MEDICINE

## 2020-06-28 PROCEDURE — 2060000000 HC ICU INTERMEDIATE R&B

## 2020-06-28 PROCEDURE — 2580000003 HC RX 258: Performed by: FAMILY MEDICINE

## 2020-06-28 PROCEDURE — 6360000002 HC RX W HCPCS: Performed by: EMERGENCY MEDICINE

## 2020-06-28 PROCEDURE — 2700000000 HC OXYGEN THERAPY PER DAY

## 2020-06-28 PROCEDURE — 2580000003 HC RX 258: Performed by: EMERGENCY MEDICINE

## 2020-06-28 PROCEDURE — 84100 ASSAY OF PHOSPHORUS: CPT

## 2020-06-28 PROCEDURE — 99233 SBSQ HOSP IP/OBS HIGH 50: CPT | Performed by: FAMILY MEDICINE

## 2020-06-28 PROCEDURE — 83735 ASSAY OF MAGNESIUM: CPT

## 2020-06-28 PROCEDURE — 36415 COLL VENOUS BLD VENIPUNCTURE: CPT

## 2020-06-28 PROCEDURE — 85025 COMPLETE CBC W/AUTO DIFF WBC: CPT

## 2020-06-28 PROCEDURE — 94640 AIRWAY INHALATION TREATMENT: CPT

## 2020-06-28 PROCEDURE — 80048 BASIC METABOLIC PNL TOTAL CA: CPT

## 2020-06-28 PROCEDURE — C9113 INJ PANTOPRAZOLE SODIUM, VIA: HCPCS | Performed by: EMERGENCY MEDICINE

## 2020-06-28 PROCEDURE — 6360000002 HC RX W HCPCS: Performed by: FAMILY MEDICINE

## 2020-06-28 PROCEDURE — 2580000003 HC RX 258

## 2020-06-28 RX ORDER — PHENAZOPYRIDINE HYDROCHLORIDE 100 MG/1
100 TABLET, FILM COATED ORAL 3 TIMES DAILY PRN
Status: DISPENSED | OUTPATIENT
Start: 2020-06-28 | End: 2020-07-01

## 2020-06-28 RX ORDER — METHYLPREDNISOLONE SODIUM SUCCINATE 40 MG/ML
40 INJECTION, POWDER, LYOPHILIZED, FOR SOLUTION INTRAMUSCULAR; INTRAVENOUS EVERY 8 HOURS
Status: DISCONTINUED | OUTPATIENT
Start: 2020-06-28 | End: 2020-06-29

## 2020-06-28 RX ADMIN — IPRATROPIUM BROMIDE AND ALBUTEROL SULFATE 1 AMPULE: 2.5; .5 SOLUTION RESPIRATORY (INHALATION) at 13:54

## 2020-06-28 RX ADMIN — METHYLPREDNISOLONE SODIUM SUCCINATE 40 MG: 40 INJECTION, POWDER, LYOPHILIZED, FOR SOLUTION INTRAMUSCULAR; INTRAVENOUS at 21:00

## 2020-06-28 RX ADMIN — METHYLPREDNISOLONE SODIUM SUCCINATE 40 MG: 40 INJECTION, POWDER, LYOPHILIZED, FOR SOLUTION INTRAMUSCULAR; INTRAVENOUS at 00:05

## 2020-06-28 RX ADMIN — SODIUM CHLORIDE, PRESERVATIVE FREE 10 ML: 5 INJECTION INTRAVENOUS at 21:56

## 2020-06-28 RX ADMIN — METHYLPREDNISOLONE SODIUM SUCCINATE 40 MG: 40 INJECTION, POWDER, LYOPHILIZED, FOR SOLUTION INTRAMUSCULAR; INTRAVENOUS at 12:15

## 2020-06-28 RX ADMIN — ACETAMINOPHEN 650 MG: 325 TABLET ORAL at 10:32

## 2020-06-28 RX ADMIN — INSULIN LISPRO 2 UNITS: 100 INJECTION, SOLUTION INTRAVENOUS; SUBCUTANEOUS at 16:16

## 2020-06-28 RX ADMIN — ENOXAPARIN SODIUM 40 MG: 40 INJECTION SUBCUTANEOUS at 14:07

## 2020-06-28 RX ADMIN — ACETAMINOPHEN 650 MG: 650 SUPPOSITORY RECTAL at 16:34

## 2020-06-28 RX ADMIN — SODIUM CHLORIDE, PRESERVATIVE FREE 10 ML: 5 INJECTION INTRAVENOUS at 08:09

## 2020-06-28 RX ADMIN — Medication 10 ML: at 22:22

## 2020-06-28 RX ADMIN — THIAMINE HYDROCHLORIDE 100 MG: 100 INJECTION, SOLUTION INTRAMUSCULAR; INTRAVENOUS at 08:09

## 2020-06-28 RX ADMIN — AZITHROMYCIN MONOHYDRATE 500 MG: 500 INJECTION, POWDER, LYOPHILIZED, FOR SOLUTION INTRAVENOUS at 08:30

## 2020-06-28 RX ADMIN — IPRATROPIUM BROMIDE AND ALBUTEROL SULFATE 1 AMPULE: 2.5; .5 SOLUTION RESPIRATORY (INHALATION) at 21:00

## 2020-06-28 RX ADMIN — PHENAZOPYRIDINE HYDROCHLORIDE 100 MG: 100 TABLET ORAL at 17:51

## 2020-06-28 RX ADMIN — WATER 10 ML: 1 INJECTION INTRAMUSCULAR; INTRAVENOUS; SUBCUTANEOUS at 12:15

## 2020-06-28 RX ADMIN — PANTOPRAZOLE SODIUM 40 MG: 40 INJECTION, POWDER, FOR SOLUTION INTRAVENOUS at 08:09

## 2020-06-28 RX ADMIN — WATER 1 G: 1 INJECTION INTRAMUSCULAR; INTRAVENOUS; SUBCUTANEOUS at 08:09

## 2020-06-28 RX ADMIN — Medication 10 ML: at 08:10

## 2020-06-28 RX ADMIN — IPRATROPIUM BROMIDE AND ALBUTEROL SULFATE 1 AMPULE: 2.5; .5 SOLUTION RESPIRATORY (INHALATION) at 08:01

## 2020-06-28 RX ADMIN — BENZOCAINE AND MENTHOL, UNSPECIFIED FORM 1 LOZENGE: 15; 20 LOZENGE ORAL at 15:23

## 2020-06-28 RX ADMIN — CHLORHEXIDINE GLUCONATE 0.12% ORAL RINSE 15 ML: 1.2 LIQUID ORAL at 08:11

## 2020-06-28 ASSESSMENT — PAIN DESCRIPTION - PAIN TYPE
TYPE: ACUTE PAIN
TYPE: ACUTE PAIN

## 2020-06-28 ASSESSMENT — PAIN DESCRIPTION - LOCATION
LOCATION: PENIS
LOCATION: HEAD

## 2020-06-28 ASSESSMENT — PAIN DESCRIPTION - ONSET: ONSET: GRADUAL

## 2020-06-28 ASSESSMENT — PAIN SCALES - GENERAL
PAINLEVEL_OUTOF10: 0
PAINLEVEL_OUTOF10: 9
PAINLEVEL_OUTOF10: 3
PAINLEVEL_OUTOF10: 0
PAINLEVEL_OUTOF10: 8

## 2020-06-28 ASSESSMENT — PAIN DESCRIPTION - DESCRIPTORS
DESCRIPTORS: HEADACHE
DESCRIPTORS: BURNING

## 2020-06-28 ASSESSMENT — PAIN DESCRIPTION - PROGRESSION: CLINICAL_PROGRESSION: GRADUALLY IMPROVING

## 2020-06-28 ASSESSMENT — PAIN DESCRIPTION - FREQUENCY: FREQUENCY: INTERMITTENT

## 2020-06-28 ASSESSMENT — PAIN - FUNCTIONAL ASSESSMENT
PAIN_FUNCTIONAL_ASSESSMENT: PREVENTS OR INTERFERES SOME ACTIVE ACTIVITIES AND ADLS
PAIN_FUNCTIONAL_ASSESSMENT: PREVENTS OR INTERFERES SOME ACTIVE ACTIVITIES AND ADLS

## 2020-06-28 NOTE — PROGRESS NOTES
Charito Quinones M.D.,[facility]  Ambric PlantJACKO., FNASRAOERIC., Neeru Lees M.D. Adiel Ivan M.D., Juani Sawyer M.D. Gene Benítez D.O. Daily Pulmonary Progress Note    Patient:  Sam Burrell 44 y.o. male MRN: [id number]     Date of Service: 6/28/2020      Synopsis     We are following patient for respiratory failure, possible cocaine pneumonitis    \"CC\" shortness of breath    Code status: full      Subjective      Patient extubated yesterday. Feels better today. Still some SOB. Sitting up and eating without difficulty. Plans to transfer out of ICU today.     Review of Systems:   Not able to obtain     24-hour events:  extubated    Objective   Vitals: BP (!) 160/92   Pulse 73   Temp 96.9 °F (36.1 °C) (Axillary)   Resp 16   Ht 5' 6\" (1.676 m)   Wt 266 lb 1.5 oz (120.7 kg)   SpO2 93%   BMI 42.95 kg/m²     I/O:    Intake/Output Summary (Last 24 hours) at 6/28/2020 1241  Last data filed at 6/28/2020 1000  Gross per 24 hour   Intake 1947 ml   Output 1280 ml   Net 667 ml       Vent Information  $Ventilation: Off Vent  Skin Assessment: Clean, dry, & intact  Equipment ID: [id number]  Vent Type: 840  Vent Mode: PS  Vt Ordered: 550 mL  Rate Set: 0 bmp  Peak Flow: 60 L/min  Pressure Support: 8 cmH20  FiO2 : 40 %  SpO2: 93 %  SpO2/FiO2 ratio: 230  Sensitivity: 3  PEEP/CPAP: 5  I Time/ I Time %: 0 s  Humidification Source: Heated wire  Humidification Temp: 37  Humidification Temp Measured: 36.9       IPAP: (S) 16 cmH20  CPAP/EPAP: (S) 8 cmH2O     CURRENT MEDS :  Scheduled Meds:   methylPREDNISolone  40 mg Intravenous Q6H    ipratropium-albuterol  1 ampule Inhalation Q6H WA    sodium chloride flush  10 mL Intravenous 2 times per day    enoxaparin  40 mg Subcutaneous Daily    cefTRIAXone (ROCEPHIN) IV  1 g Intravenous Q24H    azithromycin  500 mg Intravenous Q24H    pantoprazole  40 mg Intravenous Daily    chlorhexidine  15 mL Mouth/Throat BID    sodium chloride flush  10 mL Intravenous 2 times per day    thiamine  100 mg Intravenous Daily    insulin lispro  0-6 Units Subcutaneous 4 times per day       Physical Exam:  General Appearance: appears comfortable in no acute distress. HEENT: Normocephalic atraumatic without obvious abnormality   Neck: Lips, mucosa, and tongue normal.  Supple, symmetrical, trachea midline, no adenopathy;thyroid:  no enlargement/tenderness/nodules or JVD. Lung: Few rhonchi  Heart: RRR, normal S1, S2. No MRG  Abdomen: Soft, NT, ND. BS present x 4 quadrants. No bruit or organomegaly. Extremities: Pedal pulses 2+ symmetric b/l. Extremities normal, no cyanosis, clubbing, or edema. Musculokeletal: No joint swelling, no muscle tenderness. ROM normal in all joints of extremities. Neurologic: Mental status: Alert and Oriented X3 . Pertinent/ New Labs and Imaging Studies     Imaging Personally Reviewed:  Findings:   Study is technically limited due to low lung volumes. An endotracheal tube is noted in position with tip projecting   approximately 3.5 cm above the oliva    An NG tube is noted with tip projecting within the stomach. There is a left-sided internal jugular central venous catheter noted   the tip is in the superior vena cava. The heart is enlarged   The lung fields are unremarkable. The aorta is tortuous and ectatic           Impression   Limited study due to low lung volumes. No significant interval change. Stable positioning of support lines and tubes. .       This study was dictated by Ara Cordero PA-C and Paul Goode MD   reviewed and concurred with the findings.         cxr 6/27  Subtle worsening of aeration at the left lower lobe medially which is   likely secondary to atelectasis. ECHO  2016  Summary   Left ventricular size is grossly normal.   Borderline left ventricular concentric hypertrophy noted. Ejection fraction is visually estimated at 65%. No evidence of left ventricular mass or thrombus noted.    No regional wall motion abnormalities seen. The left atrium is mildly dilated. Interatrial septum appears intact. No evidence of thrombus within left atrium. No evidence of mass within left atrium. Physiologic and/or trace mitral regurgitation is present. No evidence of mitral valve stenosis. Physiologic and/or trace tricuspid regurgitation. RVSP is 26.00 mmHg. Regular rhythm. Labs:  Lab Results   Component Value Date    WBC 17.6 06/28/2020    HGB 14.6 06/28/2020    HCT 47.5 06/28/2020    MCV 89.8 06/28/2020    MCH 27.6 06/28/2020    MCHC 30.7 06/28/2020    RDW 15.6 06/28/2020     06/28/2020    MPV 9.5 06/28/2020     Lab Results   Component Value Date     06/28/2020    K 3.5 06/28/2020    K 4.3 06/25/2020    CL 96 06/28/2020    CO2 35 06/28/2020    BUN 19 06/28/2020    CREATININE 0.8 06/28/2020    LABALBU 3.2 06/26/2020    CALCIUM 8.8 06/28/2020    GFRAA >60 06/28/2020    LABGLOM >60 06/28/2020     Lab Results   Component Value Date    PROTIME 11.9 06/25/2020    INR 1.0 06/25/2020     No results for input(s): PROBNP in the last 72 hours. Recent Labs     06/25/20  1550   PROCAL 0.05     This SmartLink has not been configured with any valid records. Micro:  Recent Labs     06/25/20  1720   CULTRESP Oral Pharyngeal Carley present        Assessment:    1. Acute respiratory failure with hypoxia  2. Intubation for airway protection, ventilator dependence, extubated 6/27  3. Cocaine induced pneumonitis vs chf vs aspiration pneumonia   4. COPD, noncompliance with medication  5. Current every day smoker  6. Substance abuse  7. Hypertension  8. EtOH abuse, history of pancreatitis, elevated LFTs  9. GERD  10. Rhabdomyolysis   11. Medical non compliance      Plan:     1. Decrease Solumedrol 40 mg IV to  Q 8 hrs  2. Duonebs Q 6 w/a  3. Follow imaging  4. Abx for CAP coverage-zithromax and rocephin  5. DVT, GI, VAP prophylaxis   6. Wean O2 as tolerated  7.  Add incentive spirometer    GI/DVT - Lovenox    Transfer out of ICU today      Electronically signed by Bunny Wynne DO on 6/28/2020 at 12:41 PM

## 2020-06-28 NOTE — PROGRESS NOTES
Belongings: cell phone and cord, blue backpack, blue folder, shorts, t-shirt, underwear, $124.00 (verified by [employer]), credit cards, wallet, puffer, pack cigarettes, facemask, tennis shoes, change and necklace, socks all sent with patient to room 411a.

## 2020-06-28 NOTE — PROGRESS NOTES
[facility] Progress Note    Admitting Date and Time: 6/25/2020  6:08 AM  Admit Dx: Acute respiratory failure with hypoxia and hypercapnia (HCC) [J96.01, J96.02]  Acute respiratory failure with hypoxia and hypercapnia (HCC) [J96.01, J96.02]    Subjective:  Patient is being followed for Acute respiratory failure with hypoxia and hypercapnia ([address] [J96.01, J96.02]  Acute respiratory failure with hypoxia and hypercapnia ([address] [J96.01, J96.02]   Pt was extubated yesterday and is feeling much better today. He does note sore throat and would like some hot tea and throat lozenges. He says that he has coughed up a good bit of phlegm today. Per nursing patient had no over night events and will likely be sent out of the unit today.   ROS: denies chest pain, headache, or abdominal pain   methylPREDNISolone  40 mg Intravenous Q6H    ipratropium-albuterol  1 ampule Inhalation Q6H WA    sodium chloride flush  10 mL Intravenous 2 times per day    enoxaparin  40 mg Subcutaneous Daily    cefTRIAXone (ROCEPHIN) IV  1 g Intravenous Q24H    azithromycin  500 mg Intravenous Q24H    pantoprazole  40 mg Intravenous Daily    chlorhexidine  15 mL Mouth/Throat BID    sodium chloride flush  10 mL Intravenous 2 times per day    thiamine  100 mg Intravenous Daily    insulin lispro  0-6 Units Subcutaneous 4 times per day     hydrALAZINE, 10 mg, Q6H PRN  sodium chloride flush, 10 mL, PRN  acetaminophen, 650 mg, Q6H PRN    Or  acetaminophen, 650 mg, Q6H PRN  polyethylene glycol, 17 g, Daily PRN  promethazine, 12.5 mg, Q6H PRN    Or  ondansetron, 4 mg, Q6H PRN  glucose, 15 g, PRN  dextrose, 12.5 g, PRN  glucagon (rDNA), 1 mg, PRN  dextrose, 100 mL/hr, PRN  midazolam, 2 mg, Q4H PRN  sodium chloride flush, 10 mL, PRN         Objective:    BP (!) 147/96   Pulse 76   Temp 98.4 °F (36.9 °C) (Oral)   Resp 15   Ht 5' 6\" (1.676 m)   Wt 266 lb 1.5 oz (120.7 kg)   SpO2 96%   BMI 42.95 kg/m²     General Appearance: alert and oriented to person, place and time and in no acute distress  Skin: warm and dry  Head: normocephalic and atraumatic  Eyes: pupils equal, round, and reactive to light, extraocular eye movements intact, conjunctivae normal  Neck: neck supple and non tender without mass   Pulmonary/Chest: clear to auscultation bilaterally- + wheezes, no rales or rhonchi, normal air movement, no respiratory distress  Cardiovascular: normal rate, normal S1 and S2 and no carotid bruits  Abdomen: soft, non-tender, non-distended, normal bowel sounds, no masses or organomegaly  Extremities: no cyanosis, no clubbing and no edema  Neurologic: no cranial nerve deficit and speech normal        Recent Labs     06/26/20  0509 06/27/20  0520 06/28/20  0610    139 140   K 3.7 3.6 3.5   CL 98 100 96*   CO2 31* 30* 35*   BUN 8 12 19   CREATININE 0.7 0.7 0.8   GLUCOSE 160* 160* 142*   CALCIUM 8.1* 8.3* 8.8       Recent Labs     06/26/20  0509 06/27/20  0520 06/28/20  0610   WBC 9.9 14.9* 17.6*   RBC 4.84 4.82 5.29   HGB 13.7 13.7 14.6   HCT 43.7 43.2 47.5   MCV 90.3 89.6 89.8   MCH 28.3 28.4 27.6   MCHC 31.4* 31.7* 30.7*   RDW 14.6 15.5* 15.6*    296 294   MPV 9.9 9.6 9.5       Radiology: morning portable CXR result pending. Assessment:    Active Problems:    Acute respiratory failure with hypoxia and hypercapnia (HCC)    Moderate protein-calorie malnutrition (HCC)  Resolved Problems:    * No resolved hospital problems. *      Plan:  1. Acute respiratory failure with hypoxia-acute onset   Extubated this morning   Wean O2 as tolerated    2. COPD exacerbation-chronic emphysema with acute exacerbation   Continue zithromax, rocephin, solumedrol, duonebs     3. HTN-not well controlled currently   Currently on no medications; home meds include lopressor 25 mg BID and norvasc 10 mg QD   Continue to monitor    4. Polysubstance abuse-chronic issue   Banana bag given in ED as well as narcan. UDS positive for cocaine    5.   Cocaine pneumonitis-possible     6. Non traumatic Rhabdomyolysis   CK initially 5298, this morning it was 1338   Continue IVF hydration    7. Leukocytosis-increased today to 14.9-likely related to steroid use +/- pneumonia; continue to monitor; cont abx    8. Hyperglycemia- patient on steroids; fasting  this morning; continue to monitor   No hx of diabetes.                  Electronically signed by Monroe Garcia MD on 6/28/2020 at 7:35 AM

## 2020-06-28 NOTE — PROGRESS NOTES
Critical Care Team - Daily Progress Note      Date and time: 6/28/2020 2:06 PM  Patient's name:  Kathy Britt  Medical Record Number: [id number]  Patient's account/billing number: [de-identified]  Patient's YOB: 1970  Age: 44 y.o. Date of Admission: 6/25/2020  6:08 AM  Length of stay during current admission: 3      Primary Care Physician: No primary care provider on file. ICU Attending Physician: Dr. Alyse Huang Status: Full Code    Reason for ICU admission:  Acute Hypoxic Respiratory Failure  AMS    SUBJECTIVE:     OVERNIGHT EVENTS:     No Events, Patient extubated yesterday and did well overnight. On 6L O2 NC.     AWAKE & FOLLOWING COMMANDS:  [] No   [x] Yes    CURRENT VENTILATION STATUS:     [] Ventilator  [] BIPAP  [x] Nasal Cannula [] Room Air      IF INTUBATED, ET TUBE MARKING AT LOWER LIP:       cms    SECRETIONS Amount:  [] Small [x] Moderate  [] Large  [] None  Color:     [x] White [] Colored  [] Bloody    SEDATION:  RAAS Score:   [] Propofol gtt  [] Versed gtt  [] Ativan gtt   [x] No Sedation  Fentanyl gtt     PARALYZED:  [x] No    [] Yes    DIARRHEA:                [x] No                [] Yes  (C. Difficile status: [] positive                                                                                                                       [] negative                                                                                                                     [] pending)    VASOPRESSORS:  [x] No    [] Yes    If yes -   [] Levophed       [] Dopamine     [] Vasopressin       [] Dobutamine  [] Phenylephrine         [] Epinephrine    CENTRAL LINES:     [] No   [x] Yes   (Date of Insertion: 6/25  )           If yes -     [] Right IJ     [x] Left IJ [] Right Femoral [] Left Femoral                   [] Right Subclavian [] Left Subclavian       HARGROVE CATHETER:   [] No   [x] Yes  (Date of Insertion: 6/25  )         OBJECTIVE:     VITAL SIGNS:  BP (!) 160/92   Pulse 73   Temp 96.9 °F (36.1 °C) (Axillary)   Resp 16   Ht 5' 6\" (1.676 m)   Wt 266 lb 1.5 oz (120.7 kg)   SpO2 93%   BMI 42.95 kg/m²   Tmax over 24 hours:  Temp (24hrs), Av °F (36.7 °C), Min:96.9 °F (36.1 °C), Max:98.4 °F (36.9 °C)      Patient Vitals for the past 6 hrs:   BP Pulse Resp SpO2   20 1100 (!) 160/92 -- -- --   20 1000 (!) 157/92 73 16 93 %   20 0900 (!) 154/83 100 14 92 %         Intake/Output Summary (Last 24 hours) at 2020 1406  Last data filed at 2020 1000  Gross per 24 hour   Intake 640 ml   Output 680 ml   Net -40 ml     Wt Readings from Last 2 Encounters:   20 266 lb 1.5 oz (120.7 kg)   20 283 lb (128.4 kg)     Body mass index is 42.95 kg/m².         PHYSICAL EXAMINATION:    General appearance - in bed, appears stated age and is in NAD  Mental status - A&O x3, following commands and responding appropriately to questions  Eyes - PERRL, sclera anicteric  Ears - external ear normal  Nose - normal and patent, no erythema, discharge or polyps  Mouth - mucous membranes pink and moist, no lesions noted  Neck - supple, no significant adenopathy  Chest - Coarse breath sounds b/l, but no wheezes, rales or rhonchi appreciated, symmetric air entry with good air movement noted throughout  Heart - RRR with no m/g/r noted, normal S1, S2   Abdomen - soft, nontender, nondistended, no masses or organomegaly  Neurological - Awake and follows commands; no movement disorder noted  Extremities - UE and LE distal pulses intact b/l +2/4; no clubbing or cyanosis; trace edema noted in the b/l LEs  Skin - normal coloration and turgor, no rashes, no suspicious skin lesions noted    Any additional physical findings:    MEDICATIONS:    Scheduled Meds:   methylPREDNISolone  40 mg Intravenous Q8H    ipratropium-albuterol  1 ampule Inhalation Q6H WA    sodium chloride flush  10 mL Intravenous 2 times per day    enoxaparin  40 mg Subcutaneous Daily    cefTRIAXone (ROCEPHIN) IV  1 g Intravenous Q24H  azithromycin  500 mg Intravenous Q24H    pantoprazole  40 mg Intravenous Daily    sodium chloride flush  10 mL Intravenous 2 times per day    thiamine  100 mg Intravenous Daily    insulin lispro  0-6 Units Subcutaneous 4 times per day     Continuous Infusions:   dextrose       PRN Meds:   benzocaine-Menthol, 1 lozenge, Q2H PRN  hydrALAZINE, 10 mg, Q6H PRN  sodium chloride flush, 10 mL, PRN  acetaminophen, 650 mg, Q6H PRN    Or  acetaminophen, 650 mg, Q6H PRN  polyethylene glycol, 17 g, Daily PRN  promethazine, 12.5 mg, Q6H PRN    Or  ondansetron, 4 mg, Q6H PRN  glucose, 15 g, PRN  dextrose, 12.5 g, PRN  glucagon (rDNA), 1 mg, PRN  dextrose, 100 mL/hr, PRN  midazolam, 2 mg, Q4H PRN  sodium chloride flush, 10 mL, PRN          VENT SETTINGS (Comprehensive) (if applicable):  Vent Information  $Ventilation: Off Vent  Skin Assessment: Clean, dry, & intact  Equipment ID: [id number]  Vent Type: 840  Vent Mode: PS  Vt Ordered: 550 mL  Rate Set: 0 bmp  Peak Flow: 60 L/min  Pressure Support: 8 cmH20  FiO2 : 40 %  SpO2: 93 %  SpO2/FiO2 ratio: 230  Sensitivity: 3  PEEP/CPAP: 5  I Time/ I Time %: 0 s  Humidification Source: Heated wire  Humidification Temp: 37  Humidification Temp Measured: 36.9  Additional Respiratory  Assessments  Pulse: 73  Resp: 16  SpO2: 93 %  Position: Left Side  Humidification Source: Heated wire  Humidification Temp: 37  Oral Care Completed?: Yes  Oral Care: Mouth swabbed, Mouth suctioned, Mouth moisturizer  Subglottic Suction Done?: Yes  Cuff Pressure (cm H2O): 29 cm H2O    ABG  Lab Results   Component Value Date    PH 7.450 06/27/2020    PCO2 38.2 06/27/2020    PO2 62.8 06/27/2020    HCO3 26.0 06/27/2020    O2SAT 92.1 06/27/2020         Laboratory findings:    Complete Blood Count:   Recent Labs     06/26/20  0509 06/27/20  0520 06/28/20  0610   WBC 9.9 14.9* 17.6*   HGB 13.7 13.7 14.6   HCT 43.7 43.2 47.5    296 294        Lactic Acid  Lab Results   Component Value Date    LACTA 1.1 2020    LACTA 1.0 2020    LACTA 1.3 2020        Last 3 Blood Glucose:   Recent Labs     20  0509 20  0520 20  0610   GLUCOSE 160* 160* 142*         PT/INR:    Lab Results   Component Value Date    PROTIME 11.9 2020    INR 1.0 2020     PTT:    Lab Results   Component Value Date    APTT 36.6 2015       Comprehensive Metabolic Profile:   Recent Labs     20  0509 20  0520 20  0610    139 140   K 3.7 3.6 3.5   CL 98 100 96*   CO2 31* 30* 35*   BUN 8 12 19   CREATININE 0.7 0.7 0.8   GLUCOSE 160* 160* 142*   CALCIUM 8.1* 8.3* 8.8   PROT 5.8*  --   --    LABALBU 3.2*  --   --    BILITOT 0.4  --   --    ALKPHOS 74  --   --    AST 59*  --   --    ALT 79*  --   --       Magnesium:   Lab Results   Component Value Date    MG 2.4 2020     Phosphorus:   Lab Results   Component Value Date    PHOS 5.8 2020     Ionized Calcium: No results found for: CAION       Urinalysis:     Troponin:   Recent Labs     20  1550   TROPONINI <0.01         Cultures     Cultures during this admission:     Blood cultures: x2 drawn                 [] None drawn      [] Negative             []  Positive (Details:  )  Urine Culture:                   [] None drawn      [] Negative             []  Positive (Details:  )  Sputum Culture:               [] None drawn       [] Negative             []  Positive (Details:  )   Endotracheal aspirate:  Collected     [] None drawn       [] Negative             []  Positive (Details:  )     Recent Labs     20   BC 24 Hours no growth     Recent Labs     20   BLOODCULT2 24 Hours no growth       No results for input(s): LABURIN in the last 72 hours.     Other pertinent Labs:    MRSA screen    Radiology/Imaging:   Ct Head Wo Contrast    Result Date: 2020  Patient MRN:  [id number] : 1970 Age: 44 years Gender: Male Order Date:  2020 9:30 AM EXAM: CT HEAD WO CONTRAST INDICATION: altered mental altered mental  COMPARISON: CT head without contrast, 2014. FINDINGS: PARENCHYMA:No mass effect, edema or hemorrhage. The brain is normal in volume. No significant chronic microvascular ischemic changes appreciated. VENTRICLES: No hydrocephalus. Incidental note is made of cavum septum pellucidum and vergae. CALVARIUM:The calvarium is intact without fracture or focal lesion. SINUSES: Mild mucosal thickening in the paranasal sinuses, especially the ethmoid sinuses. No acute intracranial abnormality. Xr Chest Portable    Result Date: 2020  Patient MRN: [id number] : 1970 Age:  44 years Gender: Male Order Date: 2020 3:15 PM Exam: XR CHEST PORTABLE Number of Images: 1 view Indication:   Post Left IJ CVC placement Post Left IJ CVC placement Comparison: Prior study from 2020 at 1020 hours available. Findings: The lungs are clear. There is no evidence of pulmonary infiltrate or pleural effusion. The pulmonary vascularity is unremarkable. There is a left internal jugular catheter with tip in superior vena cava. The endotracheal tube and nasogastric tube are in satisfactory position. There is no pneumothorax post placement of a left internal jugular catheter. The cardiac, hilar and mediastinal silhouettes are satisfactory. The bony thorax demonstrates no gross abnormality. NO ACUTE CARDIOPULMONARY PROCESS Support lines appears to be in satisfactory position. Xr Chest Portable    Result Date: 2020  Patient MRN: [id number] : 1970 Age:  44 years Gender: Male Order Date: 2020 10:00 AM Exam: XR CHEST PORTABLE Indication:   intubate intubate Comparison: Chest one view, 2020, 6:43 a.m. FINDINGS: The lungs are clear. The cardiomediastinal contour is unremarkable. No pneumothorax or pleural effusion. Interval insertion of endotracheal tube, the tip of which is approximately 3 cm above the oliva.  The tip of the nasogastric tube is below the diaphragm and beyond the field-of-view of this study. 1. The life support lines and tubes appear well positioned. 2. No acute cardiopulmonary abnormality. Xr Chest Portable    Result Date: 2020  Patient MRN: [id number] : 1970 Age:  44 years Gender: Male Order Date: 2020 6:15 AM Exam: XR CHEST PORTABLE Indication:   short of breath short of breath Comparison: Chest radiograph and CT, 2020 FINDINGS: The lungs are clear. The cardiomediastinal contour is unremarkable. No pneumothorax or pleural effusion. Evaluation of the bones reveals no fracture or destructive lesion. No acute cardiopulmonary abnormality. Xr Chest Abdomen Ng Placement    Result Date: 2020  Patient MRN: [id number] : 1970 Age:  44 years Gender: Male Order Date: 2020 10:30 AM Exam: XR CHEST ABDOMEN NG PLACEMENT Indication:   ng placement ng placement Comparison: None. FINDINGS: The lungs are clear. The cardiomediastinal contour is unremarkable. Sandra Messing No pneumothorax or pleural effusion. The endotracheal tube is well positioned, with the tip approximately 3 cm above the oliva. Tip of the nasogastric tube overlies the stomach. 1. No acute cardiopulmonary abnormality. 2. The life support lines and tubes appear well positioned. Chest Xray (2020):    SYSTEMS ASSESSMENT    Neuro   AMS on arrival - resolved              - Head CT negative for acute intracranial pathology  Alert and following commands  Hx depression              - Home Meds: Wellbutrin XL  Other Home Meds: Melatonin, Gabapentin, Oxcarbazepine  CIWA protocol     Respiratory   Hx of COPD              - noncompliance with meds  Current everyday smoker  Acute Hypoxic Respiratory Failure              - Extubated yesterday    - O2 via NC, patient tolerating well              - Wean oxygen as tolerated.  Keep O2 sat 90-92%  Possible cocaine pneumonitis  Solumedrol 40mg q6h - weaning  DuoNebs Q6h when awake  ABG improved     Cardiovascular   Hx of HTN - Home Meds: Lopressor 25 mg BID, Norvasc 10 mg QD  No other cardiac hx  Hypertensive here              - monitor closely and treat as appropriate  EKG: Sinus Tachycardia with LAD; QTc 457     Gastrointestinal   Hx GERD  Protonix 40 mg IV QD for prophylaxis  Elevated Liver Enzymes              -  --> 59,  --> 79, improving              - Chronic EtOH abuse    - [facility] protocol  Hx pancreatitis              - Lipase 15     Renal   No hx kidney disease  Trend BMP              - BUN 19, Cr 0.8 today  Monitor electrolytes closely              - replete as necessary  CK 5298 --> 357, resolving              - likely cocaine-induced rhabdo     Infectious Disease   WBC 17.6 today              - continue to monitor with daily CBCs  Lactic acidosis resolved  Sed Rate 1  COVID negative  CXR negative for acute cardiopulmonary pathology              - Being treated as possible CAP              - continue Zithromax 500 mg IV QD and Rocephin 1 g IV QD  CXR with clear lungs today     Hematology/Oncology   H&H 14.6/47.5  Platelets 008  DVT Prophylaxis              - Lovenox 40 mg SC QD  INR 1     Endocrine   No DM diagnosis, but patient on Glipizide at home  Glucose 160              - continue to monitor closely              - patient on steroids  Continue LDSSI     Social/Spiritual/DNR/Other   CODE STATUS: Full  Drug screen positive for cocaine  Transfer to floor today    PLAN:     WEAN PER PROTOCOL:  [] No   [x] Yes  [] N/A    DISCONTINUE ANY LABS:   [x] No   [] Yes    ICU PROPHYLAXIS:  Stress ulcer:  [x] PPI Agent  [] [id number] [] Sucralfate  [] Other:  VTE:   [x] Enoxaparin  [] Unfract.  Heparin Subcut  [] EPC Cuffs    NUTRITION:  [] NPO [] Tube Feeding (Specify: ) [] TPN  [x] PO (Diet: DIET CARB CONTROL; Carb Control: 4 carbs/meal (approximate 1800 kcals/day))    HOME MEDICATIONS RECONCILED: [] No  [x] Yes    INSULIN DRIP:   [x] No   [] Yes    CONSULTATION NEEDED:  [x] No   []     FAMILY UPDATED:    [] No   [x] Yes    TRANSFER OUT OF ICU:   [] No   [x] Yes    ADDITIONAL PLAN:      Khanh Alvarez MD, PGY1.                       6/28/2020, 2:06 PM    Attending Physician Attestation: Dr. Kinsey Likes    Thank you very much for allowing me to see this patient in consultation and follow up. I personally saw, examined and provided care for the patient. Radiographs, labs and medication list were reviewed by me independently. I spoke with bedside nursing, respiratory therapists and consultants. Critical care services and times documented are independent of procedures and multidisciplinary rounds with Residents. Additionally comprehensive, multidisciplinary rounds were conducted with the MICU team. The case was discussed in detail and plans for care were established. Review of Residents documentation was conducted and revisions were made as appropriate. I agree with the the above documented information.      Physical Examination:  Vitals:   Vitals:    06/28/20 0800 06/28/20 0900 06/28/20 1000 06/28/20 1100   BP: (!) 148/101 (!) 154/83 (!) 157/92 (!) 160/92   Pulse: 86 100 73    Resp: 11 14 16    Temp: 96.9 °F (36.1 °C)      TempSrc: Axillary      SpO2: 92% 92% 93%    Weight:       Height:          General: No Acute Distress  HEENT: normocephalic, atruamatic  CVS: RRR, S1, S2, No S3 or S4  Respiratory: decreased breath sounds at lung bases, no focal wheezes noted  Extremities: no clubbing/cyanosis/edema     ASSESSMENT:  1.) Acute Hypoxic Respiratory Failure - off mechanical ventilation  -  s/p extubation 6/27/2020 after 3 days of mechanical ventilation   2.) Acute Cocaine Pneumonitis   3.) Medication Non-Compliance  4.) H/O Drug Abuse   5.) H/O COPD  6.) Cocaine Abuse      In addition the following applies:     Check: N/A  Medication Alterations: N/A  Procedures: N/A  Imaging: Reviewed  New Consultations: N/A  VENT: N/A, s/p extubation 6/27/2020 after 3 days of mechanical ventilation      Access: Peripheral, Left IJ TLC   Consults: Pulmonary   Drips: N/A   Nutrition: PO  ABX: rocephin, azithromycin      - supportive care  - supplemental oxygen  - case reviewed with Dr. Mayelin Goetz in ICU   - OK for transfer out of ICU level of care with deferment of the pulmonary management to Dr. Mayelin Goetz and [facility]   - patient counseled on cessation of drug use    Thank you for allowing me to participate in the care of this patient. Care reviewed with nursing staff, medical and surgical specialty care, primary care and the patient's family as available. Restraints are ordered when the patient can do harm to him/herself by pulling out devices. Dave Fall M.D.     Dave Fall  6/28/2020  2:16 PM

## 2020-06-28 NOTE — PROGRESS NOTES
Removed frias catheter at this time. Tolerated procedure well. Instructed patient to let nurse know when has to void. States, [place]. \"   Urinal at bedside.

## 2020-06-28 NOTE — PROGRESS NOTES
Intensive Care Daily Quality Rounding Checklist      ICU Team Members: Bedside Nurse, , , Nursing Unit Leadership and ***    ICU Day #: NUMBER: 4    Intubation Date: June 25    Ventilator Day #: NUMBER: EXTUBATED 6/26    Central Line Insertion Date: June 25        Day #: NUMBER: 3     Arterial Line Insertion Date: N/A N/A      Day #: NUMBER: N/A     DVT Prophylaxis: LOVENOX    GI Prophylaxis: PROTONIX    [place]oco Catheter Insertion Date: Luci 25       Day #: 3      Continued need (if yes, reason documented and discussed with physician): yes, I&O    Skin Issues/ Wounds and ordered treatment discussed on rounds: NO ISSUES, SOS PRECAUTIONS    Goals/ Plans for the Day: {[facility] Quality Flow Goal(s) of the Day/Commitment(s):[id number]}

## 2020-06-28 NOTE — PLAN OF CARE
refrain from responding to false sensory perceptions  Outcome: Met This Shift  Goal: Demonstrates accurate environmental perceptions  Description: Demonstrates accurate environmental perceptions  Outcome: Met This Shift  Goal: Able to distinguish between reality-based and nonreality-based thinking  Description: Able to distinguish between reality-based and nonreality-based thinking  Outcome: Met This Shift  Goal: Able to interrupt nonreality-based thinking  Description: Able to interrupt nonreality-based thinking  Outcome: Met This Shift     Problem: Sleep Pattern Disturbance:  Goal: Appears well-rested  Description: Appears well-rested  Outcome: Met This Shift     Problem: Skin Integrity:  Goal: Will show no infection signs and symptoms  Description: Will show no infection signs and symptoms  Outcome: Met This Shift  Goal: Absence of new skin breakdown  Description: Absence of new skin breakdown  Outcome: Met This Shift     Problem: Falls - Risk of:  Goal: Absence of physical injury  Description: Absence of physical injury  Outcome: Met This Shift  Goal: Will remain free from falls  Description: Will remain free from falls  Outcome: Met This Shift

## 2020-06-28 NOTE — PROGRESS NOTES
Intensive Care Daily Quality Rounding Checklist      ICU Team Members: Dr. Jolynn Powell, resdients, charge nurse, bedside, respiratory therapist    ICU Day #: NUMBER: 3    Intubation Date: June 25    Ventilator Day #: extubated 6/26    Central Line Insertion Date: Luci 25        Day #: NUMBER: 2     Arterial Line Insertion Date: N/A      Day #: N/A    DVT Prophylaxis: Lovenox    GI Prophylaxis: Protonix    T[place]oco Catheter Insertion Date: Luci 25       Day #: 2      Continued need (if yes, reason documented and discussed with physician): yes, I&O    Skin Issues/ Wounds and ordered treatment discussed on rounds: No issues, SOS precautions    Goals/ Plans for the Day: Daily labs, wean vent as able, diurese patient, replace electrolytes, BP mediations started and steroids

## 2020-06-29 ENCOUNTER — APPOINTMENT (OUTPATIENT)
Dept: GENERAL RADIOLOGY | Age: 50
DRG: 917 | End: 2020-06-29
Payer: MEDICARE

## 2020-06-29 LAB
ANION GAP SERPL CALCULATED.3IONS-SCNC: 9 MMOL/L (ref 7–16)
BASOPHILS ABSOLUTE: 0.01 E9/L (ref 0–0.2)
BASOPHILS RELATIVE PERCENT: 0.1 % (ref 0–2)
BUN BLDV-MCNC: 16 MG/DL (ref 6–20)
CALCIUM SERPL-MCNC: 8.8 MG/DL (ref 8.6–10.2)
CHLORIDE BLD-SCNC: 96 MMOL/L (ref 98–107)
CO2: 31 MMOL/L (ref 22–29)
CREAT SERPL-MCNC: 0.7 MG/DL (ref 0.7–1.2)
EOSINOPHILS ABSOLUTE: 0 E9/L (ref 0.05–0.5)
EOSINOPHILS RELATIVE PERCENT: 0 % (ref 0–6)
GFR AFRICAN AMERICAN: >60
GFR NON-AFRICAN AMERICAN: >60 ML/MIN/1.73
GLUCOSE BLD-MCNC: 182 MG/DL (ref 74–99)
HCT VFR BLD CALC: 47 % (ref 37–54)
HEMOGLOBIN: 14.7 G/DL (ref 12.5–16.5)
IMMATURE GRANULOCYTES #: 0.09 E9/L
IMMATURE GRANULOCYTES %: 0.7 % (ref 0–5)
LYMPHOCYTES ABSOLUTE: 0.65 E9/L (ref 1.5–4)
LYMPHOCYTES RELATIVE PERCENT: 5.1 % (ref 20–42)
MAGNESIUM: 2.3 MG/DL (ref 1.6–2.6)
MCH RBC QN AUTO: 28.1 PG (ref 26–35)
MCHC RBC AUTO-ENTMCNC: 31.3 % (ref 32–34.5)
MCV RBC AUTO: 89.9 FL (ref 80–99.9)
METER GLUCOSE: 168 MG/DL (ref 74–99)
METER GLUCOSE: 183 MG/DL (ref 74–99)
METER GLUCOSE: 183 MG/DL (ref 74–99)
METER GLUCOSE: 191 MG/DL (ref 74–99)
METER GLUCOSE: 200 MG/DL (ref 74–99)
MONOCYTES ABSOLUTE: 0.56 E9/L (ref 0.1–0.95)
MONOCYTES RELATIVE PERCENT: 4.4 % (ref 2–12)
NEUTROPHILS ABSOLUTE: 11.38 E9/L (ref 1.8–7.3)
NEUTROPHILS RELATIVE PERCENT: 89.7 % (ref 43–80)
PDW BLD-RTO: 15.1 FL (ref 11.5–15)
PHOSPHORUS: 4.7 MG/DL (ref 2.5–4.5)
PLATELET # BLD: 256 E9/L (ref 130–450)
PMV BLD AUTO: 9.8 FL (ref 7–12)
POTASSIUM SERPL-SCNC: 3.8 MMOL/L (ref 3.5–5)
RBC # BLD: 5.23 E12/L (ref 3.8–5.8)
SODIUM BLD-SCNC: 136 MMOL/L (ref 132–146)
WBC # BLD: 12.7 E9/L (ref 4.5–11.5)

## 2020-06-29 PROCEDURE — 2580000003 HC RX 258: Performed by: FAMILY MEDICINE

## 2020-06-29 PROCEDURE — 99233 SBSQ HOSP IP/OBS HIGH 50: CPT | Performed by: FAMILY MEDICINE

## 2020-06-29 PROCEDURE — 6370000000 HC RX 637 (ALT 250 FOR IP): Performed by: EMERGENCY MEDICINE

## 2020-06-29 PROCEDURE — 6360000002 HC RX W HCPCS: Performed by: FAMILY MEDICINE

## 2020-06-29 PROCEDURE — 2580000003 HC RX 258: Performed by: EMERGENCY MEDICINE

## 2020-06-29 PROCEDURE — 84100 ASSAY OF PHOSPHORUS: CPT

## 2020-06-29 PROCEDURE — 71045 X-RAY EXAM CHEST 1 VIEW: CPT

## 2020-06-29 PROCEDURE — 36415 COLL VENOUS BLD VENIPUNCTURE: CPT

## 2020-06-29 PROCEDURE — 6370000000 HC RX 637 (ALT 250 FOR IP): Performed by: INTERNAL MEDICINE

## 2020-06-29 PROCEDURE — 6370000000 HC RX 637 (ALT 250 FOR IP): Performed by: FAMILY MEDICINE

## 2020-06-29 PROCEDURE — 6370000000 HC RX 637 (ALT 250 FOR IP): Performed by: NURSE PRACTITIONER

## 2020-06-29 PROCEDURE — 6360000002 HC RX W HCPCS: Performed by: NURSE PRACTITIONER

## 2020-06-29 PROCEDURE — 94660 CPAP INITIATION&MGMT: CPT

## 2020-06-29 PROCEDURE — 2060000000 HC ICU INTERMEDIATE R&B

## 2020-06-29 PROCEDURE — 6360000002 HC RX W HCPCS: Performed by: EMERGENCY MEDICINE

## 2020-06-29 PROCEDURE — 94640 AIRWAY INHALATION TREATMENT: CPT

## 2020-06-29 PROCEDURE — C9113 INJ PANTOPRAZOLE SODIUM, VIA: HCPCS | Performed by: EMERGENCY MEDICINE

## 2020-06-29 PROCEDURE — 82962 GLUCOSE BLOOD TEST: CPT

## 2020-06-29 PROCEDURE — 6360000002 HC RX W HCPCS: Performed by: INTERNAL MEDICINE

## 2020-06-29 PROCEDURE — 80048 BASIC METABOLIC PNL TOTAL CA: CPT

## 2020-06-29 PROCEDURE — 85025 COMPLETE CBC W/AUTO DIFF WBC: CPT

## 2020-06-29 PROCEDURE — 83735 ASSAY OF MAGNESIUM: CPT

## 2020-06-29 PROCEDURE — 2700000000 HC OXYGEN THERAPY PER DAY

## 2020-06-29 RX ORDER — GABAPENTIN 300 MG/1
300 CAPSULE ORAL 3 TIMES DAILY
Status: DISCONTINUED | OUTPATIENT
Start: 2020-06-29 | End: 2020-07-01 | Stop reason: HOSPADM

## 2020-06-29 RX ORDER — TAMSULOSIN HYDROCHLORIDE 0.4 MG/1
0.4 CAPSULE ORAL 2 TIMES DAILY
Status: DISCONTINUED | OUTPATIENT
Start: 2020-06-29 | End: 2020-07-01 | Stop reason: HOSPADM

## 2020-06-29 RX ORDER — IPRATROPIUM BROMIDE AND ALBUTEROL SULFATE 2.5; .5 MG/3ML; MG/3ML
1 SOLUTION RESPIRATORY (INHALATION) 4 TIMES DAILY
Status: DISCONTINUED | OUTPATIENT
Start: 2020-06-29 | End: 2020-07-01 | Stop reason: HOSPADM

## 2020-06-29 RX ORDER — LANOLIN ALCOHOL/MO/W.PET/CERES
3 CREAM (GRAM) TOPICAL NIGHTLY PRN
Status: DISCONTINUED | OUTPATIENT
Start: 2020-06-29 | End: 2020-07-01 | Stop reason: HOSPADM

## 2020-06-29 RX ORDER — BUDESONIDE 0.5 MG/2ML
500 INHALANT ORAL 2 TIMES DAILY
Status: DISCONTINUED | OUTPATIENT
Start: 2020-06-29 | End: 2020-07-01 | Stop reason: HOSPADM

## 2020-06-29 RX ORDER — AMLODIPINE BESYLATE 10 MG/1
10 TABLET ORAL DAILY
Status: DISCONTINUED | OUTPATIENT
Start: 2020-06-29 | End: 2020-07-01 | Stop reason: HOSPADM

## 2020-06-29 RX ORDER — IPRATROPIUM BROMIDE AND ALBUTEROL SULFATE 2.5; .5 MG/3ML; MG/3ML
1 SOLUTION RESPIRATORY (INHALATION) 4 TIMES DAILY
Status: DISCONTINUED | OUTPATIENT
Start: 2020-06-29 | End: 2020-06-29 | Stop reason: SDUPTHER

## 2020-06-29 RX ORDER — ARFORMOTEROL TARTRATE 15 UG/2ML
15 SOLUTION RESPIRATORY (INHALATION) 2 TIMES DAILY
Status: DISCONTINUED | OUTPATIENT
Start: 2020-06-29 | End: 2020-07-01 | Stop reason: HOSPADM

## 2020-06-29 RX ORDER — ASPIRIN 81 MG/1
81 TABLET ORAL DAILY
Status: DISCONTINUED | OUTPATIENT
Start: 2020-06-29 | End: 2020-07-01 | Stop reason: HOSPADM

## 2020-06-29 RX ORDER — METHYLPREDNISOLONE SODIUM SUCCINATE 40 MG/ML
40 INJECTION, POWDER, LYOPHILIZED, FOR SOLUTION INTRAMUSCULAR; INTRAVENOUS EVERY 12 HOURS
Status: DISCONTINUED | OUTPATIENT
Start: 2020-06-30 | End: 2020-06-30

## 2020-06-29 RX ORDER — HYDROXYZINE PAMOATE 25 MG/1
25 CAPSULE ORAL NIGHTLY PRN
Status: DISCONTINUED | OUTPATIENT
Start: 2020-06-29 | End: 2020-06-30

## 2020-06-29 RX ORDER — HYDROXYZINE PAMOATE 25 MG/1
25 CAPSULE ORAL ONCE
Status: COMPLETED | OUTPATIENT
Start: 2020-06-29 | End: 2020-06-29

## 2020-06-29 RX ADMIN — BUDESONIDE 500 MCG: 0.5 SUSPENSION RESPIRATORY (INHALATION) at 19:51

## 2020-06-29 RX ADMIN — HYDROXYZINE PAMOATE 25 MG: 25 CAPSULE ORAL at 13:26

## 2020-06-29 RX ADMIN — ENOXAPARIN SODIUM 40 MG: 40 INJECTION SUBCUTANEOUS at 13:26

## 2020-06-29 RX ADMIN — PANTOPRAZOLE SODIUM 40 MG: 40 INJECTION, POWDER, FOR SOLUTION INTRAVENOUS at 09:02

## 2020-06-29 RX ADMIN — ACETAMINOPHEN 650 MG: 325 TABLET ORAL at 03:41

## 2020-06-29 RX ADMIN — IPRATROPIUM BROMIDE AND ALBUTEROL SULFATE 1 AMPULE: .5; 3 SOLUTION RESPIRATORY (INHALATION) at 19:51

## 2020-06-29 RX ADMIN — MELATONIN 3 MG ORAL TABLET 3 MG: 3 TABLET ORAL at 20:17

## 2020-06-29 RX ADMIN — INSULIN LISPRO 1 UNITS: 100 INJECTION, SOLUTION INTRAVENOUS; SUBCUTANEOUS at 11:26

## 2020-06-29 RX ADMIN — GABAPENTIN 300 MG: 300 CAPSULE ORAL at 20:17

## 2020-06-29 RX ADMIN — METOPROLOL TARTRATE 25 MG: 25 TABLET, FILM COATED ORAL at 20:16

## 2020-06-29 RX ADMIN — IPRATROPIUM BROMIDE AND ALBUTEROL SULFATE 1 AMPULE: 2.5; .5 SOLUTION RESPIRATORY (INHALATION) at 08:10

## 2020-06-29 RX ADMIN — INSULIN LISPRO 1 UNITS: 100 INJECTION, SOLUTION INTRAVENOUS; SUBCUTANEOUS at 16:03

## 2020-06-29 RX ADMIN — WATER 1 G: 1 INJECTION INTRAMUSCULAR; INTRAVENOUS; SUBCUTANEOUS at 09:01

## 2020-06-29 RX ADMIN — Medication 10 ML: at 20:18

## 2020-06-29 RX ADMIN — THIAMINE HYDROCHLORIDE 100 MG: 100 INJECTION, SOLUTION INTRAMUSCULAR; INTRAVENOUS at 09:00

## 2020-06-29 RX ADMIN — METHYLPREDNISOLONE SODIUM SUCCINATE 40 MG: 40 INJECTION, POWDER, LYOPHILIZED, FOR SOLUTION INTRAMUSCULAR; INTRAVENOUS at 13:26

## 2020-06-29 RX ADMIN — ARFORMOTEROL TARTRATE 15 MCG: 15 SOLUTION RESPIRATORY (INHALATION) at 19:51

## 2020-06-29 RX ADMIN — INSULIN LISPRO 1 UNITS: 100 INJECTION, SOLUTION INTRAVENOUS; SUBCUTANEOUS at 05:35

## 2020-06-29 RX ADMIN — AMLODIPINE BESYLATE 10 MG: 10 TABLET ORAL at 16:03

## 2020-06-29 RX ADMIN — IPRATROPIUM BROMIDE AND ALBUTEROL SULFATE 1 AMPULE: .5; 3 SOLUTION RESPIRATORY (INHALATION) at 15:55

## 2020-06-29 RX ADMIN — SODIUM CHLORIDE, PRESERVATIVE FREE 10 ML: 5 INJECTION INTRAVENOUS at 09:01

## 2020-06-29 RX ADMIN — TAMSULOSIN HYDROCHLORIDE 0.4 MG: 0.4 CAPSULE ORAL at 09:00

## 2020-06-29 RX ADMIN — AZITHROMYCIN MONOHYDRATE 500 MG: 500 INJECTION, POWDER, LYOPHILIZED, FOR SOLUTION INTRAVENOUS at 09:00

## 2020-06-29 RX ADMIN — ASPIRIN 81 MG: 81 TABLET, COATED ORAL at 16:03

## 2020-06-29 RX ADMIN — Medication 10 ML: at 09:02

## 2020-06-29 RX ADMIN — METHYLPREDNISOLONE SODIUM SUCCINATE 40 MG: 40 INJECTION, POWDER, LYOPHILIZED, FOR SOLUTION INTRAMUSCULAR; INTRAVENOUS at 05:34

## 2020-06-29 RX ADMIN — INSULIN LISPRO 1 UNITS: 100 INJECTION, SOLUTION INTRAVENOUS; SUBCUTANEOUS at 00:53

## 2020-06-29 RX ADMIN — GABAPENTIN 300 MG: 300 CAPSULE ORAL at 16:03

## 2020-06-29 RX ADMIN — TAMSULOSIN HYDROCHLORIDE 0.4 MG: 0.4 CAPSULE ORAL at 20:17

## 2020-06-29 ASSESSMENT — PAIN SCALES - GENERAL
PAINLEVEL_OUTOF10: 0
PAINLEVEL_OUTOF10: 0
PAINLEVEL_OUTOF10: 1
PAINLEVEL_OUTOF10: 0

## 2020-06-29 NOTE — CARE COORDINATION
Attempted to meet with patient to discuss plan of care and discharge planning, however pt is currently unavailable. Case management and social work will continue to follow to assist with the transition of care.

## 2020-06-29 NOTE — PROGRESS NOTES
bag given in ED as well as narcan. UDS positive for cocaine    5. Cocaine pneumonitis-possible      6. Non traumatic Rhabdomyolysis   CK initially 5298, this morning it was 1338   Continue IVF hydration    7. Leukocytosis-increased today to 14.9-likely related to steroid use +/- pneumonia; continue to monitor; cont abx    8. Hyperglycemia- patient on steroids; fasting  this morning; continue to monitor   No hx of diabetes.     9.  Insomnia-  Patient takes melatonin and vistaril at home for insomnia                   Electronically signed by Jessica Lucas MD on 6/29/2020 at 10:52 AM

## 2020-06-29 NOTE — PLAN OF CARE
Problem: Injury - Risk of, Physical Injury:  Goal: Will remain free from falls  Description: Will remain free from falls  6/28/2020 2243 by Nicholas Gil RN  Outcome: Ongoing

## 2020-06-29 NOTE — PROGRESS NOTES
Morales Daigle M.D.,[facility]  Hany Cristobal D.O., F.A.C.OBetzyI., Dakota Morales M.D. Daniel Frost M.D., Carolin Arnett M.D. Rankin Crigler, D.O. Daily Pulmonary Progress Note    Patient:  Jennifer Dinh 44 y.o. male MRN: [id number]     Date of Service: 6/29/2020      Synopsis     We are following patient for respiratory failure, possible cocaine pneumonitis    \"CC\" shortness of breath    Code status: full      Subjective      Patient extubated 6/27. Feels better with breathing but still has shortness of breath. Oxygen 5 liters NC 95 % at rest. Incentive spirometer added. Cough with some scant white mucus production. Mild sore throat after extubation.       Review of Systems:   Not able to obtain     24-hour events:  extubated    Objective   Vitals: BP (!) 150/99   Pulse 99   Temp 98 °F (36.7 °C) (Oral)   Resp 18   Ht 5' 6\" (1.676 m)   Wt 266 lb 1.5 oz (120.7 kg)   SpO2 99%   BMI 42.95 kg/m²     I/O:    Intake/Output Summary (Last 24 hours) at 6/29/2020 1346  Last data filed at 6/29/2020 1323  Gross per 24 hour   Intake 360 ml   Output 2200 ml   Net -1840 ml       Vent Information  $Ventilation: Off Vent  Skin Assessment: Clean, dry, & intact  Equipment ID: [id number]  Vent Type: 840  Vent Mode: PS  Vt Ordered: 550 mL  Rate Set: 0 bmp  Peak Flow: 60 L/min  Pressure Support: 8 cmH20  FiO2 : 40 %  SpO2: 99 %  SpO2/FiO2 ratio: 230  Sensitivity: 3  PEEP/CPAP: 5  I Time/ I Time %: 0 s  Humidification Source: Heated wire  Humidification Temp: 37  Humidification Temp Measured: 36.9       IPAP: (S) 16 cmH20  CPAP/EPAP: (S) 8 cmH2O     CURRENT MEDS :  Scheduled Meds:   tamsulosin  0.4 mg Oral BID    methylPREDNISolone  40 mg Intravenous Q8H    ipratropium-albuterol  1 ampule Inhalation Q6H WA    sodium chloride flush  10 mL Intravenous 2 times per day    enoxaparin  40 mg Subcutaneous Daily    cefTRIAXone (ROCEPHIN) IV  1 g Intravenous Q24H    azithromycin  500 mg Intravenous Q24H    pantoprazole  40 mg Intravenous Daily    thiamine  100 mg Intravenous Daily    insulin lispro  0-6 Units Subcutaneous 4 times per day       Physical Exam:  General Appearance: appears comfortable in no acute distress. HEENT: Normocephalic atraumatic without obvious abnormality   Neck: Lips, mucosa, and tongue normal.  Supple, symmetrical, trachea midline, no adenopathy;thyroid:  no enlargement/tenderness/nodules or JVD. Lung: Few rhonchi no active wheezing. No accessory muscle use. Symmetrical expansion. Heart: RRR, normal S1, S2. No MRG  Abdomen: Soft, NT, ND. BS present x 4 quadrants. No bruit or organomegaly. Extremities: Pedal pulses 2+ symmetric b/l. Extremities normal, no cyanosis, clubbing, or edema. Musculokeletal: No joint swelling, no muscle tenderness. ROM normal in all joints of extremities. Neurologic: Mental status: Alert and Oriented X3 . Pertinent/ New Labs and Imaging Studies     Imaging Personally Reviewed:     6/29 CXR    Exam: XR CHEST PORTABLE   Number of Images: 1 view   Indication:   RESP FAILURE    RESP FAILURE   Comparison: 6/28/2020       Findings:   Previously seen left jugular central venous catheter has been removed   in the interval.   The heart is unremarkable. The lower lung fields demonstrate evidence for air space disease. The aorta is unremarkable.           Impression   Interval removal of left jugular central venous catheter. Bibasilar air space disease compatible with atelectasis.         The exam has been dictated and signed by Nicholas Brothers. MIKE Joaquin-NAGA   and Hazel Azar MD, reviewed and concurred with these findings. ECHO  2016  Summary   Left ventricular size is grossly normal.   Borderline left ventricular concentric hypertrophy noted. Ejection fraction is visually estimated at 65%. No evidence of left ventricular mass or thrombus noted. No regional wall motion abnormalities seen. The left atrium is mildly dilated.    Interatrial ventilator dependence, extubated 6/27  3. Cocaine induced pneumonitis vs chf vs aspiration pneumonia   4. COPD/asthma overlap, noncompliance with medication  5. Current every day smoker  6. Substance abuse  7. Obstructive sleep apnea  8. Hypertension  9. EtOH abuse, history of pancreatitis, elevated LFTs  10. GERD  11. Rhabdomyolysis   12. Medical non compliance      Plan:     1. Oxygen therapy 5 liters wean to keep >92%  2. Ambulatory O2 testing prior to dc  3. Incentive spirometer  4. Add bipap 12/6 if patient agreeable at HS. He was previously non compliant with use as outpatient. 5. Solumedrol 40 mg IV tapered to Q 8 hrs on 6/28/2020, continue taper as symptoms improve  6. Duonebs Q 6 w/a. Add brovana and budesonide BID. Previously on Breo and Anoro daily at home  7. Follow imaging  8. Abx for CAP coverage-zithromax (day 5) and rocephin (day 5)  since 6/25/2020  9. DVT, GI, VAP prophylaxis   10. GI/DVT - Lovenox  11. Needs outpatient PFT testing. May require new sleep study upon dc  This plan of care was reviewed in collaboration with Dr. Mary Nuñez      Electronically signed by MIKE Cui CNP on 6/29/2020 at 1:46 PM    Addendum:  Patient has not been in our office since 06/ 2018. He informs me that he moved to [facility] in  [place] and did not follow through. He has been using Symbicort at home. No LAMA on board. And might be best that upon discharge switch him to [facility] for compliance. Also he is interested to get treated for his sleep apnea. We will have to set him up for an in lab titration study as an outpatient. Wean FiO2 for saturations above 92%. We will decrease his IV Solu-Medrol to 40 mg twice daily and hopefully switch him to oral today. I personally saw, examined, and cared for the patient. Labs, medications, radiographs reviewed. I agree with history exam and plans detailed in NP note.   Beverley Rubio MD

## 2020-06-30 LAB
ANION GAP SERPL CALCULATED.3IONS-SCNC: 6 MMOL/L (ref 7–16)
BASOPHILS ABSOLUTE: 0.02 E9/L (ref 0–0.2)
BASOPHILS RELATIVE PERCENT: 0.1 % (ref 0–2)
BLOOD CULTURE, ROUTINE: NORMAL
BUN BLDV-MCNC: 18 MG/DL (ref 6–20)
CALCIUM SERPL-MCNC: 8.9 MG/DL (ref 8.6–10.2)
CHLORIDE BLD-SCNC: 96 MMOL/L (ref 98–107)
CO2: 36 MMOL/L (ref 22–29)
CREAT SERPL-MCNC: 0.8 MG/DL (ref 0.7–1.2)
CULTURE, BLOOD 2: NORMAL
EOSINOPHILS ABSOLUTE: 0.03 E9/L (ref 0.05–0.5)
EOSINOPHILS RELATIVE PERCENT: 0.2 % (ref 0–6)
GFR AFRICAN AMERICAN: >60
GFR NON-AFRICAN AMERICAN: >60 ML/MIN/1.73
GLUCOSE BLD-MCNC: 265 MG/DL (ref 74–99)
HCT VFR BLD CALC: 46.3 % (ref 37–54)
HEMOGLOBIN: 14.3 G/DL (ref 12.5–16.5)
IMMATURE GRANULOCYTES #: 0.13 E9/L
IMMATURE GRANULOCYTES %: 1 % (ref 0–5)
LYMPHOCYTES ABSOLUTE: 1.16 E9/L (ref 1.5–4)
LYMPHOCYTES RELATIVE PERCENT: 8.7 % (ref 20–42)
MAGNESIUM: 2.3 MG/DL (ref 1.6–2.6)
MCH RBC QN AUTO: 28.1 PG (ref 26–35)
MCHC RBC AUTO-ENTMCNC: 30.9 % (ref 32–34.5)
MCV RBC AUTO: 91.1 FL (ref 80–99.9)
METER GLUCOSE: 193 MG/DL (ref 74–99)
METER GLUCOSE: 226 MG/DL (ref 74–99)
METER GLUCOSE: 247 MG/DL (ref 74–99)
METER GLUCOSE: 281 MG/DL (ref 74–99)
METER GLUCOSE: 308 MG/DL (ref 74–99)
MONOCYTES ABSOLUTE: 0.82 E9/L (ref 0.1–0.95)
MONOCYTES RELATIVE PERCENT: 6.1 % (ref 2–12)
NEUTROPHILS ABSOLUTE: 11.2 E9/L (ref 1.8–7.3)
NEUTROPHILS RELATIVE PERCENT: 83.9 % (ref 43–80)
PDW BLD-RTO: 14.8 FL (ref 11.5–15)
PHOSPHORUS: 4.8 MG/DL (ref 2.5–4.5)
PLATELET # BLD: 248 E9/L (ref 130–450)
PMV BLD AUTO: 9.5 FL (ref 7–12)
POTASSIUM SERPL-SCNC: 4 MMOL/L (ref 3.5–5)
RBC # BLD: 5.08 E12/L (ref 3.8–5.8)
SODIUM BLD-SCNC: 138 MMOL/L (ref 132–146)
WBC # BLD: 13.4 E9/L (ref 4.5–11.5)

## 2020-06-30 PROCEDURE — 94640 AIRWAY INHALATION TREATMENT: CPT

## 2020-06-30 PROCEDURE — 6360000002 HC RX W HCPCS: Performed by: EMERGENCY MEDICINE

## 2020-06-30 PROCEDURE — 6370000000 HC RX 637 (ALT 250 FOR IP): Performed by: NURSE PRACTITIONER

## 2020-06-30 PROCEDURE — 84100 ASSAY OF PHOSPHORUS: CPT

## 2020-06-30 PROCEDURE — 82962 GLUCOSE BLOOD TEST: CPT

## 2020-06-30 PROCEDURE — 36415 COLL VENOUS BLD VENIPUNCTURE: CPT

## 2020-06-30 PROCEDURE — 6370000000 HC RX 637 (ALT 250 FOR IP): Performed by: INTERNAL MEDICINE

## 2020-06-30 PROCEDURE — 6360000002 HC RX W HCPCS: Performed by: NURSE PRACTITIONER

## 2020-06-30 PROCEDURE — 80048 BASIC METABOLIC PNL TOTAL CA: CPT

## 2020-06-30 PROCEDURE — 6370000000 HC RX 637 (ALT 250 FOR IP): Performed by: FAMILY MEDICINE

## 2020-06-30 PROCEDURE — 83735 ASSAY OF MAGNESIUM: CPT

## 2020-06-30 PROCEDURE — 97161 PT EVAL LOW COMPLEX 20 MIN: CPT

## 2020-06-30 PROCEDURE — 2060000000 HC ICU INTERMEDIATE R&B

## 2020-06-30 PROCEDURE — 6360000002 HC RX W HCPCS: Performed by: FAMILY MEDICINE

## 2020-06-30 PROCEDURE — C9113 INJ PANTOPRAZOLE SODIUM, VIA: HCPCS | Performed by: EMERGENCY MEDICINE

## 2020-06-30 PROCEDURE — 85025 COMPLETE CBC W/AUTO DIFF WBC: CPT

## 2020-06-30 PROCEDURE — 94660 CPAP INITIATION&MGMT: CPT

## 2020-06-30 PROCEDURE — 99232 SBSQ HOSP IP/OBS MODERATE 35: CPT | Performed by: FAMILY MEDICINE

## 2020-06-30 PROCEDURE — 2580000003 HC RX 258: Performed by: FAMILY MEDICINE

## 2020-06-30 RX ORDER — PREDNISONE 10 MG/1
TABLET ORAL
Qty: 30 TABLET | Refills: 0 | Status: SHIPPED | OUTPATIENT
Start: 2020-07-01 | End: 2020-10-14

## 2020-06-30 RX ORDER — GUAIFENESIN 400 MG/1
400 TABLET ORAL 4 TIMES DAILY PRN
Status: DISCONTINUED | OUTPATIENT
Start: 2020-06-30 | End: 2020-07-01 | Stop reason: HOSPADM

## 2020-06-30 RX ORDER — GUAIFENESIN 400 MG/1
400 TABLET ORAL 4 TIMES DAILY PRN
Qty: 56 TABLET | Refills: 0 | Status: ON HOLD | OUTPATIENT
Start: 2020-06-30 | End: 2020-11-14

## 2020-06-30 RX ORDER — PREDNISONE 20 MG/1
40 TABLET ORAL DAILY
Status: DISCONTINUED | OUTPATIENT
Start: 2020-07-01 | End: 2020-07-01 | Stop reason: HOSPADM

## 2020-06-30 RX ORDER — FLUTICASONE FUROATE, UMECLIDINIUM BROMIDE AND VILANTEROL TRIFENATATE 100; 62.5; 25 UG/1; UG/1; UG/1
1 POWDER RESPIRATORY (INHALATION) DAILY
Qty: 1 EACH | Refills: 3 | Status: SHIPPED | OUTPATIENT
Start: 2020-06-30 | End: 2020-10-14

## 2020-06-30 RX ORDER — HYDROXYZINE PAMOATE 25 MG/1
25 CAPSULE ORAL 3 TIMES DAILY PRN
Status: DISCONTINUED | OUTPATIENT
Start: 2020-06-30 | End: 2020-07-01 | Stop reason: HOSPADM

## 2020-06-30 RX ADMIN — TAMSULOSIN HYDROCHLORIDE 0.4 MG: 0.4 CAPSULE ORAL at 20:24

## 2020-06-30 RX ADMIN — AMLODIPINE BESYLATE 10 MG: 10 TABLET ORAL at 07:52

## 2020-06-30 RX ADMIN — Medication 10 ML: at 20:27

## 2020-06-30 RX ADMIN — ENOXAPARIN SODIUM 40 MG: 40 INJECTION SUBCUTANEOUS at 13:52

## 2020-06-30 RX ADMIN — GABAPENTIN 300 MG: 300 CAPSULE ORAL at 07:51

## 2020-06-30 RX ADMIN — IPRATROPIUM BROMIDE AND ALBUTEROL SULFATE 1 AMPULE: .5; 3 SOLUTION RESPIRATORY (INHALATION) at 08:04

## 2020-06-30 RX ADMIN — BUDESONIDE 500 MCG: 0.5 SUSPENSION RESPIRATORY (INHALATION) at 20:48

## 2020-06-30 RX ADMIN — ASPIRIN 81 MG: 81 TABLET, COATED ORAL at 07:52

## 2020-06-30 RX ADMIN — BUDESONIDE 500 MCG: 0.5 SUSPENSION RESPIRATORY (INHALATION) at 08:04

## 2020-06-30 RX ADMIN — METHYLPREDNISOLONE SODIUM SUCCINATE 40 MG: 40 INJECTION, POWDER, LYOPHILIZED, FOR SOLUTION INTRAMUSCULAR; INTRAVENOUS at 00:32

## 2020-06-30 RX ADMIN — METOPROLOL TARTRATE 25 MG: 25 TABLET, FILM COATED ORAL at 07:52

## 2020-06-30 RX ADMIN — WATER 1 G: 1 INJECTION INTRAMUSCULAR; INTRAVENOUS; SUBCUTANEOUS at 07:51

## 2020-06-30 RX ADMIN — BENZOCAINE AND MENTHOL, UNSPECIFIED FORM 1 LOZENGE: 15; 20 LOZENGE ORAL at 07:56

## 2020-06-30 RX ADMIN — GUAIFENESIN 400 MG: 400 TABLET, FILM COATED ORAL at 13:53

## 2020-06-30 RX ADMIN — ARFORMOTEROL TARTRATE 15 MCG: 15 SOLUTION RESPIRATORY (INHALATION) at 08:04

## 2020-06-30 RX ADMIN — INSULIN LISPRO 4 UNITS: 100 INJECTION, SOLUTION INTRAVENOUS; SUBCUTANEOUS at 06:05

## 2020-06-30 RX ADMIN — METOPROLOL TARTRATE 25 MG: 25 TABLET, FILM COATED ORAL at 20:24

## 2020-06-30 RX ADMIN — METHYLPREDNISOLONE SODIUM SUCCINATE 40 MG: 40 INJECTION, POWDER, LYOPHILIZED, FOR SOLUTION INTRAMUSCULAR; INTRAVENOUS at 13:52

## 2020-06-30 RX ADMIN — INSULIN LISPRO 1 UNITS: 100 INJECTION, SOLUTION INTRAVENOUS; SUBCUTANEOUS at 00:33

## 2020-06-30 RX ADMIN — GABAPENTIN 300 MG: 300 CAPSULE ORAL at 20:24

## 2020-06-30 RX ADMIN — INSULIN LISPRO 2 UNITS: 100 INJECTION, SOLUTION INTRAVENOUS; SUBCUTANEOUS at 16:25

## 2020-06-30 RX ADMIN — GABAPENTIN 300 MG: 300 CAPSULE ORAL at 13:53

## 2020-06-30 RX ADMIN — ARFORMOTEROL TARTRATE 15 MCG: 15 SOLUTION RESPIRATORY (INHALATION) at 20:48

## 2020-06-30 RX ADMIN — HYDROXYZINE PAMOATE 25 MG: 25 CAPSULE ORAL at 10:21

## 2020-06-30 RX ADMIN — IPRATROPIUM BROMIDE AND ALBUTEROL SULFATE 1 AMPULE: .5; 3 SOLUTION RESPIRATORY (INHALATION) at 12:00

## 2020-06-30 RX ADMIN — IPRATROPIUM BROMIDE AND ALBUTEROL SULFATE 1 AMPULE: .5; 3 SOLUTION RESPIRATORY (INHALATION) at 16:09

## 2020-06-30 RX ADMIN — THIAMINE HYDROCHLORIDE 100 MG: 100 INJECTION, SOLUTION INTRAMUSCULAR; INTRAVENOUS at 07:52

## 2020-06-30 RX ADMIN — PANTOPRAZOLE SODIUM 40 MG: 40 INJECTION, POWDER, FOR SOLUTION INTRAVENOUS at 07:51

## 2020-06-30 RX ADMIN — INSULIN LISPRO 2 UNITS: 100 INJECTION, SOLUTION INTRAVENOUS; SUBCUTANEOUS at 11:19

## 2020-06-30 RX ADMIN — IPRATROPIUM BROMIDE AND ALBUTEROL SULFATE 1 AMPULE: .5; 3 SOLUTION RESPIRATORY (INHALATION) at 20:48

## 2020-06-30 RX ADMIN — AZITHROMYCIN MONOHYDRATE 500 MG: 500 INJECTION, POWDER, LYOPHILIZED, FOR SOLUTION INTRAVENOUS at 07:50

## 2020-06-30 RX ADMIN — TAMSULOSIN HYDROCHLORIDE 0.4 MG: 0.4 CAPSULE ORAL at 07:52

## 2020-06-30 RX ADMIN — Medication 10 ML: at 07:52

## 2020-06-30 ASSESSMENT — PAIN SCALES - GENERAL
PAINLEVEL_OUTOF10: 0

## 2020-06-30 ASSESSMENT — PULMONARY FUNCTION TESTS: PEFR_L/MIN: 18

## 2020-06-30 NOTE — PLAN OF CARE
Problem: Confusion - Acute:  Goal: Absence of continued neurological deterioration signs and symptoms  Description: Absence of continued neurological deterioration signs and symptoms  Outcome: Met This Shift  Goal: Mental status will be restored to baseline  Description: Mental status will be restored to baseline  Outcome: Met This Shift     Problem: Discharge Planning:  Goal: Ability to perform activities of daily living will improve  Description: Ability to perform activities of daily living will improve  Outcome: Met This Shift  Goal: Participates in care planning  Description: Participates in care planning  Outcome: Met This Shift     Problem: Injury - Risk of, Physical Injury:  Goal: Absence of physical injury  Description: Absence of physical injury  Outcome: Met This Shift  Goal: Will remain free from falls  Description: Will remain free from falls  Outcome: Met This Shift     Problem: Mood - Altered:  Goal: Mood stable  Description: Mood stable  Outcome: Met This Shift  Goal: Absence of abusive behavior  Description: Absence of abusive behavior  Outcome: Met This Shift  Goal: Verbalizations of feeling emotionally comfortable while being cared for will increase  Description: Verbalizations of feeling emotionally comfortable while being cared for will increase  Outcome: Met This Shift     Problem: Psychomotor Activity - Altered:  Goal: Absence of psychomotor disturbance signs and symptoms  Description: Absence of psychomotor disturbance signs and symptoms  Outcome: Met This Shift     Problem: Sensory Perception - Impaired:  Goal: Demonstrations of improved sensory functioning will increase  Description: Demonstrations of improved sensory functioning will increase  Outcome: Met This Shift  Goal: Decrease in sensory misperception frequency  Description: Decrease in sensory misperception frequency  Outcome: Met This Shift  Goal: Able to refrain from responding to false sensory perceptions  Description: Able to refrain from responding to false sensory perceptions  Outcome: Met This Shift  Goal: Demonstrates accurate environmental perceptions  Description: Demonstrates accurate environmental perceptions  Outcome: Met This Shift     Problem: Sleep Pattern Disturbance:  Goal: Appears well-rested  Description: Appears well-rested  Outcome: Met This Shift     Problem: Skin Integrity:  Goal: Will show no infection signs and symptoms  Description: Will show no infection signs and symptoms  Outcome: Met This Shift  Goal: Absence of new skin breakdown  Description: Absence of new skin breakdown  Outcome: Met This Shift     Problem: Falls - Risk of:  Goal: Absence of physical injury  Description: Absence of physical injury  Outcome: Met This Shift  Goal: Will remain free from falls  Description: Will remain free from falls  Outcome: Met This Shift

## 2020-06-30 NOTE — PROGRESS NOTES
Messaged Guicho Tinoco Patient's hydroxyzine is for nighttime PRN for anxiety/ sleep. Can I change order to just PRN. Patient would like Vistaril now for anxiety. Also pt would like something to help bring mucous up/ congestion. \"    Awaiting response

## 2020-06-30 NOTE — PROGRESS NOTES
Physical Therapy    Facility/Department: [address] INTERNAL MEDICINE 2  Initial Assessment    NAME: Evelin Trimble  : 1970  MRN: [id number]    Date of Service: 2020      Patient Diagnosis(es): The primary encounter diagnosis was Acute respiratory failure with hypoxia and hypercapnia ([facility] [address] Diagnoses of COPD exacerbation ([facility] [address] Polysubstance abuse ([facility] [address] and Non-traumatic rhabdomyolysis were also pertinent to this visit. has a past medical history of Acid reflux, Asthma, Asthma, COPD (chronic obstructive pulmonary disease) ([facility] [address] Hypertension, Pancreatitis, Prediabetes, Psychiatric problem, Sleep apnea, and Substance abuse ([facility] [address] has a past surgical history that includes [place] tooth extraction; Colonoscopy; Nerve Block (Left, 2018); and pr njx dx/ther sbst intrlmnr lmbr/sac w/img gdn (Left, 2018). Referring Provider:  Kadi Wiggins MD      Evaluating Therapist: Sandie Almanzar PT    Room #:411  DIAGNOSIS: Acute Respiratory failure with hypoxia  Additional Pertinent History:HTN. Substance abuse  PRECAUTIONS: falls, O2    Social:  Pt currently staying at the [employer]. Plans to go to daughter's mother's house at discharge. Pt independent without device. Initial Evaluation  Date: 20   Was pt agreeable to Eval/treatment? yes   Does pt have pain? no   Bed Mobility  Rolling: NT  Supine to sit: NT  Sit to supine: NT  Scooting: NT   Transfers Sit to stand: independent  Stand to sit: independent  Stand pivot: independent   Ambulation    200 feet with no device independent   Stair Negotiation  Ascended and descended  NT   LE strength     4/5   balance      good   AM-PAC Raw score               24/       Pt is alert and Oriented   LE ROM: WFL  Sensation: intact  Edema: none  Endurance: good  Chair alarm: no     ASSESSMENT  Pt displays functional ability as noted in the objective portion of this evaluation. Comments:  SpO2 on room air after ambulation 86%.   Recovered to 91% with seated rest.  O2 re-applied at 2L      Patient and or family understand(s) diagnosis, prognosis, and plan of care. PLAN:    No PT needs at this time, pt independent    Evaluation Time includes thorough review of current medical information, gathering information on past medical history/social history and prior level of function, completion of standardized testing/informal observation of tasks, assessment of data and education on plan of care and goals.     CPT codes:  [x] Low Complexity PT evaluation 37065  [] Moderate Complexity PT evaluation 03032  [] High Complexity PT evaluation 50866  [] PT Re-evaluation 76591  [] Gait training 02457 minutes  [] Manual therapy 49384 minutes  [] Therapeutic activities 37944 minutes  [] Therapeutic exercises 48348 minutes  [] Neuromuscular reeducation 17599 minutes     Justa PT [id number]

## 2020-06-30 NOTE — PROGRESS NOTES
aspirin  81 mg Oral Daily    gabapentin  300 mg Oral TID    metoprolol tartrate  25 mg Oral BID    sodium chloride flush  10 mL Intravenous 2 times per day    enoxaparin  40 mg Subcutaneous Daily    cefTRIAXone (ROCEPHIN) IV  1 g Intravenous Q24H    pantoprazole  40 mg Intravenous Daily    thiamine  100 mg Intravenous Daily    insulin lispro  0-6 Units Subcutaneous 4 times per day       Physical Exam:  General Appearance: appears comfortable in no acute distress. HEENT: Normocephalic atraumatic without obvious abnormality   Neck: Lips, mucosa, and tongue normal.  Supple, symmetrical, trachea midline, no adenopathy;thyroid:  no enlargement/tenderness/nodules or JVD. Lung: Few rhonchi no active wheezing. No accessory muscle use. Symmetrical expansion. Heart: RRR, normal S1, S2. No MRG  Abdomen: Soft, NT, ND. BS present x 4 quadrants. No bruit or organomegaly. Extremities: Pedal pulses 2+ symmetric b/l. Extremities normal, no cyanosis, clubbing, or edema. Musculokeletal: No joint swelling, no muscle tenderness. ROM normal in all joints of extremities. Neurologic: Mental status: Alert and Oriented X3 . Pertinent/ New Labs and Imaging Studies     Imaging Personally Reviewed:     6/29 CXR    Exam: XR CHEST PORTABLE   Number of Images: 1 view   Indication:   RESP FAILURE    RESP FAILURE   Comparison: 6/28/2020       Findings:   Previously seen left jugular central venous catheter has been removed   in the interval.   The heart is unremarkable. The lower lung fields demonstrate evidence for air space disease. The aorta is unremarkable.           Impression   Interval removal of left jugular central venous catheter. Bibasilar air space disease compatible with atelectasis.         The exam has been dictated and signed by Judson Bueno. Emerson Schaumann, APRN-NAGA   and Jayleen Small MD, reviewed and concurred with these findings.        ECHO  2016  Summary   Left ventricular size is grossly normal. Borderline left ventricular concentric hypertrophy noted. Ejection fraction is visually estimated at 65%. No evidence of left ventricular mass or thrombus noted. No regional wall motion abnormalities seen. The left atrium is mildly dilated. Interatrial septum appears intact. No evidence of thrombus within left atrium. No evidence of mass within left atrium. Physiologic and/or trace mitral regurgitation is present. No evidence of mitral valve stenosis. Physiologic and/or trace tricuspid regurgitation. RVSP is 26.00 mmHg. Regular rhythm. Labs:  Lab Results   Component Value Date    WBC 13.4 06/30/2020    HGB 14.3 06/30/2020    HCT 46.3 06/30/2020    MCV 91.1 06/30/2020    MCH 28.1 06/30/2020    MCHC 30.9 06/30/2020    RDW 14.8 06/30/2020     06/30/2020    MPV 9.5 06/30/2020     Lab Results   Component Value Date     06/30/2020    K 4.0 06/30/2020    K 4.3 06/25/2020    CL 96 06/30/2020    CO2 36 06/30/2020    BUN 18 06/30/2020    CREATININE 0.8 06/30/2020    LABALBU 3.2 06/26/2020    CALCIUM 8.9 06/30/2020    GFRAA >60 06/30/2020    LABGLOM >60 06/30/2020     Lab Results   Component Value Date    PROTIME 11.9 06/25/2020    INR 1.0 06/25/2020     No results for input(s): PROBNP in the last 72 hours. No results for input(s): PROCAL in the last 72 hours. This SmartLink has not been configured with any valid records. Micro:  No results for input(s): CULTRESP in the last 72 hours. Previous PFT testing 2018  DATA: Spirometry done in the office today demonstrates an FVC of 2.69 liters which is 66 % of predicted with an FEV1 of  1.52 liters which is 47 % of predicted. FEV1/FVC ratio is57. Mid expiratory flow rates are 20% of predicted. Maximum voluntary ventilation is decreased at 66 liters per minute or 44% of predicted. Total lung capacity is 6.49 liters which is 96% of predicted. DLCO is 26.43mm/min/mmHg which is 85% of predicted.  Flow volume loop shows no signs of intrathoracic or extrathoracic obstruction.       Impressions: severe obstruction. Good bronchodilator response. Normal total lung capacity and diffusing capacity. Assessment:    1. Acute respiratory failure with hypoxia  2. Intubation for airway protection, ventilator dependence, extubated 6/27  3. Cocaine induced pneumonitis vs chf vs aspiration pneumonia   4. COPD/asthma overlap, noncompliance with medication  5. Current every day smoker  6. Substance abuse  7. Obstructive sleep apnea  8. Hypertension  9. EtOH abuse, history of pancreatitis, elevated LFTs  10. GERD  11. Rhabdomyolysis   12. Medical non compliance      Plan:     1. Oxygen therapy 2 liters wean to keep >92%  2. Ambulatory O2 testing prior to dc  3. Incentive spirometer  4. bipap 12/6  at -used to last night. He was previously non compliant with use as outpatient. 5. Convert to oral prednisone in a.m. 6. Duonebs Q 6 w/a. brovana and budesonide BID. Recently took Symbicort. In the past was receiving Breo and Anoro daily. We will see if Nancy Rodriges is covered by his insurance. 7. Follow imaging  8. Abx for CAP coverage-completed Zithromax 5 days. Rocephin daily-can stop upon discharge. 9. DVT, GI, VAP prophylaxis   10. GI/DVT - Lovenox  11. Needs outpatient PFT testing. May require new sleep study upon dc  12. Follows with Dr. Sofy Hsu in the office  This plan of care was reviewed in collaboration with Dr. Sofy Hsu      Electronically signed by MIKE Nicholson CNP on 6/30/2020 at 3:09 PM    I personally saw, examined, and cared for the patient. Labs, medications, radiographs reviewed. I agree with history exam and plans detailed in NP note.   Kamryn Peñaloza

## 2020-06-30 NOTE — PROGRESS NOTES
6/30/2020  2:19 PM      Nutrition Assessment    Type and Reason for Visit: Reassess    Nutrition Recommendations: Continue current PO diet and ONS    Nutrition Assessment: Pt medical status improving-out of ICU and extubated. On NC. Eating well on CHO controlled diet now. Pt has declined drug/ETOH rehab after D/C    Malnutrition Assessment:  · Malnutrition Status: Meets the criteria for moderate malnutrition  · Context: Chronic illness  · Findings of the 6 clinical characteristics of malnutrition (Minimum of 2 out of 6 clinical characteristics is required to make the diagnosis of moderate or severe Protein Calorie Malnutrition based on AND/ASPEN Guidelines):  1. Energy Intake-Less than or equal to 75% of estimated energy requirement, Greater than or equal to 1 month(Staying at [facility] and per EMR review )    2. Weight Loss-7.5% loss or greater, in 3 months  3. Fat Loss-No significant subcutaneous fat loss,    4. Muscle Loss-No significant muscle mass loss,    5. Fluid Accumulation-No significant fluid accumulation,    6.  Strength-Not measured    Nutrition Risk Level: Moderate    Nutrient Needs:  · Estimated Daily Total Kcal:  (15-18 kristen/kg)  · Estimated Daily Protein (g):  (1.3-1.5 g/kg)  · Estimated Daily Total Fluid (ml/day):  (1 ml/kristen)    Nutrition Diagnosis:   · Problem:  Moderate malnutrition, In context of social or environmental circumstances  · Etiology: related to Lack or limited access to food(active polysubstance abuse and inconsistent living conditions)     Signs and symptoms:  as evidenced by Weight loss greater than or equal to 7.5% in 3 months, Diet history of poor intake    Objective Information:  · Nutrition-Focused Physical Findings: A&Ox4, NGT out, abdomen WNL, no edema, -I/O 2L  · Wound Type: (abrasion, scabs)  · Current Nutrition Therapies:  · Oral Diet Orders: Carb Control 4 Carbs/Meal   · Oral Diet intake: %  · Anthropometric Measures:  · Ht: 5' 6\" (167.6 cm) · Current Body Wt: 266 lb (120.7 kg)(6/26-UTO update -not in bed)  · Admission Body Wt: 260 lb (117.9 kg)(6/25 bedwt)  · Usual Body Wt: 283 lb (128.4 kg)(per April admit (no edema))  · % Weight Change:  ,  8% loss in last 2 mo   · Ideal Body Wt: 142 lb (64.4 kg), % Ideal Body 183% (admit)  · BMI Classification: BMI > or equal to 40.0 Obese Class III    Nutrition Interventions:   Continue current diet, Start ONS(add HP Ensure 2/2 malnutrition)  Continued Inpatient Monitoring, Education Initiated    Nutrition Evaluation:   · Evaluation: Progressing toward goals   · Goals: PO >75% at meals/ONS    · Monitoring: Meal Intake, Supplement Intake, Skin Integrity, I&O, Weight, Pertinent Labs, Monitor Bowel Function      Electronically signed by Kentrell Joshi RD, CNSC, LD on 6/30/20 at 2:19 PM EDT    Contact Number: [phone]

## 2020-06-30 NOTE — PROGRESS NOTES
[facility] Progress Note    Admitting Date and Time: 6/25/2020  6:08 AM  Admit Dx: Acute respiratory failure with hypoxia and hypercapnia ([address] [J96.01, J96.02]  Acute respiratory failure with hypoxia and hypercapnia (HCC) [J96.01, J96.02]    Subjective:  Patient is being followed for Acute respiratory failure with hypoxia and hypercapnia ([address] [J96.01, J96.02]  Acute respiratory failure with hypoxia and hypercapnia ([address] [J96.01, J96.02]   Pt was extubated6/27. He is feeling like he slept better last night with the melatonin and vistaril. His urine stream has been weak since the cath was removed, but improved after the second dose of flomax.     Per nursing patient had no over night events   ROS: denies chest pain, headache, or abdominal pain   tamsulosin  0.4 mg Oral BID    Arformoterol Tartrate  15 mcg Nebulization BID    budesonide  500 mcg Nebulization BID    ipratropium-albuterol  1 ampule Inhalation 4x daily    methylPREDNISolone  40 mg Intravenous Q12H    amLODIPine  10 mg Oral Daily    aspirin  81 mg Oral Daily    gabapentin  300 mg Oral TID    metoprolol tartrate  25 mg Oral BID    sodium chloride flush  10 mL Intravenous 2 times per day    enoxaparin  40 mg Subcutaneous Daily    cefTRIAXone (ROCEPHIN) IV  1 g Intravenous Q24H    pantoprazole  40 mg Intravenous Daily    thiamine  100 mg Intravenous Daily    insulin lispro  0-6 Units Subcutaneous 4 times per day     hydrOXYzine, 25 mg, TID PRN  guaiFENesin, 400 mg, 4x Daily PRN  melatonin, 3 mg, Nightly PRN  benzocaine-Menthol, 1 lozenge, Q2H PRN  phenazopyridine, 100 mg, TID PRN  hydrALAZINE, 10 mg, Q6H PRN  sodium chloride flush, 10 mL, PRN  acetaminophen, 650 mg, Q6H PRN    Or  acetaminophen, 650 mg, Q6H PRN  polyethylene glycol, 17 g, Daily PRN  promethazine, 12.5 mg, Q6H PRN    Or  ondansetron, 4 mg, Q6H PRN  glucose, 15 g, PRN  dextrose, 12.5 g, PRN  glucagon (rDNA), 1 mg, PRN  dextrose, 100 mL/hr, PRN  midazolam, 2 mg, Q4H PRN         Objective:    BP (!) 141/84   Pulse 94   Temp 97.4 °F (36.3 °C) (Oral)   Resp 18   Ht 5' 6\" (1.676 m)   Wt 266 lb 1.5 oz (120.7 kg)   SpO2 94%   BMI 42.95 kg/m²     General Appearance: alert and oriented to person, place and time and in no acute distress  Skin: warm and dry  Head: normocephalic and atraumatic  Eyes: pupils equal, round, and reactive to light, extraocular eye movements intact, conjunctivae normal  Neck: neck supple and non tender without mass   Pulmonary/Chest: clear to auscultation bilaterally- + wheezes, no rales or rhonchi, normal air movement, no respiratory distress  Cardiovascular: normal rate, normal S1 and S2 and no carotid bruits  Abdomen: soft, non-tender, non-distended, normal bowel sounds, no masses or organomegaly  Extremities: no cyanosis, no clubbing and no edema  Neurologic: no cranial nerve deficit and speech normal        Recent Labs     06/28/20  0610 06/29/20  0340 06/30/20  0200    136 138   K 3.5 3.8 4.0   CL 96* 96* 96*   CO2 35* 31* 36*   BUN 19 16 18   CREATININE 0.8 0.7 0.8   GLUCOSE 142* 182* 265*   CALCIUM 8.8 8.8 8.9       Recent Labs     06/28/20  0610 06/29/20  0340 06/30/20  0200   WBC 17.6* 12.7* 13.4*   RBC 5.29 5.23 5.08   HGB 14.6 14.7 14.3   HCT 47.5 47.0 46.3   MCV 89.8 89.9 91.1   MCH 27.6 28.1 28.1   MCHC 30.7* 31.3* 30.9*   RDW 15.6* 15.1* 14.8    256 248   MPV 9.5 9.8 9.5       Radiology: morning portable CXR result pending. Assessment:    Active Problems:    Acute respiratory failure with hypoxia and hypercapnia (HCC)    Moderate protein-calorie malnutrition (HCC)  Resolved Problems:    * No resolved hospital problems. *      Plan:  1. Acute respiratory failure with hypoxia-acute onset   Extubated this morning   Wean O2 as tolerated   abx coverage for CAP with zithromax and rocephin    2. COPD exacerbation-chronic emphysema with acute exacerbation   Continue zithromax, rocephin, solumedrol, duonebs     3. HTN-not well controlled currently   restart home meds include lopressor 25 mg BID and norvasc 10 mg QD   Continue to monitor    4. Polysubstance abuse-chronic issue   Banana bag given in ED as well as narcan. UDS positive for cocaine    5. Cocaine pneumonitis-possible      6. Non traumatic Rhabdomyolysis   CK initially 5298, this morning it was 1338   Continue IVF hydration    7. Leukocytosis-increased today to 14.9-likely related to steroid use +/- pneumonia; continue to monitor; cont abx    8. Hyperglycemia- patient on steroids; fasting  this morning; continue to monitor   No hx of diabetes.     9.  Insomnia-  Patient takes melatonin and vistaril at home for insomnia      PT/OT eval today    Dispo:  Home tomorrow hopefully             Electronically signed by Willian Stanley MD on 6/30/2020 at 2:34 PM

## 2020-06-30 NOTE — PLAN OF CARE
Problem: Malnutrition  (NI-5.2)  Goal: Food and/or Nutrient Delivery  Pt will tolerate diet and ONS with at least 75% intake  Description: Individualized approach for food/nutrient provision.   Outcome: Met This Shift

## 2020-06-30 NOTE — PROGRESS NOTES
Date: 6/29/2020    Time: 10:49 PM    Patient Placed On BIPAP/CPAP/ Non-Invasive Ventilation? Yes    If no must comment. Facial area red/color change? No           If YES are Blister/Lesion present? No   If yes must notify nursing staff  BIPAP/CPAP skin barrier? No    Skin barrier type:N/A       Comments: Patient using under nose mask.         Aleena Veronica

## 2020-06-30 NOTE — CARE COORDINATION
Met with patient at bedside along with social work, introduced myself and explained my role as RN case manager. Discussed plan of care (pulm consult, labs/testing, medications, oxygen therapy, discharge plans, etc.). Pt stated that he verbalized understanding. Pt stated that when he is discharge he plan to stay with Rock Shetty, his daughters mother in [place], [place]. He noted that Rock Shetty lives at [address], [address]. Message left with Rock Shetty to confirm discharge plan- await return call. Pt noted that he has home oxygen and a nebulizer, stating that he received them a few months ago after a hospital stay. Pt believes that these are provided through [employer]. Called Iman at [employer] to confirm home O2 and nebulizer- message left- await response. Pt stated that he does have a PCP. He stated that Dr. Miguel Bowen is his PCP ([address], [phone]). He denies any need/desrire for drug and/or alcohol resources at this time. Informed patient that case management and social work will continue to follow to assist with the transition of care     ADDENDUM: 6/30 1400  Spoke to Apurva Zhang at [employer]: She confirmed that the patient does have home Oxygen, 3L continuous that was prescribed in March 2020. Pt also has a nebulizer through [employer].

## 2020-07-01 VITALS
SYSTOLIC BLOOD PRESSURE: 135 MMHG | OXYGEN SATURATION: 91 % | BODY MASS INDEX: 42.77 KG/M2 | RESPIRATION RATE: 20 BRPM | DIASTOLIC BLOOD PRESSURE: 91 MMHG | WEIGHT: 266.1 LBS | HEIGHT: 66 IN | HEART RATE: 100 BPM | TEMPERATURE: 98.3 F

## 2020-07-01 LAB
ANION GAP SERPL CALCULATED.3IONS-SCNC: 6 MMOL/L (ref 7–16)
BASOPHILS ABSOLUTE: 0.02 E9/L (ref 0–0.2)
BASOPHILS RELATIVE PERCENT: 0.2 % (ref 0–2)
BUN BLDV-MCNC: 20 MG/DL (ref 6–20)
CALCIUM SERPL-MCNC: 8.6 MG/DL (ref 8.6–10.2)
CHLORIDE BLD-SCNC: 96 MMOL/L (ref 98–107)
CO2: 35 MMOL/L (ref 22–29)
CREAT SERPL-MCNC: 0.9 MG/DL (ref 0.7–1.2)
EOSINOPHILS ABSOLUTE: 0.23 E9/L (ref 0.05–0.5)
EOSINOPHILS RELATIVE PERCENT: 2 % (ref 0–6)
GFR AFRICAN AMERICAN: >60
GFR NON-AFRICAN AMERICAN: >60 ML/MIN/1.73
GLUCOSE BLD-MCNC: 201 MG/DL (ref 74–99)
HCT VFR BLD CALC: 46.6 % (ref 37–54)
HEMOGLOBIN: 14.4 G/DL (ref 12.5–16.5)
IMMATURE GRANULOCYTES #: 0.12 E9/L
IMMATURE GRANULOCYTES %: 1.1 % (ref 0–5)
LYMPHOCYTES ABSOLUTE: 2.59 E9/L (ref 1.5–4)
LYMPHOCYTES RELATIVE PERCENT: 22.7 % (ref 20–42)
MAGNESIUM: 2 MG/DL (ref 1.6–2.6)
MCH RBC QN AUTO: 28.2 PG (ref 26–35)
MCHC RBC AUTO-ENTMCNC: 30.9 % (ref 32–34.5)
MCV RBC AUTO: 91.4 FL (ref 80–99.9)
METER GLUCOSE: 170 MG/DL (ref 74–99)
METER GLUCOSE: 187 MG/DL (ref 74–99)
METER GLUCOSE: 341 MG/DL (ref 74–99)
MONOCYTES ABSOLUTE: 1.2 E9/L (ref 0.1–0.95)
MONOCYTES RELATIVE PERCENT: 10.5 % (ref 2–12)
NEUTROPHILS ABSOLUTE: 7.24 E9/L (ref 1.8–7.3)
NEUTROPHILS RELATIVE PERCENT: 63.5 % (ref 43–80)
PDW BLD-RTO: 14.5 FL (ref 11.5–15)
PHOSPHORUS: 4.6 MG/DL (ref 2.5–4.5)
PLATELET # BLD: 237 E9/L (ref 130–450)
PMV BLD AUTO: 9.6 FL (ref 7–12)
POTASSIUM SERPL-SCNC: 3.4 MMOL/L (ref 3.5–5)
RBC # BLD: 5.1 E12/L (ref 3.8–5.8)
SODIUM BLD-SCNC: 137 MMOL/L (ref 132–146)
WBC # BLD: 11.4 E9/L (ref 4.5–11.5)

## 2020-07-01 PROCEDURE — 80048 BASIC METABOLIC PNL TOTAL CA: CPT

## 2020-07-01 PROCEDURE — 82962 GLUCOSE BLOOD TEST: CPT

## 2020-07-01 PROCEDURE — 94660 CPAP INITIATION&MGMT: CPT

## 2020-07-01 PROCEDURE — 84100 ASSAY OF PHOSPHORUS: CPT

## 2020-07-01 PROCEDURE — 6370000000 HC RX 637 (ALT 250 FOR IP): Performed by: NURSE PRACTITIONER

## 2020-07-01 PROCEDURE — 6370000000 HC RX 637 (ALT 250 FOR IP): Performed by: FAMILY MEDICINE

## 2020-07-01 PROCEDURE — C9113 INJ PANTOPRAZOLE SODIUM, VIA: HCPCS | Performed by: EMERGENCY MEDICINE

## 2020-07-01 PROCEDURE — 94640 AIRWAY INHALATION TREATMENT: CPT

## 2020-07-01 PROCEDURE — 2700000000 HC OXYGEN THERAPY PER DAY

## 2020-07-01 PROCEDURE — 36415 COLL VENOUS BLD VENIPUNCTURE: CPT

## 2020-07-01 PROCEDURE — 6360000002 HC RX W HCPCS: Performed by: EMERGENCY MEDICINE

## 2020-07-01 PROCEDURE — 6360000002 HC RX W HCPCS: Performed by: NURSE PRACTITIONER

## 2020-07-01 PROCEDURE — 85025 COMPLETE CBC W/AUTO DIFF WBC: CPT

## 2020-07-01 PROCEDURE — 6370000000 HC RX 637 (ALT 250 FOR IP): Performed by: INTERNAL MEDICINE

## 2020-07-01 PROCEDURE — 99239 HOSP IP/OBS DSCHRG MGMT >30: CPT | Performed by: FAMILY MEDICINE

## 2020-07-01 PROCEDURE — 83735 ASSAY OF MAGNESIUM: CPT

## 2020-07-01 PROCEDURE — 2580000003 HC RX 258: Performed by: FAMILY MEDICINE

## 2020-07-01 PROCEDURE — 6360000002 HC RX W HCPCS: Performed by: FAMILY MEDICINE

## 2020-07-01 RX ORDER — LANOLIN ALCOHOL/MO/W.PET/CERES
100 CREAM (GRAM) TOPICAL DAILY
Qty: 30 TABLET | Refills: 3 | Status: SHIPPED | OUTPATIENT
Start: 2020-07-02 | End: 2020-10-14

## 2020-07-01 RX ORDER — THIAMINE MONONITRATE (VIT B1) 100 MG
100 TABLET ORAL DAILY
Status: DISCONTINUED | OUTPATIENT
Start: 2020-07-01 | End: 2020-07-01 | Stop reason: HOSPADM

## 2020-07-01 RX ORDER — POTASSIUM CHLORIDE 1.5 G/1.77G
40 POWDER, FOR SOLUTION ORAL ONCE
Status: DISCONTINUED | OUTPATIENT
Start: 2020-07-01 | End: 2020-07-01 | Stop reason: CLARIF

## 2020-07-01 RX ORDER — HYDROXYZINE PAMOATE 25 MG/1
25 CAPSULE ORAL 3 TIMES DAILY PRN
Qty: 21 CAPSULE | Refills: 0 | Status: SHIPPED | OUTPATIENT
Start: 2020-07-01 | End: 2020-07-08

## 2020-07-01 RX ORDER — PANTOPRAZOLE SODIUM 40 MG/1
40 TABLET, DELAYED RELEASE ORAL
Status: DISCONTINUED | OUTPATIENT
Start: 2020-07-02 | End: 2020-07-01 | Stop reason: HOSPADM

## 2020-07-01 RX ADMIN — BENZOCAINE AND MENTHOL, UNSPECIFIED FORM 1 LOZENGE: 15; 20 LOZENGE ORAL at 09:57

## 2020-07-01 RX ADMIN — INSULIN LISPRO 1 UNITS: 100 INJECTION, SOLUTION INTRAVENOUS; SUBCUTANEOUS at 12:34

## 2020-07-01 RX ADMIN — POTASSIUM BICARBONATE 40 MEQ: 782 TABLET, EFFERVESCENT ORAL at 12:33

## 2020-07-01 RX ADMIN — IPRATROPIUM BROMIDE AND ALBUTEROL SULFATE 1 AMPULE: .5; 3 SOLUTION RESPIRATORY (INHALATION) at 12:22

## 2020-07-01 RX ADMIN — TAMSULOSIN HYDROCHLORIDE 0.4 MG: 0.4 CAPSULE ORAL at 08:47

## 2020-07-01 RX ADMIN — ARFORMOTEROL TARTRATE 15 MCG: 15 SOLUTION RESPIRATORY (INHALATION) at 08:36

## 2020-07-01 RX ADMIN — GUAIFENESIN 400 MG: 400 TABLET, FILM COATED ORAL at 08:47

## 2020-07-01 RX ADMIN — INSULIN LISPRO 4 UNITS: 100 INJECTION, SOLUTION INTRAVENOUS; SUBCUTANEOUS at 16:23

## 2020-07-01 RX ADMIN — WATER 1 G: 1 INJECTION INTRAMUSCULAR; INTRAVENOUS; SUBCUTANEOUS at 08:46

## 2020-07-01 RX ADMIN — METOPROLOL TARTRATE 25 MG: 25 TABLET, FILM COATED ORAL at 08:48

## 2020-07-01 RX ADMIN — HYDROXYZINE PAMOATE 25 MG: 25 CAPSULE ORAL at 09:57

## 2020-07-01 RX ADMIN — Medication 10 ML: at 08:47

## 2020-07-01 RX ADMIN — GUAIFENESIN 400 MG: 400 TABLET, FILM COATED ORAL at 00:58

## 2020-07-01 RX ADMIN — PREDNISONE 40 MG: 20 TABLET ORAL at 08:47

## 2020-07-01 RX ADMIN — ASPIRIN 81 MG: 81 TABLET, COATED ORAL at 08:50

## 2020-07-01 RX ADMIN — AMLODIPINE BESYLATE 10 MG: 10 TABLET ORAL at 08:47

## 2020-07-01 RX ADMIN — THIAMINE HYDROCHLORIDE 100 MG: 100 INJECTION, SOLUTION INTRAMUSCULAR; INTRAVENOUS at 08:45

## 2020-07-01 RX ADMIN — PANTOPRAZOLE SODIUM 40 MG: 40 INJECTION, POWDER, FOR SOLUTION INTRAVENOUS at 08:46

## 2020-07-01 RX ADMIN — BUDESONIDE 500 MCG: 0.5 SUSPENSION RESPIRATORY (INHALATION) at 08:36

## 2020-07-01 RX ADMIN — GABAPENTIN 300 MG: 300 CAPSULE ORAL at 08:47

## 2020-07-01 RX ADMIN — IPRATROPIUM BROMIDE AND ALBUTEROL SULFATE 1 AMPULE: .5; 3 SOLUTION RESPIRATORY (INHALATION) at 08:36

## 2020-07-01 RX ADMIN — GABAPENTIN 300 MG: 300 CAPSULE ORAL at 14:58

## 2020-07-01 RX ADMIN — HYDROXYZINE PAMOATE 25 MG: 25 CAPSULE ORAL at 00:01

## 2020-07-01 RX ADMIN — IPRATROPIUM BROMIDE AND ALBUTEROL SULFATE 1 AMPULE: .5; 3 SOLUTION RESPIRATORY (INHALATION) at 16:14

## 2020-07-01 RX ADMIN — ENOXAPARIN SODIUM 40 MG: 40 INJECTION SUBCUTANEOUS at 14:58

## 2020-07-01 RX ADMIN — INSULIN LISPRO 1 UNITS: 100 INJECTION, SOLUTION INTRAVENOUS; SUBCUTANEOUS at 06:57

## 2020-07-01 ASSESSMENT — PAIN SCALES - GENERAL: PAINLEVEL_OUTOF10: 0

## 2020-07-01 ASSESSMENT — PAIN DESCRIPTION - PROGRESSION: CLINICAL_PROGRESSION: GRADUALLY IMPROVING

## 2020-07-01 NOTE — PROGRESS NOTES
Emeterio Jara M.D.,[facility]  Chavez Sesay D.O., F.A.C.O.I., Sapna Scott M.D. Peewee Mcqueen M.D., Chrissy Amador M.D. Marly Correia D.O. Daily Pulmonary Progress Note    Patient:  Arlene Connelly 44 y.o. male MRN: [id number]     Date of Service: 7/1/2020      Synopsis     We are following patient for respiratory failure, possible cocaine pneumonitis    \"CC\" shortness of breath    Code status: full      Subjective      Patient extubated 6/27. Feels better with breathing but still has shortness of breath. Oxygen 2 liters NC 95 % at rest.  Episodes of desaturation with ambulation. Incentive spirometer encouraged. States he used BiPAP last night for most of the night. cough with some scant white mucus production. Ambulatory oxygen testing   Pulse ox 95% at rest on room air. Pulse ox 86% at rest on room air with ambulation. O2 2L applied. Pulse ox 90% on 2L with ambulation.   O2 sat 95% on 2L at rest.    Review of Systems:   Not able to obtain     24-hour events:  None    Objective   Vitals: /80   Pulse 93   Temp 97.6 °F (36.4 °C) (Axillary)   Resp 20   Ht 5' 6\" (1.676 m)   Wt 266 lb 1.5 oz (120.7 kg)   SpO2 91%   BMI 42.95 kg/m²     I/O:    Intake/Output Summary (Last 24 hours) at 7/1/2020 1249  Last data filed at 7/1/2020 1117  Gross per 24 hour   Intake 120 ml   Output 200 ml   Net -80 ml       Vent Information  $Ventilation: Off Vent  Skin Assessment: Clean, dry, & intact  Equipment ID: [id number]  Vent Type: 840  Vent Mode: PS  Vt Ordered: 550 mL  Rate Set: 0 bmp  Peak Flow: 60 L/min  Pressure Support: 8 cmH20  FiO2 : 40 %  SpO2: 91 %  SpO2/FiO2 ratio: 230  Sensitivity: 3  PEEP/CPAP: 5  I Time/ I Time %: 0 s  Humidification Source: Heated wire  Humidification Temp: 37  Humidification Temp Measured: 36.9       IPAP: 12 cmH20  CPAP/EPAP: 6 cmH2O     CURRENT MEDS :  Scheduled Meds:   [START ON 7/2/2020] pantoprazole  40 mg Oral QAM AC    vitamin B-1  100 mg Oral Daily    predniSONE  40 mg Oral Daily    insulin lispro  0-6 Units Subcutaneous 4x Daily AC & HS    tamsulosin  0.4 mg Oral BID    Arformoterol Tartrate  15 mcg Nebulization BID    budesonide  500 mcg Nebulization BID    ipratropium-albuterol  1 ampule Inhalation 4x daily    amLODIPine  10 mg Oral Daily    aspirin  81 mg Oral Daily    gabapentin  300 mg Oral TID    sodium chloride flush  10 mL Intravenous 2 times per day    enoxaparin  40 mg Subcutaneous Daily       Physical Exam:  General Appearance: appears comfortable in no acute distress. HEENT: Normocephalic atraumatic without obvious abnormality   Neck: Lips, mucosa, and tongue normal.  Supple, symmetrical, trachea midline, no adenopathy;thyroid:  no enlargement/tenderness/nodules or JVD. Lung: Few rhonchi no active wheezing. No accessory muscle use. Symmetrical expansion. Heart: RRR, normal S1, S2. No MRG  Abdomen: Soft, NT, ND. BS present x 4 quadrants. No bruit or organomegaly. Extremities: Pedal pulses 2+ symmetric b/l. Extremities normal, no cyanosis, clubbing, or edema. Musculokeletal: No joint swelling, no muscle tenderness. ROM normal in all joints of extremities. Neurologic: Mental status: Alert and Oriented X3 . Pertinent/ New Labs and Imaging Studies     Imaging Personally Reviewed:     6/29 CXR    Exam: XR CHEST PORTABLE   Number of Images: 1 view   Indication:   RESP FAILURE    RESP FAILURE   Comparison: 6/28/2020       Findings:   Previously seen left jugular central venous catheter has been removed   in the interval.   The heart is unremarkable. The lower lung fields demonstrate evidence for air space disease. The aorta is unremarkable.           Impression   Interval removal of left jugular central venous catheter. Bibasilar air space disease compatible with atelectasis.         The exam has been dictated and signed by [facility] [place].  MIKE Min-NAGA   and Donnie Hatch MD, reviewed and concurred with these findings. ECHO  2016  Summary   Left ventricular size is grossly normal.   Borderline left ventricular concentric hypertrophy noted. Ejection fraction is visually estimated at 65%. No evidence of left ventricular mass or thrombus noted. No regional wall motion abnormalities seen. The left atrium is mildly dilated. Interatrial septum appears intact. No evidence of thrombus within left atrium. No evidence of mass within left atrium. Physiologic and/or trace mitral regurgitation is present. No evidence of mitral valve stenosis. Physiologic and/or trace tricuspid regurgitation. RVSP is 26.00 mmHg. Regular rhythm. Labs:  Lab Results   Component Value Date    WBC 11.4 07/01/2020    HGB 14.4 07/01/2020    HCT 46.6 07/01/2020    MCV 91.4 07/01/2020    MCH 28.2 07/01/2020    MCHC 30.9 07/01/2020    RDW 14.5 07/01/2020     07/01/2020    MPV 9.6 07/01/2020     Lab Results   Component Value Date     07/01/2020    K 3.4 07/01/2020    K 4.3 06/25/2020    CL 96 07/01/2020    CO2 35 07/01/2020    BUN 20 07/01/2020    CREATININE 0.9 07/01/2020    LABALBU 3.2 06/26/2020    CALCIUM 8.6 07/01/2020    GFRAA >60 07/01/2020    LABGLOM >60 07/01/2020     Lab Results   Component Value Date    PROTIME 11.9 06/25/2020    INR 1.0 06/25/2020     No results for input(s): PROBNP in the last 72 hours. No results for input(s): PROCAL in the last 72 hours. This SmartLink has not been configured with any valid records. Micro:  No results for input(s): CULTRESP in the last 72 hours. Previous PFT testing 2018  DATA: Spirometry done in the office today demonstrates an FVC of 2.69 liters which is 66 % of predicted with an FEV1 of  1.52 liters which is 47 % of predicted. FEV1/FVC ratio is57. Mid expiratory flow rates are 20% of predicted. Maximum voluntary ventilation is decreased at 66 liters per minute or 44% of predicted. Total lung capacity is 6.49 liters which is 96% of predicted.  DLCO is 26.43mm/min/mmHg which is 85% of predicted. Flow volume loop shows no signs of intrathoracic or extrathoracic obstruction.       Impressions: severe obstruction. Good bronchodilator response. Normal total lung capacity and diffusing capacity. Assessment:    1. Acute respiratory failure with hypoxia  2. Intubation for airway protection, ventilator dependence, extubated 6/27  3. Cocaine induced pneumonitis vs chf vs aspiration pneumonia   4. COPD/asthma overlap, noncompliance with medication  5. Current every day smoker  6. Substance abuse  7. Obstructive sleep apnea  8. Hypertension  9. EtOH abuse, history of pancreatitis, elevated LFTs  10. GERD  11. Rhabdomyolysis   12. Medical non compliance      Plan:     1. Oxygen therapy 2 liters wean to keep >92%  2. uses 3 liters NC at home PRN through [employer]  3. Incentive spirometer  4. bipap 12/6  at -used to last night. He was previously non compliant with use as outpatient. 5. Oral prednisone with taper for dc written  6. Duonebs Q 6 w/a. brovana and budesonide BID. For DC: [facility] Galeazzi is  covered by his insurance   7. Abx for CAP coverage-completed Zithromax 5 days. Rocephin daily-can stop upon discharge. 8. DVT, GI, VAP prophylaxis   9. GI/DVT - Lovenox  10. Needs outpatient PFT testing. May require new sleep study upon dc   11. He states today that he was seeing a pulmonary doctor in [place], [place],  Dr Aaron Sierra, follow with him as outpatient. This plan of care was reviewed in collaboration with Dr. Anamika Luna      Electronically signed by MIKE Knight CNP on 7/1/2020 at 12:49 PM       Addendum; Patient was discharged on taper prednisone and Trelegy. He follows with Dr. Beba Cheema in [facility] and will follow with him. I personally saw, examined, and cared for the patient. Labs, medications, radiographs reviewed. I agree with history exam and plans detailed in NP note.   Salvador Ruggiero

## 2020-07-01 NOTE — PROGRESS NOTES
Occupational Therapy      Occupational Therapy referral received. Spoke with patient, explained reason for visit. Patient reports he is up walking in room, able to complete self care. At this time feels no need for OT services. No further questions/concerns.     OT order discontinued -- patient agreed

## 2020-07-01 NOTE — PROGRESS NOTES
Pulse ox 95% at rest on room air. Pulse ox 86% at rest on room air with ambulation. O2 2L applied. Pulse ox 90% on 2L with ambulation.   O2 sat 95% on 2L at rest.

## 2020-07-01 NOTE — DISCHARGE SUMMARY
[facility] Physician Discharge Summary           Call  Call [phone] to establish a PCP in your community. Please inform them you were just discharged from the hospital and need a follow up appointment. MD Zain Ya Acee [address]  [phone]    In 2 weeks      Helga Schlatter, DO  [address] [phone]    In 1 week        Activity level: As tolerated     Dispo:home with home O2      Condition on discharge: Stable     Patient ID:  Randi Kemp  [id number]  85 y.o.  1970    Admit date: 6/25/2020    Discharge date and time:  7/1/2020  1:38 PM    Admission Diagnoses: Active Problems:    Acute respiratory failure with hypoxia and hypercapnia (HCC)    Moderate protein-calorie malnutrition (HCC)    Polysubstance abuse ([address]  Resolved Problems:    * No resolved hospital problems. *      Discharge Diagnoses: Active Problems:    Acute respiratory failure with hypoxia and hypercapnia (HCC)    Moderate protein-calorie malnutrition (HCC)    Polysubstance abuse ([address]  Resolved Problems:    * No resolved hospital problems. *      Consults:  IP CONSULT TO PULMONOLOGY  IP CONSULT TO CRITICAL CARE  IP CONSULT TO DIETITIAN  IP CONSULT TO SOCIAL WORK  IP CONSULT TO SOCIAL WORK  IP CONSULT TO DIETITIAN  IP CONSULT TO CASE MANAGEMENT  IP CONSULT TO IV TEAM    Procedures: patient was initially intubated in the ER prior to going to the ICU secondary to acute respiratory failure. Central line placement occurred on 6/25/20 for vascular access    Hospital Course:   Patient Randi Kemp is a 44 y.o. presented with Acute respiratory failure with hypoxia and hypercapnia ([address] [J96.01, J96.02]  Acute respiratory failure with hypoxia and hypercapnia (HCC) [J96.01, J96.02]  1. Acute respiratory failure with hypoxia-acute onset              in ICU intubated initially. Did well after extubation    O2 weaned as tolerated to 2 L NC.   Patient already has [facility] for o2 therapy              abx coverage for CAP with zithromax and rocephin while in hospital.  No further abx needed     2. COPD exacerbation-chronic emphysema with acute exacerbation              resolved after abx and steroid tx.; will require outpatient follow up with pulmonology. Might need new PFTs     3. HTN-not well controlled currently              restart home med norvasc 10 mg QD              Continue to monitor as outpatient with PCP     4. Polysubstance abuse-chronic issue              Banana bag given in ED as well as narcan. UDS positive for cocaine   Discussed counseling for addiction     5. Cocaine pneumonitis-possible due to use just prior to acute resp. Failure; resolved. 6.  Non traumatic Rhabdomyolysis              CK initially 5298, trended downward while in ICU due to hydration with IVF     7.  Leukocytosis-likely due to pneumonia and steroids. Now resolved on discharge.     8. Hyperglycemia- patient on steroids; fasting  this morning; continue to monitor as outpatient for DM2              No hx of diabetes.     9.   Insomnia-  Patient takes melatonin and vistaril at home for insomnia; continue this at home   Patient will need outpatient follow up with pulmonology for sleep study       Discharge Exam:    General Appearance: alert and oriented to person, place and time and in no acute distress  Skin: warm and dry  Head: normocephalic and atraumatic  Eyes: pupils equal, round, and reactive to light, extraocular eye movements intact, conjunctivae normal  Neck: neck supple and non tender without mass   Pulmonary/Chest: clear to auscultation bilaterally- no wheezes, rales or rhonchi, normal air movement, no respiratory distress  Cardiovascular: normal rate, normal S1 and S2 and no carotid bruits  Abdomen: soft, non-tender, non-distended, normal bowel sounds, no masses or organomegaly  Extremities: no cyanosis, no clubbing and no edema  Neurologic: no cranial nerve deficit and speech normal    I/O last 3 completed shifts: In: 360 [P.O.:360]  Out: 725 [Urine:725]  I/O this shift:  In: 120 [P.O.:120]  Out: -       LABS:  Recent Labs     20  0340 20  0200 20  0808    138 137   K 3.8 4.0 3.4*   CL 96* 96* 96*   CO2 31* 36* 35*   BUN 16 18 20   CREATININE 0.7 0.8 0.9   GLUCOSE 182* 265* 201*   CALCIUM 8.8 8.9 8.6       Recent Labs     20  0200 20  0808   WBC 12.7* 13.4* 11.4   RBC 5.23 5.08 5.10   HGB 14.7 14.3 14.4   HCT 47.0 46.3 46.6   MCV 89.9 91.1 91.4   MCH 28.1 28.1 28.2   MCHC 31.3* 30.9* 30.9*   RDW 15.1* 14.8 14.5    248 237   MPV 9.8 9.5 9.6       No results for input(s): POCGLU in the last 72 hours. Imaging:  Ct Head Wo Contrast    Result Date: 2020  Patient MRN:  [id number] : 1970 Age: 44 years Gender: Male Order Date:  2020 9:30 AM EXAM: CT HEAD WO CONTRAST INDICATION:  altered mental altered mental  COMPARISON: CT head without contrast, 2014. FINDINGS: PARENCHYMA:No mass effect, edema or hemorrhage. The brain is normal in volume. No significant chronic microvascular ischemic changes appreciated. VENTRICLES: No hydrocephalus. Incidental note is made of cavum septum pellucidum and vergae. CALVARIUM:The calvarium is intact without fracture or focal lesion. SINUSES: Mild mucosal thickening in the paranasal sinuses, especially the ethmoid sinuses. No acute intracranial abnormality. Xr Chest Portable    Result Date: 2020  Patient MRN: [id number] : 1970 Age:  44 years Gender: Male Order Date: 2020 6:00 AM Exam: XR CHEST PORTABLE Number of Images: 1 view Indication:   RESP FAILURE RESP FAILURE Comparison: 2020 Findings: Study is technically limited due to low lung volumes. An endotracheal tube is noted in position with tip projecting approximately 3.5 cm above the oliva An NG tube is noted with tip projecting within the stomach. There is a left-sided internal jugular central venous catheter noted the tip is in the superior vena cava. The heart is enlarged The lung fields are unremarkable. The aorta is tortuous and ectatic     Limited study due to low lung volumes. No significant interval change. Stable positioning of support lines and tubes. . This study was dictated by Chrissy Venegas PA-C and Zay Michael MD reviewed and concurred with the findings. Xr Chest Portable    Result Date: 2020  Patient MRN: [id number] : 1970 Age:  44 years Gender: Male Order Date: 2020 3:15 PM Exam: XR CHEST PORTABLE Number of Images: 1 view Indication:   Post Left IJ CVC placement Post Left IJ CVC placement Comparison: Prior study from 2020 at 1020 hours available. Findings: The lungs are clear. There is no evidence of pulmonary infiltrate or pleural effusion. The pulmonary vascularity is unremarkable. There is a left internal jugular catheter with tip in superior vena cava. The endotracheal tube and nasogastric tube are in satisfactory position. There is no pneumothorax post placement of a left internal jugular catheter. The cardiac, hilar and mediastinal silhouettes are satisfactory. The bony thorax demonstrates no gross abnormality. NO ACUTE CARDIOPULMONARY PROCESS Support lines appears to be in satisfactory position. Xr Chest Portable    Result Date: 2020  Patient MRN: [id number] : 1970 Age:  44 years Gender: Male Order Date: 2020 10:00 AM Exam: XR CHEST PORTABLE Indication:   intubate intubate Comparison: Chest one view, 2020, 6:43 a.m. FINDINGS: The lungs are clear. The cardiomediastinal contour is unremarkable. No pneumothorax or pleural effusion. Interval insertion of endotracheal tube, the tip of which is approximately 3 cm above the oliva. The tip of the nasogastric tube is below the diaphragm and beyond the field-of-view of this study.      1. The life support lines and tubes appear well positioned. 2. No acute cardiopulmonary abnormality. Xr Chest Portable    Result Date: 2020  Patient MRN: [id number] : 1970 Age:  44 years Gender: Male Order Date: 2020 6:15 AM Exam: XR CHEST PORTABLE Indication:   short of breath short of breath Comparison: Chest radiograph and CT, 2020 FINDINGS: The lungs are clear. The cardiomediastinal contour is unremarkable. No pneumothorax or pleural effusion. Evaluation of the bones reveals no fracture or destructive lesion. No acute cardiopulmonary abnormality. Xr Chest Abdomen Ng Placement    Result Date: 2020  Patient MRN: [id number] : 1970 Age:  44 years Gender: Male Order Date: 2020 10:30 AM Exam: XR CHEST ABDOMEN NG PLACEMENT Indication:   ng placement ng placement Comparison: None. FINDINGS: The lungs are clear. The cardiomediastinal contour is unremarkable. Delwyn Scarce No pneumothorax or pleural effusion. The endotracheal tube is well positioned, with the tip approximately 3 cm above the oliva. Tip of the nasogastric tube overlies the stomach. 1. No acute cardiopulmonary abnormality. 2. The life support lines and tubes appear well positioned.        Patient Instructions:      Medication List      START taking these medications    guaiFENesin 400 MG tablet  Take 1 tablet by mouth 4 times daily as needed for Cough     hydrOXYzine 25 MG capsule  Commonly known as:  VISTARIL  Take 1 capsule by mouth 3 times daily as needed for Anxiety (sleep)     predniSONE 10 MG tablet  Commonly known as:  DELTASONE  Take 4 tabs daily x 3 days then 3 tabs daily x 3 days then 2 tabs daily x 3 days then 1 tab daily x 3 days then stop     thiamine 100 MG tablet  Take 1 tablet by mouth daily  Start taking on:  2020     Trelegy Ellipta 100-62.5-25 MCG/INH Aepb  Generic drug:  fluticasone-umeclidin-vilant  Inhale 1 puff into the lungs daily        CONTINUE taking these medications    albuterol sulfate  (90 Base) MCG/ACT inhaler  Commonly known as:  Proventil HFA  Inhale 2 puffs into the lungs every 6 hours as needed for Wheezing     amLODIPine 10 MG tablet  Commonly known as:  Norvasc  Take 1 tablet by mouth daily.      aspirin 81 MG tablet     buPROPion 300 MG extended release tablet  Commonly known as:  WELLBUTRIN XL  Take 1 tablet by mouth daily     gabapentin 300 MG capsule  Commonly known as:  NEURONTIN     glipiZIDE 5 MG tablet  Commonly known as:  GLUCOTROL  Take 1 tablet by mouth 2 times daily     ibuprofen 200 MG tablet  Commonly known as:  ADVIL;MOTRIN     ipratropium-albuterol 0.5-2.5 (3) MG/3ML Soln nebulizer solution  Commonly known as:  DUONEB  Take 3 mLs by nebulization every 4 hours as needed for Shortness of Breath     melatonin 3 MG Tabs tablet  Take 2 tablets by mouth nightly     nicotine polacrilex 4 MG gum  Commonly known as:  NICORETTE  Take 1 each by mouth as needed for Smoking cessation     OXcarbazepine 300 MG tablet  Commonly known as:  TRILEPTAL  Take 1 tablet by mouth 2 times daily     pantoprazole 40 MG tablet  Commonly known as:  PROTONIX     tamsulosin 0.4 MG capsule  Commonly known as:  FLOMAX        STOP taking these medications    metoprolol tartrate 25 MG tablet  Commonly known as:  LOPRESSOR     Symbicort 160-4.5 MCG/ACT Aero  Generic drug:  budesonide-formoterol           Where to Get Your Medications      These medications were sent to [facility] [id number], [place] - [address] - P [phone] - F [phone] [address] [facility] [address]    Phone:  [phone]   · guaiFENesin 400 MG tablet  · hydrOXYzine 25 MG capsule  · predniSONE 10 MG tablet  · thiamine 100 MG tablet  · Trelegy Ellipta 100-62.5-25 MCG/INH Aepb           Note that more than 30 minutes was spent in preparing discharge papers, discussing discharge with patient, medication review, etc.    Signed:  Electronically signed by Bear Rose MD on 7/1/2020 at 1:38 PM

## 2020-07-01 NOTE — CARE COORDINATION
7/1/2020  Social Work Discharge Planning: Possible discharge. Discharge plan remains the same; however, awaiting ID plan. . AM PAC is 24/24. Pt plans to discharge to [facility] where he will stay with his daughters mother, Charo STEWART. Her number is [phone]. Awaiting confirmation of this from [facility]. Pt said [facility] will likely transport at discharge, and if not, then his ins. will supply transport but will need to be called. Pt is on 2l o2 here and uses 3l through [facility], per [place]. Pt also has a nebulizer. Pt stated he DOES have a PCP in [place]-Dr. Dee Goetz. Pt continues to decline any drug rehab. resources.   Electronically signed by GABINO Moser on 7/1/2020 at 10:39 AM

## 2020-07-02 ENCOUNTER — CARE COORDINATION (OUTPATIENT)
Dept: CASE MANAGEMENT | Age: 50
End: 2020-07-02

## 2020-07-02 NOTE — CARE COORDINATION
First attempt to reach pt for the COVID 19 initial follow call. COVID 19 test Neg 6/25/20  Left message to call transition care nurse from [facility] - YOVANI FARRIS @ [phone]. [phone] [facility] Transition Nurse  [phone]

## 2020-07-03 ENCOUNTER — CARE COORDINATION (OUTPATIENT)
Dept: CASE MANAGEMENT | Age: 50
End: 2020-07-03

## 2020-10-14 ENCOUNTER — APPOINTMENT (OUTPATIENT)
Dept: CT IMAGING | Age: 50
DRG: 190 | End: 2020-10-14
Payer: MEDICARE

## 2020-10-14 ENCOUNTER — APPOINTMENT (OUTPATIENT)
Dept: GENERAL RADIOLOGY | Age: 50
DRG: 190 | End: 2020-10-14
Payer: MEDICARE

## 2020-10-14 ENCOUNTER — HOSPITAL ENCOUNTER (INPATIENT)
Age: 50
LOS: 1 days | Discharge: HOME | DRG: 190 | End: 2020-10-15
Attending: EMERGENCY MEDICINE | Admitting: INTERNAL MEDICINE
Payer: MEDICARE

## 2020-10-14 PROBLEM — J96.01 ACUTE RESPIRATORY FAILURE WITH HYPOXIA (HCC): Status: ACTIVE | Noted: 2020-10-14

## 2020-10-14 LAB
ACETAMINOPHEN LEVEL: <5 MCG/ML (ref 10–30)
ALBUMIN SERPL-MCNC: 4.3 G/DL (ref 3.5–5.2)
ALP BLD-CCNC: 108 U/L (ref 40–129)
ALT SERPL-CCNC: 68 U/L (ref 0–40)
AMPHETAMINE SCREEN, URINE: NOT DETECTED
ANION GAP SERPL CALCULATED.3IONS-SCNC: 16 MMOL/L (ref 7–16)
AST SERPL-CCNC: 95 U/L (ref 0–39)
B.E.: 4.2 MMOL/L (ref -3–3)
BARBITURATE SCREEN URINE: NOT DETECTED
BASOPHILS ABSOLUTE: 0.03 E9/L (ref 0–0.2)
BASOPHILS RELATIVE PERCENT: 0.5 % (ref 0–2)
BENZODIAZEPINE SCREEN, URINE: NOT DETECTED
BILIRUB SERPL-MCNC: 0.6 MG/DL (ref 0–1.2)
BUN BLDV-MCNC: 6 MG/DL (ref 6–20)
CALCIUM SERPL-MCNC: 8.7 MG/DL (ref 8.6–10.2)
CANNABINOID SCREEN URINE: NOT DETECTED
CHLORIDE BLD-SCNC: 92 MMOL/L (ref 98–107)
CO2: 34 MMOL/L (ref 22–29)
COCAINE METABOLITE SCREEN URINE: POSITIVE
COHB: 4 % (ref 0–1.5)
CREAT SERPL-MCNC: 0.8 MG/DL (ref 0.7–1.2)
CRITICAL: ABNORMAL
D DIMER: 220 NG/ML DDU
DATE ANALYZED: ABNORMAL
DATE OF COLLECTION: ABNORMAL
EKG ATRIAL RATE: 103 BPM
EKG P AXIS: 59 DEGREES
EKG P-R INTERVAL: 154 MS
EKG Q-T INTERVAL: 366 MS
EKG QRS DURATION: 102 MS
EKG QTC CALCULATION (BAZETT): 479 MS
EKG R AXIS: -57 DEGREES
EKG T AXIS: 69 DEGREES
EKG VENTRICULAR RATE: 103 BPM
EOSINOPHILS ABSOLUTE: 0.18 E9/L (ref 0.05–0.5)
EOSINOPHILS RELATIVE PERCENT: 2.7 % (ref 0–6)
ETHANOL: 83 MG/DL (ref 0–0.08)
FENTANYL SCREEN, URINE: POSITIVE
GFR AFRICAN AMERICAN: >60
GFR NON-AFRICAN AMERICAN: >60 ML/MIN/1.73
GLUCOSE BLD-MCNC: 86 MG/DL (ref 74–99)
HCO3: 32.8 MMOL/L (ref 22–26)
HCT VFR BLD CALC: 52.6 % (ref 37–54)
HEMOGLOBIN: 16.5 G/DL (ref 12.5–16.5)
HHB: 8.4 % (ref 0–5)
IMMATURE GRANULOCYTES #: 0.04 E9/L
IMMATURE GRANULOCYTES %: 0.6 % (ref 0–5)
LAB: ABNORMAL
LYMPHOCYTES ABSOLUTE: 1.94 E9/L (ref 1.5–4)
LYMPHOCYTES RELATIVE PERCENT: 29.3 % (ref 20–42)
Lab: ABNORMAL
Lab: ABNORMAL
MCH RBC QN AUTO: 27.1 PG (ref 26–35)
MCHC RBC AUTO-ENTMCNC: 31.4 % (ref 32–34.5)
MCV RBC AUTO: 86.5 FL (ref 80–99.9)
METER GLUCOSE: 85 MG/DL (ref 74–99)
METHADONE SCREEN, URINE: NOT DETECTED
METHB: 0.5 % (ref 0–1.5)
MODE: ABNORMAL
MONOCYTES ABSOLUTE: 0.58 E9/L (ref 0.1–0.95)
MONOCYTES RELATIVE PERCENT: 8.8 % (ref 2–12)
NEUTROPHILS ABSOLUTE: 3.85 E9/L (ref 1.8–7.3)
NEUTROPHILS RELATIVE PERCENT: 58.1 % (ref 43–80)
O2 CONTENT: 20.7 ML/DL
O2 SATURATION: 91.2 % (ref 92–98.5)
O2HB: 87.1 % (ref 94–97)
OPERATOR ID: 421
OPIATE SCREEN URINE: NOT DETECTED
OXYCODONE URINE: NOT DETECTED
PATIENT TEMP: 37 C
PCO2: 65.2 MMHG (ref 35–45)
PDW BLD-RTO: 15.4 FL (ref 11.5–15)
PH BLOOD GAS: 7.32 (ref 7.35–7.45)
PHENCYCLIDINE SCREEN URINE: NOT DETECTED
PLATELET # BLD: 365 E9/L (ref 130–450)
PMV BLD AUTO: 10.4 FL (ref 7–12)
PO2: 66.5 MMHG (ref 75–100)
POTASSIUM SERPL-SCNC: 3.2 MMOL/L (ref 3.5–5)
PRO-BNP: 26 PG/ML (ref 0–125)
PROCALCITONIN: 0.02 NG/ML (ref 0–0.08)
RBC # BLD: 6.08 E12/L (ref 3.8–5.8)
SALICYLATE, SERUM: <0.3 MG/DL (ref 0–30)
SARS-COV-2, NAAT: NOT DETECTED
SODIUM BLD-SCNC: 142 MMOL/L (ref 132–146)
SOURCE, BLOOD GAS: ABNORMAL
THB: 16.9 G/DL (ref 11.5–16.5)
TIME ANALYZED: 957
TOTAL PROTEIN: 7.4 G/DL (ref 6.4–8.3)
TRICYCLIC ANTIDEPRESSANTS SCREEN SERUM: NEGATIVE NG/ML
TROPONIN: <0.01 NG/ML (ref 0–0.03)
TSH SERPL DL<=0.05 MIU/L-ACNC: 1.74 UIU/ML (ref 0.27–4.2)
WBC # BLD: 6.6 E9/L (ref 4.5–11.5)

## 2020-10-14 PROCEDURE — 84443 ASSAY THYROID STIM HORMONE: CPT

## 2020-10-14 PROCEDURE — G0480 DRUG TEST DEF 1-7 CLASSES: HCPCS

## 2020-10-14 PROCEDURE — 6370000000 HC RX 637 (ALT 250 FOR IP): Performed by: EMERGENCY MEDICINE

## 2020-10-14 PROCEDURE — 96375 TX/PRO/DX INJ NEW DRUG ADDON: CPT

## 2020-10-14 PROCEDURE — 80053 COMPREHEN METABOLIC PANEL: CPT

## 2020-10-14 PROCEDURE — G0378 HOSPITAL OBSERVATION PER HR: HCPCS

## 2020-10-14 PROCEDURE — 71046 X-RAY EXAM CHEST 2 VIEWS: CPT

## 2020-10-14 PROCEDURE — 82805 BLOOD GASES W/O2 SATURATION: CPT

## 2020-10-14 PROCEDURE — 96365 THER/PROPH/DIAG IV INF INIT: CPT

## 2020-10-14 PROCEDURE — 96376 TX/PRO/DX INJ SAME DRUG ADON: CPT

## 2020-10-14 PROCEDURE — U0002 COVID-19 LAB TEST NON-CDC: HCPCS

## 2020-10-14 PROCEDURE — 80307 DRUG TEST PRSMV CHEM ANLYZR: CPT

## 2020-10-14 PROCEDURE — 71275 CT ANGIOGRAPHY CHEST: CPT

## 2020-10-14 PROCEDURE — 36415 COLL VENOUS BLD VENIPUNCTURE: CPT

## 2020-10-14 PROCEDURE — 82962 GLUCOSE BLOOD TEST: CPT

## 2020-10-14 PROCEDURE — 6360000004 HC RX CONTRAST MEDICATION: Performed by: RADIOLOGY

## 2020-10-14 PROCEDURE — 85025 COMPLETE CBC W/AUTO DIFF WBC: CPT

## 2020-10-14 PROCEDURE — 99285 EMERGENCY DEPT VISIT HI MDM: CPT

## 2020-10-14 PROCEDURE — 6370000000 HC RX 637 (ALT 250 FOR IP): Performed by: INTERNAL MEDICINE

## 2020-10-14 PROCEDURE — 94640 AIRWAY INHALATION TREATMENT: CPT

## 2020-10-14 PROCEDURE — 2060000000 HC ICU INTERMEDIATE R&B

## 2020-10-14 PROCEDURE — 85378 FIBRIN DEGRADE SEMIQUANT: CPT

## 2020-10-14 PROCEDURE — 93005 ELECTROCARDIOGRAM TRACING: CPT | Performed by: EMERGENCY MEDICINE

## 2020-10-14 PROCEDURE — 94664 DEMO&/EVAL PT USE INHALER: CPT

## 2020-10-14 PROCEDURE — 6360000002 HC RX W HCPCS: Performed by: INTERNAL MEDICINE

## 2020-10-14 PROCEDURE — 84484 ASSAY OF TROPONIN QUANT: CPT

## 2020-10-14 PROCEDURE — 2580000003 HC RX 258: Performed by: RADIOLOGY

## 2020-10-14 PROCEDURE — 2580000003 HC RX 258: Performed by: INTERNAL MEDICINE

## 2020-10-14 PROCEDURE — 83880 ASSAY OF NATRIURETIC PEPTIDE: CPT

## 2020-10-14 PROCEDURE — 87040 BLOOD CULTURE FOR BACTERIA: CPT

## 2020-10-14 PROCEDURE — 84145 PROCALCITONIN (PCT): CPT

## 2020-10-14 PROCEDURE — 6360000002 HC RX W HCPCS: Performed by: EMERGENCY MEDICINE

## 2020-10-14 PROCEDURE — 93010 ELECTROCARDIOGRAM REPORT: CPT | Performed by: INTERNAL MEDICINE

## 2020-10-14 PROCEDURE — 96374 THER/PROPH/DIAG INJ IV PUSH: CPT

## 2020-10-14 RX ORDER — HYDROXYZINE PAMOATE 25 MG/1
2 CAPSULE ORAL NIGHTLY
Status: ON HOLD | COMMUNITY
End: 2022-02-16 | Stop reason: HOSPADM

## 2020-10-14 RX ORDER — PREDNISONE 20 MG/1
40 TABLET ORAL DAILY
Status: DISCONTINUED | OUTPATIENT
Start: 2020-10-17 | End: 2020-10-15 | Stop reason: HOSPADM

## 2020-10-14 RX ORDER — ARFORMOTEROL TARTRATE 15 UG/2ML
15 SOLUTION RESPIRATORY (INHALATION) 2 TIMES DAILY
Status: DISCONTINUED | OUTPATIENT
Start: 2020-10-14 | End: 2020-10-15 | Stop reason: HOSPADM

## 2020-10-14 RX ORDER — AMLODIPINE BESYLATE 10 MG/1
10 TABLET ORAL DAILY
Status: DISCONTINUED | OUTPATIENT
Start: 2020-10-15 | End: 2020-10-15 | Stop reason: HOSPADM

## 2020-10-14 RX ORDER — BUDESONIDE AND FORMOTEROL FUMARATE DIHYDRATE 160; 4.5 UG/1; UG/1
2 AEROSOL RESPIRATORY (INHALATION) 2 TIMES DAILY
Status: DISCONTINUED | OUTPATIENT
Start: 2020-10-14 | End: 2020-10-14 | Stop reason: CLARIF

## 2020-10-14 RX ORDER — ASPIRIN 81 MG/1
81 TABLET, CHEWABLE ORAL DAILY
Status: DISCONTINUED | OUTPATIENT
Start: 2020-10-15 | End: 2020-10-15 | Stop reason: HOSPADM

## 2020-10-14 RX ORDER — PANTOPRAZOLE SODIUM 40 MG/1
40 TABLET, DELAYED RELEASE ORAL DAILY
Status: DISCONTINUED | OUTPATIENT
Start: 2020-10-15 | End: 2020-10-15 | Stop reason: HOSPADM

## 2020-10-14 RX ORDER — METHYLPREDNISOLONE SODIUM SUCCINATE 125 MG/2ML
125 INJECTION, POWDER, LYOPHILIZED, FOR SOLUTION INTRAMUSCULAR; INTRAVENOUS ONCE
Status: COMPLETED | OUTPATIENT
Start: 2020-10-14 | End: 2020-10-14

## 2020-10-14 RX ORDER — ASPIRIN 81 MG/1
81 TABLET ORAL DAILY
Status: DISCONTINUED | OUTPATIENT
Start: 2020-10-15 | End: 2020-10-15 | Stop reason: HOSPADM

## 2020-10-14 RX ORDER — IBUPROFEN 400 MG/1
400 TABLET ORAL EVERY 6 HOURS PRN
Status: DISCONTINUED | OUTPATIENT
Start: 2020-10-14 | End: 2020-10-15 | Stop reason: HOSPADM

## 2020-10-14 RX ORDER — BUDESONIDE AND FORMOTEROL FUMARATE DIHYDRATE 160; 4.5 UG/1; UG/1
2 AEROSOL RESPIRATORY (INHALATION) 2 TIMES DAILY
Status: ON HOLD | COMMUNITY
End: 2020-11-17 | Stop reason: SDUPTHER

## 2020-10-14 RX ORDER — IPRATROPIUM BROMIDE AND ALBUTEROL SULFATE 2.5; .5 MG/3ML; MG/3ML
1 SOLUTION RESPIRATORY (INHALATION)
Status: DISCONTINUED | OUTPATIENT
Start: 2020-10-14 | End: 2020-10-15 | Stop reason: HOSPADM

## 2020-10-14 RX ORDER — POLYETHYLENE GLYCOL 3350 17 G/17G
17 POWDER, FOR SOLUTION ORAL DAILY PRN
Status: DISCONTINUED | OUTPATIENT
Start: 2020-10-14 | End: 2020-10-15 | Stop reason: HOSPADM

## 2020-10-14 RX ORDER — ACETAMINOPHEN 325 MG/1
650 TABLET ORAL EVERY 6 HOURS PRN
Status: DISCONTINUED | OUTPATIENT
Start: 2020-10-14 | End: 2020-10-15 | Stop reason: HOSPADM

## 2020-10-14 RX ORDER — BUDESONIDE 0.5 MG/2ML
0.5 INHALANT ORAL 2 TIMES DAILY
Status: DISCONTINUED | OUTPATIENT
Start: 2020-10-14 | End: 2020-10-15 | Stop reason: HOSPADM

## 2020-10-14 RX ORDER — GUAIFENESIN 400 MG/1
400 TABLET ORAL 4 TIMES DAILY PRN
Status: DISCONTINUED | OUTPATIENT
Start: 2020-10-14 | End: 2020-10-15 | Stop reason: HOSPADM

## 2020-10-14 RX ORDER — LANOLIN ALCOHOL/MO/W.PET/CERES
3 CREAM (GRAM) TOPICAL NIGHTLY PRN
COMMUNITY
End: 2022-06-02

## 2020-10-14 RX ORDER — ONDANSETRON 2 MG/ML
4 INJECTION INTRAMUSCULAR; INTRAVENOUS EVERY 6 HOURS PRN
Status: DISCONTINUED | OUTPATIENT
Start: 2020-10-14 | End: 2020-10-15 | Stop reason: HOSPADM

## 2020-10-14 RX ORDER — ATORVASTATIN CALCIUM 80 MG/1
80 TABLET, FILM COATED ORAL NIGHTLY
Status: DISCONTINUED | OUTPATIENT
Start: 2020-10-14 | End: 2020-10-15

## 2020-10-14 RX ORDER — ACETAMINOPHEN 650 MG/1
650 SUPPOSITORY RECTAL EVERY 6 HOURS PRN
Status: DISCONTINUED | OUTPATIENT
Start: 2020-10-14 | End: 2020-10-15 | Stop reason: HOSPADM

## 2020-10-14 RX ORDER — SODIUM CHLORIDE 0.9 % (FLUSH) 0.9 %
10 SYRINGE (ML) INJECTION EVERY 12 HOURS SCHEDULED
Status: DISCONTINUED | OUTPATIENT
Start: 2020-10-14 | End: 2020-10-15 | Stop reason: HOSPADM

## 2020-10-14 RX ORDER — ASPIRIN 81 MG/1
324 TABLET, CHEWABLE ORAL ONCE
Status: COMPLETED | OUTPATIENT
Start: 2020-10-14 | End: 2020-10-14

## 2020-10-14 RX ORDER — SODIUM CHLORIDE 0.9 % (FLUSH) 0.9 %
10 SYRINGE (ML) INJECTION PRN
Status: DISCONTINUED | OUTPATIENT
Start: 2020-10-14 | End: 2020-10-15 | Stop reason: HOSPADM

## 2020-10-14 RX ORDER — SODIUM CHLORIDE 0.9 % (FLUSH) 0.9 %
10 SYRINGE (ML) INJECTION ONCE
Status: COMPLETED | OUTPATIENT
Start: 2020-10-14 | End: 2020-10-14

## 2020-10-14 RX ORDER — METHYLPREDNISOLONE SODIUM SUCCINATE 40 MG/ML
40 INJECTION, POWDER, LYOPHILIZED, FOR SOLUTION INTRAMUSCULAR; INTRAVENOUS EVERY 8 HOURS
Status: DISCONTINUED | OUTPATIENT
Start: 2020-10-14 | End: 2020-10-15 | Stop reason: HOSPADM

## 2020-10-14 RX ORDER — GLIPIZIDE 5 MG/1
5 TABLET ORAL 2 TIMES DAILY
Status: DISCONTINUED | OUTPATIENT
Start: 2020-10-14 | End: 2020-10-15 | Stop reason: HOSPADM

## 2020-10-14 RX ORDER — IPRATROPIUM BROMIDE AND ALBUTEROL SULFATE 2.5; .5 MG/3ML; MG/3ML
1 SOLUTION RESPIRATORY (INHALATION)
Status: DISCONTINUED | OUTPATIENT
Start: 2020-10-15 | End: 2020-10-15 | Stop reason: HOSPADM

## 2020-10-14 RX ORDER — IPRATROPIUM BROMIDE AND ALBUTEROL SULFATE 2.5; .5 MG/3ML; MG/3ML
1 SOLUTION RESPIRATORY (INHALATION) ONCE
Status: COMPLETED | OUTPATIENT
Start: 2020-10-14 | End: 2020-10-14

## 2020-10-14 RX ORDER — NITROGLYCERIN 0.4 MG/1
0.4 TABLET SUBLINGUAL EVERY 5 MIN PRN
Status: DISCONTINUED | OUTPATIENT
Start: 2020-10-14 | End: 2020-10-15 | Stop reason: HOSPADM

## 2020-10-14 RX ORDER — LANOLIN ALCOHOL/MO/W.PET/CERES
3 CREAM (GRAM) TOPICAL NIGHTLY PRN
Status: DISCONTINUED | OUTPATIENT
Start: 2020-10-14 | End: 2020-10-15 | Stop reason: HOSPADM

## 2020-10-14 RX ORDER — TAMSULOSIN HYDROCHLORIDE 0.4 MG/1
0.4 CAPSULE ORAL 2 TIMES DAILY
Status: DISCONTINUED | OUTPATIENT
Start: 2020-10-14 | End: 2020-10-15 | Stop reason: HOSPADM

## 2020-10-14 RX ADMIN — ASPIRIN 324 MG: 81 TABLET, CHEWABLE ORAL at 19:34

## 2020-10-14 RX ADMIN — TAMSULOSIN HYDROCHLORIDE 0.4 MG: 0.4 CAPSULE ORAL at 23:06

## 2020-10-14 RX ADMIN — METHYLPREDNISOLONE SODIUM SUCCINATE 125 MG: 125 INJECTION, POWDER, FOR SOLUTION INTRAMUSCULAR; INTRAVENOUS at 16:47

## 2020-10-14 RX ADMIN — METHYLPREDNISOLONE SODIUM SUCCINATE 40 MG: 40 INJECTION, POWDER, FOR SOLUTION INTRAMUSCULAR; INTRAVENOUS at 23:06

## 2020-10-14 RX ADMIN — IPRATROPIUM BROMIDE AND ALBUTEROL SULFATE 1 AMPULE: .5; 3 SOLUTION RESPIRATORY (INHALATION) at 12:34

## 2020-10-14 RX ADMIN — Medication 10 ML: at 13:48

## 2020-10-14 RX ADMIN — IOPAMIDOL 75 ML: 755 INJECTION, SOLUTION INTRAVENOUS at 13:48

## 2020-10-14 RX ADMIN — CEFTRIAXONE SODIUM 1 G: 1 INJECTION, POWDER, FOR SOLUTION INTRAMUSCULAR; INTRAVENOUS at 23:06

## 2020-10-14 RX ADMIN — Medication 3 MG: at 23:06

## 2020-10-14 RX ADMIN — IPRATROPIUM BROMIDE AND ALBUTEROL SULFATE 1 AMPULE: .5; 3 SOLUTION RESPIRATORY (INHALATION) at 14:45

## 2020-10-14 RX ADMIN — AZITHROMYCIN DIHYDRATE 500 MG: 500 INJECTION, POWDER, LYOPHILIZED, FOR SOLUTION INTRAVENOUS at 23:06

## 2020-10-14 ASSESSMENT — PAIN DESCRIPTION - LOCATION
LOCATION: CHEST
LOCATION: CHEST

## 2020-10-14 ASSESSMENT — PAIN SCALES - GENERAL
PAINLEVEL_OUTOF10: 8
PAINLEVEL_OUTOF10: 8

## 2020-10-14 ASSESSMENT — PAIN DESCRIPTION - PAIN TYPE
TYPE: ACUTE PAIN
TYPE: ACUTE PAIN

## 2020-10-14 ASSESSMENT — PAIN DESCRIPTION - ORIENTATION: ORIENTATION: MID

## 2020-10-14 ASSESSMENT — PAIN DESCRIPTION - DESCRIPTORS: DESCRIPTORS: HEAVINESS

## 2020-10-14 NOTE — ED NOTES
Bed:   Expected date:   Expected time:   Means of arrival:   Comments:  triage     Allyson Lynch RN  10/14/20 1650

## 2020-10-14 NOTE — ED NOTES
Pt refused CTA due to being claustrophobic and unable to keep mask up. Dr. Carolee Orantes aware.      Esequiel Dominguez, RN  10/14/20 5337

## 2020-10-14 NOTE — ED NOTES
Pt will do CTA per dr Brown if pt can get a warm meal and a breathing tx     Elizabeth Novak RN  10/14/20 0480 no

## 2020-10-14 NOTE — H&P
Hospital Medicine History & Physical      PCP: No primary care provider on file. Date of Admission: 10/14/2020    Date of Service: Pt seen/examined on 10/14/2020 and is admitted to Inpatient with expected LOS greater than two midnights due to medical therapy. Chief Complaint:  had concerns including Chest Pain (midsternal chest pain with sob and cough, ongoing for a couple of days. allegedly was in 76s on room air and placed on 6L O2 via NC by [facility] staff). History Of Present Illness:    Mr. Barbara Barriga, a 44y.o. year old male  who  has a past medical history of Acid reflux, Asthma, Asthma, COPD (chronic obstructive pulmonary disease) ([address] Hypertension, Pancreatitis, Prediabetes, Psychiatric problem, Sleep apnea, and Substance abuse ([address] Patient comes to hospital with complaints of increasing shortness of breath over the last few days. Patient refers that for at least 3 to 4 days he has been having some cough and associated shortness of breath. He refers no fever, no chills. He refers that shortness of breath has been progressive getting worse. He also described he of this admission he was sitting all of a sudden he started having some chest pain. He described the chest pain was localized in the middle. Pain was pressure-like. Intensity was 7/10. Patient refers that did not take any medication to relieve the pain. She came to the ER for further evaluation. In the ER patient was found with hypoxemia. Patient was tachypneic with a saturation of 70% at room air in the ER.       Past Medical History:        Diagnosis Date    Acid reflux     Asthma     Asthma     COPD (chronic obstructive pulmonary disease) ([address]     Hypertension     Pancreatitis     Prediabetes     Psychiatric problem     depressed    Sleep apnea     Substance abuse ([facility]     alcohol and cocaine       Past Surgical History:        Procedure Laterality Date    COLONOSCOPY      2010 neg    NERVE BLOCK Left 08/27/2018    lumbar epidural #1 paramedian    ND NJX DX/THER SBST INTRLMNR LMBR/SAC W/IMG GDN Left 8/27/2018    LUMBAR EPIDURAL STEROID INJECTION L4-5 LEFT PARAMEDIAN #1 performed by Lamont Feng MD at [address] [place] [address]         Medications Prior to Admission:      Prior to Admission medications    Medication Sig Start Date End Date Taking? Authorizing Provider   melatonin 3 MG TABS tablet Take 3 mg by mouth nightly as needed (sleep)   Yes Historical Provider, MD   nicotine polacrilex (NICORETTE) 4 MG gum Take 4 mg by mouth as needed for Smoking cessation   Yes Historical Provider, MD   budesonide-formoterol (SYMBICORT) 160-4.5 MCG/ACT AERO Inhale 2 puffs into the lungs 2 times daily   Yes Historical Provider, MD   hydrOXYzine Pamoate (VISTARIL PO) Take 2 tablets by mouth nightly   Yes Historical Provider, MD   guaiFENesin 400 MG tablet Take 1 tablet by mouth 4 times daily as needed for Cough 6/30/20  Yes MIKE Fu - CNP   albuterol sulfate HFA (PROVENTIL HFA) 108 (90 Base) MCG/ACT inhaler Inhale 2 puffs into the lungs every 6 hours as needed for Wheezing 5/19/20  Yes MIKE Mcneill - CNS   glipiZIDE (GLUCOTROL) 5 MG tablet Take 1 tablet by mouth 2 times daily 4/23/20  Yes Bebo Brooks MD   pantoprazole (PROTONIX) 40 MG tablet Take 40 mg by mouth daily   Yes Historical Provider, MD   ibuprofen (ADVIL;MOTRIN) 200 MG tablet Take 400 mg by mouth every 6 hours as needed for Pain    Yes Historical Provider, MD   aspirin 81 MG tablet Take 81 mg by mouth daily   Yes Historical Provider, MD   tamsulosin (FLOMAX) 0.4 MG capsule Take 0.4 mg by mouth 2 times daily    Yes Historical Provider, MD   amLODIPine (NORVASC) 10 MG tablet Take 1 tablet by mouth daily. 1/16/15  Yes Lisa Norman MD       Allergies:  Dust mite extract    Social History:    TOBACCO:   reports that he has been smoking cigarettes and cigars. He has a 28.00 pack-year smoking history.  He has never used smokeless tobacco.  ETOH:   reports current alcohol use. Family History:    Reviewed in detail and negative for DM, CAD, Cancer, CVA. Positive as follows\"      Problem Relation Age of Onset    Other Sister     High Blood Pressure Maternal Grandmother     Other Maternal Grandmother          REVIEW OF SYSTEMS:   Pertinent positives as noted in the HPI. All other systems reviewed and negative. PHYSICAL EXAM:  BP (!) 133/95   Pulse 104   Temp 97.9 °F (36.6 °C)   Resp 20   Ht 5' 9\" (1.753 m)   Wt 265 lb (120.2 kg)   SpO2 94%   BMI 39.13 kg/m²   General appearance: No apparent distress, appears stated age and cooperative. HEENT: Normal cephalic, atraumatic without obvious deformity. Pupils equal, round, and reactive to light. Extra ocular muscles intact. Conjunctivae/corneas clear. Neck: Supple, with full range of motion. No jugular venous distention. Trachea midline. Respiratory: Wheezes/rhonchi bilaterally  Cardiovascular: Regular rate and rhythm with normal S1/S2 without murmurs, rubs or gallops. Brisk capillary refill. 2+ lower extremity pulses (dorsalis pedis). Abdomen: Soft, non-tender, non-distended with normal bowel sounds. Musculoskeletal: No clubbing, cyanosis or edema bilaterally. Full range of motion without deformity. Brisk capillary refill. Skin: Normal skin color. No rashes or lesions. Neurologic:  Neurovascularly intact without any focal sensory/motor deficits.  Cranial nerves: II-XII intact, grossly non-focal.  Psychiatric: Alert and oriented, thought content appropriate, normal insight    Reviewed EKG and CXR personally    CBC:   Recent Labs     10/14/20  0859   WBC 6.6   RBC 6.08*   HGB 16.5   HCT 52.6   MCV 86.5   RDW 15.4*        BMP:   Recent Labs     10/14/20  0859      K 3.2*   CL 92*   CO2 34*   BUN 6   CREATININE 0.8     LFT:  Recent Labs     10/14/20  0859   PROT 7.4   ALKPHOS 108   ALT 68*   AST 95*   BILITOT 0.6     CE:  Recent Labs     10/14/20  0859 10/14/20  1443   TROPONINI <0.01 <0.01     PT/INR: No results for input(s): INR, APTT in the last 72 hours. BNP: No results for input(s): BNP in the last 72 hours. ESR:   Lab Results   Component Value Date    SEDRATE 1 06/25/2020     CRP:   Lab Results   Component Value Date    CRP 6.0 (H) 03/16/2013     D Dimer:   Lab Results   Component Value Date    DDIMER 220 10/14/2020     Folate and B12: No results found for: [id number], No results found for: FOLATE  Lactic Acid:   Lab Results   Component Value Date    LACTA 1.1 06/26/2020     Thyroid Studies:   Lab Results   Component Value Date    TSH 1.740 10/14/2020       Oupatient labs:  Lab Results   Component Value Date    CHOL 156 04/20/2020    TRIG 109 04/20/2020    HDL 58 04/20/2020    LDLCALC 76 04/20/2020    TSH 1.740 10/14/2020    PSA 0.60 10/28/2015    INR 1.0 06/25/2020    LABA1C 6.2 (H) 05/14/2020       Urinalysis:    Lab Results   Component Value Date    NITRU Negative 06/25/2020    WBCUA 1-3 06/25/2020    BACTERIA FEW 06/25/2020    RBCUA NONE 06/25/2020    RBCUA NONE 02/13/2014    BLOODU LARGE 06/25/2020    SPECGRAV >=1.030 06/25/2020    GLUCOSEU Negative 06/25/2020       Imaging:  Xr Chest (2 Vw)    Result Date: 10/14/2020  EXAMINATION: TWO XRAY VIEWS OF THE CHEST 10/14/2020 9:24 am COMPARISON: 01/29/2020. HISTORY: ORDERING SYSTEM PROVIDED HISTORY: dyspnea TECHNOLOGIST PROVIDED HISTORY: Reason for exam:->dyspnea What reading provider will be dictating this exam?->CRC FINDINGS: The heart size is within normal limits. The pulmonary vasculature is also within normal limits. No acute infiltrates are seen. The costophrenic angles are sharp bilaterally. No pneumothoraces are noted. 1. No active pulmonary disease.      Cta Pulmonary W Contrast    Result Date: 10/14/2020  EXAMINATION: CTA OF THE CHEST 10/14/2020 1:42 pm TECHNIQUE: CTA of the chest was performed after the administration of intravenous contrast.  Multiplanar reformatted images are provided for review. MIP images are provided for review. Dose modulation, iterative reconstruction, and/or weight based adjustment of the mA/kV was utilized to reduce the radiation dose to as low as reasonably achievable. COMPARISON: None. HISTORY: ORDERING SYSTEM PROVIDED HISTORY: rule out PE TECHNOLOGIST PROVIDED HISTORY: Reason for exam:->rule out PE What reading provider will be dictating this exam?->CRC FINDINGS: There is respiratory motion artifact noted throughout the examination which limits fine detail and causes artifact through the pulmonary arteries of the right upper lobe and within the distal left main pulmonary artery. Pulmonary Arteries: Pulmonary arteries are adequately opacified for evaluation. No evidence of intraluminal filling defect to suggest pulmonary embolism. Main pulmonary artery is normal in caliber. Mediastinum: No evidence of mediastinal lymphadenopathy. The heart and pericardium demonstrate no acute abnormality. There is no acute abnormality of the thoracic aorta. Lungs/pleura: The lungs are without acute process. No focal consolidation or pulmonary edema. No evidence of pleural effusion or pneumothorax. Upper Abdomen: Limited images of the upper abdomen are unremarkable. Soft Tissues/Bones: No acute bone or soft tissue abnormality. 1. No gross pulmonary embolus is noted. Please note there is respiratory motion artifact noted throughout the examination which limits fine detail of the distal pulmonary arteries. If there is high suspicion for pulmonary embolus repeat CT scan can be obtained in 24 hours or V/Q scan can be considered. .       ASSESSMENT:    COPD exacerbation  Acute hypoxemic respiratory failure  Acute bronchitis  Atypical chest pain  Hypertension  Sleep apnea  Previous history of substance abuse        PLAN    Solu-Medrol IV  DuoNeb  Follow next set of troponin  Monitor on telemetry  CTA showed no evidence of PE  COVID-19 test negative  Toxicology cocaine positive  Echocardiogram  Ceftriaxone IV  Azithromycin IV  Follow neck set of troponins  Procalcitonin      Echocardiogram 4/20/2014  Summary   Left ventricular size is grossly normal.   Borderline left ventricular concentric hypertrophy noted. Ejection fraction is visually estimated at 65%. No evidence of left ventricular mass or thrombus noted. No regional wall motion abnormalities seen. The left atrium is mildly dilated. Interatrial septum appears intact. No evidence of thrombus within left atrium. No evidence of mass within left atrium. Physiologic and/or trace mitral regurgitation is present. No evidence of mitral valve stenosis. Physiologic and/or trace tricuspid regurgitation. RVSP is 26.00 mmHg. Regular rhythm            Diet: No diet orders on file  Code Status: Prior    PT/OT Eval Status: [x] Ordered [] Evaluation noted [] Not applicable  DVT Prophylaxis: [x]Lovenox []Heparin []PCD [] [address] []Encouraged ambulation    Disposition: [x]Med/Surg [] Intermediate [] ICU/CCU  Admit status: [] Observation [x] Inpatient     +++++++++++++++++++++++++++++++++++++++++++++++++  Tao Sanchez MD, Hospitalist  +++++++++++++++++++++++++++++++++++++++++++++++++  NOTE: This report was transcribed using voice recognition software. Every effort was made to ensure accuracy; however, inadvertent computerized transcription errors may be present.

## 2020-10-14 NOTE — ED PROVIDER NOTES
HPI:  10/14/20, Time: 10:37 AM EDT         Angelika Goodiwn is a 44 y.o. male presenting to the ED for chest pain and shortness of breath with cough, beginning 4 days ago. He presents today with O2 sat with 70s on room air. The complaint has been persistent, moderate in severity, and worsened by nothing. He denies drug or alcohol use. Denies exposure to Covid or concerns for covid. Patient denies fever/chills, edema, headache, visual disturbances, focal paresthesias, focal weakness, abdominal pain, nausea, vomiting, diarrhea, constipation, dysuria, hematuria, trauma, neck or back pain or other complaints. ROS:   Pertinent positives and negatives are stated within HPI, all other systems reviewed and are negative.      --------------------------------------------- PAST HISTORY ---------------------------------------------  Past Medical History:  has a past medical history of Acid reflux, Asthma, Asthma, COPD (chronic obstructive pulmonary disease) ([facility] Hypertension, Pancreatitis, Prediabetes, Psychiatric problem, Sleep apnea, and Substance abuse ([facility] Past Surgical History:  has a past surgical history that includes [place] tooth extraction; Colonoscopy; Nerve Block (Left, 08/27/2018); and pr njx dx/ther sbst intrlmnr lmbr/sac w/img gdn (Left, 8/27/2018). Social History:  reports that he has been smoking cigarettes and cigars. He has a 28.00 pack-year smoking history. He has never used smokeless tobacco. He reports current alcohol use. He reports current drug use. Drugs:  and Cocaine. Family History: family history includes High Blood Pressure in his maternal grandmother; Other in his maternal grandmother and sister. The patients home medications have been reviewed.     Allergies: Dust mite extract        ---------------------------------------------------PHYSICAL EXAM--------------------------------------    Constitutional:  Well developed, well nourished, no acute distress, non-toxic appearance Eyes:  PERRL, conjunctiva normal, EOMI  HENT:  Atraumatic, external ears normal, nose normal, oropharynx moist. Neck- normal range of motion, no nuchal rigidity  Respiratory:  No respiratory distress, normal breath sounds, no rales, mild diffuse expirotory wheezing   Cardiovascular:  Tachycardic rate, normal rhythm, no murmurs, no gallops, no rubs. Radial and DP pulses 2+ bilaterally. GI:  Soft, nondistended, normal bowel sounds, nontender, no rebound, no guarding   :  No costovertebral angle tenderness   Musculoskeletal:  No edema, no tenderness, no deformities. Back- no tenderness  Integument:  Well hydrated, no rash. Adequate perfusion. Lymphatic:  No cervical lymphadenopathy noted   Neurologic:  Alert & oriented x 3, CN 2-12 normal, no focal deficits noted. Psychiatric:  Speech and behavior appropriate       -------------------------------------------------- RESULTS -------------------------------------------------  I have personally reviewed all laboratory and imaging results for this patient. Results are listed below.      LABS:  Results for orders placed or performed during the hospital encounter of 10/14/20   CBC auto differential   Result Value Ref Range    WBC 6.6 4.5 - 11.5 E9/L    RBC 6.08 (H) 3.80 - 5.80 E12/L    Hemoglobin 16.5 12.5 - 16.5 g/dL    Hematocrit 52.6 37.0 - 54.0 %    MCV 86.5 80.0 - 99.9 fL    MCH 27.1 26.0 - 35.0 pg    MCHC 31.4 (L) 32.0 - 34.5 %    RDW 15.4 (H) 11.5 - 15.0 fL    Platelets 185 167 - 418 E9/L    MPV 10.4 7.0 - 12.0 fL    Neutrophils % 58.1 43.0 - 80.0 %    Immature Granulocytes % 0.6 0.0 - 5.0 %    Lymphocytes % 29.3 20.0 - 42.0 %    Monocytes % 8.8 2.0 - 12.0 %    Eosinophils % 2.7 0.0 - 6.0 %    Basophils % 0.5 0.0 - 2.0 %    Neutrophils Absolute 3.85 1.80 - 7.30 E9/L    Immature Granulocytes # 0.04 E9/L    Lymphocytes Absolute 1.94 1.50 - 4.00 E9/L    Monocytes Absolute 0.58 0.10 - 0.95 E9/L    Eosinophils Absolute 0.18 0.05 - 0.50 E9/L    Basophils Absolute 0. 03 0.00 - 0.20 E9/L   Comprehensive Metabolic Panel   Result Value Ref Range    Sodium 142 132 - 146 mmol/L    Potassium 3.2 (L) 3.5 - 5.0 mmol/L    Chloride 92 (L) 98 - 107 mmol/L    CO2 34 (H) 22 - 29 mmol/L    Anion Gap 16 7 - 16 mmol/L    Glucose 86 74 - 99 mg/dL    BUN 6 6 - 20 mg/dL    CREATININE 0.8 0.7 - 1.2 mg/dL    GFR Non-African American >60 >=60 mL/min/1.73    GFR African American >60     Calcium 8.7 8.6 - 10.2 mg/dL    Total Protein 7.4 6.4 - 8.3 g/dL    Alb 4.3 3.5 - 5.2 g/dL    Total Bilirubin 0.6 0.0 - 1.2 mg/dL    Alkaline Phosphatase 108 40 - 129 U/L    ALT 68 (H) 0 - 40 U/L    AST 95 (H) 0 - 39 U/L   Troponin   Result Value Ref Range    Troponin <0.01 0.00 - 0.03 ng/mL   D-Dimer, Quantitative   Result Value Ref Range    D-Dimer, Quant 220 ng/mL DDU   Brain Natriuretic Peptide   Result Value Ref Range    Pro-BNP 26 0 - 125 pg/mL   TSH without Reflex   Result Value Ref Range    TSH 1.740 0.270 - 4.200 uIU/mL   Serum Drug Screen   Result Value Ref Range    Ethanol Lvl 83 mg/dL    Acetaminophen Level <5.0 (L) 10.0 - 87.2 mcg/mL    Salicylate, Serum <6.0 0.0 - 30.0 mg/dL    TCA Scrn NEGATIVE Cutoff:300 ng/mL   Urine Drug Screen   Result Value Ref Range    Amphetamine Screen, Urine NOT DETECTED Negative <1000 ng/mL    Barbiturate Screen, Ur NOT DETECTED Negative < 200 ng/mL    Benzodiazepine Screen, Urine NOT DETECTED Negative < 200 ng/mL    Cannabinoid Scrn, Ur NOT DETECTED Negative < 50ng/mL    Cocaine Metabolite Screen, Urine POSITIVE (A) Negative < 300 ng/mL    Opiate Scrn, Ur NOT DETECTED Negative < 300ng/mL    PCP Screen, Urine NOT DETECTED Negative < 25 ng/mL    Methadone Screen, Urine NOT DETECTED Negative <300 ng/mL    Oxycodone Urine NOT DETECTED Negative <100 ng/mL    FENTANYL SCREEN, URINE POSITIVE (A) Negative <1 ng/mL    Drug Screen Comment: see below    Blood Gas, Arterial   Result Value Ref Range    Date Analyzed [id number]     Time Analyzed 0957     Source: Blood Arterial     pH, Blood Gas 7.320 (L) 7.350 - 7.450    PCO2 65.2 (H) 35.0 - 45.0 mmHg    PO2 66.5 (L) 75.0 - 100.0 mmHg    HCO3 32.8 (H) 22.0 - 26.0 mmol/L    B.E. 4.2 (H) -3.0 - 3.0 mmol/L    O2 Sat 91.2 (L) 92.0 - 98.5 %    O2Hb 87.1 (L) 94.0 - 97.0 %    COHb 4.0 (H) 0.0 - 1.5 %    MetHb 0.5 0.0 - 1.5 %    O2 Content 20.7 mL/dL    HHb 8.4 (H) 0.0 - 5.0 %    tHb (est) 16.9 (H) 11.5 - 16.5 g/dL    Mode NC- 4 L     Date Of Collection      Time Collected      Pt Temp 37.0 C     ID [id number]     Lab [id number]     Critical(s) Notified . No Critical Values    COVID-19   Result Value Ref Range    SARS-CoV-2, NAAT Not Detected Not Detected   Troponin   Result Value Ref Range    Troponin <0.01 0.00 - 0.03 ng/mL   POCT Glucose   Result Value Ref Range    Meter Glucose 85 74 - 99 mg/dL   EKG 12 Lead   Result Value Ref Range    Ventricular Rate 103 BPM    Atrial Rate 103 BPM    P-R Interval 154 ms    QRS Duration 102 ms    Q-T Interval 366 ms    QTc Calculation (Bazett) 479 ms    P Axis 59 degrees    R Axis -57 degrees    T Axis 69 degrees       RADIOLOGY:  Interpreted by Radiologist.  CTA PULMONARY W CONTRAST   Final Result   1. No gross pulmonary embolus is noted. Please note there is respiratory   motion artifact noted throughout the examination which limits fine detail of   the distal pulmonary arteries. If there is high suspicion for pulmonary   embolus repeat CT scan can be obtained in 24 hours or V/Q scan can be   considered. .         XR CHEST (2 VW)   Final Result   1. No active pulmonary disease.                EKG Interpretation  Interpreted by emergency department physician,    Time: 5294  Rhythm: sinus tachycardia  Rate: 103  Axis: left  Conduction: normal  ST Segments: no acute change  T Waves: no acute change  Clinical Impression: sinus tachycardia  Comparison to prior EKG: None      ------------------------- NURSING NOTES AND VITALS REVIEWED ---------------------------   The nursing notes within the ED encounter and vital signs improving  Repeat physical examination is improved        Consultations:             4:42 PM EDT  Spoke with Dr India Brower, discussed case, accepts admission for hypoxia (?copd) and chest pain rule out    Critical Care: Please note that the withdrawal or failure to initiate urgent interventions for this patient would likely result in a life threatening deterioration or permanent disability. Accordingly this patient received 35 minutes of critical care time, excluding separately billable procedures. Counseling: The emergency provider has spoken with the patient and discussed todays results, in addition to providing specific details for the plan of care and counseling regarding the diagnosis and prognosis. Questions are answered at this time and they are agreeable with the plan.       --------------------------------- IMPRESSION AND DISPOSITION ---------------------------------    IMPRESSION  1. Hypoxia    2. Chest pain, unspecified type    3.  Cocaine use        DISPOSITION  Disposition: Admit to telemetry  Patient condition is stable                 Chanelle Her, DO  10/14/20 Manfred Hernandez, DO  10/14/20 [id number]

## 2020-10-15 VITALS
RESPIRATION RATE: 18 BRPM | HEIGHT: 69 IN | HEART RATE: 86 BPM | TEMPERATURE: 97.2 F | OXYGEN SATURATION: 93 % | WEIGHT: 258.4 LBS | DIASTOLIC BLOOD PRESSURE: 74 MMHG | SYSTOLIC BLOOD PRESSURE: 127 MMHG | BODY MASS INDEX: 38.27 KG/M2

## 2020-10-15 PROBLEM — J44.1 COPD EXACERBATION (HCC): Status: ACTIVE | Noted: 2020-10-15

## 2020-10-15 LAB
BASOPHILS ABSOLUTE: 0.01 E9/L (ref 0–0.2)
BASOPHILS RELATIVE PERCENT: 0.1 % (ref 0–2)
EOSINOPHILS ABSOLUTE: 0 E9/L (ref 0.05–0.5)
EOSINOPHILS RELATIVE PERCENT: 0 % (ref 0–6)
HCT VFR BLD CALC: 52.6 % (ref 37–54)
HEMOGLOBIN: 16.3 G/DL (ref 12.5–16.5)
IMMATURE GRANULOCYTES #: 0.04 E9/L
IMMATURE GRANULOCYTES %: 0.5 % (ref 0–5)
LYMPHOCYTES ABSOLUTE: 0.62 E9/L (ref 1.5–4)
LYMPHOCYTES RELATIVE PERCENT: 7.9 % (ref 20–42)
MCH RBC QN AUTO: 27.1 PG (ref 26–35)
MCHC RBC AUTO-ENTMCNC: 31 % (ref 32–34.5)
MCV RBC AUTO: 87.5 FL (ref 80–99.9)
MONOCYTES ABSOLUTE: 0.18 E9/L (ref 0.1–0.95)
MONOCYTES RELATIVE PERCENT: 2.3 % (ref 2–12)
NEUTROPHILS ABSOLUTE: 6.99 E9/L (ref 1.8–7.3)
NEUTROPHILS RELATIVE PERCENT: 89.2 % (ref 43–80)
PDW BLD-RTO: 14.4 FL (ref 11.5–15)
PLATELET # BLD: 351 E9/L (ref 130–450)
PMV BLD AUTO: 9.9 FL (ref 7–12)
RBC # BLD: 6.01 E12/L (ref 3.8–5.8)
TROPONIN: <0.01 NG/ML (ref 0–0.03)
TROPONIN: <0.01 NG/ML (ref 0–0.03)
WBC # BLD: 7.8 E9/L (ref 4.5–11.5)

## 2020-10-15 PROCEDURE — G0378 HOSPITAL OBSERVATION PER HR: HCPCS

## 2020-10-15 PROCEDURE — 36415 COLL VENOUS BLD VENIPUNCTURE: CPT

## 2020-10-15 PROCEDURE — 2700000000 HC OXYGEN THERAPY PER DAY

## 2020-10-15 PROCEDURE — 96372 THER/PROPH/DIAG INJ SC/IM: CPT

## 2020-10-15 PROCEDURE — 6370000000 HC RX 637 (ALT 250 FOR IP): Performed by: INTERNAL MEDICINE

## 2020-10-15 PROCEDURE — 99222 1ST HOSP IP/OBS MODERATE 55: CPT | Performed by: INTERNAL MEDICINE

## 2020-10-15 PROCEDURE — APPSS60 APP SPLIT SHARED TIME 46-60 MINUTES: Performed by: PHYSICIAN ASSISTANT

## 2020-10-15 PROCEDURE — 96366 THER/PROPH/DIAG IV INF ADDON: CPT

## 2020-10-15 PROCEDURE — 84484 ASSAY OF TROPONIN QUANT: CPT

## 2020-10-15 PROCEDURE — 6360000002 HC RX W HCPCS: Performed by: INTERNAL MEDICINE

## 2020-10-15 PROCEDURE — 96376 TX/PRO/DX INJ SAME DRUG ADON: CPT

## 2020-10-15 PROCEDURE — 94640 AIRWAY INHALATION TREATMENT: CPT

## 2020-10-15 PROCEDURE — 93005 ELECTROCARDIOGRAM TRACING: CPT | Performed by: INTERNAL MEDICINE

## 2020-10-15 PROCEDURE — 2580000003 HC RX 258: Performed by: INTERNAL MEDICINE

## 2020-10-15 PROCEDURE — 85025 COMPLETE CBC W/AUTO DIFF WBC: CPT

## 2020-10-15 RX ORDER — PREDNISONE 10 MG/1
40 TABLET ORAL SEE ADMIN INSTRUCTIONS
Qty: 20 TABLET | Refills: 0 | Status: SHIPPED | OUTPATIENT
Start: 2020-10-15 | End: 2020-10-20

## 2020-10-15 RX ORDER — IPRATROPIUM BROMIDE AND ALBUTEROL SULFATE 2.5; .5 MG/3ML; MG/3ML
3 SOLUTION RESPIRATORY (INHALATION) EVERY 4 HOURS PRN
Qty: 120 ML | Refills: 0 | Status: ON HOLD | OUTPATIENT
Start: 2020-10-15 | End: 2020-11-17

## 2020-10-15 RX ADMIN — ASPIRIN 81 MG: 81 TABLET, COATED ORAL at 11:00

## 2020-10-15 RX ADMIN — TAMSULOSIN HYDROCHLORIDE 0.4 MG: 0.4 CAPSULE ORAL at 11:00

## 2020-10-15 RX ADMIN — ATORVASTATIN CALCIUM 80 MG: 80 TABLET, FILM COATED ORAL at 00:51

## 2020-10-15 RX ADMIN — GLIPIZIDE 5 MG: 5 TABLET ORAL at 11:00

## 2020-10-15 RX ADMIN — METHYLPREDNISOLONE SODIUM SUCCINATE 40 MG: 40 INJECTION, POWDER, FOR SOLUTION INTRAMUSCULAR; INTRAVENOUS at 05:58

## 2020-10-15 RX ADMIN — BUDESONIDE 500 MCG: 0.5 SUSPENSION RESPIRATORY (INHALATION) at 08:18

## 2020-10-15 RX ADMIN — PANTOPRAZOLE SODIUM 40 MG: 40 TABLET, DELAYED RELEASE ORAL at 11:00

## 2020-10-15 RX ADMIN — ARFORMOTEROL TARTRATE 15 MCG: 15 SOLUTION RESPIRATORY (INHALATION) at 08:17

## 2020-10-15 RX ADMIN — ENOXAPARIN SODIUM 40 MG: 40 INJECTION SUBCUTANEOUS at 11:01

## 2020-10-15 RX ADMIN — IPRATROPIUM BROMIDE AND ALBUTEROL SULFATE 1 AMPULE: .5; 3 SOLUTION RESPIRATORY (INHALATION) at 08:16

## 2020-10-15 RX ADMIN — GLIPIZIDE 5 MG: 5 TABLET ORAL at 00:51

## 2020-10-15 RX ADMIN — IPRATROPIUM BROMIDE AND ALBUTEROL SULFATE 1 AMPULE: .5; 3 SOLUTION RESPIRATORY (INHALATION) at 16:41

## 2020-10-15 RX ADMIN — SODIUM CHLORIDE, PRESERVATIVE FREE 10 ML: 5 INJECTION INTRAVENOUS at 10:59

## 2020-10-15 RX ADMIN — AMLODIPINE BESYLATE 10 MG: 10 TABLET ORAL at 11:00

## 2020-10-15 ASSESSMENT — PAIN SCALES - GENERAL
PAINLEVEL_OUTOF10: 0
PAINLEVEL_OUTOF10: 0

## 2020-10-15 NOTE — CONSULTS
Inpatient Cardiology Consultation      Reason for Consult:  Chest pain     Consulting Physician: Dr Jeaneth Ocampo     Requesting Physician:  Dr Pastor Horner     Date of Consultation: 10/15/2020    HISTORY OF PRESENT ILLNESS:   Mr Radha Vines is a 45 yo  male who is not previously known to [facility]. He has a PMH that includes hypertension, COPD, tobacco abuse, cocaine abuse, diabetes mellitues, asthma, obstructive sleep apnea, pancreatitis. Hospitalization 6/25/2020-7/1/2020 with CAP, COPD exacerbation,   rhabdomyolysis. + cocaine at that time. Presented to [facility] 10/14/2020 with shortness of breath. VS: 97.3-115-22-70%RA-133/95   Placed on 6L NC --> improved to 94%     Labs: WBC 6.6, H&H 16.5/52.6, Plt 365. K+ 3.2, BUN/SCr 6/0.8, glucose 86. Troponin negtaive, ProBNP 26  DDimer 220  ETOH 83   + cocaine + fentanyl     Blood gases: pH 7.32, PCO2 65.2, PO2 66.5, HCO3 32.8   COVID 19 negative     CTA pulmonary negative for pulmonary emboli, with consideration for high respiratory motion artifact. CXR negative     He was treated for acute COPD exacerbation, started on Abx and given SL NG x1. He was admitted to a telemetry monitoring floor. Cardiology was asked to see the patient for chest pain. -150, HR . Troponin negative x3. He states that he has had progressive shortness of breath and wheezing over the last 2 days. He has not had his meds for at least 4 days and has been out drinking and using cocaine. He states that he has like this whenever he gets off his medicine. He states that he has had one episode of chest pain associated with the shortness of breath that is midsternal described as a pressure sensation. It is nonradiating and lasted for a few minutes while he was at exertion. He states that he is just more tired over the last few days because he cannot seem to catch his breath.   He does have oxygen at home that he wears as needed 3 L nasal cannula but has not been using it very frequently. He is a current smoker about a pack every 2 days for the last 20 years. He states that with exertion he gets palpitations and over the last few days he has had orthopnea and PND sleeping on multiple pillows at nighttime. Has any cough or recent illnesses. He denies any sick contacts. Please note: past medical records were reviewed per electronic medical record (EMR) - see detailed reports under Past Medical/ Surgical History. Past Medical and surgical history:    1. Hypertension  2. COPD/ashtma   3. Tobacco abuse  4. Alcohol abuse  5. Cocaine abuse  6. Positive fentanyl on drug screen  7. Type 2 diabetes mellitus  8. JANESSA, non compliant with CPAP   9. Medical non compliance   10. Echocardiogram 4/20/2014  Left ventricular size is grossly normal.  Borderline left ventricular concentric hypertrophy noted.  Ejection fraction is visually estimated at 65%.  No evidence of left ventricular mass or thrombus noted.  No regional wall motion abnormalities seen.   The left atrium is mildly dilated.  Interatrial septum appears intact.  No evidence of thrombus within left atrium.  No evidence of mass within left atrium.  Physiologic and/or trace mitral regurgitation is present.  No evidence of mitral valve stenosis.  Physiologic and/or trace tricuspid regurgitation.  RVSP is 26.00 mmHg.   Regular rhythm        Medications Prior to admit:  Prior to Admission medications    Medication Sig Start Date End Date Taking?  Authorizing Provider   melatonin 3 MG TABS tablet Take 3 mg by mouth nightly as needed (sleep)   Yes Historical Provider, MD   nicotine polacrilex (NICORETTE) 4 MG gum Take 4 mg by mouth as needed for Smoking cessation   Yes Historical Provider, MD   budesonide-formoterol (SYMBICORT) 160-4.5 MCG/ACT AERO Inhale 2 puffs into the lungs 2 times daily   Yes Historical Provider, MD   hydrOXYzine Pamoate (VISTARIL PO) Take 2 tablets by mouth nightly   Yes Historical Provider, MD guaiFENesin 400 MG tablet Take 1 tablet by mouth 4 times daily as needed for Cough 6/30/20  Yes MIKE Garcia - CNP   albuterol sulfate HFA (PROVENTIL HFA) 108 (90 Base) MCG/ACT inhaler Inhale 2 puffs into the lungs every 6 hours as needed for Wheezing 5/19/20  Yes MIKE Cassidy - CNS   glipiZIDE (GLUCOTROL) 5 MG tablet Take 1 tablet by mouth 2 times daily 4/23/20  Yes Renae Greenfield MD   pantoprazole (PROTONIX) 40 MG tablet Take 40 mg by mouth daily   Yes Historical Provider, MD   ibuprofen (ADVIL;MOTRIN) 200 MG tablet Take 400 mg by mouth every 6 hours as needed for Pain    Yes Historical Provider, MD   aspirin 81 MG tablet Take 81 mg by mouth daily   Yes Historical Provider, MD   tamsulosin (FLOMAX) 0.4 MG capsule Take 0.4 mg by mouth 2 times daily    Yes Historical Provider, MD   amLODIPine (NORVASC) 10 MG tablet Take 1 tablet by mouth daily.  1/16/15  Yes Ivis Amado MD       Current Medications:    Current Facility-Administered Medications: ipratropium-albuterol (DUONEB) nebulizer solution 1 ampule, 1 ampule, Inhalation, Q15 Min PRN  amLODIPine (NORVASC) tablet 10 mg, 10 mg, Oral, Daily  aspirin EC tablet 81 mg, 81 mg, Oral, Daily  glipiZIDE (GLUCOTROL) tablet 5 mg, 5 mg, Oral, BID  guaiFENesin tablet 400 mg, 400 mg, Oral, 4x Daily PRN  ibuprofen (ADVIL;MOTRIN) tablet 400 mg, 400 mg, Oral, Q6H PRN  melatonin tablet 3 mg, 3 mg, Oral, Nightly PRN  nicotine polacrilex (NICORETTE) gum 4 mg, 4 mg, Oral, PRN  pantoprazole (PROTONIX) tablet 40 mg, 40 mg, Oral, Daily  tamsulosin (FLOMAX) capsule 0.4 mg, 0.4 mg, Oral, BID  sodium chloride flush 0.9 % injection 10 mL, 10 mL, Intravenous, 2 times per day  sodium chloride flush 0.9 % injection 10 mL, 10 mL, Intravenous, PRN  acetaminophen (TYLENOL) tablet 650 mg, 650 mg, Oral, Q6H PRN **OR** acetaminophen (TYLENOL) suppository 650 mg, 650 mg, Rectal, Q6H PRN  polyethylene glycol (GLYCOLAX) packet 17 g, 17 g, Oral, Daily PRN  enoxaparin (LOVENOX) abscess or lesion. · Musculoskeletal: Denies falls, pain to BLE with ambulation and edema to BLE. · Neurological: Denies dizziness and lightheadedness, numbness and tingling  · Cardiovascular: +  chest pain, palpitations, and feelings of heart racing. · Respiratory: Denies orthopnea and PND,+  cough, HERNANDEZ, wheezing   · Gastrointestinal: Denies heartburn, nausea/vomiting, diarrhea and constipation, black/bloody, and tarry stools. · Genitourinary: Denies dysuria and hematuria  · Hematologic: Denies excessive bruising or bleeding  · Lymphatic: Denies lumps and bumps to neck, axilla, breast, and groin      PHYSICAL EXAM:   BP (!) 155/96   Pulse 74   Temp 97.1 °F (36.2 °C) (Temporal)   Resp 18   Ht 5' 9\" (1.753 m)   Wt 258 lb 6.4 oz (117.2 kg)   SpO2 94%   BMI 38.16 kg/m²   CONST:  Well developed, obese  [place] male  who appears his stated age. Awake, alert, cooperative, no apparent distress  HEENT:   Head- Normocephalic, atraumatic   Eyes- Conjunctivae pink, anicteric  Throat- Oral mucosa pink and moist  Neck-  No stridor, trachea midline, no jugular venous distention. CHEST: Chest symmetrical and non-tender to palpation. RESPIRATORY: Coarse breath sounds bilaterally bases, expiratory wheeze noted bilaterally apex  CARDIOVASCULAR:     No carotid bruit noted bilaterally   Heart Ausculation- Regular rate and rhythm, no murmur. PV: No lower extremity edema. No varicosities. ABDOMEN: Soft, non-tender to light palpation. Bowel sounds present. MS: Good muscle strength and tone. No atrophy or abnormal movements. : Deferred   SKIN: Warm and dry no statis dermatitis or ulcers   NEURO / PSYCH: Oriented to person, place and time. Speech clear and appropriate. Follows all commands. Pleasant affect     DATA:    ECG: Sinus tachycardia heart rate 103.   No acute ST-T wave changes  Tele strips: Sinus rhythm heart rate 70s      Diagnostic:      Intake/Output Summary (Last 24 hours) at 10/15/2020 1002  Last data filed at 10/15/2020 0515  Gross per 24 hour   Intake --   Output 300 ml   Net -300 ml       Labs:   CBC:   Recent Labs     10/14/20  0859 10/15/20  0605   WBC 6.6 7.8   HGB 16.5 16.3   HCT 52.6 52.6    351     BMP:   Recent Labs     10/14/20  0859      K 3.2*   CO2 34*   BUN 6   CREATININE 0.8   LABGLOM >60   CALCIUM 8.7     TSH:   Recent Labs     10/14/20  0859   TSH 1.740     HgA1c:   Lab Results   Component Value Date    LABA1C 6.2 (H) 05/14/2020       CARDIAC ENZYMES:  Recent Labs     10/14/20  2221 10/15/20  0015 10/15/20  0605   TROPONINI <0.01 <0.01 <0.01     FASTING LIPID PANEL:  Lab Results   Component Value Date    CHOL 156 04/20/2020    HDL 58 04/20/2020    LDLCALC 76 04/20/2020    TRIG 109 04/20/2020     LIVER PROFILE:  Recent Labs     10/14/20  0859   AST 95*   ALT 68*   LABALBU 4.3       Assessment and Plan per Dr Shannan Diaz   Electronically signed by Bobby Caldera on 10/15/2020 at 10:02 AM     Patient seen and examined. Chart, labs, imaging studies, EKG and rhythm strips reviewed. Full consult to follow. IMPRESSION:  1. Chest pain a few days ago with no recurrence : doubt cardiac. No ischemic EKG changes, and negative cardiac enzymes   2. Shortness of breath, secondary to COPD exacerbation. No signs of CHF (ProBNP 26)   3. COPD with continued tobacco abuse. Asthma. JANESSA non compliant with CPAP. CAP in 7/2020   4. Multisubstance abuse including: cocaine, fentanyl, and alcohol with + tox screen for all this admission   5. Elevated liver enzymes possibly secondary to alcoholic liver disease   6. Medical non compliance     PLAN:   1. No indication for advanced cardiac testing  2. Would not give high dose Statin therapy in view of abnormal liver enzymes and especially in view of unremarkable lipid profile in 4/2020   3. Abstinence from substance of abuse and importance of compliance with medications discussed with patient   4.  May be discharged from cardiology stand point 5. Cardiology will sign off, please call if needed     Electronically signed by Guy Hook MD on 10/15/2020 at 11:36 AM

## 2020-10-15 NOTE — CARE COORDINATION
Attempted to meet with pt at bedside x2 however, sleeping soundly with lights off. I did verify with bedside RN Kenyetta Agudelo, pt ambulates independently. Per prior notes pt has oxygen through [address] for 3 liters prn (verified with Deion Otoole at [facility] via phone [phone]), nebulizer, PCP is Dr. Destiny Matthews, pharmacy is [facility] in [facility], no home going needs identified. Chart reviewed, pt had positive drug screen on admission, notified peer recovery services to provide information for available programs in pt's community at  via . Pt on IV Rocephin Q 24 hours, IV Zithromax Q 24 hours, IV solu-medrol 40 mg Q 8 hours, most recent sat 96% on 3 liters via NC. Tentative plan for discharge return home with no needs.

## 2020-10-15 NOTE — PROGRESS NOTES
Hospital Medicine Progress Note      Date of Admission: 10/14/2020  Hospital day # 1    Course: Follow up on COPD exaerbation    Subjective    Clinically improving. Feeling better. Stable overnight. No other overnight issues reported. Exam:    /83   Pulse 78   Temp 97.4 °F (36.3 °C) (Temporal)   Resp 16   Ht 5' 9\" (1.753 m)   Wt 258 lb 6.4 oz (117.2 kg)   SpO2 96%   BMI 38.16 kg/m²     General appearance: No apparent distress, appears stated age and cooperative. HEENT: Pupils equal, round, and reactive to light. Conjunctivae/corneas clear. Neck: Supple. No jugular venous distention. Trachea midline. Respiratory:  Few ronchi. Cardiovascular: S1/S2   Abdomen: Soft, non-tender, non-distended with normal bowel sounds. Musculoskeletal: No clubbing, cyanosis or edema bilaterally. Brisk capillary refill. 2+ lower extremity pulses (dorsalis pedis).    Skin:  No rashes    Neurologic: awake, alert and following commands     Medications:  Reviewed    Infusion Medications   Scheduled Medications    amLODIPine  10 mg Oral Daily    aspirin  81 mg Oral Daily    glipiZIDE  5 mg Oral BID    pantoprazole  40 mg Oral Daily    tamsulosin  0.4 mg Oral BID    sodium chloride flush  10 mL Intravenous 2 times per day    enoxaparin  40 mg Subcutaneous Daily    ipratropium-albuterol  1 ampule Inhalation Q4H WA    methylPREDNISolone  40 mg Intravenous Q8H    Followed by   Danvikram Redhead ON 10/17/2020] predniSONE  40 mg Oral Daily    azithromycin  500 mg Intravenous Q24H    cefTRIAXone (ROCEPHIN) IV  1 g Intravenous Q24H    atorvastatin  80 mg Oral Nightly    aspirin  81 mg Oral Daily    budesonide  0.5 mg Nebulization BID    And    Arformoterol Tartrate  15 mcg Nebulization BID    influenza virus vaccine  0.5 mL Intramuscular Prior to discharge     PRN Meds: ipratropium-albuterol, guaiFENesin, ibuprofen, melatonin, nicotine polacrilex, sodium chloride flush, acetaminophen **OR** acetaminophen, trace mitral regurgitation is present.   No evidence of mitral valve stenosis.   Physiologic and/or trace tricuspid regurgitation.  RVSP is 26.00 mmHg.   Regular rhythm          Diet: DIET CARDIAC;  Code Status: Full Code    PT/OT Eval Status: [x] Ordered [] Evaluation noted [] Not applicable    DVT Prophylaxis: [x]Lovenox []Heparin []PCD [] [address] []Encouraged ambulation []N/A    Likely disposition when able:  [x]Home [] Home with [facility] [] SNF/SAMANTHA [] Acute Rehab [] LTAC []Other    +++++++++++++++++++++++++++++++++++++++++++++++++  Víctor Langley MD, Hospitalist  +++++++++++++++++++++++++++++++++++++++++++++++++  NOTE: This report was transcribed using voice recognition software.  Every effort was made to ensure accuracy; however, inadvertent computerized transcription errors may be present.

## 2020-10-15 NOTE — CONSULTS
Addi Rodriguez M.D.,[facility]  Sita Crow D.O., F.A.C.O.I., Emilie Baptiste M.D. Reina Lopez M.D., Narinder Da Silva M.D. Marli Norwood D.O. Patient:  Edgar De La Cruz 44 y.o. male MRN: [id number]     Date of Service: 10/15/2020      PULMONARY CONSULTATION    Reason for Consultation: acute  respiratory failure  Referring Physician: Dr. Gonsales Marker with the referring physician will be sent via the electronic medical record. Chief Complaint: SOB with activity     CODE STATUS:full    SUBJECTIVE:  HPI:  Edgar De La Cruz is a 44 y.o. male who presented 1 day ago with chest pain and shortness of breath with activity. Significant past medical history of hypertension, COPD is home oxygen at 3 L PRN, JANESSA, GERD, pancreatitis, depression polysubstance abuse. In ED psychology screen was positive for cocaine and fentanyl. CTA of chest was done no acute process PE or consolidation seen. COVID-19 negative, no leukocytosis afebrile. Service has been consulted for acute respiratory failure. Patient seen in room he is lying in bed he appears no acute distress he is currently on 2 L nasal cannula. Patient reports shortness of breath with activity began 3 to 4 days ago, with intermittent chest pain. He reports no sick contacts, he denies fever chills or night sweats. He denies cough. He reports that he has been using cocaine smoking it he is unsure with a fentanyl came from. Current every day cigarette smoker. Patient reports she did follow-up with Dr. Anu Ramirez his pulmonologist in [facility] who took him off Tregy and started him on Brio. He feels that that is not working as well. When he was short of breath recently he did not use his oxygen has not uses oxygen over a month. He is to follow-up with Dr. Anu Ramirez at the end of the month with an appointment he has already scheduled.       Past Medical History:   Diagnosis Date    Acid reflux     Asthma     Asthma     COPD (chronic Attends meetings of clubs or organizations: Not on file     Relationship status: Not on file    Intimate partner violence     Fear of current or ex partner: Not on file     Emotionally abused: Not on file     Physically abused: Not on file     Forced sexual activity: Not on file   Other Topics Concern    Not on file   Social History Narrative    Not on file     Smoking history: Current smoker  ETOH:   reports current alcohol use. Exposures: There  is not history of TB or TB exposure. There is not asbestos or silica dust exposure. The patient reports does not have coal, foundry, quarry or [employer] exposure. Recent travel history none. There is not  history of recreational or IV drug use. There is not hot tub exposure. The patient does not have any exotic pets, turtles or exotic birds.        Vaccines:    Influenza:  Refused  Pneumococcal Polysaccharide:  Refused  Immunization History   Administered Date(s) Administered    Influenza Virus Vaccine 02/18/2015, 10/16/2019    Pneumococcal Polysaccharide ([id number]) 02/18/2015        Home Meds: Medications Prior to Admission: melatonin 3 MG TABS tablet, Take 3 mg by mouth nightly as needed (sleep)  nicotine polacrilex (NICORETTE) 4 MG gum, Take 4 mg by mouth as needed for Smoking cessation  budesonide-formoterol (SYMBICORT) 160-4.5 MCG/ACT AERO, Inhale 2 puffs into the lungs 2 times daily  hydrOXYzine Pamoate (VISTARIL PO), Take 2 tablets by mouth nightly  guaiFENesin 400 MG tablet, Take 1 tablet by mouth 4 times daily as needed for Cough  albuterol sulfate HFA (PROVENTIL HFA) 108 (90 Base) MCG/ACT inhaler, Inhale 2 puffs into the lungs every 6 hours as needed for Wheezing  glipiZIDE (GLUCOTROL) 5 MG tablet, Take 1 tablet by mouth 2 times daily  pantoprazole (PROTONIX) 40 MG tablet, Take 40 mg by mouth daily  ibuprofen (ADVIL;MOTRIN) 200 MG tablet, Take 400 mg by mouth every 6 hours as needed for Pain   aspirin 81 MG tablet, Take 81 mg by mouth daily  tamsulosin (FLOMAX) 0.4 MG capsule, Take 0.4 mg by mouth 2 times daily   amLODIPine (NORVASC) 10 MG tablet, Take 1 tablet by mouth daily. CURRENT MEDS :  Scheduled Meds:   amLODIPine  10 mg Oral Daily    aspirin  81 mg Oral Daily    glipiZIDE  5 mg Oral BID    pantoprazole  40 mg Oral Daily    tamsulosin  0.4 mg Oral BID    sodium chloride flush  10 mL Intravenous 2 times per day    enoxaparin  40 mg Subcutaneous Daily    ipratropium-albuterol  1 ampule Inhalation Q4H WA    methylPREDNISolone  40 mg Intravenous Q8H    Followed by   Jalil Avery ON 10/17/2020] predniSONE  40 mg Oral Daily    azithromycin  500 mg Intravenous Q24H    cefTRIAXone (ROCEPHIN) IV  1 g Intravenous Q24H    atorvastatin  80 mg Oral Nightly    aspirin  81 mg Oral Daily    budesonide  0.5 mg Nebulization BID    And    Arformoterol Tartrate  15 mcg Nebulization BID    influenza virus vaccine  0.5 mL Intramuscular Prior to discharge       Continuous Infusions: Allergies   Allergen Reactions    Dust Mite Extract        REVIEW OF SYSTEMS:  Constitutional: Denies fever, weight loss, night sweats, and fatigue  Skin: Denies pigmentation, dark lesions, and rashes   HEENT: Denies hearing loss, tinnitus, ear drainage, epistaxis, sore throat, and hoarseness. Cardiovascular: Denies palpitations, chest pain, and chest pressure. Respiratory: + cough, +dyspnea at rest and activity, denies hemoptysis, apnea, and choking.   Gastrointestinal: Denies nausea, vomiting, poor appetite, diarrhea, heartburn or reflux  Genitourinary: Denies dysuria, frequency, urgency or hematuria  Musculoskeletal: Denies myalgias, muscle weakness, and bone pain  Neurological: Denies dizziness, vertigo, headache, and focal weakness  Psychological: Denies anxiety and depression  Endocrine: Denies heat intolerance and cold intolerance  Hematopoietic/Lymphatic: Denies bleeding problems and blood transfusions    OBJECTIVE:   BP (!) 155/96   Pulse 74   Temp  Multiplanar reformatted images are provided for review.  MIP    images are provided for review. Dose modulation, iterative reconstruction,    and/or weight based adjustment of the mA/kV was utilized to reduce the    radiation dose to as low as reasonably achievable.         COMPARISON:    None.         HISTORY:    ORDERING SYSTEM PROVIDED HISTORY: rule out PE    TECHNOLOGIST PROVIDED HISTORY:    Reason for exam:->rule out PE    What reading provider will be dictating this exam?->CRC         FINDINGS:    There is respiratory motion artifact noted throughout the examination which    limits fine detail and causes artifact through the pulmonary arteries of the    right upper lobe and within the distal left main pulmonary artery.         Pulmonary Arteries: Pulmonary arteries are adequately opacified for    evaluation.  No evidence of intraluminal filling defect to suggest pulmonary    embolism.  Main pulmonary artery is normal in caliber.         Mediastinum: No evidence of mediastinal lymphadenopathy.  The heart and    pericardium demonstrate no acute abnormality.  There is no acute abnormality    of the thoracic aorta.         Lungs/pleura: The lungs are without acute process.  No focal consolidation or    pulmonary edema.  No evidence of pleural effusion or pneumothorax.         Upper Abdomen: Limited images of the upper abdomen are unremarkable.         Soft Tissues/Bones: No acute bone or soft tissue abnormality.              Impression    1. No gross pulmonary embolus is noted.  Please note there is respiratory    motion artifact noted throughout the examination which limits fine detail of    the distal pulmonary arteries.  If there is high suspicion for pulmonary    embolus repeat CT scan can be obtained in 24 hours or V/Q scan can be    considered. .               Echo:  4/28/2014  Conclusions      Summary   Left ventricular size is grossly normal.   Borderline left ventricular concentric hypertrophy noted. Ejection fraction is visually estimated at 65%. No evidence of left ventricular mass or thrombus noted. No regional wall motion abnormalities seen. The left atrium is mildly dilated. Interatrial septum appears intact. No evidence of thrombus within left atrium. No evidence of mass within left atrium. Physiologic and/or trace mitral regurgitation is present. No evidence of mitral valve stenosis. Physiologic and/or trace tricuspid regurgitation. RVSP is 26.00 mmHg. Regular rhythm. Signature         Labs:  Lab Results   Component Value Date    WBC 7.8 10/15/2020    HGB 16.3 10/15/2020    HCT 52.6 10/15/2020    MCV 87.5 10/15/2020    MCH 27.1 10/15/2020    MCHC 31.0 10/15/2020    RDW 14.4 10/15/2020     10/15/2020    MPV 9.9 10/15/2020     Lab Results   Component Value Date     10/14/2020    K 3.2 10/14/2020    K 4.3 06/25/2020    CL 92 10/14/2020    CO2 34 10/14/2020    BUN 6 10/14/2020    CREATININE 0.8 10/14/2020    LABALBU 4.3 10/14/2020    CALCIUM 8.7 10/14/2020    GFRAA >60 10/14/2020    LABGLOM >60 10/14/2020     Lab Results   Component Value Date    PROTIME 11.9 06/25/2020    INR 1.0 06/25/2020     Recent Labs     10/14/20  0859   PROBNP 26     Recent Labs     10/14/20  2221 10/15/20  0015 10/15/20  0605   TROPONINI <0.01 <0.01 <0.01     Recent Labs     10/14/20  1443   PROCAL 0.02     This SmartLink has not been configured with any valid records. Micro:  No results for input(s): CULTRESP in the last 72 hours. No results for input(s): LABGRAM in the last 72 hours. No results for input(s): LEGUR in the last 72 hours. No results for input(s): STREPNEUMAGU in the last 72 hours. No results for input(s): LP1UAG in the last 72 hours. Assessment:  1. Acute on chronic respiratory failure with hypoxia  2. CP  3. Mild COPD asthma exacerbation  4. Hx of Intubation for airway protection extubated 6/27  5.  Hx Cocaine induced pneumonitis vs chf vs aspiration pneumonia hospt admit 7/1/2020  6. COPD/asthma overlap, noncompliance with medication  7. Current every day smoker  8. Substance abuse- +cociane fentanyl   9. Obstructive sleep apnea  10. Hypertension  11. EtOH abuse, history of pancreatitis, elevated LFTs  12. GERD  13. Medical non compliance  14. Nicotine dependence        Plan:  1. Keep pulse oximetry greater than 92%, currently on 2 L  2. Will need ambulating pulse oximetry before he is discharged  3. Cardiology consulted   1. Duonebs Q 6 w/a. brovana and budesonide BID. For DC: Randal Morris is  covered by his insurance   2. Solu-Medrol 40 mg every 8 hours, taper with improvement  3. Our recommendation during his 7/1 admit -Needs outpatient PFT testing. May require new sleep study upon dc , he stated then he would follow up with a pulmonary doctor in [place], [place],  Dr Syeda Pereyra, follow with him as outpatient. Dr. Candice Hicks took off [facility] , started breo , he has appointment with Dr. Candice Hicks  at the end of the month, he is scheduled for PFT  then . Thank you for allowing me to participate in the care of Di Burns. Please feel free to call with questions. This plan of care was reviewed in collaboration with Dr. Crescencio Monet    Electronically signed by MIKE Madison on 10/15/2020 at 9:25 AM      Note: This report was completed utilizing computer voice recognition software. Every effort has been made to ensure accuracy, however; inadvertent computerized transcription errors may be present    I personally saw, examined, and cared for the patient. Labs, medications, radiographs reviewed. I agree with history exam and plans detailed in NP note.       Electronically signed by Wilfrid Larios DO on 10/15/2020 at 5:17 PM

## 2020-10-15 NOTE — DISCHARGE SUMMARY
Hospital Medicine Discharge Summary    Patient ID: Jonas Garcia   Patient's PCP: Kerry Ernst DO    Admit Date: 10/14/2020   Discharge Date:      Admitting Physician: Giovanni Jensen MD  Discharge Physician: Giovanni Jensen MD     Discharge Diagnoses:    COPD exacerbation  Acute hypoxemic respiratory failure  Acute bronchitis  Atypical chest pain  Hypertension  Sleep apnea  Previous history of substance abuse    Hospital course in brief (Please refer to daily progress notes for a comprehensive review of the hospitalization by requesting medical records)    44y.o. year old male  who  has a past medical history of Acid reflux, Asthma, Asthma, COPD (chronic obstructive pulmonary disease) ([facility] [address] Hypertension, Pancreatitis, Prediabetes, Psychiatric problem, Sleep apnea, and Substance abuse ([facility] [address] Patient comes to hospital with complaints of increasing shortness of breath over the last few days. Patient refers that for at least 3 to 4 days he has been having some cough and associated shortness of breath. He refers no fever, no chills. He refers that shortness of breath has been progressive getting worse. He also described he of this admission he was sitting all of a sudden he started having some chest pain. He described the chest pain was localized in the middle. Pain was pressure-like. Intensity was 7/10. Patient refers that did not take any medication to relieve the pain. She came to the ER for further evaluation. In the ER patient was found with hypoxemia. Patient was tachypneic with a saturation of 70% at room air in the ER. Troponins have been negative. Patient has been seen by cardiology. Started on steroids as well as nebulizer treatments. Patient is clinically improved. Patient feels better and wants to go home.     Please see consultations for details      PHYSICAL EXAM:  /74   Pulse 86   Temp 97.2 °F (36.2 °C) (Temporal)   Resp 18   Ht 5' 9\" (1.753 m)   Wt 258 lb 6.4 oz (117.2 kg)   SpO2 93%   BMI 38.16 kg/m²     General appearance: No apparent distress appears stated age and cooperative. HEENT Normal cephalic, atraumatic without obvious deformity. Pupils equal, round, and reactive to light. Extra ocular muscles intact. Conjunctivae/corneas clear. Neck: Supple, No jugular venous distention/bruits. Trachea midline without thyromegaly or adenopathy with full range of motion. Lungs: Clear to auscultation, bilaterally without Rales/Wheezes/Rhonchi with good respiratory effort. Heart: l S1/S2   Abdomen: Soft, non-tender or non-distended without rigidity or guarding and positive bowel sounds all four quadrants. Extremities: No clubbing, cyanosis, or edema bilaterally. Skin: Skin color, texture, turgor normal.  No rashes or lesions. Neurologic: Alert and oriented X 3, neurovascularly intact with sensory/motor intact upper extremities/lower extremities, bilaterally. Cranial nerves: II-XII intact, grossly non-focal.  Mental status: Alert, oriented, thought content appropriate. Consults:   IP CONSULT TO HOSPITALIST  IP CONSULT TO PULMONOLOGY  IP CONSULT TO CARDIOLOGY    Discharge Instructions / Follow up:  Pulmonology, PCP    Activity: activity as tolerated    Significant labs:    Labs: For convenience and continuity at follow-up the following most recent labs are provided:  CBC:   Recent Labs     10/14/20  0859 10/15/20  0605   WBC 6.6 7.8   RBC 6.08* 6.01*   HGB 16.5 16.3   HCT 52.6 52.6   MCV 86.5 87.5   RDW 15.4* 14.4    351     BMP:   Recent Labs     10/14/20  0859      K 3.2*   CL 92*   CO2 34*   BUN 6   CREATININE 0.8     LFT:  Recent Labs     10/14/20  0859   PROT 7.4   ALKPHOS 108   ALT 68*   AST 95*   BILITOT 0.6     PT/INR: No results for input(s): INR, APTT in the last 72 hours. BNP: No results for input(s): BNP in the last 72 hours.   Hgb A1C:   Lab Results   Component Value Date    LABA1C 6.2 (H) 05/14/2020     Folate and B12: No results found for: Loco Izquierdosharons, No results found for: FOLATE  Thyroid Studies:   Lab Results   Component Value Date    TSH 1.740 10/14/2020       Urinalysis:    Lab Results   Component Value Date    NITRU Negative 06/25/2020    WBCUA 1-3 06/25/2020    BACTERIA FEW 06/25/2020    RBCUA NONE 06/25/2020    RBCUA NONE 02/13/2014    BLOODU LARGE 06/25/2020    SPECGRAV >=1.030 06/25/2020    GLUCOSEU Negative 06/25/2020       Imaging:  Xr Chest (2 Vw)    Result Date: 10/14/2020  EXAMINATION: TWO XRAY VIEWS OF THE CHEST 10/14/2020 9:24 am COMPARISON: 01/29/2020. HISTORY: ORDERING SYSTEM PROVIDED HISTORY: dyspnea TECHNOLOGIST PROVIDED HISTORY: Reason for exam:->dyspnea What reading provider will be dictating this exam?->CRC FINDINGS: The heart size is within normal limits. The pulmonary vasculature is also within normal limits. No acute infiltrates are seen. The costophrenic angles are sharp bilaterally. No pneumothoraces are noted. 1. No active pulmonary disease. Cta Pulmonary W Contrast    Result Date: 10/14/2020  EXAMINATION: CTA OF THE CHEST 10/14/2020 1:42 pm TECHNIQUE: CTA of the chest was performed after the administration of intravenous contrast.  Multiplanar reformatted images are provided for review. MIP images are provided for review. Dose modulation, iterative reconstruction, and/or weight based adjustment of the mA/kV was utilized to reduce the radiation dose to as low as reasonably achievable. COMPARISON: None. HISTORY: ORDERING SYSTEM PROVIDED HISTORY: rule out PE TECHNOLOGIST PROVIDED HISTORY: Reason for exam:->rule out PE What reading provider will be dictating this exam?->CRC FINDINGS: There is respiratory motion artifact noted throughout the examination which limits fine detail and causes artifact through the pulmonary arteries of the right upper lobe and within the distal left main pulmonary artery. Pulmonary Arteries: Pulmonary arteries are adequately opacified for evaluation.   No evidence of intraluminal filling defect to suggest pulmonary embolism. Main pulmonary artery is normal in caliber. Mediastinum: No evidence of mediastinal lymphadenopathy. The heart and pericardium demonstrate no acute abnormality. There is no acute abnormality of the thoracic aorta. Lungs/pleura: The lungs are without acute process. No focal consolidation or pulmonary edema. No evidence of pleural effusion or pneumothorax. Upper Abdomen: Limited images of the upper abdomen are unremarkable. Soft Tissues/Bones: No acute bone or soft tissue abnormality. 1. No gross pulmonary embolus is noted. Please note there is respiratory motion artifact noted throughout the examination which limits fine detail of the distal pulmonary arteries. If there is high suspicion for pulmonary embolus repeat CT scan can be obtained in 24 hours or V/Q scan can be considered. Ashish Ralph Discharge Medications:      Medication List      START taking these medications    Full Kit Nebulizer Set Misc  Use as directed with nebulized medication. ipratropium-albuterol 0.5-2.5 (3) MG/3ML Soln nebulizer solution  Commonly known as:  DUONEB  Inhale 3 mLs into the lungs every 4 hours as needed for Shortness of Breath     predniSONE 10 MG tablet  Commonly known as:  DELTASONE  Take 4 tablets by mouth See Admin Instructions for 5 days        CONTINUE taking these medications    albuterol sulfate  (90 Base) MCG/ACT inhaler  Commonly known as:  Proventil HFA  Inhale 2 puffs into the lungs every 6 hours as needed for Wheezing     amLODIPine 10 MG tablet  Commonly known as:  Norvasc  Take 1 tablet by mouth daily.      aspirin 81 MG tablet     glipiZIDE 5 MG tablet  Commonly known as:  GLUCOTROL  Take 1 tablet by mouth 2 times daily     guaiFENesin 400 MG tablet  Take 1 tablet by mouth 4 times daily as needed for Cough     ibuprofen 200 MG tablet  Commonly known as:  ADVIL;MOTRIN     melatonin 3 MG Tabs tablet     nicotine polacrilex 4 MG gum  Commonly known as:

## 2020-10-15 NOTE — PROGRESS NOTES
CLINICAL PHARMACY NOTE: MEDS TO [address] Select Patient?: No  Total # of Prescriptions Filled: 2   The following medications were delivered to the patient:  · Prednisone 10mg  · iprat-albuterol 0.5-2.5 soln  Total # of Interventions Completed: 3  Time Spent (min): 30    Additional Documentation:    Delivered to patient

## 2020-10-15 NOTE — PLAN OF CARE
Problem: Gas Exchange - Impaired:  Goal: Levels of oxygenation will improve  Description: Levels of oxygenation will improve  10/15/2020 1255 by Gracia Bach RN  Outcome: Met This Shift  10/15/2020 0138 by Brook Mcneil RN  Outcome: Met This Shift     Problem: Falls - Risk of:  Goal: Will remain free from falls  Description: Will remain free from falls  Outcome: Met This Shift  Goal: Absence of physical injury  Description: Absence of physical injury  Outcome: Met This Shift

## 2020-10-16 LAB
EKG ATRIAL RATE: 83 BPM
EKG P AXIS: 59 DEGREES
EKG P-R INTERVAL: 156 MS
EKG Q-T INTERVAL: 406 MS
EKG QRS DURATION: 120 MS
EKG QTC CALCULATION (BAZETT): 477 MS
EKG R AXIS: -47 DEGREES
EKG T AXIS: 42 DEGREES
EKG VENTRICULAR RATE: 83 BPM

## 2020-10-16 PROCEDURE — 93010 ELECTROCARDIOGRAM REPORT: CPT | Performed by: INTERNAL MEDICINE

## 2020-10-20 LAB — BLOOD CULTURE, ROUTINE: NORMAL

## 2020-11-03 PROBLEM — M47.816 LUMBAR SPONDYLOSIS: Status: RESOLVED | Noted: 2018-08-01 | Resolved: 2020-11-03

## 2020-11-14 ENCOUNTER — APPOINTMENT (OUTPATIENT)
Dept: GENERAL RADIOLOGY | Age: 50
DRG: 189 | End: 2020-11-14
Payer: MEDICARE

## 2020-11-14 ENCOUNTER — HOSPITAL ENCOUNTER (INPATIENT)
Age: 50
LOS: 3 days | Discharge: HOME | DRG: 189 | End: 2020-11-17
Attending: EMERGENCY MEDICINE | Admitting: FAMILY MEDICINE
Payer: MEDICARE

## 2020-11-14 PROBLEM — J44.1 COPD WITH ACUTE EXACERBATION (HCC): Status: ACTIVE | Noted: 2020-11-14

## 2020-11-14 LAB
ADENOVIRUS BY PCR: NOT DETECTED
ALBUMIN SERPL-MCNC: 3.7 G/DL (ref 3.5–5.2)
ALP BLD-CCNC: 95 U/L (ref 40–129)
ALT SERPL-CCNC: 31 U/L (ref 0–40)
AMPHETAMINE SCREEN, URINE: NOT DETECTED
ANION GAP SERPL CALCULATED.3IONS-SCNC: 13 MMOL/L (ref 7–16)
AST SERPL-CCNC: 39 U/L (ref 0–39)
B.E.: 2.5 MMOL/L (ref -3–0)
B.E.: 6.7 MMOL/L (ref -3–3)
BARBITURATE SCREEN URINE: NOT DETECTED
BASOPHILS ABSOLUTE: 0.04 E9/L (ref 0–0.2)
BASOPHILS RELATIVE PERCENT: 0.5 % (ref 0–2)
BENZODIAZEPINE SCREEN, URINE: NOT DETECTED
BILIRUB SERPL-MCNC: 0.5 MG/DL (ref 0–1.2)
BORDETELLA PARAPERTUSSIS BY PCR: NOT DETECTED
BORDETELLA PERTUSSIS BY PCR: NOT DETECTED
BUN BLDV-MCNC: 9 MG/DL (ref 6–20)
CALCIUM SERPL-MCNC: 8.8 MG/DL (ref 8.6–10.2)
CANNABINOID SCREEN URINE: NOT DETECTED
CHLAMYDOPHILIA PNEUMONIAE BY PCR: NOT DETECTED
CHLORIDE BLD-SCNC: 97 MMOL/L (ref 98–107)
CO2: 30 MMOL/L (ref 22–29)
COCAINE METABOLITE SCREEN URINE: POSITIVE
COHB: 2.3 % (ref 0–1.5)
COMMENT: ABNORMAL
CORONAVIRUS 229E BY PCR: NOT DETECTED
CORONAVIRUS HKU1 BY PCR: NOT DETECTED
CORONAVIRUS NL63 BY PCR: NOT DETECTED
CORONAVIRUS OC43 BY PCR: NOT DETECTED
CREAT SERPL-MCNC: 0.9 MG/DL (ref 0.7–1.2)
CRITICAL: ABNORMAL
DATE ANALYZED: ABNORMAL
DATE OF COLLECTION: ABNORMAL
DELIVERY SYSTEMS: ABNORMAL
DEVICE: ABNORMAL
EKG ATRIAL RATE: 120 BPM
EKG P AXIS: 71 DEGREES
EKG P-R INTERVAL: 156 MS
EKG Q-T INTERVAL: 314 MS
EKG QRS DURATION: 86 MS
EKG QTC CALCULATION (BAZETT): 443 MS
EKG R AXIS: -71 DEGREES
EKG T AXIS: 76 DEGREES
EKG VENTRICULAR RATE: 120 BPM
EOSINOPHILS ABSOLUTE: 0.06 E9/L (ref 0.05–0.5)
EOSINOPHILS RELATIVE PERCENT: 0.7 % (ref 0–6)
FENTANYL SCREEN, URINE: NOT DETECTED
FIO2 ARTERIAL: 60
GFR AFRICAN AMERICAN: >60
GFR NON-AFRICAN AMERICAN: >60 ML/MIN/1.73
GLUCOSE BLD-MCNC: 150 MG/DL (ref 74–99)
HCO3 ARTERIAL: 31.5 MMOL/L (ref 22–26)
HCO3: 37.9 MMOL/L (ref 22–26)
HCT VFR BLD CALC: 52.5 % (ref 37–54)
HEMOGLOBIN: 16.4 G/DL (ref 12.5–16.5)
HHB: 1.4 % (ref 0–5)
HUMAN METAPNEUMOVIRUS BY PCR: NOT DETECTED
HUMAN RHINOVIRUS/ENTEROVIRUS BY PCR: NOT DETECTED
IMMATURE GRANULOCYTES #: 0.08 E9/L
IMMATURE GRANULOCYTES %: 0.9 % (ref 0–5)
INFLUENZA A BY PCR: NOT DETECTED
INFLUENZA B BY PCR: NOT DETECTED
INR BLD: 1.1
LAB: ABNORMAL
LYMPHOCYTES ABSOLUTE: 2.27 E9/L (ref 1.5–4)
LYMPHOCYTES RELATIVE PERCENT: 26.2 % (ref 20–42)
Lab: ABNORMAL
Lab: ABNORMAL
MCH RBC QN AUTO: 27.3 PG (ref 26–35)
MCHC RBC AUTO-ENTMCNC: 31.2 % (ref 32–34.5)
MCV RBC AUTO: 87.5 FL (ref 80–99.9)
METER GLUCOSE: 253 MG/DL (ref 74–99)
METHADONE SCREEN, URINE: NOT DETECTED
METHB: 0.4 % (ref 0–1.5)
MODE: ABNORMAL
MODE: ABNORMAL
MONOCYTES ABSOLUTE: 0.83 E9/L (ref 0.1–0.95)
MONOCYTES RELATIVE PERCENT: 9.6 % (ref 2–12)
MYCOPLASMA PNEUMONIAE BY PCR: NOT DETECTED
NEUTROPHILS ABSOLUTE: 5.39 E9/L (ref 1.8–7.3)
NEUTROPHILS RELATIVE PERCENT: 62.1 % (ref 43–80)
O2 CONTENT: 23 ML/DL
O2 SATURATION: 91.9 % (ref 92–98.5)
O2 SATURATION: 98.6 % (ref 92–98.5)
O2HB: 95.9 % (ref 94–97)
OPERATOR ID: 1768
OPERATOR ID: 2156
OPIATE SCREEN URINE: NOT DETECTED
OXYCODONE URINE: NOT DETECTED
PARAINFLUENZA VIRUS 1 BY PCR: NOT DETECTED
PARAINFLUENZA VIRUS 2 BY PCR: NOT DETECTED
PARAINFLUENZA VIRUS 3 BY PCR: NOT DETECTED
PARAINFLUENZA VIRUS 4 BY PCR: NOT DETECTED
PATIENT TEMP: 37 C
PCO2 ARTERIAL: 63.1 MMHG (ref 35–45)
PCO2: 85.7 MMHG (ref 35–45)
PDW BLD-RTO: 15.1 FL (ref 11.5–15)
PEEP/CPAP: 6 CMH2O
PH BLOOD GAS: 7.26 (ref 7.35–7.45)
PH BLOOD GAS: 7.31 (ref 7.35–7.45)
PHENCYCLIDINE SCREEN URINE: NOT DETECTED
PLATELET # BLD: 423 E9/L (ref 130–450)
PMV BLD AUTO: 10.2 FL (ref 7–12)
PO2 ARTERIAL: 71.8 MMHG (ref 80–100)
PO2: 137 MMHG (ref 75–100)
POTASSIUM SERPL-SCNC: 4.2 MMOL/L (ref 3.5–5)
PRO-BNP: 149 PG/ML (ref 0–125)
PROCALCITONIN: 0.05 NG/ML (ref 0–0.08)
PROTHROMBIN TIME: 12 SEC (ref 9.3–12.4)
RBC # BLD: 6 E12/L (ref 3.8–5.8)
RESPIRATORY RATE: 22 B/MIN
RESPIRATORY SYNCYTIAL VIRUS BY PCR: NOT DETECTED
RR MECHANICAL: 18 B/MIN
SARS-COV-2, PCR: NOT DETECTED
SODIUM BLD-SCNC: 140 MMOL/L (ref 132–146)
SOURCE, BLOOD GAS: ABNORMAL
SOURCE, BLOOD GAS: ABNORMAL
THB: 16.9 G/DL (ref 11.5–16.5)
TIDAL VOLUME: 500 ML
TIME ANALYZED: 1324
TOTAL PROTEIN: 7.4 G/DL (ref 6.4–8.3)
TROPONIN: <0.01 NG/ML (ref 0–0.03)
VT MECHANICAL: 450 ML
WBC # BLD: 8.7 E9/L (ref 4.5–11.5)

## 2020-11-14 PROCEDURE — 83880 ASSAY OF NATRIURETIC PEPTIDE: CPT

## 2020-11-14 PROCEDURE — 6360000002 HC RX W HCPCS: Performed by: CLINICAL NURSE SPECIALIST

## 2020-11-14 PROCEDURE — 36600 WITHDRAWAL OF ARTERIAL BLOOD: CPT

## 2020-11-14 PROCEDURE — 93010 ELECTROCARDIOGRAM REPORT: CPT | Performed by: INTERNAL MEDICINE

## 2020-11-14 PROCEDURE — 71045 X-RAY EXAM CHEST 1 VIEW: CPT

## 2020-11-14 PROCEDURE — 84484 ASSAY OF TROPONIN QUANT: CPT

## 2020-11-14 PROCEDURE — 93005 ELECTROCARDIOGRAM TRACING: CPT | Performed by: EMERGENCY MEDICINE

## 2020-11-14 PROCEDURE — 6370000000 HC RX 637 (ALT 250 FOR IP): Performed by: INTERNAL MEDICINE

## 2020-11-14 PROCEDURE — 94660 CPAP INITIATION&MGMT: CPT

## 2020-11-14 PROCEDURE — 0202U NFCT DS 22 TRGT SARS-COV-2: CPT

## 2020-11-14 PROCEDURE — 85610 PROTHROMBIN TIME: CPT

## 2020-11-14 PROCEDURE — 6360000002 HC RX W HCPCS: Performed by: EMERGENCY MEDICINE

## 2020-11-14 PROCEDURE — 82962 GLUCOSE BLOOD TEST: CPT

## 2020-11-14 PROCEDURE — 82803 BLOOD GASES ANY COMBINATION: CPT

## 2020-11-14 PROCEDURE — 85025 COMPLETE CBC W/AUTO DIFF WBC: CPT

## 2020-11-14 PROCEDURE — 2060000000 HC ICU INTERMEDIATE R&B

## 2020-11-14 PROCEDURE — 80307 DRUG TEST PRSMV CHEM ANLYZR: CPT

## 2020-11-14 PROCEDURE — 87450 HC DIRECT STREP B ANTIGEN: CPT

## 2020-11-14 PROCEDURE — 80053 COMPREHEN METABOLIC PANEL: CPT

## 2020-11-14 PROCEDURE — 2580000003 HC RX 258: Performed by: INTERNAL MEDICINE

## 2020-11-14 PROCEDURE — 94640 AIRWAY INHALATION TREATMENT: CPT

## 2020-11-14 PROCEDURE — 82805 BLOOD GASES W/O2 SATURATION: CPT

## 2020-11-14 PROCEDURE — 99285 EMERGENCY DEPT VISIT HI MDM: CPT

## 2020-11-14 PROCEDURE — 6370000000 HC RX 637 (ALT 250 FOR IP): Performed by: EMERGENCY MEDICINE

## 2020-11-14 PROCEDURE — 94664 DEMO&/EVAL PT USE INHALER: CPT

## 2020-11-14 PROCEDURE — 6360000002 HC RX W HCPCS: Performed by: INTERNAL MEDICINE

## 2020-11-14 PROCEDURE — 87449 NOS EACH ORGANISM AG IA: CPT

## 2020-11-14 PROCEDURE — 96374 THER/PROPH/DIAG INJ IV PUSH: CPT

## 2020-11-14 PROCEDURE — 84145 PROCALCITONIN (PCT): CPT

## 2020-11-14 RX ORDER — LORAZEPAM 2 MG/ML
3 INJECTION INTRAMUSCULAR
Status: DISCONTINUED | OUTPATIENT
Start: 2020-11-14 | End: 2020-11-17 | Stop reason: HOSPADM

## 2020-11-14 RX ORDER — ACETAMINOPHEN 325 MG/1
650 TABLET ORAL EVERY 6 HOURS PRN
Status: DISCONTINUED | OUTPATIENT
Start: 2020-11-14 | End: 2020-11-17 | Stop reason: HOSPADM

## 2020-11-14 RX ORDER — SODIUM CHLORIDE 0.9 % (FLUSH) 0.9 %
10 SYRINGE (ML) INJECTION PRN
Status: DISCONTINUED | OUTPATIENT
Start: 2020-11-14 | End: 2020-11-17 | Stop reason: HOSPADM

## 2020-11-14 RX ORDER — ARFORMOTEROL TARTRATE 15 UG/2ML
15 SOLUTION RESPIRATORY (INHALATION) 2 TIMES DAILY
Status: DISCONTINUED | OUTPATIENT
Start: 2020-11-14 | End: 2020-11-17 | Stop reason: HOSPADM

## 2020-11-14 RX ORDER — BUDESONIDE AND FORMOTEROL FUMARATE DIHYDRATE 160; 4.5 UG/1; UG/1
2 AEROSOL RESPIRATORY (INHALATION) 2 TIMES DAILY
Status: DISCONTINUED | OUTPATIENT
Start: 2020-11-14 | End: 2020-11-14

## 2020-11-14 RX ORDER — NICOTINE POLACRILEX 4 MG
15 LOZENGE BUCCAL PRN
Status: DISCONTINUED | OUTPATIENT
Start: 2020-11-14 | End: 2020-11-17 | Stop reason: HOSPADM

## 2020-11-14 RX ORDER — IPRATROPIUM BROMIDE AND ALBUTEROL SULFATE 2.5; .5 MG/3ML; MG/3ML
1 SOLUTION RESPIRATORY (INHALATION)
Status: DISCONTINUED | OUTPATIENT
Start: 2020-11-14 | End: 2020-11-17 | Stop reason: HOSPADM

## 2020-11-14 RX ORDER — GUAIFENESIN 400 MG/1
400 TABLET ORAL 4 TIMES DAILY PRN
Status: DISCONTINUED | OUTPATIENT
Start: 2020-11-14 | End: 2020-11-17 | Stop reason: HOSPADM

## 2020-11-14 RX ORDER — PANTOPRAZOLE SODIUM 40 MG/1
40 TABLET, DELAYED RELEASE ORAL DAILY
Status: DISCONTINUED | OUTPATIENT
Start: 2020-11-14 | End: 2020-11-17 | Stop reason: HOSPADM

## 2020-11-14 RX ORDER — ONDANSETRON 2 MG/ML
4 INJECTION INTRAMUSCULAR; INTRAVENOUS EVERY 6 HOURS PRN
Status: DISCONTINUED | OUTPATIENT
Start: 2020-11-14 | End: 2020-11-17 | Stop reason: HOSPADM

## 2020-11-14 RX ORDER — GLIPIZIDE 5 MG/1
5 TABLET ORAL
Status: DISCONTINUED | OUTPATIENT
Start: 2020-11-14 | End: 2020-11-17 | Stop reason: HOSPADM

## 2020-11-14 RX ORDER — ACETAMINOPHEN 650 MG/1
650 SUPPOSITORY RECTAL EVERY 6 HOURS PRN
Status: DISCONTINUED | OUTPATIENT
Start: 2020-11-14 | End: 2020-11-17 | Stop reason: HOSPADM

## 2020-11-14 RX ORDER — LORAZEPAM 1 MG/1
3 TABLET ORAL
Status: DISCONTINUED | OUTPATIENT
Start: 2020-11-14 | End: 2020-11-17 | Stop reason: HOSPADM

## 2020-11-14 RX ORDER — DEXTROSE MONOHYDRATE 50 MG/ML
100 INJECTION, SOLUTION INTRAVENOUS PRN
Status: DISCONTINUED | OUTPATIENT
Start: 2020-11-14 | End: 2020-11-17 | Stop reason: HOSPADM

## 2020-11-14 RX ORDER — BUSPIRONE HYDROCHLORIDE 5 MG/1
10 TABLET ORAL 3 TIMES DAILY
Status: ON HOLD | COMMUNITY
End: 2020-11-14

## 2020-11-14 RX ORDER — IPRATROPIUM BROMIDE AND ALBUTEROL SULFATE 2.5; .5 MG/3ML; MG/3ML
1 SOLUTION RESPIRATORY (INHALATION)
Status: COMPLETED | OUTPATIENT
Start: 2020-11-14 | End: 2020-11-14

## 2020-11-14 RX ORDER — METHYLPREDNISOLONE SODIUM SUCCINATE 125 MG/2ML
125 INJECTION, POWDER, LYOPHILIZED, FOR SOLUTION INTRAMUSCULAR; INTRAVENOUS ONCE
Status: COMPLETED | OUTPATIENT
Start: 2020-11-14 | End: 2020-11-14

## 2020-11-14 RX ORDER — AMLODIPINE BESYLATE 10 MG/1
10 TABLET ORAL DAILY
Status: DISCONTINUED | OUTPATIENT
Start: 2020-11-14 | End: 2020-11-17 | Stop reason: HOSPADM

## 2020-11-14 RX ORDER — BUPROPION HYDROCHLORIDE 150 MG/1
450 TABLET ORAL EVERY MORNING
Status: ON HOLD | COMMUNITY
End: 2020-11-14

## 2020-11-14 RX ORDER — LORAZEPAM 2 MG/ML
2 INJECTION INTRAMUSCULAR
Status: DISCONTINUED | OUTPATIENT
Start: 2020-11-14 | End: 2020-11-17 | Stop reason: HOSPADM

## 2020-11-14 RX ORDER — LANOLIN ALCOHOL/MO/W.PET/CERES
3 CREAM (GRAM) TOPICAL NIGHTLY PRN
Status: DISCONTINUED | OUTPATIENT
Start: 2020-11-14 | End: 2020-11-17 | Stop reason: HOSPADM

## 2020-11-14 RX ORDER — LORAZEPAM 1 MG/1
2 TABLET ORAL
Status: DISCONTINUED | OUTPATIENT
Start: 2020-11-14 | End: 2020-11-17 | Stop reason: HOSPADM

## 2020-11-14 RX ORDER — SODIUM CHLORIDE 0.9 % (FLUSH) 0.9 %
10 SYRINGE (ML) INJECTION EVERY 12 HOURS SCHEDULED
Status: DISCONTINUED | OUTPATIENT
Start: 2020-11-14 | End: 2020-11-14 | Stop reason: SDUPTHER

## 2020-11-14 RX ORDER — LORAZEPAM 2 MG/ML
4 INJECTION INTRAMUSCULAR
Status: DISCONTINUED | OUTPATIENT
Start: 2020-11-14 | End: 2020-11-17 | Stop reason: HOSPADM

## 2020-11-14 RX ORDER — M-VIT,TX,IRON,MINS/CALC/FOLIC 27MG-0.4MG
1 TABLET ORAL DAILY
Status: ON HOLD | COMMUNITY
End: 2020-11-14

## 2020-11-14 RX ORDER — METHYLPREDNISOLONE SODIUM SUCCINATE 40 MG/ML
40 INJECTION, POWDER, LYOPHILIZED, FOR SOLUTION INTRAMUSCULAR; INTRAVENOUS EVERY 8 HOURS
Status: COMPLETED | OUTPATIENT
Start: 2020-11-14 | End: 2020-11-16

## 2020-11-14 RX ORDER — FOLIC ACID 1 MG/1
1 TABLET ORAL DAILY
Status: ON HOLD | COMMUNITY
End: 2020-11-14

## 2020-11-14 RX ORDER — DOCUSATE SODIUM 100 MG/1
100 CAPSULE, LIQUID FILLED ORAL 2 TIMES DAILY
Status: ON HOLD | COMMUNITY
End: 2022-02-13

## 2020-11-14 RX ORDER — LORAZEPAM 1 MG/1
1 TABLET ORAL
Status: DISCONTINUED | OUTPATIENT
Start: 2020-11-14 | End: 2020-11-17 | Stop reason: HOSPADM

## 2020-11-14 RX ORDER — SODIUM CHLORIDE 0.9 % (FLUSH) 0.9 %
10 SYRINGE (ML) INJECTION PRN
Status: DISCONTINUED | OUTPATIENT
Start: 2020-11-14 | End: 2020-11-14 | Stop reason: SDUPTHER

## 2020-11-14 RX ORDER — LORAZEPAM 2 MG/ML
1 INJECTION INTRAMUSCULAR
Status: DISCONTINUED | OUTPATIENT
Start: 2020-11-14 | End: 2020-11-17 | Stop reason: HOSPADM

## 2020-11-14 RX ORDER — BUDESONIDE 0.5 MG/2ML
500 INHALANT ORAL 2 TIMES DAILY
Status: DISCONTINUED | OUTPATIENT
Start: 2020-11-14 | End: 2020-11-17 | Stop reason: HOSPADM

## 2020-11-14 RX ORDER — LORAZEPAM 1 MG/1
4 TABLET ORAL
Status: DISCONTINUED | OUTPATIENT
Start: 2020-11-14 | End: 2020-11-17 | Stop reason: HOSPADM

## 2020-11-14 RX ORDER — ASPIRIN 81 MG/1
81 TABLET, CHEWABLE ORAL DAILY
Status: DISCONTINUED | OUTPATIENT
Start: 2020-11-14 | End: 2020-11-17 | Stop reason: HOSPADM

## 2020-11-14 RX ORDER — PREDNISONE 20 MG/1
40 TABLET ORAL DAILY
Status: DISCONTINUED | OUTPATIENT
Start: 2020-11-16 | End: 2020-11-17 | Stop reason: HOSPADM

## 2020-11-14 RX ORDER — POLYETHYLENE GLYCOL 3350 17 G/17G
17 POWDER, FOR SOLUTION ORAL DAILY PRN
Status: DISCONTINUED | OUTPATIENT
Start: 2020-11-14 | End: 2020-11-17 | Stop reason: HOSPADM

## 2020-11-14 RX ORDER — DOXYCYCLINE HYCLATE 100 MG/1
100 CAPSULE ORAL EVERY 12 HOURS
Status: DISCONTINUED | OUTPATIENT
Start: 2020-11-14 | End: 2020-11-17 | Stop reason: HOSPADM

## 2020-11-14 RX ORDER — SODIUM CHLORIDE 0.9 % (FLUSH) 0.9 %
10 SYRINGE (ML) INJECTION EVERY 12 HOURS SCHEDULED
Status: DISCONTINUED | OUTPATIENT
Start: 2020-11-14 | End: 2020-11-17 | Stop reason: HOSPADM

## 2020-11-14 RX ORDER — DEXTROSE MONOHYDRATE 25 G/50ML
12.5 INJECTION, SOLUTION INTRAVENOUS PRN
Status: DISCONTINUED | OUTPATIENT
Start: 2020-11-14 | End: 2020-11-17 | Stop reason: HOSPADM

## 2020-11-14 RX ORDER — THIAMINE MONONITRATE (VIT B1) 100 MG
100 TABLET ORAL DAILY
Status: ON HOLD | COMMUNITY
End: 2020-11-14

## 2020-11-14 RX ADMIN — METHYLPREDNISOLONE SODIUM SUCCINATE 40 MG: 40 INJECTION, POWDER, FOR SOLUTION INTRAMUSCULAR; INTRAVENOUS at 22:08

## 2020-11-14 RX ADMIN — ARFORMOTEROL TARTRATE 15 MCG: 15 SOLUTION RESPIRATORY (INHALATION) at 21:17

## 2020-11-14 RX ADMIN — AMLODIPINE BESYLATE 10 MG: 10 TABLET ORAL at 17:14

## 2020-11-14 RX ADMIN — IPRATROPIUM BROMIDE AND ALBUTEROL SULFATE 1 AMPULE: .5; 3 SOLUTION RESPIRATORY (INHALATION) at 06:31

## 2020-11-14 RX ADMIN — IPRATROPIUM BROMIDE AND ALBUTEROL SULFATE 1 AMPULE: .5; 3 SOLUTION RESPIRATORY (INHALATION) at 12:25

## 2020-11-14 RX ADMIN — METHYLPREDNISOLONE SODIUM SUCCINATE 40 MG: 40 INJECTION, POWDER, FOR SOLUTION INTRAMUSCULAR; INTRAVENOUS at 17:14

## 2020-11-14 RX ADMIN — IPRATROPIUM BROMIDE AND ALBUTEROL SULFATE 1 AMPULE: .5; 3 SOLUTION RESPIRATORY (INHALATION) at 06:32

## 2020-11-14 RX ADMIN — ASPIRIN 81 MG CHEWABLE TABLET 81 MG: 81 TABLET CHEWABLE at 17:14

## 2020-11-14 RX ADMIN — GLIPIZIDE 5 MG: 5 TABLET ORAL at 17:14

## 2020-11-14 RX ADMIN — IPRATROPIUM BROMIDE AND ALBUTEROL SULFATE 1 AMPULE: .5; 3 SOLUTION RESPIRATORY (INHALATION) at 06:30

## 2020-11-14 RX ADMIN — IPRATROPIUM BROMIDE AND ALBUTEROL SULFATE 1 AMPULE: .5; 3 SOLUTION RESPIRATORY (INHALATION) at 17:02

## 2020-11-14 RX ADMIN — Medication 3 MG: at 22:08

## 2020-11-14 RX ADMIN — BUDESONIDE 500 MCG: 0.5 SUSPENSION RESPIRATORY (INHALATION) at 21:17

## 2020-11-14 RX ADMIN — DOXYCYCLINE HYCLATE 100 MG: 100 CAPSULE ORAL at 22:08

## 2020-11-14 RX ADMIN — Medication 10 ML: at 22:09

## 2020-11-14 RX ADMIN — METHYLPREDNISOLONE SODIUM SUCCINATE 125 MG: 125 INJECTION, POWDER, FOR SOLUTION INTRAMUSCULAR; INTRAVENOUS at 05:43

## 2020-11-14 RX ADMIN — IPRATROPIUM BROMIDE AND ALBUTEROL SULFATE 1 AMPULE: .5; 3 SOLUTION RESPIRATORY (INHALATION) at 21:17

## 2020-11-14 RX ADMIN — LORAZEPAM 1 MG: 1 TABLET ORAL at 23:44

## 2020-11-14 NOTE — PROGRESS NOTES
Called patient's pharmacy to verify a home med list and list obtained but per pharmacy patient has not filled his prescriptions in quite some time. Per Pharmacist patient just doesn't show up to pick them up. Entered room to ask patient about compliance with medications and non-rebreather is half off of his face and patient sleeping. Mask adjusted, patient hard to arrouse but does wake up. Instructed patient again about keeping his mask on. Patient report that he has been taking his meds at home but not for the last three days patient reports \"I have been drinking a lot for the past 3 days so I haven't been taking my meds. I need some librium for this withdrawal from alcohol. \" Asked patient how much he had been drinking and patient will just say \"a lot, a lot of alcohol\". Patient verbalizes his last drink as being this morning.

## 2020-11-14 NOTE — ED NOTES
Pt 02 sat on 6l 90%. Admitting Dr Vargas to see if wanted any further orders before go to floor. Pt denies any shortness of breathe or respiratory distress. Pt calm and cooperative.   Care continued on the monitor     Padilla Salcedo RN  11/14/20 0505

## 2020-11-14 NOTE — PROGRESS NOTES
Patient took bipap off and got up to the bathroom on room air. Instructed the patient that he isn't supposed to be taking bipap off to pee. Reminded patient about using the urinal. Patient aware. Explained again the importance of keeping bipap or oxygen on and not removing it to walk to the bathroom.  Patient nods his head in understanding

## 2020-11-14 NOTE — H&P
Hospital Medicine History & Physical      PCP: Tila Gilbert DO    Date of Admission: 11/14/2020    Date of Service: Pt seen/examined on 11/14/2020 and is admitted to Inpatient with expected LOS greater than two midnights due to medical therapy. Chief Complaint:  had concerns including Shortness of Breath (increasing SOB x 3 day , hx COPD & Asthma , wears 3 L NC PRN at home). History Of Present Illness:    Mr. Angelika Goodwin, a 44y.o. year old male  who  has a past medical history of Acid reflux, Asthma, Asthma, COPD (chronic obstructive pulmonary disease) ([facility] [address] Hypertension, Pancreatitis, Prediabetes, Psychiatric problem, Sleep apnea, and Substance abuse ([facility] [address] Patient refers that over the last 2 days he has been having some increasing shortness of breath. Refers some cough with some associated phlegm. Patient came to the ER for further evaluation. Patient was found with hypoxemia tachypnea. Patient had a saturation of 63% air in the ER. She has been placed on BiPAP. Patient refers no fever, no chills. Refers no chest pain, no palpitations. Past Medical History:        Diagnosis Date    Acid reflux     Asthma     Asthma     COPD (chronic obstructive pulmonary disease) (HCC)     Hypertension     Pancreatitis     Prediabetes     Psychiatric problem     depressed    Sleep apnea     Substance abuse ([facility] [address]     alcohol and cocaine       Past Surgical History:        Procedure Laterality Date    COLONOSCOPY      2010 neg    NERVE BLOCK Left 08/27/2018    lumbar epidural #1 paramedian    DE NJX DX/THER SBST INTRLMNR LMBR/SAC W/IMG GDN Left 8/27/2018    LUMBAR EPIDURAL STEROID INJECTION L4-5 LEFT PARAMEDIAN #1 performed by Leslie Cheema MD at [address] [place] [address]         Medications Prior to Admission:      Prior to Admission medications    Medication Sig Start Date End Date Taking?  Authorizing Provider   ipratropium-albuterol (DUONEB) 0.5-2.5 (3) MG/3ML SOLN nebulizer solution Inhale 3 mLs into the lungs every 4 hours as needed for Shortness of Breath 10/15/20 10/25/20  Kofi Torres MD   Respiratory Therapy Supplies (FULL KIT NEBULIZER SET) MISC Use as directed with nebulized medication. 10/15/20   Kofi Torres MD   melatonin 3 MG TABS tablet Take 3 mg by mouth nightly as needed (sleep)    Historical Provider, MD   nicotine polacrilex (NICORETTE) 4 MG gum Take 4 mg by mouth as needed for Smoking cessation    Historical Provider, MD   budesonide-formoterol (SYMBICORT) 160-4.5 MCG/ACT AERO Inhale 2 puffs into the lungs 2 times daily    Historical Provider, MD   hydrOXYzine Pamoate (VISTARIL PO) Take 2 tablets by mouth nightly    Historical Provider, MD   guaiFENesin 400 MG tablet Take 1 tablet by mouth 4 times daily as needed for Cough 6/30/20   Beltran Albrecht, APRN - CNP   albuterol sulfate HFA (PROVENTIL HFA) 108 (90 Base) MCG/ACT inhaler Inhale 2 puffs into the lungs every 6 hours as needed for Wheezing 5/19/20   Albert Spain, APRN - CNS   glipiZIDE (GLUCOTROL) 5 MG tablet Take 1 tablet by mouth 2 times daily 4/23/20   Tim Gleason MD   pantoprazole (PROTONIX) 40 MG tablet Take 40 mg by mouth daily    Historical Provider, MD   ibuprofen (ADVIL;MOTRIN) 200 MG tablet Take 400 mg by mouth every 6 hours as needed for Pain     Historical Provider, MD   aspirin 81 MG tablet Take 81 mg by mouth daily    Historical Provider, MD   tamsulosin (FLOMAX) 0.4 MG capsule Take 0.4 mg by mouth 2 times daily     Historical Provider, MD   amLODIPine (NORVASC) 10 MG tablet Take 1 tablet by mouth daily. 1/16/15   Anthony Ayoub MD       Allergies:  Dust mite extract    Social History:    TOBACCO:   reports that he has been smoking cigarettes and cigars. He has a 28.00 pack-year smoking history. He has never used smokeless tobacco.  ETOH:   reports current alcohol use. Family History:    Reviewed in detail and negative for DM, CAD, Cancer, CVA.  Positive as follows\"      Problem Relation Age of Onset    Other Sister     High Blood Pressure Maternal Grandmother     Other Maternal Grandmother          REVIEW OF SYSTEMS:   Pertinent positives as noted in the HPI. All other systems reviewed and negative. PHYSICAL EXAM:  BP (!) 148/100   Pulse 107   Temp 98.5 °F (36.9 °C) (Oral)   Resp 18   Ht 5' 9\" (1.753 m)   Wt 250 lb (113.4 kg)   SpO2 92%   BMI 36.92 kg/m²   General appearance: No apparent distress, appears stated age and cooperative. HEENT: Normal cephalic, atraumatic without obvious deformity. Pupils equal, round, and reactive to light. Extra ocular muscles intact. Conjunctivae/corneas clear. Neck: Supple, with full range of motion. No jugular venous distention. Trachea midline. Respiratory:  Normal respiratory effort. Clear to auscultation, bilaterally without Rales/Wheezes/Rhonchi. Cardiovascular:  S1/S2    Abdomen: Soft, non-tender, non-distended with normal bowel sounds. Musculoskeletal: No clubbing, cyanosis or edema bilaterally. Full range of motion without deformity. Skin: Normal skin color. No rashes or lesions. Neurologic: Oriented x3    Reviewed EKG and CXR personally    CBC:   Recent Labs     11/14/20 0524   WBC 8.7   RBC 6.00*   HGB 16.4   HCT 52.5   MCV 87.5   RDW 15.1*        BMP:   Recent Labs     11/14/20 0524      K 4.2   CL 97*   CO2 30*   BUN 9   CREATININE 0.9     LFT:  Recent Labs     11/14/20 0524   PROT 7.4   ALKPHOS 95   ALT 31   AST 39   BILITOT 0.5     CE:  Recent Labs     11/14/20 0524   TROPONINI <0.01     PT/INR:   Recent Labs     11/14/20 0524   INR 1.1     BNP: No results for input(s): BNP in the last 72 hours.   ESR:   Lab Results   Component Value Date    SEDRATE 1 06/25/2020     CRP:   Lab Results   Component Value Date    CRP 6.0 (H) 03/16/2013     D Dimer:   Lab Results   Component Value Date    DDIMER 220 10/14/2020     Folate and B12: No results found for: [id number], No results found for: Hospitalist  +++++++++++++++++++++++++++++++++++++++++++++++++  NOTE: This report was transcribed using voice recognition software. Every effort was made to ensure accuracy; however, inadvertent computerized transcription errors may be present.

## 2020-11-14 NOTE — PROGRESS NOTES
Patient found to be eating without his nonrebreather on. Patient educated on why he needed to keep the oxygen on. Patient satting 82% room air. Non rebreather reapplied and patient instructed to take deep breaths and O2 sats improved to 96% on non rebreather. Explained the importance of patient keeping nonrebreather on and patient questioning why he isn't able to have a nasal cannula on instead. Explained that he had failed wearing one in ER and wasn't satting well with the nasal cannula so he would need to keep on the nonrebreather until we could try and wean him down some a little later.  Patient agreeable at this time

## 2020-11-14 NOTE — PROGRESS NOTES
Date: 11/14/2020    Time: 5:35 PM    Patient Placed On BIPAP/CPAP/ Non-Invasive Ventilation? Yes    If no must comment. Facial area red/color change? No           If YES are Blister/Lesion present? No   If yes must notify nursing staff  BIPAP/CPAP skin barrier?   Yes    Skin barrier type:mepilexlite       Comments:        Bing Marte

## 2020-11-14 NOTE — CONSULTS
Rodrigo Wilcox M.D.,[facility]  Alli Ferro D.O., F.[place].OBetzyI., Ivelisse Alcantara M.D. Sierra Castillo M.D., Claribel Alejo M.D. Jennifer Prater D.O. Patient:  Mckinley Mora 44 y.o. male MRN: [id number]     Date of Service: 11/14/2020      PULMONARY CONSULTATION    Reason for Consultation: Acute respiratory failure  Referring Physician: Cleve    Communication with the referring physician will be sent via the electronic medical record. Chief Complaint: Lethargy, shortness of breath    CODE STATUS: Full    SUBJECTIVE:  HPI:  Mckinley Mora is a 44 y.o. presented 7 hours ago with shortness of breath x2 days. Significant past medical history of hypertension, COPD is on home oxygen at 3 L as needed, JANESSA GERD pancreatitis depression polysubstance abuse. In the ED was found to have talk screen positive for cocaine. ABG for acidosis,  hypercapnic hypoxic respiratory failure. Respiratory panel and COVID-19 negative, leukocytosis. Chest x-ray done today no acute process. Service has been consulted for respiratory failure. Patient is known to our service he was last seen on 21  we are consulted and he was discharged subsequently after consult for respiratory failure at that time patient reported that he had appointment with Immanuel Renee his pulmonologist in [facility], patient reports to me he did not follow-up with his doctor as the appointment was too far out and he subsequently ran out of his Symbicort and did not use his albuterol inhaler 2 days ago when his shortness of breath began. Is a heavy smoker every day. At least a pack a day. Patient seen in room he appears very lethargic he is very hard to arouse. I will have ABG and noninvasive ventilator started. Patient reports he began 2 days ago with shortness of breath he did not use his inhalers he did not call his pulmonologist.  She has a history of noncompliance with medication and medical treatment.   Patient denies fever chills or night sweats. Past Medical History:   Diagnosis Date    Acid reflux     Asthma     Asthma     COPD (chronic obstructive pulmonary disease) ([facility])     Hypertension     Pancreatitis     Prediabetes     Psychiatric problem     depressed    Sleep apnea     Substance abuse ([facility] [address]     alcohol and cocaine       Past Surgical History:   Procedure Laterality Date    COLONOSCOPY      2010 neg    NERVE BLOCK Left 08/27/2018    lumbar epidural #1 paramedian    MS NJX DX/THER SBST INTRLMNR LMBR/SAC W/IMG GDN Left 8/27/2018    LUMBAR EPIDURAL STEROID INJECTION L4-5 LEFT PARAMEDIAN #1 performed by Bhavin Robison MD at [address] [place] [address]         Family History   Problem Relation Age of Onset    Other Sister     High Blood Pressure Maternal Grandmother     Other Maternal Grandmother        Social History:   Social History     Socioeconomic History    Marital status: Single     Spouse name: Not on file    Number of children: 3    Years of education: 15    Highest education level: Not on file   Occupational History    Occupation:    Social Needs    Financial resource strain: Not on file    Food insecurity     Worry: Not on file     Inability: Not on file   Rostima needs     Medical: Not on file     Non-medical: Not on file   Tobacco Use    Smoking status: Current Every Day Smoker     Packs/day: 1.00     Years: 28.00     Pack years: 28.00     Types: Cigarettes, Cigars    Smokeless tobacco: Never Used    Tobacco comment: quit cigarettes 3 weeks ago. 5 cigars a week now.     Substance and Sexual Activity    Alcohol use: Yes     Comment: past month    Drug use: Yes     Types: Cocaine     Comment: couple days ago    Sexual activity: Not on file   Lifestyle    Physical activity     Days per week: Not on file     Minutes per session: Not on file    Stress: Not on file   Relationships    Social connections     Talks on phone: Not on file     Gets together: Not on file Attends [employer] service: Not on file     Active member of club or organization: Not on file     Attends meetings of clubs or organizations: Not on file     Relationship status: Not on file    Intimate partner violence     Fear of current or ex partner: Not on file     Emotionally abused: Not on file     Physically abused: Not on file     Forced sexual activity: Not on file   Other Topics Concern    Not on file   Social History Narrative    Not on file   Smoking history: Current smoker  ETOH:   reports current alcohol use.     Exposures: There  is not history of TB or TB exposure. There is not asbestos or silica dust exposure. The patient reports does not have coal, foundry, quarry or [employer] exposure. Recent travel history none. There is not  history of recreational or IV drug use. There is not hot tub exposure. The patient does not have any exotic pets, turtles or exotic birds.        Vaccines:    Influenza:  Refused    Immunization History   Administered Date(s) Administered    Influenza Virus Vaccine 02/18/2015, 10/16/2019    Pneumococcal Polysaccharide ([id number]) 02/18/2015        Home Meds: Medications Prior to Admission: ipratropium-albuterol (DUONEB) 0.5-2.5 (3) MG/3ML SOLN nebulizer solution, Inhale 3 mLs into the lungs every 4 hours as needed for Shortness of Breath  Respiratory Therapy Supplies (FULL KIT NEBULIZER SET) MISC, Use as directed with nebulized medication.   melatonin 3 MG TABS tablet, Take 3 mg by mouth nightly as needed (sleep)  nicotine polacrilex (NICORETTE) 4 MG gum, Take 4 mg by mouth as needed for Smoking cessation  budesonide-formoterol (SYMBICORT) 160-4.5 MCG/ACT AERO, Inhale 2 puffs into the lungs 2 times daily  hydrOXYzine Pamoate (VISTARIL PO), Take 2 tablets by mouth nightly  guaiFENesin 400 MG tablet, Take 1 tablet by mouth 4 times daily as needed for Cough  albuterol sulfate HFA (PROVENTIL HFA) 108 (90 Base) MCG/ACT inhaler, Inhale 2 puffs into the lungs every 06/25/2020    CL 97 11/14/2020    CO2 30 11/14/2020    BUN 9 11/14/2020    CREATININE 0.9 11/14/2020    LABALBU 3.7 11/14/2020    CALCIUM 8.8 11/14/2020    GFRAA >60 11/14/2020    LABGLOM >60 11/14/2020     Lab Results   Component Value Date    PROTIME 12.0 11/14/2020    INR 1.1 11/14/2020     Recent Labs     11/14/20  0524   PROBNP 149*     Recent Labs     11/14/20 0524   TROPONINI <0.01     No results for input(s): PROCAL in the last 72 hours. This SmartLink has not been configured with any valid records. Micro:  No results for input(s): CULTRESP in the last 72 hours. No results for input(s): LABGRAM in the last 72 hours. No results for input(s): LEGUR in the last 72 hours. No results for input(s): STREPNEUMAGU in the last 72 hours. No results for input(s): LP1UAG in the last 72 hours. Results for Jovita Henry (MRN [id number]) as of 11/14/2020 14:20   Ref. Range 11/14/2020 13:24   Source: Unknown Blood Arterial   pH, Blood Gas Latest Ref Range: 7.350 - 7.450  7.264 (L)   PCO2 Latest Ref Range: 35.0 - 45.0 mmHg 85.7 (HH)   pO2 Latest Ref Range: 75.0 - 100.0 mmHg 137.0 (H)   HCO3 Latest Ref Range: 22.0 - 26.0 mmol/L 37.9 (H)   Base Excess Latest Ref Range: -3.0 - 3.0 mmol/L 6.7 (H)   O2 Sat Latest Ref Range: 92.0 - 98.5 % 98.6 (H)   tHb (est) Latest Ref Range: 11.5 - 16.5 g/dL 16.9 (H)   O2Hb Latest Ref Range: 94.0 - 97.0 % 95.9   COHb Latest Ref Range: 0.0 - 1.5 % 2.3 (H)   MetHb Latest Ref Range: 0.0 - 1.5 % 0.4   HHb Latest Ref Range: 0.0 - 5.0 % 1.4   O2 Content Latest Units: mL/dL 23.0   Pt Temp Latest Units: C 37.0   Critical(s) Notified Unknown Handed report to Dr/RN   Time Analyzed Unknown 1324   Mode Unknown AVAPS   Peep/Cpap Latest Units: cmH2O 6.0   Vt Mechanical Latest Units: mL 450.0   Rr Mechanical Latest Units: b/min 18.0       Assessment:  1. Acute on chronic respiratory failure with hypoxia hypercapnia  2. COPD asthma exacerbation  3.  Hx of Intubation for airway protection extubated 6/27  4. Hx Cocaine induced pneumonitis vs chf vs aspiration pneumonia hospt admit 7/1/2020  5. COPD/asthma overlap, noncompliance with medication  6. Current every day smoker  7. Substance abuse- +cociane fentanyl   8. Obstructive sleep apnea  9. Hypertension  10. EtOH abuse, history of pancreatitis, elevated LFTs  11. GERD  12. Medical non compliance  13. Nicotine dependence     Plan:  1. Patient needs stat ABG, will start noninvasive ventilator tidal volume 450-500 backup rate 12-18 FiO2 40%  2. Check strep pneumoniae antigen  , legionella urine  , respiratory culture , respiratory panel with Covid negative, pro calcitonin , proBNP 149  3. Agree with doxy  4. DuoNebs every 4 hours while awake, add Pulmicort and Brovana, DC Symbicort  5. Continue Solu-Medrol 40 mg every 8 hours, taper with improvement  6. Has nicotine gum patient reports that he is a heavy drinker CIWA protocol per  Primary  7. Can be off AVAPS only for meals   8. Check gas still with significant respiratory acidosis PCO2 of 85.7, will increase tidal volume to 500 and respiratory to 22 recheck ABG in 2 hours         Thank you for allowing me to participate in the care of Bakari Powell. Please feel free to call with questions. This plan of care was reviewed in collaboration with Dr. Richard Evans    Electronically signed by MIKE Grey on 11/14/2020 at 12:09 PM     Agree with current treatment and plan. Dr. Benjamín Reynoso      Note: This report was completed utilizing computer voice recognition software.  Every effort has been made to ensure accuracy, however; inadvertent computerized transcription errors may be present

## 2020-11-14 NOTE — ED PROVIDER NOTES
HPI:  11/14/20, Time: 5:24 AM VITALIY Barrios is a 44 y.o. male presenting to the ED for feeling short of breath, beginning 2 days ago. The complaint has been persistent, moderate in severity, and worsened by nothing. Patient reporting feeling short of breath the last several days. Patient does have history of asthma. Patient to be hypoxic out in the waiting room. Patient reporting no productive cough. He reports no fever chills he reports no chest pain there is no history abdominal pain vomiting diarrhea. He does report using cocaine several days ago. Patient reporting he is on oxygen at times when he needs it. But he arrived here with no oxygen. Patient was a walk-in. Patient reporting no leg pain or swelling. ROS:   Pertinent positives and negatives are stated within HPI, all other systems reviewed and are negative.  --------------------------------------------- PAST HISTORY ---------------------------------------------  Past Medical History:  has a past medical history of Acid reflux, Asthma, Asthma, COPD (chronic obstructive pulmonary disease) ([facility] [place] [facility] Hypertension, Pancreatitis, Prediabetes, Psychiatric problem, Sleep apnea, and Substance abuse ([facility] Past Surgical History:  has a past surgical history that includes [place] tooth extraction; Colonoscopy; Nerve Block (Left, 08/27/2018); and pr njx dx/ther sbst intrlmnr lmbr/sac w/img gdn (Left, 8/27/2018). Social History:  reports that he has been smoking cigarettes and cigars. He has a 28.00 pack-year smoking history. He has never used smokeless tobacco. He reports current alcohol use. He reports current drug use. Drugs:  and Cocaine. Family History: family history includes High Blood Pressure in his maternal grandmother; Other in his maternal grandmother and sister. The patients home medications have been reviewed.     Allergies: Dust mite extract    ---------------------------------------------------PHYSICAL EXAM--------------------------------------    Constitutional/General: Alert and oriented x3, mild distress  Head: Normocephalic and atraumatic  Eyes: PERRL, EOMI  Mouth: Oropharynx clear, handling secretions, no trismus  Neck: Supple, full ROM, non tender to palpation in the midline, no stridor, no crepitus, no meningeal signs  Pulmonary: Lungs some wheezes noted but diminished  Cardiovascular: Tachycardic regular rhythm. No murmurs, gallops, or rubs. 2+ distal pulses  Chest: no chest wall tenderness  Abdomen: Soft. Non tender. Non distended. +BS. No rebound, guarding, or rigidity. No pulsatile masses appreciated. Musculoskeletal: Moves all extremities x 4. Warm and well perfused, no clubbing, cyanosis, or edema. Capillary refill <3 seconds  Skin: warm and dry. No rashes. Neurologic: GCS 15, CN 2-12 grossly intact, no focal deficits, symmetric strength 5/5 in the upper and lower extremities bilaterally  Psych: Normal Affect    -------------------------------------------------- RESULTS -------------------------------------------------  I have personally reviewed all laboratory and imaging results for this patient. Results are listed below.      LABS:  Results for orders placed or performed during the hospital encounter of 11/14/20   CBC auto differential   Result Value Ref Range    WBC 8.7 4.5 - 11.5 E9/L    RBC 6.00 (H) 3.80 - 5.80 E12/L    Hemoglobin 16.4 12.5 - 16.5 g/dL    Hematocrit 52.5 37.0 - 54.0 %    MCV 87.5 80.0 - 99.9 fL    MCH 27.3 26.0 - 35.0 pg    MCHC 31.2 (L) 32.0 - 34.5 %    RDW 15.1 (H) 11.5 - 15.0 fL    Platelets 949 414 - 365 E9/L    MPV 10.2 7.0 - 12.0 fL    Neutrophils % 62.1 43.0 - 80.0 %    Immature Granulocytes % 0.9 0.0 - 5.0 %    Lymphocytes % 26.2 20.0 - 42.0 %    Monocytes % 9.6 2.0 - 12.0 %    Eosinophils % 0.7 0.0 - 6.0 %    Basophils % 0.5 0.0 - 2.0 %    Neutrophils Absolute 5.39 1.80 - 7.30 E9/L    Immature Granulocytes # 0.08 E9/L    Lymphocytes Absolute 2.27 1.50 - 4.00 E9/L Monocytes Absolute 0.83 0.10 - 0.95 E9/L    Eosinophils Absolute 0.06 0.05 - 0.50 E9/L    Basophils Absolute 0.04 0.00 - 0.20 E9/L   Comprehensive Metabolic Panel   Result Value Ref Range    Sodium 140 132 - 146 mmol/L    Potassium 4.2 3.5 - 5.0 mmol/L    Chloride 97 (L) 98 - 107 mmol/L    CO2 30 (H) 22 - 29 mmol/L    Anion Gap 13 7 - 16 mmol/L    Glucose 150 (H) 74 - 99 mg/dL    BUN 9 6 - 20 mg/dL    CREATININE 0.9 0.7 - 1.2 mg/dL    GFR Non-African American >60 >=60 mL/min/1.73    GFR African American >60     Calcium 8.8 8.6 - 10.2 mg/dL    Total Protein 7.4 6.4 - 8.3 g/dL    Alb 3.7 3.5 - 5.2 g/dL    Total Bilirubin 0.5 0.0 - 1.2 mg/dL    Alkaline Phosphatase 95 40 - 129 U/L    ALT 31 0 - 40 U/L    AST 39 0 - 39 U/L   Troponin   Result Value Ref Range    Troponin <0.01 0.00 - 0.03 ng/mL   Brain Natriuretic Peptide   Result Value Ref Range    Pro- (H) 0 - 125 pg/mL   Protime-INR   Result Value Ref Range    Protime 12.0 9.3 - 12.4 sec    INR 1.1        RADIOLOGY:  Interpreted by Radiologist.  XR CHEST PORTABLE   Final Result   Negative portable chest.               EKG:  This EKG is signed and interpreted by me. Rate: 120  Rhythm: Sinus tachycardia  Interpretation: non-specific EKG  Comparison: compared to previous      ------------------------- NURSING NOTES AND VITALS REVIEWED ---------------------------   The nursing notes within the ED encounter and vital signs as below have been reviewed by myself. BP (!) 155/99   Pulse 105   Temp 98.5 °F (36.9 °C) (Oral)   Resp 18   Ht 5' 9\" (1.753 m)   Wt 250 lb (113.4 kg)   SpO2 93%   BMI 36.92 kg/m²   Oxygen Saturation Interpretation: Abnormal    The patients available past medical records and past encounters were reviewed.         ------------------------------ ED COURSE/MEDICAL DECISION MAKING----------------------  Medications   ipratropium-albuterol (DUONEB) nebulizer solution 1 ampule (1 ampule Inhalation Given 11/14/20 6520)   methylPREDNISolone sodium (SOLU-MEDROL) injection 125 mg (125 mg Intravenous Given 11/14/20 0543)             Medical Decision Making:    Patient presenting because of COPD symptoms. Patient was noted to be hypoxic in the waiting room his pulse ox is 63%. Patient reporting he is wheezing he reports no productive cough he reports no leg pain or swelling. Patient reporting no pleuritic pain. Patient placed on oxygen here. Patient given breathing treatments as well as steroids patient vital signs stable. Slightly tach cardiac. Plan will be to admit to monitored bed    Re-Evaluations:             Re-evaluation. Patients symptoms show no change  Patient medicated and given breathing treatments placed on supplemental oxygen. Patient reporting no chest pain. Heart rates below 120. Patient aware of findings and plan. Consultations:          Internal medicine     Spoke to sound they would like Covid testing done before patient admitted  Critical Care: This patient's ED course included: a personal history and physicial eaxmination    This patient has been closely monitored during their ED course. Counseling: The emergency provider has spoken with the patient and discussed todays results, in addition to providing specific details for the plan of care and counseling regarding the diagnosis and prognosis. Questions are answered at this time and they are agreeable with the plan.       --------------------------------- IMPRESSION AND DISPOSITION ---------------------------------    IMPRESSION  1. COPD with acute exacerbation ([facility]    2. Hypoxia        DISPOSITION  Disposition: Admit to telemetry  Patient condition is fair        NOTE: This report was transcribed using voice recognition software.  Every effort was made to ensure accuracy; however, inadvertent computerized transcription errors may be present          Jazz Qureshi MD  11/14/20 3489       Jazz Qureshi MD  11/14/20 8974

## 2020-11-14 NOTE — PROGRESS NOTES
Patient stating \"Im not going to wear this all the time. She told me I could have it off for awhile. \" Patient refusing to keep bipap on any more and states \"I'm not putting it back on until bedtime tonight. The respiratory girl told me my numbers was better that they went down. \" Explained that the numbers had improved due to keeping the bipap on and patient nods in understanding but still refusing to wear bipap.

## 2020-11-15 LAB
ALBUMIN SERPL-MCNC: 3.9 G/DL (ref 3.5–5.2)
ALP BLD-CCNC: 111 U/L (ref 40–129)
ALT SERPL-CCNC: 32 U/L (ref 0–40)
ANION GAP SERPL CALCULATED.3IONS-SCNC: 15 MMOL/L (ref 7–16)
AST SERPL-CCNC: 23 U/L (ref 0–39)
BASOPHILS ABSOLUTE: 0.02 E9/L (ref 0–0.2)
BASOPHILS RELATIVE PERCENT: 0.2 % (ref 0–2)
BILIRUB SERPL-MCNC: 0.5 MG/DL (ref 0–1.2)
BILIRUBIN DIRECT: <0.2 MG/DL (ref 0–0.3)
BILIRUBIN, INDIRECT: NORMAL MG/DL (ref 0–1)
BUN BLDV-MCNC: 10 MG/DL (ref 6–20)
CALCIUM SERPL-MCNC: 9.6 MG/DL (ref 8.6–10.2)
CHLORIDE BLD-SCNC: 95 MMOL/L (ref 98–107)
CO2: 27 MMOL/L (ref 22–29)
CREAT SERPL-MCNC: 0.8 MG/DL (ref 0.7–1.2)
EOSINOPHILS ABSOLUTE: 0 E9/L (ref 0.05–0.5)
EOSINOPHILS RELATIVE PERCENT: 0 % (ref 0–6)
GFR AFRICAN AMERICAN: >60
GFR NON-AFRICAN AMERICAN: >60 ML/MIN/1.73
GLUCOSE BLD-MCNC: 216 MG/DL (ref 74–99)
HCT VFR BLD CALC: 51.1 % (ref 37–54)
HEMOGLOBIN: 15.8 G/DL (ref 12.5–16.5)
IMMATURE GRANULOCYTES #: 0.09 E9/L
IMMATURE GRANULOCYTES %: 0.8 % (ref 0–5)
L. PNEUMOPHILA SEROGP 1 UR AG: NORMAL
LYMPHOCYTES ABSOLUTE: 0.86 E9/L (ref 1.5–4)
LYMPHOCYTES RELATIVE PERCENT: 7.3 % (ref 20–42)
MCH RBC QN AUTO: 27.4 PG (ref 26–35)
MCHC RBC AUTO-ENTMCNC: 30.9 % (ref 32–34.5)
MCV RBC AUTO: 88.7 FL (ref 80–99.9)
METER GLUCOSE: 180 MG/DL (ref 74–99)
METER GLUCOSE: 226 MG/DL (ref 74–99)
METER GLUCOSE: 226 MG/DL (ref 74–99)
MONOCYTES ABSOLUTE: 0.42 E9/L (ref 0.1–0.95)
MONOCYTES RELATIVE PERCENT: 3.5 % (ref 2–12)
NEUTROPHILS ABSOLUTE: 10.47 E9/L (ref 1.8–7.3)
NEUTROPHILS RELATIVE PERCENT: 88.2 % (ref 43–80)
PDW BLD-RTO: 14.6 FL (ref 11.5–15)
PLATELET # BLD: 402 E9/L (ref 130–450)
PMV BLD AUTO: 10.3 FL (ref 7–12)
POTASSIUM REFLEX MAGNESIUM: 4.6 MMOL/L (ref 3.5–5)
RBC # BLD: 5.76 E12/L (ref 3.8–5.8)
SODIUM BLD-SCNC: 137 MMOL/L (ref 132–146)
STREP PNEUMONIAE ANTIGEN, URINE: NORMAL
TOTAL PROTEIN: 7.1 G/DL (ref 6.4–8.3)
WBC # BLD: 11.9 E9/L (ref 4.5–11.5)

## 2020-11-15 PROCEDURE — 6370000000 HC RX 637 (ALT 250 FOR IP): Performed by: INTERNAL MEDICINE

## 2020-11-15 PROCEDURE — 85025 COMPLETE CBC W/AUTO DIFF WBC: CPT

## 2020-11-15 PROCEDURE — 2580000003 HC RX 258: Performed by: INTERNAL MEDICINE

## 2020-11-15 PROCEDURE — 6360000002 HC RX W HCPCS: Performed by: CLINICAL NURSE SPECIALIST

## 2020-11-15 PROCEDURE — 94640 AIRWAY INHALATION TREATMENT: CPT

## 2020-11-15 PROCEDURE — 2060000000 HC ICU INTERMEDIATE R&B

## 2020-11-15 PROCEDURE — 2700000000 HC OXYGEN THERAPY PER DAY

## 2020-11-15 PROCEDURE — 80048 BASIC METABOLIC PNL TOTAL CA: CPT

## 2020-11-15 PROCEDURE — 82962 GLUCOSE BLOOD TEST: CPT

## 2020-11-15 PROCEDURE — 36415 COLL VENOUS BLD VENIPUNCTURE: CPT

## 2020-11-15 PROCEDURE — 80076 HEPATIC FUNCTION PANEL: CPT

## 2020-11-15 PROCEDURE — 94660 CPAP INITIATION&MGMT: CPT

## 2020-11-15 PROCEDURE — 6360000002 HC RX W HCPCS: Performed by: INTERNAL MEDICINE

## 2020-11-15 RX ORDER — TAMSULOSIN HYDROCHLORIDE 0.4 MG/1
0.4 CAPSULE ORAL 2 TIMES DAILY
Status: DISCONTINUED | OUTPATIENT
Start: 2020-11-15 | End: 2020-11-17 | Stop reason: HOSPADM

## 2020-11-15 RX ADMIN — ARFORMOTEROL TARTRATE 15 MCG: 15 SOLUTION RESPIRATORY (INHALATION) at 22:02

## 2020-11-15 RX ADMIN — BUDESONIDE 500 MCG: 0.5 SUSPENSION RESPIRATORY (INHALATION) at 11:35

## 2020-11-15 RX ADMIN — ACETAMINOPHEN 650 MG: 325 TABLET ORAL at 09:48

## 2020-11-15 RX ADMIN — Medication 10 ML: at 09:49

## 2020-11-15 RX ADMIN — BENZOCAINE AND MENTHOL 1 LOZENGE: 15; 3.6 LOZENGE ORAL at 12:18

## 2020-11-15 RX ADMIN — BUDESONIDE 500 MCG: 0.5 SUSPENSION RESPIRATORY (INHALATION) at 22:01

## 2020-11-15 RX ADMIN — DOXYCYCLINE HYCLATE 100 MG: 100 CAPSULE ORAL at 09:49

## 2020-11-15 RX ADMIN — PANTOPRAZOLE SODIUM 40 MG: 40 TABLET, DELAYED RELEASE ORAL at 09:49

## 2020-11-15 RX ADMIN — DOXYCYCLINE HYCLATE 100 MG: 100 CAPSULE ORAL at 22:40

## 2020-11-15 RX ADMIN — ARFORMOTEROL TARTRATE 15 MCG: 15 SOLUTION RESPIRATORY (INHALATION) at 11:33

## 2020-11-15 RX ADMIN — AMLODIPINE BESYLATE 10 MG: 10 TABLET ORAL at 09:50

## 2020-11-15 RX ADMIN — NICOTINE POLACRILEX 4 MG: 2 GUM, CHEWING BUCCAL at 16:29

## 2020-11-15 RX ADMIN — GLIPIZIDE 5 MG: 5 TABLET ORAL at 06:23

## 2020-11-15 RX ADMIN — IPRATROPIUM BROMIDE AND ALBUTEROL SULFATE 1 AMPULE: .5; 3 SOLUTION RESPIRATORY (INHALATION) at 22:00

## 2020-11-15 RX ADMIN — GUAIFENESIN 400 MG: 400 TABLET, FILM COATED ORAL at 09:50

## 2020-11-15 RX ADMIN — TAMSULOSIN HYDROCHLORIDE 0.4 MG: 0.4 CAPSULE ORAL at 12:18

## 2020-11-15 RX ADMIN — GLIPIZIDE 5 MG: 5 TABLET ORAL at 17:25

## 2020-11-15 RX ADMIN — BENZOCAINE AND MENTHOL 1 LOZENGE: 15; 3.6 LOZENGE ORAL at 16:14

## 2020-11-15 RX ADMIN — LORAZEPAM 1 MG: 1 TABLET ORAL at 17:25

## 2020-11-15 RX ADMIN — ASPIRIN 81 MG CHEWABLE TABLET 81 MG: 81 TABLET CHEWABLE at 09:49

## 2020-11-15 RX ADMIN — METHYLPREDNISOLONE SODIUM SUCCINATE 40 MG: 40 INJECTION, POWDER, FOR SOLUTION INTRAMUSCULAR; INTRAVENOUS at 22:40

## 2020-11-15 RX ADMIN — METHYLPREDNISOLONE SODIUM SUCCINATE 40 MG: 40 INJECTION, POWDER, FOR SOLUTION INTRAMUSCULAR; INTRAVENOUS at 14:38

## 2020-11-15 RX ADMIN — IPRATROPIUM BROMIDE AND ALBUTEROL SULFATE 1 AMPULE: .5; 3 SOLUTION RESPIRATORY (INHALATION) at 16:19

## 2020-11-15 RX ADMIN — Medication 3 MG: at 22:40

## 2020-11-15 RX ADMIN — TAMSULOSIN HYDROCHLORIDE 0.4 MG: 0.4 CAPSULE ORAL at 22:40

## 2020-11-15 RX ADMIN — Medication 10 ML: at 22:40

## 2020-11-15 RX ADMIN — IPRATROPIUM BROMIDE AND ALBUTEROL SULFATE 1 AMPULE: .5; 3 SOLUTION RESPIRATORY (INHALATION) at 11:33

## 2020-11-15 RX ADMIN — METHYLPREDNISOLONE SODIUM SUCCINATE 40 MG: 40 INJECTION, POWDER, FOR SOLUTION INTRAMUSCULAR; INTRAVENOUS at 06:23

## 2020-11-15 RX ADMIN — ENOXAPARIN SODIUM 40 MG: 40 INJECTION SUBCUTANEOUS at 09:48

## 2020-11-15 ASSESSMENT — PAIN SCALES - GENERAL
PAINLEVEL_OUTOF10: 7
PAINLEVEL_OUTOF10: 0

## 2020-11-15 NOTE — PROGRESS NOTES
Patric Saunders M.D.,[facility]  John Simms D.O., F.A.C.O.I., Edinson Pina M.D. Briseyda Linares M.D., Staci España M.D. Jonathan Rogers D.O. Daily Pulmonary Progress Note    Patient:  Babrara Barriag 44 y.o. male MRN: [id number]     Date of Service: 11/15/2020      Synopsis     We are following patient for resp failure     \"CC\" shortness of breath    Code status: Full      Subjective      Patient was seen and examined. Was compliant with device overnight he appears much more alert today we discussed his chronic drug and alcohol abuse in addition to his smoking. Patient would like information on addiction and smoking cessation . Again reiterated to patient the importance of following up with his pulmonologist Dr. Pratik Dumont. Review of Systems:  Constitutional: Denies fever, weight loss, night sweats, and fatigue  Skin: Denies pigmentation, dark lesions, and rashes   HEENT: Denies hearing loss, tinnitus, ear drainage, epistaxis, sore throat, and hoarseness. Cardiovascular: Denies palpitations, chest pain, and chest pressure. Respiratory: Denies cough, dyspnea at rest, hemoptysis, apnea, and choking.   Gastrointestinal: Denies nausea, vomiting, poor appetite, diarrhea, heartburn or reflux  Genitourinary: Denies dysuria, frequency, urgency or hematuria  Musculoskeletal: Denies myalgias, muscle weakness, and bone pain  Neurological: Denies dizziness, vertigo, headache, and focal weakness  Psychological: Denies anxiety and depression  Endocrine: Denies heat intolerance and cold intolerance  Hematopoietic/Lymphatic: Denies bleeding problems and blood transfusions    24-hour events:      Objective   Vitals: /86   Pulse 78   Temp 97 °F (36.1 °C) (Temporal)   Resp 22   Ht 5' 9\" (1.753 m)   Wt 250 lb (113.4 kg)   SpO2 98%   BMI 36.92 kg/m²     I/O:    Intake/Output Summary (Last 24 hours) at 11/15/2020 0816  Last data filed at 11/15/2020 0746  Gross per 24 hour   Intake -- Output 1300 ml   Net -1300 ml       Vent Information  Skin Assessment: Clean, dry, & intact  Vt Ordered: 500 mL  FiO2 : 60 %  SpO2: 98 %  Mask Type: Full face mask  Mask Size: Large          CPAP/EPAP: 6 cmH2O     CURRENT MEDS :  Scheduled Meds:   amLODIPine  10 mg Oral Daily    aspirin  81 mg Oral Daily    glipiZIDE  5 mg Oral BID AC    pantoprazole  40 mg Oral Daily    enoxaparin  40 mg Subcutaneous Daily    ipratropium-albuterol  1 ampule Inhalation Q4H WA    methylPREDNISolone  40 mg Intravenous Q8H    Followed by   Rosio Santiago ON 11/16/2020] predniSONE  40 mg Oral Daily    doxycycline hyclate  100 mg Oral Q12H    sodium chloride flush  10 mL Intravenous 2 times per day    budesonide  500 mcg Nebulization BID    Arformoterol Tartrate  15 mcg Nebulization BID       Physical Exam:  General Appearance: appears comfortable in no acute distress. HEENT: Normocephalic atraumatic without obvious abnormality   Neck: Lips, mucosa, and tongue normal.  Supple, symmetrical, trachea midline, no adenopathy;thyroid:  no enlargement/tenderness/nodules or JVD. Lung: Breath sounds CTA. Respirations   unlabored. Symmetrical expansion. Heart: RRR, normal S1, S2. No MRG  Abdomen: Soft, NT, ND. BS present x 4 quadrants. No bruit or organomegaly. Extremities: Pedal pulses 2+ symmetric b/l. Extremities normal, no cyanosis, clubbing, or edema. Musculokeletal: No joint swelling, no muscle tenderness. ROM normal in all joints of extremities. Neurologic: Mental status: Alert and Oriented X3 .     Pertinent/ New Labs and Imaging Studies     Imaging Personally Reviewed:  none          Labs:  Lab Results   Component Value Date    WBC 11.9 11/15/2020    HGB 15.8 11/15/2020    HCT 51.1 11/15/2020    MCV 88.7 11/15/2020    MCH 27.4 11/15/2020    MCHC 30.9 11/15/2020    RDW 14.6 11/15/2020     11/15/2020    MPV 10.3 11/15/2020     Lab Results   Component Value Date     11/15/2020    K 4.6 11/15/2020    CL 95 11/15/2020    CO2 27 11/15/2020    BUN 10 11/15/2020    CREATININE 0.8 11/15/2020    LABALBU 3.9 11/15/2020    CALCIUM 9.6 11/15/2020    GFRAA >60 11/15/2020    LABGLOM >60 11/15/2020     Lab Results   Component Value Date    PROTIME 12.0 11/14/2020    INR 1.1 11/14/2020     Recent Labs     11/14/20  0524   PROBNP 149*     Recent Labs     11/14/20  0524   PROCAL 0.05     This SmartLink has not been configured with any valid records. Micro:  No results for input(s): CULTRESP in the last 72 hours. No results for input(s): LABGRAM in the last 72 hours. No results for input(s): LEGUR in the last 72 hours. No results for input(s): STREPNEUMAGU in the last 72 hours. No results for input(s): LP1UAG in the last 72 hours. Assessment:    1. Acute on chronic respiratory failure with hypoxia hypercapnia  2. COPD asthma exacerbation  3. Hx of Intubation for airway protection extubated 6/27  4. Hx Cocaine induced pneumonitis vs chf vs aspiration pneumonia hospt admit 7/1/2020  5. COPD/asthma overlap, noncompliance with medication  6. Current every day smoker  7. Substance abuse- +cociane fentanyl   8. Obstructive sleep apnea  9. Hypertension  10. EtOH abuse, history of pancreatitis, elevated LFTs  11. GERD  12. Medical non compliance  13. Nicotine dependence          Plan:       14. Check strep pneumoniae antigen  , legionella urine  , respiratory culture , respiratory panel with Covid negative, pro calcitonin .05 , proBNP 149  15. Agree with doxy  16. DuoNebs every 4 hours while awake, add Pulmicort and Brovana, DC Symbicort  17. Continue Solu-Medrol 40 mg every 8 hours, taper with improvement  18. Has nicotine gum patient reports that he is a heavy drinker CIWA protocol per  Primary  19. Continue AVAPS  tidal volume 500 backup rate22 FiO2 40%  20.  Will order addiction and smoking cessation information for this patient, she is interested in trying to quit drugs and alcohol in addition to smoking after he is discharged  21. Keep pulse oximetry greater than 92% currently on 4 L nasal cannula 90%        This plan of care was reviewed in collaboration with Dr. Marilyn Alvarez  Electronically signed by MIKE Cortez on 11/15/2020 at 8:16 AM      Agree with current treatment and care.     Dr. Sia Osorio

## 2020-11-15 NOTE — PROGRESS NOTES
Hospital Medicine Progress Note      Date of Admission: 11/14/2020  Hospital day # 1    Course: Follow-up on exacerbation, acute hypoxemic respiratory failure    Subjective    Refers shortness of breath is better  Stable overnight. No other overnight issues reported. Exam:    /86   Pulse 78   Temp 97 °F (36.1 °C) (Temporal)   Resp 22   Ht 5' 9\" (1.753 m)   Wt 250 lb (113.4 kg)   SpO2 98%   BMI 36.92 kg/m²     General appearance: Acutely ill, appears stated age and cooperative. HEENT: Pupils equal, round, and reactive to light. Conjunctivae/corneas clear. Neck: Supple. No jugular venous distention. Trachea midline. Respiratory: Rhonchi bilaterally  Cardiovascular: S1/S2   Abdomen: Soft, non-tender, non-distended with normal bowel sounds. Musculoskeletal: No clubbing, cyanosis or edema bilaterally.    Skin:  No rashes    Neurologic: awake, alert and following commands     Medications:  Reviewed    Infusion Medications    dextrose       Scheduled Medications    amLODIPine  10 mg Oral Daily    aspirin  81 mg Oral Daily    glipiZIDE  5 mg Oral BID AC    pantoprazole  40 mg Oral Daily    enoxaparin  40 mg Subcutaneous Daily    ipratropium-albuterol  1 ampule Inhalation Q4H WA    methylPREDNISolone  40 mg Intravenous Q8H    Followed by   Marika Washington ON 11/16/2020] predniSONE  40 mg Oral Daily    doxycycline hyclate  100 mg Oral Q12H    sodium chloride flush  10 mL Intravenous 2 times per day    budesonide  500 mcg Nebulization BID    Arformoterol Tartrate  15 mcg Nebulization BID     PRN Meds: guaiFENesin, melatonin, nicotine polacrilex, acetaminophen **OR** acetaminophen, polyethylene glycol, ondansetron, sodium chloride flush, LORazepam **OR** LORazepam **OR** LORazepam **OR** LORazepam **OR** LORazepam **OR** LORazepam **OR** LORazepam **OR** LORazepam, glucose, dextrose, glucagon (rDNA), dextrose    I/O    Intake/Output Summary (Last 24 hours) at 11/15/2020 1023  Last data filed at 11/15/2020 0746  Gross per 24 hour   Intake --   Output 1300 ml   Net -1300 ml       Labs:   Recent Labs     11/14/20  0524 11/15/20  0505   WBC 8.7 11.9*   HGB 16.4 15.8   HCT 52.5 51.1    402       Recent Labs     11/14/20  0524 11/15/20  0505    137   K 4.2 4.6   CL 97* 95*   CO2 30* 27   BUN 9 10   CREATININE 0.9 0.8   CALCIUM 8.8 9.6       Recent Labs     11/14/20  0524 11/15/20  0505   PROT 7.4 7.1   ALKPHOS 95 111   ALT 31 32   AST 39 23   BILITOT 0.5 0.5       Recent Labs     11/14/20 0524   INR 1.1       Recent Labs     11/14/20 0524   TROPONINI <0.01       Other labs:  Lab Results   Component Value Date    CHOL 156 04/20/2020    TRIG 109 04/20/2020    HDL 58 04/20/2020    LDLCALC 76 04/20/2020    TSH 1.740 10/14/2020    PSA 0.60 10/28/2015    INR 1.1 11/14/2020    LABA1C 6.2 (H) 05/14/2020       Radiology:  Imaging studies reviewed today. ASSESSMENT:    COPD exacerbation  Acute hypoxemic respiratory failure  Chronic alcohol abuse  Cocaine use by history  Hypertension  Diabetes mellitus  BPH    PLAN:      Nebulizer treatments  DuoNeb  Solu-Medrol IV  Respiratory status  BiPAP if needed  CIWA protocol  Insulin sliding scale coverage  Continue Flomax        Diet: DIET CARB CONTROL;  Code Status: Full Code    PT/OT Eval Status: [] Ordered [] Evaluation noted [x] Not applicable    DVT Prophylaxis: [x]Lovenox []Heparin []PCD [] [address] []Encouraged ambulation []N/A    Likely disposition when able:  [x]Home [] Home with [facility] [] SNF/[facility] [] Acute Rehab [] LTAC []Other    +++++++++++++++++++++++++++++++++++++++++++++++++  Kaylan Chand MD, Hospitalist  +++++++++++++++++++++++++++++++++++++++++++++++++  NOTE: This report was transcribed using voice recognition software.  Every effort was made to ensure accuracy; however, inadvertent computerized transcription errors may be present.

## 2020-11-15 NOTE — PROGRESS NOTES
Date: 11/15/2020    Time: 1:39 AM    Patient Placed On BIPAP/CPAP/ Non-Invasive Ventilation? Yes    If no must comment. Facial area red/color change? No           If YES are Blister/Lesion present? No   If yes must notify nursing staff  BIPAP/CPAP skin barrier?   Yes    Skin barrier type:mepilexlite       Comments:        Jeffrey Rodriguez

## 2020-11-16 LAB
METER GLUCOSE: 256 MG/DL (ref 74–99)
METER GLUCOSE: 270 MG/DL (ref 74–99)
METER GLUCOSE: 282 MG/DL (ref 74–99)
METER GLUCOSE: 366 MG/DL (ref 74–99)

## 2020-11-16 PROCEDURE — 6370000000 HC RX 637 (ALT 250 FOR IP): Performed by: INTERNAL MEDICINE

## 2020-11-16 PROCEDURE — 82962 GLUCOSE BLOOD TEST: CPT

## 2020-11-16 PROCEDURE — 2580000003 HC RX 258: Performed by: INTERNAL MEDICINE

## 2020-11-16 PROCEDURE — 94660 CPAP INITIATION&MGMT: CPT

## 2020-11-16 PROCEDURE — 2700000000 HC OXYGEN THERAPY PER DAY

## 2020-11-16 PROCEDURE — 94640 AIRWAY INHALATION TREATMENT: CPT

## 2020-11-16 PROCEDURE — 6360000002 HC RX W HCPCS: Performed by: CLINICAL NURSE SPECIALIST

## 2020-11-16 PROCEDURE — 2060000000 HC ICU INTERMEDIATE R&B

## 2020-11-16 PROCEDURE — 1200000000 HC SEMI PRIVATE

## 2020-11-16 PROCEDURE — 6360000002 HC RX W HCPCS: Performed by: INTERNAL MEDICINE

## 2020-11-16 RX ADMIN — NICOTINE POLACRILEX 4 MG: 2 GUM, CHEWING BUCCAL at 17:03

## 2020-11-16 RX ADMIN — AMLODIPINE BESYLATE 10 MG: 10 TABLET ORAL at 08:11

## 2020-11-16 RX ADMIN — METHYLPREDNISOLONE SODIUM SUCCINATE 40 MG: 40 INJECTION, POWDER, FOR SOLUTION INTRAMUSCULAR; INTRAVENOUS at 05:01

## 2020-11-16 RX ADMIN — GLIPIZIDE 5 MG: 5 TABLET ORAL at 06:58

## 2020-11-16 RX ADMIN — DOXYCYCLINE HYCLATE 100 MG: 100 CAPSULE ORAL at 20:06

## 2020-11-16 RX ADMIN — Medication 10 ML: at 20:08

## 2020-11-16 RX ADMIN — IPRATROPIUM BROMIDE AND ALBUTEROL SULFATE 1 AMPULE: .5; 3 SOLUTION RESPIRATORY (INHALATION) at 13:13

## 2020-11-16 RX ADMIN — IPRATROPIUM BROMIDE AND ALBUTEROL SULFATE 1 AMPULE: .5; 3 SOLUTION RESPIRATORY (INHALATION) at 15:57

## 2020-11-16 RX ADMIN — Medication 3 MG: at 20:05

## 2020-11-16 RX ADMIN — BUDESONIDE 500 MCG: 0.5 SUSPENSION RESPIRATORY (INHALATION) at 09:51

## 2020-11-16 RX ADMIN — BUDESONIDE 500 MCG: 0.5 SUSPENSION RESPIRATORY (INHALATION) at 20:30

## 2020-11-16 RX ADMIN — LORAZEPAM 1 MG: 1 TABLET ORAL at 15:17

## 2020-11-16 RX ADMIN — GLIPIZIDE 5 MG: 5 TABLET ORAL at 17:01

## 2020-11-16 RX ADMIN — DOXYCYCLINE HYCLATE 100 MG: 100 CAPSULE ORAL at 10:17

## 2020-11-16 RX ADMIN — PREDNISONE 40 MG: 20 TABLET ORAL at 14:05

## 2020-11-16 RX ADMIN — IPRATROPIUM BROMIDE AND ALBUTEROL SULFATE 1 AMPULE: .5; 3 SOLUTION RESPIRATORY (INHALATION) at 09:51

## 2020-11-16 RX ADMIN — BENZOCAINE AND MENTHOL 1 LOZENGE: 15; 3.6 LOZENGE ORAL at 20:04

## 2020-11-16 RX ADMIN — LORAZEPAM 1 MG: 1 TABLET ORAL at 20:05

## 2020-11-16 RX ADMIN — Medication 10 ML: at 08:11

## 2020-11-16 RX ADMIN — ASPIRIN 81 MG CHEWABLE TABLET 81 MG: 81 TABLET CHEWABLE at 08:11

## 2020-11-16 RX ADMIN — PANTOPRAZOLE SODIUM 40 MG: 40 TABLET, DELAYED RELEASE ORAL at 08:11

## 2020-11-16 RX ADMIN — IPRATROPIUM BROMIDE AND ALBUTEROL SULFATE 1 AMPULE: .5; 3 SOLUTION RESPIRATORY (INHALATION) at 20:29

## 2020-11-16 RX ADMIN — NICOTINE POLACRILEX 4 MG: 2 GUM, CHEWING BUCCAL at 20:05

## 2020-11-16 RX ADMIN — TAMSULOSIN HYDROCHLORIDE 0.4 MG: 0.4 CAPSULE ORAL at 08:11

## 2020-11-16 RX ADMIN — ARFORMOTEROL TARTRATE 15 MCG: 15 SOLUTION RESPIRATORY (INHALATION) at 09:51

## 2020-11-16 RX ADMIN — ENOXAPARIN SODIUM 40 MG: 40 INJECTION SUBCUTANEOUS at 08:10

## 2020-11-16 RX ADMIN — NICOTINE POLACRILEX 4 MG: 2 GUM, CHEWING BUCCAL at 05:01

## 2020-11-16 RX ADMIN — ACETAMINOPHEN 650 MG: 325 TABLET ORAL at 20:05

## 2020-11-16 RX ADMIN — TAMSULOSIN HYDROCHLORIDE 0.4 MG: 0.4 CAPSULE ORAL at 20:05

## 2020-11-16 ASSESSMENT — PAIN SCALES - GENERAL
PAINLEVEL_OUTOF10: 0
PAINLEVEL_OUTOF10: 3
PAINLEVEL_OUTOF10: 3
PAINLEVEL_OUTOF10: 0

## 2020-11-16 ASSESSMENT — PAIN DESCRIPTION - ONSET: ONSET: ON-GOING

## 2020-11-16 ASSESSMENT — PAIN DESCRIPTION - FREQUENCY: FREQUENCY: CONTINUOUS

## 2020-11-16 ASSESSMENT — PAIN - FUNCTIONAL ASSESSMENT: PAIN_FUNCTIONAL_ASSESSMENT: PREVENTS OR INTERFERES SOME ACTIVE ACTIVITIES AND ADLS

## 2020-11-16 ASSESSMENT — PAIN DESCRIPTION - PAIN TYPE: TYPE: ACUTE PAIN

## 2020-11-16 ASSESSMENT — PAIN DESCRIPTION - PROGRESSION: CLINICAL_PROGRESSION: NOT CHANGED

## 2020-11-16 ASSESSMENT — PAIN DESCRIPTION - DESCRIPTORS: DESCRIPTORS: HEADACHE

## 2020-11-16 ASSESSMENT — PAIN DESCRIPTION - LOCATION: LOCATION: HEAD

## 2020-11-16 ASSESSMENT — PAIN DESCRIPTION - ORIENTATION: ORIENTATION: RIGHT

## 2020-11-16 NOTE — CARE COORDINATION
This note will not be viewable in [employer] for the following reason(s). Suspected substance abuse disorder. Peer Recovery Support Note    Name: Ifrah Kay  Date: 11/16/2020    Chief Complaint   Patient presents with    Shortness of Breath     increasing SOB x 3 day , hx COPD & Asthma , wears 3 L NC PRN at home       Peer Support met with patient.   [x] Support and education provided  [x] Resources provided   [] Treatment referral:  [] Other:   [] Patient declined peer recovery services     Referred By:NETO Campos    Notes:     Signed: Cale Carey, 11/16/2020

## 2020-11-16 NOTE — CARE COORDINATION
SW made referral to Peer recovery per patient request, he would like information on outpatient resources for drug and alcohol rehab.

## 2020-11-16 NOTE — PROGRESS NOTES
Date: 11/16/2020    Time: 3:00 AM    Patient Placed On BIPAP/CPAP/ Non-Invasive Ventilation? Yes    If no must comment. Facial area red/color change? No           If YES are Blister/Lesion present? No   If yes must notify nursing staff  BIPAP/CPAP skin barrier?   Yes    Skin barrier type:mepilexlite       Comments:        Sophia Ritchie

## 2020-11-16 NOTE — PROGRESS NOTES
Hospital Medicine Progress Note      Date of Admission: 11/14/2020  Hospital day # 2    Course: Follow-up on exacerbation, acute hypoxemic respiratory failure    Subjective    Refers shortness of breath is better patient refers he does not feel well enough for going home yet  Stable overnight. No other overnight issues reported. Exam:    BP (!) 140/82   Pulse 96   Temp 97.9 °F (36.6 °C) (Temporal)   Resp 16   Ht 5' 9\" (1.753 m)   Wt 250 lb (113.4 kg)   SpO2 97%   BMI 36.92 kg/m²     General appearance: Acutely ill, appears stated age and cooperative. HEENT: Pupils equal, round, and reactive to light. Conjunctivae/corneas clear. Neck: Supple. No jugular venous distention. Trachea midline. Respiratory: Rhonchi bilaterally  Cardiovascular: S1/S2   Abdomen: Soft, non-tender, non-distended with normal bowel sounds. Musculoskeletal: No clubbing, cyanosis or edema bilaterally.    Skin:  No rashes    Neurologic: awake, alert and following commands     Medications:  Reviewed    Infusion Medications    dextrose       Scheduled Medications    tamsulosin  0.4 mg Oral BID    amLODIPine  10 mg Oral Daily    aspirin  81 mg Oral Daily    glipiZIDE  5 mg Oral BID AC    pantoprazole  40 mg Oral Daily    enoxaparin  40 mg Subcutaneous Daily    ipratropium-albuterol  1 ampule Inhalation Q4H WA    predniSONE  40 mg Oral Daily    doxycycline hyclate  100 mg Oral Q12H    sodium chloride flush  10 mL Intravenous 2 times per day    budesonide  500 mcg Nebulization BID    Arformoterol Tartrate  15 mcg Nebulization BID     PRN Meds: benzocaine-menthol, guaiFENesin, melatonin, nicotine polacrilex, acetaminophen **OR** acetaminophen, polyethylene glycol, ondansetron, sodium chloride flush, LORazepam **OR** LORazepam **OR** LORazepam **OR** LORazepam **OR** LORazepam **OR** LORazepam **OR** LORazepam **OR** LORazepam, glucose, dextrose, glucagon (rDNA), dextrose    I/O    Intake/Output Summary (Last 24 hours) at 11/16/2020 0839  Last data filed at 11/15/2020 1919  Gross per 24 hour   Intake 720 ml   Output --   Net 720 ml       Labs:   Recent Labs     11/14/20  0524 11/15/20  0505   WBC 8.7 11.9*   HGB 16.4 15.8   HCT 52.5 51.1    402       Recent Labs     11/14/20  0524 11/15/20  0505    137   K 4.2 4.6   CL 97* 95*   CO2 30* 27   BUN 9 10   CREATININE 0.9 0.8   CALCIUM 8.8 9.6       Recent Labs     11/14/20  0524 11/15/20  0505   PROT 7.4 7.1   ALKPHOS 95 111   ALT 31 32   AST 39 23   BILITOT 0.5 0.5       Recent Labs     11/14/20 0524   INR 1.1       Recent Labs     11/14/20 0524   TROPONINI <0.01       Other labs:  Lab Results   Component Value Date    CHOL 156 04/20/2020    TRIG 109 04/20/2020    HDL 58 04/20/2020    LDLCALC 76 04/20/2020    TSH 1.740 10/14/2020    PSA 0.60 10/28/2015    INR 1.1 11/14/2020    LABA1C 6.2 (H) 05/14/2020       Radiology:  Imaging studies reviewed today. ASSESSMENT:    COPD exacerbation  Acute hypoxemic respiratory failure  Chronic alcohol abuse  Cocaine use by history  Hypertension  Diabetes mellitus  BPH    PLAN:      Nebulizer treatments  DuoNeb  Solu-Medrol IV  Respiratory status  BiPAP if needed  CIWA protocol  Insulin sliding scale coverage  Continue Flomax continue current management  Anticipated discharge possible tomorrow        Diet: DIET CARB CONTROL;  Code Status: Full Code    PT/OT Eval Status: [] Ordered [] Evaluation noted [x] Not applicable    DVT Prophylaxis: [x]Lovenox []Heparin []PCD [] [address] []Encouraged ambulation []N/A    Likely disposition when able:  [x]Home [] Home with [facility] [] SNF/[facility]ANTHA [] Acute Rehab [] LTAC []Other    +++++++++++++++++++++++++++++++++++++++++++++++++  Willow Lynn MD, Hospitalist  +++++++++++++++++++++++++++++++++++++++++++++++++  NOTE: This report was transcribed using voice recognition software.  Every effort was made to ensure accuracy; however, inadvertent computerized transcription errors may be present.

## 2020-11-16 NOTE — PROGRESS NOTES
David Reyes M.D.,[facility]  Jennie Pollack D.O., CLAUDINE, Ana Cristina Gallagher M.D. Cary Lamar M.D., Jacqui Malcolm M.D. Larry Poon D.O. Daily Pulmonary Progress Note    Patient:  Gissel Ivan 44 y.o. male MRN: [id number]     Date of Service: 11/16/2020      Synopsis     We are following patient for resp failure     \"CC\" shortness of breath    Code status: Full      Subjective      Patient was seen and examined. Was compliant with AVAPS overnight he appears much more alert today. Again reiterated to patient the importance of following up with his pulmonologist Dr. Jeremy Fink in [facility]. Review of Systems:  Constitutional: Denies fever, weight loss, night sweats, and fatigue  Skin: Denies pigmentation, dark lesions, and rashes   HEENT: Denies hearing loss, tinnitus, ear drainage, epistaxis, sore throat, and hoarseness. Cardiovascular: Denies palpitations, chest pain, and chest pressure. Respiratory: Denies cough, dyspnea at rest, hemoptysis, apnea, and choking.   Gastrointestinal: Denies nausea, vomiting, poor appetite, diarrhea, heartburn or reflux  Genitourinary: Denies dysuria, frequency, urgency or hematuria  Musculoskeletal: Denies myalgias, muscle weakness, and bone pain  Neurological: Denies dizziness, vertigo, headache, and focal weakness  Psychological: Denies anxiety and depression  Endocrine: Denies heat intolerance and cold intolerance  Hematopoietic/Lymphatic: Denies bleeding problems and blood transfusions    24-hour events:  None    Objective   Vitals: BP (!) 140/82   Pulse 96   Temp 97.9 °F (36.6 °C) (Temporal)   Resp 18   Ht 5' 9\" (1.753 m)   Wt 250 lb (113.4 kg)   SpO2 95%   BMI 36.92 kg/m²     I/O:    Intake/Output Summary (Last 24 hours) at 11/16/2020 1538  Last data filed at 11/16/2020 1340  Gross per 24 hour   Intake 940 ml   Output --   Net 940 ml       Vent Information  Skin Assessment: Clean, dry, & intact  Vt Ordered: 500 mL  FiO2 : 60 %  SpO2: 95 %  Mask Type: Full face mask  Mask Size: Large          CPAP/EPAP: 6 cmH2O     CURRENT MEDS :  Scheduled Meds:   tamsulosin  0.4 mg Oral BID    amLODIPine  10 mg Oral Daily    aspirin  81 mg Oral Daily    glipiZIDE  5 mg Oral BID AC    pantoprazole  40 mg Oral Daily    enoxaparin  40 mg Subcutaneous Daily    ipratropium-albuterol  1 ampule Inhalation Q4H WA    predniSONE  40 mg Oral Daily    doxycycline hyclate  100 mg Oral Q12H    sodium chloride flush  10 mL Intravenous 2 times per day    budesonide  500 mcg Nebulization BID    Arformoterol Tartrate  15 mcg Nebulization BID       Physical Exam:  General Appearance: appears comfortable in no acute distress. HEENT: Normocephalic atraumatic without obvious abnormality   Neck: Lips, mucosa, and tongue normal.  Supple, symmetrical, trachea midline, no adenopathy;thyroid:  no enlargement/tenderness/nodules or JVD. Lung: Breath sounds CTA. Respirations   unlabored. Symmetrical expansion. Heart: RRR, normal S1, S2. No MRG  Abdomen: Soft, NT, ND. BS present x 4 quadrants. No bruit or organomegaly. Extremities: Pedal pulses 2+ symmetric b/l. Extremities normal, no cyanosis, clubbing, or edema. Musculokeletal: No joint swelling, no muscle tenderness. ROM normal in all joints of extremities. Neurologic: Mental status: Alert and Oriented X3 .     Pertinent/ New Labs and Imaging Studies     Imaging Personally Reviewed:  none  Chest x-ray 11/14/2020         FINDINGS:    The patient is rotated.  The lungs are clear.  The costophrenic angles are    sharp.  The cardiomediastinal silhouette is within normal limits.  There is    no discernible pneumothorax.              Impression    Negative portable chest.               Labs:  Lab Results   Component Value Date    WBC 11.9 11/15/2020    HGB 15.8 11/15/2020    HCT 51.1 11/15/2020    MCV 88.7 11/15/2020    MCH 27.4 11/15/2020    MCHC 30.9 11/15/2020    RDW 14.6 11/15/2020     11/15/2020    MPV 10.3 11/15/2020     Lab Results   Component Value Date     11/15/2020    K 4.6 11/15/2020    CL 95 11/15/2020    CO2 27 11/15/2020    BUN 10 11/15/2020    CREATININE 0.8 11/15/2020    LABALBU 3.9 11/15/2020    CALCIUM 9.6 11/15/2020    GFRAA >60 11/15/2020    LABGLOM >60 11/15/2020     Lab Results   Component Value Date    PROTIME 12.0 11/14/2020    INR 1.1 11/14/2020     Recent Labs     11/14/20  0524   PROBNP 149*     Recent Labs     11/14/20 0524   PROCAL 0.05     This SmartLink has not been configured with any valid records. Micro:  No results for input(s): CULTRESP in the last 72 hours. No results for input(s): LABGRAM in the last 72 hours. No results for input(s): LEGUR in the last 72 hours. Recent Labs     11/14/20  1630   STREPNEUMAGU Presumptive negative- suggests no current or recent  pneumococcal infection. Infection due to Strep pneumoniae cannot be  ruled out since the antigen present in the sample  may be below the detection limit of the test.  Normal Range:Presumptive Negative       Recent Labs     11/14/20  1630   LP1UAG Presumptive Negative -suggesting no recent or current infections  with Legionella pneumophila serogroup 1. Infection to Legionella cannot be ruled out since other serogroups  and species may cause infection, antigen may not be present in  early infection, or level of antigen may be below the  detection limit. Normal Range: Presumptive Negative            Assessment:    1. Acute on chronic respiratory failure with hypoxia hypercapnia  2. COPD asthma exacerbation  3. Hx of Intubation for airway protection extubated 6/27  4. Hx Cocaine induced pneumonitis vs chf vs aspiration pneumonia hospt admit 7/1/2020  5. COPD/asthma overlap, noncompliance with medication  6. Current every day smoker  7. Substance abuse- +cociane fentanyl   8. Obstructive sleep apnea  9. Hypertension  10. EtOH abuse, history of pancreatitis, elevated LFTs  11. GERD  12.  Medical non compliance  13. Nicotine dependence          Plan:       14. Check strep pneumoniae antigen  , legionella urine  , respiratory culture , respiratory panel with Covid negative, pro calcitonin .05 , proBNP 149  15. Agree with doxy for empiric coverage  16. DuoNebs every 4 hours while awake, add Pulmicort and Brovana, DC Symbicort  17. Continue Solu-Medrol 40 mg taper per primary team  18. Has nicotine gum patient reports that he is a heavy drinker CIWA protocol per  Primary  19. Continue AVAPS  tidal volume 500 backup rate22 FiO2 40%  20. Will order addiction and smoking cessation information for this patient, she is interested in trying to quit drugs and alcohol in addition to smoking after he is discharged  21. Keep pulse oximetry greater than 92% currently on 4 L nasal cannula 90%  22. Will need ambulatory oxygen testing prior to discharge        This plan of care was reviewed in collaboration with Dr. Kathleen Simms  Electronically signed by MIKE George CNP on 11/16/2020 at 3:38 PM           I personally saw, examined, and cared for the patient. Labs, medications, radiographs reviewed. I agree with history exam and plans detailed in NP note. Vandy Burkitt, M.D.    Pulmonary/Critical Care Medicine

## 2020-11-17 ENCOUNTER — APPOINTMENT (OUTPATIENT)
Dept: GENERAL RADIOLOGY | Age: 50
DRG: 189 | End: 2020-11-17
Payer: MEDICARE

## 2020-11-17 VITALS
HEART RATE: 82 BPM | DIASTOLIC BLOOD PRESSURE: 88 MMHG | TEMPERATURE: 97.5 F | RESPIRATION RATE: 20 BRPM | WEIGHT: 250 LBS | OXYGEN SATURATION: 97 % | HEIGHT: 69 IN | BODY MASS INDEX: 37.03 KG/M2 | SYSTOLIC BLOOD PRESSURE: 137 MMHG

## 2020-11-17 LAB
METER GLUCOSE: 156 MG/DL (ref 74–99)
METER GLUCOSE: 201 MG/DL (ref 74–99)

## 2020-11-17 PROCEDURE — 6360000002 HC RX W HCPCS: Performed by: INTERNAL MEDICINE

## 2020-11-17 PROCEDURE — 94640 AIRWAY INHALATION TREATMENT: CPT

## 2020-11-17 PROCEDURE — 6370000000 HC RX 637 (ALT 250 FOR IP): Performed by: INTERNAL MEDICINE

## 2020-11-17 PROCEDURE — 71045 X-RAY EXAM CHEST 1 VIEW: CPT

## 2020-11-17 PROCEDURE — 2580000003 HC RX 258: Performed by: INTERNAL MEDICINE

## 2020-11-17 PROCEDURE — 82962 GLUCOSE BLOOD TEST: CPT

## 2020-11-17 PROCEDURE — 6360000002 HC RX W HCPCS: Performed by: CLINICAL NURSE SPECIALIST

## 2020-11-17 PROCEDURE — 94660 CPAP INITIATION&MGMT: CPT

## 2020-11-17 PROCEDURE — 2700000000 HC OXYGEN THERAPY PER DAY

## 2020-11-17 RX ORDER — DOXYCYCLINE HYCLATE 100 MG/1
100 CAPSULE ORAL EVERY 12 HOURS
Qty: 6 CAPSULE | Refills: 0 | Status: SHIPPED | OUTPATIENT
Start: 2020-11-17 | End: 2020-11-20

## 2020-11-17 RX ORDER — BUDESONIDE AND FORMOTEROL FUMARATE DIHYDRATE 160; 4.5 UG/1; UG/1
2 AEROSOL RESPIRATORY (INHALATION) 2 TIMES DAILY
Qty: 1 INHALER | Refills: 3 | Status: ON HOLD | OUTPATIENT
Start: 2020-11-17 | End: 2022-02-13

## 2020-11-17 RX ORDER — PREDNISONE 20 MG/1
40 TABLET ORAL DAILY
Qty: 6 TABLET | Refills: 0 | Status: SHIPPED | OUTPATIENT
Start: 2020-11-18 | End: 2020-11-21

## 2020-11-17 RX ORDER — ASPIRIN 81 MG/1
81 TABLET, CHEWABLE ORAL DAILY
Qty: 30 TABLET | Refills: 3 | Status: SHIPPED | OUTPATIENT
Start: 2020-11-18

## 2020-11-17 RX ORDER — PANTOPRAZOLE SODIUM 40 MG/1
40 TABLET, DELAYED RELEASE ORAL DAILY
Qty: 30 TABLET | Refills: 3 | Status: SHIPPED | OUTPATIENT
Start: 2020-11-18 | End: 2022-06-02

## 2020-11-17 RX ORDER — IPRATROPIUM BROMIDE AND ALBUTEROL SULFATE 2.5; .5 MG/3ML; MG/3ML
3 SOLUTION RESPIRATORY (INHALATION) EVERY 4 HOURS PRN
Qty: 120 ML | Refills: 0 | Status: SHIPPED | OUTPATIENT
Start: 2020-11-17 | End: 2022-06-02

## 2020-11-17 RX ADMIN — LORAZEPAM 1 MG: 1 TABLET ORAL at 00:16

## 2020-11-17 RX ADMIN — IPRATROPIUM BROMIDE AND ALBUTEROL SULFATE 1 AMPULE: .5; 3 SOLUTION RESPIRATORY (INHALATION) at 09:28

## 2020-11-17 RX ADMIN — AMLODIPINE BESYLATE 10 MG: 10 TABLET ORAL at 09:51

## 2020-11-17 RX ADMIN — PANTOPRAZOLE SODIUM 40 MG: 40 TABLET, DELAYED RELEASE ORAL at 09:50

## 2020-11-17 RX ADMIN — PREDNISONE 40 MG: 20 TABLET ORAL at 09:50

## 2020-11-17 RX ADMIN — ACETAMINOPHEN 650 MG: 325 TABLET ORAL at 12:18

## 2020-11-17 RX ADMIN — ENOXAPARIN SODIUM 40 MG: 40 INJECTION SUBCUTANEOUS at 09:51

## 2020-11-17 RX ADMIN — TAMSULOSIN HYDROCHLORIDE 0.4 MG: 0.4 CAPSULE ORAL at 09:50

## 2020-11-17 RX ADMIN — ARFORMOTEROL TARTRATE 15 MCG: 15 SOLUTION RESPIRATORY (INHALATION) at 09:28

## 2020-11-17 RX ADMIN — Medication 10 ML: at 09:51

## 2020-11-17 RX ADMIN — BUDESONIDE 500 MCG: 0.5 SUSPENSION RESPIRATORY (INHALATION) at 09:28

## 2020-11-17 RX ADMIN — GLIPIZIDE 5 MG: 5 TABLET ORAL at 09:50

## 2020-11-17 RX ADMIN — LORAZEPAM 1 MG: 1 TABLET ORAL at 12:18

## 2020-11-17 RX ADMIN — DOXYCYCLINE HYCLATE 100 MG: 100 CAPSULE ORAL at 09:51

## 2020-11-17 RX ADMIN — IPRATROPIUM BROMIDE AND ALBUTEROL SULFATE 1 AMPULE: .5; 3 SOLUTION RESPIRATORY (INHALATION) at 13:50

## 2020-11-17 RX ADMIN — ASPIRIN 81 MG CHEWABLE TABLET 81 MG: 81 TABLET CHEWABLE at 09:50

## 2020-11-17 ASSESSMENT — PAIN DESCRIPTION - LOCATION: LOCATION: HEAD

## 2020-11-17 ASSESSMENT — PAIN SCALES - GENERAL
PAINLEVEL_OUTOF10: 4
PAINLEVEL_OUTOF10: 0
PAINLEVEL_OUTOF10: 7

## 2020-11-17 ASSESSMENT — PAIN DESCRIPTION - PROGRESSION
CLINICAL_PROGRESSION: NOT CHANGED
CLINICAL_PROGRESSION: NOT CHANGED

## 2020-11-17 NOTE — PROGRESS NOTES
Maicol Sepulveda M.D.,[facility]  Roshan Larson D.O., F.A.C.O.I., Bhavana Ashton M.D. Georges Lino M.D., Can Gloria M.D. Dolores Garcia D.O. Daily Pulmonary Progress Note    Patient:  Christy Barry 44 y.o. male MRN: [id number]     Date of Service: 11/17/2020      Synopsis     We are following patient for resp failure     \"CC\" shortness of breath    Code status: Full      Subjective      Denies sob, states breathing has improved. little cough. C/o headache. No n/v/d. + HERNANDEZ on 3 L/min O2 via nc. No fevers overnight. Review of Systems:  No changes bowel/bladder. No edema. 24-hour events:  None    Objective   Vitals: /88   Pulse 82   Temp 97.5 °F (36.4 °C) (Temporal)   Resp 20   Ht 5' 9\" (1.753 m)   Wt 250 lb (113.4 kg)   SpO2 97%   BMI 36.92 kg/m²     I/O:    Intake/Output Summary (Last 24 hours) at 11/17/2020 1126  Last data filed at 11/16/2020 2000  Gross per 24 hour   Intake 820 ml   Output 650 ml   Net 170 ml       Vent Information  Skin Assessment: Clean, dry, & intact  Vt Ordered: 500 mL  FiO2 : 60 %  SpO2: 97 %  Mask Type: Full face mask  Mask Size: Large          CPAP/EPAP: 6 cmH2O     CURRENT MEDS :  Scheduled Meds:   tamsulosin  0.4 mg Oral BID    amLODIPine  10 mg Oral Daily    aspirin  81 mg Oral Daily    glipiZIDE  5 mg Oral BID AC    pantoprazole  40 mg Oral Daily    enoxaparin  40 mg Subcutaneous Daily    ipratropium-albuterol  1 ampule Inhalation Q4H WA    predniSONE  40 mg Oral Daily    doxycycline hyclate  100 mg Oral Q12H    sodium chloride flush  10 mL Intravenous 2 times per day    budesonide  500 mcg Nebulization BID    Arformoterol Tartrate  15 mcg Nebulization BID       Physical Exam:  General Appearance: lying in bed, appears comfortable in no acute distress. HEENT: Normocephalic atraumatic without obvious abnormality. Lips, mucosa, and tongue normal.   Neck:  Supple, symmetrical, trachea midline  Lung: Breath sounds CTA. Respirations unlabored. Symmetrical expansion. Heart: RRR, normal S1, S2.   Abdomen: Soft, NT, ND. BS present x 4 quadrants. Extremities: Pedal pulses 2+ symmetric b/l. Extremities normal, no cyanosis, clubbing, or edema. Musculokeletal: No joint swelling  Neurologic: Mental status: Alert and Oriented X3 . Pertinent/ New Labs and Imaging Studies     CXR    Impression:         No significant changes since November 14. Atelectasis left base           Labs:  Lab Results   Component Value Date    WBC 11.9 11/15/2020    HGB 15.8 11/15/2020    HCT 51.1 11/15/2020    MCV 88.7 11/15/2020    MCH 27.4 11/15/2020    MCHC 30.9 11/15/2020    RDW 14.6 11/15/2020     11/15/2020    MPV 10.3 11/15/2020     Lab Results   Component Value Date     11/15/2020    K 4.6 11/15/2020    CL 95 11/15/2020    CO2 27 11/15/2020    BUN 10 11/15/2020    CREATININE 0.8 11/15/2020    LABALBU 3.9 11/15/2020    CALCIUM 9.6 11/15/2020    GFRAA >60 11/15/2020    LABGLOM >60 11/15/2020     Lab Results   Component Value Date    PROTIME 12.0 11/14/2020    INR 1.1 11/14/2020     No results for input(s): PROBNP in the last 72 hours. No results for input(s): PROCAL in the last 72 hours. This SmartLink has not been configured with any valid records. Micro:  No results for input(s): CULTRESP in the last 72 hours. No results for input(s): LABGRAM in the last 72 hours. No results for input(s): LEGUR in the last 72 hours. Recent Labs     11/14/20  1630   STREPNEUMAGU Presumptive negative- suggests no current or recent  pneumococcal infection. Infection due to Strep pneumoniae cannot be  ruled out since the antigen present in the sample  may be below the detection limit of the test.  Normal Range:Presumptive Negative       Recent Labs     11/14/20  1630   LP1UAG Presumptive Negative -suggesting no recent or current infections  with Legionella pneumophila serogroup 1.   Infection to Legionella cannot be ruled out since other serogroups  and species may cause infection, antigen may not be present in  early infection, or level of antigen may be below the  detection limit. Normal Range: Presumptive Negative       pro calcitonin .05 , proBNP 149  Resp viral panel neg     Assessment:    1. Acute on chronic respiratory failure with hypoxia hypercapnia  2. COPD asthma exacerbation  3. Hx of Intubation for airway protection extubated 6/27  4. Hx Cocaine induced pneumonitis vs chf vs aspiration pneumonia hospt admit 7/1/2020  5. COPD/asthma overlap, noncompliance with medication  6. Current every day smoker  7. Substance abuse- +cociane fentanyl   8. Obstructive sleep apnea  9. Hypertension  10. EtOH abuse, history of pancreatitis, elevated LFTs  11. GERD  12. Medical non compliance  13. Nicotine dependence          Plan:       1. doxy for empiric coverage  2. DuoNebs every 4 hours while awake, Pulmicort and Brovana  3. Tapering steroids - now on 40 mg po qd  4. Has nicotine gum patient reports that he is a heavy drinker CIWA protocol per  Primary  5 Addiction and smoking cessation information for this patient, he is interested in trying to quit drugs and alcohol in addition to smoking after he is discharged. 6. Keep pulse oximetry greater than 92% currently on 3 L nasal cannula 90%  7. Will need ambulatory oxygen testing prior to discharge  8. F/u with outpt pulmonologist Dr. Aparna Cohen in [facility]. This plan of care was reviewed in collaboration with Dr. Devika Mandel  Electronically signed by Colen Brittle, OMS-3 on 11/17/2020 at 11:26 AM           Addendum; Patient is stable to be discharged from a pulmonary stand point. Follow up with his Pulmonologist in [facility]. I personally saw, examined, and cared for the patient. Labs, medications, radiographs reviewed. I agree with history exam and plans detailed in NP note.   Tristen Connelly MD

## 2020-11-17 NOTE — PLAN OF CARE
Problem: Falls - Risk of:  Goal: Will remain free from falls  Description: Will remain free from falls  11/17/2020 1357 by Romelia Lao RN  Outcome: Met This Shift

## 2020-11-17 NOTE — PROGRESS NOTES
PULSE OX ON ROOM AIR SITTING 91%   PULSE OX ON ROOM AIR AMBULATING  88-89%   PULSE OX ON LITERS 3  SITTING RECOVERY 94%

## 2020-11-17 NOTE — CARE COORDINATION
Discharge order noted. Patient has oxygen through [address] for 3 liters prn and a nebulizer. I met with patient in room to explain role and discuss transition of care. He said that he has no needs for discharge and he will drive himself home today.  His car is here at the hospital.   Rohan Smith RN CM

## 2020-11-17 NOTE — PLAN OF CARE
Problem: Skin Integrity:  Goal: Will show no infection signs and symptoms  Description: Will show no infection signs and symptoms  Outcome: Completed  Goal: Absence of new skin breakdown  Description: Absence of new skin breakdown  Outcome: Completed     Problem: Falls - Risk of:  Goal: Will remain free from falls  Description: Will remain free from falls  11/17/2020 1517 by Ricci Max RN  Outcome: Completed  11/17/2020 1357 by Ricci Max RN  Outcome: Met This Shift  Goal: Absence of physical injury  Description: Absence of physical injury  Outcome: Completed

## 2020-11-18 ENCOUNTER — CARE COORDINATION (OUTPATIENT)
Dept: CASE MANAGEMENT | Age: 50
End: 2020-11-18

## 2020-11-18 NOTE — CARE COORDINATION
[facility] Transitions Initial Follow Up Call    Call within 2 business days of discharge: Yes    Patient: Bakari Powell Patient : 1970   MRN: [id number]  Reason for Admission: COPD with acute exacerbation  Discharge Date: 20 RARS: Readmission Risk Score: 39      Last Discharge [address] [id number]       Complaint Diagnosis Description Type Department Provider    20 Shortness of Breath COPD with acute exacerbation ([facility] [address] . .. ED to Hosp-Admission (Discharged) (ADMITTED) SKY Wilson MD; Eris Horta. ..        -Phone answered by patient  for COVID-19 monitoring Care Transition call post hospital discharge. Patient states he is doing fine but is currently at store and unable to talk. CTN informed patient to call CTN back later on today when he is available or CTN will call him back tomorrow. Patient is agreeable. Follow Up  No future appointments.     Vicente Hung RN

## 2020-11-19 ENCOUNTER — CARE COORDINATION (OUTPATIENT)
Dept: CASE MANAGEMENT | Age: 50
End: 2020-11-19

## 2020-12-19 NOTE — DISCHARGE SUMMARY
Hospital Medicine Discharge Summary    Patient ID: Bertha Hawkins      Patient's PCP: Althea Lugo DO    Admit Date: 11/14/2020     Discharge Date: 11/17/2020      Admitting Physician: Yamilex Borja MD     Discharge Physician: Dennis Zuniga MD     Discharge Diagnoses: Active Hospital Problems    Diagnosis Date Noted    COPD with acute exacerbation ([facility] [address] [J44.1] 11/14/2020       The patient was seen and examined on day of discharge and this discharge summary is in conjunction with any daily progress note from day of discharge. Hospital Course:   Pt with a hx of COPD, cocaine abuse, DM2, HTN, chronic alcohol use who presented ot the hospital with shortness of breath secondary to COPD exacerbation requiring oxygen. Pt clinically improved with steroids and oxygen. Discharged to home on 11/17/20 in stable condition with pcp follow up       Exam:     /88   Pulse 82   Temp 97.5 °F (36.4 °C) (Temporal)   Resp 20   Ht 5' 9\" (1.753 m)   Wt 250 lb (113.4 kg)   SpO2 97%   BMI 36.92 kg/m²     General appearance: No apparent distress, appears stated age and cooperative. HEENT: Pupils equal, round, and reactive to light. Conjunctivae/corneas clear. Neck: Supple, with full range of motion. No jugular venous distention. Trachea midline. Respiratory:  Normal respiratory effort. Clear to auscultation, bilaterally without Rales/Wheezes/Rhonchi. Cardiovascular: Regular rate and rhythm with normal S1/S2 without murmurs, rubs or gallops. Abdomen: Soft, non-tender, non-distended with normal bowel sounds. Musculoskeletal: No clubbing, cyanosis or edema bilaterally. Full range of motion without deformity. Skin: Skin color, texture, turgor normal.  No rashes or lesions. Neurologic:  Neurovascularly intact without any focal sensory/motor deficits.  Cranial nerves: II-XII intact, grossly non-focal.  Psychiatric: Alert and oriented, thought content appropriate, normal insight      Consults:     HANG CONSULT TO INTERNAL MEDICINE  IP CONSULT TO PULMONOLOGY  IP CONSULT TO SOCIAL WORK  IP CONSULT TO SOCIAL WORK    Significant Diagnostic Studies:     XR CHEST PORTABLE   Final Result   No significant changes since November 14. XR CHEST PORTABLE   Final Result   Negative portable chest.               Disposition:  Home w [facility]      Discharge Instructions/Follow-up:  Keep scheduled follow up appointments. Take medications as prescribed. Code Status:  Prior     Activity: activity as tolerated    Diet: regular diet    Labs:  For convenience and continuity at follow-up the following most recent labs are provided:      CBC:    Lab Results   Component Value Date    WBC 11.9 11/15/2020    HGB 15.8 11/15/2020    HCT 51.1 11/15/2020     11/15/2020       Renal:    Lab Results   Component Value Date     11/15/2020    K 4.6 11/15/2020    CL 95 11/15/2020    CO2 27 11/15/2020    BUN 10 11/15/2020    CREATININE 0.8 11/15/2020    CALCIUM 9.6 11/15/2020    PHOS 4.6 07/01/2020       Discharge Medications:     Discharge Medication List as of 11/17/2020  3:18 PM           Details   aspirin 81 MG chewable tablet Take 1 tablet by mouth daily, Disp-30 tablet,R-3Normal      predniSONE (DELTASONE) 20 MG tablet Take 2 tablets by mouth daily for 3 days, Disp-6 tablet,R-0Normal      doxycycline hyclate (VIBRAMYCIN) 100 MG capsule Take 1 capsule by mouth every 12 hours for 3 days, Disp-6 capsule,R-0Normal              Details   budesonide-formoterol (SYMBICORT) 160-4.5 MCG/ACT AERO Inhale 2 puffs into the lungs 2 times daily, Disp-1 Inhaler,R-3Normal      ipratropium-albuterol (DUONEB) 0.5-2.5 (3) MG/3ML SOLN nebulizer solution Inhale 3 mLs into the lungs every 4 hours as needed for Shortness of Breath, Disp-120 mL,R-0Normal      pantoprazole (PROTONIX) 40 MG tablet Take 1 tablet by mouth daily, Disp-30 tablet,R-3Normal              Details   docusate sodium (COLACE) 100 MG capsule Take 100 mg by mouth 2 times dailyHistorical Med      Respiratory Therapy Supplies (FULL KIT NEBULIZER SET) MISC Disp-1 each,R-0, PrintUse as directed with nebulized medication. melatonin 3 MG TABS tablet Take 3 mg by mouth nightly as needed (sleep)Historical Med      hydrOXYzine (VISTARIL) 25 MG capsule Take 2 tablets by mouth nightly Historical Med      glipiZIDE (GLUCOTROL) 5 MG tablet Take 1 tablet by mouth 2 times daily, Disp-60 tablet,R-3Normal      tamsulosin (FLOMAX) 0.4 MG capsule Take 0.4 mg by mouth 2 times daily Historical Med      amLODIPine (NORVASC) 10 MG tablet Take 1 tablet by mouth daily. , Disp-30 tablet,R-2Normal             Time Spent on discharge is more than 1 hour in the examination, evaluation, counseling and review of medications and discharge plan.       Signed:    Jean Avery MD   12/19/2020

## 2022-02-13 ENCOUNTER — APPOINTMENT (OUTPATIENT)
Dept: CT IMAGING | Age: 52
DRG: 440 | End: 2022-02-13
Payer: MEDICARE

## 2022-02-13 ENCOUNTER — APPOINTMENT (OUTPATIENT)
Dept: GENERAL RADIOLOGY | Age: 52
DRG: 440 | End: 2022-02-13
Payer: MEDICARE

## 2022-02-13 ENCOUNTER — HOSPITAL ENCOUNTER (INPATIENT)
Age: 52
LOS: 3 days | Discharge: HOME | DRG: 440 | End: 2022-02-16
Attending: EMERGENCY MEDICINE | Admitting: INTERNAL MEDICINE
Payer: MEDICARE

## 2022-02-13 DIAGNOSIS — K85.20 ALCOHOL INDUCED ACUTE PANCREATITIS WITHOUT NECROSIS OR INFECTION: ICD-10-CM

## 2022-02-13 DIAGNOSIS — M51.9 LUMBAR DISC DISORDER: Primary | ICD-10-CM

## 2022-02-13 PROBLEM — K21.9 GERD (GASTROESOPHAGEAL REFLUX DISEASE): Status: ACTIVE | Noted: 2022-02-13

## 2022-02-13 PROBLEM — J44.9 CHRONIC OBSTRUCTIVE PULMONARY DISEASE (HCC): Status: ACTIVE | Noted: 2020-11-14

## 2022-02-13 LAB
ALBUMIN SERPL-MCNC: 4.1 G/DL (ref 3.5–5.2)
ALP BLD-CCNC: 79 U/L (ref 40–129)
ALT SERPL-CCNC: 57 U/L (ref 0–40)
ANION GAP SERPL CALCULATED.3IONS-SCNC: 18 MMOL/L (ref 7–16)
AST SERPL-CCNC: 40 U/L (ref 0–39)
BASOPHILS ABSOLUTE: 0.03 E9/L (ref 0–0.2)
BASOPHILS RELATIVE PERCENT: 0.3 % (ref 0–2)
BILIRUB SERPL-MCNC: 1.3 MG/DL (ref 0–1.2)
BUN BLDV-MCNC: 8 MG/DL (ref 6–20)
CALCIUM SERPL-MCNC: 8.4 MG/DL (ref 8.6–10.2)
CHLORIDE BLD-SCNC: 95 MMOL/L (ref 98–107)
CO2: 23 MMOL/L (ref 22–29)
CREAT SERPL-MCNC: 0.7 MG/DL (ref 0.7–1.2)
EKG ATRIAL RATE: 86 BPM
EKG P AXIS: 53 DEGREES
EKG P-R INTERVAL: 162 MS
EKG Q-T INTERVAL: 396 MS
EKG QRS DURATION: 104 MS
EKG QTC CALCULATION (BAZETT): 473 MS
EKG R AXIS: -48 DEGREES
EKG T AXIS: 62 DEGREES
EKG VENTRICULAR RATE: 86 BPM
EOSINOPHILS ABSOLUTE: 0.02 E9/L (ref 0.05–0.5)
EOSINOPHILS RELATIVE PERCENT: 0.2 % (ref 0–6)
GFR AFRICAN AMERICAN: >60
GFR NON-AFRICAN AMERICAN: >60 ML/MIN/1.73
GLUCOSE BLD-MCNC: 175 MG/DL (ref 74–99)
HCT VFR BLD CALC: 47 % (ref 37–54)
HEMOGLOBIN: 15.3 G/DL (ref 12.5–16.5)
IMMATURE GRANULOCYTES #: 0.04 E9/L
IMMATURE GRANULOCYTES %: 0.4 % (ref 0–5)
LIPASE: 111 U/L (ref 13–60)
LYMPHOCYTES ABSOLUTE: 1.13 E9/L (ref 1.5–4)
LYMPHOCYTES RELATIVE PERCENT: 12.6 % (ref 20–42)
MAGNESIUM: 1.7 MG/DL (ref 1.6–2.6)
MCH RBC QN AUTO: 28 PG (ref 26–35)
MCHC RBC AUTO-ENTMCNC: 32.6 % (ref 32–34.5)
MCV RBC AUTO: 86.1 FL (ref 80–99.9)
MONOCYTES ABSOLUTE: 0.84 E9/L (ref 0.1–0.95)
MONOCYTES RELATIVE PERCENT: 9.4 % (ref 2–12)
NEUTROPHILS ABSOLUTE: 6.91 E9/L (ref 1.8–7.3)
NEUTROPHILS RELATIVE PERCENT: 77.1 % (ref 43–80)
PDW BLD-RTO: 13.5 FL (ref 11.5–15)
PLATELET # BLD: 296 E9/L (ref 130–450)
PMV BLD AUTO: 9.4 FL (ref 7–12)
POTASSIUM REFLEX MAGNESIUM: 3.5 MMOL/L (ref 3.5–5)
RBC # BLD: 5.46 E12/L (ref 3.8–5.8)
SARS-COV-2, NAAT: NOT DETECTED
SODIUM BLD-SCNC: 136 MMOL/L (ref 132–146)
TOTAL PROTEIN: 7.1 G/DL (ref 6.4–8.3)
TRIGL SERPL-MCNC: 120 MG/DL (ref 0–149)
WBC # BLD: 9 E9/L (ref 4.5–11.5)

## 2022-02-13 PROCEDURE — 2580000003 HC RX 258: Performed by: NURSE PRACTITIONER

## 2022-02-13 PROCEDURE — 74177 CT ABD & PELVIS W/CONTRAST: CPT

## 2022-02-13 PROCEDURE — 6360000002 HC RX W HCPCS: Performed by: NURSE PRACTITIONER

## 2022-02-13 PROCEDURE — 80053 COMPREHEN METABOLIC PANEL: CPT

## 2022-02-13 PROCEDURE — 99285 EMERGENCY DEPT VISIT HI MDM: CPT

## 2022-02-13 PROCEDURE — 6360000002 HC RX W HCPCS: Performed by: FAMILY MEDICINE

## 2022-02-13 PROCEDURE — 87635 SARS-COV-2 COVID-19 AMP PRB: CPT

## 2022-02-13 PROCEDURE — A4216 STERILE WATER/SALINE, 10 ML: HCPCS | Performed by: NURSE PRACTITIONER

## 2022-02-13 PROCEDURE — 96374 THER/PROPH/DIAG INJ IV PUSH: CPT

## 2022-02-13 PROCEDURE — 6360000004 HC RX CONTRAST MEDICATION: Performed by: RADIOLOGY

## 2022-02-13 PROCEDURE — 6360000002 HC RX W HCPCS: Performed by: EMERGENCY MEDICINE

## 2022-02-13 PROCEDURE — 2580000003 HC RX 258: Performed by: FAMILY MEDICINE

## 2022-02-13 PROCEDURE — 83735 ASSAY OF MAGNESIUM: CPT

## 2022-02-13 PROCEDURE — 6360000002 HC RX W HCPCS: Performed by: STUDENT IN AN ORGANIZED HEALTH CARE EDUCATION/TRAINING PROGRAM

## 2022-02-13 PROCEDURE — 1200000000 HC SEMI PRIVATE

## 2022-02-13 PROCEDURE — 99223 1ST HOSP IP/OBS HIGH 75: CPT | Performed by: FAMILY MEDICINE

## 2022-02-13 PROCEDURE — 2580000003 HC RX 258: Performed by: GENERAL PRACTICE

## 2022-02-13 PROCEDURE — 71045 X-RAY EXAM CHEST 1 VIEW: CPT

## 2022-02-13 PROCEDURE — 84478 ASSAY OF TRIGLYCERIDES: CPT

## 2022-02-13 PROCEDURE — 94664 DEMO&/EVAL PT USE INHALER: CPT

## 2022-02-13 PROCEDURE — 6370000000 HC RX 637 (ALT 250 FOR IP): Performed by: FAMILY MEDICINE

## 2022-02-13 PROCEDURE — 6370000000 HC RX 637 (ALT 250 FOR IP): Performed by: STUDENT IN AN ORGANIZED HEALTH CARE EDUCATION/TRAINING PROGRAM

## 2022-02-13 PROCEDURE — 6370000000 HC RX 637 (ALT 250 FOR IP): Performed by: NURSE PRACTITIONER

## 2022-02-13 PROCEDURE — 83690 ASSAY OF LIPASE: CPT

## 2022-02-13 PROCEDURE — APPSS45 APP SPLIT SHARED TIME 31-45 MINUTES: Performed by: NURSE PRACTITIONER

## 2022-02-13 PROCEDURE — 85025 COMPLETE CBC W/AUTO DIFF WBC: CPT

## 2022-02-13 PROCEDURE — 2580000003 HC RX 258: Performed by: RADIOLOGY

## 2022-02-13 PROCEDURE — 96375 TX/PRO/DX INJ NEW DRUG ADDON: CPT

## 2022-02-13 PROCEDURE — 93005 ELECTROCARDIOGRAM TRACING: CPT | Performed by: STUDENT IN AN ORGANIZED HEALTH CARE EDUCATION/TRAINING PROGRAM

## 2022-02-13 PROCEDURE — C9113 INJ PANTOPRAZOLE SODIUM, VIA: HCPCS | Performed by: NURSE PRACTITIONER

## 2022-02-13 PROCEDURE — 94640 AIRWAY INHALATION TREATMENT: CPT

## 2022-02-13 RX ORDER — ZOLPIDEM TARTRATE 5 MG/1
5 TABLET ORAL NIGHTLY PRN
Status: DISCONTINUED | OUTPATIENT
Start: 2022-02-13 | End: 2022-02-16 | Stop reason: HOSPADM

## 2022-02-13 RX ORDER — ARFORMOTEROL TARTRATE 15 UG/2ML
15 SOLUTION RESPIRATORY (INHALATION) 2 TIMES DAILY
Status: DISCONTINUED | OUTPATIENT
Start: 2022-02-13 | End: 2022-02-16 | Stop reason: HOSPADM

## 2022-02-13 RX ORDER — SODIUM CHLORIDE 0.9 % (FLUSH) 0.9 %
10 SYRINGE (ML) INJECTION PRN
Status: DISCONTINUED | OUTPATIENT
Start: 2022-02-13 | End: 2022-02-13 | Stop reason: SDUPTHER

## 2022-02-13 RX ORDER — TRAMADOL HYDROCHLORIDE 50 MG/1
50 TABLET ORAL EVERY 6 HOURS PRN
Status: ON HOLD | COMMUNITY
End: 2022-02-16 | Stop reason: HOSPADM

## 2022-02-13 RX ORDER — PANTOPRAZOLE SODIUM 40 MG/10ML
40 INJECTION, POWDER, LYOPHILIZED, FOR SOLUTION INTRAVENOUS DAILY
Status: DISCONTINUED | OUTPATIENT
Start: 2022-02-13 | End: 2022-02-16 | Stop reason: HOSPADM

## 2022-02-13 RX ORDER — METHYLPREDNISOLONE SODIUM SUCCINATE 125 MG/2ML
125 INJECTION, POWDER, LYOPHILIZED, FOR SOLUTION INTRAMUSCULAR; INTRAVENOUS ONCE
Status: COMPLETED | OUTPATIENT
Start: 2022-02-13 | End: 2022-02-13

## 2022-02-13 RX ORDER — AMLODIPINE BESYLATE 10 MG/1
10 TABLET ORAL DAILY
Status: DISCONTINUED | OUTPATIENT
Start: 2022-02-13 | End: 2022-02-16 | Stop reason: HOSPADM

## 2022-02-13 RX ORDER — MORPHINE SULFATE 4 MG/ML
4 INJECTION, SOLUTION INTRAMUSCULAR; INTRAVENOUS ONCE
Status: COMPLETED | OUTPATIENT
Start: 2022-02-13 | End: 2022-02-13

## 2022-02-13 RX ORDER — IPRATROPIUM BROMIDE AND ALBUTEROL SULFATE 2.5; .5 MG/3ML; MG/3ML
3 SOLUTION RESPIRATORY (INHALATION) ONCE
Status: COMPLETED | OUTPATIENT
Start: 2022-02-13 | End: 2022-02-13

## 2022-02-13 RX ORDER — SODIUM CHLORIDE 9 MG/ML
10 INJECTION INTRAVENOUS DAILY
Status: DISCONTINUED | OUTPATIENT
Start: 2022-02-13 | End: 2022-02-16 | Stop reason: HOSPADM

## 2022-02-13 RX ORDER — BUDESONIDE 0.5 MG/2ML
0.5 INHALANT ORAL 2 TIMES DAILY
Status: DISCONTINUED | OUTPATIENT
Start: 2022-02-13 | End: 2022-02-16 | Stop reason: HOSPADM

## 2022-02-13 RX ORDER — LANOLIN ALCOHOL/MO/W.PET/CERES
3 CREAM (GRAM) TOPICAL NIGHTLY PRN
Status: DISCONTINUED | OUTPATIENT
Start: 2022-02-13 | End: 2022-02-16 | Stop reason: HOSPADM

## 2022-02-13 RX ORDER — ONDANSETRON 2 MG/ML
4 INJECTION INTRAMUSCULAR; INTRAVENOUS EVERY 6 HOURS PRN
Status: DISCONTINUED | OUTPATIENT
Start: 2022-02-13 | End: 2022-02-16 | Stop reason: HOSPADM

## 2022-02-13 RX ORDER — SODIUM CHLORIDE, SODIUM LACTATE, POTASSIUM CHLORIDE, CALCIUM CHLORIDE 600; 310; 30; 20 MG/100ML; MG/100ML; MG/100ML; MG/100ML
INJECTION, SOLUTION INTRAVENOUS CONTINUOUS
Status: DISCONTINUED | OUTPATIENT
Start: 2022-02-13 | End: 2022-02-16 | Stop reason: HOSPADM

## 2022-02-13 RX ORDER — ONDANSETRON 2 MG/ML
4 INJECTION INTRAMUSCULAR; INTRAVENOUS ONCE
Status: COMPLETED | OUTPATIENT
Start: 2022-02-13 | End: 2022-02-13

## 2022-02-13 RX ORDER — FENTANYL CITRATE 50 UG/ML
50 INJECTION, SOLUTION INTRAMUSCULAR; INTRAVENOUS ONCE
Status: COMPLETED | OUTPATIENT
Start: 2022-02-13 | End: 2022-02-13

## 2022-02-13 RX ORDER — SODIUM CHLORIDE 9 MG/ML
1000 INJECTION, SOLUTION INTRAVENOUS CONTINUOUS
Status: DISCONTINUED | OUTPATIENT
Start: 2022-02-13 | End: 2022-02-13 | Stop reason: ALTCHOICE

## 2022-02-13 RX ORDER — SODIUM CHLORIDE 9 MG/ML
25 INJECTION, SOLUTION INTRAVENOUS PRN
Status: DISCONTINUED | OUTPATIENT
Start: 2022-02-13 | End: 2022-02-16 | Stop reason: HOSPADM

## 2022-02-13 RX ORDER — SODIUM CHLORIDE, SODIUM LACTATE, POTASSIUM CHLORIDE, CALCIUM CHLORIDE 600; 310; 30; 20 MG/100ML; MG/100ML; MG/100ML; MG/100ML
INJECTION, SOLUTION INTRAVENOUS CONTINUOUS
Status: ACTIVE | OUTPATIENT
Start: 2022-02-13 | End: 2022-02-13

## 2022-02-13 RX ORDER — BUDESONIDE AND FORMOTEROL FUMARATE DIHYDRATE 160; 4.5 UG/1; UG/1
2 AEROSOL RESPIRATORY (INHALATION) 2 TIMES DAILY
Status: DISCONTINUED | OUTPATIENT
Start: 2022-02-13 | End: 2022-02-13 | Stop reason: CLARIF

## 2022-02-13 RX ORDER — SODIUM CHLORIDE 0.9 % (FLUSH) 0.9 %
5-40 SYRINGE (ML) INJECTION PRN
Status: DISCONTINUED | OUTPATIENT
Start: 2022-02-13 | End: 2022-02-16 | Stop reason: HOSPADM

## 2022-02-13 RX ORDER — ONDANSETRON 4 MG/1
4 TABLET, ORALLY DISINTEGRATING ORAL EVERY 8 HOURS PRN
Status: DISCONTINUED | OUTPATIENT
Start: 2022-02-13 | End: 2022-02-16 | Stop reason: HOSPADM

## 2022-02-13 RX ORDER — SODIUM CHLORIDE 0.9 % (FLUSH) 0.9 %
5-40 SYRINGE (ML) INJECTION EVERY 12 HOURS SCHEDULED
Status: DISCONTINUED | OUTPATIENT
Start: 2022-02-13 | End: 2022-02-16 | Stop reason: HOSPADM

## 2022-02-13 RX ORDER — KETOROLAC TROMETHAMINE 30 MG/ML
30 INJECTION, SOLUTION INTRAMUSCULAR; INTRAVENOUS EVERY 6 HOURS PRN
Status: DISCONTINUED | OUTPATIENT
Start: 2022-02-13 | End: 2022-02-16 | Stop reason: HOSPADM

## 2022-02-13 RX ORDER — ACETAMINOPHEN 325 MG/1
650 TABLET ORAL EVERY 4 HOURS PRN
Status: DISCONTINUED | OUTPATIENT
Start: 2022-02-13 | End: 2022-02-16 | Stop reason: HOSPADM

## 2022-02-13 RX ORDER — KETOROLAC TROMETHAMINE 30 MG/ML
30 INJECTION, SOLUTION INTRAMUSCULAR; INTRAVENOUS ONCE
Status: COMPLETED | OUTPATIENT
Start: 2022-02-13 | End: 2022-02-13

## 2022-02-13 RX ORDER — FLUTICASONE FUROATE, UMECLIDINIUM BROMIDE AND VILANTEROL TRIFENATATE 200; 62.5; 25 UG/1; UG/1; UG/1
2 POWDER RESPIRATORY (INHALATION) 2 TIMES DAILY
COMMUNITY

## 2022-02-13 RX ORDER — ZOLPIDEM TARTRATE 5 MG/1
5 TABLET ORAL NIGHTLY PRN
COMMUNITY

## 2022-02-13 RX ADMIN — IOPAMIDOL 75 ML: 755 INJECTION, SOLUTION INTRAVENOUS at 08:29

## 2022-02-13 RX ADMIN — IPRATROPIUM BROMIDE AND ALBUTEROL SULFATE 3 AMPULE: 2.5; .5 SOLUTION RESPIRATORY (INHALATION) at 06:46

## 2022-02-13 RX ADMIN — SODIUM CHLORIDE, PRESERVATIVE FREE 10 ML: 5 INJECTION INTRAVENOUS at 08:29

## 2022-02-13 RX ADMIN — BUDESONIDE 500 MCG: 0.5 SUSPENSION RESPIRATORY (INHALATION) at 21:08

## 2022-02-13 RX ADMIN — NICOTINE POLACRILEX 4 MG: 2 GUM, CHEWING BUCCAL at 22:08

## 2022-02-13 RX ADMIN — SODIUM CHLORIDE, PRESERVATIVE FREE 10 ML: 5 INJECTION INTRAVENOUS at 13:31

## 2022-02-13 RX ADMIN — ZOLPIDEM TARTRATE 5 MG: 5 TABLET ORAL at 23:58

## 2022-02-13 RX ADMIN — ONDANSETRON 4 MG: 4 TABLET, ORALLY DISINTEGRATING ORAL at 23:40

## 2022-02-13 RX ADMIN — KETOROLAC TROMETHAMINE 30 MG: 30 INJECTION, SOLUTION INTRAMUSCULAR at 10:10

## 2022-02-13 RX ADMIN — SODIUM CHLORIDE, POTASSIUM CHLORIDE, SODIUM LACTATE AND CALCIUM CHLORIDE: 600; 310; 30; 20 INJECTION, SOLUTION INTRAVENOUS at 17:33

## 2022-02-13 RX ADMIN — ARFORMOTEROL TARTRATE 15 MCG: 15 SOLUTION RESPIRATORY (INHALATION) at 21:04

## 2022-02-13 RX ADMIN — Medication 3 MG: at 23:57

## 2022-02-13 RX ADMIN — SODIUM CHLORIDE, POTASSIUM CHLORIDE, SODIUM LACTATE AND CALCIUM CHLORIDE: 600; 310; 30; 20 INJECTION, SOLUTION INTRAVENOUS at 13:36

## 2022-02-13 RX ADMIN — PANTOPRAZOLE SODIUM 40 MG: 40 INJECTION, POWDER, FOR SOLUTION INTRAVENOUS at 13:31

## 2022-02-13 RX ADMIN — FENTANYL CITRATE 50 MCG: 50 INJECTION, SOLUTION INTRAMUSCULAR; INTRAVENOUS at 04:23

## 2022-02-13 RX ADMIN — SODIUM CHLORIDE, POTASSIUM CHLORIDE, SODIUM LACTATE AND CALCIUM CHLORIDE: 600; 310; 30; 20 INJECTION, SOLUTION INTRAVENOUS at 22:07

## 2022-02-13 RX ADMIN — KETOROLAC TROMETHAMINE 30 MG: 30 INJECTION, SOLUTION INTRAMUSCULAR; INTRAVENOUS at 16:34

## 2022-02-13 RX ADMIN — SODIUM CHLORIDE 1000 ML: 9 INJECTION, SOLUTION INTRAVENOUS at 10:11

## 2022-02-13 RX ADMIN — ENOXAPARIN SODIUM 30 MG: 100 INJECTION SUBCUTANEOUS at 13:31

## 2022-02-13 RX ADMIN — AMLODIPINE BESYLATE 10 MG: 10 TABLET ORAL at 13:30

## 2022-02-13 RX ADMIN — KETOROLAC TROMETHAMINE 30 MG: 30 INJECTION, SOLUTION INTRAMUSCULAR; INTRAVENOUS at 23:24

## 2022-02-13 RX ADMIN — METHYLPREDNISOLONE SODIUM SUCCINATE 125 MG: 125 INJECTION, POWDER, FOR SOLUTION INTRAMUSCULAR; INTRAVENOUS at 04:23

## 2022-02-13 RX ADMIN — ENOXAPARIN SODIUM 30 MG: 100 INJECTION SUBCUTANEOUS at 22:08

## 2022-02-13 RX ADMIN — ONDANSETRON 4 MG: 2 INJECTION INTRAMUSCULAR; INTRAVENOUS at 07:22

## 2022-02-13 RX ADMIN — MORPHINE SULFATE 4 MG: 4 INJECTION, SOLUTION INTRAMUSCULAR; INTRAVENOUS at 07:22

## 2022-02-13 ASSESSMENT — PAIN SCALES - GENERAL
PAINLEVEL_OUTOF10: 4
PAINLEVEL_OUTOF10: 0
PAINLEVEL_OUTOF10: 3
PAINLEVEL_OUTOF10: 0
PAINLEVEL_OUTOF10: 5
PAINLEVEL_OUTOF10: 10
PAINLEVEL_OUTOF10: 8
PAINLEVEL_OUTOF10: 10
PAINLEVEL_OUTOF10: 3

## 2022-02-13 ASSESSMENT — PAIN DESCRIPTION - PROGRESSION
CLINICAL_PROGRESSION: GRADUALLY WORSENING
CLINICAL_PROGRESSION: GRADUALLY WORSENING

## 2022-02-13 ASSESSMENT — PAIN DESCRIPTION - ONSET
ONSET: ON-GOING
ONSET: ON-GOING

## 2022-02-13 ASSESSMENT — PAIN DESCRIPTION - DESCRIPTORS
DESCRIPTORS: ACHING;DISCOMFORT;PRESSURE
DESCRIPTORS: ACHING
DESCRIPTORS: ACHING;DISCOMFORT;SHARP

## 2022-02-13 ASSESSMENT — PAIN DESCRIPTION - PAIN TYPE
TYPE: ACUTE PAIN

## 2022-02-13 ASSESSMENT — PAIN DESCRIPTION - LOCATION
LOCATION: ABDOMEN

## 2022-02-13 ASSESSMENT — PAIN DESCRIPTION - ORIENTATION: ORIENTATION: RIGHT

## 2022-02-13 ASSESSMENT — PAIN DESCRIPTION - FREQUENCY
FREQUENCY: INTERMITTENT
FREQUENCY: INTERMITTENT

## 2022-02-13 NOTE — ED NOTES
Bed: 22  Expected date:   Expected time:   Means of arrival:   Comments:  EMS     Stan Ye RN  02/13/22 [id number]

## 2022-02-13 NOTE — PROGRESS NOTES
Dr. Ricki Goltz,    Your patient is on a medication that requires a renal and/or weight dose adjustment. Renal/Body Weight Function Assessment:    Date Body Weight IBW  Adjusted BW SCr  CrCl Dialysis status   2/13/2022 118.8 kg Ideal body weight: 70.7 kg (155 lb 13.8 oz)  Adjusted ideal body weight: 90 kg (198 lb 5.1 oz) Serum creatinine: 0.7 mg/dL 02/13/22 0416  Estimated creatinine clearance: 153 mL/min N/a       Pharmacy has dose-adjusted the following medication(s):    Date Previous Order Adjusted Order   2/13/2022 Enoxaparin 40 mg daily Enoxaparin 30 mg twice daily       These changes were made per protocol according to the [facility] Renal Dosing Policy/ [facility] Pharmacist Anticoagulant Review. *Please note this dose may need readjusted if your patient's condition changes. Please contact pharmacy with any questions regarding these changes.     kiley lara [facility]  2/13/2022  10:44 AM

## 2022-02-13 NOTE — H&P
[facility] History and Physical      CHIEF COMPLAINT:  Abdominal Pain    History of Present Illness:  Patient is a 45year old male with a past medical history of hypertension, asthma, GERD, depression, BPH, diabetes mellitus type 2 taken off medications two months ago and alcohol abuse with prior alcoholic pancreatitis. He presented to the ER this morning with complaints of abdominal pain that began a few days prior. Patient reports that on Friday he began having some increased sharp mid abdominal pain with associated nausea and vomiting that was intermittent and moderate in severity. He reports that the pain occurred after he had a few cocktails. He reports that he has had this pain before and typically would resolve with taking another drink so he attempted to drink another cocktail but failed to have improvement. He reports that nothing seemed to make it better but eating/drinking did make it worse. He reports that due to the ongoing pain he came to the ER for evaluation. While in the ER lab work was obtained and noted slight elevation in LFTs and lipase of 111. CT of the abdomen/pelvis was obtained and noted acute pancreatitis. He was started on IV fluids and given Fentanyl/Morphine/Toradol for pain with some improvement. He was admitted with a diagnosis of acute alcoholic pancreatitis for continued management. Currently he is seen laying in bed awake and alert in no distress. He reports that overall he is starting to feel better. He reports still having some abdominal pain but feels like he is hungry and wants to eat. He denies any nausea or vomiting. He denies any fever or chills. Denies any shortness of breath or difficulty breathing but reports that he was seen by his PCP last week and given Prednisone because he was having some episodes of shortness of breath. Much of the HPI has been obtained through extensive chart review as well as discussion with patient.   All of patient's past medical history, past surgical history, social history, family history and allergies reviewed and verified with patient for accuracy. Patient is felt to be a good historian. Informant(s) for H&P: Patient    REVIEW OF SYSTEMS:  Full 12 point ROS negative unless mentioned in the HPI      PMH:  Past Medical History:   Diagnosis Date    Acid reflux     Asthma     Asthma     [facility]     Hypertension     Pancreatitis     Prediabetes     Psychiatric problem     depressed    Sleep apnea     Substance abuse ([facility]     alcohol and cocaine       Surgical History:  Past Surgical History:   Procedure Laterality Date    COLONOSCOPY      2010 neg    NERVE BLOCK Left 08/27/2018    lumbar epidural #1 paramedian    MI NJX DX/THER SBST INTRLMNR LMBR/SAC W/IMG GDN Left 8/27/2018    LUMBAR EPIDURAL STEROID INJECTION L4-5 LEFT PARAMEDIAN #1 performed by Maximus Zambrano MD at [address] [place] [address]         Medications Prior to Admission:    Prior to Admission medications    Medication Sig Start Date End Date Taking? Authorizing Provider   aspirin 81 MG chewable tablet Take 1 tablet by mouth daily 11/18/20   Cyndy Merlos MD   budesonide-formoterol (SYMBICORT) 160-4.5 MCG/ACT AERO Inhale 2 puffs into the lungs 2 times daily 11/17/20   Cyndy Merlos MD   ipratropium-albuterol (DUONEB) 0.5-2.5 (3) MG/3ML SOLN nebulizer solution Inhale 3 mLs into the lungs every 4 hours as needed for Shortness of Breath 11/17/20 11/27/20  Cyndy Merlos MD   pantoprazole (PROTONIX) 40 MG tablet Take 1 tablet by mouth daily 11/18/20   Cyndy Merlos MD   docusate sodium (COLACE) 100 MG capsule Take 100 mg by mouth 2 times daily    Historical Provider, MD   Respiratory Therapy Supplies (FULL KIT NEBULIZER SET) MISC Use as directed with nebulized medication.  10/15/20   Leyda Garrett MD   melatonin 3 MG TABS tablet Take 3 mg by mouth nightly as needed (sleep) Historical Provider, MD   hydrOXYzine (VISTARIL) 25 MG capsule Take 2 tablets by mouth nightly     Historical Provider, MD   glipiZIDE (GLUCOTROL) 5 MG tablet Take 1 tablet by mouth 2 times daily 4/23/20   Catalina Murray MD   tamsulosin (FLOMAX) 0.4 MG capsule Take 0.4 mg by mouth 2 times daily     Historical Provider, MD   amLODIPine (NORVASC) 10 MG tablet Take 1 tablet by mouth daily. 1/16/15   Kimberley Caruso MD       Allergies:    Dust mite extract    Social History:   Tobacco: admits to smoking 1/2 pack per day  Alcohol: admits to drinking 1 to 1.5 pints of Vodka every other day  Illicit Drugs: admits to occasional cocaine use    Family History:   family history includes High Blood Pressure in his maternal grandmother; Other in his maternal grandmother and sister. PHYSICAL EXAM:  Vitals:  /88   Pulse 85   Temp 98 °F (36.7 °C) (Oral)   Resp 14   Ht 5' 9\" (1.753 m)   Wt 262 lb (118.8 kg)   SpO2 99%   BMI 38.69 kg/m²   General Appearance: awake and alert, oriented x 3, in no distress  Skin: warm and dry, no rashes or lesions noted  Head: normocephalic and atraumatic  Eyes: pupils equal, round, and reactive to light, conjunctivae normal  Neck: supple and non tender without mass, no lymphadenopathy noted  Pulmonary/Chest: clear throughout, respirations even and non-labored on room air  Cardiovascular: regular rate and rhythm, S1S2 present, no murmur noted  Abdomen: obese, softly distended, non-tender, normal bowel sounds  Extremities: no cyanosis, no clubbing and no edema  Neurologic: no cranial nerve deficit and speech normal        LABS:  Recent Labs     02/13/22  0416      K 3.5   CL 95*   CO2 23   BUN 8   CREATININE 0.7   GLUCOSE 175*   CALCIUM 8.4*       Recent Labs     02/13/22 0416   WBC 9.0   RBC 5.46   HGB 15.3   HCT 47.0   MCV 86.1   MCH 28.0   MCHC 32.6   RDW 13.5      MPV 9.4       No results for input(s): POCGLU in the last 72 hours.       Radiology:   CT ABDOMEN PELVIS W IV CONTRAST Additional Contrast? None   Final Result   Finding consistent with acute pancreatitis. No peripancreatic fluid   collections or pancreatic necrosis. XR CHEST PORTABLE   Final Result   No acute process. EKG: Sinus Rhythm with Sinus Arrhythmia, Left Anterior Fascicular Block    ASSESSMENT:      Principal Problem:    Alcohol induced acute pancreatitis without necrosis or infection  Active Problems:    HTN (hypertension)    Depression    Asthma    Alcohol abuse    GERD (gastroesophageal reflux disease)  Resolved Problems:    * No resolved hospital problems. *      PLAN:    1. Acute Alcohol Induced Pancreatitis: Lipase 111, CT abdomen/pelvis consistent with acute pancreatitis, continue IV fluids for now, start clear liquid diet - monitor abdominal exam and pain; this is recurrent issue for patient - did review risks associated with recurrent pancreatitis as well as continued alcohol abuse  2. Elevated LFTs: likely chronic in relation to patient's underlying alcohol abuse, will monitor  3. Essential Hypertension: continue home Norvasc, monitor BP closely and adjust as indicated  4. Asthma: currently not exacerbated, typically on Trelegy Ellipta, will continue with Symbicort  5. Diabetes Mellitus Type 2, Controlled: previously on Glipizide but reports this was stopped by his PCP 2 months ago as his A1C was good - recommend continued outpatient follow up and monitoring  6. GERD: continue PPI but will give IV for now, monitor response  7. BPH: typically on Flomax at home, will monitor off for now as trying to limit oral medications while acute pancreatitis is being treated  8. Depression: patient reports being on Wellbutrin/Seroquel at home - will hold on resuming in order to limit oral medications while acute pancreatitis is being treated  9.  Alcohol Abuse: counseling and cessation advised, patient asking about possible AA/rehab options, will consult social work - will also need to monitor closely for any evidence of alcohol withdrawal   10. Tobacco Abuse: counseling and cessation advised, will add Nicorette gum per patient request  11. Cocaine Abuse: counseling and cessation advised    Code Status: Full  DVT prophylaxis: Lovenox      NOTE: This report was transcribed using voice recognition software. Every effort was made to ensure accuracy; however, inadvertent computerized transcription errors may be present. Electronically signed by Rebekah Hansen CNP on 2/13/2022 at 11:25 AM

## 2022-02-14 LAB
ALBUMIN SERPL-MCNC: 4.1 G/DL (ref 3.5–5.2)
ALP BLD-CCNC: 74 U/L (ref 40–129)
ALT SERPL-CCNC: 44 U/L (ref 0–40)
ANION GAP SERPL CALCULATED.3IONS-SCNC: 11 MMOL/L (ref 7–16)
AST SERPL-CCNC: 22 U/L (ref 0–39)
BASOPHILS ABSOLUTE: 0.01 E9/L (ref 0–0.2)
BASOPHILS RELATIVE PERCENT: 0.1 % (ref 0–2)
BILIRUB SERPL-MCNC: 0.9 MG/DL (ref 0–1.2)
BUN BLDV-MCNC: 7 MG/DL (ref 6–20)
CALCIUM SERPL-MCNC: 8.8 MG/DL (ref 8.6–10.2)
CHLORIDE BLD-SCNC: 97 MMOL/L (ref 98–107)
CO2: 29 MMOL/L (ref 22–29)
CREAT SERPL-MCNC: 0.7 MG/DL (ref 0.7–1.2)
EOSINOPHILS ABSOLUTE: 0 E9/L (ref 0.05–0.5)
EOSINOPHILS RELATIVE PERCENT: 0 % (ref 0–6)
GFR AFRICAN AMERICAN: >60
GFR NON-AFRICAN AMERICAN: >60 ML/MIN/1.73
GLUCOSE BLD-MCNC: 142 MG/DL (ref 74–99)
HBA1C MFR BLD: 6.1 % (ref 4–5.6)
HCT VFR BLD CALC: 48.7 % (ref 37–54)
HEMOGLOBIN: 15.2 G/DL (ref 12.5–16.5)
IMMATURE GRANULOCYTES #: 0.05 E9/L
IMMATURE GRANULOCYTES %: 0.5 % (ref 0–5)
LACTIC ACID: 1.2 MMOL/L (ref 0.5–2.2)
LIPASE: 114 U/L (ref 13–60)
LYMPHOCYTES ABSOLUTE: 1.07 E9/L (ref 1.5–4)
LYMPHOCYTES RELATIVE PERCENT: 10.5 % (ref 20–42)
MCH RBC QN AUTO: 27.9 PG (ref 26–35)
MCHC RBC AUTO-ENTMCNC: 31.2 % (ref 32–34.5)
MCV RBC AUTO: 89.4 FL (ref 80–99.9)
METER GLUCOSE: 130 MG/DL (ref 74–99)
MONOCYTES ABSOLUTE: 0.93 E9/L (ref 0.1–0.95)
MONOCYTES RELATIVE PERCENT: 9.1 % (ref 2–12)
NEUTROPHILS ABSOLUTE: 8.16 E9/L (ref 1.8–7.3)
NEUTROPHILS RELATIVE PERCENT: 79.8 % (ref 43–80)
PDW BLD-RTO: 13.7 FL (ref 11.5–15)
PLATELET # BLD: 301 E9/L (ref 130–450)
PMV BLD AUTO: 9.6 FL (ref 7–12)
POTASSIUM REFLEX MAGNESIUM: 4.1 MMOL/L (ref 3.5–5)
RBC # BLD: 5.45 E12/L (ref 3.8–5.8)
SODIUM BLD-SCNC: 137 MMOL/L (ref 132–146)
TOTAL PROTEIN: 7.3 G/DL (ref 6.4–8.3)
TRIGL SERPL-MCNC: 171 MG/DL (ref 0–149)
WBC # BLD: 10.2 E9/L (ref 4.5–11.5)

## 2022-02-14 PROCEDURE — 6360000002 HC RX W HCPCS: Performed by: NURSE PRACTITIONER

## 2022-02-14 PROCEDURE — 6360000002 HC RX W HCPCS: Performed by: FAMILY MEDICINE

## 2022-02-14 PROCEDURE — 85025 COMPLETE CBC W/AUTO DIFF WBC: CPT

## 2022-02-14 PROCEDURE — A4216 STERILE WATER/SALINE, 10 ML: HCPCS | Performed by: NURSE PRACTITIONER

## 2022-02-14 PROCEDURE — 2700000000 HC OXYGEN THERAPY PER DAY

## 2022-02-14 PROCEDURE — 36415 COLL VENOUS BLD VENIPUNCTURE: CPT

## 2022-02-14 PROCEDURE — 99232 SBSQ HOSP IP/OBS MODERATE 35: CPT | Performed by: STUDENT IN AN ORGANIZED HEALTH CARE EDUCATION/TRAINING PROGRAM

## 2022-02-14 PROCEDURE — 80053 COMPREHEN METABOLIC PANEL: CPT

## 2022-02-14 PROCEDURE — C9113 INJ PANTOPRAZOLE SODIUM, VIA: HCPCS | Performed by: NURSE PRACTITIONER

## 2022-02-14 PROCEDURE — 83036 HEMOGLOBIN GLYCOSYLATED A1C: CPT

## 2022-02-14 PROCEDURE — 83605 ASSAY OF LACTIC ACID: CPT

## 2022-02-14 PROCEDURE — 82962 GLUCOSE BLOOD TEST: CPT

## 2022-02-14 PROCEDURE — 1200000000 HC SEMI PRIVATE

## 2022-02-14 PROCEDURE — 6370000000 HC RX 637 (ALT 250 FOR IP): Performed by: NURSE PRACTITIONER

## 2022-02-14 PROCEDURE — 6360000002 HC RX W HCPCS: Performed by: STUDENT IN AN ORGANIZED HEALTH CARE EDUCATION/TRAINING PROGRAM

## 2022-02-14 PROCEDURE — 94640 AIRWAY INHALATION TREATMENT: CPT

## 2022-02-14 PROCEDURE — 6370000000 HC RX 637 (ALT 250 FOR IP): Performed by: STUDENT IN AN ORGANIZED HEALTH CARE EDUCATION/TRAINING PROGRAM

## 2022-02-14 PROCEDURE — 83690 ASSAY OF LIPASE: CPT

## 2022-02-14 PROCEDURE — 2580000003 HC RX 258: Performed by: NURSE PRACTITIONER

## 2022-02-14 PROCEDURE — 84478 ASSAY OF TRIGLYCERIDES: CPT

## 2022-02-14 PROCEDURE — 6370000000 HC RX 637 (ALT 250 FOR IP): Performed by: FAMILY MEDICINE

## 2022-02-14 PROCEDURE — 2580000003 HC RX 258: Performed by: FAMILY MEDICINE

## 2022-02-14 RX ORDER — TAMSULOSIN HYDROCHLORIDE 0.4 MG/1
0.4 CAPSULE ORAL 2 TIMES DAILY
Status: DISCONTINUED | OUTPATIENT
Start: 2022-02-14 | End: 2022-02-16 | Stop reason: HOSPADM

## 2022-02-14 RX ORDER — MORPHINE SULFATE 2 MG/ML
0.5 INJECTION, SOLUTION INTRAMUSCULAR; INTRAVENOUS EVERY 8 HOURS PRN
Status: DISCONTINUED | OUTPATIENT
Start: 2022-02-14 | End: 2022-02-14

## 2022-02-14 RX ORDER — MORPHINE SULFATE 2 MG/ML
0.5 INJECTION, SOLUTION INTRAMUSCULAR; INTRAVENOUS EVERY 4 HOURS PRN
Status: DISCONTINUED | OUTPATIENT
Start: 2022-02-14 | End: 2022-02-15

## 2022-02-14 RX ORDER — POLYETHYLENE GLYCOL 3350 17 G/17G
17 POWDER, FOR SOLUTION ORAL DAILY
Status: DISCONTINUED | OUTPATIENT
Start: 2022-02-14 | End: 2022-02-16 | Stop reason: HOSPADM

## 2022-02-14 RX ORDER — MORPHINE SULFATE 2 MG/ML
0.5 INJECTION, SOLUTION INTRAMUSCULAR; INTRAVENOUS EVERY 6 HOURS PRN
Status: DISCONTINUED | OUTPATIENT
Start: 2022-02-14 | End: 2022-02-14

## 2022-02-14 RX ADMIN — TAMSULOSIN HYDROCHLORIDE 0.4 MG: 0.4 CAPSULE ORAL at 20:03

## 2022-02-14 RX ADMIN — PANTOPRAZOLE SODIUM 40 MG: 40 INJECTION, POWDER, FOR SOLUTION INTRAVENOUS at 08:47

## 2022-02-14 RX ADMIN — TAMSULOSIN HYDROCHLORIDE 0.4 MG: 0.4 CAPSULE ORAL at 08:48

## 2022-02-14 RX ADMIN — ENOXAPARIN SODIUM 30 MG: 100 INJECTION SUBCUTANEOUS at 20:03

## 2022-02-14 RX ADMIN — KETOROLAC TROMETHAMINE 30 MG: 30 INJECTION, SOLUTION INTRAMUSCULAR; INTRAVENOUS at 20:03

## 2022-02-14 RX ADMIN — ACETAMINOPHEN 650 MG: 325 TABLET ORAL at 14:18

## 2022-02-14 RX ADMIN — KETOROLAC TROMETHAMINE 30 MG: 30 INJECTION, SOLUTION INTRAMUSCULAR; INTRAVENOUS at 13:34

## 2022-02-14 RX ADMIN — ONDANSETRON 4 MG: 2 INJECTION INTRAMUSCULAR; INTRAVENOUS at 17:56

## 2022-02-14 RX ADMIN — AMLODIPINE BESYLATE 10 MG: 10 TABLET ORAL at 08:48

## 2022-02-14 RX ADMIN — SODIUM CHLORIDE, PRESERVATIVE FREE 10 ML: 5 INJECTION INTRAVENOUS at 08:48

## 2022-02-14 RX ADMIN — ONDANSETRON 4 MG: 4 TABLET, ORALLY DISINTEGRATING ORAL at 08:48

## 2022-02-14 RX ADMIN — BUDESONIDE 500 MCG: 0.5 SUSPENSION RESPIRATORY (INHALATION) at 19:15

## 2022-02-14 RX ADMIN — MORPHINE SULFATE 0.5 MG: 2 INJECTION, SOLUTION INTRAMUSCULAR; INTRAVENOUS at 22:22

## 2022-02-14 RX ADMIN — ENOXAPARIN SODIUM 30 MG: 100 INJECTION SUBCUTANEOUS at 08:47

## 2022-02-14 RX ADMIN — SODIUM CHLORIDE, POTASSIUM CHLORIDE, SODIUM LACTATE AND CALCIUM CHLORIDE: 600; 310; 30; 20 INJECTION, SOLUTION INTRAVENOUS at 04:36

## 2022-02-14 RX ADMIN — ARFORMOTEROL TARTRATE 15 MCG: 15 SOLUTION RESPIRATORY (INHALATION) at 09:39

## 2022-02-14 RX ADMIN — ACETAMINOPHEN 650 MG: 325 TABLET ORAL at 23:26

## 2022-02-14 RX ADMIN — ZOLPIDEM TARTRATE 5 MG: 5 TABLET ORAL at 23:24

## 2022-02-14 RX ADMIN — KETOROLAC TROMETHAMINE 30 MG: 30 INJECTION, SOLUTION INTRAMUSCULAR; INTRAVENOUS at 06:26

## 2022-02-14 RX ADMIN — MORPHINE SULFATE 0.5 MG: 2 INJECTION, SOLUTION INTRAMUSCULAR; INTRAVENOUS at 12:25

## 2022-02-14 RX ADMIN — ACETAMINOPHEN 650 MG: 325 TABLET ORAL at 18:26

## 2022-02-14 RX ADMIN — Medication 3 MG: at 23:24

## 2022-02-14 RX ADMIN — MORPHINE SULFATE 0.5 MG: 2 INJECTION, SOLUTION INTRAMUSCULAR; INTRAVENOUS at 18:18

## 2022-02-14 ASSESSMENT — PAIN SCALES - GENERAL
PAINLEVEL_OUTOF10: 10
PAINLEVEL_OUTOF10: 2
PAINLEVEL_OUTOF10: 10
PAINLEVEL_OUTOF10: 7
PAINLEVEL_OUTOF10: 10
PAINLEVEL_OUTOF10: 6
PAINLEVEL_OUTOF10: 5
PAINLEVEL_OUTOF10: 8
PAINLEVEL_OUTOF10: 10
PAINLEVEL_OUTOF10: 7

## 2022-02-14 NOTE — PROGRESS NOTES
P Quality Flow/Interdisciplinary Rounds Progress Note        Quality Flow Rounds held on February 14, 2022    Disciplines Attending:  Bedside Nurse, ,  and Nursing Unit Leadership    Ness Cano was admitted on 2/13/2022  3:26 AM    Anticipated Discharge Date:       Disposition:    Jaycob Score:  Jaycob Scale Score: 21    Readmission Risk              Risk of Unplanned Readmission:  11           Discussed patient goal for the day, patient clinical progression, and barriers to discharge.   The following Goal(s) of the Day/Commitment(s) have been identified:  Discharge [address] [place]  February 14, 2022

## 2022-02-14 NOTE — PROGRESS NOTES
Pt complaining of abd pain- no additional pain meds ordered at this time; spoke to Dr. Polly Daley morphine inc to q6hr and if pain continues make pt NPO

## 2022-02-14 NOTE — PROGRESS NOTES
[facility] Progress Note    Admitting Date and Time: 2/13/2022  3:26 AM  Admit Dx: Alcohol induced acute pancreatitis without necrosis or infection [K85.20]    Subjective:  Patient is being followed for Alcohol induced acute pancreatitis without necrosis or infection [K85.20]   Pt feels 10/10 abdominal pain, constipation, though also wants his diet be progressed from clears to regular  Per RN: Patient c/o pain. ROS: denies fever, chills, cp, sob, n/v, HA unless stated above.       tamsulosin  0.4 mg Oral BID    sodium chloride flush  5-40 mL IntraVENous 2 times per day    enoxaparin  30 mg SubCUTAneous BID    amLODIPine  10 mg Oral Daily    pantoprazole  40 mg IntraVENous Daily    And    sodium chloride (PF)  10 mL IntraVENous Daily    Arformoterol Tartrate  15 mcg Nebulization BID    Or    budesonide  0.5 mg Nebulization BID     sodium chloride flush, 5-40 mL, PRN  sodium chloride, 25 mL, PRN  acetaminophen, 650 mg, Q4H PRN  ondansetron, 4 mg, Q8H PRN   Or  ondansetron, 4 mg, Q6H PRN  ketorolac, 30 mg, Q6H PRN  nicotine polacrilex, 4 mg, Q2H PRN  zolpidem, 5 mg, Nightly PRN  melatonin, 3 mg, Nightly PRN         Objective:    BP (!) 150/102   Pulse 75   Temp 97.6 °F (36.4 °C) (Oral)   Resp 16   Ht 5' 9\" (1.753 m)   Wt 262 lb (118.8 kg)   SpO2 100%   BMI 38.69 kg/m²     General Appearance: alert and oriented to person, place and time and in no acute distress  Skin: warm and dry  Head: normocephalic and atraumatic  Eyes: pupils equal, round, and reactive to light, extraocular eye movements intact, conjunctivae normal  Neck: neck supple and non tender without mass   Pulmonary/Chest: clear to auscultation bilaterally- no wheezes, rales or rhonchi, normal air movement, no respiratory distress  Cardiovascular: normal rate, normal S1 and S2 and no carotid bruits  Abdomen: soft, diffusely tender, non-distended, normal bowel sounds, no masses or organomegaly  Extremities: no cyanosis, no clubbing and no edema  Neurologic: no cranial nerve deficit and speech normal        Recent Labs     02/13/22  0416 02/14/22  0625    137   K 3.5 4.1   CL 95* 97*   CO2 23 29   BUN 8 7   CREATININE 0.7 0.7   GLUCOSE 175* 142*   CALCIUM 8.4* 8.8       Recent Labs     02/13/22  0416 02/14/22  0625   WBC 9.0 10.2   RBC 5.46 5.45   HGB 15.3 15.2   HCT 47.0 48.7   MCV 86.1 89.4   MCH 28.0 27.9   MCHC 32.6 31.2*   RDW 13.5 13.7    301   MPV 9.4 9.6       Micro:  No components found for: [facility])    Radiology:   CT ABDOMEN PELVIS W IV CONTRAST Additional Contrast? None    Result Date: 2/13/2022  EXAMINATION: CT OF THE ABDOMEN AND PELVIS WITH CONTRAST 2/13/2022 8:27 am TECHNIQUE: CT of the abdomen and pelvis was performed with the administration of intravenous contrast. Multiplanar reformatted images are provided for review. Dose modulation, iterative reconstruction, and/or weight based adjustment of the mA/kV was utilized to reduce the radiation dose to as low as reasonably achievable. COMPARISON: None. HISTORY: ORDERING SYSTEM PROVIDED HISTORY: concern for pancreatitis TECHNOLOGIST PROVIDED HISTORY: Reason for exam:->concern for pancreatitis Additional Contrast?->None Decision Support Exception - unselect if not a suspected or confirmed emergency medical condition->Emergency Medical Condition (MA) FINDINGS: Lung Bases: Bilateral compressive atelectasis. Liver: No hepatic lesions identified. Bile ducts:  Gallbladder is unremarkable. No evidence of intrahepatic or extrahepatic biliary dilatation. Pancreas: There is peripancreatic fat stranding throughout the pancreas, most prominent in the body and tail. No peripancreatic fluid collection. No evidence of pancreatic necrosis. The pancreatic duct is not dilated. Adrenal glands: Normal in appearance. Kidneys: No evidence of hydronephrosis. No abnormal renal lesions. Spleen: No enhancing lesions. Bowel: No evidence of bowel obstruction.  Appendix is normal appearance. Peritoneum/Retroperitoneum: No abnormal pelvic lymphadenopathy. Pelvis:   Bladder is unremarkable. Bones/Soft tissues:   No aggressive osseous lesion     Finding consistent with acute pancreatitis. No peripancreatic fluid collections or pancreatic necrosis. XR CHEST PORTABLE    Result Date: 2/13/2022  EXAMINATION: ONE XRAY VIEW OF THE CHEST 2/13/2022 4:44 am COMPARISON: 11/17/2020 HISTORY: ORDERING SYSTEM PROVIDED HISTORY: Shortness of breath TECHNOLOGIST PROVIDED HISTORY: Reason for exam:->Shortness of breath FINDINGS: The lungs are without acute focal process. There is no effusion or pneumothorax. The cardiomediastinal silhouette is without acute process. The osseous structures are without acute process. No acute process. Assessment:    Principal Problem:    Alcohol induced acute pancreatitis without necrosis or infection  Active Problems:    HTN (hypertension)    Depression    Asthma    Alcohol abuse    Chronic obstructive pulmonary disease (HCC)    GERD (gastroesophageal reflux disease)  Resolved Problems:    * No resolved hospital problems. *      Plan:  1. Pancreatitis due to ETOH use - IV hydration, NSAID, clear liquid diet. Trend labs and treat accordingly. Prn analgesics, antiemetics. 2.  T2DM - Diabetic diet. Glucose POCT. SS insulin scale. F/u HgbA1C if not done within the last 3 months. 3.  HTN - Continue meds. Monitor vitals and adjust accordingly  4. COPD - continue nebs. 5. Polysubstance abuse    DVT Prophylaxis - Lovenox    NOTE: This report was transcribed using voice recognition software. Every effort was made to ensure accuracy; however, inadvertent computerized transcription errors may be present.   Electronically signed by Mustapha Tijerina MD on 2/14/2022 at 10:42 AM

## 2022-02-14 NOTE — PLAN OF CARE
Problem: Activity:  Goal: Risk for activity intolerance will decrease  Description: Risk for activity intolerance will decrease  Outcome: Met This Shift     Problem:  Bowel/Gastric:  Goal: Bowel function will improve  Description: Bowel function will improve  Outcome: Met This Shift  Goal: Diagnostic test results will improve  Description: Diagnostic test results will improve  Outcome: Met This Shift  Goal: Occurrences of nausea will decrease  Description: Occurrences of nausea will decrease  Outcome: Met This Shift  Goal: Occurrences of vomiting will decrease  Description: Occurrences of vomiting will decrease  Outcome: Met This Shift     Problem: Fluid Volume:  Goal: Maintenance of adequate hydration will improve  Description: Maintenance of adequate hydration will improve  Outcome: Met This Shift     Problem: Health Behavior:  Goal: Ability to state signs and symptoms to report to health care provider will improve  Description: Ability to state signs and symptoms to report to health care provider will improve  Outcome: Met This Shift     Problem: Physical Regulation:  Goal: Complications related to the disease process, condition or treatment will be avoided or minimized  Description: Complications related to the disease process, condition or treatment will be avoided or minimized  Outcome: Met This Shift  Goal: Ability to maintain clinical measurements within normal limits will improve  Description: Ability to maintain clinical measurements within normal limits will improve  Outcome: Met This Shift     Problem: Sensory:  Goal: Ability to identify factors that increase the pain will improve  Description: Ability to identify factors that increase the pain will improve  Outcome: Met This Shift  Goal: Ability to notify healthcare provider of pain before it becomes unmanageable or unbearable will improve  Description: Ability to notify healthcare provider of pain before it becomes unmanageable or unbearable will improve  Outcome: Met This Shift  Goal: Pain level will decrease  Description: Pain level will decrease  Outcome: Met This Shift     Problem: Pain:  Goal: Pain level will decrease  Description: Pain level will decrease  Outcome: Met This Shift  Goal: Control of acute pain  Description: Control of acute pain  Outcome: Met This Shift  Goal: Control of chronic pain  Description: Control of chronic pain  Outcome: Met This Shift

## 2022-02-15 LAB
ALBUMIN SERPL-MCNC: 3.8 G/DL (ref 3.5–5.2)
ALP BLD-CCNC: 70 U/L (ref 40–129)
ALT SERPL-CCNC: 32 U/L (ref 0–40)
ANION GAP SERPL CALCULATED.3IONS-SCNC: 12 MMOL/L (ref 7–16)
AST SERPL-CCNC: 21 U/L (ref 0–39)
BASOPHILS ABSOLUTE: 0.02 E9/L (ref 0–0.2)
BASOPHILS RELATIVE PERCENT: 0.2 % (ref 0–2)
BILIRUB SERPL-MCNC: 1 MG/DL (ref 0–1.2)
BUN BLDV-MCNC: 6 MG/DL (ref 6–20)
CALCIUM SERPL-MCNC: 8.8 MG/DL (ref 8.6–10.2)
CHLORIDE BLD-SCNC: 94 MMOL/L (ref 98–107)
CO2: 30 MMOL/L (ref 22–29)
CREAT SERPL-MCNC: 0.8 MG/DL (ref 0.7–1.2)
EOSINOPHILS ABSOLUTE: 0.03 E9/L (ref 0.05–0.5)
EOSINOPHILS RELATIVE PERCENT: 0.4 % (ref 0–6)
GFR AFRICAN AMERICAN: >60
GFR NON-AFRICAN AMERICAN: >60 ML/MIN/1.73
GLUCOSE BLD-MCNC: 114 MG/DL (ref 74–99)
HCT VFR BLD CALC: 48.4 % (ref 37–54)
HEMOGLOBIN: 15 G/DL (ref 12.5–16.5)
IMMATURE GRANULOCYTES #: 0.04 E9/L
IMMATURE GRANULOCYTES %: 0.5 % (ref 0–5)
LIPASE: 127 U/L (ref 13–60)
LYMPHOCYTES ABSOLUTE: 1.73 E9/L (ref 1.5–4)
LYMPHOCYTES RELATIVE PERCENT: 20.4 % (ref 20–42)
MCH RBC QN AUTO: 28.5 PG (ref 26–35)
MCHC RBC AUTO-ENTMCNC: 31 % (ref 32–34.5)
MCV RBC AUTO: 92 FL (ref 80–99.9)
MONOCYTES ABSOLUTE: 0.79 E9/L (ref 0.1–0.95)
MONOCYTES RELATIVE PERCENT: 9.3 % (ref 2–12)
NEUTROPHILS ABSOLUTE: 5.88 E9/L (ref 1.8–7.3)
NEUTROPHILS RELATIVE PERCENT: 69.2 % (ref 43–80)
PDW BLD-RTO: 13.8 FL (ref 11.5–15)
PLATELET # BLD: 287 E9/L (ref 130–450)
PMV BLD AUTO: 10 FL (ref 7–12)
POTASSIUM REFLEX MAGNESIUM: 3.6 MMOL/L (ref 3.5–5)
RBC # BLD: 5.26 E12/L (ref 3.8–5.8)
SODIUM BLD-SCNC: 136 MMOL/L (ref 132–146)
TOTAL PROTEIN: 7.1 G/DL (ref 6.4–8.3)
WBC # BLD: 8.5 E9/L (ref 4.5–11.5)

## 2022-02-15 PROCEDURE — 6360000002 HC RX W HCPCS: Performed by: STUDENT IN AN ORGANIZED HEALTH CARE EDUCATION/TRAINING PROGRAM

## 2022-02-15 PROCEDURE — 6360000002 HC RX W HCPCS: Performed by: NURSE PRACTITIONER

## 2022-02-15 PROCEDURE — C9113 INJ PANTOPRAZOLE SODIUM, VIA: HCPCS | Performed by: NURSE PRACTITIONER

## 2022-02-15 PROCEDURE — 99232 SBSQ HOSP IP/OBS MODERATE 35: CPT | Performed by: STUDENT IN AN ORGANIZED HEALTH CARE EDUCATION/TRAINING PROGRAM

## 2022-02-15 PROCEDURE — 6370000000 HC RX 637 (ALT 250 FOR IP): Performed by: STUDENT IN AN ORGANIZED HEALTH CARE EDUCATION/TRAINING PROGRAM

## 2022-02-15 PROCEDURE — 2700000000 HC OXYGEN THERAPY PER DAY

## 2022-02-15 PROCEDURE — 6370000000 HC RX 637 (ALT 250 FOR IP): Performed by: NURSE PRACTITIONER

## 2022-02-15 PROCEDURE — 6360000002 HC RX W HCPCS: Performed by: FAMILY MEDICINE

## 2022-02-15 PROCEDURE — 36415 COLL VENOUS BLD VENIPUNCTURE: CPT

## 2022-02-15 PROCEDURE — 83690 ASSAY OF LIPASE: CPT

## 2022-02-15 PROCEDURE — 94640 AIRWAY INHALATION TREATMENT: CPT

## 2022-02-15 PROCEDURE — 2580000003 HC RX 258: Performed by: FAMILY MEDICINE

## 2022-02-15 PROCEDURE — 85025 COMPLETE CBC W/AUTO DIFF WBC: CPT

## 2022-02-15 PROCEDURE — 80053 COMPREHEN METABOLIC PANEL: CPT

## 2022-02-15 PROCEDURE — 2580000003 HC RX 258: Performed by: NURSE PRACTITIONER

## 2022-02-15 PROCEDURE — 1200000000 HC SEMI PRIVATE

## 2022-02-15 PROCEDURE — 6370000000 HC RX 637 (ALT 250 FOR IP): Performed by: FAMILY MEDICINE

## 2022-02-15 RX ORDER — MORPHINE SULFATE 2 MG/ML
1 INJECTION, SOLUTION INTRAMUSCULAR; INTRAVENOUS EVERY 4 HOURS PRN
Status: DISCONTINUED | OUTPATIENT
Start: 2022-02-15 | End: 2022-02-16 | Stop reason: HOSPADM

## 2022-02-15 RX ORDER — SENNA AND DOCUSATE SODIUM 50; 8.6 MG/1; MG/1
2 TABLET, FILM COATED ORAL 2 TIMES DAILY
Status: DISCONTINUED | OUTPATIENT
Start: 2022-02-15 | End: 2022-02-16 | Stop reason: HOSPADM

## 2022-02-15 RX ADMIN — Medication 3 MG: at 22:14

## 2022-02-15 RX ADMIN — ENOXAPARIN SODIUM 30 MG: 100 INJECTION SUBCUTANEOUS at 08:20

## 2022-02-15 RX ADMIN — MORPHINE SULFATE 1 MG: 2 INJECTION, SOLUTION INTRAMUSCULAR; INTRAVENOUS at 14:52

## 2022-02-15 RX ADMIN — TAMSULOSIN HYDROCHLORIDE 0.4 MG: 0.4 CAPSULE ORAL at 08:21

## 2022-02-15 RX ADMIN — MORPHINE SULFATE 0.5 MG: 2 INJECTION, SOLUTION INTRAMUSCULAR; INTRAVENOUS at 07:13

## 2022-02-15 RX ADMIN — ARFORMOTEROL TARTRATE 15 MCG: 15 SOLUTION RESPIRATORY (INHALATION) at 18:21

## 2022-02-15 RX ADMIN — KETOROLAC TROMETHAMINE 30 MG: 30 INJECTION, SOLUTION INTRAMUSCULAR; INTRAVENOUS at 08:24

## 2022-02-15 RX ADMIN — ENOXAPARIN SODIUM 30 MG: 100 INJECTION SUBCUTANEOUS at 20:15

## 2022-02-15 RX ADMIN — ONDANSETRON 4 MG: 2 INJECTION INTRAMUSCULAR; INTRAVENOUS at 23:03

## 2022-02-15 RX ADMIN — ONDANSETRON 4 MG: 2 INJECTION INTRAMUSCULAR; INTRAVENOUS at 17:59

## 2022-02-15 RX ADMIN — MORPHINE SULFATE 0.5 MG: 2 INJECTION, SOLUTION INTRAMUSCULAR; INTRAVENOUS at 02:25

## 2022-02-15 RX ADMIN — MORPHINE SULFATE 0.5 MG: 2 INJECTION, SOLUTION INTRAMUSCULAR; INTRAVENOUS at 11:13

## 2022-02-15 RX ADMIN — DOCUSATE SODIUM 50 MG AND SENNOSIDES 8.6 MG 2 TABLET: 8.6; 5 TABLET, FILM COATED ORAL at 20:15

## 2022-02-15 RX ADMIN — ARFORMOTEROL TARTRATE 15 MCG: 15 SOLUTION RESPIRATORY (INHALATION) at 08:39

## 2022-02-15 RX ADMIN — KETOROLAC TROMETHAMINE 30 MG: 30 INJECTION, SOLUTION INTRAMUSCULAR; INTRAVENOUS at 02:10

## 2022-02-15 RX ADMIN — TAMSULOSIN HYDROCHLORIDE 0.4 MG: 0.4 CAPSULE ORAL at 20:15

## 2022-02-15 RX ADMIN — SODIUM CHLORIDE, PRESERVATIVE FREE 10 ML: 5 INJECTION INTRAVENOUS at 02:11

## 2022-02-15 RX ADMIN — PANTOPRAZOLE SODIUM 40 MG: 40 INJECTION, POWDER, FOR SOLUTION INTRAVENOUS at 08:21

## 2022-02-15 RX ADMIN — NICOTINE POLACRILEX 4 MG: 2 GUM, CHEWING BUCCAL at 11:10

## 2022-02-15 RX ADMIN — KETOROLAC TROMETHAMINE 30 MG: 30 INJECTION, SOLUTION INTRAMUSCULAR; INTRAVENOUS at 17:55

## 2022-02-15 RX ADMIN — AMLODIPINE BESYLATE 10 MG: 10 TABLET ORAL at 08:20

## 2022-02-15 RX ADMIN — ACETAMINOPHEN 650 MG: 325 TABLET ORAL at 13:17

## 2022-02-15 RX ADMIN — MORPHINE SULFATE 1 MG: 2 INJECTION, SOLUTION INTRAMUSCULAR; INTRAVENOUS at 18:59

## 2022-02-15 RX ADMIN — POLYETHYLENE GLYCOL 3350 17 G: 17 POWDER, FOR SOLUTION ORAL at 08:21

## 2022-02-15 RX ADMIN — ZOLPIDEM TARTRATE 5 MG: 5 TABLET ORAL at 22:14

## 2022-02-15 RX ADMIN — MORPHINE SULFATE 1 MG: 2 INJECTION, SOLUTION INTRAMUSCULAR; INTRAVENOUS at 23:03

## 2022-02-15 RX ADMIN — SODIUM CHLORIDE, PRESERVATIVE FREE 10 ML: 5 INJECTION INTRAVENOUS at 08:21

## 2022-02-15 ASSESSMENT — PAIN SCALES - GENERAL
PAINLEVEL_OUTOF10: 5
PAINLEVEL_OUTOF10: 4
PAINLEVEL_OUTOF10: 4
PAINLEVEL_OUTOF10: 7
PAINLEVEL_OUTOF10: 4
PAINLEVEL_OUTOF10: 1
PAINLEVEL_OUTOF10: 4
PAINLEVEL_OUTOF10: 5
PAINLEVEL_OUTOF10: 7
PAINLEVEL_OUTOF10: 5
PAINLEVEL_OUTOF10: 1
PAINLEVEL_OUTOF10: 0
PAINLEVEL_OUTOF10: 4

## 2022-02-15 NOTE — PROGRESS NOTES
Pt c/o constant 10/10 pain and requested nurse to call MD/NP to increase dosage. Nurse spoke Funmilayo Nolan NP and she gave verbal orders to increase frequency to Q4 prn. Pt is aware and voiced an understanding.

## 2022-02-15 NOTE — PROGRESS NOTES
P Quality Flow/Interdisciplinary Rounds Progress Note        Quality Flow Rounds held on February 15, 2022    Disciplines Attending:  Bedside Nurse, ,  and Nursing Unit Leadership    Dewey Valiente was admitted on 2/13/2022  3:26 AM    Anticipated Discharge Date:       Disposition:    Jaycob Score:  Jaycob Scale Score: 22    Readmission Risk              Risk of Unplanned Readmission:  12           Discussed patient goal for the day, patient clinical progression, and barriers to discharge.   The following Goal(s) of the Day/Commitment(s) have been identified:  Discharge [address] [place]  February 15, 2022

## 2022-02-15 NOTE — PROGRESS NOTES
[facility] Progress Note    Admitting Date and Time: 2/13/2022  3:26 AM  Admit Dx: Alcohol induced acute pancreatitis without necrosis or infection [K85.20]    Subjective:  Patient is being followed for Alcohol induced acute pancreatitis without necrosis or infection [K85.20]   Pt feels  abdominal pain slightly improved from yesterday, constipation,  also wants his diet be progressed from clears to regular. Per RN: Patient c/o pain. ROS: denies fever, chills, cp, sob, n/v, HA unless stated above.       senna-docusate  2 tablet Oral BID    tamsulosin  0.4 mg Oral BID    polyethylene glycol  17 g Oral Daily    sodium chloride flush  5-40 mL IntraVENous 2 times per day    enoxaparin  30 mg SubCUTAneous BID    amLODIPine  10 mg Oral Daily    pantoprazole  40 mg IntraVENous Daily    And    sodium chloride (PF)  10 mL IntraVENous Daily    Arformoterol Tartrate  15 mcg Nebulization BID    Or    budesonide  0.5 mg Nebulization BID     morphine, 1 mg, Q4H PRN  sodium chloride flush, 5-40 mL, PRN  sodium chloride, 25 mL, PRN  acetaminophen, 650 mg, Q4H PRN  ondansetron, 4 mg, Q8H PRN   Or  ondansetron, 4 mg, Q6H PRN  ketorolac, 30 mg, Q6H PRN  nicotine polacrilex, 4 mg, Q2H PRN  zolpidem, 5 mg, Nightly PRN  melatonin, 3 mg, Nightly PRN       Objective:    BP (!) 140/82   Pulse 102   Temp 97.5 °F (36.4 °C) (Oral)   Resp 18   Ht 5' 9\" (1.753 m)   Wt 262 lb (118.8 kg)   SpO2 96%   BMI 38.69 kg/m²     General Appearance: alert and oriented to person, place and time and in no acute distress  Skin: warm and dry  Head: normocephalic and atraumatic  Eyes: pupils equal, round, and reactive to light, extraocular eye movements intact, conjunctivae normal  Neck: neck supple and non tender without mass   Pulmonary/Chest: clear to auscultation bilaterally- no wheezes, rales or rhonchi, normal air movement, no respiratory distress  Cardiovascular: normal rate, normal S1 and S2 and no carotid The pancreatic duct is not dilated. Adrenal glands: Normal in appearance. Kidneys: No evidence of hydronephrosis. No abnormal renal lesions. Spleen: No enhancing lesions. Bowel: No evidence of bowel obstruction. Appendix is normal appearance. Peritoneum/Retroperitoneum: No abnormal pelvic lymphadenopathy. Pelvis:   Bladder is unremarkable. Bones/Soft tissues:   No aggressive osseous lesion     Finding consistent with acute pancreatitis. No peripancreatic fluid collections or pancreatic necrosis. XR CHEST PORTABLE    Result Date: 2/13/2022  EXAMINATION: ONE XRAY VIEW OF THE CHEST 2/13/2022 4:44 am COMPARISON: 11/17/2020 HISTORY: ORDERING SYSTEM PROVIDED HISTORY: Shortness of breath TECHNOLOGIST PROVIDED HISTORY: Reason for exam:->Shortness of breath FINDINGS: The lungs are without acute focal process. There is no effusion or pneumothorax. The cardiomediastinal silhouette is without acute process. The osseous structures are without acute process. No acute process. Assessment:    Principal Problem:    Alcohol induced acute pancreatitis without necrosis or infection  Active Problems:    HTN (hypertension)    Depression    Asthma    Alcohol abuse    Chronic obstructive pulmonary disease (HCC)    GERD (gastroesophageal reflux disease)  Resolved Problems:    * No resolved hospital problems. *      Plan:  1. Pancreatitis due to ETOH use - IV hydration, NSAID, clear liquid diet advanced to soft diet. Trend labs and treat accordingly. Prn analgesics, antiemetics. 2.  T2DM - Diabetic diet. Glucose POCT. SS insulin scale. 3.  HTN - Continue meds. Monitor vitals and adjust accordingly  4. COPD - continue nebs. 5. Polysubstance abuse  6. Constipation  - prn laxatives    DVT Prophylaxis - Lovenox    NOTE: This report was transcribed using voice recognition software. Every effort was made to ensure accuracy; however, inadvertent computerized transcription errors may be present.   Electronically signed by Anton Diggs MD on 2/15/2022 at 3:20 PM

## 2022-02-16 VITALS
DIASTOLIC BLOOD PRESSURE: 89 MMHG | BODY MASS INDEX: 38.8 KG/M2 | TEMPERATURE: 97.9 F | SYSTOLIC BLOOD PRESSURE: 151 MMHG | HEIGHT: 69 IN | OXYGEN SATURATION: 93 % | RESPIRATION RATE: 16 BRPM | WEIGHT: 262 LBS | HEART RATE: 92 BPM

## 2022-02-16 LAB
ALBUMIN SERPL-MCNC: 3.7 G/DL (ref 3.5–5.2)
ALP BLD-CCNC: 78 U/L (ref 40–129)
ALT SERPL-CCNC: 28 U/L (ref 0–40)
ANION GAP SERPL CALCULATED.3IONS-SCNC: 10 MMOL/L (ref 7–16)
AST SERPL-CCNC: 19 U/L (ref 0–39)
BASOPHILS ABSOLUTE: 0.02 E9/L (ref 0–0.2)
BASOPHILS RELATIVE PERCENT: 0.3 % (ref 0–2)
BILIRUB SERPL-MCNC: 0.7 MG/DL (ref 0–1.2)
BUN BLDV-MCNC: 12 MG/DL (ref 6–20)
CALCIUM SERPL-MCNC: 8.9 MG/DL (ref 8.6–10.2)
CHLORIDE BLD-SCNC: 96 MMOL/L (ref 98–107)
CO2: 29 MMOL/L (ref 22–29)
CREAT SERPL-MCNC: 0.9 MG/DL (ref 0.7–1.2)
EOSINOPHILS ABSOLUTE: 0.08 E9/L (ref 0.05–0.5)
EOSINOPHILS RELATIVE PERCENT: 1.1 % (ref 0–6)
GFR AFRICAN AMERICAN: >60
GFR NON-AFRICAN AMERICAN: >60 ML/MIN/1.73
GLUCOSE BLD-MCNC: 157 MG/DL (ref 74–99)
HCT VFR BLD CALC: 46.1 % (ref 37–54)
HEMOGLOBIN: 14.4 G/DL (ref 12.5–16.5)
IMMATURE GRANULOCYTES #: 0.04 E9/L
IMMATURE GRANULOCYTES %: 0.5 % (ref 0–5)
LIPASE: 205 U/L (ref 13–60)
LYMPHOCYTES ABSOLUTE: 1.93 E9/L (ref 1.5–4)
LYMPHOCYTES RELATIVE PERCENT: 25.6 % (ref 20–42)
MCH RBC QN AUTO: 28.1 PG (ref 26–35)
MCHC RBC AUTO-ENTMCNC: 31.2 % (ref 32–34.5)
MCV RBC AUTO: 89.9 FL (ref 80–99.9)
MONOCYTES ABSOLUTE: 0.65 E9/L (ref 0.1–0.95)
MONOCYTES RELATIVE PERCENT: 8.6 % (ref 2–12)
NEUTROPHILS ABSOLUTE: 4.82 E9/L (ref 1.8–7.3)
NEUTROPHILS RELATIVE PERCENT: 63.9 % (ref 43–80)
PDW BLD-RTO: 13.5 FL (ref 11.5–15)
PLATELET # BLD: 264 E9/L (ref 130–450)
PMV BLD AUTO: 9.5 FL (ref 7–12)
POTASSIUM REFLEX MAGNESIUM: 3.7 MMOL/L (ref 3.5–5)
RBC # BLD: 5.13 E12/L (ref 3.8–5.8)
SODIUM BLD-SCNC: 135 MMOL/L (ref 132–146)
TOTAL PROTEIN: 6.8 G/DL (ref 6.4–8.3)
WBC # BLD: 7.5 E9/L (ref 4.5–11.5)

## 2022-02-16 PROCEDURE — 6370000000 HC RX 637 (ALT 250 FOR IP): Performed by: FAMILY MEDICINE

## 2022-02-16 PROCEDURE — 6370000000 HC RX 637 (ALT 250 FOR IP): Performed by: NURSE PRACTITIONER

## 2022-02-16 PROCEDURE — 2580000003 HC RX 258: Performed by: FAMILY MEDICINE

## 2022-02-16 PROCEDURE — 80053 COMPREHEN METABOLIC PANEL: CPT

## 2022-02-16 PROCEDURE — C9113 INJ PANTOPRAZOLE SODIUM, VIA: HCPCS | Performed by: NURSE PRACTITIONER

## 2022-02-16 PROCEDURE — 6360000002 HC RX W HCPCS: Performed by: NURSE PRACTITIONER

## 2022-02-16 PROCEDURE — 6360000002 HC RX W HCPCS: Performed by: STUDENT IN AN ORGANIZED HEALTH CARE EDUCATION/TRAINING PROGRAM

## 2022-02-16 PROCEDURE — 36415 COLL VENOUS BLD VENIPUNCTURE: CPT

## 2022-02-16 PROCEDURE — 94640 AIRWAY INHALATION TREATMENT: CPT

## 2022-02-16 PROCEDURE — 85025 COMPLETE CBC W/AUTO DIFF WBC: CPT

## 2022-02-16 PROCEDURE — 6370000000 HC RX 637 (ALT 250 FOR IP): Performed by: STUDENT IN AN ORGANIZED HEALTH CARE EDUCATION/TRAINING PROGRAM

## 2022-02-16 PROCEDURE — 83690 ASSAY OF LIPASE: CPT

## 2022-02-16 PROCEDURE — 99239 HOSP IP/OBS DSCHRG MGMT >30: CPT | Performed by: STUDENT IN AN ORGANIZED HEALTH CARE EDUCATION/TRAINING PROGRAM

## 2022-02-16 PROCEDURE — 6360000002 HC RX W HCPCS: Performed by: FAMILY MEDICINE

## 2022-02-16 RX ORDER — ONDANSETRON 4 MG/1
4 TABLET, ORALLY DISINTEGRATING ORAL 3 TIMES DAILY PRN
Qty: 21 TABLET | Refills: 0 | Status: SHIPPED | OUTPATIENT
Start: 2022-02-16 | End: 2022-06-02

## 2022-02-16 RX ORDER — KETOROLAC TROMETHAMINE 10 MG/1
10 TABLET, FILM COATED ORAL EVERY 6 HOURS PRN
Qty: 10 TABLET | Refills: 0 | Status: SHIPPED | OUTPATIENT
Start: 2022-02-16 | End: 2022-06-02

## 2022-02-16 RX ORDER — SENNA AND DOCUSATE SODIUM 50; 8.6 MG/1; MG/1
2 TABLET, FILM COATED ORAL 2 TIMES DAILY
Qty: 28 TABLET | Refills: 0 | Status: SHIPPED | OUTPATIENT
Start: 2022-02-16 | End: 2022-02-23

## 2022-02-16 RX ORDER — OXYCODONE HYDROCHLORIDE AND ACETAMINOPHEN 5; 325 MG/1; MG/1
1 TABLET ORAL EVERY 6 HOURS PRN
Qty: 10 TABLET | Refills: 0 | Status: SHIPPED | OUTPATIENT
Start: 2022-02-16 | End: 2022-02-16 | Stop reason: SDUPTHER

## 2022-02-16 RX ORDER — KETOROLAC TROMETHAMINE 10 MG/1
10 TABLET, FILM COATED ORAL EVERY 6 HOURS PRN
Qty: 10 TABLET | Refills: 0 | Status: SHIPPED | OUTPATIENT
Start: 2022-02-16 | End: 2022-02-16 | Stop reason: SDUPTHER

## 2022-02-16 RX ORDER — OXYCODONE HYDROCHLORIDE AND ACETAMINOPHEN 5; 325 MG/1; MG/1
1 TABLET ORAL EVERY 6 HOURS PRN
Qty: 10 TABLET | Refills: 0 | Status: SHIPPED | OUTPATIENT
Start: 2022-02-16 | End: 2022-02-19

## 2022-02-16 RX ORDER — SENNA AND DOCUSATE SODIUM 50; 8.6 MG/1; MG/1
2 TABLET, FILM COATED ORAL 2 TIMES DAILY
Qty: 28 TABLET | Refills: 0 | Status: SHIPPED | OUTPATIENT
Start: 2022-02-16 | End: 2022-02-16

## 2022-02-16 RX ADMIN — KETOROLAC TROMETHAMINE 30 MG: 30 INJECTION, SOLUTION INTRAMUSCULAR; INTRAVENOUS at 11:06

## 2022-02-16 RX ADMIN — POLYETHYLENE GLYCOL 3350 17 G: 17 POWDER, FOR SOLUTION ORAL at 10:14

## 2022-02-16 RX ADMIN — KETOROLAC TROMETHAMINE 30 MG: 30 INJECTION, SOLUTION INTRAMUSCULAR; INTRAVENOUS at 00:23

## 2022-02-16 RX ADMIN — TAMSULOSIN HYDROCHLORIDE 0.4 MG: 0.4 CAPSULE ORAL at 10:13

## 2022-02-16 RX ADMIN — ACETAMINOPHEN 650 MG: 325 TABLET ORAL at 10:46

## 2022-02-16 RX ADMIN — DOCUSATE SODIUM 50 MG AND SENNOSIDES 8.6 MG 2 TABLET: 8.6; 5 TABLET, FILM COATED ORAL at 10:13

## 2022-02-16 RX ADMIN — SODIUM CHLORIDE, PRESERVATIVE FREE 10 ML: 5 INJECTION INTRAVENOUS at 10:14

## 2022-02-16 RX ADMIN — PANTOPRAZOLE SODIUM 40 MG: 40 INJECTION, POWDER, FOR SOLUTION INTRAVENOUS at 10:14

## 2022-02-16 RX ADMIN — ARFORMOTEROL TARTRATE 15 MCG: 15 SOLUTION RESPIRATORY (INHALATION) at 09:26

## 2022-02-16 RX ADMIN — ONDANSETRON 4 MG: 4 TABLET, ORALLY DISINTEGRATING ORAL at 08:01

## 2022-02-16 RX ADMIN — AMLODIPINE BESYLATE 10 MG: 10 TABLET ORAL at 10:13

## 2022-02-16 RX ADMIN — ENOXAPARIN SODIUM 30 MG: 100 INJECTION SUBCUTANEOUS at 10:13

## 2022-02-16 RX ADMIN — SODIUM CHLORIDE, PRESERVATIVE FREE 10 ML: 5 INJECTION INTRAVENOUS at 08:58

## 2022-02-16 RX ADMIN — MORPHINE SULFATE 1 MG: 2 INJECTION, SOLUTION INTRAMUSCULAR; INTRAVENOUS at 11:05

## 2022-02-16 RX ADMIN — MORPHINE SULFATE 1 MG: 2 INJECTION, SOLUTION INTRAMUSCULAR; INTRAVENOUS at 04:35

## 2022-02-16 ASSESSMENT — PAIN DESCRIPTION - DESCRIPTORS: DESCRIPTORS: CRAMPING

## 2022-02-16 ASSESSMENT — PAIN SCALES - GENERAL
PAINLEVEL_OUTOF10: 3
PAINLEVEL_OUTOF10: 0
PAINLEVEL_OUTOF10: 0
PAINLEVEL_OUTOF10: 5

## 2022-02-16 NOTE — CARE COORDINATION
Met with patient about diagnosis and discharge plan of care. Pt admit for acute pancreatitis. Pt lives alone in apt, independent prior to admit. Gave information on AA and also called Peer Recovery services. Pt needs ride at home. Call [facility] transportation at [phone] and they have arranged to  patient. Pt will  scripts at [address] in [facility].  Discharge home-nishant

## 2022-02-16 NOTE — CARE COORDINATION
Peer Recovery Support Note    Name: Mickey Osman  Date: 2/16/2022    Chief Complaint   Patient presents with    Abdominal Pain     right side, hx of pancreatitis    Shortness of Breath       Peer Support met with patient. [x] Support and education provided  [x] Resources provided   [] Treatment referral:   [] Other:   [] Patient declined peer recovery services     Referred By: Cecil Jackson. Notes: Patient has been involved with AA in the past. Peer gave patient  updated AA meetings booklet for all the surrounding counties.    Niko Hanna, 2/16/2022

## 2022-02-16 NOTE — PLAN OF CARE
Problem: Activity:  Goal: Risk for activity intolerance will decrease  Description: Risk for activity intolerance will decrease  Outcome: Met This Shift     Problem:  Bowel/Gastric:  Goal: Bowel function will improve  Description: Bowel function will improve  Outcome: Met This Shift     Problem: Fluid Volume:  Goal: Maintenance of adequate hydration will improve  Description: Maintenance of adequate hydration will improve  Outcome: Met This Shift     Problem: Sensory:  Goal: Pain level will decrease  Description: Pain level will decrease  Outcome: Met This Shift     Problem: Pain:  Goal: Pain level will decrease  Description: Pain level will decrease  Outcome: Met This Shift

## 2022-02-16 NOTE — DISCHARGE SUMMARY
[facility] Physician Discharge Summary       No follow-up provider specified. Activity level: As tolerated     Dispo: Home     Condition on discharge: Stable    Patient ID:  Ness Cano  [id number]  57 y.o.  1970    Admit date: 2/13/2022    Discharge date and time:  2/16/2022  12:04 PM    Admission Diagnoses: Principal Problem:    Alcohol induced acute pancreatitis without necrosis or infection  Active Problems:    HTN (hypertension)    Depression    Asthma    Alcohol abuse    Chronic obstructive pulmonary disease (HCC)    GERD (gastroesophageal reflux disease)  Resolved Problems:    * No resolved hospital problems. *      Discharge Diagnoses: Principal Problem:    Alcohol induced acute pancreatitis without necrosis or infection  Active Problems:    HTN (hypertension)    Depression    Asthma    Alcohol abuse    Chronic obstructive pulmonary disease (HCC)    GERD (gastroesophageal reflux disease)  Resolved Problems:    * No resolved hospital problems. *      Consults:  IP CONSULT TO DIETITIAN  IP CONSULT TO SOCIAL WORK  IP CONSULT TO [employer] [facility]:   Patient Ness Cano is a 45 y.o. presented with Alcohol induced acute pancreatitis without necrosis or infection [K85.20]  Patient was admitted & managed for Pancreatitis due to ETOH use - IV hydration, NSAID, clear liquid diet advanced to soft, low fat diet. Prn analgesics, antiemetics.     Discharge Exam:  General Appearance: alert and oriented to person, place and time and in no acute distress  Skin: warm and dry  Head: normocephalic and atraumatic  Eyes: pupils equal, round, and reactive to light, extraocular eye movements intact, conjunctivae normal  Neck: neck supple and non tender without mass   Pulmonary/Chest: clear to auscultation bilaterally- no wheezes, rales or rhonchi, normal air movement, no respiratory distress  Cardiovascular: normal rate, normal S1 and S2 and no carotid bruits  Abdomen: soft, non-tender, non-distended, normal bowel sounds, no masses or organomegaly  Extremities: no cyanosis, no clubbing and no edema  Neurologic: no cranial nerve deficit and speech normal    I/O last 3 completed shifts:  In: -   Out: 3400 [Urine:3400]  I/O this shift:  In: 240 [P.O.:240]  Out: -       LABS:  Recent Labs     02/14/22  0625 02/15/22  0408 02/16/22  0447    136 135   K 4.1 3.6 3.7   CL 97* 94* 96*   CO2 29 30* 29   BUN 7 6 12   CREATININE 0.7 0.8 0.9   GLUCOSE 142* 114* 157*   CALCIUM 8.8 8.8 8.9       Recent Labs     02/14/22  0625 02/15/22  0408 02/16/22  0447   WBC 10.2 8.5 7.5   RBC 5.45 5.26 5.13   HGB 15.2 15.0 14.4   HCT 48.7 48.4 46.1   MCV 89.4 92.0 89.9   MCH 27.9 28.5 28.1   MCHC 31.2* 31.0* 31.2*   RDW 13.7 13.8 13.5    287 264   MPV 9.6 10.0 9.5       No results for input(s): POCGLU in the last 72 hours. Imaging:  CT ABDOMEN PELVIS W IV CONTRAST Additional Contrast? None    Result Date: 2/13/2022  EXAMINATION: CT OF THE ABDOMEN AND PELVIS WITH CONTRAST 2/13/2022 8:27 am TECHNIQUE: CT of the abdomen and pelvis was performed with the administration of intravenous contrast. Multiplanar reformatted images are provided for review. Dose modulation, iterative reconstruction, and/or weight based adjustment of the mA/kV was utilized to reduce the radiation dose to as low as reasonably achievable. COMPARISON: None. HISTORY: ORDERING SYSTEM PROVIDED HISTORY: concern for pancreatitis TECHNOLOGIST PROVIDED HISTORY: Reason for exam:->concern for pancreatitis Additional Contrast?->None Decision Support Exception - unselect if not a suspected or confirmed emergency medical condition->Emergency Medical Condition (MA) FINDINGS: Lung Bases: Bilateral compressive atelectasis. Liver: No hepatic lesions identified. Bile ducts:  Gallbladder is unremarkable. No evidence of intrahepatic or extrahepatic biliary dilatation. Pancreas:  There is peripancreatic fat stranding throughout the pancreas, most prominent in the body and tail. No peripancreatic fluid collection. No evidence of pancreatic necrosis. The pancreatic duct is not dilated. Adrenal glands: Normal in appearance. Kidneys: No evidence of hydronephrosis. No abnormal renal lesions. Spleen: No enhancing lesions. Bowel: No evidence of bowel obstruction. Appendix is normal appearance. Peritoneum/Retroperitoneum: No abnormal pelvic lymphadenopathy. Pelvis:   Bladder is unremarkable. Bones/Soft tissues:   No aggressive osseous lesion     Finding consistent with acute pancreatitis. No peripancreatic fluid collections or pancreatic necrosis. XR CHEST PORTABLE    Result Date: 2/13/2022  EXAMINATION: ONE XRAY VIEW OF THE CHEST 2/13/2022 4:44 am COMPARISON: 11/17/2020 HISTORY: ORDERING SYSTEM PROVIDED HISTORY: Shortness of breath TECHNOLOGIST PROVIDED HISTORY: Reason for exam:->Shortness of breath FINDINGS: The lungs are without acute focal process. There is no effusion or pneumothorax. The cardiomediastinal silhouette is without acute process. The osseous structures are without acute process. No acute process. Patient Instructions:      Medication List      START taking these medications    ketorolac 10 MG tablet  Commonly known as: TORADOL  Take 1 tablet by mouth every 6 hours as needed for Pain     oxyCODONE-acetaminophen 5-325 MG per tablet  Commonly known as: Percocet  Take 1 tablet by mouth every 6 hours as needed for Pain for up to 3 days. Intended supply: 3 days. Take lowest dose possible to manage pain     sennosides-docusate sodium 8.6-50 MG tablet  Commonly known as: SENOKOT-S  Take 2 tablets by mouth 2 times daily for 7 days        CONTINUE taking these medications    amLODIPine 10 MG tablet  Commonly known as: Norvasc  Take 1 tablet by mouth daily. aspirin 81 MG chewable tablet  Take 1 tablet by mouth daily     Full Kit Nebulizer Set Misc  Use as directed with nebulized medication.      ipratropium-albuterol 0.5-2.5 (3) MG/3ML Soln nebulizer solution  Commonly known as: DUONEB  Inhale 3 mLs into the lungs every 4 hours as needed for Shortness of Breath     melatonin 3 MG Tabs tablet     pantoprazole 40 MG tablet  Commonly known as: PROTONIX  Take 1 tablet by mouth daily     tamsulosin 0.4 MG capsule  Commonly known as: FLOMAX     Trelegy Ellipta 200-62.5-25 MCG/INH Aepb  Generic drug: Fluticasone-Umeclidin-Vilant     zolpidem 10 MG tablet  Commonly known as: AMBIEN        STOP taking these medications    traMADol 50 MG tablet  Commonly known as: ULTRAM     Vistaril 25 MG capsule  Generic drug: hydrOXYzine           Where to Get Your Medications      These medications were sent to [facility], [address] [place] [address], [address] [place] [address]    Phone: [phone]   · ketorolac 10 MG tablet  · oxyCODONE-acetaminophen 5-325 MG per tablet  · sennosides-docusate sodium 8.6-50 MG tablet           Note that more than 30 minutes was spent in preparing discharge papers, discussing discharge with patient, medication review, etc.    Signed:  Electronically signed by Gracy Gao MD on 2/16/2022 at 12:04 PM

## 2022-02-19 ASSESSMENT — ENCOUNTER SYMPTOMS
NAUSEA: 1
DIARRHEA: 0
VOMITING: 1
BACK PAIN: 0
EYE PAIN: 0
SORE THROAT: 0
ABDOMINAL PAIN: 1
RHINORRHEA: 0
SINUS PAIN: 0
SHORTNESS OF BREATH: 0
COUGH: 0

## 2022-02-19 NOTE — ED PROVIDER NOTES
56-year-old male patient with past medical history of pancreatitis and alcohol abuse presents today to the emergency department with complaints of epigastric abdominal pain onset two days ago and worsening just prior to arrival.  Pain is described as a sharp sensation that radiates to his back. He reports associated nausea and vomiting. Symptoms are severe, constant and have been progressively getting worse. Patient states that symptoms are similar to previous acute pancreatitis attacks. Patient admits to drinking 1.5 L of vodka yesterday. He denies associated fevers or chills. Review of Systems   Constitutional: Negative for diaphoresis and fever. HENT: Negative for ear pain, rhinorrhea, sinus pain and sore throat. Eyes: Negative for pain. Respiratory: Negative for cough and shortness of breath. Cardiovascular: Negative for chest pain and leg swelling. Gastrointestinal: Positive for abdominal pain, nausea and vomiting. Negative for diarrhea. Genitourinary: Negative for dysuria, flank pain and frequency. Musculoskeletal: Negative for back pain, neck pain and neck stiffness. Neurological: Negative for dizziness, weakness, light-headedness and headaches. Psychiatric/Behavioral: Negative for agitation and confusion. Physical Exam  Vitals reviewed. Constitutional:       General: He is not in acute distress. Appearance: Normal appearance. He is not toxic-appearing. HENT:      Head: Normocephalic and atraumatic. Nose: No congestion or rhinorrhea. Eyes:      General: No scleral icterus. Right eye: No discharge. Left eye: No discharge. Extraocular Movements: Extraocular movements intact. Pupils: Pupils are equal, round, and reactive to light. Cardiovascular:      Rate and Rhythm: Normal rate and regular rhythm. Heart sounds: Normal heart sounds. No murmur heard. No friction rub. No gallop.     Pulmonary:      Effort: Pulmonary effort is normal. No respiratory distress. Breath sounds: No wheezing, rhonchi or rales. Abdominal:      General: Abdomen is flat. There is no distension. Palpations: Abdomen is soft. Tenderness: There is abdominal tenderness in the epigastric area. There is no guarding or rebound. Negative signs include Hernandez's sign and McBurney's sign. Hernia: No hernia is present. Musculoskeletal:         General: No deformity or signs of injury. Cervical back: Normal range of motion and neck supple. No rigidity or tenderness. Right lower leg: No edema. Left lower leg: No edema. Skin:     General: Skin is warm and dry. Coloration: Skin is not jaundiced or pale. Neurological:      General: No focal deficit present. Mental Status: He is alert. Psychiatric:         Mood and Affect: Mood normal.          Procedures     MDM  Number of Diagnoses or Management Options  Diagnosis management comments: This is a 78-year-old male patient with past medical history of acute alcohol induced pancreatitis who presents today to the emergency department with complaints of epigastric abdominal pain associated with nausea and vomiting consistent with prior episodes of acute pancreatitis. On arrival to the ED patient is nontoxic, no acute distress. Vital signs reviewed and within normal limits. On exam he has mild distention of his abdomen with tenderness to palpation of the epigastrium. Otherwise normal physical exam.Appropriate labs and imaging were ordered. Patient found to have elevated LFTs with an ALT of 57 and AST of 40. Glucose is elevated with no concern for DKA. Patient's lipase is elevated at 111. CT scan ordered at this time to evaluate for pancreatitis. Patient signed out to oncoming resident. Chart review shows that CT abdomen pelvis was ordered showing evidence of acute pancreatitis. Patient was given pain control as well as nausea control and started on IV fluids.   Given patients home medications have been reviewed.     Allergies: Dust mite extract    -------------------------------------------------- RESULTS -------------------------------------------------    LABS:  Results for orders placed or performed during the hospital encounter of 02/13/22   COVID-19, Rapid    Specimen: Nasopharyngeal Swab   Result Value Ref Range    SARS-CoV-2, NAAT Not Detected Not Detected   CBC Auto Differential   Result Value Ref Range    WBC 9.0 4.5 - 11.5 E9/L    RBC 5.46 3.80 - 5.80 E12/L    Hemoglobin 15.3 12.5 - 16.5 g/dL    Hematocrit 47.0 37.0 - 54.0 %    MCV 86.1 80.0 - 99.9 fL    MCH 28.0 26.0 - 35.0 pg    MCHC 32.6 32.0 - 34.5 %    RDW 13.5 11.5 - 15.0 fL    Platelets 710 461 - 232 E9/L    MPV 9.4 7.0 - 12.0 fL    Neutrophils % 77.1 43.0 - 80.0 %    Immature Granulocytes % 0.4 0.0 - 5.0 %    Lymphocytes % 12.6 (L) 20.0 - 42.0 %    Monocytes % 9.4 2.0 - 12.0 %    Eosinophils % 0.2 0.0 - 6.0 %    Basophils % 0.3 0.0 - 2.0 %    Neutrophils Absolute 6.91 1.80 - 7.30 E9/L    Immature Granulocytes # 0.04 E9/L    Lymphocytes Absolute 1.13 (L) 1.50 - 4.00 E9/L    Monocytes Absolute 0.84 0.10 - 0.95 E9/L    Eosinophils Absolute 0.02 (L) 0.05 - 0.50 E9/L    Basophils Absolute 0.03 0.00 - 0.20 E9/L   Comprehensive Metabolic Panel w/ Reflex to MG   Result Value Ref Range    Sodium 136 132 - 146 mmol/L    Potassium reflex Magnesium 3.5 3.5 - 5.0 mmol/L    Chloride 95 (L) 98 - 107 mmol/L    CO2 23 22 - 29 mmol/L    Anion Gap 18 (H) 7 - 16 mmol/L    Glucose 175 (H) 74 - 99 mg/dL    BUN 8 6 - 20 mg/dL    CREATININE 0.7 0.7 - 1.2 mg/dL    GFR Non-African American >60 >=60 mL/min/1.73    GFR African American >60     Calcium 8.4 (L) 8.6 - 10.2 mg/dL    Total Protein 7.1 6.4 - 8.3 g/dL    Albumin 4.1 3.5 - 5.2 g/dL    Total Bilirubin 1.3 (H) 0.0 - 1.2 mg/dL    Alkaline Phosphatase 79 40 - 129 U/L    ALT 57 (H) 0 - 40 U/L    AST 40 (H) 0 - 39 U/L   Lipase   Result Value Ref Range    Lipase 111 (H) 13 - 60 U/L Magnesium   Result Value Ref Range    Magnesium 1.7 1.6 - 2.6 mg/dL   Triglyceride   Result Value Ref Range    Triglycerides 120 0 - 149 mg/dL   Triglyceride   Result Value Ref Range    Triglycerides 171 (H) 0 - 149 mg/dL   Comprehensive Metabolic Panel w/ Reflex to MG   Result Value Ref Range    Sodium 137 132 - 146 mmol/L    Potassium reflex Magnesium 4.1 3.5 - 5.0 mmol/L    Chloride 97 (L) 98 - 107 mmol/L    CO2 29 22 - 29 mmol/L    Anion Gap 11 7 - 16 mmol/L    Glucose 142 (H) 74 - 99 mg/dL    BUN 7 6 - 20 mg/dL    CREATININE 0.7 0.7 - 1.2 mg/dL    GFR Non-African American >60 >=60 mL/min/1.73    GFR African American >60     Calcium 8.8 8.6 - 10.2 mg/dL    Total Protein 7.3 6.4 - 8.3 g/dL    Albumin 4.1 3.5 - 5.2 g/dL    Total Bilirubin 0.9 0.0 - 1.2 mg/dL    Alkaline Phosphatase 74 40 - 129 U/L    ALT 44 (H) 0 - 40 U/L    AST 22 0 - 39 U/L   CBC auto differential   Result Value Ref Range    WBC 10.2 4.5 - 11.5 E9/L    RBC 5.45 3.80 - 5.80 E12/L    Hemoglobin 15.2 12.5 - 16.5 g/dL    Hematocrit 48.7 37.0 - 54.0 %    MCV 89.4 80.0 - 99.9 fL    MCH 27.9 26.0 - 35.0 pg    MCHC 31.2 (L) 32.0 - 34.5 %    RDW 13.7 11.5 - 15.0 fL    Platelets 535 870 - 836 E9/L    MPV 9.6 7.0 - 12.0 fL    Neutrophils % 79.8 43.0 - 80.0 %    Immature Granulocytes % 0.5 0.0 - 5.0 %    Lymphocytes % 10.5 (L) 20.0 - 42.0 %    Monocytes % 9.1 2.0 - 12.0 %    Eosinophils % 0.0 0.0 - 6.0 %    Basophils % 0.1 0.0 - 2.0 %    Neutrophils Absolute 8.16 (H) 1.80 - 7.30 E9/L    Immature Granulocytes # 0.05 E9/L    Lymphocytes Absolute 1.07 (L) 1.50 - 4.00 E9/L    Monocytes Absolute 0.93 0.10 - 0.95 E9/L    Eosinophils Absolute 0.00 (L) 0.05 - 0.50 E9/L    Basophils Absolute 0.01 0.00 - 0.20 E9/L   Lipase   Result Value Ref Range    Lipase 114 (H) 13 - 60 U/L   Lactic acid, plasma   Result Value Ref Range    Lactic Acid 1.2 0.5 - 2.2 mmol/L   Hemoglobin A1c   Result Value Ref Range    Hemoglobin A1C 6.1 (H) 4.0 - 5.6 %   Comprehensive Metabolic Panel w/ Reflex to MG   Result Value Ref Range    Sodium 136 132 - 146 mmol/L    Potassium reflex Magnesium 3.6 3.5 - 5.0 mmol/L    Chloride 94 (L) 98 - 107 mmol/L    CO2 30 (H) 22 - 29 mmol/L    Anion Gap 12 7 - 16 mmol/L    Glucose 114 (H) 74 - 99 mg/dL    BUN 6 6 - 20 mg/dL    CREATININE 0.8 0.7 - 1.2 mg/dL    GFR Non-African American >60 >=60 mL/min/1.73    GFR African American >60     Calcium 8.8 8.6 - 10.2 mg/dL    Total Protein 7.1 6.4 - 8.3 g/dL    Albumin 3.8 3.5 - 5.2 g/dL    Total Bilirubin 1.0 0.0 - 1.2 mg/dL    Alkaline Phosphatase 70 40 - 129 U/L    ALT 32 0 - 40 U/L    AST 21 0 - 39 U/L   CBC auto differential   Result Value Ref Range    WBC 8.5 4.5 - 11.5 E9/L    RBC 5.26 3.80 - 5.80 E12/L    Hemoglobin 15.0 12.5 - 16.5 g/dL    Hematocrit 48.4 37.0 - 54.0 %    MCV 92.0 80.0 - 99.9 fL    MCH 28.5 26.0 - 35.0 pg    MCHC 31.0 (L) 32.0 - 34.5 %    RDW 13.8 11.5 - 15.0 fL    Platelets 695 320 - 151 E9/L    MPV 10.0 7.0 - 12.0 fL    Neutrophils % 69.2 43.0 - 80.0 %    Immature Granulocytes % 0.5 0.0 - 5.0 %    Lymphocytes % 20.4 20.0 - 42.0 %    Monocytes % 9.3 2.0 - 12.0 %    Eosinophils % 0.4 0.0 - 6.0 %    Basophils % 0.2 0.0 - 2.0 %    Neutrophils Absolute 5.88 1.80 - 7.30 E9/L    Immature Granulocytes # 0.04 E9/L    Lymphocytes Absolute 1.73 1.50 - 4.00 E9/L    Monocytes Absolute 0.79 0.10 - 0.95 E9/L    Eosinophils Absolute 0.03 (L) 0.05 - 0.50 E9/L    Basophils Absolute 0.02 0.00 - 0.20 E9/L   Lipase   Result Value Ref Range    Lipase 127 (H) 13 - 60 U/L   Lipase   Result Value Ref Range    Lipase 205 (H) 13 - 60 U/L   CBC auto differential   Result Value Ref Range    WBC 7.5 4.5 - 11.5 E9/L    RBC 5.13 3.80 - 5.80 E12/L    Hemoglobin 14.4 12.5 - 16.5 g/dL    Hematocrit 46.1 37.0 - 54.0 %    MCV 89.9 80.0 - 99.9 fL    MCH 28.1 26.0 - 35.0 pg    MCHC 31.2 (L) 32.0 - 34.5 %    RDW 13.5 11.5 - 15.0 fL    Platelets 854 645 - 475 E9/L    MPV 9.5 7.0 - 12.0 fL    Neutrophils % 63.9 43.0 - 80.0 % Immature Granulocytes % 0.5 0.0 - 5.0 %    Lymphocytes % 25.6 20.0 - 42.0 %    Monocytes % 8.6 2.0 - 12.0 %    Eosinophils % 1.1 0.0 - 6.0 %    Basophils % 0.3 0.0 - 2.0 %    Neutrophils Absolute 4.82 1.80 - 7.30 E9/L    Immature Granulocytes # 0.04 E9/L    Lymphocytes Absolute 1.93 1.50 - 4.00 E9/L    Monocytes Absolute 0.65 0.10 - 0.95 E9/L    Eosinophils Absolute 0.08 0.05 - 0.50 E9/L    Basophils Absolute 0.02 0.00 - 0.20 E9/L   Comprehensive Metabolic Panel w/ Reflex to MG   Result Value Ref Range    Sodium 135 132 - 146 mmol/L    Potassium reflex Magnesium 3.7 3.5 - 5.0 mmol/L    Chloride 96 (L) 98 - 107 mmol/L    CO2 29 22 - 29 mmol/L    Anion Gap 10 7 - 16 mmol/L    Glucose 157 (H) 74 - 99 mg/dL    BUN 12 6 - 20 mg/dL    CREATININE 0.9 0.7 - 1.2 mg/dL    GFR Non-African American >60 >=60 mL/min/1.73    GFR African American >60     Calcium 8.9 8.6 - 10.2 mg/dL    Total Protein 6.8 6.4 - 8.3 g/dL    Albumin 3.7 3.5 - 5.2 g/dL    Total Bilirubin 0.7 0.0 - 1.2 mg/dL    Alkaline Phosphatase 78 40 - 129 U/L    ALT 28 0 - 40 U/L    AST 19 0 - 39 U/L   POCT Glucose   Result Value Ref Range    Meter Glucose 130 (H) 74 - 99 mg/dL   EKG 12 Lead   Result Value Ref Range    Ventricular Rate 86 BPM    Atrial Rate 86 BPM    P-R Interval 162 ms    QRS Duration 104 ms    Q-T Interval 396 ms    QTc Calculation (Bazett) 473 ms    P Axis 53 degrees    R Axis -48 degrees    T Axis 62 degrees       RADIOLOGY:  CT ABDOMEN PELVIS W IV CONTRAST Additional Contrast? None   Final Result   Finding consistent with acute pancreatitis. No peripancreatic fluid   collections or pancreatic necrosis. XR CHEST PORTABLE   Final Result   No acute process. EKG:  This EKG is signed and interpreted by me. Rate: 86  Rhythm: Sinus  Interpretation: Normal sinus rhythm with no ST elevations or depressions.   Intervals are normal.  Comparison: stable as compared to patient's most recent EKG      ------------------------- NURSING NOTES AND VITALS REVIEWED ---------------------------  Date / Time Roomed:  2/13/2022  3:26 AM  ED Bed Assignment:  [id number]    The nursing notes within the ED encounter and vital signs as below have been reviewed. No data found. Oxygen Saturation Interpretation: Normal    ------------------------------------------ PROGRESS NOTES ------------------------------------------  Re-evaluation(s):  Time: 0630  Patients symptoms show no change  Repeat physical examination is not changed    Counseling:  I have spoken with the patient and discussed todays results, in addition to providing specific details for the plan of care and counseling regarding the diagnosis and prognosis. Their questions are answered at this time and they are agreeable with the plan of admission.    --------------------------------- ADDITIONAL PROVIDER NOTES ---------------------------------  Consultations:  Time: 0957. Spoke with Dr. Tyson Wild. Discussed case. They will admit the patient. This patient's ED course included: a personal history and physicial examination, re-evaluation prior to disposition, multiple bedside re-evaluations, IV medications, cardiac monitoring, continuous pulse oximetry and complex medical decision making and emergency management    This patient has remained hemodynamically stable during their ED course. Diagnosis:  1. Lumbar disc disorder    2. Alcohol induced acute pancreatitis without necrosis or infection        Disposition:  Patient's disposition: Admit to med/surg floor  Patient's condition is stable.          Jayla Cain DO  Resident  02/19/22 [id number]

## 2022-06-01 ENCOUNTER — HOSPITAL ENCOUNTER (INPATIENT)
Age: 52
LOS: 4 days | Discharge: HOME | DRG: 177 | End: 2022-06-05
Attending: EMERGENCY MEDICINE | Admitting: FAMILY MEDICINE
Payer: MEDICARE

## 2022-06-01 ENCOUNTER — APPOINTMENT (OUTPATIENT)
Dept: GENERAL RADIOLOGY | Age: 52
DRG: 177 | End: 2022-06-01
Payer: MEDICARE

## 2022-06-01 DIAGNOSIS — J96.01 ACUTE RESPIRATORY FAILURE WITH HYPOXIA (HCC): Primary | ICD-10-CM

## 2022-06-01 DIAGNOSIS — U07.1 COVID: ICD-10-CM

## 2022-06-01 PROBLEM — J96.00 ACUTE RESPIRATORY FAILURE (HCC): Status: ACTIVE | Noted: 2022-06-01

## 2022-06-01 LAB
ALBUMIN SERPL-MCNC: 4.6 G/DL (ref 3.5–5.2)
ALP BLD-CCNC: 83 U/L (ref 40–129)
ALT SERPL-CCNC: 61 U/L (ref 0–40)
ANION GAP SERPL CALCULATED.3IONS-SCNC: 16 MMOL/L (ref 7–16)
AST SERPL-CCNC: 80 U/L (ref 0–39)
BASOPHILS ABSOLUTE: 0.03 E9/L (ref 0–0.2)
BASOPHILS RELATIVE PERCENT: 0.3 % (ref 0–2)
BILIRUB SERPL-MCNC: 1 MG/DL (ref 0–1.2)
BUN BLDV-MCNC: 12 MG/DL (ref 6–20)
C-REACTIVE PROTEIN: 0.5 MG/DL (ref 0–0.4)
CALCIUM SERPL-MCNC: 8.9 MG/DL (ref 8.6–10.2)
CHLORIDE BLD-SCNC: 91 MMOL/L (ref 98–107)
CO2: 25 MMOL/L (ref 22–29)
CREAT SERPL-MCNC: 0.8 MG/DL (ref 0.7–1.2)
D DIMER: <200 NG/ML DDU
EOSINOPHILS ABSOLUTE: 0.01 E9/L (ref 0.05–0.5)
EOSINOPHILS RELATIVE PERCENT: 0.1 % (ref 0–6)
FERRITIN: 163 NG/ML
GFR AFRICAN AMERICAN: >60
GFR NON-AFRICAN AMERICAN: >60 ML/MIN/1.73
GLUCOSE BLD-MCNC: 120 MG/DL (ref 74–99)
HCT VFR BLD CALC: 45.9 % (ref 37–54)
HEMOGLOBIN: 14.7 G/DL (ref 12.5–16.5)
IMMATURE GRANULOCYTES #: 0.04 E9/L
IMMATURE GRANULOCYTES %: 0.4 % (ref 0–5)
INFLUENZA A BY PCR: NOT DETECTED
INFLUENZA B BY PCR: NOT DETECTED
LACTATE DEHYDROGENASE: 355 U/L (ref 135–225)
LYMPHOCYTES ABSOLUTE: 1.36 E9/L (ref 1.5–4)
LYMPHOCYTES RELATIVE PERCENT: 15 % (ref 20–42)
MCH RBC QN AUTO: 27.7 PG (ref 26–35)
MCHC RBC AUTO-ENTMCNC: 32 % (ref 32–34.5)
MCV RBC AUTO: 86.4 FL (ref 80–99.9)
MONOCYTES ABSOLUTE: 0.65 E9/L (ref 0.1–0.95)
MONOCYTES RELATIVE PERCENT: 7.2 % (ref 2–12)
NEUTROPHILS ABSOLUTE: 6.96 E9/L (ref 1.8–7.3)
NEUTROPHILS RELATIVE PERCENT: 77 % (ref 43–80)
PDW BLD-RTO: 13.8 FL (ref 11.5–15)
PLATELET # BLD: 285 E9/L (ref 130–450)
PMV BLD AUTO: 10.1 FL (ref 7–12)
POTASSIUM SERPL-SCNC: 3.4 MMOL/L (ref 3.5–5)
PRO-BNP: 35 PG/ML (ref 0–125)
PROCALCITONIN: 0.05 NG/ML (ref 0–0.08)
RBC # BLD: 5.31 E12/L (ref 3.8–5.8)
SARS-COV-2, NAAT: DETECTED
SODIUM BLD-SCNC: 132 MMOL/L (ref 132–146)
TOTAL PROTEIN: 7.8 G/DL (ref 6.4–8.3)
TROPONIN, HIGH SENSITIVITY: 20 NG/L (ref 0–11)
WBC # BLD: 9.1 E9/L (ref 4.5–11.5)

## 2022-06-01 PROCEDURE — 83615 LACTATE (LD) (LDH) ENZYME: CPT

## 2022-06-01 PROCEDURE — 85378 FIBRIN DEGRADE SEMIQUANT: CPT

## 2022-06-01 PROCEDURE — 2700000000 HC OXYGEN THERAPY PER DAY

## 2022-06-01 PROCEDURE — 36415 COLL VENOUS BLD VENIPUNCTURE: CPT

## 2022-06-01 PROCEDURE — 84484 ASSAY OF TROPONIN QUANT: CPT

## 2022-06-01 PROCEDURE — 80053 COMPREHEN METABOLIC PANEL: CPT

## 2022-06-01 PROCEDURE — 99285 EMERGENCY DEPT VISIT HI MDM: CPT

## 2022-06-01 PROCEDURE — 6360000002 HC RX W HCPCS: Performed by: NURSE PRACTITIONER

## 2022-06-01 PROCEDURE — 6360000002 HC RX W HCPCS: Performed by: EMERGENCY MEDICINE

## 2022-06-01 PROCEDURE — 84145 PROCALCITONIN (PCT): CPT

## 2022-06-01 PROCEDURE — 3E0333Z INTRODUCTION OF ANTI-INFLAMMATORY INTO PERIPHERAL VEIN, PERCUTANEOUS APPROACH: ICD-10-PCS | Performed by: INTERNAL MEDICINE

## 2022-06-01 PROCEDURE — 83880 ASSAY OF NATRIURETIC PEPTIDE: CPT

## 2022-06-01 PROCEDURE — 87635 SARS-COV-2 COVID-19 AMP PRB: CPT

## 2022-06-01 PROCEDURE — 86140 C-REACTIVE PROTEIN: CPT

## 2022-06-01 PROCEDURE — 96375 TX/PRO/DX INJ NEW DRUG ADDON: CPT

## 2022-06-01 PROCEDURE — 2060000000 HC ICU INTERMEDIATE R&B

## 2022-06-01 PROCEDURE — 85025 COMPLETE CBC W/AUTO DIFF WBC: CPT

## 2022-06-01 PROCEDURE — 82728 ASSAY OF FERRITIN: CPT

## 2022-06-01 PROCEDURE — 96374 THER/PROPH/DIAG INJ IV PUSH: CPT

## 2022-06-01 PROCEDURE — 87502 INFLUENZA DNA AMP PROBE: CPT

## 2022-06-01 PROCEDURE — 71046 X-RAY EXAM CHEST 2 VIEWS: CPT

## 2022-06-01 RX ORDER — DEXAMETHASONE SODIUM PHOSPHATE 10 MG/ML
10 INJECTION, SOLUTION INTRAMUSCULAR; INTRAVENOUS ONCE
Status: COMPLETED | OUTPATIENT
Start: 2022-06-01 | End: 2022-06-01

## 2022-06-01 RX ORDER — METHYLPREDNISOLONE SODIUM SUCCINATE 125 MG/2ML
125 INJECTION, POWDER, LYOPHILIZED, FOR SOLUTION INTRAMUSCULAR; INTRAVENOUS ONCE
Status: COMPLETED | OUTPATIENT
Start: 2022-06-01 | End: 2022-06-01

## 2022-06-01 RX ORDER — IPRATROPIUM BROMIDE AND ALBUTEROL SULFATE 2.5; .5 MG/3ML; MG/3ML
3 SOLUTION RESPIRATORY (INHALATION)
Status: DISPENSED | OUTPATIENT
Start: 2022-06-01 | End: 2022-06-01

## 2022-06-01 RX ADMIN — DEXAMETHASONE SODIUM PHOSPHATE 10 MG: 10 INJECTION, SOLUTION INTRAMUSCULAR; INTRAVENOUS at 19:30

## 2022-06-01 RX ADMIN — METHYLPREDNISOLONE SODIUM SUCCINATE 125 MG: 125 INJECTION, POWDER, FOR SOLUTION INTRAMUSCULAR; INTRAVENOUS at 17:29

## 2022-06-01 NOTE — ED PROVIDER NOTES
HPI:  6/1/22, Time: 5:07 PM EDT         Konstantin Rodriguez is a 45 y.o. male presenting to the ED for presents with wheezing and shortness of breath has productive cough states his increased sputum production. Patient is history of asthma and smoking. States he has been using his nebulizer and rescue inhaler today with no relief of his symptoms. He was found to be hypoxic in triage 81%. He has no fevers or chills. He denies chest pain other than chest tightness. He does admit to smoking cigars and cigarettes. He also reports drug use and cocaine use. He has no fevers chills. He has no hemoptysis. No history of renal thrombus embolism. , beginning several hours ago. The complaint has been persistent, severe in severity, and worsened by light exertion, deep breath, cough. Patient stated he has been vaccinated for COVID and he has had the booster. ROS:   Pertinent positives and negatives are stated within HPI, all other systems reviewed and are negative.  --------------------------------------------- PAST HISTORY ---------------------------------------------  Past Medical History:  has a past medical history of Acid reflux, Asthma, Asthma, COPD [facility]chronic obstructive [facility] disease) ([facility] Hypertension, Pancreatitis, Prediabetes, Psychiatric problem, Sleep apnea, and Substance abuse ([facility] Past Surgical History:  has a past surgical history that includes [place] tooth extraction; Colonoscopy; Nerve Block (Left, 08/27/2018); and pr njx dx/ther sbst intrlmnr lmbr/sac w/img gdn (Left, 8/27/2018). Social History:  reports that he has been smoking cigarettes and cigars. He has a 28.00 pack-year smoking history. He has never used smokeless tobacco. He reports current alcohol use. He reports current drug use. Drugs:  and Cocaine. Family History: family history includes High Blood Pressure in his maternal grandmother; Other in his maternal grandmother and sister.      The patients home medications have been reviewed. Allergies: Dust mite extract    ---------------------------------------------------PHYSICAL EXAM--------------------------------------    Constitutional/General: Alert and oriented x3, well appearing, non toxic in NAD  Head: Normocephalic and atraumatic  Eyes: PERRL, EOMI  Mouth: Oropharynx clear, handling secretions, no trismus  Neck: Supple, full ROM, non tender to palpation in the midline, no stridor, no crepitus, no meningeal signs  Pulmonary: Patient demonstrates inspiratory expiratory wheezing bilaterally is mildly tachypneic. Cardiovascular:  Regular rate. Regular rhythm. No murmurs, gallops, or rubs. 2+ distal pulses  Chest: no chest wall tenderness  Abdomen: Soft. Non tender. Non distended. +BS. No rebound, guarding, or rigidity. No pulsatile masses appreciated. Musculoskeletal: Moves all extremities x 4. Warm and well perfused, no clubbing, cyanosis, or edema. Capillary refill <3 seconds  Skin: warm and dry. No rashes. Neurologic: GCS 15, CN 2-12 grossly intact, no focal deficits, symmetric strength 5/5 in the upper and lower extremities bilaterally  Psych: Normal Affect    -------------------------------------------------- RESULTS -------------------------------------------------  I have personally reviewed all laboratory and imaging results for this patient. Results are listed below.      LABS:  Results for orders placed or performed during the hospital encounter of 06/01/22   COVID-19, Rapid    Specimen: Nasopharyngeal Swab   Result Value Ref Range    SARS-CoV-2, NAAT DETECTED (A) Not Detected   Rapid influenza A/B antigens    Specimen: Nares   Result Value Ref Range    Influenza A by PCR Not Detected Not Detected    Influenza B by PCR Not Detected Not Detected   CBC with Auto Differential   Result Value Ref Range    WBC 9.1 4.5 - 11.5 E9/L    RBC 5.31 3.80 - 5.80 E12/L    Hemoglobin 14.7 12.5 - 16.5 g/dL    Hematocrit 45.9 37.0 - 54.0 %    MCV 86.4 80.0 - 99.9 fL    MCH 27.7 26.0 - 35.0 pg    MCHC 32.0 32.0 - 34.5 %    RDW 13.8 11.5 - 15.0 fL    Platelets 632 141 - 350 E9/L    MPV 10.1 7.0 - 12.0 fL    Neutrophils % 77.0 43.0 - 80.0 %    Immature Granulocytes % 0.4 0.0 - 5.0 %    Lymphocytes % 15.0 (L) 20.0 - 42.0 %    Monocytes % 7.2 2.0 - 12.0 %    Eosinophils % 0.1 0.0 - 6.0 %    Basophils % 0.3 0.0 - 2.0 %    Neutrophils Absolute 6.96 1.80 - 7.30 E9/L    Immature Granulocytes # 0.04 E9/L    Lymphocytes Absolute 1.36 (L) 1.50 - 4.00 E9/L    Monocytes Absolute 0.65 0.10 - 0.95 E9/L    Eosinophils Absolute 0.01 (L) 0.05 - 0.50 E9/L    Basophils Absolute 0.03 0.00 - 0.20 E9/L   Comprehensive Metabolic Panel   Result Value Ref Range    Sodium 132 132 - 146 mmol/L    Potassium 3.4 (L) 3.5 - 5.0 mmol/L    Chloride 91 (L) 98 - 107 mmol/L    CO2 25 22 - 29 mmol/L    Anion Gap 16 7 - 16 mmol/L    Glucose 120 (H) 74 - 99 mg/dL    BUN 12 6 - 20 mg/dL    CREATININE 0.8 0.7 - 1.2 mg/dL    GFR Non-African American >60 >=60 mL/min/1.73    GFR African American >60     Calcium 8.9 8.6 - 10.2 mg/dL    Total Protein 7.8 6.4 - 8.3 g/dL    Albumin 4.6 3.5 - 5.2 g/dL    Total Bilirubin 1.0 0.0 - 1.2 mg/dL    Alkaline Phosphatase 83 40 - 129 U/L    ALT 61 (H) 0 - 40 U/L    AST 80 (H) 0 - 39 U/L   Troponin   Result Value Ref Range    Troponin, High Sensitivity 20 (H) 0 - 11 ng/L   Brain Natriuretic Peptide   Result Value Ref Range    Pro-BNP 35 0 - 125 pg/mL   D-Dimer, Quantitative   Result Value Ref Range    D-Dimer, Quant <200 ng/mL DDU   C-Reactive Protein   Result Value Ref Range    CRP 0.5 (H) 0.0 - 0.4 mg/dL   Ferritin   Result Value Ref Range    Ferritin 163 ng/mL   Procalcitonin   Result Value Ref Range    Procalcitonin 0.05 0.00 - 0.08 ng/mL   Lactate Dehydrogenase   Result Value Ref Range     (H) 135 - 225 U/L   Vitamin D 25 Hydroxy   Result Value Ref Range    Vit D, 25-Hydroxy 22 (L) 30 - 100 ng/mL   CBC with Auto Differential   Result Value Ref Range    WBC 6.9 4.5 - 11.5 E9/L    RBC 5.50 3.80 - 5.80 E12/L    Hemoglobin 15.1 12.5 - 16.5 g/dL    Hematocrit 48.1 37.0 - 54.0 %    MCV 87.5 80.0 - 99.9 fL    MCH 27.5 26.0 - 35.0 pg    MCHC 31.4 (L) 32.0 - 34.5 %    RDW 13.5 11.5 - 15.0 fL    Platelets 264 914 - 312 E9/L    MPV 9.9 7.0 - 12.0 fL    Neutrophils % 86.0 (H) 43.0 - 80.0 %    Immature Granulocytes % 0.6 0.0 - 5.0 %    Lymphocytes % 9.2 (L) 20.0 - 42.0 %    Monocytes % 4.1 2.0 - 12.0 %    Eosinophils % 0.0 0.0 - 6.0 %    Basophils % 0.1 0.0 - 2.0 %    Neutrophils Absolute 5.91 1.80 - 7.30 E9/L    Immature Granulocytes # 0.04 E9/L    Lymphocytes Absolute 0.63 (L) 1.50 - 4.00 E9/L    Monocytes Absolute 0.28 0.10 - 0.95 E9/L    Eosinophils Absolute 0.00 (L) 0.05 - 0.50 E9/L    Basophils Absolute 0.01 0.00 - 0.20 E9/L   Comprehensive Metabolic Panel w/ Reflex to MG   Result Value Ref Range    Sodium 138 132 - 146 mmol/L    Potassium reflex Magnesium 4.1 3.5 - 5.0 mmol/L    Chloride 99 98 - 107 mmol/L    CO2 24 22 - 29 mmol/L    Anion Gap 15 7 - 16 mmol/L    Glucose 181 (H) 74 - 99 mg/dL    BUN 10 6 - 20 mg/dL    CREATININE 0.8 0.7 - 1.2 mg/dL    GFR Non-African American >60 >=60 mL/min/1.73    GFR African American >60     Calcium 8.6 8.6 - 10.2 mg/dL    Total Protein 7.0 6.4 - 8.3 g/dL    Albumin 4.0 3.5 - 5.2 g/dL    Total Bilirubin 0.6 0.0 - 1.2 mg/dL    Alkaline Phosphatase 81 40 - 129 U/L    ALT 52 (H) 0 - 40 U/L    AST 53 (H) 0 - 39 U/L   Fibrinogen   Result Value Ref Range    Fibrinogen 311 200 - 400 mg/dL   Procalcitonin   Result Value Ref Range    Procalcitonin 0.04 0.00 - 0.08 ng/mL   Troponin   Result Value Ref Range    Troponin, High Sensitivity 8 0 - 11 ng/L   Lactic Acid   Result Value Ref Range    Lactic Acid 1.0 0.5 - 2.2 mmol/L   CBC with Auto Differential   Result Value Ref Range    WBC 9.8 4.5 - 11.5 E9/L    RBC 5.71 3.80 - 5.80 E12/L    Hemoglobin 15.8 12.5 - 16.5 g/dL    Hematocrit 51.0 37.0 - 54.0 %    MCV 89.3 80.0 - 99.9 fL    MCH 27.7 26.0 - 35.0 pg    MCHC 31.0 (L) 32.0 - 34.5 % RDW 13.4 11.5 - 15.0 fL    Platelets 531 890 - 436 E9/L    MPV 10.4 7.0 - 12.0 fL    Neutrophils % 93.9 (H) 43.0 - 80.0 %    Lymphocytes % 2.6 (L) 20.0 - 42.0 %    Monocytes % 2.6 2.0 - 12.0 %    Eosinophils % 0.0 0.0 - 6.0 %    Basophils % 0.2 0.0 - 2.0 %    Neutrophils Absolute 9.31 (H) 1.80 - 7.30 E9/L    Lymphocytes Absolute 0.29 (L) 1.50 - 4.00 E9/L    Monocytes Absolute 0.29 0.10 - 0.95 E9/L    Eosinophils Absolute 0.00 (L) 0.05 - 0.50 E9/L    Basophils Absolute 0.00 0.00 - 0.20 E9/L    Myelocyte Percent 0.9 0 - 0 %   Comprehensive Metabolic Panel w/ Reflex to MG   Result Value Ref Range    Sodium 139 132 - 146 mmol/L    Potassium reflex Magnesium 4.1 3.5 - 5.0 mmol/L    Chloride 99 98 - 107 mmol/L    CO2 25 22 - 29 mmol/L    Anion Gap 15 7 - 16 mmol/L    Glucose 234 (H) 74 - 99 mg/dL    BUN 12 6 - 20 mg/dL    CREATININE 0.7 0.7 - 1.2 mg/dL    GFR Non-African American >60 >=60 mL/min/1.73    GFR African American >60     Calcium 8.9 8.6 - 10.2 mg/dL    Total Protein 7.2 6.4 - 8.3 g/dL    Albumin 3.9 3.5 - 5.2 g/dL    Total Bilirubin 0.5 0.0 - 1.2 mg/dL    Alkaline Phosphatase 85 40 - 129 U/L    ALT 48 (H) 0 - 40 U/L    AST 30 0 - 39 U/L   Magnesium   Result Value Ref Range    Magnesium 2.4 1.6 - 2.6 mg/dL   Phosphorus   Result Value Ref Range    Phosphorus 2.5 2.5 - 4.5 mg/dL   Hemoglobin A1C   Result Value Ref Range    Hemoglobin A1C 5.8 (H) 4.0 - 5.6 %   Procalcitonin   Result Value Ref Range    Procalcitonin 0.02 0.00 - 0.08 ng/mL   CBC with Auto Differential   Result Value Ref Range    WBC 9.9 4.5 - 11.5 E9/L    RBC 5.58 3.80 - 5.80 E12/L    Hemoglobin 15.5 12.5 - 16.5 g/dL    Hematocrit 49.5 37.0 - 54.0 %    MCV 88.7 80.0 - 99.9 fL    MCH 27.8 26.0 - 35.0 pg    MCHC 31.3 (L) 32.0 - 34.5 %    RDW 13.2 11.5 - 15.0 fL    Platelets 721 240 - 003 E9/L    MPV 10.6 7.0 - 12.0 fL    Neutrophils % 91.4 (H) 43.0 - 80.0 %    Immature Granulocytes % 0.9 0.0 - 5.0 %    Lymphocytes % 5.3 (L) 20.0 - 42.0 %    Monocytes % WBC    Anisocytosis 1+    Renal Function Panel   Result Value Ref Range    Sodium 138 132 - 146 mmol/L    Potassium 4.3 3.5 - 5.0 mmol/L    Chloride 96 (L) 98 - 107 mmol/L    CO2 27 22 - 29 mmol/L    Anion Gap 15 7 - 16 mmol/L    Glucose 379 (H) 74 - 99 mg/dL    BUN 14 6 - 20 mg/dL    CREATININE 0.7 0.7 - 1.2 mg/dL    GFR Non-African American >60 >=60 mL/min/1.73    GFR African American >60     Calcium 9.4 8.6 - 10.2 mg/dL    Phosphorus 4.7 (H) 2.5 - 4.5 mg/dL    Albumin 4.2 3.5 - 5.2 g/dL       RADIOLOGY:  Interpreted by Radiologist.  XR CHEST PORTABLE   Final Result   Normal chest         XR CHEST (2 VW)   Final Result   No pneumonia or pleural effusion. EKG Interpretation  Interpreted by emergency department physician    EKG Ordered but never completed in the ED.       ------------------------- NURSING NOTES AND VITALS REVIEWED ---------------------------   The nursing notes within the ED encounter and vital signs as below have been reviewed by myself. BP (!) 142/109   Pulse 78   Temp 98.1 °F (36.7 °C) (Oral)   Resp 19   Ht 5' 9\" (1.753 m)   Wt 262 lb (118.8 kg)   SpO2 95%   BMI 38.69 kg/m²   Oxygen Saturation Interpretation: Abnormal 81 % on RA  96% on 6 L NC     The patients available past medical records and past encounters were reviewed.         ------------------------------ ED COURSE/MEDICAL DECISION MAKING----------------------  Medications   ipratropium-albuterol (DUONEB) nebulizer solution 3 ampule (3 ampules Inhalation Not Given 6/1/22 2049)   methylPREDNISolone sodium (SOLU-MEDROL) injection 125 mg (125 mg IntraVENous Given 6/1/22 1729)   dexamethasone (PF) (DECADRON) injection 10 mg (10 mg IntraVENous Given 6/1/22 1930)   remdesivir 200 mg in sodium chloride 0.9 % 250 mL IVPB (0 mg IntraVENous Stopped 6/2/22 0308)   pseudoephedrine (SUDAFED) tablet 30 mg (30 mg Oral Given 6/3/22 1526)             Medical Decision Making:    Patient presents with a worsening shortness of breath ---------------------------------    IMPRESSION  1. Acute respiratory failure with hypoxia (HCC)    2. COVID        DISPOSITION  Disposition: Admit to telemetry  Patient condition is serious        NOTE: This report was transcribed using voice recognition software.  Every effort was made to ensure accuracy; however, inadvertent computerized transcription errors may be present        Abel Cardoso DO  06/06/22 5751

## 2022-06-01 NOTE — LETTER
6/7/2022    Mr. Anabelle Mueller  [address]      To Whom It May Concern:    Anabelel Mueller was tested for COVID-19 on 6/1/22, the result was positive and was admitted to [facility] from 6/1/22-6/5/22.       Sincerely,          Jason Santizo RN

## 2022-06-02 LAB
ALBUMIN SERPL-MCNC: 4 G/DL (ref 3.5–5.2)
ALP BLD-CCNC: 81 U/L (ref 40–129)
ALT SERPL-CCNC: 52 U/L (ref 0–40)
ANION GAP SERPL CALCULATED.3IONS-SCNC: 15 MMOL/L (ref 7–16)
AST SERPL-CCNC: 53 U/L (ref 0–39)
BASOPHILS ABSOLUTE: 0.01 E9/L (ref 0–0.2)
BASOPHILS RELATIVE PERCENT: 0.1 % (ref 0–2)
BILIRUB SERPL-MCNC: 0.6 MG/DL (ref 0–1.2)
BUN BLDV-MCNC: 10 MG/DL (ref 6–20)
CALCIUM SERPL-MCNC: 8.6 MG/DL (ref 8.6–10.2)
CHLORIDE BLD-SCNC: 99 MMOL/L (ref 98–107)
CO2: 24 MMOL/L (ref 22–29)
CREAT SERPL-MCNC: 0.8 MG/DL (ref 0.7–1.2)
EOSINOPHILS ABSOLUTE: 0 E9/L (ref 0.05–0.5)
EOSINOPHILS RELATIVE PERCENT: 0 % (ref 0–6)
FIBRINOGEN: 311 MG/DL (ref 200–400)
GFR AFRICAN AMERICAN: >60
GFR NON-AFRICAN AMERICAN: >60 ML/MIN/1.73
GLUCOSE BLD-MCNC: 181 MG/DL (ref 74–99)
HCT VFR BLD CALC: 48.1 % (ref 37–54)
HEMOGLOBIN: 15.1 G/DL (ref 12.5–16.5)
IMMATURE GRANULOCYTES #: 0.04 E9/L
IMMATURE GRANULOCYTES %: 0.6 % (ref 0–5)
LACTIC ACID: 1 MMOL/L (ref 0.5–2.2)
LYMPHOCYTES ABSOLUTE: 0.63 E9/L (ref 1.5–4)
LYMPHOCYTES RELATIVE PERCENT: 9.2 % (ref 20–42)
MCH RBC QN AUTO: 27.5 PG (ref 26–35)
MCHC RBC AUTO-ENTMCNC: 31.4 % (ref 32–34.5)
MCV RBC AUTO: 87.5 FL (ref 80–99.9)
MONOCYTES ABSOLUTE: 0.28 E9/L (ref 0.1–0.95)
MONOCYTES RELATIVE PERCENT: 4.1 % (ref 2–12)
NEUTROPHILS ABSOLUTE: 5.91 E9/L (ref 1.8–7.3)
NEUTROPHILS RELATIVE PERCENT: 86 % (ref 43–80)
PDW BLD-RTO: 13.5 FL (ref 11.5–15)
PLATELET # BLD: 250 E9/L (ref 130–450)
PMV BLD AUTO: 9.9 FL (ref 7–12)
POTASSIUM REFLEX MAGNESIUM: 4.1 MMOL/L (ref 3.5–5)
PROCALCITONIN: 0.04 NG/ML (ref 0–0.08)
RBC # BLD: 5.5 E12/L (ref 3.8–5.8)
SODIUM BLD-SCNC: 138 MMOL/L (ref 132–146)
TOTAL PROTEIN: 7 G/DL (ref 6.4–8.3)
TROPONIN, HIGH SENSITIVITY: 8 NG/L (ref 0–11)
VITAMIN D 25-HYDROXY: 22 NG/ML (ref 30–100)
WBC # BLD: 6.9 E9/L (ref 4.5–11.5)

## 2022-06-02 PROCEDURE — 82306 VITAMIN D 25 HYDROXY: CPT

## 2022-06-02 PROCEDURE — 80053 COMPREHEN METABOLIC PANEL: CPT

## 2022-06-02 PROCEDURE — 85025 COMPLETE CBC W/AUTO DIFF WBC: CPT

## 2022-06-02 PROCEDURE — 84145 PROCALCITONIN (PCT): CPT

## 2022-06-02 PROCEDURE — 6360000002 HC RX W HCPCS: Performed by: INTERNAL MEDICINE

## 2022-06-02 PROCEDURE — 83605 ASSAY OF LACTIC ACID: CPT

## 2022-06-02 PROCEDURE — 6370000000 HC RX 637 (ALT 250 FOR IP): Performed by: INTERNAL MEDICINE

## 2022-06-02 PROCEDURE — 85384 FIBRINOGEN ACTIVITY: CPT

## 2022-06-02 PROCEDURE — 2500000003 HC RX 250 WO HCPCS: Performed by: FAMILY MEDICINE

## 2022-06-02 PROCEDURE — 2060000000 HC ICU INTERMEDIATE R&B

## 2022-06-02 PROCEDURE — 84484 ASSAY OF TROPONIN QUANT: CPT

## 2022-06-02 PROCEDURE — 6360000002 HC RX W HCPCS: Performed by: FAMILY MEDICINE

## 2022-06-02 PROCEDURE — 94640 AIRWAY INHALATION TREATMENT: CPT

## 2022-06-02 PROCEDURE — 6370000000 HC RX 637 (ALT 250 FOR IP): Performed by: FAMILY MEDICINE

## 2022-06-02 PROCEDURE — XW033E5 INTRODUCTION OF REMDESIVIR ANTI-INFECTIVE INTO PERIPHERAL VEIN, PERCUTANEOUS APPROACH, NEW TECHNOLOGY GROUP 5: ICD-10-PCS | Performed by: INTERNAL MEDICINE

## 2022-06-02 PROCEDURE — 2580000003 HC RX 258: Performed by: FAMILY MEDICINE

## 2022-06-02 PROCEDURE — 2700000000 HC OXYGEN THERAPY PER DAY

## 2022-06-02 RX ORDER — OXYBUTYNIN CHLORIDE 5 MG/1
5 TABLET, EXTENDED RELEASE ORAL DAILY
COMMUNITY

## 2022-06-02 RX ORDER — ACETAMINOPHEN 650 MG/1
650 SUPPOSITORY RECTAL EVERY 6 HOURS PRN
Status: DISCONTINUED | OUTPATIENT
Start: 2022-06-02 | End: 2022-06-05 | Stop reason: HOSPADM

## 2022-06-02 RX ORDER — ENOXAPARIN SODIUM 100 MG/ML
30 INJECTION SUBCUTANEOUS 2 TIMES DAILY
Status: DISCONTINUED | OUTPATIENT
Start: 2022-06-02 | End: 2022-06-05 | Stop reason: HOSPADM

## 2022-06-02 RX ORDER — ZOLPIDEM TARTRATE 5 MG/1
5 TABLET ORAL NIGHTLY PRN
Status: DISCONTINUED | OUTPATIENT
Start: 2022-06-02 | End: 2022-06-05 | Stop reason: HOSPADM

## 2022-06-02 RX ORDER — BUDESONIDE 0.5 MG/2ML
500 INHALANT ORAL 2 TIMES DAILY
Status: DISCONTINUED | OUTPATIENT
Start: 2022-06-02 | End: 2022-06-05 | Stop reason: HOSPADM

## 2022-06-02 RX ORDER — ONDANSETRON 4 MG/1
4 TABLET, ORALLY DISINTEGRATING ORAL EVERY 8 HOURS PRN
Status: DISCONTINUED | OUTPATIENT
Start: 2022-06-02 | End: 2022-06-05 | Stop reason: HOSPADM

## 2022-06-02 RX ORDER — ALBUTEROL SULFATE 2.5 MG/3ML
2.5 SOLUTION RESPIRATORY (INHALATION) EVERY 4 HOURS PRN
Status: DISCONTINUED | OUTPATIENT
Start: 2022-06-02 | End: 2022-06-05 | Stop reason: HOSPADM

## 2022-06-02 RX ORDER — ZINC SULFATE 50(220)MG
50 CAPSULE ORAL DAILY
Status: DISCONTINUED | OUTPATIENT
Start: 2022-06-02 | End: 2022-06-05 | Stop reason: HOSPADM

## 2022-06-02 RX ORDER — VITAMIN B COMPLEX
1000 TABLET ORAL DAILY
Status: DISCONTINUED | OUTPATIENT
Start: 2022-06-02 | End: 2022-06-05 | Stop reason: HOSPADM

## 2022-06-02 RX ORDER — ACETAMINOPHEN 325 MG/1
650 TABLET ORAL EVERY 6 HOURS PRN
Status: DISCONTINUED | OUTPATIENT
Start: 2022-06-02 | End: 2022-06-05 | Stop reason: HOSPADM

## 2022-06-02 RX ORDER — DEXAMETHASONE SODIUM PHOSPHATE 10 MG/ML
10 INJECTION, SOLUTION INTRAMUSCULAR; INTRAVENOUS EVERY 24 HOURS
Status: DISCONTINUED | OUTPATIENT
Start: 2022-06-07 | End: 2022-06-05 | Stop reason: HOSPADM

## 2022-06-02 RX ORDER — ASCORBIC ACID 500 MG
500 TABLET ORAL DAILY
Status: DISCONTINUED | OUTPATIENT
Start: 2022-06-02 | End: 2022-06-05 | Stop reason: HOSPADM

## 2022-06-02 RX ORDER — ATORVASTATIN CALCIUM 10 MG/1
10 TABLET, FILM COATED ORAL DAILY
COMMUNITY

## 2022-06-02 RX ORDER — PANTOPRAZOLE SODIUM 40 MG/1
40 TABLET, DELAYED RELEASE ORAL
Status: DISCONTINUED | OUTPATIENT
Start: 2022-06-02 | End: 2022-06-05 | Stop reason: HOSPADM

## 2022-06-02 RX ORDER — TAMSULOSIN HYDROCHLORIDE 0.4 MG/1
0.4 CAPSULE ORAL 2 TIMES DAILY
Status: DISCONTINUED | OUTPATIENT
Start: 2022-06-02 | End: 2022-06-05 | Stop reason: HOSPADM

## 2022-06-02 RX ORDER — BUPROPION HYDROCHLORIDE 150 MG/1
150 TABLET, EXTENDED RELEASE ORAL DAILY
COMMUNITY

## 2022-06-02 RX ORDER — 0.9 % SODIUM CHLORIDE 0.9 %
30 INTRAVENOUS SOLUTION INTRAVENOUS PRN
Status: DISCONTINUED | OUTPATIENT
Start: 2022-06-02 | End: 2022-06-05 | Stop reason: HOSPADM

## 2022-06-02 RX ORDER — AMLODIPINE BESYLATE 10 MG/1
10 TABLET ORAL DAILY
Status: DISCONTINUED | OUTPATIENT
Start: 2022-06-02 | End: 2022-06-05 | Stop reason: HOSPADM

## 2022-06-02 RX ORDER — ONDANSETRON 2 MG/ML
4 INJECTION INTRAMUSCULAR; INTRAVENOUS EVERY 6 HOURS PRN
Status: DISCONTINUED | OUTPATIENT
Start: 2022-06-02 | End: 2022-06-05 | Stop reason: HOSPADM

## 2022-06-02 RX ORDER — ASPIRIN 81 MG/1
81 TABLET, CHEWABLE ORAL DAILY
Status: DISCONTINUED | OUTPATIENT
Start: 2022-06-02 | End: 2022-06-05 | Stop reason: HOSPADM

## 2022-06-02 RX ORDER — LANOLIN ALCOHOL/MO/W.PET/CERES
3 CREAM (GRAM) TOPICAL NIGHTLY PRN
Status: DISCONTINUED | OUTPATIENT
Start: 2022-06-02 | End: 2022-06-05 | Stop reason: HOSPADM

## 2022-06-02 RX ORDER — SODIUM CHLORIDE 0.9 % (FLUSH) 0.9 %
5-40 SYRINGE (ML) INJECTION PRN
Status: DISCONTINUED | OUTPATIENT
Start: 2022-06-02 | End: 2022-06-05 | Stop reason: HOSPADM

## 2022-06-02 RX ORDER — SODIUM CHLORIDE 0.9 % (FLUSH) 0.9 %
5-40 SYRINGE (ML) INJECTION EVERY 12 HOURS SCHEDULED
Status: DISCONTINUED | OUTPATIENT
Start: 2022-06-02 | End: 2022-06-05 | Stop reason: HOSPADM

## 2022-06-02 RX ORDER — ARFORMOTEROL TARTRATE 15 UG/2ML
15 SOLUTION RESPIRATORY (INHALATION) 2 TIMES DAILY
Status: DISCONTINUED | OUTPATIENT
Start: 2022-06-02 | End: 2022-06-05 | Stop reason: HOSPADM

## 2022-06-02 RX ORDER — GUAIFENESIN/DEXTROMETHORPHAN 100-10MG/5
5 SYRUP ORAL EVERY 4 HOURS PRN
Status: DISCONTINUED | OUTPATIENT
Start: 2022-06-02 | End: 2022-06-05 | Stop reason: HOSPADM

## 2022-06-02 RX ORDER — SODIUM CHLORIDE 9 MG/ML
INJECTION, SOLUTION INTRAVENOUS PRN
Status: DISCONTINUED | OUTPATIENT
Start: 2022-06-02 | End: 2022-06-05 | Stop reason: HOSPADM

## 2022-06-02 RX ORDER — ALBUTEROL SULFATE 2.5 MG/3ML
2.5 SOLUTION RESPIRATORY (INHALATION) EVERY 6 HOURS PRN
COMMUNITY

## 2022-06-02 RX ORDER — POLYETHYLENE GLYCOL 3350 17 G/17G
17 POWDER, FOR SOLUTION ORAL DAILY PRN
Status: DISCONTINUED | OUTPATIENT
Start: 2022-06-02 | End: 2022-06-05 | Stop reason: HOSPADM

## 2022-06-02 RX ORDER — ALBUTEROL SULFATE 90 UG/1
2 AEROSOL, METERED RESPIRATORY (INHALATION) EVERY 6 HOURS PRN
COMMUNITY

## 2022-06-02 RX ORDER — GABAPENTIN 300 MG/1
300 CAPSULE ORAL 3 TIMES DAILY
COMMUNITY

## 2022-06-02 RX ORDER — IPRATROPIUM BROMIDE AND ALBUTEROL SULFATE 2.5; .5 MG/3ML; MG/3ML
1 SOLUTION RESPIRATORY (INHALATION)
Status: DISCONTINUED | OUTPATIENT
Start: 2022-06-02 | End: 2022-06-05 | Stop reason: HOSPADM

## 2022-06-02 RX ORDER — AZITHROMYCIN 250 MG/1
250 TABLET, FILM COATED ORAL DAILY
Status: ON HOLD | COMMUNITY
Start: 2022-05-30 | End: 2022-06-05 | Stop reason: HOSPADM

## 2022-06-02 RX ADMIN — SODIUM CHLORIDE, PRESERVATIVE FREE 10 ML: 5 INJECTION INTRAVENOUS at 18:37

## 2022-06-02 RX ADMIN — GUAIFENESIN SYRUP AND DEXTROMETHORPHAN 5 ML: 100; 10 SYRUP ORAL at 18:35

## 2022-06-02 RX ADMIN — OXYCODONE HYDROCHLORIDE AND ACETAMINOPHEN 500 MG: 500 TABLET ORAL at 08:05

## 2022-06-02 RX ADMIN — Medication 10 ML: at 18:38

## 2022-06-02 RX ADMIN — IPRATROPIUM BROMIDE AND ALBUTEROL SULFATE 1 AMPULE: .5; 2.5 SOLUTION RESPIRATORY (INHALATION) at 07:47

## 2022-06-02 RX ADMIN — ARFORMOTEROL TARTRATE 15 MCG: 15 SOLUTION RESPIRATORY (INHALATION) at 08:04

## 2022-06-02 RX ADMIN — BUDESONIDE 500 MCG: 0.5 SUSPENSION RESPIRATORY (INHALATION) at 19:44

## 2022-06-02 RX ADMIN — ARFORMOTEROL TARTRATE 15 MCG: 15 SOLUTION RESPIRATORY (INHALATION) at 19:44

## 2022-06-02 RX ADMIN — REMDESIVIR 200 MG: 100 INJECTION, POWDER, LYOPHILIZED, FOR SOLUTION INTRAVENOUS at 02:17

## 2022-06-02 RX ADMIN — SODIUM CHLORIDE, PRESERVATIVE FREE 10 ML: 5 INJECTION INTRAVENOUS at 23:05

## 2022-06-02 RX ADMIN — ASPIRIN 81 MG CHEWABLE TABLET 81 MG: 81 TABLET CHEWABLE at 08:05

## 2022-06-02 RX ADMIN — ENOXAPARIN SODIUM 30 MG: 100 INJECTION SUBCUTANEOUS at 08:07

## 2022-06-02 RX ADMIN — REMDESIVIR 100 MG: 100 INJECTION, POWDER, LYOPHILIZED, FOR SOLUTION INTRAVENOUS at 23:06

## 2022-06-02 RX ADMIN — BUDESONIDE 500 MCG: 0.5 SUSPENSION RESPIRATORY (INHALATION) at 07:47

## 2022-06-02 RX ADMIN — IPRATROPIUM BROMIDE AND ALBUTEROL SULFATE 1 AMPULE: .5; 2.5 SOLUTION RESPIRATORY (INHALATION) at 19:44

## 2022-06-02 RX ADMIN — Medication 10 ML: at 21:20

## 2022-06-02 RX ADMIN — ENOXAPARIN SODIUM 30 MG: 100 INJECTION SUBCUTANEOUS at 02:17

## 2022-06-02 RX ADMIN — TAMSULOSIN HYDROCHLORIDE 0.4 MG: 0.4 CAPSULE ORAL at 02:18

## 2022-06-02 RX ADMIN — ENOXAPARIN SODIUM 30 MG: 100 INJECTION SUBCUTANEOUS at 21:29

## 2022-06-02 RX ADMIN — AMLODIPINE BESYLATE 10 MG: 10 TABLET ORAL at 18:35

## 2022-06-02 RX ADMIN — DEXAMETHASONE SODIUM PHOSPHATE 20 MG: 4 INJECTION, SOLUTION INTRAMUSCULAR; INTRAVENOUS at 21:24

## 2022-06-02 RX ADMIN — PANTOPRAZOLE SODIUM 40 MG: 40 TABLET, DELAYED RELEASE ORAL at 08:06

## 2022-06-02 RX ADMIN — ZOLPIDEM TARTRATE 5 MG: 5 TABLET ORAL at 21:29

## 2022-06-02 RX ADMIN — TAMSULOSIN HYDROCHLORIDE 0.4 MG: 0.4 CAPSULE ORAL at 18:35

## 2022-06-02 RX ADMIN — IPRATROPIUM BROMIDE AND ALBUTEROL SULFATE 1 AMPULE: .5; 2.5 SOLUTION RESPIRATORY (INHALATION) at 11:39

## 2022-06-02 ASSESSMENT — PAIN SCALES - GENERAL: PAINLEVEL_OUTOF10: 0

## 2022-06-02 NOTE — CARE COORDINATION
Social Work Discharge Planning:  COVID+-SW spoke with patient for initial assessment. Patient lives alone in a 3rd floor apt with steps and elevator to access to entry. Patient is 3.5L O2 at home, as needed and has a nebulizer supplied by [employer]. Patient independent prior to admission. Patient's PCP is Dr. Hermelinda Espino and pharmacy is [facility] in [place]. Patient relayed he transportation provided through his insurance. SW will continue to follow and assist with transition of care.   Electronically signed by GABINO Horta on 6/2/2022 at 5:33 PM

## 2022-06-02 NOTE — PROGRESS NOTES
Hospitalist Progress Note      SYNOPSIS: Patient admitted on 2022 for SOB      SUBJECTIVE:    Patient seen and examined. Patient was tired. +Cough. SOB improved. Stable overnight. No other overnight issues reported. Temp (24hrs), Av.2 °F (36.8 °C), Min:98.2 °F (36.8 °C), Max:98.2 °F (36.8 °C)    DIET: ADULT DIET; Regular; Low Fat/Low Chol/High Fiber/2 gm Na  CODE: Full Code  No intake or output data in the 24 hours ending 22 0748    OBJECTIVE:    BP (!) 143/81   Pulse 75   Temp 98.2 °F (36.8 °C)   Resp 20   SpO2 100%     General appearance: No apparent distress, appears stated age and cooperative. HEENT:  Conjunctivae/corneas clear. Neck: Supple. No jugular venous distention. Respiratory: Diminished breath sound s(b/l)   Cardiovascular: Regular rate rhythm, normal S1-S2  Abdomen: Soft, nontender, nondistended  Musculoskeletal: No clubbing, cyanosis, no bilateral lower extremity edema. Brisk capillary refill. Skin:  No rashes  on visible skin  Neurologic: awake, alert and following commands     ASSESSMENT:  45y.o. year old male  who  has a past medical history of Acid reflux, Asthma, Asthma, COPD (chronic obstructive pulmonary disease) ([address] Hypertension, Pancreatitis, Prediabetes, Psychiatric problem, Sleep apnea, and Substance abuse ([address] Patient presented to the emergency department with shortness of breath, wheezing and productive cough. Patient has a history of asthma/COPD is a current smoker. Patient has been vaccinated and boosted for COVID-19. Has been using his nebulizer at home without relief. He denies fever, chills, chest pain, nausea, vomiting. On arrival patient has an SPO2 of 78% on room air. He was placed on 6 L nasal cannula with an SPO2 of 96%. PLAN:  1.) Acute hypoxic respiratory failure, currently on 2L Nasal cannula. 2.) COPD/Asthma exacerbation secondary to COVID-19 infection. Chest xray 22: No PNA or pleural effusion.   Continue with decadron, remdesivir and zinc/ascorbic acid/Vitamin D. Duonebs/pulmicort/brovana. Chest Xray in the am.   3.) HTN: C/w norvasc; monitor; levels ar erratic. 4.) GERD: protonix   5.) Hypokalemia: resolved. 6.) DVT proph: Lovenox           DISPOSITION: TBD    Medications:  REVIEWED DAILY      Infusion Medications    sodium chloride       Scheduled Medications    amLODIPine  10 mg Oral Daily    aspirin  81 mg Oral Daily    pantoprazole  40 mg Oral QAM AC    tamsulosin  0.4 mg Oral BID    sodium chloride flush  5-40 mL IntraVENous 2 times per day    enoxaparin  30 mg SubCUTAneous BID    ipratropium-albuterol  1 ampule Inhalation Q4H WA    budesonide  500 mcg Nebulization BID    dexamethasone  20 mg IntraVENous Q24H    Followed by   Marika Washington ON 6/7/2022] dexamethasone  10 mg IntraVENous Q24H    remdesivir IVPB  100 mg IntraVENous Q24H     PRN Meds: melatonin, zolpidem, sodium chloride flush, sodium chloride, ondansetron **OR** ondansetron, polyethylene glycol, acetaminophen **OR** acetaminophen, albuterol, guaiFENesin-dextromethorphan, sodium chloride        Labs:     Recent Labs     06/01/22  1703 06/02/22  0515   WBC 9.1 6.9   HGB 14.7 15.1   HCT 45.9 48.1    250       Recent Labs     06/01/22  1703 06/02/22  0515    138   K 3.4* 4.1   CL 91* 99   CO2 25 24   BUN 12 10   CREATININE 0.8 0.8   CALCIUM 8.9 8.6       Recent Labs     06/01/22  1703 06/02/22  0515   PROT 7.8 7.0   ALKPHOS 83 81   ALT 61* 52*   AST 80* 53*   BILITOT 1.0 0.6       No results for input(s): INR in the last 72 hours. No results for input(s): [place] in the last 72 hours.     Chronic labs:    Lab Results   Component Value Date    CHOL 156 04/20/2020    TRIG 171 (H) 02/14/2022    HDL 58 04/20/2020    LDLCALC 76 04/20/2020    TSH 1.740 10/14/2020    PSA 0.60 10/28/2015    INR 1.1 11/14/2020    LABA1C 6.1 (H) 02/14/2022       Radiology: REVIEWED DAILY    +++++++++++++++++++++++++++++++++++++++++++++++++  Ileana WATSON Zaira Salvador MD  [facility] [address]  +++++++++++++++++++++++++++++++++++++++++++++++++  NOTE: This report was transcribed using voice recognition software. Every effort was made to ensure accuracy; however, inadvertent computerized transcription errors may be present.

## 2022-06-02 NOTE — ED NOTES
Pillow provided, urinal emptied & in reach, breakfast tray provided, room cleaned per pt.request.     Melonie Blanton RN  06/02/22 3600

## 2022-06-02 NOTE — H&P
Hospitalist History & Physical      PCP: No primary care provider on file. Date of Service: Pt seen/examined on 6/1/2022     Chief Complaint:  had concerns including Shortness of Breath (Patient arrived to the ED with SpO2 of 81% room air. Pt states he was exposed to covid at work last week. +SOB, +CP, -N/V). History Of Present Illness:    Mr. Ifrah Kay, a 45y.o. year old male  who  has a past medical history of Acid reflux, Asthma, Asthma, COPD (chronic obstructive pulmonary disease) ([facility] [address] Hypertension, Pancreatitis, Prediabetes, Psychiatric problem, Sleep apnea, and Substance abuse ([facility] [address] Patient presented to the emergency department with shortness of breath, wheezing and productive cough. Patient has a history of asthma/COPD is a current smoker. Patient has been vaccinated and boosted for COVID-19. Has been using his nebulizer at home without relief. He denies fever, chills, chest pain, nausea, vomiting. On arrival patient has an SPO2 of 78% on room air. He was placed on 6 L nasal cannula with an SPO2 of 96%. Remainder of his vital signs are within normal limits and stable. He is afebrile. Laboratory studies demonstrate potassium 3.4, glucose 120, , troponin 20, ALT 61, AST 80.  COVID-19 positive. Chest x-ray unremarkable. Patient was given steroids and breathing treatments in the emergency department. Medicine consulted for admission.       Past Medical History:   Diagnosis Date    Acid reflux     Asthma     Asthma     COPD (chronic obstructive pulmonary disease) ([facility] [address]     Hypertension     Pancreatitis     Prediabetes     Psychiatric problem     depressed    Sleep apnea     Substance abuse ([facility] [address]     alcohol and cocaine       Past Surgical History:   Procedure Laterality Date    COLONOSCOPY      2010 neg    NERVE BLOCK Left 08/27/2018    lumbar epidural #1 paramedian    AR NJX DX/THER SBST INTRLMNR LMBR/SAC W/IMG GDN Left 8/27/2018    LUMBAR EPIDURAL STEROID INJECTION L4-5 LEFT PARAMEDIAN #1 performed by Licha Segura MD at [place] [address]         Prior to Admission medications    Medication Sig Start Date End Date Taking? Authorizing Provider   ketorolac (TORADOL) 10 MG tablet Take 1 tablet by mouth every 6 hours as needed for Pain 2/16/22 2/19/22  Keyana Garza MD   ondansetron (ZOFRAN-ODT) 4 MG disintegrating tablet Take 1 tablet by mouth 3 times daily as needed for Nausea or Vomiting 2/16/22   Keyana Garza MD   zolpidem (AMBIEN) 10 MG tablet Take by mouth nightly as needed for Sleep. Historical Provider, MD   Fluticasone-Umeclidin-Vilant (Horris Purchase) 016-47.6-95 MCG/INH AEPB Inhale into the lungs    Historical Provider, MD   aspirin 81 MG chewable tablet Take 1 tablet by mouth daily 11/18/20   Anthony Bryant MD   ipratropium-albuterol (DUONEB) 0.5-2.5 (3) MG/3ML SOLN nebulizer solution Inhale 3 mLs into the lungs every 4 hours as needed for Shortness of Breath 11/17/20 2/13/22  Cyndy Merlos MD   pantoprazole (PROTONIX) 40 MG tablet Take 1 tablet by mouth daily 11/18/20   Anthony Bryant MD   Respiratory Therapy Supplies (FULL KIT NEBULIZER SET) MISC Use as directed with nebulized medication. 10/15/20   Víctor Langley MD   melatonin 3 MG TABS tablet Take 3 mg by mouth nightly as needed (sleep)    Historical Provider, MD   tamsulosin (FLOMAX) 0.4 MG capsule Take 0.4 mg by mouth 2 times daily     Historical Provider, MD   amLODIPine (NORVASC) 10 MG tablet Take 1 tablet by mouth daily. 1/16/15   Phuc Lees MD         Allergies:  Dust mite extract    Social History:    TOBACCO:   reports that he has been smoking cigarettes and cigars. He has a 28.00 pack-year smoking history. He has never used smokeless tobacco.  ETOH:   reports current alcohol use. Family History:    Reviewed in detail and negative for DM, CAD, Cancer, CVA.  Positive as follows\"      Problem Relation Age of Onset    Other Sister     High Blood Pressure Maternal Grandmother     Other Maternal Grandmother        REVIEW OF SYSTEMS:   Pertinent positives as noted in the HPI. All other systems reviewed and negative. PHYSICAL EXAM:  /72   Pulse 90   Temp 98.2 °F (36.8 °C)   Resp 20   SpO2 96%   General appearance: No apparent distress, appears stated age and cooperative. HEENT: Normal cephalic, atraumatic without obvious deformity. Pupils equal, round, and reactive to light. Extra ocular muscles intact. Conjunctivae/corneas clear. Neck: Supple, with full range of motion. No jugular venous distention. Trachea midline. Respiratory: Bilateral wheezes  Cardiovascular: Regular rate and rhythm  Abdomen: Soft, nontender, nondistended  Musculoskeletal: No clubbing, cyanosis, edema of bilateral lower extremities. Brisk capillary refill. Skin: Normal skin color. No rashes or lesions. Neurologic:  Neurovascularly intact without any focal sensory/motor deficits. Cranial nerves: II-XII intact, grossly non-focal.  Psychiatric: Alert and oriented, thought content appropriate, normal insight    Reviewed EKG and CXR personally      CBC:   Recent Labs     06/01/22  1703   WBC 9.1   RBC 5.31   HGB 14.7   HCT 45.9   MCV 86.4   RDW 13.8        BMP:   Recent Labs     06/01/22  1703      K 3.4*   CL 91*   CO2 25   BUN 12   CREATININE 0.8     LFT:  Recent Labs     06/01/22  1703   PROT 7.8   ALKPHOS 83   ALT 61*   AST 80*   BILITOT 1.0     CE:  No results for input(s): Les Tiburcio in the last 72 hours. PT/INR: No results for input(s): INR, APTT in the last 72 hours. BNP: No results for input(s): BNP in the last 72 hours.   ESR:   Lab Results   Component Value Date    SEDRATE 1 06/25/2020     CRP:   Lab Results   Component Value Date    CRP 0.5 (H) 06/01/2022     D Dimer:   Lab Results   Component Value Date    DDIMER <200 06/01/2022      Folate and B12: No results found for: [id number], No results found for: FOLATE  Lactic Acid:   Lab Results   Component Value Date    LACTA 1.2 02/14/2022     Thyroid Studies:   Lab Results   Component Value Date    TSH 1.740 10/14/2020       Oupatient labs:  Lab Results   Component Value Date    CHOL 156 04/20/2020    TRIG 171 (H) 02/14/2022    HDL 58 04/20/2020    LDLCALC 76 04/20/2020    TSH 1.740 10/14/2020    PSA 0.60 10/28/2015    INR 1.1 11/14/2020    LABA1C 6.1 (H) 02/14/2022       Urinalysis:    Lab Results   Component Value Date    NITRU Negative 06/25/2020    WBCUA 1-3 06/25/2020    BACTERIA FEW 06/25/2020    RBCUA NONE 06/25/2020    RBCUA NONE 02/13/2014    BLOODU LARGE 06/25/2020    SPECGRAV >=1.030 06/25/2020    GLUCOSEU Negative 06/25/2020       Imaging:  XR CHEST (2 VW)    Result Date: 6/1/2022  EXAMINATION: TWO XRAY VIEWS OF THE CHEST 6/1/2022 5:20 pm COMPARISON: February 13, 2022 HISTORY: ORDERING SYSTEM PROVIDED HISTORY: cough TECHNOLOGIST PROVIDED HISTORY: Reason for exam:->cough Reason for exam:->SOB What reading provider will be dictating this exam?->CRC FINDINGS: No airspace opacity or pleural effusion. The heart is normal size. No pneumothorax. No free air beneath the hemidiaphragms. No pneumonia or pleural effusion. ASSESSMENT:  -Acute respiratory failure with hypoxia  -COVID-19 infection  -COPD/asthma exacerbation  -Hypokalemia  -Hypertension  -GERD      PLAN:  -Admit to medicine  -Pharmacy to dose baricitinib  -Dexamethasone 20 mg IV daily  -DuoNebs every 4 hours  -Albuterol as needed  -Pulmicort twice daily  -Telemetry  -Monitor serum electrolytes  -Monitor serial inflammatory markers  -Continue home medications        Diet: No diet orders on file  Code Status: Prior  Surrogate decision maker confirmed with patient:   Extended Emergency Contact Information  Primary Emergency Contact: Jaylene Blanchard  Address: [address], [address] [facility] Phone: [phone]  Mobile Phone: [phone]  Relation: Brother/Sister   needed?  No  Secondary Emergency Contact: [facility] Phone: [phone]  Mobile Phone: [phone]  Relation: Other   needed? No    DVT Prophylaxis: []Lovenox []Heparin []PCD [] [address] []Encouraged ambulation  Disposition: []Med/Surg [] Intermediate [] ICU/CCU  Admit status: [] Observation [] Inpatient     +++++++++++++++++++++++++++++++++++++++++++++++++  Carlo Grumbling, DO  +++++++++++++++++++++++++++++++++++++++++++++++++  NOTE: This report was transcribed using voice recognition software. Every effort was made to ensure accuracy; however, inadvertent computerized transcription errors may be present.

## 2022-06-03 ENCOUNTER — APPOINTMENT (OUTPATIENT)
Dept: GENERAL RADIOLOGY | Age: 52
DRG: 177 | End: 2022-06-03
Payer: MEDICARE

## 2022-06-03 LAB
ALBUMIN SERPL-MCNC: 3.9 G/DL (ref 3.5–5.2)
ALP BLD-CCNC: 85 U/L (ref 40–129)
ALT SERPL-CCNC: 48 U/L (ref 0–40)
ANION GAP SERPL CALCULATED.3IONS-SCNC: 15 MMOL/L (ref 7–16)
AST SERPL-CCNC: 30 U/L (ref 0–39)
BASOPHILS ABSOLUTE: 0 E9/L (ref 0–0.2)
BASOPHILS RELATIVE PERCENT: 0.2 % (ref 0–2)
BILIRUB SERPL-MCNC: 0.5 MG/DL (ref 0–1.2)
BUN BLDV-MCNC: 12 MG/DL (ref 6–20)
CALCIUM SERPL-MCNC: 8.9 MG/DL (ref 8.6–10.2)
CHLORIDE BLD-SCNC: 99 MMOL/L (ref 98–107)
CO2: 25 MMOL/L (ref 22–29)
CREAT SERPL-MCNC: 0.7 MG/DL (ref 0.7–1.2)
EOSINOPHILS ABSOLUTE: 0 E9/L (ref 0.05–0.5)
EOSINOPHILS RELATIVE PERCENT: 0 % (ref 0–6)
GFR AFRICAN AMERICAN: >60
GFR NON-AFRICAN AMERICAN: >60 ML/MIN/1.73
GLUCOSE BLD-MCNC: 234 MG/DL (ref 74–99)
HBA1C MFR BLD: 5.8 % (ref 4–5.6)
HCT VFR BLD CALC: 51 % (ref 37–54)
HEMOGLOBIN: 15.8 G/DL (ref 12.5–16.5)
LYMPHOCYTES ABSOLUTE: 0.29 E9/L (ref 1.5–4)
LYMPHOCYTES RELATIVE PERCENT: 2.6 % (ref 20–42)
MAGNESIUM: 2.4 MG/DL (ref 1.6–2.6)
MCH RBC QN AUTO: 27.7 PG (ref 26–35)
MCHC RBC AUTO-ENTMCNC: 31 % (ref 32–34.5)
MCV RBC AUTO: 89.3 FL (ref 80–99.9)
MONOCYTES ABSOLUTE: 0.29 E9/L (ref 0.1–0.95)
MONOCYTES RELATIVE PERCENT: 2.6 % (ref 2–12)
MYELOCYTE PERCENT: 0.9 % (ref 0–0)
NEUTROPHILS ABSOLUTE: 9.31 E9/L (ref 1.8–7.3)
NEUTROPHILS RELATIVE PERCENT: 93.9 % (ref 43–80)
PDW BLD-RTO: 13.4 FL (ref 11.5–15)
PHOSPHORUS: 2.5 MG/DL (ref 2.5–4.5)
PLATELET # BLD: 263 E9/L (ref 130–450)
PMV BLD AUTO: 10.4 FL (ref 7–12)
POTASSIUM REFLEX MAGNESIUM: 4.1 MMOL/L (ref 3.5–5)
RBC # BLD: 5.71 E12/L (ref 3.8–5.8)
SODIUM BLD-SCNC: 139 MMOL/L (ref 132–146)
TOTAL PROTEIN: 7.2 G/DL (ref 6.4–8.3)
WBC # BLD: 9.8 E9/L (ref 4.5–11.5)

## 2022-06-03 PROCEDURE — 2700000000 HC OXYGEN THERAPY PER DAY

## 2022-06-03 PROCEDURE — 71045 X-RAY EXAM CHEST 1 VIEW: CPT

## 2022-06-03 PROCEDURE — 6370000000 HC RX 637 (ALT 250 FOR IP): Performed by: FAMILY MEDICINE

## 2022-06-03 PROCEDURE — 83036 HEMOGLOBIN GLYCOSYLATED A1C: CPT

## 2022-06-03 PROCEDURE — 94640 AIRWAY INHALATION TREATMENT: CPT

## 2022-06-03 PROCEDURE — 2580000003 HC RX 258: Performed by: FAMILY MEDICINE

## 2022-06-03 PROCEDURE — 6360000002 HC RX W HCPCS: Performed by: FAMILY MEDICINE

## 2022-06-03 PROCEDURE — 80053 COMPREHEN METABOLIC PANEL: CPT

## 2022-06-03 PROCEDURE — 6370000000 HC RX 637 (ALT 250 FOR IP): Performed by: INTERNAL MEDICINE

## 2022-06-03 PROCEDURE — 84100 ASSAY OF PHOSPHORUS: CPT

## 2022-06-03 PROCEDURE — 83735 ASSAY OF MAGNESIUM: CPT

## 2022-06-03 PROCEDURE — 36415 COLL VENOUS BLD VENIPUNCTURE: CPT

## 2022-06-03 PROCEDURE — 2060000000 HC ICU INTERMEDIATE R&B

## 2022-06-03 PROCEDURE — 85025 COMPLETE CBC W/AUTO DIFF WBC: CPT

## 2022-06-03 RX ORDER — PSEUDOEPHEDRINE HYDROCHLORIDE 30 MG/1
30 TABLET ORAL ONCE
Status: COMPLETED | OUTPATIENT
Start: 2022-06-03 | End: 2022-06-03

## 2022-06-03 RX ORDER — GUAIFENESIN 400 MG/1
400 TABLET ORAL 3 TIMES DAILY
Status: DISCONTINUED | OUTPATIENT
Start: 2022-06-03 | End: 2022-06-05 | Stop reason: HOSPADM

## 2022-06-03 RX ORDER — NICOTINE 21 MG/24HR
1 PATCH, TRANSDERMAL 24 HOURS TRANSDERMAL DAILY
Status: DISCONTINUED | OUTPATIENT
Start: 2022-06-03 | End: 2022-06-05 | Stop reason: HOSPADM

## 2022-06-03 RX ADMIN — BUDESONIDE 500 MCG: 0.5 SUSPENSION RESPIRATORY (INHALATION) at 09:26

## 2022-06-03 RX ADMIN — TAMSULOSIN HYDROCHLORIDE 0.4 MG: 0.4 CAPSULE ORAL at 08:28

## 2022-06-03 RX ADMIN — GUAIFENESIN 400 MG: 400 TABLET ORAL at 20:59

## 2022-06-03 RX ADMIN — BUDESONIDE 500 MCG: 0.5 SUSPENSION RESPIRATORY (INHALATION) at 22:37

## 2022-06-03 RX ADMIN — ASPIRIN 81 MG CHEWABLE TABLET 81 MG: 81 TABLET CHEWABLE at 08:28

## 2022-06-03 RX ADMIN — ENOXAPARIN SODIUM 30 MG: 100 INJECTION SUBCUTANEOUS at 08:28

## 2022-06-03 RX ADMIN — PANTOPRAZOLE SODIUM 40 MG: 40 TABLET, DELAYED RELEASE ORAL at 08:30

## 2022-06-03 RX ADMIN — ARFORMOTEROL TARTRATE 15 MCG: 15 SOLUTION RESPIRATORY (INHALATION) at 09:25

## 2022-06-03 RX ADMIN — Medication 50 MG: at 08:28

## 2022-06-03 RX ADMIN — Medication 10 ML: at 20:57

## 2022-06-03 RX ADMIN — GUAIFENESIN SYRUP AND DEXTROMETHORPHAN 5 ML: 100; 10 SYRUP ORAL at 14:05

## 2022-06-03 RX ADMIN — OXYCODONE HYDROCHLORIDE AND ACETAMINOPHEN 500 MG: 500 TABLET ORAL at 08:28

## 2022-06-03 RX ADMIN — PSEUDOEPHEDRINE HCL 30 MG: 30 TABLET, FILM COATED ORAL at 15:26

## 2022-06-03 RX ADMIN — IPRATROPIUM BROMIDE AND ALBUTEROL SULFATE 1 AMPULE: .5; 2.5 SOLUTION RESPIRATORY (INHALATION) at 09:24

## 2022-06-03 RX ADMIN — DEXAMETHASONE SODIUM PHOSPHATE 20 MG: 4 INJECTION, SOLUTION INTRAMUSCULAR; INTRAVENOUS at 20:56

## 2022-06-03 RX ADMIN — IPRATROPIUM BROMIDE AND ALBUTEROL SULFATE 1 AMPULE: .5; 2.5 SOLUTION RESPIRATORY (INHALATION) at 22:35

## 2022-06-03 RX ADMIN — IPRATROPIUM BROMIDE AND ALBUTEROL SULFATE 1 AMPULE: .5; 2.5 SOLUTION RESPIRATORY (INHALATION) at 18:08

## 2022-06-03 RX ADMIN — ZOLPIDEM TARTRATE 5 MG: 5 TABLET ORAL at 20:58

## 2022-06-03 RX ADMIN — REMDESIVIR 100 MG: 100 INJECTION, POWDER, LYOPHILIZED, FOR SOLUTION INTRAVENOUS at 23:10

## 2022-06-03 RX ADMIN — IPRATROPIUM BROMIDE AND ALBUTEROL SULFATE 1 AMPULE: .5; 2.5 SOLUTION RESPIRATORY (INHALATION) at 13:24

## 2022-06-03 RX ADMIN — TAMSULOSIN HYDROCHLORIDE 0.4 MG: 0.4 CAPSULE ORAL at 20:59

## 2022-06-03 RX ADMIN — Medication 3 MG: at 20:59

## 2022-06-03 RX ADMIN — ACETAMINOPHEN 650 MG: 325 TABLET ORAL at 14:15

## 2022-06-03 RX ADMIN — Medication 1000 UNITS: at 08:28

## 2022-06-03 RX ADMIN — ENOXAPARIN SODIUM 30 MG: 100 INJECTION SUBCUTANEOUS at 20:59

## 2022-06-03 RX ADMIN — Medication 10 ML: at 08:34

## 2022-06-03 RX ADMIN — AMLODIPINE BESYLATE 10 MG: 10 TABLET ORAL at 08:28

## 2022-06-03 RX ADMIN — ARFORMOTEROL TARTRATE 15 MCG: 15 SOLUTION RESPIRATORY (INHALATION) at 22:36

## 2022-06-03 ASSESSMENT — PAIN SCALES - GENERAL
PAINLEVEL_OUTOF10: 8
PAINLEVEL_OUTOF10: 0

## 2022-06-03 ASSESSMENT — PAIN DESCRIPTION - LOCATION: LOCATION: HEAD

## 2022-06-03 NOTE — PROGRESS NOTES
Hospitalist Progress Note      SYNOPSIS: Patient admitted on 2022 for SOB      SUBJECTIVE:    Patient seen and examined. He states that he cannot expectorate his phlegm. He states that the SOB has improved since he has been here. Stable overnight. No other overnight issues reported. Temp (24hrs), Av.4 °F (36.3 °C), Min:97.2 °F (36.2 °C), Max:97.5 °F (36.4 °C)    DIET: ADULT DIET; Regular; Low Fat/Low Chol/High Fiber/2 gm Na  CODE: Full Code  No intake or output data in the 24 hours ending 22    OBJECTIVE:    /81   Pulse 79   Temp 97.3 °F (36.3 °C) (Temporal)   Resp 20   Ht 5' 9\" (1.753 m)   Wt 262 lb (118.8 kg)   SpO2 95%   BMI 38.69 kg/m²     General appearance: No apparent distress, appears stated age and cooperative. HEENT:  Conjunctivae/corneas clear. Neck: Supple. No jugular venous distention. Respiratory: Diminished breath sound s(b/l)   Cardiovascular: Regular rate rhythm, normal S1-S2  Abdomen: Soft, nontender, nondistended  Musculoskeletal: No clubbing, cyanosis, no bilateral lower extremity edema. Brisk capillary refill. Skin:  No rashes  on visible skin  Neurologic: awake, alert and following commands     ASSESSMENT:  45y.o. year old male  who  has a past medical history of Acid reflux, Asthma, Asthma, COPD (chronic obstructive pulmonary disease) ([facility] [address] Hypertension, Pancreatitis, Prediabetes, Psychiatric problem, Sleep apnea, and Substance abuse ([facility] [address] Patient presented to the emergency department with shortness of breath, wheezing and productive cough. Patient has a history of asthma/COPD is a current smoker. Patient has been vaccinated and boosted for COVID-19. Has been using his nebulizer at home without relief. He denies fever, chills, chest pain, nausea, vomiting. On arrival patient has an SPO2 of 78% on room air. He was placed on 6 L nasal cannula with an SPO2 of 96%.    PLAN:  1.) Acute hypoxic respiratory failure, currently on RA- titrating down from oxygen from 6L  chest Xray shows: No acute process today. Will need an ambulatory pulse at discharge. 2.) COPD/Asthma exacerbation secondary to COVID-19 infection. Chest xray 6/1/22: No PNA or pleural effusion. Continue with decadron, remdesivir and zinc/ascorbic acid/Vitamin D. Duonebs/pulmicort/brovana. Added mucinex/flutter valve to help expectorate the phlegm. 3.) HTN: C/w norvasc; monitor; levels ar erratic. Patient had requested a change in the diet to regular foods- C/w cardiac.    4.) GERD: protonix   5.) Hypokalemia: resolved. 6.) DVT proph: Lovenox   7. .) Prediabetes: hbA1c: 5.8         DISPOSITION: TBD    Medications:  REVIEWED DAILY      Infusion Medications    sodium chloride       Scheduled Medications    nicotine  1 patch TransDERmal Daily    guaiFENesin  600 mg Oral BID    amLODIPine  10 mg Oral Daily    aspirin  81 mg Oral Daily    pantoprazole  40 mg Oral QAM AC    tamsulosin  0.4 mg Oral BID    sodium chloride flush  5-40 mL IntraVENous 2 times per day    enoxaparin  30 mg SubCUTAneous BID    ipratropium-albuterol  1 ampule Inhalation Q4H WA    budesonide  500 mcg Nebulization BID    dexamethasone  20 mg IntraVENous Q24H    Followed by   Manjit Coronado ON 6/7/2022] dexamethasone  10 mg IntraVENous Q24H    remdesivir IVPB  100 mg IntraVENous Q24H    Vitamin D  1,000 Units Oral Daily    ascorbic acid  500 mg Oral Daily    zinc sulfate  50 mg Oral Daily    Arformoterol Tartrate  15 mcg Nebulization BID     PRN Meds: melatonin, zolpidem, sodium chloride flush, sodium chloride, ondansetron **OR** ondansetron, polyethylene glycol, acetaminophen **OR** acetaminophen, albuterol, guaiFENesin-dextromethorphan, sodium chloride        Labs:     Recent Labs     06/01/22  1703 06/02/22  0515 06/03/22  0609   WBC 9.1 6.9 9.8   HGB 14.7 15.1 15.8   HCT 45.9 48.1 51.0    250 263       Recent Labs     06/01/22  1703 06/02/22  0515 06/03/22  0609    138 139   K 3.4* 4.1 4.1   CL 91* 99 99   CO2 25 24 25   BUN 12 10 12   CREATININE 0.8 0.8 0.7   CALCIUM 8.9 8.6 8.9   PHOS  --   --  2.5       Recent Labs     06/01/22  1703 06/02/22  0515 06/03/22  0609   PROT 7.8 7.0 7.2   ALKPHOS 83 81 85   ALT 61* 52* 48*   AST 80* 53* 30   BILITOT 1.0 0.6 0.5       No results for input(s): INR in the last 72 hours. No results for input(s): Zettie Binet in the last 72 hours. Chronic labs:    Lab Results   Component Value Date    CHOL 156 04/20/2020    TRIG 171 (H) 02/14/2022    HDL 58 04/20/2020    LDLCALC 76 04/20/2020    TSH 1.740 10/14/2020    PSA 0.60 10/28/2015    INR 1.1 11/14/2020    LABA1C 5.8 (H) 06/03/2022       Radiology: REVIEWED DAILY    +++++++++++++++++++++++++++++++++++++++++++++++++  Ector Treadwell MD  Sound Physician - [address], [place]  +++++++++++++++++++++++++++++++++++++++++++++++++  NOTE: This report was transcribed using voice recognition software. Every effort was made to ensure accuracy; however, inadvertent computerized transcription errors may be present.

## 2022-06-04 LAB
ALBUMIN SERPL-MCNC: 4.1 G/DL (ref 3.5–5.2)
ALP BLD-CCNC: 92 U/L (ref 40–129)
ALT SERPL-CCNC: 52 U/L (ref 0–40)
ANION GAP SERPL CALCULATED.3IONS-SCNC: 12 MMOL/L (ref 7–16)
AST SERPL-CCNC: 31 U/L (ref 0–39)
BASOPHILS ABSOLUTE: 0.02 E9/L (ref 0–0.2)
BASOPHILS RELATIVE PERCENT: 0.2 % (ref 0–2)
BILIRUB SERPL-MCNC: 0.3 MG/DL (ref 0–1.2)
BUN BLDV-MCNC: 12 MG/DL (ref 6–20)
CALCIUM SERPL-MCNC: 8.8 MG/DL (ref 8.6–10.2)
CHLORIDE BLD-SCNC: 95 MMOL/L (ref 98–107)
CO2: 30 MMOL/L (ref 22–29)
CREAT SERPL-MCNC: 0.7 MG/DL (ref 0.7–1.2)
EOSINOPHILS ABSOLUTE: 0 E9/L (ref 0.05–0.5)
EOSINOPHILS RELATIVE PERCENT: 0 % (ref 0–6)
GFR AFRICAN AMERICAN: >60
GFR NON-AFRICAN AMERICAN: >60 ML/MIN/1.73
GLUCOSE BLD-MCNC: 287 MG/DL (ref 74–99)
HCT VFR BLD CALC: 49.5 % (ref 37–54)
HEMOGLOBIN: 15.5 G/DL (ref 12.5–16.5)
IMMATURE GRANULOCYTES #: 0.09 E9/L
IMMATURE GRANULOCYTES %: 0.9 % (ref 0–5)
LYMPHOCYTES ABSOLUTE: 0.53 E9/L (ref 1.5–4)
LYMPHOCYTES RELATIVE PERCENT: 5.3 % (ref 20–42)
MAGNESIUM: 2 MG/DL (ref 1.6–2.6)
MCH RBC QN AUTO: 27.8 PG (ref 26–35)
MCHC RBC AUTO-ENTMCNC: 31.3 % (ref 32–34.5)
MCV RBC AUTO: 88.7 FL (ref 80–99.9)
MONOCYTES ABSOLUTE: 0.22 E9/L (ref 0.1–0.95)
MONOCYTES RELATIVE PERCENT: 2.2 % (ref 2–12)
NEUTROPHILS ABSOLUTE: 9.07 E9/L (ref 1.8–7.3)
NEUTROPHILS RELATIVE PERCENT: 91.4 % (ref 43–80)
PDW BLD-RTO: 13.2 FL (ref 11.5–15)
PHOSPHORUS: 3.5 MG/DL (ref 2.5–4.5)
PLATELET # BLD: 294 E9/L (ref 130–450)
PMV BLD AUTO: 10.6 FL (ref 7–12)
POTASSIUM REFLEX MAGNESIUM: 3.9 MMOL/L (ref 3.5–5)
PROCALCITONIN: 0.02 NG/ML (ref 0–0.08)
RBC # BLD: 5.58 E12/L (ref 3.8–5.8)
RBC # BLD: NORMAL 10*6/UL
SODIUM BLD-SCNC: 137 MMOL/L (ref 132–146)
TOTAL PROTEIN: 6.8 G/DL (ref 6.4–8.3)
WBC # BLD: 9.9 E9/L (ref 4.5–11.5)

## 2022-06-04 PROCEDURE — 6360000002 HC RX W HCPCS: Performed by: FAMILY MEDICINE

## 2022-06-04 PROCEDURE — 94640 AIRWAY INHALATION TREATMENT: CPT

## 2022-06-04 PROCEDURE — 6370000000 HC RX 637 (ALT 250 FOR IP): Performed by: FAMILY MEDICINE

## 2022-06-04 PROCEDURE — 2580000003 HC RX 258: Performed by: FAMILY MEDICINE

## 2022-06-04 PROCEDURE — 36415 COLL VENOUS BLD VENIPUNCTURE: CPT

## 2022-06-04 PROCEDURE — 2060000000 HC ICU INTERMEDIATE R&B

## 2022-06-04 PROCEDURE — 85025 COMPLETE CBC W/AUTO DIFF WBC: CPT

## 2022-06-04 PROCEDURE — 80053 COMPREHEN METABOLIC PANEL: CPT

## 2022-06-04 PROCEDURE — 83735 ASSAY OF MAGNESIUM: CPT

## 2022-06-04 PROCEDURE — 84145 PROCALCITONIN (PCT): CPT

## 2022-06-04 PROCEDURE — 84100 ASSAY OF PHOSPHORUS: CPT

## 2022-06-04 PROCEDURE — 6370000000 HC RX 637 (ALT 250 FOR IP): Performed by: INTERNAL MEDICINE

## 2022-06-04 RX ADMIN — Medication 1000 UNITS: at 08:21

## 2022-06-04 RX ADMIN — GUAIFENESIN 400 MG: 400 TABLET ORAL at 15:21

## 2022-06-04 RX ADMIN — ARFORMOTEROL TARTRATE 15 MCG: 15 SOLUTION RESPIRATORY (INHALATION) at 07:36

## 2022-06-04 RX ADMIN — SODIUM CHLORIDE 25 ML: 9 INJECTION, SOLUTION INTRAVENOUS at 21:40

## 2022-06-04 RX ADMIN — GUAIFENESIN 400 MG: 400 TABLET ORAL at 08:21

## 2022-06-04 RX ADMIN — PANTOPRAZOLE SODIUM 40 MG: 40 TABLET, DELAYED RELEASE ORAL at 06:53

## 2022-06-04 RX ADMIN — BUDESONIDE 500 MCG: 0.5 SUSPENSION RESPIRATORY (INHALATION) at 19:15

## 2022-06-04 RX ADMIN — Medication 3 MG: at 21:47

## 2022-06-04 RX ADMIN — IPRATROPIUM BROMIDE AND ALBUTEROL SULFATE 1 AMPULE: .5; 2.5 SOLUTION RESPIRATORY (INHALATION) at 19:15

## 2022-06-04 RX ADMIN — AMLODIPINE BESYLATE 10 MG: 10 TABLET ORAL at 08:21

## 2022-06-04 RX ADMIN — ENOXAPARIN SODIUM 30 MG: 100 INJECTION SUBCUTANEOUS at 20:35

## 2022-06-04 RX ADMIN — BUDESONIDE 500 MCG: 0.5 SUSPENSION RESPIRATORY (INHALATION) at 07:36

## 2022-06-04 RX ADMIN — DEXAMETHASONE SODIUM PHOSPHATE 20 MG: 4 INJECTION, SOLUTION INTRAMUSCULAR; INTRAVENOUS at 20:35

## 2022-06-04 RX ADMIN — Medication 50 MG: at 08:21

## 2022-06-04 RX ADMIN — Medication 10 ML: at 20:36

## 2022-06-04 RX ADMIN — ENOXAPARIN SODIUM 30 MG: 100 INJECTION SUBCUTANEOUS at 08:20

## 2022-06-04 RX ADMIN — GUAIFENESIN 400 MG: 400 TABLET ORAL at 20:35

## 2022-06-04 RX ADMIN — TAMSULOSIN HYDROCHLORIDE 0.4 MG: 0.4 CAPSULE ORAL at 08:21

## 2022-06-04 RX ADMIN — IPRATROPIUM BROMIDE AND ALBUTEROL SULFATE 1 AMPULE: .5; 2.5 SOLUTION RESPIRATORY (INHALATION) at 07:36

## 2022-06-04 RX ADMIN — SODIUM CHLORIDE 25 ML: 9 INJECTION, SOLUTION INTRAVENOUS at 20:32

## 2022-06-04 RX ADMIN — IPRATROPIUM BROMIDE AND ALBUTEROL SULFATE 1 AMPULE: .5; 2.5 SOLUTION RESPIRATORY (INHALATION) at 12:11

## 2022-06-04 RX ADMIN — ARFORMOTEROL TARTRATE 15 MCG: 15 SOLUTION RESPIRATORY (INHALATION) at 19:15

## 2022-06-04 RX ADMIN — ASPIRIN 81 MG CHEWABLE TABLET 81 MG: 81 TABLET CHEWABLE at 08:21

## 2022-06-04 RX ADMIN — REMDESIVIR 100 MG: 100 INJECTION, POWDER, LYOPHILIZED, FOR SOLUTION INTRAVENOUS at 21:42

## 2022-06-04 RX ADMIN — IPRATROPIUM BROMIDE AND ALBUTEROL SULFATE 1 AMPULE: .5; 2.5 SOLUTION RESPIRATORY (INHALATION) at 16:02

## 2022-06-04 RX ADMIN — OXYCODONE HYDROCHLORIDE AND ACETAMINOPHEN 500 MG: 500 TABLET ORAL at 08:21

## 2022-06-04 RX ADMIN — Medication 10 ML: at 08:20

## 2022-06-04 RX ADMIN — ZOLPIDEM TARTRATE 5 MG: 5 TABLET ORAL at 21:47

## 2022-06-04 RX ADMIN — TAMSULOSIN HYDROCHLORIDE 0.4 MG: 0.4 CAPSULE ORAL at 20:35

## 2022-06-04 NOTE — PROGRESS NOTES
Hospitalist Progress Note      SYNOPSIS: Patient admitted on 2022 for SOB      SUBJECTIVE:    Patient reports that he is still congested and hearing some wheezing sounds. He continues to have cough and sputum production. Denies chest pain. Came off oxygen but having significant shortness of breath with ambulation. No fever or chills    Stable overnight. No other overnight issues reported. Temp (24hrs), Av.7 °F (35.9 °C), Min:96.5 °F (35.8 °C), Max:96.8 °F (36 °C)    DIET: ADULT DIET; Regular  CODE: Full Code    Intake/Output Summary (Last 24 hours) at 2022 1611  Last data filed at 6/3/2022 2341  Gross per 24 hour   Intake 877.88 ml   Output --   Net 877.88 ml       OBJECTIVE:    BP (!) 137/90   Pulse 85   Temp (!) 96.5 °F (35.8 °C) (Temporal)   Resp 16   Ht 5' 9\" (1.753 m)   Wt 262 lb (118.8 kg)   SpO2 96%   BMI 38.69 kg/m²     General appearance: No apparent distress, appears stated age and cooperative. HEENT:  Conjunctivae/corneas clear. Neck: Supple. No jugular venous distention. Respiratory: Diminished breath sound s(b/l)   Cardiovascular: Regular rate rhythm, normal S1-S2  Abdomen: Soft, nontender, nondistended  Musculoskeletal: No clubbing, cyanosis, no bilateral lower extremity edema. Brisk capillary refill. Skin:  No rashes  on visible skin  Neurologic: awake, alert and following commands     ASSESSMENT:  45y.o. year old male  who  has a past medical history of Acid reflux, Asthma, Asthma, COPD (chronic obstructive pulmonary disease) ([facility] [address] Hypertension, Pancreatitis, Prediabetes, Psychiatric problem, Sleep apnea, and Substance abuse ([facility] [address] Patient presented to the emergency department with shortness of breath, wheezing and productive cough. Patient has a history of asthma/COPD is a current smoker. Patient has been vaccinated and boosted for COVID-19. Has been using his nebulizer at home without relief. He denies fever, chills, chest pain, nausea, vomiting.   On arrival patient has an SPO2 of 78% on room air. He was placed on 6 L nasal cannula with an SPO2 of 96%. PLAN:  1.) Acute hypoxic respiratory failure, currently on RA- titrating down from oxygen from 6L, short of breath with ambulation, although walking desaturation test    2.) COPD/Asthma exacerbation secondary to COVID-19 infection. Chest xray 6/1/22: No PNA or pleural effusion. Continue with decadron, remdesivir (1 more day) and zinc/ascorbic acid/Vitamin D. Duonebs/pulmicort/brovana. Added mucinex/flutter valve to help expectorate the phlegm. 3.) HTN: C/w norvasc; monitor; levels ar erratic. Patient had requested a change in the diet to regular foods- C/w cardiac.    4.) GERD: protonix   5.) Hypokalemia: resolved. 6.) DVT proph: Lovenox   7. .) Prediabetes: hbA1c: 5.8       DISPOSITION: TBD    Medications:  REVIEWED DAILY      Infusion Medications    sodium chloride       Scheduled Medications    nicotine  1 patch TransDERmal Daily    guaiFENesin  400 mg Oral TID    amLODIPine  10 mg Oral Daily    aspirin  81 mg Oral Daily    pantoprazole  40 mg Oral QAM AC    tamsulosin  0.4 mg Oral BID    sodium chloride flush  5-40 mL IntraVENous 2 times per day    enoxaparin  30 mg SubCUTAneous BID    ipratropium-albuterol  1 ampule Inhalation Q4H WA    budesonide  500 mcg Nebulization BID    dexamethasone  20 mg IntraVENous Q24H    Followed by   Noé Gonzales ON 6/7/2022] dexamethasone  10 mg IntraVENous Q24H    remdesivir IVPB  100 mg IntraVENous Q24H    Vitamin D  1,000 Units Oral Daily    ascorbic acid  500 mg Oral Daily    zinc sulfate  50 mg Oral Daily    Arformoterol Tartrate  15 mcg Nebulization BID     PRN Meds: melatonin, zolpidem, sodium chloride flush, sodium chloride, ondansetron **OR** ondansetron, polyethylene glycol, acetaminophen **OR** acetaminophen, albuterol, guaiFENesin-dextromethorphan, sodium chloride        Labs:     Recent Labs     06/02/22  0515 06/03/22  0609 06/04/22  0510   WBC 6.9 9.8 9. 9   HGB 15.1 15.8 15.5   HCT 48.1 51.0 49.5    263 294       Recent Labs     06/02/22  0515 06/03/22  0609 06/04/22  0510    139 137   K 4.1 4.1 3.9   CL 99 99 95*   CO2 24 25 30*   BUN 10 12 12   CREATININE 0.8 0.7 0.7   CALCIUM 8.6 8.9 8.8   PHOS  --  2.5 3.5       Recent Labs     06/02/22  0515 06/03/22  0609 06/04/22  0510   PROT 7.0 7.2 6.8   ALKPHOS 81 85 92   ALT 52* 48* 52*   AST 53* 30 31   BILITOT 0.6 0.5 0.3       No results for input(s): INR in the last 72 hours. No results for input(s): [place] in the last 72 hours. Chronic labs:    Lab Results   Component Value Date    CHOL 156 04/20/2020    TRIG 171 (H) 02/14/2022    HDL 58 04/20/2020    LDLCALC 76 04/20/2020    TSH 1.740 10/14/2020    PSA 0.60 10/28/2015    INR 1.1 11/14/2020    LABA1C 5.8 (H) 06/03/2022       Radiology: REVIEWED DAILY    +++++++++++++++++++++++++++++++++++++++++++++++++  Nakia Whitfield MD  Sound Physician - [address], [address]  +++++++++++++++++++++++++++++++++++++++++++++++++  NOTE: This report was transcribed using voice recognition software. Every effort was made to ensure accuracy; however, inadvertent computerized transcription errors may be present.

## 2022-06-04 NOTE — PROGRESS NOTES
Patient upset with low fat diet, perfect serve message sent to Dr. Manuel Miranda to see if we are able to change it.

## 2022-06-05 VITALS
RESPIRATION RATE: 19 BRPM | SYSTOLIC BLOOD PRESSURE: 142 MMHG | BODY MASS INDEX: 38.8 KG/M2 | WEIGHT: 262 LBS | HEART RATE: 78 BPM | HEIGHT: 69 IN | TEMPERATURE: 98.1 F | OXYGEN SATURATION: 95 % | DIASTOLIC BLOOD PRESSURE: 109 MMHG

## 2022-06-05 LAB
ALBUMIN SERPL-MCNC: 4.2 G/DL (ref 3.5–5.2)
ANION GAP SERPL CALCULATED.3IONS-SCNC: 15 MMOL/L (ref 7–16)
ANISOCYTOSIS: ABNORMAL
BASOPHILS ABSOLUTE: 0 E9/L (ref 0–0.2)
BASOPHILS RELATIVE PERCENT: 0.2 % (ref 0–2)
BUN BLDV-MCNC: 14 MG/DL (ref 6–20)
CALCIUM SERPL-MCNC: 9.4 MG/DL (ref 8.6–10.2)
CHLORIDE BLD-SCNC: 96 MMOL/L (ref 98–107)
CO2: 27 MMOL/L (ref 22–29)
CREAT SERPL-MCNC: 0.7 MG/DL (ref 0.7–1.2)
EOSINOPHILS ABSOLUTE: 0 E9/L (ref 0.05–0.5)
EOSINOPHILS RELATIVE PERCENT: 0 % (ref 0–6)
GFR AFRICAN AMERICAN: >60
GFR NON-AFRICAN AMERICAN: >60 ML/MIN/1.73
GLUCOSE BLD-MCNC: 379 MG/DL (ref 74–99)
HCT VFR BLD CALC: 49.2 % (ref 37–54)
HEMOGLOBIN: 15.7 G/DL (ref 12.5–16.5)
LYMPHOCYTES ABSOLUTE: 0.28 E9/L (ref 1.5–4)
LYMPHOCYTES RELATIVE PERCENT: 2.6 % (ref 20–42)
MCH RBC QN AUTO: 27.6 PG (ref 26–35)
MCHC RBC AUTO-ENTMCNC: 31.9 % (ref 32–34.5)
MCV RBC AUTO: 86.5 FL (ref 80–99.9)
MONOCYTES ABSOLUTE: 0 E9/L (ref 0.1–0.95)
MONOCYTES RELATIVE PERCENT: 2.5 % (ref 2–12)
NEUTROPHILS ABSOLUTE: 9.02 E9/L (ref 1.8–7.3)
NEUTROPHILS RELATIVE PERCENT: 97.4 % (ref 43–80)
NUCLEATED RED BLOOD CELLS: 0.9 /100 WBC
PDW BLD-RTO: 13.4 FL (ref 11.5–15)
PHOSPHORUS: 4.7 MG/DL (ref 2.5–4.5)
PLATELET # BLD: 308 E9/L (ref 130–450)
PMV BLD AUTO: 10.1 FL (ref 7–12)
POTASSIUM SERPL-SCNC: 4.3 MMOL/L (ref 3.5–5)
RBC # BLD: 5.69 E12/L (ref 3.8–5.8)
SODIUM BLD-SCNC: 138 MMOL/L (ref 132–146)
WBC # BLD: 9.3 E9/L (ref 4.5–11.5)

## 2022-06-05 PROCEDURE — 6360000002 HC RX W HCPCS: Performed by: FAMILY MEDICINE

## 2022-06-05 PROCEDURE — 80069 RENAL FUNCTION PANEL: CPT

## 2022-06-05 PROCEDURE — 6370000000 HC RX 637 (ALT 250 FOR IP): Performed by: FAMILY MEDICINE

## 2022-06-05 PROCEDURE — 36415 COLL VENOUS BLD VENIPUNCTURE: CPT

## 2022-06-05 PROCEDURE — 2580000003 HC RX 258: Performed by: FAMILY MEDICINE

## 2022-06-05 PROCEDURE — 6370000000 HC RX 637 (ALT 250 FOR IP): Performed by: INTERNAL MEDICINE

## 2022-06-05 PROCEDURE — 85025 COMPLETE CBC W/AUTO DIFF WBC: CPT

## 2022-06-05 PROCEDURE — 94640 AIRWAY INHALATION TREATMENT: CPT

## 2022-06-05 RX ADMIN — ARFORMOTEROL TARTRATE 15 MCG: 15 SOLUTION RESPIRATORY (INHALATION) at 09:35

## 2022-06-05 RX ADMIN — ENOXAPARIN SODIUM 30 MG: 100 INJECTION SUBCUTANEOUS at 08:16

## 2022-06-05 RX ADMIN — IPRATROPIUM BROMIDE AND ALBUTEROL SULFATE 1 AMPULE: .5; 2.5 SOLUTION RESPIRATORY (INHALATION) at 09:34

## 2022-06-05 RX ADMIN — ASPIRIN 81 MG CHEWABLE TABLET 81 MG: 81 TABLET CHEWABLE at 08:16

## 2022-06-05 RX ADMIN — BUDESONIDE 500 MCG: 0.5 SUSPENSION RESPIRATORY (INHALATION) at 09:34

## 2022-06-05 RX ADMIN — Medication 10 ML: at 08:25

## 2022-06-05 RX ADMIN — PANTOPRAZOLE SODIUM 40 MG: 40 TABLET, DELAYED RELEASE ORAL at 08:24

## 2022-06-05 RX ADMIN — IPRATROPIUM BROMIDE AND ALBUTEROL SULFATE 1 AMPULE: .5; 2.5 SOLUTION RESPIRATORY (INHALATION) at 13:08

## 2022-06-05 RX ADMIN — Medication 1000 UNITS: at 08:16

## 2022-06-05 RX ADMIN — IPRATROPIUM BROMIDE AND ALBUTEROL SULFATE 1 AMPULE: .5; 2.5 SOLUTION RESPIRATORY (INHALATION) at 16:38

## 2022-06-05 RX ADMIN — GUAIFENESIN 400 MG: 400 TABLET ORAL at 08:16

## 2022-06-05 RX ADMIN — TAMSULOSIN HYDROCHLORIDE 0.4 MG: 0.4 CAPSULE ORAL at 08:16

## 2022-06-05 RX ADMIN — GUAIFENESIN 400 MG: 400 TABLET ORAL at 16:45

## 2022-06-05 RX ADMIN — AMLODIPINE BESYLATE 10 MG: 10 TABLET ORAL at 08:16

## 2022-06-05 RX ADMIN — Medication 50 MG: at 08:16

## 2022-06-05 RX ADMIN — OXYCODONE HYDROCHLORIDE AND ACETAMINOPHEN 500 MG: 500 TABLET ORAL at 08:16

## 2022-06-05 ASSESSMENT — PAIN SCALES - GENERAL: PAINLEVEL_OUTOF10: 0

## 2022-06-05 NOTE — PROGRESS NOTES
Pulse ox was 94% on room air at rest.  Ambulated patient on room air. Oxygen saturation was 91% on room air while ambulating.

## 2022-06-05 NOTE — DISCHARGE SUMMARY
Hospitalist Discharge Summary    Patient ID: Nora Contreras   Patient : 1970  Patient's PCP: No primary care provider on file. Admit Date: 2022   Admitting Physician: Laxmi Richardson MD    Discharge Date:  2022   Discharge Physician: Junito Glynn MD   Discharge Condition: Stable  Discharge Disposition: [facility] course in brief:  Mr. Nora Contreras, a 45y.o. year old male  who  has a past medical history of Acid reflux, Asthma, Asthma, COPD (chronic obstructive pulmonary disease) ([facility] [address] Hypertension, Pancreatitis, Prediabetes, Psychiatric problem, Sleep apnea, and Substance abuse ([facility] [address]    Patient presented to the emergency department with shortness of breath, wheezing and productive cough. Patient has a history of asthma/COPD is a current smoker. Patient has been vaccinated and boosted for COVID-19. Has been using his nebulizer at home without relief. He denies fever, chills, chest pain, nausea, vomiting. On arrival patient has an SPO2 of 78% on room air. He was placed on 6 L nasal cannula with an SPO2 of 96%. Remainder of his vital signs are within normal limits and stable. He is afebrile. Laboratory studies demonstrate potassium 3.4, glucose 120, , troponin 20, ALT 61, AST 80.  COVID-19 positive. Chest x-ray unremarkable. Patient was given steroids and breathing treatments in the emergency department. The patient was admitted to the hospital for further management. He was started on remdesivir and Decadron. After 4 days of treatment, he felt much better, he came off oxygen and did not use oxygen the last 24 hours, he passed 6 minutes walking desaturation test and is ready to be discharged home.          Consults:   IP CONSULT TO HOSPITALIST  IP CONSULT TO PHARMACY      Activity: activity as tolerated    Significant labs:  CBC:   Recent Labs     22  0609 22  0510 22  0551   WBC 9.8 9.9 9.3   RBC 5.71 5.58 5.69   HGB 15.8 15.5 15.7   HCT 51.0 49.5 49.2   MCV 89.3 88.7 86.5   RDW 13.4 13.2 13.4    294 308     BMP:   Recent Labs     06/03/22  0609 06/04/22  0510 06/05/22  0551    137 138   K 4.1 3.9 4.3   CL 99 95* 96*   CO2 25 30* 27   BUN 12 12 14   CREATININE 0.7 0.7 0.7   MG 2.4 2.0  --    PHOS 2.5 3.5 4.7*     LFT:  Recent Labs     06/03/22  0609 06/04/22  0510   PROT 7.2 6.8   ALKPHOS 85 92   ALT 48* 52*   AST 30 31   BILITOT 0.5 0.3     PT/INR: No results for input(s): INR, APTT in the last 72 hours. BNP: No results for input(s): BNP in the last 72 hours. Hgb A1C:   Lab Results   Component Value Date    LABA1C 5.8 (H) 06/03/2022     Folate and B12: No results found for: [id number], No results found for: FOLATE  Thyroid Studies:   Lab Results   Component Value Date    TSH 1.740 10/14/2020       Urinalysis:    Lab Results   Component Value Date    NITRU Negative 06/25/2020    WBCUA 1-3 06/25/2020    BACTERIA FEW 06/25/2020    RBCUA NONE 06/25/2020    RBCUA NONE 02/13/2014    BLOODU LARGE 06/25/2020    SPECGRAV >=1.030 06/25/2020    GLUCOSEU Negative 06/25/2020       Imaging:  XR CHEST (2 VW)    Result Date: 6/1/2022  EXAMINATION: TWO XRAY VIEWS OF THE CHEST 6/1/2022 5:20 pm COMPARISON: February 13, 2022 HISTORY: ORDERING SYSTEM PROVIDED HISTORY: cough TECHNOLOGIST PROVIDED HISTORY: Reason for exam:->cough Reason for exam:->SOB What reading provider will be dictating this exam?->CRC FINDINGS: No airspace opacity or pleural effusion. The heart is normal size. No pneumothorax. No free air beneath the hemidiaphragms. No pneumonia or pleural effusion.      XR CHEST PORTABLE    Result Date: 6/3/2022  EXAMINATION: ONE XRAY VIEW OF THE CHEST 6/3/2022 9:01 am COMPARISON: 06/01/2022 HISTORY: ORDERING SYSTEM PROVIDED HISTORY: COVID; eval for pneumoniae TECHNOLOGIST PROVIDED HISTORY: Reason for exam:->COVID; eval for pneumoniae What reading provider will be dictating this exam?->CRC FINDINGS: Heart size is normal.  There are no infiltrates or effusions. Normal chest       Discharge Medications:      Medication List      CONTINUE taking these medications    * albuterol (2.5 MG/3ML) 0.083% nebulizer solution  Commonly known as: PROVENTIL     * Ventolin  (90 Base) MCG/ACT inhaler  Generic drug: albuterol sulfate HFA     amLODIPine 10 MG tablet  Commonly known as: Norvasc  Take 1 tablet by mouth daily. aspirin 81 MG chewable tablet  Take 1 tablet by mouth daily     atorvastatin 10 MG tablet  Commonly known as: LIPITOR     buPROPion 150 MG extended release tablet  Commonly known as: WELLBUTRIN SR     gabapentin 300 MG capsule  Commonly known as: NEURONTIN     oxybutynin 5 MG extended release tablet  Commonly known as: DITROPAN-XL     tamsulosin 0.4 mg capsule  Commonly known as: FLOMAX     Trelegy Ellipta 200-62.5-25 MCG/INH Aepb  Generic drug: Fluticasone-Umeclidin-Vilant     zolpidem 5 MG tablet  Commonly known as: AMBIEN         * This list has 2 medication(s) that are the same as other medications prescribed for you. Read the directions carefully, and ask your doctor or other care provider to review them with you. STOP taking these medications    azithromycin 250 MG tablet  Commonly known as: ZITHROMAX            Time Spent on discharge is more than 45 minutes in the examination, evaluation, counseling and review of medications and discharge plan.    +++++++++++++++++++++++++++++++++++++++++++++++++  Johnna García MD  [address]  +++++++++++++++++++++++++++++++++++++++++++++++++  NOTE: This report was transcribed using voice recognition software. Every effort was made to ensure accuracy; however, inadvertent computerized transcription errors may be present.

## 2022-06-05 NOTE — PLAN OF CARE
Problem: Chronic Conditions and Co-morbidities  Goal: Patient's chronic conditions and co-morbidity symptoms are monitored and maintained or improved  Outcome: Completed     Problem: Discharge Planning  Goal: Discharge to home or other facility with appropriate resources  Outcome: Completed     Problem: ABCDS Injury Assessment  Goal: Absence of physical injury  Outcome: Completed     Problem: Safety - Adult  Goal: Free from fall injury  Outcome: Completed     Problem: Pain  Goal: Verbalizes/displays adequate comfort level or baseline comfort level  Outcome: Completed

## 2022-06-05 NOTE — DISCHARGE INSTR - DIET

## 2022-06-06 ENCOUNTER — CARE COORDINATION (OUTPATIENT)
Dept: CARE COORDINATION | Age: 52
End: 2022-06-06

## 2022-06-06 NOTE — CARE COORDINATION
[facility] Initial Follow Up Call    Call within 2 business days of discharge: Yes    Patient: Ramu Pandya Patient : 1970   MRN: <[id number]>  Reason for Admission: -22 Acute Respiratory failure; Covid-19Virus  Discharge Date: 22 RARS: Readmission Risk Score: 10.1 ( )      Last Discharge [facility]       Complaint Diagnosis Description Type Department Provider    22 Shortness of Breath Acute respiratory failure with hypoxia ([facility] [address] . .. ED to Hosp-Admission (Discharged) (ADMITTED) Mya Baez MD; Abel Cadroso DO;. .. Date/Time:  2022 11:51 AM  Attempted to reach patient by telephone for [facility]'S Transition call. Call within 2 business days of discharge: Yes Left HIPPA compliant message requesting a return call. Will attempt to reach patient again. Carola Joseph LPN Care Coordinator  [facility] [place] [address] Transition Team  [phone]    Facility: [facility]    Care Transitions 24 Hour Call    Do you have all of your prescriptions and are they filled?: Yes  Care Transitions Interventions    Social Work: Completed           Follow Up  No future appointments.     Jewell Hudson LPN

## 2022-06-07 ENCOUNTER — CARE COORDINATION (OUTPATIENT)
Dept: CARE COORDINATION | Age: 52
End: 2022-06-07

## 2022-06-07 NOTE — CARE COORDINATION
[facility] Transitions Initial Follow Up Call    Call within 2 business days of discharge: Yes    Patient: Shannan Munguia Patient : 1970   MRN: <[id number]>  Reason for Admission: -22 Acute Respiratory failure; Covid-19Virus  Discharge Date: 22 RARS: Readmission Risk Score: 10.1 ( )      Last Discharge [facility]       Complaint Diagnosis Description Type Department Provider    22 Shortness of Breath Acute respiratory failure with hypoxia ([facility] [address] . .. ED to Hosp-Admission (Discharged) (ADMITTED) Joe Donaldson MD; Silver Del Angel DO;. .. Date/Time:  2022 1:19 PM  Second attempt made to reach patient by telephone for Initial Covid Care Transition call. Call within 2 business days of discharge: Yes Left HIPPA compliant message requesting a return call. CTN will sign off. Facility:[facility]     Pt has a Non [facility] PCP    STOP taking:  azithromycin 250 MG tablet (ZITHROMAX)    Care Transitions 24 Hour Call    Do you have all of your prescriptions and are they filled?: Yes  Care Transitions Interventions    Social Work: Completed           Follow Up  No future appointments.     Harpreet Can LPN

## 2022-06-20 ENCOUNTER — HOSPITAL ENCOUNTER (INPATIENT)
Age: 52
LOS: 3 days | Discharge: HOME HEALTH | DRG: 190 | End: 2022-06-24
Attending: EMERGENCY MEDICINE | Admitting: FAMILY MEDICINE
Payer: MEDICARE

## 2022-06-20 DIAGNOSIS — J96.01 ACUTE RESPIRATORY FAILURE WITH HYPOXIA (HCC): Primary | ICD-10-CM

## 2022-06-20 DIAGNOSIS — J18.9 PNEUMONIA OF LEFT LOWER LOBE DUE TO INFECTIOUS ORGANISM: ICD-10-CM

## 2022-06-20 PROCEDURE — 99285 EMERGENCY DEPT VISIT HI MDM: CPT

## 2022-06-20 PROCEDURE — 96375 TX/PRO/DX INJ NEW DRUG ADDON: CPT

## 2022-06-20 PROCEDURE — 96374 THER/PROPH/DIAG INJ IV PUSH: CPT

## 2022-06-20 PROCEDURE — 84484 ASSAY OF TROPONIN QUANT: CPT

## 2022-06-20 PROCEDURE — 93005 ELECTROCARDIOGRAM TRACING: CPT | Performed by: EMERGENCY MEDICINE

## 2022-06-20 PROCEDURE — 85025 COMPLETE CBC W/AUTO DIFF WBC: CPT

## 2022-06-20 PROCEDURE — 83880 ASSAY OF NATRIURETIC PEPTIDE: CPT

## 2022-06-20 PROCEDURE — 80053 COMPREHEN METABOLIC PANEL: CPT

## 2022-06-20 ASSESSMENT — PAIN - FUNCTIONAL ASSESSMENT: PAIN_FUNCTIONAL_ASSESSMENT: NONE - DENIES PAIN

## 2022-06-20 NOTE — LETTER
[facility]  Phone: [phone]             June 24, 2022    Patient: Tawana Stokes   YOB: 1970   Date of Visit: 6/20/2022       To Whom It May Concern:    Tawana Stokes was seen and treated in our facility  beginning 6/20/2022 until 6/24/2022.        Sincerely,       Pieter Dempsey RN         Signature:__________________________________

## 2022-06-21 ENCOUNTER — APPOINTMENT (OUTPATIENT)
Dept: GENERAL RADIOLOGY | Age: 52
DRG: 190 | End: 2022-06-21
Payer: MEDICARE

## 2022-06-21 ENCOUNTER — APPOINTMENT (OUTPATIENT)
Dept: CT IMAGING | Age: 52
DRG: 190 | End: 2022-06-21
Payer: MEDICARE

## 2022-06-21 LAB
ALBUMIN SERPL-MCNC: 3.8 G/DL (ref 3.5–5.2)
ALBUMIN SERPL-MCNC: 3.8 G/DL (ref 3.5–5.2)
ALP BLD-CCNC: 102 U/L (ref 40–129)
ALP BLD-CCNC: 95 U/L (ref 40–129)
ALT SERPL-CCNC: 65 U/L (ref 0–40)
ALT SERPL-CCNC: 72 U/L (ref 0–40)
ANION GAP SERPL CALCULATED.3IONS-SCNC: 10 MMOL/L (ref 7–16)
ANION GAP SERPL CALCULATED.3IONS-SCNC: 16 MMOL/L (ref 7–16)
ANISOCYTOSIS: ABNORMAL
AST SERPL-CCNC: 23 U/L (ref 0–39)
AST SERPL-CCNC: 37 U/L (ref 0–39)
BASOPHILS ABSOLUTE: 0.02 E9/L (ref 0–0.2)
BASOPHILS ABSOLUTE: 0.03 E9/L (ref 0–0.2)
BASOPHILS RELATIVE PERCENT: 0.3 % (ref 0–2)
BASOPHILS RELATIVE PERCENT: 0.4 % (ref 0–2)
BILIRUB SERPL-MCNC: 0.5 MG/DL (ref 0–1.2)
BILIRUB SERPL-MCNC: 0.5 MG/DL (ref 0–1.2)
BUN BLDV-MCNC: 10 MG/DL (ref 6–20)
BUN BLDV-MCNC: 9 MG/DL (ref 6–20)
CALCIUM SERPL-MCNC: 8.6 MG/DL (ref 8.6–10.2)
CALCIUM SERPL-MCNC: 9 MG/DL (ref 8.6–10.2)
CHLORIDE BLD-SCNC: 96 MMOL/L (ref 98–107)
CHLORIDE BLD-SCNC: 96 MMOL/L (ref 98–107)
CO2: 31 MMOL/L (ref 22–29)
CO2: 35 MMOL/L (ref 22–29)
CREAT SERPL-MCNC: 0.8 MG/DL (ref 0.7–1.2)
CREAT SERPL-MCNC: 0.8 MG/DL (ref 0.7–1.2)
EKG ATRIAL RATE: 110 BPM
EKG P AXIS: 63 DEGREES
EKG P-R INTERVAL: 154 MS
EKG Q-T INTERVAL: 350 MS
EKG QRS DURATION: 108 MS
EKG QTC CALCULATION (BAZETT): 473 MS
EKG R AXIS: -62 DEGREES
EKG T AXIS: 67 DEGREES
EKG VENTRICULAR RATE: 110 BPM
EOSINOPHILS ABSOLUTE: 0 E9/L (ref 0.05–0.5)
EOSINOPHILS ABSOLUTE: 0.12 E9/L (ref 0.05–0.5)
EOSINOPHILS RELATIVE PERCENT: 0 % (ref 0–6)
EOSINOPHILS RELATIVE PERCENT: 1.5 % (ref 0–6)
GFR AFRICAN AMERICAN: >60
GFR AFRICAN AMERICAN: >60
GFR NON-AFRICAN AMERICAN: >60 ML/MIN/1.73
GFR NON-AFRICAN AMERICAN: >60 ML/MIN/1.73
GLUCOSE BLD-MCNC: 138 MG/DL (ref 74–99)
GLUCOSE BLD-MCNC: 207 MG/DL (ref 74–99)
HCT VFR BLD CALC: 47.7 % (ref 37–54)
HCT VFR BLD CALC: 48.3 % (ref 37–54)
HEMOGLOBIN: 14.3 G/DL (ref 12.5–16.5)
HEMOGLOBIN: 14.5 G/DL (ref 12.5–16.5)
IMMATURE GRANULOCYTES #: 0.1 E9/L
IMMATURE GRANULOCYTES #: 0.12 E9/L
IMMATURE GRANULOCYTES %: 1.3 % (ref 0–5)
IMMATURE GRANULOCYTES %: 1.5 % (ref 0–5)
INFLUENZA A: NOT DETECTED
INFLUENZA B: NOT DETECTED
L. PNEUMOPHILA SEROGP 1 UR AG: NORMAL
LYMPHOCYTES ABSOLUTE: 0.56 E9/L (ref 1.5–4)
LYMPHOCYTES ABSOLUTE: 2.23 E9/L (ref 1.5–4)
LYMPHOCYTES RELATIVE PERCENT: 28.7 % (ref 20–42)
LYMPHOCYTES RELATIVE PERCENT: 7.5 % (ref 20–42)
MCH RBC QN AUTO: 27.5 PG (ref 26–35)
MCH RBC QN AUTO: 27.7 PG (ref 26–35)
MCHC RBC AUTO-ENTMCNC: 29.6 % (ref 32–34.5)
MCHC RBC AUTO-ENTMCNC: 30.4 % (ref 32–34.5)
MCV RBC AUTO: 91.2 FL (ref 80–99.9)
MCV RBC AUTO: 92.9 FL (ref 80–99.9)
MONOCYTES ABSOLUTE: 0.07 E9/L (ref 0.1–0.95)
MONOCYTES ABSOLUTE: 0.53 E9/L (ref 0.1–0.95)
MONOCYTES RELATIVE PERCENT: 0.9 % (ref 2–12)
MONOCYTES RELATIVE PERCENT: 6.8 % (ref 2–12)
NEUTROPHILS ABSOLUTE: 4.73 E9/L (ref 1.8–7.3)
NEUTROPHILS ABSOLUTE: 6.67 E9/L (ref 1.8–7.3)
NEUTROPHILS RELATIVE PERCENT: 61.1 % (ref 43–80)
NEUTROPHILS RELATIVE PERCENT: 90 % (ref 43–80)
PDW BLD-RTO: 14.3 FL (ref 11.5–15)
PDW BLD-RTO: 14.5 FL (ref 11.5–15)
PLATELET # BLD: 274 E9/L (ref 130–450)
PLATELET # BLD: 277 E9/L (ref 130–450)
PMV BLD AUTO: 9.4 FL (ref 7–12)
PMV BLD AUTO: 9.5 FL (ref 7–12)
POIKILOCYTES: ABNORMAL
POLYCHROMASIA: ABNORMAL
POTASSIUM SERPL-SCNC: 3.4 MMOL/L (ref 3.5–5)
POTASSIUM SERPL-SCNC: 4.7 MMOL/L (ref 3.5–5)
PRO-BNP: 82 PG/ML (ref 0–125)
PROCALCITONIN: 0.03 NG/ML (ref 0–0.08)
RBC # BLD: 5.2 E12/L (ref 3.8–5.8)
RBC # BLD: 5.23 E12/L (ref 3.8–5.8)
SARS-COV-2 RNA, RT PCR: NOT DETECTED
SARS-COV-2, NAAT: NOT DETECTED
SODIUM BLD-SCNC: 141 MMOL/L (ref 132–146)
SODIUM BLD-SCNC: 143 MMOL/L (ref 132–146)
STOMATOCYTES: ABNORMAL
STREP PNEUMONIAE ANTIGEN, URINE: NORMAL
TOTAL PROTEIN: 6.9 G/DL (ref 6.4–8.3)
TOTAL PROTEIN: 7 G/DL (ref 6.4–8.3)
TROPONIN, HIGH SENSITIVITY: 15 NG/L (ref 0–11)
TROPONIN, HIGH SENSITIVITY: 16 NG/L (ref 0–11)
WBC # BLD: 7.4 E9/L (ref 4.5–11.5)
WBC # BLD: 7.8 E9/L (ref 4.5–11.5)

## 2022-06-21 PROCEDURE — 6360000002 HC RX W HCPCS

## 2022-06-21 PROCEDURE — 71045 X-RAY EXAM CHEST 1 VIEW: CPT

## 2022-06-21 PROCEDURE — 87635 SARS-COV-2 COVID-19 AMP PRB: CPT

## 2022-06-21 PROCEDURE — 6370000000 HC RX 637 (ALT 250 FOR IP)

## 2022-06-21 PROCEDURE — 71275 CT ANGIOGRAPHY CHEST: CPT

## 2022-06-21 PROCEDURE — 87449 NOS EACH ORGANISM AG IA: CPT

## 2022-06-21 PROCEDURE — 2500000003 HC RX 250 WO HCPCS: Performed by: FAMILY MEDICINE

## 2022-06-21 PROCEDURE — 94664 DEMO&/EVAL PT USE INHALER: CPT

## 2022-06-21 PROCEDURE — 6360000002 HC RX W HCPCS: Performed by: FAMILY MEDICINE

## 2022-06-21 PROCEDURE — 85025 COMPLETE CBC W/AUTO DIFF WBC: CPT

## 2022-06-21 PROCEDURE — 94640 AIRWAY INHALATION TREATMENT: CPT

## 2022-06-21 PROCEDURE — 6370000000 HC RX 637 (ALT 250 FOR IP): Performed by: FAMILY MEDICINE

## 2022-06-21 PROCEDURE — 84484 ASSAY OF TROPONIN QUANT: CPT

## 2022-06-21 PROCEDURE — 80053 COMPREHEN METABOLIC PANEL: CPT

## 2022-06-21 PROCEDURE — 87636 SARSCOV2 & INF A&B AMP PRB: CPT

## 2022-06-21 PROCEDURE — 84145 PROCALCITONIN (PCT): CPT

## 2022-06-21 PROCEDURE — 2580000003 HC RX 258: Performed by: FAMILY MEDICINE

## 2022-06-21 PROCEDURE — 36415 COLL VENOUS BLD VENIPUNCTURE: CPT

## 2022-06-21 PROCEDURE — 6360000004 HC RX CONTRAST MEDICATION: Performed by: STUDENT IN AN ORGANIZED HEALTH CARE EDUCATION/TRAINING PROGRAM

## 2022-06-21 PROCEDURE — 2060000000 HC ICU INTERMEDIATE R&B

## 2022-06-21 RX ORDER — POTASSIUM CHLORIDE 20 MEQ/1
40 TABLET, EXTENDED RELEASE ORAL PRN
Status: DISCONTINUED | OUTPATIENT
Start: 2022-06-21 | End: 2022-06-24 | Stop reason: HOSPADM

## 2022-06-21 RX ORDER — PANTOPRAZOLE SODIUM 40 MG/1
TABLET, DELAYED RELEASE ORAL
COMMUNITY
Start: 2022-06-14

## 2022-06-21 RX ORDER — SODIUM CHLORIDE 0.9 % (FLUSH) 0.9 %
10 SYRINGE (ML) INJECTION EVERY 12 HOURS SCHEDULED
Status: DISCONTINUED | OUTPATIENT
Start: 2022-06-21 | End: 2022-06-24 | Stop reason: HOSPADM

## 2022-06-21 RX ORDER — POTASSIUM CHLORIDE 7.45 MG/ML
10 INJECTION INTRAVENOUS PRN
Status: DISCONTINUED | OUTPATIENT
Start: 2022-06-21 | End: 2022-06-24 | Stop reason: HOSPADM

## 2022-06-21 RX ORDER — GABAPENTIN 300 MG/1
300 CAPSULE ORAL 3 TIMES DAILY
Status: DISCONTINUED | OUTPATIENT
Start: 2022-06-21 | End: 2022-06-24 | Stop reason: HOSPADM

## 2022-06-21 RX ORDER — IPRATROPIUM BROMIDE AND ALBUTEROL SULFATE 2.5; .5 MG/3ML; MG/3ML
3 SOLUTION RESPIRATORY (INHALATION) ONCE
Status: COMPLETED | OUTPATIENT
Start: 2022-06-21 | End: 2022-06-21

## 2022-06-21 RX ORDER — ATORVASTATIN CALCIUM 10 MG/1
10 TABLET, FILM COATED ORAL DAILY
Status: DISCONTINUED | OUTPATIENT
Start: 2022-06-21 | End: 2022-06-24 | Stop reason: HOSPADM

## 2022-06-21 RX ORDER — BUDESONIDE 0.25 MG/2ML
250 INHALANT ORAL 2 TIMES DAILY
Status: DISCONTINUED | OUTPATIENT
Start: 2022-06-21 | End: 2022-06-24 | Stop reason: HOSPADM

## 2022-06-21 RX ORDER — TAMSULOSIN HYDROCHLORIDE 0.4 MG/1
0.8 CAPSULE ORAL NIGHTLY
Status: DISCONTINUED | OUTPATIENT
Start: 2022-06-21 | End: 2022-06-23

## 2022-06-21 RX ORDER — OXYBUTYNIN CHLORIDE 5 MG/1
5 TABLET, EXTENDED RELEASE ORAL DAILY
Status: DISCONTINUED | OUTPATIENT
Start: 2022-06-21 | End: 2022-06-24 | Stop reason: HOSPADM

## 2022-06-21 RX ORDER — BUPROPION HYDROCHLORIDE 150 MG/1
150 TABLET, EXTENDED RELEASE ORAL DAILY
Status: DISCONTINUED | OUTPATIENT
Start: 2022-06-21 | End: 2022-06-24 | Stop reason: HOSPADM

## 2022-06-21 RX ORDER — FLUTICASONE PROPIONATE 50 MCG
SPRAY, SUSPENSION (ML) NASAL
COMMUNITY
Start: 2022-04-13

## 2022-06-21 RX ORDER — ONDANSETRON 2 MG/ML
4 INJECTION INTRAMUSCULAR; INTRAVENOUS ONCE
Status: COMPLETED | OUTPATIENT
Start: 2022-06-21 | End: 2022-06-21

## 2022-06-21 RX ORDER — PROMETHAZINE HYDROCHLORIDE 12.5 MG/1
12.5 TABLET ORAL EVERY 6 HOURS PRN
Status: DISCONTINUED | OUTPATIENT
Start: 2022-06-21 | End: 2022-06-24 | Stop reason: HOSPADM

## 2022-06-21 RX ORDER — ENOXAPARIN SODIUM 100 MG/ML
30 INJECTION SUBCUTANEOUS 2 TIMES DAILY
Status: DISCONTINUED | OUTPATIENT
Start: 2022-06-21 | End: 2022-06-24 | Stop reason: HOSPADM

## 2022-06-21 RX ORDER — ASPIRIN 81 MG/1
81 TABLET, CHEWABLE ORAL DAILY
Status: DISCONTINUED | OUTPATIENT
Start: 2022-06-21 | End: 2022-06-24 | Stop reason: HOSPADM

## 2022-06-21 RX ORDER — ALBUTEROL SULFATE 2.5 MG/3ML
2.5 SOLUTION RESPIRATORY (INHALATION) EVERY 6 HOURS PRN
Status: DISCONTINUED | OUTPATIENT
Start: 2022-06-21 | End: 2022-06-24 | Stop reason: HOSPADM

## 2022-06-21 RX ORDER — POTASSIUM CHLORIDE 20 MEQ/1
20 TABLET, EXTENDED RELEASE ORAL ONCE
Status: COMPLETED | OUTPATIENT
Start: 2022-06-21 | End: 2022-06-21

## 2022-06-21 RX ORDER — ARFORMOTEROL TARTRATE 15 UG/2ML
15 SOLUTION RESPIRATORY (INHALATION) 2 TIMES DAILY
Status: DISCONTINUED | OUTPATIENT
Start: 2022-06-21 | End: 2022-06-24 | Stop reason: HOSPADM

## 2022-06-21 RX ORDER — ZOLPIDEM TARTRATE 5 MG/1
5 TABLET ORAL NIGHTLY PRN
Status: DISCONTINUED | OUTPATIENT
Start: 2022-06-21 | End: 2022-06-24 | Stop reason: HOSPADM

## 2022-06-21 RX ORDER — ACETAMINOPHEN 325 MG/1
650 TABLET ORAL EVERY 6 HOURS PRN
Status: DISCONTINUED | OUTPATIENT
Start: 2022-06-21 | End: 2022-06-24 | Stop reason: HOSPADM

## 2022-06-21 RX ORDER — SODIUM CHLORIDE 0.9 % (FLUSH) 0.9 %
10 SYRINGE (ML) INJECTION PRN
Status: DISCONTINUED | OUTPATIENT
Start: 2022-06-21 | End: 2022-06-24 | Stop reason: HOSPADM

## 2022-06-21 RX ORDER — ONDANSETRON 2 MG/ML
4 INJECTION INTRAMUSCULAR; INTRAVENOUS EVERY 6 HOURS PRN
Status: DISCONTINUED | OUTPATIENT
Start: 2022-06-21 | End: 2022-06-24 | Stop reason: HOSPADM

## 2022-06-21 RX ORDER — TRAMADOL HYDROCHLORIDE 50 MG/1
TABLET ORAL
COMMUNITY
Start: 2022-06-07

## 2022-06-21 RX ORDER — POLYETHYLENE GLYCOL 3350 17 G/17G
17 POWDER, FOR SOLUTION ORAL DAILY PRN
Status: DISCONTINUED | OUTPATIENT
Start: 2022-06-21 | End: 2022-06-24 | Stop reason: HOSPADM

## 2022-06-21 RX ORDER — AMLODIPINE BESYLATE 5 MG/1
10 TABLET ORAL DAILY
Status: DISCONTINUED | OUTPATIENT
Start: 2022-06-21 | End: 2022-06-24 | Stop reason: HOSPADM

## 2022-06-21 RX ORDER — IPRATROPIUM BROMIDE AND ALBUTEROL SULFATE 2.5; .5 MG/3ML; MG/3ML
1 SOLUTION RESPIRATORY (INHALATION)
Status: DISCONTINUED | OUTPATIENT
Start: 2022-06-21 | End: 2022-06-22

## 2022-06-21 RX ORDER — METHYLPREDNISOLONE SODIUM SUCCINATE 125 MG/2ML
125 INJECTION, POWDER, LYOPHILIZED, FOR SOLUTION INTRAMUSCULAR; INTRAVENOUS ONCE
Status: COMPLETED | OUTPATIENT
Start: 2022-06-21 | End: 2022-06-21

## 2022-06-21 RX ORDER — ACETAMINOPHEN 650 MG/1
650 SUPPOSITORY RECTAL EVERY 6 HOURS PRN
Status: DISCONTINUED | OUTPATIENT
Start: 2022-06-21 | End: 2022-06-24 | Stop reason: HOSPADM

## 2022-06-21 RX ORDER — SODIUM CHLORIDE 9 MG/ML
INJECTION, SOLUTION INTRAVENOUS PRN
Status: DISCONTINUED | OUTPATIENT
Start: 2022-06-21 | End: 2022-06-24 | Stop reason: HOSPADM

## 2022-06-21 RX ORDER — TADALAFIL 5 MG/1
TABLET ORAL
COMMUNITY
Start: 2022-03-31

## 2022-06-21 RX ADMIN — ZOLPIDEM TARTRATE 5 MG: 5 TABLET ORAL at 20:04

## 2022-06-21 RX ADMIN — TAMSULOSIN HYDROCHLORIDE 0.8 MG: 0.4 CAPSULE ORAL at 20:05

## 2022-06-21 RX ADMIN — IPRATROPIUM BROMIDE AND ALBUTEROL SULFATE 1 AMPULE: .5; 2.5 SOLUTION RESPIRATORY (INHALATION) at 16:43

## 2022-06-21 RX ADMIN — ENOXAPARIN SODIUM 30 MG: 100 INJECTION SUBCUTANEOUS at 20:04

## 2022-06-21 RX ADMIN — GABAPENTIN 300 MG: 300 CAPSULE ORAL at 09:15

## 2022-06-21 RX ADMIN — ENOXAPARIN SODIUM 30 MG: 100 INJECTION SUBCUTANEOUS at 09:15

## 2022-06-21 RX ADMIN — AMLODIPINE BESYLATE 10 MG: 5 TABLET ORAL at 09:14

## 2022-06-21 RX ADMIN — GABAPENTIN 300 MG: 300 CAPSULE ORAL at 20:04

## 2022-06-21 RX ADMIN — ATORVASTATIN CALCIUM 10 MG: 10 TABLET, FILM COATED ORAL at 09:15

## 2022-06-21 RX ADMIN — IPRATROPIUM BROMIDE AND ALBUTEROL SULFATE 1 AMPULE: .5; 2.5 SOLUTION RESPIRATORY (INHALATION) at 08:35

## 2022-06-21 RX ADMIN — ONDANSETRON HYDROCHLORIDE 4 MG: 2 SOLUTION INTRAMUSCULAR; INTRAVENOUS at 00:47

## 2022-06-21 RX ADMIN — BUDESONIDE 250 MCG: 0.25 SUSPENSION RESPIRATORY (INHALATION) at 19:44

## 2022-06-21 RX ADMIN — IPRATROPIUM BROMIDE AND ALBUTEROL SULFATE 3 AMPULE: .5; 2.5 SOLUTION RESPIRATORY (INHALATION) at 01:05

## 2022-06-21 RX ADMIN — SODIUM CHLORIDE, PRESERVATIVE FREE 10 ML: 5 INJECTION INTRAVENOUS at 20:07

## 2022-06-21 RX ADMIN — ASPIRIN 81 MG CHEWABLE TABLET 81 MG: 81 TABLET CHEWABLE at 09:15

## 2022-06-21 RX ADMIN — IPRATROPIUM BROMIDE AND ALBUTEROL SULFATE 1 AMPULE: .5; 2.5 SOLUTION RESPIRATORY (INHALATION) at 12:17

## 2022-06-21 RX ADMIN — DOXYCYCLINE 100 MG: 100 INJECTION, POWDER, LYOPHILIZED, FOR SOLUTION INTRAVENOUS at 03:50

## 2022-06-21 RX ADMIN — ARFORMOTEROL TARTRATE 15 MCG: 15 SOLUTION RESPIRATORY (INHALATION) at 19:44

## 2022-06-21 RX ADMIN — IOPAMIDOL 75 ML: 755 INJECTION, SOLUTION INTRAVENOUS at 02:42

## 2022-06-21 RX ADMIN — METHYLPREDNISOLONE SODIUM SUCCINATE 125 MG: 125 INJECTION, POWDER, FOR SOLUTION INTRAMUSCULAR; INTRAVENOUS at 01:06

## 2022-06-21 RX ADMIN — WATER 1000 MG: 1 INJECTION INTRAMUSCULAR; INTRAVENOUS; SUBCUTANEOUS at 03:49

## 2022-06-21 RX ADMIN — IPRATROPIUM BROMIDE AND ALBUTEROL SULFATE 1 AMPULE: .5; 2.5 SOLUTION RESPIRATORY (INHALATION) at 19:44

## 2022-06-21 RX ADMIN — POTASSIUM CHLORIDE 20 MEQ: 1500 TABLET, EXTENDED RELEASE ORAL at 03:49

## 2022-06-21 RX ADMIN — DOXYCYCLINE 100 MG: 100 INJECTION, POWDER, LYOPHILIZED, FOR SOLUTION INTRAVENOUS at 17:32

## 2022-06-21 ASSESSMENT — ENCOUNTER SYMPTOMS
DIARRHEA: 0
COUGH: 1
COLOR CHANGE: 0
SORE THROAT: 0
SHORTNESS OF BREATH: 1
CONSTIPATION: 0
VOMITING: 0
CHEST TIGHTNESS: 1
NAUSEA: 0
BLOOD IN STOOL: 0
CHOKING: 0
ABDOMINAL PAIN: 0

## 2022-06-21 NOTE — ED PROVIDER NOTES
[address]      Pt Name: Juan A Hdz  MRN: [id number]  [place] 1970  Date of evaluation: 6/20/2022      CHIEF COMPLAINT       Chief Complaint   Patient presents with    Shortness of Breath     Dx with covid 3 weeks ago, has been increasingly sob last few days, states he drops to mid 70s on pulse ox at home when ambulating. Pt coughing up yellow sputum, Pt has SpO2 of 86% on RA in triage         HPI  Juan A Hdz is a 45 y.o. male  with PMHx of HTN, GERD, COPD, JANESSA( on CPAP)  presents with sob worsening for the past 5 days. SOB worse on exertion associated with cough productive. States he was recently admitted with COVID 2 weeks ago,he required oxygen but was discharged home without oxygen. States that shortly after going home SOB started to worsen. Associated with chills on and off for 5 days. Endorses chest tightness across anterior chest wall worsening since onset of SOB. Patient states he has an oxygen tank at home from several years ago but no he no longer has oxygen. He has a nebulizer treatment at home but ran out today. States he used his pulse ox today and it has been under 90% as low as 70% after walking to the bathroom and 87-88% at rest.   Describes symptoms moderate in severity with no alleviating or exacerbating factors. Denies any fever, n/v, headache, dizziness, vision changes, neck tenderness or stiffness, weakness, cp, palpitations, leg swelling/tenderness, abd pain, dysuria, hematuria, diarrhea, constipation. He is a smoker, denies drugs or alcohol use. Except as noted above the remainder of the review of systems was reviewed and negative. Review of Systems   Constitutional: Negative for appetite change, chills, fatigue and fever. HENT: Negative for congestion and sore throat. Eyes: Negative for visual disturbance.    Respiratory: Positive for cough, chest tightness and shortness of breath. Negative for choking. Cardiovascular: Negative for chest pain, palpitations and leg swelling. Gastrointestinal: Negative for abdominal pain, blood in stool, constipation, diarrhea, nausea and vomiting. Endocrine: Negative for polyphagia. Genitourinary: Negative for decreased urine volume, difficulty urinating, flank pain and hematuria. Musculoskeletal: Negative for arthralgias, gait problem, joint swelling and myalgias. Skin: Negative for color change, pallor, rash and wound. Neurological: Negative for dizziness, tremors, seizures, syncope, weakness, light-headedness, numbness and headaches. Hematological: Negative for adenopathy. Does not bruise/bleed easily. Psychiatric/Behavioral: Negative for confusion and hallucinations. All other systems reviewed and are negative. Physical Exam  Vitals reviewed. Constitutional:       General: He is not in acute distress. Appearance: Normal appearance. He is obese. He is not ill-appearing, toxic-appearing or diaphoretic. HENT:      Head: Normocephalic and atraumatic. Right Ear: External ear normal.      Left Ear: External ear normal.      Nose: Nose normal. No congestion or rhinorrhea. Mouth/Throat:      Mouth: Mucous membranes are moist.      Pharynx: Oropharynx is clear. No oropharyngeal exudate or posterior oropharyngeal erythema. Eyes:      Extraocular Movements: Extraocular movements intact. Conjunctiva/sclera: Conjunctivae normal.      Pupils: Pupils are equal, round, and reactive to light. Cardiovascular:      Rate and Rhythm: Normal rate and regular rhythm. Pulses: Normal pulses. Pulmonary:      Effort: Pulmonary effort is normal. No respiratory distress. Breath sounds: Decreased breath sounds present. No wheezing, rhonchi or rales. Chest:      Chest wall: No tenderness. Abdominal:      General: Abdomen is flat. Bowel sounds are normal. There is no distension. Palpations: Abdomen is soft. Tenderness: There is no abdominal tenderness. There is no right CVA tenderness, left CVA tenderness or guarding. Hernia: No hernia is present. Musculoskeletal:      Cervical back: Normal range of motion. Right lower leg: No edema. Left lower leg: No edema. Skin:     General: Skin is warm and dry. Capillary Refill: Capillary refill takes less than 2 seconds. Neurological:      General: No focal deficit present. Mental Status: He is alert and oriented to person, place, and time. Mental status is at baseline. Psychiatric:         Mood and Affect: Mood normal.         Behavior: Behavior normal.         Thought Content: Thought content normal.         Judgment: Judgment normal.          Procedures     MDM     45 y.o. male  with PMHx of HTN, GERD, COPD, JANESSA (uses cpap)  presents with sob worsening for the past 5 days. SOB worse on exertion associated with cough productive. States he was recently admitted with COVID 2 weeks ago,he required oxygen but was discharged home without oxygen. States that shortly after going home SOB started to worsen. While in the ED patient was tachycardiac and hypoxic but otherwise hemodynamically stable, afebrile, nontoxic-appearing. Physical exam remarkable for diminished breath sounds bilaterally but no wheezing, rhonchi or rales noted. Labs remarkable for elevated trop with delta trop trending down, slight hypokalemia which was replenished orally. EKG sinus tach with no sign of acute ischemia. CXR remarkable for hazy left basilar opacity. CTA Pulm minimal disease involving the lower lungs. No pulmonary embolism, aortic aneurysm or dissection. Patient received 3 duonebs and solumedrol, zofran with significant symptomatic relief. He remained hypoxia requiring 4 L throughout his stay. He was started on rocephin and doxy and admitted to the Medicine team for further management. Patient in agreement with plan of admission.      ED Course as of 06/21/22 [address] Jun 21, 2022   0257 EKG: This EKG is signed by emergency department physician. Rate: 109  Rhythm: Sinus  Interpretation: sinus tachycardia and LAD  Comparison: stable as compared to patient's most recent EKG     \ [TC]   0303 IMPRESSION:  Left basilar disease, which may suggest atelectasis or alternatively,  pneumonia in the appropriate setting. [TC]   [id number] IMPRESSION:  Minimal disease involving the lower lungs. This may represent consolidative  changes or alternatively, atelectasis.     No pulmonary embolism, aortic aneurysm or dissection. [TC]      ED Course User Index  [TC] Yomi Cardenas MD       --------------------------------------------- PAST HISTORY ---------------------------------------------  Past Medical History:  has a past medical history of Acid reflux, Asthma, Asthma, COPD (chronic obstructive pulmonary disease) ([facility] [address] Hypertension, Pancreatitis, Prediabetes, Psychiatric problem, Sleep apnea, and Substance abuse ([facility] Past Surgical History:  has a past surgical history that includes [place] tooth extraction; Colonoscopy; Nerve Block (Left, 08/27/2018); and pr njx dx/ther sbst intrlmnr lmbr/sac w/img gdn (Left, 8/27/2018). Social History:  reports that he has been smoking cigarettes and cigars. He has a 28.00 pack-year smoking history. He has never used smokeless tobacco. He reports current alcohol use. He reports current drug use. Drugs:  and Cocaine. Family History: family history includes High Blood Pressure in his maternal grandmother; Other in his maternal grandmother and sister. The patients home medications have been reviewed.     Allergies: Dust mite extract    -------------------------------------------------- RESULTS -------------------------------------------------    LABS:  Results for orders placed or performed during the hospital encounter of 06/20/22   COVID-19, Rapid    Specimen: Nasopharyngeal Swab   Result Value Ref Range    SARS-CoV-2, NAAT Not Detected Not Detected   CBC with Auto Differential   Result Value Ref Range    WBC 7.8 4.5 - 11.5 E9/L    RBC 5.23 3.80 - 5.80 E12/L    Hemoglobin 14.5 12.5 - 16.5 g/dL    Hematocrit 47.7 37.0 - 54.0 %    MCV 91.2 80.0 - 99.9 fL    MCH 27.7 26.0 - 35.0 pg    MCHC 30.4 (L) 32.0 - 34.5 %    RDW 14.3 11.5 - 15.0 fL    Platelets 853 809 - 955 E9/L    MPV 9.5 7.0 - 12.0 fL    Neutrophils % 61.1 43.0 - 80.0 %    Immature Granulocytes % 1.5 0.0 - 5.0 %    Lymphocytes % 28.7 20.0 - 42.0 %    Monocytes % 6.8 2.0 - 12.0 %    Eosinophils % 1.5 0.0 - 6.0 %    Basophils % 0.4 0.0 - 2.0 %    Neutrophils Absolute 4.73 1.80 - 7.30 E9/L    Immature Granulocytes # 0.12 E9/L    Lymphocytes Absolute 2.23 1.50 - 4.00 E9/L    Monocytes Absolute 0.53 0.10 - 0.95 E9/L    Eosinophils Absolute 0.12 0.05 - 0.50 E9/L    Basophils Absolute 0.03 0.00 - 0.20 E9/L   Comprehensive Metabolic Panel   Result Value Ref Range    Sodium 143 132 - 146 mmol/L    Potassium 3.4 (L) 3.5 - 5.0 mmol/L    Chloride 96 (L) 98 - 107 mmol/L    CO2 31 (H) 22 - 29 mmol/L    Anion Gap 16 7 - 16 mmol/L    Glucose 138 (H) 74 - 99 mg/dL    BUN 10 6 - 20 mg/dL    CREATININE 0.8 0.7 - 1.2 mg/dL    GFR Non-African American >60 >=60 mL/min/1.73    GFR African American >60     Calcium 9.0 8.6 - 10.2 mg/dL    Total Protein 7.0 6.4 - 8.3 g/dL    Albumin 3.8 3.5 - 5.2 g/dL    Total Bilirubin 0.5 0.0 - 1.2 mg/dL    Alkaline Phosphatase 102 40 - 129 U/L    ALT 72 (H) 0 - 40 U/L    AST 37 0 - 39 U/L   Troponin   Result Value Ref Range    Troponin, High Sensitivity 16 (H) 0 - 11 ng/L   Brain Natriuretic Peptide   Result Value Ref Range    Pro-BNP 82 0 - 125 pg/mL   Troponin   Result Value Ref Range    Troponin, High Sensitivity 15 (H) 0 - 11 ng/L   Procalcitonin   Result Value Ref Range    Procalcitonin 0.03 0.00 - 0.08 ng/mL   EKG 12 Lead   Result Value Ref Range    Ventricular Rate 110 BPM    Atrial Rate 110 BPM    P-R Interval 154 ms    QRS Duration 108 ms    Q-T Interval 350 ms QTc Calculation (Bazett) 473 ms    P Axis 63 degrees    R Axis -62 degrees    T Axis 67 degrees       RADIOLOGY:  CTA PULMONARY W CONTRAST   Final Result   Minimal disease involving the lower lungs. This may represent consolidative   changes or alternatively, atelectasis. No pulmonary embolism, aortic aneurysm or dissection. XR CHEST PORTABLE   Final Result   Left basilar disease, which may suggest atelectasis or alternatively,   pneumonia in the appropriate setting.             ------------------------- NURSING NOTES AND VITALS REVIEWED ---------------------------  Date / Time Roomed:  6/20/2022 11:45 PM  ED Bed Assignment:  13/13    The nursing notes within the ED encounter and vital signs as below have been reviewed. Patient Vitals for the past 24 hrs:   BP Temp Temp src Pulse Resp SpO2 Weight   06/21/22 0325 107/86 -- -- (!) 107 18 91 % --   06/21/22 0217 110/68 98.1 °F (36.7 °C) Oral (!) 110 18 93 % --   06/21/22 0106 -- -- -- (!) 104 -- -- --   06/20/22 2346 126/89 98.8 °F (37.1 °C) -- (!) 114 20 (!) 86 % 270 lb (122.5 kg)       Oxygen Saturation Interpretation: Abnormal    ------------------------------------------ PROGRESS NOTES ------------------------------------------  Re-evaluation(s):  Time: 1200  Patients symptoms show no change  Repeat physical examination is not changed    Counseling:  I have spoken with the patient and discussed todays results, in addition to providing specific details for the plan of care and counseling regarding the diagnosis and prognosis. Their questions are answered at this time and they are agreeable with the plan of admission.    --------------------------------- ADDITIONAL PROVIDER NOTES ---------------------------------  Consultations:  Spoke with Dr. Eugene Diaz. Discussed case. They will admit the patient.   This patient's ED course included: a personal history and physicial examination, re-evaluation prior to disposition, multiple bedside re-evaluations, IV medications, cardiac monitoring, continuous pulse oximetry and complex medical decision making and emergency management    This patient has remained hemodynamically stable during their ED course. Diagnosis:  1. Acute respiratory failure with hypoxia ([address]    2. Pneumonia of left lower lobe due to infectious organism        Disposition:  Patient's disposition: Admit to telemetry  Patient's condition is stable. Yasmine Zuñiga MD  Resident  06/21/22 0985    ATTENDING PROVIDER ATTESTATION:     I have personally performed and/or participated in the history, exam, medical decision making, and procedures and agree with all pertinent clinical information. I have also reviewed and agree with the past medical, family and social history unless otherwise noted. I have discussed this patient in detail with the resident, and provided the instruction and education regarding sob. My findings/Plan: I Was the primary provider for patient presenting here because of shortness of breath for the last several days. Patient also reporting coughing productive sputum. Patient reporting no active fever. Patient reporting intermittent pains in his chest.  Patient reporting no abdominal pain or vomiting. Patient does have a history of COVID. Reports no leg pain. Patient was noted be hypoxic per EMS he was brought in by EMS with supplemental oxygen. Patient heart exam normal lungs are diminished abdomen soft nontender he is neurologically intact he has no edema. Labs and chest x-ray noted reviewed. Patient CT of chest was done due to his history of COVID abdomen he was hypoxic. Patient CT does show some infiltrate. Patient has no PE.  EKG noted reviewed shows no acute ST elevation. Patient does desaturate here without oxygen. Patient medicated here. Plan will be to admit to monitored bed we did speak to on-call internal medicine. Patient was made aware of findings and plan.          Ev Egan Geneva Larsen MD  06/21/22 2022  Alicja Hill MD  06/21/22 5021

## 2022-06-21 NOTE — PROGRESS NOTES
Brief progress note    Admission H&P reviewed    Patient was seen and examined at bedside this morning in the emergency room. Resting comfortably. Continues to be on supplemental oxygen at 4 L/min via nasal cannula. Denies any respiratory worsening. Denies any chest pain.   Continue ceftriaxone/doxycycline

## 2022-06-21 NOTE — ED NOTES
Pt continues to remove oxygen and pulse oximeter. Pt found to have an SpO2 of 77% with nc off.  Pt was placed back on nasal canula and instructed to leave pulse ox on finger      Marycarmen Rosenthal RN  06/21/22 8453

## 2022-06-22 LAB
ALBUMIN SERPL-MCNC: 3.3 G/DL (ref 3.5–5.2)
ALP BLD-CCNC: 117 U/L (ref 40–129)
ALT SERPL-CCNC: 75 U/L (ref 0–40)
ANION GAP SERPL CALCULATED.3IONS-SCNC: 13 MMOL/L (ref 7–16)
AST SERPL-CCNC: 28 U/L (ref 0–39)
BASOPHILIC STIPPLING: ABNORMAL
BASOPHILS ABSOLUTE: 0.01 E9/L (ref 0–0.2)
BASOPHILS RELATIVE PERCENT: 0.1 % (ref 0–2)
BILIRUB SERPL-MCNC: 0.4 MG/DL (ref 0–1.2)
BUN BLDV-MCNC: 15 MG/DL (ref 6–20)
CALCIUM SERPL-MCNC: 8.6 MG/DL (ref 8.6–10.2)
CHLORIDE BLD-SCNC: 93 MMOL/L (ref 98–107)
CO2: 30 MMOL/L (ref 22–29)
CREAT SERPL-MCNC: 0.8 MG/DL (ref 0.7–1.2)
EOSINOPHILS ABSOLUTE: 0.02 E9/L (ref 0.05–0.5)
EOSINOPHILS RELATIVE PERCENT: 0.2 % (ref 0–6)
GFR AFRICAN AMERICAN: >60
GFR NON-AFRICAN AMERICAN: >60 ML/MIN/1.73
GLUCOSE BLD-MCNC: 230 MG/DL (ref 74–99)
HBA1C MFR BLD: 6.6 % (ref 4–5.6)
HCT VFR BLD CALC: 46.4 % (ref 37–54)
HEMOGLOBIN: 13.3 G/DL (ref 12.5–16.5)
IMMATURE GRANULOCYTES #: 0.07 E9/L
IMMATURE GRANULOCYTES %: 0.7 % (ref 0–5)
LYMPHOCYTES ABSOLUTE: 1.38 E9/L (ref 1.5–4)
LYMPHOCYTES RELATIVE PERCENT: 12.9 % (ref 20–42)
MCH RBC QN AUTO: 27.3 PG (ref 26–35)
MCHC RBC AUTO-ENTMCNC: 28.7 % (ref 32–34.5)
MCV RBC AUTO: 95.3 FL (ref 80–99.9)
METER GLUCOSE: 252 MG/DL (ref 74–99)
METER GLUCOSE: 315 MG/DL (ref 74–99)
METER GLUCOSE: 352 MG/DL (ref 74–99)
MONOCYTES ABSOLUTE: 0.82 E9/L (ref 0.1–0.95)
MONOCYTES RELATIVE PERCENT: 7.6 % (ref 2–12)
NEUTROPHILS ABSOLUTE: 8.43 E9/L (ref 1.8–7.3)
NEUTROPHILS RELATIVE PERCENT: 78.5 % (ref 43–80)
PDW BLD-RTO: 14.1 FL (ref 11.5–15)
PLATELET # BLD: 230 E9/L (ref 130–450)
PMV BLD AUTO: 9.1 FL (ref 7–12)
POLYCHROMASIA: ABNORMAL
POTASSIUM REFLEX MAGNESIUM: 4.1 MMOL/L (ref 3.5–5)
RBC # BLD: 4.87 E12/L (ref 3.8–5.8)
REASON FOR REJECTION: NORMAL
REJECTED TEST: NORMAL
SODIUM BLD-SCNC: 136 MMOL/L (ref 132–146)
TOTAL PROTEIN: 6.7 G/DL (ref 6.4–8.3)
WBC # BLD: 10.7 E9/L (ref 4.5–11.5)

## 2022-06-22 PROCEDURE — 2060000000 HC ICU INTERMEDIATE R&B

## 2022-06-22 PROCEDURE — 94660 CPAP INITIATION&MGMT: CPT

## 2022-06-22 PROCEDURE — 6360000002 HC RX W HCPCS: Performed by: FAMILY MEDICINE

## 2022-06-22 PROCEDURE — 82962 GLUCOSE BLOOD TEST: CPT

## 2022-06-22 PROCEDURE — S5553 INSULIN LONG ACTING 5 U: HCPCS | Performed by: INTERNAL MEDICINE

## 2022-06-22 PROCEDURE — 2580000003 HC RX 258: Performed by: FAMILY MEDICINE

## 2022-06-22 PROCEDURE — 87206 SMEAR FLUORESCENT/ACID STAI: CPT

## 2022-06-22 PROCEDURE — 6370000000 HC RX 637 (ALT 250 FOR IP): Performed by: INTERNAL MEDICINE

## 2022-06-22 PROCEDURE — 2500000003 HC RX 250 WO HCPCS: Performed by: FAMILY MEDICINE

## 2022-06-22 PROCEDURE — 36415 COLL VENOUS BLD VENIPUNCTURE: CPT

## 2022-06-22 PROCEDURE — 6370000000 HC RX 637 (ALT 250 FOR IP): Performed by: FAMILY MEDICINE

## 2022-06-22 PROCEDURE — 85025 COMPLETE CBC W/AUTO DIFF WBC: CPT

## 2022-06-22 PROCEDURE — 80053 COMPREHEN METABOLIC PANEL: CPT

## 2022-06-22 PROCEDURE — 83036 HEMOGLOBIN GLYCOSYLATED A1C: CPT

## 2022-06-22 PROCEDURE — 94640 AIRWAY INHALATION TREATMENT: CPT

## 2022-06-22 PROCEDURE — 6370000000 HC RX 637 (ALT 250 FOR IP): Performed by: NURSE PRACTITIONER

## 2022-06-22 PROCEDURE — 87070 CULTURE OTHR SPECIMN AEROBIC: CPT

## 2022-06-22 PROCEDURE — 2700000000 HC OXYGEN THERAPY PER DAY

## 2022-06-22 PROCEDURE — 6360000002 HC RX W HCPCS: Performed by: INTERNAL MEDICINE

## 2022-06-22 RX ORDER — INSULIN LISPRO 100 [IU]/ML
0-6 INJECTION, SOLUTION INTRAVENOUS; SUBCUTANEOUS NIGHTLY
Status: DISCONTINUED | OUTPATIENT
Start: 2022-06-22 | End: 2022-06-24 | Stop reason: HOSPADM

## 2022-06-22 RX ORDER — PANTOPRAZOLE SODIUM 40 MG/1
40 TABLET, DELAYED RELEASE ORAL
Status: DISCONTINUED | OUTPATIENT
Start: 2022-06-22 | End: 2022-06-24 | Stop reason: HOSPADM

## 2022-06-22 RX ORDER — METHYLPREDNISOLONE SODIUM SUCCINATE 40 MG/ML
40 INJECTION, POWDER, LYOPHILIZED, FOR SOLUTION INTRAMUSCULAR; INTRAVENOUS EVERY 12 HOURS
Status: DISCONTINUED | OUTPATIENT
Start: 2022-06-22 | End: 2022-06-23

## 2022-06-22 RX ORDER — CHLORHEXIDINE GLUCONATE 0.12 MG/ML
15 RINSE ORAL 2 TIMES DAILY
Status: DISCONTINUED | OUTPATIENT
Start: 2022-06-22 | End: 2022-06-24 | Stop reason: HOSPADM

## 2022-06-22 RX ORDER — GUAIFENESIN/DEXTROMETHORPHAN 100-10MG/5
5 SYRUP ORAL EVERY 4 HOURS PRN
Status: DISCONTINUED | OUTPATIENT
Start: 2022-06-22 | End: 2022-06-24 | Stop reason: HOSPADM

## 2022-06-22 RX ORDER — IPRATROPIUM BROMIDE AND ALBUTEROL SULFATE 2.5; .5 MG/3ML; MG/3ML
1 SOLUTION RESPIRATORY (INHALATION)
Status: DISCONTINUED | OUTPATIENT
Start: 2022-06-22 | End: 2022-06-24 | Stop reason: HOSPADM

## 2022-06-22 RX ORDER — INSULIN LISPRO 100 [IU]/ML
0-12 INJECTION, SOLUTION INTRAVENOUS; SUBCUTANEOUS
Status: DISCONTINUED | OUTPATIENT
Start: 2022-06-22 | End: 2022-06-24 | Stop reason: HOSPADM

## 2022-06-22 RX ORDER — DOCUSATE SODIUM 100 MG/1
100 CAPSULE, LIQUID FILLED ORAL 2 TIMES DAILY
Status: DISCONTINUED | OUTPATIENT
Start: 2022-06-22 | End: 2022-06-24 | Stop reason: HOSPADM

## 2022-06-22 RX ORDER — INSULIN GLARGINE-YFGN 100 [IU]/ML
10 INJECTION, SOLUTION SUBCUTANEOUS NIGHTLY
Status: DISCONTINUED | OUTPATIENT
Start: 2022-06-22 | End: 2022-06-24 | Stop reason: HOSPADM

## 2022-06-22 RX ORDER — NICOTINE 21 MG/24HR
1 PATCH, TRANSDERMAL 24 HOURS TRANSDERMAL DAILY
Status: DISCONTINUED | OUTPATIENT
Start: 2022-06-22 | End: 2022-06-24 | Stop reason: HOSPADM

## 2022-06-22 RX ADMIN — METHYLPREDNISOLONE SODIUM SUCCINATE 40 MG: 40 INJECTION, POWDER, FOR SOLUTION INTRAMUSCULAR; INTRAVENOUS at 22:36

## 2022-06-22 RX ADMIN — ZOLPIDEM TARTRATE 5 MG: 5 TABLET ORAL at 22:35

## 2022-06-22 RX ADMIN — ARFORMOTEROL TARTRATE 15 MCG: 15 SOLUTION RESPIRATORY (INHALATION) at 09:55

## 2022-06-22 RX ADMIN — PANTOPRAZOLE SODIUM 40 MG: 40 TABLET, DELAYED RELEASE ORAL at 10:03

## 2022-06-22 RX ADMIN — IPRATROPIUM BROMIDE AND ALBUTEROL SULFATE 1 AMPULE: 2.5; .5 SOLUTION RESPIRATORY (INHALATION) at 16:14

## 2022-06-22 RX ADMIN — GABAPENTIN 300 MG: 300 CAPSULE ORAL at 08:41

## 2022-06-22 RX ADMIN — ENOXAPARIN SODIUM 30 MG: 100 INJECTION SUBCUTANEOUS at 22:35

## 2022-06-22 RX ADMIN — INSULIN GLARGINE-YFGN 10 UNITS: 100 INJECTION, SOLUTION SUBCUTANEOUS at 22:34

## 2022-06-22 RX ADMIN — INSULIN LISPRO 10 UNITS: 100 INJECTION, SOLUTION INTRAVENOUS; SUBCUTANEOUS at 16:29

## 2022-06-22 RX ADMIN — SODIUM CHLORIDE, PRESERVATIVE FREE 10 ML: 5 INJECTION INTRAVENOUS at 22:37

## 2022-06-22 RX ADMIN — ASPIRIN 81 MG CHEWABLE TABLET 81 MG: 81 TABLET CHEWABLE at 08:41

## 2022-06-22 RX ADMIN — 0.12% CHLORHEXIDINE GLUCONATE 15 ML: 1.2 RINSE ORAL at 13:31

## 2022-06-22 RX ADMIN — OXYBUTYNIN CHLORIDE 5 MG: 5 TABLET, EXTENDED RELEASE ORAL at 08:41

## 2022-06-22 RX ADMIN — TAMSULOSIN HYDROCHLORIDE 0.8 MG: 0.4 CAPSULE ORAL at 22:36

## 2022-06-22 RX ADMIN — 0.12% CHLORHEXIDINE GLUCONATE 15 ML: 1.2 RINSE ORAL at 22:36

## 2022-06-22 RX ADMIN — ENOXAPARIN SODIUM 30 MG: 100 INJECTION SUBCUTANEOUS at 08:41

## 2022-06-22 RX ADMIN — INSULIN LISPRO 6 UNITS: 100 INJECTION, SOLUTION INTRAVENOUS; SUBCUTANEOUS at 10:56

## 2022-06-22 RX ADMIN — WATER 1000 MG: 1 INJECTION INTRAMUSCULAR; INTRAVENOUS; SUBCUTANEOUS at 03:39

## 2022-06-22 RX ADMIN — BUPROPION HYDROCHLORIDE 150 MG: 150 TABLET, EXTENDED RELEASE ORAL at 08:40

## 2022-06-22 RX ADMIN — SODIUM CHLORIDE, PRESERVATIVE FREE 10 ML: 5 INJECTION INTRAVENOUS at 08:41

## 2022-06-22 RX ADMIN — IPRATROPIUM BROMIDE AND ALBUTEROL SULFATE 1 AMPULE: 2.5; .5 SOLUTION RESPIRATORY (INHALATION) at 09:56

## 2022-06-22 RX ADMIN — BUDESONIDE 250 MCG: 0.25 SUSPENSION RESPIRATORY (INHALATION) at 20:24

## 2022-06-22 RX ADMIN — INSULIN LISPRO 4 UNITS: 100 INJECTION, SOLUTION INTRAVENOUS; SUBCUTANEOUS at 22:34

## 2022-06-22 RX ADMIN — DOCUSATE SODIUM 100 MG: 100 CAPSULE, LIQUID FILLED ORAL at 22:37

## 2022-06-22 RX ADMIN — ATORVASTATIN CALCIUM 10 MG: 10 TABLET, FILM COATED ORAL at 08:41

## 2022-06-22 RX ADMIN — METHYLPREDNISOLONE SODIUM SUCCINATE 40 MG: 40 INJECTION, POWDER, FOR SOLUTION INTRAMUSCULAR; INTRAVENOUS at 10:03

## 2022-06-22 RX ADMIN — GABAPENTIN 300 MG: 300 CAPSULE ORAL at 22:36

## 2022-06-22 RX ADMIN — GABAPENTIN 300 MG: 300 CAPSULE ORAL at 13:31

## 2022-06-22 RX ADMIN — DOXYCYCLINE 100 MG: 100 INJECTION, POWDER, LYOPHILIZED, FOR SOLUTION INTRAVENOUS at 03:47

## 2022-06-22 RX ADMIN — DOCUSATE SODIUM 100 MG: 100 CAPSULE, LIQUID FILLED ORAL at 10:03

## 2022-06-22 RX ADMIN — NYSTATIN 500000 UNITS: 100000 SUSPENSION ORAL at 13:31

## 2022-06-22 RX ADMIN — DOXYCYCLINE 100 MG: 100 INJECTION, POWDER, LYOPHILIZED, FOR SOLUTION INTRAVENOUS at 16:32

## 2022-06-22 RX ADMIN — BUDESONIDE 250 MCG: 0.25 SUSPENSION RESPIRATORY (INHALATION) at 09:55

## 2022-06-22 RX ADMIN — IPRATROPIUM BROMIDE AND ALBUTEROL SULFATE 1 AMPULE: 2.5; .5 SOLUTION RESPIRATORY (INHALATION) at 20:24

## 2022-06-22 RX ADMIN — AMLODIPINE BESYLATE 10 MG: 5 TABLET ORAL at 08:41

## 2022-06-22 RX ADMIN — NYSTATIN 500000 UNITS: 100000 SUSPENSION ORAL at 16:58

## 2022-06-22 RX ADMIN — IPRATROPIUM BROMIDE AND ALBUTEROL SULFATE 1 AMPULE: 2.5; .5 SOLUTION RESPIRATORY (INHALATION) at 13:03

## 2022-06-22 RX ADMIN — NYSTATIN 500000 UNITS: 100000 SUSPENSION ORAL at 22:36

## 2022-06-22 RX ADMIN — ARFORMOTEROL TARTRATE 15 MCG: 15 SOLUTION RESPIRATORY (INHALATION) at 20:24

## 2022-06-22 NOTE — PROGRESS NOTES
Pulmonary consult placed via [facility] serve to Dr Niesha Torres.   Dr Xochitl Ahumada taking new consults

## 2022-06-22 NOTE — PLAN OF CARE
Problem: Discharge Planning  Goal: Discharge to home or other facility with appropriate resources  6/22/2022 1220 by Jacquie Patel RN  Outcome: Progressing  6/22/2022 0014 by Marcy Reese RN  Outcome: Progressing     Problem: Safety - Adult  Goal: Free from fall injury  6/22/2022 1220 by Jacquie Patel RN  Outcome: Progressing  6/22/2022 0014 by Marcy Reese RN  Outcome: Progressing     Problem: Chronic Conditions and Co-morbidities  Goal: Patient's chronic conditions and co-morbidity symptoms are monitored and maintained or improved  6/22/2022 1220 by Jacquie Patel RN  Outcome: Progressing  6/22/2022 0014 by Marcy Reese RN  Outcome: Progressing

## 2022-06-22 NOTE — PROGRESS NOTES
Date: 6/21/2022    Time: 10:53 PM    Patient Placed On BIPAP/CPAP/ Non-Invasive Ventilation? Yes    If no must comment. Facial area red/color change? No           If YES are Blister/Lesion present? No   If yes must notify nursing staff  BIPAP/CPAP skin barrier?   Yes    Skin barrier type:mepilexlite       Comments:        Aranza Isaacs RCP

## 2022-06-22 NOTE — CONSULTS
6/20/22 with a 5-day history of worsening dyspnea and cough. He also went to the ED at [facility] last week and was discharged home. Symptoms worse with exertion. +chills and chest tightness. Pulse ox at home 70's on RA with ambulation. + cough productive of yellow sputum. Radiology review- left basilar disease may suggest atelectasis or pneumonia. CTA chest- pulmonary arteries adequately opacified no evidence of filling defect to suggest PE, minimal bibasilar atelectasis and groundglass disease. No mass or pleural effusion identified. There is no CT chest from recent hospital stay for comparison. Lab testing- procalcitonin 0.03, sodium 136, potassium 4.1, BUN 15, high sensitivity troponin 20>15, creatinine 0.8, CO2 30, anion gap 13, protein 8.6, glucose 252, ALT 75, AST 28, T bili 0.4, WBCs 10.7, hemoglobin 13.3, platelets 422, absolute lymphocytes 1.38. Rapid COVID test and influenza A/B not detected. Strep pneumonia and Legionella urine antigens-negative. Recent hospital stay 6/1/2022 D-dimer less than 200, CRP 0.5, fibrinogen 311. Upon arrival patient was found to be 86% on room air required 4 L nasal cannula to recover greater than 90%. No fevers documented. Heart rate 114 sinus tachycardia on admission. Received DuoNeb nebulizer treatment and IV Solu-Medrol in ED with improvement of symptoms. He was started on Rocephin and doxycycline for CAP coverage. Await respiratory culture specimen to be obtained.     Past Medical History:   Diagnosis Date    Acid reflux     Asthma     Asthma     [facility] [address]     Hypertension     Pancreatitis     Prediabetes     Psychiatric problem     depressed    Sleep apnea     Substance abuse ([facility] [address]     alcohol and cocaine       Past Surgical History:   Procedure Laterality Date    COLONOSCOPY      2010 neg    NERVE BLOCK Left 08/27/2018    lumbar epidural #1 paramedian    NH NJX DX/THER SBST INTRLMNR LMBR/SAC W/ANNE MARIE GDN Left 8/27/2018    LUMBAR EPIDURAL STEROID INJECTION L4-5 LEFT PARAMEDIAN #1 performed by Marcella Terry MD at [address] [facility]         Family History   Problem Relation Age of Onset    Other Sister     High Blood Pressure Maternal Grandmother     Other Maternal Grandmother        Social History:   Social History     Socioeconomic History    Marital status: Single     Spouse name: Not on file    Number of children: 3    Years of education: 15    Highest education level: Not on file   Occupational History    Occupation:    Tobacco Use    Smoking status: Current Every Day Smoker     Packs/day: 1.00     Years: 28.00     Pack years: 28.00     Types: Cigarettes, Cigars    Smokeless tobacco: Never Used    Tobacco comment: quit cigarettes 3 weeks ago. 5 cigars a week now. Vaping Use    Vaping Use: Never used   Substance and Sexual Activity    Alcohol use: Yes     Comment: last drank 2/12 0600    Drug use: Yes     Types: Cocaine     Comment: last used \"few\" months ago    Sexual activity: Not on file   Other Topics Concern    Not on file   Social History Narrative    Not on file     Social Determinants of Health     Financial Resource Strain:     Difficulty of Paying Living Expenses: Not on file   Food Insecurity:     Worried About Running Out of Food in the Last Year: Not on file    Shefali of Food in the Last Year: Not on file   Transportation Needs:     Lack of Transportation (Medical): Not on file    Lack of Transportation (Non-Medical):  Not on file   Physical Activity:     Days of Exercise per Week: Not on file    Minutes of Exercise per Session: Not on file   Stress:     Feeling of Stress : Not on file   Social Connections:     Frequency of Communication with Friends and Family: Not on file    Frequency of Social Gatherings with Friends and Family: Not on file    Attends Sabianist Services: Not on file   [employer] of Clubs or Organizations: Not on file    Attends Club or Organization Meetings: Not on file    Marital Status: Not on file   Intimate Partner Violence:     Fear of Current or Ex-Partner: Not on file    Emotionally Abused: Not on file    Physically Abused: Not on file    Sexually Abused: Not on file   Housing Stability:     Unable to Pay for Housing in the Last Year: Not on file    Number of [place] in the Last Year: Not on file    Unstable Housing in the Last Year: Not on file     Smoking history: The patient is a current smoker 30 pk yrs , now cutting down to 1 pack every few days    ETOH:   reports current alcohol use. With history of pancreatitis  Medical noncompliance    Substance abuse-history of cocaine and fentanyl use  Exposures: There  is not history of TB or TB exposure. There is not asbestos or silica dust exposure. The patient reports does not have coal, foundry, quarry or [employer] exposure. Recent travel history none. There is not hot tub exposure. The patient does not have any exotic pets, turtles or exotic birds.        Vaccines:       Immunization History   Administered Date(s) Administered    Influenza Virus Vaccine 02/18/2015, 10/16/2019    Pneumococcal Polysaccharide ([id number]) 02/18/2015        Home Meds: Medications Prior to Admission: fluticasone (FLONASE) 50 MCG/ACT nasal spray, instill 2 sprays into each nostril twice a day  pantoprazole (PROTONIX) 40 MG tablet,   tadalafil (CIALIS) 5 MG tablet, take 1 tablet by mouth once daily  traMADol (ULTRAM) 50 MG tablet, take 1 to 2 tablets by mouth every 6 hours if needed for pain  gabapentin (NEURONTIN) 300 MG capsule, Take 300 mg by mouth 3 times daily.   oxybutynin (DITROPAN-XL) 5 MG extended release tablet, Take 5 mg by mouth daily  albuterol (PROVENTIL) (2.5 MG/3ML) 0.083% nebulizer solution, Take 2.5 mg by nebulization every 6 hours as needed for Wheezing  albuterol sulfate HFA (VENTOLIN HFA) 108 (90 Base) MCG/ACT inhaler, Inhale 2 puffs into the lungs every 6 hours as needed for Wheezing  atorvastatin (LIPITOR) 10 MG tablet, Take 10 mg by mouth daily  buPROPion (WELLBUTRIN SR) 150 MG extended release tablet, Take 150 mg by mouth daily  zolpidem (AMBIEN) 5 MG tablet, Take 5 mg by mouth nightly as needed for Sleep. Fluticasone-Umeclidin-Vilant (TRELEGY ELLIPTA) 200-62.5-25 MCG/INH AEPB, Inhale 2 puffs into the lungs 2 times daily   aspirin 81 MG chewable tablet, Take 1 tablet by mouth daily  tamsulosin (FLOMAX) 0.4 MG capsule, Take 0.8 mg by mouth at bedtime   amLODIPine (NORVASC) 10 MG tablet, Take 1 tablet by mouth daily. CURRENT MEDS :  Scheduled Meds:   methylPREDNISolone  40 mg IntraVENous Q12H    insulin lispro  0-12 Units SubCUTAneous TID WC    insulin lispro  0-6 Units SubCUTAneous Nightly    insulin glargine  10 Units SubCUTAneous Nightly    ipratropium-albuterol  1 ampule Inhalation Q4H WA    docusate sodium  100 mg Oral BID    cefTRIAXone (ROCEPHIN) IV  1,000 mg IntraVENous Q24H    doxycycline (VIBRAMYCIN) IV  100 mg IntraVENous Q12H    amLODIPine  10 mg Oral Daily    aspirin  81 mg Oral Daily    atorvastatin  10 mg Oral Daily    buPROPion  150 mg Oral Daily    gabapentin  300 mg Oral TID    oxybutynin  5 mg Oral Daily    tamsulosin  0.8 mg Oral Nightly    sodium chloride flush  10 mL IntraVENous 2 times per day    enoxaparin  30 mg SubCUTAneous BID    budesonide  250 mcg Nebulization BID    Arformoterol Tartrate  15 mcg Nebulization BID       Continuous Infusions:   sodium chloride         Allergies   Allergen Reactions    Dust Mite Extract        REVIEW OF SYSTEMS:  Constitutional: Denies fever, weight loss, night sweats, and fatigue  Skin: Denies pigmentation, dark lesions, and rashes   HEENT: Denies hearing loss, tinnitus, ear drainage, epistaxis, sore throat, and hoarseness. Cardiovascular: Denies palpitations, chest pain, and chest pressure.   Respiratory:  Positive for cough, chest tightness and shortness of breath  Gastrointestinal: Denies nausea, vomiting, poor appetite, diarrhea, heartburn or reflux  Genitourinary: Denies dysuria, frequency, urgency or hematuria  Musculoskeletal: Denies myalgias, muscle weakness, and bone pain  Neurological: Denies dizziness, vertigo, headache, and focal weakness  Psychological: Denies anxiety and depression  Endocrine: Denies heat intolerance and cold intolerance  Hematopoietic/Lymphatic: Denies bleeding problems and blood transfusions    OBJECTIVE:   /74   Pulse 98   Temp 97.2 °F (36.2 °C) (Oral)   Resp 20   Wt 270 lb (122.5 kg)   SpO2 93%   BMI 39.87 kg/m²   SpO2 Readings from Last 1 Encounters:   06/22/22 93%        I/O:  No intake or output data in the 24 hours ending 06/22/22 0939             CPAP/EPAP: 5 cmH2O     Physical Exam:  General: The patient is lying in bed comfortably without any distress. Breathing is not labored  HEENT: Pupils are equal round and reactive to light, there are no oral lesions and no post-nasal drip   Neck: supple without adenopathy  Cardiovascular: regular rate and rhythm without murmur or gallop  Respiratory: few exp wheezing to auscultation bilaterally without wheezing or crackles. Air entry is symmetric  Abdomen: soft, non-tender, non-distended, normal bowel sounds  Extremities: warm, no edema, no clubbing  Skin: no rash or lesion  Neurologic: CN II-XII grossly intact, no focal deficits    Pulmonary Function Testing on file 2018  Previous PFT testing 2018  DATA: Spirometry done in the office today demonstrates an FVC of 2.69 liters which is 66 % of predicted with an FEV1 of  1.52 liters which is 47 % of predicted.  FEV1/FVC ratio is57. Mid expiratory flow rates are 20% of predicted.  Maximum voluntary ventilation is decreased at 66 liters per minute or 44% of predicted. Total lung capacity is 6.49 liters which is 96% of predicted. DLCO is 26.43mm/min/mmHg which is 85% of predicted.  Flow volume loop shows no signs of intrathoracic or extrathoracic obstruction.       Impressions: severe obstruction. Good bronchodilator response. Normal total lung capacity and diffusing capacity. Imaging personally reviewed:  FINDINGS:   The cardiomediastinal contours and pulmonary vasculature are within normal   limits.       Hazy left basilar opacity is noted, which may suggest atelectasis or   alternatively, parenchymal disease.  No effusion or pneumothorax is seen.       No acute osseous abnormalities are identified.           Impression   Left basilar disease, which may suggest atelectasis or alternatively,   pneumonia in the appropriate setting.         CT chest      FINDINGS:   Pulmonary Arteries: Pulmonary arteries are adequately opacified for   evaluation.  No evidence of intraluminal filling defect to suggest pulmonary   embolism.  Main pulmonary artery is normal in caliber.       Mediastinum: No evidence of mediastinal lymphadenopathy.  The heart and   pericardium demonstrate no acute abnormality.  There is no acute abnormality   of the thoracic aorta.       Lungs/pleura: Bibasilar atelectasis/ground-glass disease.  The lungs are   otherwise clear.  No mass, effusion or pneumothorax.       Upper Abdomen: Limited images of the upper abdomen are unremarkable.       Soft Tissues/Bones: No acute bone or soft tissue abnormality.           Impression   Minimal disease involving the lower lungs.  This may represent consolidative   changes or alternatively, atelectasis.       No pulmonary embolism, aortic aneurysm or dissection.             Sleep study 2016 interpreted by Dr. Rusty Agrawal:  During this study, the patient had total of 6 apneas   which of these apneic episodes all were obstructive. The patient had 71   hypopneas. The longest duration of the hypopneic event was 62 seconds. Per   the respiratory report, after 2 hours of sleep a CPAP was applied for severe   oxygen desaturation with respiratory events. OXYGEN STATISTICS:  The patient's mean oxygen saturation was 94%. The   patient spent 84.9% of the total sleep time with oxygen saturation above 90%. The patient's lowest oxygen saturation was 69%. HEART RATE SUMMARY:  The patient's heart rate while awake was 94 bpm.  The   patient's heart rate during study sleep was 81 bpm.      LEG MOVEMENTS:  During this study, the patient only had 2 leg movements. The patient was placed on CPAP and then the pressures were increased for   elimination of events. At a CPAP of 8 the patient had no apneas or   hypopneas. RECOMMEND:  A CPAP of 8 with a 20 minute ramp and a humidifier. Echo:  Summary   Left ventricular size is grossly normal.   Borderline left ventricular concentric hypertrophy noted. Ejection fraction is visually estimated at 65%. No evidence of left ventricular mass or thrombus noted. No regional wall motion abnormalities seen. The left atrium is mildly dilated. Interatrial septum appears intact. No evidence of thrombus within left atrium. No evidence of mass within left atrium. Physiologic and/or trace mitral regurgitation is present. No evidence of mitral valve stenosis. Physiologic and/or trace tricuspid regurgitation. RVSP is 26.00 mmHg. Regular rhythm.     Labs:  Lab Results   Component Value Date    WBC 10.7 06/22/2022    HGB 13.3 06/22/2022    HCT 46.4 06/22/2022    MCV 95.3 06/22/2022    MCH 27.3 06/22/2022    MCHC 28.7 06/22/2022    RDW 14.1 06/22/2022     06/22/2022    MPV 9.1 06/22/2022     Lab Results   Component Value Date     06/22/2022    K 4.1 06/22/2022    CL 93 06/22/2022    CO2 30 06/22/2022    BUN 15 06/22/2022    CREATININE 0.8 06/22/2022    LABALBU 3.3 06/22/2022    CALCIUM 8.6 06/22/2022    GFRAA >60 06/22/2022    LABGLOM >60 06/22/2022     Lab Results   Component Value Date    PROTIME 12.0 11/14/2020    INR 1.1 11/14/2020     Recent Labs     06/20/22  2354   PROBNP 82     No results for input(s): TROPONINI in the last 72 hours. Recent Labs     06/21/22  0426   PROCAL 0.03     This SmartLink has not been configured with any valid records. Micro:  No results for input(s): CULTRESP in the last 72 hours. No results for input(s): LABGRAM in the last 72 hours. No results for input(s): LEGUR in the last 72 hours. Recent Labs     06/21/22  0904   STREPNEUMAGU Presumptive negative- suggests no current or recent  pneumococcal infection. Infection due to Strep pneumoniae cannot be  ruled out since the antigen present in the sample  may be below the detection limit of the test.  Normal Range:Presumptive Negative       Recent Labs     06/21/22  0904   LP1UAG Presumptive Negative -suggesting no recent or current infections  with Legionella pneumophila serogroup 1. Infection to Legionella cannot be ruled out since other serogroups  and species may cause infection, antigen may not be present in  early infection, or level of antigen may be below the  detection limit. Normal Range: Presumptive Negative           Assessment:  1. Acute on chronic respiratory failure with hypoxia  2. Severe Gold stage III COPD/asthma overlap with acute exacerbation  3. Recent COVID-19 pneumonia June 1, 2022 with minimal basilar infiltrates/atelectasis on CTA chest imaging  4. Obstructive sleep apnea on home CPAP 8 cm water pressure  5. History of substance abuse cocaine, fentanyl  6. Nicotine dependence-30 pack years  7. Medical noncompliance  8. EtOH abuse history of pancreatitis  9. GERD  10. 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 Obesity BMI 39.87  12. Oral thrush    Plan:  1. Oxygen therapy 4 L nasal cannula wean to keep greater than 92%  2. We will need ambulatory oxygen testing repeated prior to discharge  3. CPAP 8 cm water pressure nocturnally and as needed daytime with naps for treatment of underlying JANESSA  4.  Rocephin and doxycycline for empiric CAP coverage-may stop soon. Await final cultures. Obtain respiratory culture if able to produce specimen  5. Chest imaging reviewed- no pulmonary embolism. Minimal disease involving lower lung bases  6. Bronchodilator regimen-Brovana budesonide twice daily, DuoNebs every 4 hours while awake. May resume Trelegy Ellipta and albuterol as needed for rescue upon discharge  7. Add incentive spirometer, lung recruitment maneuvers  8. Strep pneumonia Legionella urine antigens negative, low Pro-Augustus  9. Recent treatment for COVID-19 during last hospital stay included remdesivir , azithromycin, and dexamethasone  10. Taper Solu-Medrol 40 mg IV every 12 hours. Hope to transition to oral prednisone soon with taper for dc. Add nystatin QID, peridex oral rinse for thrush. 11. NicoDerm CQ, tobacco cessation counseling  12. DVT, GI prophylaxis-Lovenox twice daily, Protonix daily  13. Will need follow-up with pulmonary as outpatient-local pulmonologist Dr. Hudson [facility]za [facility]      Thank you for allowing me to participate in the care of Kev Vega. Please feel free to call with questions. This plan of care was reviewed in collaboration with Dr. Angelica Smart    Electronically signed by MIKE Matthew CNP on 6/22/2022 at 9:39 AM      Note: This report was completed utilizing computer voice recognition software. Every effort has been made to ensure accuracy, however; inadvertent computerized transcription errors may be present        I personally saw, examined, and cared for the patient. Labs, medications, radiographs reviewed. I agree with history exam and plans detailed in NP note. Acute hypoxic resp failure with AECOPD  Tobacco use  Recent COVID  Obesity    Continue IV steroids and bronchodilators. Can monitor off abx, pna less likely. Tobacco cessation  Wean O2 as deng, encourage ambulation  Continue f/u with pulm provider in [place].     Mono Henderson,

## 2022-06-22 NOTE — CARE COORDINATION
Met with pt at bedside to discuss discharge / transition of care plan. Pt reports from home alone; independent of all ADL; home DME as follows, nebulizer, oxygen concentrator (unsure of company, thinks he got it three years ago, states it needs serviced), to clarify pt states he does not have a C-PAP at home; verified PCP and pharmacy; discharge plan is to return home, pt provided this CM with transportation number [phone] (states he uses this for transport home). Discharge plan is to return home.

## 2022-06-22 NOTE — PROGRESS NOTES
PLAN:    Currently on supplemental oxygen via nasal cannula at 5 L/min. Required BiPAP support last night. Continue IV ceftriaxone/doxycycline. Start IV Solu-Medrol. DuoNeb every 4 hourly  Continue Brovana / Pulmicort  Pulmonary consulted  Smoking cessation strongly advised.   Start nicotine patch  Continue aspirin/Lipitor/ Amlodipine  Check HbA1c   Start Lantus / ISS  Continue PPI      DVT Prophylaxis [x] Lovenox, []  Heparin, [] SCDs, [] Ambulation   GI Prophylaxis [x] PPI,  [] H2 Blocker,  [] Carafate,  [] Diet/Tube Feeds   Disposition Patient requires continued admission due to IV Abx   MDM [] Low, [] Moderate,[x]  High  Patient's risk as above        Medications:  REVIEWED DAILY    Infusion Medications    sodium chloride       Scheduled Medications    methylPREDNISolone  40 mg IntraVENous Q12H    insulin lispro  0-12 Units SubCUTAneous TID WC    insulin lispro  0-6 Units SubCUTAneous Nightly    insulin glargine  10 Units SubCUTAneous Nightly    ipratropium-albuterol  1 ampule Inhalation Q4H WA    docusate sodium  100 mg Oral BID    pantoprazole  40 mg Oral QAM AC    cefTRIAXone (ROCEPHIN) IV  1,000 mg IntraVENous Q24H    doxycycline (VIBRAMYCIN) IV  100 mg IntraVENous Q12H    amLODIPine  10 mg Oral Daily    aspirin  81 mg Oral Daily    atorvastatin  10 mg Oral Daily    buPROPion  150 mg Oral Daily    gabapentin  300 mg Oral TID    oxybutynin  5 mg Oral Daily    tamsulosin  0.8 mg Oral Nightly    sodium chloride flush  10 mL IntraVENous 2 times per day    enoxaparin  30 mg SubCUTAneous BID    budesonide  250 mcg Nebulization BID    Arformoterol Tartrate  15 mcg Nebulization BID     PRN Meds: guaiFENesin-dextromethorphan, albuterol, zolpidem, sodium chloride flush, sodium chloride, promethazine **OR** ondansetron, polyethylene glycol, acetaminophen **OR** acetaminophen, potassium chloride **OR** potassium alternative oral replacement **OR** potassium chloride    Labs:     Recent Labs 06/20/22 2354 06/21/22  0930 06/22/22  0459   WBC 7.8 7.4 10.7   HGB 14.5 14.3 13.3   HCT 47.7 48.3 46.4    277 230       Recent Labs     06/20/22 2354 06/21/22  0930 06/22/22  0459    141 136   K 3.4* 4.7 4.1   CL 96* 96* 93*   CO2 31* 35* 30*   BUN 10 9 15   CREATININE 0.8 0.8 0.8   CALCIUM 9.0 8.6 8.6       Recent Labs     06/20/22 2354 06/21/22  0930 06/22/22  0459   PROT 7.0 6.9 6.7   ALKPHOS 102 95 117   ALT 72* 65* 75*   AST 37 23 28   BILITOT 0.5 0.5 0.4       No results for input(s): INR in the last 72 hours. No results for input(s): Curlie Lat in the last 72 hours. Chronic labs:    Lab Results   Component Value Date    CHOL 156 04/20/2020    TRIG 171 (H) 02/14/2022    HDL 58 04/20/2020    LDLCALC 76 04/20/2020    TSH 1.740 10/14/2020    PSA 0.60 10/28/2015    INR 1.1 11/14/2020    LABA1C 5.8 (H) 06/03/2022       Radiology: REVIEWED DAILY    +++++++++++++++++++++++++++++++++++++++++++++++++  Mohini Santiago MD  [employer] - [address] [place], [place]  +++++++++++++++++++++++++++++++++++++++++++++++++  NOTE: This report was transcribed using voice recognition software. Every effort was made to ensure accuracy; however, inadvertent computerized transcription errors may be present.

## 2022-06-23 LAB
ALBUMIN SERPL-MCNC: 3.8 G/DL (ref 3.5–5.2)
ALBUMIN SERPL-MCNC: 3.9 G/DL (ref 3.5–5.2)
ALP BLD-CCNC: 110 U/L (ref 40–129)
ALP BLD-CCNC: 112 U/L (ref 40–129)
ALT SERPL-CCNC: 68 U/L (ref 0–40)
ALT SERPL-CCNC: 73 U/L (ref 0–40)
ANION GAP SERPL CALCULATED.3IONS-SCNC: 13 MMOL/L (ref 7–16)
ANION GAP SERPL CALCULATED.3IONS-SCNC: 18 MMOL/L (ref 7–16)
AST SERPL-CCNC: 19 U/L (ref 0–39)
AST SERPL-CCNC: 22 U/L (ref 0–39)
BASOPHILS ABSOLUTE: 0.01 E9/L (ref 0–0.2)
BASOPHILS RELATIVE PERCENT: 0.1 % (ref 0–2)
BILIRUB SERPL-MCNC: 0.6 MG/DL (ref 0–1.2)
BILIRUB SERPL-MCNC: 0.6 MG/DL (ref 0–1.2)
BUN BLDV-MCNC: 14 MG/DL (ref 6–20)
BUN BLDV-MCNC: 15 MG/DL (ref 6–20)
CALCIUM SERPL-MCNC: 9.2 MG/DL (ref 8.6–10.2)
CALCIUM SERPL-MCNC: 9.3 MG/DL (ref 8.6–10.2)
CHLORIDE BLD-SCNC: 91 MMOL/L (ref 98–107)
CHLORIDE BLD-SCNC: 93 MMOL/L (ref 98–107)
CO2: 27 MMOL/L (ref 22–29)
CO2: 28 MMOL/L (ref 22–29)
CREAT SERPL-MCNC: 0.6 MG/DL (ref 0.7–1.2)
CREAT SERPL-MCNC: 0.7 MG/DL (ref 0.7–1.2)
EOSINOPHILS ABSOLUTE: 0 E9/L (ref 0.05–0.5)
EOSINOPHILS RELATIVE PERCENT: 0 % (ref 0–6)
GFR AFRICAN AMERICAN: >60
GFR AFRICAN AMERICAN: >60
GFR NON-AFRICAN AMERICAN: >60 ML/MIN/1.73
GFR NON-AFRICAN AMERICAN: >60 ML/MIN/1.73
GLUCOSE BLD-MCNC: 308 MG/DL (ref 74–99)
GLUCOSE BLD-MCNC: 342 MG/DL (ref 74–99)
HCT VFR BLD CALC: 44.7 % (ref 37–54)
HEMOGLOBIN: 13.7 G/DL (ref 12.5–16.5)
IMMATURE GRANULOCYTES #: 0.13 E9/L
IMMATURE GRANULOCYTES %: 1 % (ref 0–5)
LYMPHOCYTES ABSOLUTE: 0.65 E9/L (ref 1.5–4)
LYMPHOCYTES RELATIVE PERCENT: 4.8 % (ref 20–42)
MCH RBC QN AUTO: 28 PG (ref 26–35)
MCHC RBC AUTO-ENTMCNC: 30.6 % (ref 32–34.5)
MCV RBC AUTO: 91.2 FL (ref 80–99.9)
METER GLUCOSE: 263 MG/DL (ref 74–99)
METER GLUCOSE: 267 MG/DL (ref 74–99)
METER GLUCOSE: 312 MG/DL (ref 74–99)
METER GLUCOSE: 326 MG/DL (ref 74–99)
MONOCYTES ABSOLUTE: 0.27 E9/L (ref 0.1–0.95)
MONOCYTES RELATIVE PERCENT: 2 % (ref 2–12)
NEUTROPHILS ABSOLUTE: 12.37 E9/L (ref 1.8–7.3)
NEUTROPHILS RELATIVE PERCENT: 92.1 % (ref 43–80)
PDW BLD-RTO: 14.1 FL (ref 11.5–15)
PLATELET # BLD: 236 E9/L (ref 130–450)
PMV BLD AUTO: 10.1 FL (ref 7–12)
POTASSIUM REFLEX MAGNESIUM: 4.7 MMOL/L (ref 3.5–5)
POTASSIUM REFLEX MAGNESIUM: 4.7 MMOL/L (ref 3.5–5)
RBC # BLD: 4.9 E12/L (ref 3.8–5.8)
SODIUM BLD-SCNC: 132 MMOL/L (ref 132–146)
SODIUM BLD-SCNC: 138 MMOL/L (ref 132–146)
TOTAL PROTEIN: 6.9 G/DL (ref 6.4–8.3)
TOTAL PROTEIN: 7.1 G/DL (ref 6.4–8.3)
WBC # BLD: 13.4 E9/L (ref 4.5–11.5)

## 2022-06-23 PROCEDURE — S5553 INSULIN LONG ACTING 5 U: HCPCS | Performed by: INTERNAL MEDICINE

## 2022-06-23 PROCEDURE — 6370000000 HC RX 637 (ALT 250 FOR IP): Performed by: INTERNAL MEDICINE

## 2022-06-23 PROCEDURE — 6370000000 HC RX 637 (ALT 250 FOR IP): Performed by: NURSE PRACTITIONER

## 2022-06-23 PROCEDURE — 6370000000 HC RX 637 (ALT 250 FOR IP)

## 2022-06-23 PROCEDURE — 2700000000 HC OXYGEN THERAPY PER DAY

## 2022-06-23 PROCEDURE — 6360000002 HC RX W HCPCS: Performed by: FAMILY MEDICINE

## 2022-06-23 PROCEDURE — 6370000000 HC RX 637 (ALT 250 FOR IP): Performed by: FAMILY MEDICINE

## 2022-06-23 PROCEDURE — 94640 AIRWAY INHALATION TREATMENT: CPT

## 2022-06-23 PROCEDURE — 36415 COLL VENOUS BLD VENIPUNCTURE: CPT

## 2022-06-23 PROCEDURE — 2060000000 HC ICU INTERMEDIATE R&B

## 2022-06-23 PROCEDURE — 82962 GLUCOSE BLOOD TEST: CPT

## 2022-06-23 PROCEDURE — 94660 CPAP INITIATION&MGMT: CPT

## 2022-06-23 PROCEDURE — 85025 COMPLETE CBC W/AUTO DIFF WBC: CPT

## 2022-06-23 PROCEDURE — 6360000002 HC RX W HCPCS: Performed by: NURSE PRACTITIONER

## 2022-06-23 PROCEDURE — 2500000003 HC RX 250 WO HCPCS: Performed by: FAMILY MEDICINE

## 2022-06-23 PROCEDURE — 2580000003 HC RX 258: Performed by: FAMILY MEDICINE

## 2022-06-23 PROCEDURE — 6360000002 HC RX W HCPCS: Performed by: INTERNAL MEDICINE

## 2022-06-23 PROCEDURE — 80053 COMPREHEN METABOLIC PANEL: CPT

## 2022-06-23 RX ORDER — PREDNISONE 20 MG/1
40 TABLET ORAL DAILY
Status: DISCONTINUED | OUTPATIENT
Start: 2022-06-24 | End: 2022-06-24 | Stop reason: HOSPADM

## 2022-06-23 RX ORDER — GUAIFENESIN 600 MG/1
600 TABLET, EXTENDED RELEASE ORAL 2 TIMES DAILY
Status: DISCONTINUED | OUTPATIENT
Start: 2022-06-23 | End: 2022-06-23 | Stop reason: CLARIF

## 2022-06-23 RX ORDER — TAMSULOSIN HYDROCHLORIDE 0.4 MG/1
0.4 CAPSULE ORAL 2 TIMES DAILY
Status: DISCONTINUED | OUTPATIENT
Start: 2022-06-23 | End: 2022-06-24 | Stop reason: HOSPADM

## 2022-06-23 RX ORDER — LANOLIN ALCOHOL/MO/W.PET/CERES
3 CREAM (GRAM) TOPICAL NIGHTLY PRN
Status: DISCONTINUED | OUTPATIENT
Start: 2022-06-23 | End: 2022-06-24 | Stop reason: HOSPADM

## 2022-06-23 RX ORDER — METHYLPREDNISOLONE SODIUM SUCCINATE 40 MG/ML
40 INJECTION, POWDER, LYOPHILIZED, FOR SOLUTION INTRAMUSCULAR; INTRAVENOUS EVERY 12 HOURS
Status: COMPLETED | OUTPATIENT
Start: 2022-06-23 | End: 2022-06-23

## 2022-06-23 RX ORDER — GUAIFENESIN 400 MG/1
400 TABLET ORAL 3 TIMES DAILY
Status: DISCONTINUED | OUTPATIENT
Start: 2022-06-23 | End: 2022-06-24 | Stop reason: HOSPADM

## 2022-06-23 RX ADMIN — IPRATROPIUM BROMIDE AND ALBUTEROL SULFATE 1 AMPULE: 2.5; .5 SOLUTION RESPIRATORY (INHALATION) at 20:07

## 2022-06-23 RX ADMIN — 0.12% CHLORHEXIDINE GLUCONATE 15 ML: 1.2 RINSE ORAL at 09:07

## 2022-06-23 RX ADMIN — ATORVASTATIN CALCIUM 10 MG: 10 TABLET, FILM COATED ORAL at 09:06

## 2022-06-23 RX ADMIN — BUDESONIDE 250 MCG: 0.25 SUSPENSION RESPIRATORY (INHALATION) at 20:08

## 2022-06-23 RX ADMIN — NYSTATIN 500000 UNITS: 100000 SUSPENSION ORAL at 21:09

## 2022-06-23 RX ADMIN — SODIUM CHLORIDE, PRESERVATIVE FREE 10 ML: 5 INJECTION INTRAVENOUS at 21:11

## 2022-06-23 RX ADMIN — NYSTATIN 500000 UNITS: 100000 SUSPENSION ORAL at 15:05

## 2022-06-23 RX ADMIN — ARFORMOTEROL TARTRATE 15 MCG: 15 SOLUTION RESPIRATORY (INHALATION) at 20:08

## 2022-06-23 RX ADMIN — OXYBUTYNIN CHLORIDE 5 MG: 5 TABLET, EXTENDED RELEASE ORAL at 09:07

## 2022-06-23 RX ADMIN — ARFORMOTEROL TARTRATE 15 MCG: 15 SOLUTION RESPIRATORY (INHALATION) at 08:06

## 2022-06-23 RX ADMIN — GABAPENTIN 300 MG: 300 CAPSULE ORAL at 09:07

## 2022-06-23 RX ADMIN — DOCUSATE SODIUM 100 MG: 100 CAPSULE, LIQUID FILLED ORAL at 21:10

## 2022-06-23 RX ADMIN — GABAPENTIN 300 MG: 300 CAPSULE ORAL at 21:10

## 2022-06-23 RX ADMIN — INSULIN GLARGINE-YFGN 10 UNITS: 100 INJECTION, SOLUTION SUBCUTANEOUS at 21:10

## 2022-06-23 RX ADMIN — GUAIFENESIN SYRUP AND DEXTROMETHORPHAN 5 ML: 100; 10 SYRUP ORAL at 02:37

## 2022-06-23 RX ADMIN — WATER 1000 MG: 1 INJECTION INTRAMUSCULAR; INTRAVENOUS; SUBCUTANEOUS at 04:06

## 2022-06-23 RX ADMIN — 0.12% CHLORHEXIDINE GLUCONATE 15 ML: 1.2 RINSE ORAL at 21:09

## 2022-06-23 RX ADMIN — INSULIN LISPRO 6 UNITS: 100 INJECTION, SOLUTION INTRAVENOUS; SUBCUTANEOUS at 16:30

## 2022-06-23 RX ADMIN — GUAIFENESIN 400 MG: 400 TABLET ORAL at 23:48

## 2022-06-23 RX ADMIN — Medication 3 MG: at 21:10

## 2022-06-23 RX ADMIN — DOXYCYCLINE 100 MG: 100 INJECTION, POWDER, LYOPHILIZED, FOR SOLUTION INTRAVENOUS at 04:15

## 2022-06-23 RX ADMIN — AMLODIPINE BESYLATE 10 MG: 5 TABLET ORAL at 09:06

## 2022-06-23 RX ADMIN — GUAIFENESIN 400 MG: 400 TABLET ORAL at 06:16

## 2022-06-23 RX ADMIN — Medication 3 MG: at 02:37

## 2022-06-23 RX ADMIN — ENOXAPARIN SODIUM 30 MG: 100 INJECTION SUBCUTANEOUS at 09:07

## 2022-06-23 RX ADMIN — METFORMIN HYDROCHLORIDE 500 MG: 500 TABLET ORAL at 12:06

## 2022-06-23 RX ADMIN — BUPROPION HYDROCHLORIDE 150 MG: 150 TABLET, EXTENDED RELEASE ORAL at 09:06

## 2022-06-23 RX ADMIN — ENOXAPARIN SODIUM 30 MG: 100 INJECTION SUBCUTANEOUS at 21:09

## 2022-06-23 RX ADMIN — METHYLPREDNISOLONE SODIUM SUCCINATE 40 MG: 40 INJECTION, POWDER, FOR SOLUTION INTRAMUSCULAR; INTRAVENOUS at 21:10

## 2022-06-23 RX ADMIN — IPRATROPIUM BROMIDE AND ALBUTEROL SULFATE 1 AMPULE: 2.5; .5 SOLUTION RESPIRATORY (INHALATION) at 12:11

## 2022-06-23 RX ADMIN — TAMSULOSIN HYDROCHLORIDE 0.4 MG: 0.4 CAPSULE ORAL at 21:10

## 2022-06-23 RX ADMIN — IPRATROPIUM BROMIDE AND ALBUTEROL SULFATE 1 AMPULE: 2.5; .5 SOLUTION RESPIRATORY (INHALATION) at 08:06

## 2022-06-23 RX ADMIN — INSULIN LISPRO 8 UNITS: 100 INJECTION, SOLUTION INTRAVENOUS; SUBCUTANEOUS at 06:11

## 2022-06-23 RX ADMIN — TAMSULOSIN HYDROCHLORIDE 0.4 MG: 0.4 CAPSULE ORAL at 10:47

## 2022-06-23 RX ADMIN — GABAPENTIN 300 MG: 300 CAPSULE ORAL at 15:05

## 2022-06-23 RX ADMIN — GUAIFENESIN 400 MG: 400 TABLET ORAL at 15:05

## 2022-06-23 RX ADMIN — PANTOPRAZOLE SODIUM 40 MG: 40 TABLET, DELAYED RELEASE ORAL at 06:12

## 2022-06-23 RX ADMIN — INSULIN LISPRO 3 UNITS: 100 INJECTION, SOLUTION INTRAVENOUS; SUBCUTANEOUS at 21:11

## 2022-06-23 RX ADMIN — DOCUSATE SODIUM 100 MG: 100 CAPSULE, LIQUID FILLED ORAL at 09:06

## 2022-06-23 RX ADMIN — METFORMIN HYDROCHLORIDE 500 MG: 500 TABLET ORAL at 16:29

## 2022-06-23 RX ADMIN — SODIUM CHLORIDE, PRESERVATIVE FREE 10 ML: 5 INJECTION INTRAVENOUS at 09:13

## 2022-06-23 RX ADMIN — METHYLPREDNISOLONE SODIUM SUCCINATE 40 MG: 40 INJECTION, POWDER, FOR SOLUTION INTRAMUSCULAR; INTRAVENOUS at 09:07

## 2022-06-23 RX ADMIN — ASPIRIN 81 MG CHEWABLE TABLET 81 MG: 81 TABLET CHEWABLE at 09:06

## 2022-06-23 RX ADMIN — INSULIN LISPRO 8 UNITS: 100 INJECTION, SOLUTION INTRAVENOUS; SUBCUTANEOUS at 12:07

## 2022-06-23 RX ADMIN — ZOLPIDEM TARTRATE 5 MG: 5 TABLET ORAL at 21:10

## 2022-06-23 RX ADMIN — IPRATROPIUM BROMIDE AND ALBUTEROL SULFATE 1 AMPULE: 2.5; .5 SOLUTION RESPIRATORY (INHALATION) at 15:50

## 2022-06-23 RX ADMIN — BUDESONIDE 250 MCG: 0.25 SUSPENSION RESPIRATORY (INHALATION) at 08:06

## 2022-06-23 ASSESSMENT — PAIN SCALES - GENERAL: PAINLEVEL_OUTOF10: 0

## 2022-06-23 NOTE — PROGRESS NOTES
Hospitalist Progress Note      SYNOPSIS: Patient admitted on 2022 for Acute respiratory failure ([facility] [address]      SUBJECTIVE:    Patient seen and examined at bedside this morning. Resting comfortably. Continues to be on supplemental oxygen via nasal cannula at 2 L/min. Continues to report productive cough/exertional shortness of breath. Records reviewed. Stable overnight. No other overnight issues reported. Temp (24hrs), Av.2 °F (36.2 °C), Min:97 °F (36.1 °C), Max:97.5 °F (36.4 °C)    DIET: ADULT DIET; Regular; 4 carb choices (60 gm/meal)  CODE: Full Code    Intake/Output Summary (Last 24 hours) at 2022 1049  Last data filed at 2022 0906  Gross per 24 hour   Intake 1080 ml   Output --   Net 1080 ml       OBJECTIVE:    BP (!) 156/94   Pulse 97   Temp 97.2 °F (36.2 °C) (Temporal)   Resp 15   Wt 270 lb (122.5 kg)   SpO2 92%   BMI 39.87 kg/m²     General appearance: No apparent distress, appears stated age and cooperative. Respiratory: Bilateral air entry fair. Bilateral crackles/minimal expiratory wheezing. Cardiovascular: Regular rate rhythm, normal S1-S2  Abdomen: Soft, nontender, nondistended  Musculoskeletal: No  bilateral lower extremity edema. Neurologic: awake, alert and following commands       Assessment and plan    Patient is a 55-year-old male with past medical history of GERD, asthma/COPD (not on home oxygen), hypertension,? Diabetes, JANESSA not on CPAP, nicotine dependence was admitted on 2022 for evaluation of worsening shortness of breath/wheezing/productive cough. Patient was discharged from the hospital on 2022 when he was admitted for COVID-19 pneumonia. Patient was treated Decadron. Patient did not require any supplemental oxygen at the time of discharge. Patient unfortunately continues to smoke. ASSESSMENT:    1.  Acute hypoxic respiratory failure likely secondary to COPD exacerbation- Rocephin and doxycycline for empiric CAP coverage-may stop soon.  Await final cultures. Obtain respiratory culture if able to produce specimen. Will need ambulatory pulse ox. On nebulized breathing treatments. Taper steroids. · Recent COVID-19 pneumonia  · Hypertension  · Diabetes mellitus- A1c 6.6. Start metformin. · JANESSA on CPAP- CPAP 8 cm water pressure  · Nicotine dependence- smoking cessation  · History of drug abuse  · History EtOH abuse with history of pancreatitis  · Obesity- BMI 39      Possible dc tomorrow once steroids changed to PO, ambulatory pulse ox complete, and O2 set up.         Medications:  REVIEWED DAILY    Infusion Medications    sodium chloride       Scheduled Medications    guaiFENesin  400 mg Oral TID    tamsulosin  0.4 mg Oral BID    methylPREDNISolone  40 mg IntraVENous Q12H    insulin lispro  0-12 Units SubCUTAneous TID WC    insulin lispro  0-6 Units SubCUTAneous Nightly    insulin glargine  10 Units SubCUTAneous Nightly    ipratropium-albuterol  1 ampule Inhalation Q4H WA    docusate sodium  100 mg Oral BID    pantoprazole  40 mg Oral QAM AC    nicotine  1 patch TransDERmal Daily    chlorhexidine  15 mL Mouth/Throat BID    nystatin  5 mL Oral 4x Daily    cefTRIAXone (ROCEPHIN) IV  1,000 mg IntraVENous Q24H    doxycycline (VIBRAMYCIN) IV  100 mg IntraVENous Q12H    amLODIPine  10 mg Oral Daily    aspirin  81 mg Oral Daily    atorvastatin  10 mg Oral Daily    buPROPion  150 mg Oral Daily    gabapentin  300 mg Oral TID    oxybutynin  5 mg Oral Daily    sodium chloride flush  10 mL IntraVENous 2 times per day    enoxaparin  30 mg SubCUTAneous BID    budesonide  250 mcg Nebulization BID    Arformoterol Tartrate  15 mcg Nebulization BID     PRN Meds: melatonin, guaiFENesin-dextromethorphan, albuterol, zolpidem, sodium chloride flush, sodium chloride, promethazine **OR** ondansetron, polyethylene glycol, acetaminophen **OR** acetaminophen, potassium chloride **OR** potassium alternative oral replacement **OR** potassium

## 2022-06-23 NOTE — CARE COORDINATION
This CM followed back with pt to provide his oxygen supplier name and number ([employer], c/o concentrator not working); completed envelope and ambulette form for pt's transport home via access to care (p) [phone] in soft-chart. Discharge plan remains home once medically stable. Please note that pt's home oxygen has not been working and pt prior to this admit did not qualify for oxygen. Pt will need ambulatory pulse ox prior to discharge.

## 2022-06-23 NOTE — PROGRESS NOTES
Ambulatory pulse ox completed. Patient walked 2 laps around unit on room air at a rather brisk pace. Patient was told multiple times that it wasn't a speed walk however patient insisted on the pace and stated that \"this is how I walk\". Patients o2 ranged between 86 and 90 with a HR range of 101-125 with a resp rate of 26. Patient was visually observed to be short of breath and mouth breathing during the walk. Patient had no complaints of pain or discomfort during the walk and denies any shortness of breath. Patient did request to try again later on in the afternoon.

## 2022-06-23 NOTE — PLAN OF CARE
Problem: Discharge Planning  Goal: Discharge to home or other facility with appropriate resources  6/23/2022 1458 by Deanne Murray RN  Outcome: Progressing     Problem: Safety - Adult  Goal: Free from fall injury  6/23/2022 1458 by Deanne Murray RN  Outcome: Progressing     Problem: Chronic Conditions and Co-morbidities  Goal: Patient's chronic conditions and co-morbidity symptoms are monitored and maintained or improved  6/23/2022 1458 by Deanne Murray RN  Outcome: Progressing

## 2022-06-23 NOTE — PROGRESS NOTES
Nano Evans M.D.,[facility]  Yue Kumar D.O., FNASRAOBetzyI., Gonzalo Abernathy M.D. Rommel Ayala M.D. Ander Ramirez D.O. Daily Pulmonary Progress Note    Patient:  Gilberto Thomas 45 y.o. male MRN: [id number]     Date of Service: 6/23/2022      Synopsis     We are following patient for worsening respiratory failure with hypoxia    \"CC\" shortness of breath    Code status: Full      Subjective      Patient was seen and examined. Lying in bed in no acute distress. States he ambulated to the bathroom back without oxygen and felt short of breath SPO2 dropped to 88%. He is now on 2 L nasal cannula at rest.  He only wore CPAP for few hours last night. Had trouble sleeping because his room was too warm. He is now transferred to a different room with better temperature control 69 degrees. He admits to occasional cough with scant mucus production. Review of Systems:  Constitutional: Denies fever, weight loss, night sweats, and fatigue  Skin: Denies pigmentation, dark lesions, and rashes   HEENT: Denies hearing loss, tinnitus, ear drainage, epistaxis, sore throat, and hoarseness. Cardiovascular: Denies palpitations, chest pain, and chest pressure.   Respiratory: Improving cough dyspnea scant mucus production  Gastrointestinal: Denies nausea, vomiting, poor appetite, diarrhea, heartburn or reflux  Genitourinary: Denies dysuria, frequency, urgency or hematuria  Musculoskeletal: Denies myalgias, muscle weakness, and bone pain  Neurological: Denies dizziness, vertigo, headache, and focal weakness  Psychological: Denies anxiety and depression  Endocrine: Denies heat intolerance and cold intolerance  Hematopoietic/Lymphatic: Denies bleeding problems and blood transfusions    24-hour events:  None    Objective   Vitals: BP (!) 156/94   Pulse 97   Temp 97.2 °F (36.2 °C) (Temporal)   Resp 15   Wt 270 lb (122.5 kg)   SpO2 92%   BMI 39.87 kg/m²     I/O:    Intake/Output Summary (Last 24 hours) at 6/23/2022 1101  Last data filed at 6/23/2022 0906  Gross per 24 hour   Intake 1080 ml   Output --   Net 1080 ml                  CPAP/EPAP: 5 cmH2O     CURRENT MEDS :  Scheduled Meds:   guaiFENesin  400 mg Oral TID    tamsulosin  0.4 mg Oral BID    metFORMIN  500 mg Oral BID     methylPREDNISolone  40 mg IntraVENous Q12H    insulin lispro  0-12 Units SubCUTAneous TID     insulin lispro  0-6 Units SubCUTAneous Nightly    insulin glargine  10 Units SubCUTAneous Nightly    ipratropium-albuterol  1 ampule Inhalation Q4H WA    docusate sodium  100 mg Oral BID    pantoprazole  40 mg Oral QAM AC    nicotine  1 patch TransDERmal Daily    chlorhexidine  15 mL Mouth/Throat BID    nystatin  5 mL Oral 4x Daily    cefTRIAXone (ROCEPHIN) IV  1,000 mg IntraVENous Q24H    doxycycline (VIBRAMYCIN) IV  100 mg IntraVENous Q12H    amLODIPine  10 mg Oral Daily    aspirin  81 mg Oral Daily    atorvastatin  10 mg Oral Daily    buPROPion  150 mg Oral Daily    gabapentin  300 mg Oral TID    oxybutynin  5 mg Oral Daily    sodium chloride flush  10 mL IntraVENous 2 times per day    enoxaparin  30 mg SubCUTAneous BID    budesonide  250 mcg Nebulization BID    Arformoterol Tartrate  15 mcg Nebulization BID       Physical Exam:  General Appearance: appears comfortable in no acute distress. HEENT: Normocephalic atraumatic without obvious abnormality   Neck: Lips, mucosa, and tongue normal.  Supple, symmetrical, trachea midline, no adenopathy;thyroid:  no enlargement/tenderness/nodules or JVD. Lung: Breath sounds faint expiratory wheeze with improvement diminished aeration bilaterally. Respirations   unlabored. Symmetrical expansion. Heart: RRR, normal S1, S2. No MRG  Abdomen: Soft, NT, ND. BS present x 4 quadrants. No bruit or organomegaly. Extremities: Pedal pulses 2+ symmetric b/l. Extremities normal, no cyanosis, clubbing, or edema. Musculokeletal: No joint swelling, no muscle tenderness.  ROM normal in all joints of extremities. Neurologic: Mental status: Alert and Oriented X3 .     Pertinent/ New Labs and Imaging Studies     Imaging Personally Reviewed:    FINDINGS:   The cardiomediastinal contours and pulmonary vasculature are within normal   limits.       Hazy left basilar opacity is noted, which may suggest atelectasis or   alternatively, parenchymal disease.  No effusion or pneumothorax is seen.       No acute osseous abnormalities are identified.           Impression   Left basilar disease, which may suggest atelectasis or alternatively,   pneumonia in the appropriate setting.          CT chest      FINDINGS:   Pulmonary Arteries: Pulmonary arteries are adequately opacified for   evaluation.  No evidence of intraluminal filling defect to suggest pulmonary   embolism.  Main pulmonary artery is normal in caliber.       Mediastinum: No evidence of mediastinal lymphadenopathy.  The heart and   pericardium demonstrate no acute abnormality.  There is no acute abnormality   of the thoracic aorta.       Lungs/pleura: Bibasilar atelectasis/ground-glass disease.  The lungs are   otherwise clear.  No mass, effusion or pneumothorax.       Upper Abdomen: Limited images of the upper abdomen are unremarkable.       Soft Tissues/Bones: No acute bone or soft tissue abnormality.           Impression   Minimal disease involving the lower lungs.  This may represent consolidative   changes or alternatively, atelectasis.       No pulmonary embolism, aortic aneurysm or dissection.               Sleep study 2016 interpreted by Dr. Gianna Chawla: Lauro Stone this study, the patient had total of 6 apneas   which of these apneic episodes all were obstructive.  The patient had 71   hypopneas.  The longest duration of the hypopneic event was 62 seconds.  Per   the respiratory report, after 2 hours of sleep a CPAP was applied for severe   oxygen desaturation with respiratory events.      OXYGEN STATISTICS:  The patient's mean oxygen saturation was 94%.  The   patient spent 84.9% of the total sleep time with oxygen saturation above 90%.   The patient's lowest oxygen saturation was 69%.      HEART RATE SUMMARY:  The patient's heart rate while awake was 94 bpm.  The   patient's heart rate during study sleep was 81 bpm.      LEG MOVEMENTS:  During this study, the patient only had 2 leg movements.      The patient was placed on CPAP and then the pressures were increased for   elimination of events.  At a CPAP of 8 the patient had no apneas or   hypopneas.      RECOMMEND:  A CPAP of 8 with a 20 minute ramp and a humidifier.                    Echo:  Summary   Left ventricular size is grossly normal.   Borderline left ventricular concentric hypertrophy noted.   Ejection fraction is visually estimated at 65%.   No evidence of left ventricular mass or thrombus noted.   No regional wall motion abnormalities seen.   The left atrium is mildly dilated.   Interatrial septum appears intact.   No evidence of thrombus within left atrium.   No evidence of mass within left atrium.   Physiologic and/or trace mitral regurgitation is present.   No evidence of mitral valve stenosis.   Physiologic and/or trace tricuspid regurgitation.  RVSP is 26.00 mmHg.   Regular rhythm.           Labs:  Lab Results   Component Value Date    WBC 13.4 06/23/2022    HGB 13.7 06/23/2022    HCT 44.7 06/23/2022    MCV 91.2 06/23/2022    MCH 28.0 06/23/2022    MCHC 30.6 06/23/2022    RDW 14.1 06/23/2022     06/23/2022    MPV 10.1 06/23/2022     Lab Results   Component Value Date     06/23/2022    K 4.7 06/23/2022    CL 91 06/23/2022    CO2 28 06/23/2022    BUN 14 06/23/2022    CREATININE 0.6 06/23/2022    LABALBU 3.8 06/23/2022    CALCIUM 9.3 06/23/2022    GFRAA >60 06/23/2022    LABGLOM >60 06/23/2022     Lab Results   Component Value Date    PROTIME 12.0 11/14/2020    INR 1.1 11/14/2020     Recent Labs     06/20/22  2354   PROBNP 82     Recent Labs     06/21/22  9785 PROCAL 0.03     This SmartLink has not been configured with any valid records. Micro:  Recent Labs     06/22/22  1336   CULTRESP Oral Pharyngeal Carley reduced     No results for input(s): LABGRAM in the last 72 hours. No results for input(s): LEGUR in the last 72 hours. Recent Labs     06/21/22  0904   STREPNEUMAGU Presumptive negative- suggests no current or recent  pneumococcal infection. Infection due to Strep pneumoniae cannot be  ruled out since the antigen present in the sample  may be below the detection limit of the test.  Normal Range:Presumptive Negative       Recent Labs     06/21/22  0904   LP1UAG Presumptive Negative -suggesting no recent or current infections  with Legionella pneumophila serogroup 1. Infection to Legionella cannot be ruled out since other serogroups  and species may cause infection, antigen may not be present in  early infection, or level of antigen may be below the  detection limit. Normal Range: Presumptive Negative            Assessment:    1. Acute on chronic respiratory failure with hypoxia  2. Severe Gold stage III COPD/asthma overlap with acute exacerbation  3. Recent COVID-19 pneumonia June 1, 2022 with minimal basilar infiltrates/atelectasis on CTA chest imaging  4. Obstructive sleep apnea, sleep study 2014 prior recommended setting-CPAP 8 cm water pressure  5. History of substance abuse cocaine, fentanyl  6. Nicotine dependence-30 pack years  7. Medical noncompliance  8. EtOH abuse history of pancreatitis  9. GERD  10. 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 Obesity BMI 39.87  12. Oral thrush      Plan:   1. Oxygen therapy weaned to 2 L nasal cannula wean to keep greater than 92%  2. We will need ambulatory oxygen testing repeated prior to discharge. Patient does not know his prior DME company and states that his oxygen concentrator is 1years old and needs serviced, not working properly.   Discussed with social work to contact his local pulmonary office for further information. 3. CPAP 8 cm water pressure nocturnally and as needed daytime with naps for treatment of underlying JANESSA  4. Okay to stop antibiotics. Chest imaging reviewed- no pulmonary embolism. Minimal airspace disease involving lower lung bases, not impressive for bacterial pneumonia. 5. Bronchodilator regimen-Brovana budesonide twice daily, DuoNebs every 4 hours while awake. May resume Trelegy Ellipta and albuterol as needed for rescue upon discharge  6. Continue incentive spirometer, lung recruitment maneuvers  7. Strep pneumonia Legionella urine antigens negative, low Pro-Augustus  8. Recent treatment for COVID-19 during last hospital stay included remdesivir , azithromycin, and dexamethasone  9. Continue Solu-Medrol 40 mg IV every 12 hours. We will transition to oral prednisone with taper upon discharge. Continue nystatin QID, peridex oral rinse for thrush. 10. NicoDerm CQ, tobacco cessation counseling  11. DVT, GI prophylaxis-Lovenox twice daily, Protonix daily  12. Will need follow-up with pulmonary as outpatient-local pulmonologist Dr. Serina Hennessy in [facility]. It has been many years since his initial sleep study. He no longer has a CPAP machine due to noncompliance. He will need a brand-new study to be completed as outpatient. Would recommend split study with AHI 10. This will need set up with Dr. Rangel Hutchinson upon discharge. 13. Patient's dyspnea is multifactorial.  Long discussion at bedside regarding weight loss, follow-up for sleep apnea testing,  Tobacco cessation. Consider repeat 2D echo to assess for pulmonary hypertension. Last echo 2014. Will defer to primary team.    This plan of care was reviewed in collaboration with Dr. Jennifer Lund  Electronically signed by MIKE Duarte CNP on 6/23/2022 at 11:01 AM      I personally saw, examined, and cared for the patient. Labs, medications, radiographs reviewed.  I agree

## 2022-06-24 VITALS
BODY MASS INDEX: 39.87 KG/M2 | SYSTOLIC BLOOD PRESSURE: 126 MMHG | DIASTOLIC BLOOD PRESSURE: 82 MMHG | OXYGEN SATURATION: 93 % | WEIGHT: 270 LBS | HEART RATE: 88 BPM | TEMPERATURE: 98.1 F | RESPIRATION RATE: 14 BRPM

## 2022-06-24 LAB
ALBUMIN SERPL-MCNC: 3.7 G/DL (ref 3.5–5.2)
ALP BLD-CCNC: 92 U/L (ref 40–129)
ALT SERPL-CCNC: 58 U/L (ref 0–40)
ANION GAP SERPL CALCULATED.3IONS-SCNC: 15 MMOL/L (ref 7–16)
AST SERPL-CCNC: 13 U/L (ref 0–39)
BASOPHILS ABSOLUTE: 0.01 E9/L (ref 0–0.2)
BASOPHILS RELATIVE PERCENT: 0.1 % (ref 0–2)
BILIRUB SERPL-MCNC: 0.7 MG/DL (ref 0–1.2)
BUN BLDV-MCNC: 15 MG/DL (ref 6–20)
CALCIUM SERPL-MCNC: 9.1 MG/DL (ref 8.6–10.2)
CHLORIDE BLD-SCNC: 90 MMOL/L (ref 98–107)
CO2: 27 MMOL/L (ref 22–29)
CREAT SERPL-MCNC: 0.7 MG/DL (ref 0.7–1.2)
CULTURE, RESPIRATORY: NORMAL
EOSINOPHILS ABSOLUTE: 0 E9/L (ref 0.05–0.5)
EOSINOPHILS RELATIVE PERCENT: 0 % (ref 0–6)
GFR AFRICAN AMERICAN: >60
GFR NON-AFRICAN AMERICAN: >60 ML/MIN/1.73
GLUCOSE BLD-MCNC: 246 MG/DL (ref 74–99)
HCT VFR BLD CALC: 44.9 % (ref 37–54)
HEMOGLOBIN: 13.5 G/DL (ref 12.5–16.5)
IMMATURE GRANULOCYTES #: 0.05 E9/L
IMMATURE GRANULOCYTES %: 0.4 % (ref 0–5)
LYMPHOCYTES ABSOLUTE: 0.77 E9/L (ref 1.5–4)
LYMPHOCYTES RELATIVE PERCENT: 6 % (ref 20–42)
MCH RBC QN AUTO: 27.3 PG (ref 26–35)
MCHC RBC AUTO-ENTMCNC: 30.1 % (ref 32–34.5)
MCV RBC AUTO: 90.9 FL (ref 80–99.9)
METER GLUCOSE: 191 MG/DL (ref 74–99)
METER GLUCOSE: 269 MG/DL (ref 74–99)
MONOCYTES ABSOLUTE: 0.57 E9/L (ref 0.1–0.95)
MONOCYTES RELATIVE PERCENT: 4.4 % (ref 2–12)
NEUTROPHILS ABSOLUTE: 11.52 E9/L (ref 1.8–7.3)
NEUTROPHILS RELATIVE PERCENT: 89.1 % (ref 43–80)
PDW BLD-RTO: 14.5 FL (ref 11.5–15)
PLATELET # BLD: 242 E9/L (ref 130–450)
PMV BLD AUTO: 9.9 FL (ref 7–12)
POTASSIUM REFLEX MAGNESIUM: 4.5 MMOL/L (ref 3.5–5)
RBC # BLD: 4.94 E12/L (ref 3.8–5.8)
SMEAR, RESPIRATORY: NORMAL
SODIUM BLD-SCNC: 132 MMOL/L (ref 132–146)
TOTAL PROTEIN: 6.9 G/DL (ref 6.4–8.3)
WBC # BLD: 12.9 E9/L (ref 4.5–11.5)

## 2022-06-24 PROCEDURE — 6370000000 HC RX 637 (ALT 250 FOR IP): Performed by: FAMILY MEDICINE

## 2022-06-24 PROCEDURE — 6370000000 HC RX 637 (ALT 250 FOR IP): Performed by: INTERNAL MEDICINE

## 2022-06-24 PROCEDURE — 6360000002 HC RX W HCPCS: Performed by: FAMILY MEDICINE

## 2022-06-24 PROCEDURE — 94660 CPAP INITIATION&MGMT: CPT

## 2022-06-24 PROCEDURE — 80053 COMPREHEN METABOLIC PANEL: CPT

## 2022-06-24 PROCEDURE — 94640 AIRWAY INHALATION TREATMENT: CPT

## 2022-06-24 PROCEDURE — 2580000003 HC RX 258: Performed by: FAMILY MEDICINE

## 2022-06-24 PROCEDURE — 6370000000 HC RX 637 (ALT 250 FOR IP): Performed by: NURSE PRACTITIONER

## 2022-06-24 PROCEDURE — 82962 GLUCOSE BLOOD TEST: CPT

## 2022-06-24 PROCEDURE — 36415 COLL VENOUS BLD VENIPUNCTURE: CPT

## 2022-06-24 PROCEDURE — 85025 COMPLETE CBC W/AUTO DIFF WBC: CPT

## 2022-06-24 PROCEDURE — 6370000000 HC RX 637 (ALT 250 FOR IP)

## 2022-06-24 RX ORDER — PREDNISONE 10 MG/1
TABLET ORAL
Qty: 30 TABLET | Refills: 0 | Status: SHIPPED | OUTPATIENT
Start: 2022-06-24

## 2022-06-24 RX ADMIN — IPRATROPIUM BROMIDE AND ALBUTEROL SULFATE 1 AMPULE: 2.5; .5 SOLUTION RESPIRATORY (INHALATION) at 09:33

## 2022-06-24 RX ADMIN — METFORMIN HYDROCHLORIDE 500 MG: 500 TABLET ORAL at 08:40

## 2022-06-24 RX ADMIN — PREDNISONE 40 MG: 20 TABLET ORAL at 08:41

## 2022-06-24 RX ADMIN — SODIUM CHLORIDE, PRESERVATIVE FREE 10 ML: 5 INJECTION INTRAVENOUS at 08:41

## 2022-06-24 RX ADMIN — ARFORMOTEROL TARTRATE 15 MCG: 15 SOLUTION RESPIRATORY (INHALATION) at 09:32

## 2022-06-24 RX ADMIN — ATORVASTATIN CALCIUM 10 MG: 10 TABLET, FILM COATED ORAL at 08:40

## 2022-06-24 RX ADMIN — 0.12% CHLORHEXIDINE GLUCONATE 15 ML: 1.2 RINSE ORAL at 08:40

## 2022-06-24 RX ADMIN — AMLODIPINE BESYLATE 10 MG: 5 TABLET ORAL at 08:41

## 2022-06-24 RX ADMIN — INSULIN LISPRO 2 UNITS: 100 INJECTION, SOLUTION INTRAVENOUS; SUBCUTANEOUS at 12:31

## 2022-06-24 RX ADMIN — TAMSULOSIN HYDROCHLORIDE 0.4 MG: 0.4 CAPSULE ORAL at 08:42

## 2022-06-24 RX ADMIN — NYSTATIN 500000 UNITS: 100000 SUSPENSION ORAL at 08:39

## 2022-06-24 RX ADMIN — BUPROPION HYDROCHLORIDE 150 MG: 150 TABLET, EXTENDED RELEASE ORAL at 08:41

## 2022-06-24 RX ADMIN — GUAIFENESIN 400 MG: 400 TABLET ORAL at 06:14

## 2022-06-24 RX ADMIN — GABAPENTIN 300 MG: 300 CAPSULE ORAL at 08:40

## 2022-06-24 RX ADMIN — BUDESONIDE 250 MCG: 0.25 SUSPENSION RESPIRATORY (INHALATION) at 09:32

## 2022-06-24 RX ADMIN — INSULIN LISPRO 6 UNITS: 100 INJECTION, SOLUTION INTRAVENOUS; SUBCUTANEOUS at 06:13

## 2022-06-24 RX ADMIN — DOCUSATE SODIUM 100 MG: 100 CAPSULE, LIQUID FILLED ORAL at 08:41

## 2022-06-24 RX ADMIN — ENOXAPARIN SODIUM 30 MG: 100 INJECTION SUBCUTANEOUS at 09:00

## 2022-06-24 RX ADMIN — ASPIRIN 81 MG CHEWABLE TABLET 81 MG: 81 TABLET CHEWABLE at 08:40

## 2022-06-24 RX ADMIN — PANTOPRAZOLE SODIUM 40 MG: 40 TABLET, DELAYED RELEASE ORAL at 06:14

## 2022-06-24 RX ADMIN — OXYBUTYNIN CHLORIDE 5 MG: 5 TABLET, EXTENDED RELEASE ORAL at 08:41

## 2022-06-24 NOTE — CARE COORDINATION
Call back from Donato Sterling with pt's oxygen supplier [facility], oxygen will be delivered by noon for transport home; this CM set-up transportation for pt with a pick-up time of [id number],  to call unit hour prior to arrival and ask for charge LEÓN Fermin to notify of company and ETA ([address] / main entrance).

## 2022-06-24 NOTE — CARE COORDINATION
Discharge order noted, this CM called [facility] for pt's home going oxygen, spoke with Anders; Anders will call this CM once she has an ETA for pt's home going oxygen then, this CM will set-up transport home for pt through his insurance.

## 2022-06-24 NOTE — PROGRESS NOTES
Pulse ox on room air sitting 93%. Pulse ox on room air ambulating 88%. Pulse ox on 2 liters sitting recovering 94%. Pulse ox on 2 liters ambulating recovery 94%.

## 2022-06-24 NOTE — CARE COORDINATION
Pt notified at bedside regarding to call [facility] (phone number provided to pt earlier) once he gets home from the hospital; work excuse provided; and notified of transport time.

## 2022-06-24 NOTE — PLAN OF CARE
Problem: Discharge Planning  Goal: Discharge to home or other facility with appropriate resources  6/24/2022 7770 by Guillermina Baez RN  Outcome: Progressing  6/24/2022 0402 by Gris Echavarria, RN  Outcome: Progressing     Problem: Chronic Conditions and Co-morbidities  Goal: Patient's chronic conditions and co-morbidity symptoms are monitored and maintained or improved  6/24/2022 1326 by Guillermina Baez RN  Outcome: Progressing  6/24/2022 0402 by Gris Echavarria, RN  Outcome: Progressing

## 2022-06-24 NOTE — PLAN OF CARE
Problem: Discharge Planning  Goal: Discharge to home or other facility with appropriate resources  6/24/2022 0402 by Mayelin Riojas RN  Outcome: Progressing     Problem: Safety - Adult  Goal: Free from fall injury  6/24/2022 0402 by Mayelin Riojas RN  Outcome: Progressing     Problem: Chronic Conditions and Co-morbidities  Goal: Patient's chronic conditions and co-morbidity symptoms are monitored and maintained or improved  6/24/2022 0402 by Mayelin Riojas RN  Outcome: Progressing

## 2022-06-24 NOTE — DISCHARGE SUMMARY
Discharge Summary    Admit date: 6/20/2022    Discharge date and time: No discharge date for patient encounter. Admitting Physician: Kristi Granado DO     Consultants: Pulm    Admission Diagnoses:  ACUTE HYPOXIC RESPIRATORY FAILURE    Discharge Diagnoses AND Hospital Course:  1. Acute hypoxic respiratory failure likely secondary to COPD/asthma exacerbation- Rocephin and doxycycline for empiric CAP coverage- now discontinued.  Await final cultures.  WIll taper steroids. Follow up with pulmonology OP. · Recent COVID-19 pneumonia  · Hypertension  · Diabetes mellitus- A1c 6.6. Start metformin. · JANESSA on CPAP- CPAP 8 cm water pressure  · Nicotine dependence- smoking cessation  · History of drug abuse  · History EtOH abuse with history of pancreatitis  · Obesity- BMI 39      Discharge Exam:  Vitals:    06/24/22 0745   BP: 126/82   Pulse: 88   Resp: 14   Temp: 98.1 °F (36.7 °C)   SpO2: 96%       General appearance: No apparent distress, appears stated age and cooperative. Respiratory: Bilateral air entry fair. Bilateral crackles/minimal expiratory wheezing. Cardiovascular: Regular rate rhythm, normal S1-S2  Abdomen: Soft, nontender, nondistended  Musculoskeletal: No  bilateral lower extremity edema. Neurologic: awake, alert and following commands     Disposition: home  The patient's condition is FAIR. At this time the patient is without objective evidence of an acute process requiring continuing hospitalization or inpatient management. They are stable for discharge with outpatient follow-up. I have spoken with the patient and discussed the results of the current hospitalization, in addition to providing specific details for the plan of care and counseling regarding the diagnosis and prognosis. The plan has been discussed in detail and they are aware of the specific conditions for emergent return, as well as the importance of follow-up.   Their questions are answered at this time and they are agreeable with the plan for discharge to HOME     Patient Instructions: Follow up with PCP within 7 days. Follow up with pulmonology as directed. No future appointments. Discharge Medications:     Medication List      START taking these medications    metFORMIN 500 MG tablet  Commonly known as: GLUCOPHAGE  Take 1 tablet by mouth 2 times daily (with meals)     nystatin 093419 UNIT/ML suspension  Commonly known as: MYCOSTATIN  Take 5 mLs by mouth 4 times daily for 22 doses     predniSONE 10 MG tablet  Commonly known as: DELTASONE  Take 4 tabs daily x 3 days then 3 tabs daily x 3 days then 2 tabs daily x 3 days then 1 tab daily x 3 days then stop        CONTINUE taking these medications    * albuterol (2.5 MG/3ML) 0.083% nebulizer solution  Commonly known as: PROVENTIL     * Ventolin  (90 Base) MCG/ACT inhaler  Generic drug: albuterol sulfate HFA     amLODIPine 10 MG tablet  Commonly known as: Norvasc  Take 1 tablet by mouth daily. aspirin 81 MG chewable tablet  Take 1 tablet by mouth daily     atorvastatin 10 MG tablet  Commonly known as: LIPITOR     buPROPion 150 MG extended release tablet  Commonly known as: WELLBUTRIN SR     fluticasone 50 MCG/ACT nasal spray  Commonly known as: FLONASE     gabapentin 300 MG capsule  Commonly known as: NEURONTIN     oxybutynin 5 MG extended release tablet  Commonly known as: DITROPAN-XL     pantoprazole 40 MG tablet  Commonly known as: PROTONIX     tadalafil 5 MG tablet  Commonly known as: CIALIS     tamsulosin 0.4 MG capsule  Commonly known as: FLOMAX     traMADol 50 MG tablet  Commonly known as: ULTRAM     Trelegy Ellipta 200-62.5-25 MCG/INH Aepb  Generic drug: Fluticasone-Umeclidin-Vilant     zolpidem 5 MG tablet  Commonly known as: AMBIEN         * This list has 2 medication(s) that are the same as other medications prescribed for you. Read the directions carefully, and ask your doctor or other care provider to review them with you.                Where to Get Your Medications These medications were sent to [address] [place] [address], / Nettie Buitrago  [phone] [facility] [phone]  [address] [place] [phone]    Phone: [phone]   · metFORMIN 500 MG tablet  · nystatin [id number] UNIT/ML suspension  · predniSONE 10 MG tablet         Activity: activity as tolerated    Diet: regular diet    Wound Care: none needed    Follow-up:     · This patient is instructed to follow-up with his primary care physician. · Patient is instructed to follow-up with the consults listed above as directed by them. · They are instructed to resume home medications and take new medications as indicated in the list above. · If the patient has a recurrence of symptoms, they are instructed to go to the ED. Preparing for this patient's discharge, including paperwork, orders, instructions, and meeting with patient did require > 30 minutes.     Kehinde Valverde DO   9:03 AM  6/24/2022

## 2022-06-24 NOTE — PROGRESS NOTES
Jaya Rizzo M.D.,[facility]  Jan Milian D.O., FLOUISA., Sherry Trammell M.D. Chris Watson M.D. Keegan Bolton D.O. Daily Pulmonary Progress Note    Patient:  Liane Henley 45 y.o. male MRN: [id number]     Date of Service: 6/24/2022      Synopsis     We are following patient for worsening respiratory failure with hypoxia    \"CC\" shortness of breath    Code status: Full      Subjective      Patient was seen and examined. Lying in bed in no acute distress. Dyspnea improved. Oxygen orders placed, will need 2 L NC for dc. Pulse ox on room air sitting 93%. Pulse ox on room air ambulating 88%. Pulse ox on 2 liters sitting recovering 94%. Pulse ox on 2 liters ambulating recovery 94%. Review of Systems:  Constitutional: Denies fever, weight loss, night sweats, and fatigue  Skin: Denies pigmentation, dark lesions, and rashes   HEENT: Denies hearing loss, tinnitus, ear drainage, epistaxis, sore throat, and hoarseness. Cardiovascular: Denies palpitations, chest pain, and chest pressure.   Respiratory: Improving cough dyspnea scant mucus production  Gastrointestinal: Denies nausea, vomiting, poor appetite, diarrhea, heartburn or reflux  Genitourinary: Denies dysuria, frequency, urgency or hematuria  Musculoskeletal: Denies myalgias, muscle weakness, and bone pain  Neurological: Denies dizziness, vertigo, headache, and focal weakness  Psychological: Denies anxiety and depression  Endocrine: Denies heat intolerance and cold intolerance  Hematopoietic/Lymphatic: Denies bleeding problems and blood transfusions    24-hour events:  None    Objective   Vitals: /82   Pulse 88   Temp 98.1 °F (36.7 °C)   Resp 14   Wt 270 lb (122.5 kg)   SpO2 93%   BMI 39.87 kg/m²     I/O:    Intake/Output Summary (Last 24 hours) at 6/24/2022 1051  Last data filed at 6/24/2022 0850  Gross per 24 hour   Intake 1140 ml   Output --   Net 1140 ml                  CPAP/EPAP: 5 cmH2O     CURRENT MEDS :  Scheduled Meds:   guaiFENesin  400 mg Oral TID    tamsulosin  0.4 mg Oral BID    metFORMIN  500 mg Oral BID     predniSONE  40 mg Oral Daily    insulin lispro  0-12 Units SubCUTAneous TID     insulin lispro  0-6 Units SubCUTAneous Nightly    insulin glargine  10 Units SubCUTAneous Nightly    ipratropium-albuterol  1 ampule Inhalation Q4H WA    docusate sodium  100 mg Oral BID    pantoprazole  40 mg Oral QAM AC    nicotine  1 patch TransDERmal Daily    chlorhexidine  15 mL Mouth/Throat BID    nystatin  5 mL Oral 4x Daily    amLODIPine  10 mg Oral Daily    aspirin  81 mg Oral Daily    atorvastatin  10 mg Oral Daily    buPROPion  150 mg Oral Daily    gabapentin  300 mg Oral TID    oxybutynin  5 mg Oral Daily    sodium chloride flush  10 mL IntraVENous 2 times per day    enoxaparin  30 mg SubCUTAneous BID    budesonide  250 mcg Nebulization BID    Arformoterol Tartrate  15 mcg Nebulization BID       Physical Exam:  General Appearance: appears comfortable in no acute distress. HEENT: Normocephalic atraumatic without obvious abnormality   Neck: Lips, mucosa, and tongue normal.  Supple, symmetrical, trachea midline, no adenopathy;thyroid:  no enlargement/tenderness/nodules or JVD. Lung: Breath sounds faint expiratory wheeze with improvement diminished aeration bilaterally. Respirations   unlabored. Symmetrical expansion. Heart: RRR, normal S1, S2. No MRG  Abdomen: Soft, NT, ND. BS present x 4 quadrants. No bruit or organomegaly. Extremities: Pedal pulses 2+ symmetric b/l. Extremities normal, no cyanosis, clubbing, or edema. Musculokeletal: No joint swelling, no muscle tenderness. ROM normal in all joints of extremities. Neurologic: Mental status: Alert and Oriented X3 . Pertinent/ New Labs and Imaging Studies     Imaging Personally Reviewed:    FINDINGS:   The cardiomediastinal contours and pulmonary vasculature are within normal   limits.        Ismael Chary left basilar opacity is noted, which may suggest atelectasis or alternatively, parenchymal disease. No effusion or pneumothorax is seen. No acute osseous abnormalities are identified. Impression   Left basilar disease, which may suggest atelectasis or alternatively,   pneumonia in the appropriate setting. CT chest      FINDINGS:   Pulmonary Arteries: Pulmonary arteries are adequately opacified for   evaluation. No evidence of intraluminal filling defect to suggest pulmonary   embolism. Main pulmonary artery is normal in caliber. Mediastinum: No evidence of mediastinal lymphadenopathy. The heart and   pericardium demonstrate no acute abnormality. There is no acute abnormality   of the thoracic aorta. Lungs/pleura: Bibasilar atelectasis/ground-glass disease. The lungs are   otherwise clear. No mass, effusion or pneumothorax. Upper Abdomen: Limited images of the upper abdomen are unremarkable. Soft Tissues/Bones: No acute bone or soft tissue abnormality. Impression   Minimal disease involving the lower lungs. This may represent consolidative   changes or alternatively, atelectasis. No pulmonary embolism, aortic aneurysm or dissection. Sleep study 2016 interpreted by Dr. Jeferson Tolentino:  During this study, the patient had total of 6 apneas   which of these apneic episodes all were obstructive. The patient had 71   hypopneas. The longest duration of the hypopneic event was 62 seconds. Per   the respiratory report, after 2 hours of sleep a CPAP was applied for severe   oxygen desaturation with respiratory events. OXYGEN STATISTICS:  The patient's mean oxygen saturation was 94%. The   patient spent 84.9% of the total sleep time with oxygen saturation above 90%. The patient's lowest oxygen saturation was 69%.       HEART RATE SUMMARY:  The patient's heart rate while awake was 94 bpm.  The   patient's heart rate during study sleep was 81 bpm.      LEG MOVEMENTS:  During this study, the patient only had 2 leg movements. The patient was placed on CPAP and then the pressures were increased for   elimination of events. At a CPAP of 8 the patient had no apneas or   hypopneas. RECOMMEND:  A CPAP of 8 with a 20 minute ramp and a humidifier. Echo:  Summary   Left ventricular size is grossly normal.   Borderline left ventricular concentric hypertrophy noted. Ejection fraction is visually estimated at 65%. No evidence of left ventricular mass or thrombus noted. No regional wall motion abnormalities seen. The left atrium is mildly dilated. Interatrial septum appears intact. No evidence of thrombus within left atrium. No evidence of mass within left atrium. Physiologic and/or trace mitral regurgitation is present. No evidence of mitral valve stenosis. Physiologic and/or trace tricuspid regurgitation. RVSP is 26.00 mmHg. Regular rhythm. Labs:  Lab Results   Component Value Date    WBC 12.9 06/24/2022    HGB 13.5 06/24/2022    HCT 44.9 06/24/2022    MCV 90.9 06/24/2022    MCH 27.3 06/24/2022    MCHC 30.1 06/24/2022    RDW 14.5 06/24/2022     06/24/2022    MPV 9.9 06/24/2022     Lab Results   Component Value Date     06/24/2022    K 4.5 06/24/2022    CL 90 06/24/2022    CO2 27 06/24/2022    BUN 15 06/24/2022    CREATININE 0.7 06/24/2022    LABALBU 3.7 06/24/2022    CALCIUM 9.1 06/24/2022    GFRAA >60 06/24/2022    LABGLOM >60 06/24/2022     Lab Results   Component Value Date    PROTIME 12.0 11/14/2020    INR 1.1 11/14/2020     No results for input(s): PROBNP in the last 72 hours. No results for input(s): PROCAL in the last 72 hours. This SmartLink has not been configured with any valid records. Micro:  Recent Labs     06/22/22  1336   CULTRESP Oral Pharyngeal Carley reduced     No results for input(s): LABGRAM in the last 72 hours. No results for input(s): LEGUR in the last 72 hours.   No results for input(s): STREPNEUMAGU in the last 72 hours. No results for input(s): LP1UAG in the last 72 hours. 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 CPAP 8 cm water pressure nocturnally and as needed daytime with naps for treatment of underlying JANESSA  Okay to stop antibiotics. Chest imaging reviewed- no pulmonary embolism. Minimal airspace disease involving lower lung bases, not impressive for bacterial pneumonia. Bronchodilator regimen-Brovana budesonide twice daily, DuoNebs every 4 hours while awake. May resume Trelegy Ellipta and albuterol as needed for rescue upon discharge  Continue incentive spirometer, lung recruitment maneuvers  Strep pneumonia Legionella urine antigens negative, low Pro-Augustus  Recent treatment for COVID-19 during last hospital stay included remdesivir , azithromycin, and dexamethasone  oral prednisone with taper upon discharge-orders placed. Continue nystatin QID, peridex oral rinse for thrush. NicoDerm CQ, tobacco cessation counseling  DVT, GI prophylaxis-Lovenox twice daily, Protonix daily  Will need follow-up with pulmonary as outpatient-local pulmonologist Dr. Jamal Wang in [facility]. It has been many years since his initial sleep study.   He no longer has a CPAP machine due to noncompliance. He will need a brand-new study to be completed as outpatient. Would recommend split study with AHI 10. This will need set up with Dr. Rangel Hutchinson upon discharge. Patient's dyspnea is multifactorial.  Long discussion at bedside regarding weight loss, follow-up for sleep apnea testing,  Tobacco cessation. Consider repeat 2D echo to assess for pulmonary hypertension. Last echo 2014. Will defer to primary team.  [id number] Rani Harding to dc from a pulmonary perspective when ok with all others  This plan of care was reviewed in collaboration with Dr. Jennifer Lund  Electronically signed by MIKE Duarte - CNP on 6/24/2022 at 10:51 AM         I personally saw, examined, and cared for the patient. Labs, medications, radiographs reviewed. I agree with history exam and plans detailed in NP note.         Minerva Torres, DO

## 2023-01-29 ENCOUNTER — HOSPITAL ENCOUNTER (INPATIENT)
Age: 53
LOS: 2 days | Discharge: PSYCHIATRIC OR SUBSTANCE USE FACILITY | DRG: 190 | End: 2023-02-01
Attending: EMERGENCY MEDICINE | Admitting: FAMILY MEDICINE
Payer: MEDICARE

## 2023-01-29 ENCOUNTER — APPOINTMENT (OUTPATIENT)
Dept: GENERAL RADIOLOGY | Age: 53
DRG: 190 | End: 2023-01-29
Payer: MEDICARE

## 2023-01-29 DIAGNOSIS — F10.929 ACUTE ALCOHOLIC INTOXICATION WITH COMPLICATION (HCC): ICD-10-CM

## 2023-01-29 DIAGNOSIS — F39 MOOD DISORDER (HCC): Primary | ICD-10-CM

## 2023-01-29 DIAGNOSIS — J96.02 ACUTE RESPIRATORY FAILURE WITH HYPOXIA AND HYPERCAPNIA (HCC): ICD-10-CM

## 2023-01-29 DIAGNOSIS — J44.1 COPD WITH ACUTE EXACERBATION (HCC): ICD-10-CM

## 2023-01-29 DIAGNOSIS — J96.01 ACUTE RESPIRATORY FAILURE WITH HYPOXIA AND HYPERCAPNIA (HCC): ICD-10-CM

## 2023-01-29 LAB
B.E.: 2.2 MMOL/L (ref -3–3)
COHB: 3.5 % (ref 0–1.5)
CRITICAL: ABNORMAL
DATE ANALYZED: ABNORMAL
DATE OF COLLECTION: ABNORMAL
HCO3: 31.4 MMOL/L (ref 22–26)
HHB: 5.9 % (ref 0–5)
LAB: ABNORMAL
Lab: ABNORMAL
METHB: 0.4 % (ref 0–1.5)
MODE: ABNORMAL
O2 CONTENT: 19.4 ML/DL
O2 SATURATION: 93.9 % (ref 92–98.5)
O2HB: 90.2 % (ref 94–97)
OPERATOR ID: 2245
PATIENT TEMP: 37 C
PCO2: 69.7 MMHG (ref 35–45)
PH BLOOD GAS: 7.27 (ref 7.35–7.45)
PO2: 81.9 MMHG (ref 75–100)
SOURCE, BLOOD GAS: ABNORMAL
THB: 15.3 G/DL (ref 11.5–16.5)
TIME ANALYZED: 2350

## 2023-01-29 PROCEDURE — 80179 DRUG ASSAY SALICYLATE: CPT

## 2023-01-29 PROCEDURE — 83880 ASSAY OF NATRIURETIC PEPTIDE: CPT

## 2023-01-29 PROCEDURE — 94640 AIRWAY INHALATION TREATMENT: CPT

## 2023-01-29 PROCEDURE — 6360000002 HC RX W HCPCS: Performed by: NURSE PRACTITIONER

## 2023-01-29 PROCEDURE — 83735 ASSAY OF MAGNESIUM: CPT

## 2023-01-29 PROCEDURE — 2580000003 HC RX 258: Performed by: NURSE PRACTITIONER

## 2023-01-29 PROCEDURE — 82805 BLOOD GASES W/O2 SATURATION: CPT

## 2023-01-29 PROCEDURE — 6370000000 HC RX 637 (ALT 250 FOR IP): Performed by: NURSE PRACTITIONER

## 2023-01-29 PROCEDURE — 81003 URINALYSIS AUTO W/O SCOPE: CPT

## 2023-01-29 PROCEDURE — 94664 DEMO&/EVAL PT USE INHALER: CPT

## 2023-01-29 PROCEDURE — 85025 COMPLETE CBC W/AUTO DIFF WBC: CPT

## 2023-01-29 PROCEDURE — 80307 DRUG TEST PRSMV CHEM ANLYZR: CPT

## 2023-01-29 PROCEDURE — 0202U NFCT DS 22 TRGT SARS-COV-2: CPT

## 2023-01-29 PROCEDURE — 82077 ASSAY SPEC XCP UR&BREATH IA: CPT

## 2023-01-29 PROCEDURE — 87040 BLOOD CULTURE FOR BACTERIA: CPT

## 2023-01-29 PROCEDURE — 96374 THER/PROPH/DIAG INJ IV PUSH: CPT

## 2023-01-29 PROCEDURE — 99285 EMERGENCY DEPT VISIT HI MDM: CPT

## 2023-01-29 PROCEDURE — 83690 ASSAY OF LIPASE: CPT

## 2023-01-29 PROCEDURE — 83605 ASSAY OF LACTIC ACID: CPT

## 2023-01-29 PROCEDURE — 80053 COMPREHEN METABOLIC PANEL: CPT

## 2023-01-29 PROCEDURE — 84484 ASSAY OF TROPONIN QUANT: CPT

## 2023-01-29 PROCEDURE — 80143 DRUG ASSAY ACETAMINOPHEN: CPT

## 2023-01-29 PROCEDURE — 71045 X-RAY EXAM CHEST 1 VIEW: CPT

## 2023-01-29 RX ORDER — 0.9 % SODIUM CHLORIDE 0.9 %
1000 INTRAVENOUS SOLUTION INTRAVENOUS ONCE
Status: COMPLETED | OUTPATIENT
Start: 2023-01-29 | End: 2023-01-30

## 2023-01-29 RX ORDER — IPRATROPIUM BROMIDE AND ALBUTEROL SULFATE 2.5; .5 MG/3ML; MG/3ML
3 SOLUTION RESPIRATORY (INHALATION) ONCE
Status: COMPLETED | OUTPATIENT
Start: 2023-01-29 | End: 2023-01-29

## 2023-01-29 RX ORDER — METHYLPREDNISOLONE SODIUM SUCCINATE 125 MG/2ML
125 INJECTION, POWDER, LYOPHILIZED, FOR SOLUTION INTRAMUSCULAR; INTRAVENOUS ONCE
Status: COMPLETED | OUTPATIENT
Start: 2023-01-29 | End: 2023-01-29

## 2023-01-29 RX ADMIN — SODIUM CHLORIDE 1000 ML: 9 INJECTION, SOLUTION INTRAVENOUS at 23:39

## 2023-01-29 RX ADMIN — METHYLPREDNISOLONE SODIUM SUCCINATE 125 MG: 125 INJECTION, POWDER, FOR SOLUTION INTRAMUSCULAR; INTRAVENOUS at 23:40

## 2023-01-29 RX ADMIN — IPRATROPIUM BROMIDE AND ALBUTEROL SULFATE 3 AMPULE: .5; 2.5 SOLUTION RESPIRATORY (INHALATION) at 23:47

## 2023-01-30 LAB
AADO2: 119.2 MMHG
ACETAMINOPHEN LEVEL: <5 MCG/ML (ref 10–30)
ADENOVIRUS BY PCR: NOT DETECTED
ALBUMIN SERPL-MCNC: 3.9 G/DL (ref 3.5–5.2)
ALBUMIN SERPL-MCNC: 4 G/DL (ref 3.5–5.2)
ALP BLD-CCNC: 80 U/L (ref 40–129)
ALP BLD-CCNC: 99 U/L (ref 40–129)
ALT SERPL-CCNC: 46 U/L (ref 0–40)
ALT SERPL-CCNC: 48 U/L (ref 0–40)
AMPHETAMINE SCREEN, URINE: NOT DETECTED
ANION GAP SERPL CALCULATED.3IONS-SCNC: 12 MMOL/L (ref 7–16)
ANION GAP SERPL CALCULATED.3IONS-SCNC: 14 MMOL/L (ref 7–16)
ANISOCYTOSIS: ABNORMAL
AST SERPL-CCNC: 25 U/L (ref 0–39)
AST SERPL-CCNC: 34 U/L (ref 0–39)
B.E.: 1.2 MMOL/L (ref -3–3)
BARBITURATE SCREEN URINE: NOT DETECTED
BASOPHILS ABSOLUTE: 0 E9/L (ref 0–0.2)
BASOPHILS ABSOLUTE: 0.03 E9/L (ref 0–0.2)
BASOPHILS RELATIVE PERCENT: 0.2 % (ref 0–2)
BASOPHILS RELATIVE PERCENT: 0.4 % (ref 0–2)
BENZODIAZEPINE SCREEN, URINE: NOT DETECTED
BILIRUB SERPL-MCNC: 0.4 MG/DL (ref 0–1.2)
BILIRUB SERPL-MCNC: 0.4 MG/DL (ref 0–1.2)
BILIRUBIN URINE: NEGATIVE
BLOOD, URINE: NEGATIVE
BORDETELLA PARAPERTUSSIS BY PCR: NOT DETECTED
BORDETELLA PERTUSSIS BY PCR: NOT DETECTED
BUN BLDV-MCNC: 6 MG/DL (ref 6–20)
BUN BLDV-MCNC: 6 MG/DL (ref 6–20)
CALCIUM SERPL-MCNC: 8.1 MG/DL (ref 8.6–10.2)
CALCIUM SERPL-MCNC: 8.3 MG/DL (ref 8.6–10.2)
CANNABINOID SCREEN URINE: NOT DETECTED
CHLAMYDOPHILIA PNEUMONIAE BY PCR: NOT DETECTED
CHLORIDE BLD-SCNC: 103 MMOL/L (ref 98–107)
CHLORIDE BLD-SCNC: 105 MMOL/L (ref 98–107)
CLARITY: CLEAR
CO2: 29 MMOL/L (ref 22–29)
CO2: 30 MMOL/L (ref 22–29)
COCAINE METABOLITE SCREEN URINE: NOT DETECTED
COHB: 3.1 % (ref 0–1.5)
COLOR: YELLOW
CORONAVIRUS 229E BY PCR: NOT DETECTED
CORONAVIRUS HKU1 BY PCR: DETECTED
CORONAVIRUS NL63 BY PCR: NOT DETECTED
CORONAVIRUS OC43 BY PCR: NOT DETECTED
CREAT SERPL-MCNC: 0.7 MG/DL (ref 0.7–1.2)
CREAT SERPL-MCNC: 0.7 MG/DL (ref 0.7–1.2)
CRITICAL: ABNORMAL
DATE ANALYZED: ABNORMAL
DATE OF COLLECTION: ABNORMAL
EKG ATRIAL RATE: 277 BPM
EKG Q-T INTERVAL: 408 MS
EKG QRS DURATION: 116 MS
EKG QTC CALCULATION (BAZETT): 459 MS
EKG R AXIS: -47 DEGREES
EKG T AXIS: 65 DEGREES
EKG VENTRICULAR RATE: 76 BPM
EOSINOPHILS ABSOLUTE: 0 E9/L (ref 0.05–0.5)
EOSINOPHILS ABSOLUTE: 0.04 E9/L (ref 0.05–0.5)
EOSINOPHILS RELATIVE PERCENT: 0 % (ref 0–6)
EOSINOPHILS RELATIVE PERCENT: 0.6 % (ref 0–6)
ETHANOL: 386 MG/DL (ref 0–0.08)
FENTANYL SCREEN, URINE: NOT DETECTED
FIO2: 40 %
GFR SERPL CREATININE-BSD FRML MDRD: >60 ML/MIN/1.73
GFR SERPL CREATININE-BSD FRML MDRD: >60 ML/MIN/1.73
GLUCOSE BLD-MCNC: 120 MG/DL (ref 74–99)
GLUCOSE BLD-MCNC: 160 MG/DL (ref 74–99)
GLUCOSE URINE: NEGATIVE MG/DL
HCO3: 30.3 MMOL/L (ref 22–26)
HCT VFR BLD CALC: 47.5 % (ref 37–54)
HCT VFR BLD CALC: 49.1 % (ref 37–54)
HEMOGLOBIN: 14.8 G/DL (ref 12.5–16.5)
HEMOGLOBIN: 15.1 G/DL (ref 12.5–16.5)
HHB: 6.7 % (ref 0–5)
HUMAN METAPNEUMOVIRUS BY PCR: NOT DETECTED
HUMAN RHINOVIRUS/ENTEROVIRUS BY PCR: NOT DETECTED
IMMATURE GRANULOCYTES #: 0.05 E9/L
IMMATURE GRANULOCYTES %: 0.7 % (ref 0–5)
INFLUENZA A BY PCR: NOT DETECTED
INFLUENZA B BY PCR: NOT DETECTED
KETONES, URINE: NEGATIVE MG/DL
LAB: ABNORMAL
LACTIC ACID: 1.8 MMOL/L (ref 0.5–2.2)
LACTIC ACID: 2.9 MMOL/L (ref 0.5–2.2)
LEUKOCYTE ESTERASE, URINE: NEGATIVE
LIPASE: 29 U/L (ref 13–60)
LYMPHOCYTES ABSOLUTE: 0.52 E9/L (ref 1.5–4)
LYMPHOCYTES ABSOLUTE: 3.25 E9/L (ref 1.5–4)
LYMPHOCYTES RELATIVE PERCENT: 47.9 % (ref 20–42)
LYMPHOCYTES RELATIVE PERCENT: 7.7 % (ref 20–42)
Lab: ABNORMAL
Lab: NORMAL
MAGNESIUM: 1.6 MG/DL (ref 1.6–2.6)
MAGNESIUM: 1.7 MG/DL (ref 1.6–2.6)
MCH RBC QN AUTO: 27.6 PG (ref 26–35)
MCH RBC QN AUTO: 28.1 PG (ref 26–35)
MCHC RBC AUTO-ENTMCNC: 30.8 % (ref 32–34.5)
MCHC RBC AUTO-ENTMCNC: 31.2 % (ref 32–34.5)
MCV RBC AUTO: 89.6 FL (ref 80–99.9)
MCV RBC AUTO: 90.1 FL (ref 80–99.9)
METAMYELOCYTES RELATIVE PERCENT: 0.9 % (ref 0–1)
METHADONE SCREEN, URINE: NOT DETECTED
METHB: 0.5 % (ref 0–1.5)
MODE: ABNORMAL
MONOCYTES ABSOLUTE: 0 E9/L (ref 0.1–0.95)
MONOCYTES ABSOLUTE: 0.73 E9/L (ref 0.1–0.95)
MONOCYTES RELATIVE PERCENT: 0.6 % (ref 2–12)
MONOCYTES RELATIVE PERCENT: 10.8 % (ref 2–12)
MYCOPLASMA PNEUMONIAE BY PCR: NOT DETECTED
MYELOCYTE PERCENT: 0.9 % (ref 0–0)
NEUTROPHILS ABSOLUTE: 2.69 E9/L (ref 1.8–7.3)
NEUTROPHILS ABSOLUTE: 5.98 E9/L (ref 1.8–7.3)
NEUTROPHILS RELATIVE PERCENT: 39.6 % (ref 43–80)
NEUTROPHILS RELATIVE PERCENT: 90.5 % (ref 43–80)
NITRITE, URINE: NEGATIVE
O2 SATURATION: 93 % (ref 92–98.5)
O2HB: 89.7 % (ref 94–97)
OPERATOR ID: 459
OPIATE SCREEN URINE: NOT DETECTED
OVALOCYTES: ABNORMAL
OXYCODONE URINE: NOT DETECTED
PARAINFLUENZA VIRUS 1 BY PCR: NOT DETECTED
PARAINFLUENZA VIRUS 2 BY PCR: NOT DETECTED
PARAINFLUENZA VIRUS 3 BY PCR: NOT DETECTED
PARAINFLUENZA VIRUS 4 BY PCR: NOT DETECTED
PATIENT TEMP: 37 C
PCO2: 67.4 MMHG (ref 35–45)
PDW BLD-RTO: 15.5 FL (ref 11.5–15)
PDW BLD-RTO: 15.7 FL (ref 11.5–15)
PEEP/CPAP: 6 CMH2O
PFO2: 1.96 MMHG/%
PH BLOOD GAS: 7.27 (ref 7.35–7.45)
PH UA: 6 (ref 5–9)
PHENCYCLIDINE SCREEN URINE: NOT DETECTED
PIP: 12 CMH2O
PLATELET # BLD: 302 E9/L (ref 130–450)
PLATELET # BLD: 330 E9/L (ref 130–450)
PMV BLD AUTO: 8.8 FL (ref 7–12)
PMV BLD AUTO: 9.2 FL (ref 7–12)
PO2: 78.5 MMHG (ref 75–100)
POIKILOCYTES: ABNORMAL
POLYCHROMASIA: ABNORMAL
POTASSIUM REFLEX MAGNESIUM: 3.5 MMOL/L (ref 3.5–5)
POTASSIUM SERPL-SCNC: 3.4 MMOL/L (ref 3.5–5)
PRO-BNP: 139 PG/ML (ref 0–125)
PROTEIN UA: NEGATIVE MG/DL
RBC # BLD: 5.27 E12/L (ref 3.8–5.8)
RBC # BLD: 5.48 E12/L (ref 3.8–5.8)
RESPIRATORY SYNCYTIAL VIRUS BY PCR: NOT DETECTED
RI(T): 1.52
SALICYLATE, SERUM: <0.3 MG/DL (ref 0–30)
SARS-COV-2, PCR: NOT DETECTED
SODIUM BLD-SCNC: 146 MMOL/L (ref 132–146)
SODIUM BLD-SCNC: 147 MMOL/L (ref 132–146)
SOURCE, BLOOD GAS: ABNORMAL
SPECIFIC GRAVITY UA: <=1.005 (ref 1–1.03)
TARGET CELLS: ABNORMAL
THB: 15.9 G/DL (ref 11.5–16.5)
TIME ANALYZED: 452
TOTAL PROTEIN: 6.7 G/DL (ref 6.4–8.3)
TOTAL PROTEIN: 6.9 G/DL (ref 6.4–8.3)
TRICYCLIC ANTIDEPRESSANTS SCREEN SERUM: NEGATIVE NG/ML
TROPONIN, HIGH SENSITIVITY: 19 NG/L (ref 0–11)
TROPONIN, HIGH SENSITIVITY: 20 NG/L (ref 0–11)
UROBILINOGEN, URINE: 0.2 E.U./DL
WBC # BLD: 6.5 E9/L (ref 4.5–11.5)
WBC # BLD: 6.8 E9/L (ref 4.5–11.5)

## 2023-01-30 PROCEDURE — 80053 COMPREHEN METABOLIC PANEL: CPT

## 2023-01-30 PROCEDURE — 85025 COMPLETE CBC W/AUTO DIFF WBC: CPT

## 2023-01-30 PROCEDURE — 94640 AIRWAY INHALATION TREATMENT: CPT

## 2023-01-30 PROCEDURE — 93010 ELECTROCARDIOGRAM REPORT: CPT | Performed by: INTERNAL MEDICINE

## 2023-01-30 PROCEDURE — 94660 CPAP INITIATION&MGMT: CPT

## 2023-01-30 PROCEDURE — 82805 BLOOD GASES W/O2 SATURATION: CPT

## 2023-01-30 PROCEDURE — 36415 COLL VENOUS BLD VENIPUNCTURE: CPT

## 2023-01-30 PROCEDURE — 2060000000 HC ICU INTERMEDIATE R&B

## 2023-01-30 PROCEDURE — 6370000000 HC RX 637 (ALT 250 FOR IP): Performed by: FAMILY MEDICINE

## 2023-01-30 PROCEDURE — 6370000000 HC RX 637 (ALT 250 FOR IP)

## 2023-01-30 PROCEDURE — 2580000003 HC RX 258

## 2023-01-30 PROCEDURE — 83605 ASSAY OF LACTIC ACID: CPT

## 2023-01-30 PROCEDURE — 83735 ASSAY OF MAGNESIUM: CPT

## 2023-01-30 PROCEDURE — 93005 ELECTROCARDIOGRAM TRACING: CPT | Performed by: NURSE PRACTITIONER

## 2023-01-30 PROCEDURE — 6360000002 HC RX W HCPCS: Performed by: FAMILY MEDICINE

## 2023-01-30 PROCEDURE — 84484 ASSAY OF TROPONIN QUANT: CPT

## 2023-01-30 PROCEDURE — 2580000003 HC RX 258: Performed by: FAMILY MEDICINE

## 2023-01-30 PROCEDURE — 6360000002 HC RX W HCPCS

## 2023-01-30 RX ORDER — AMLODIPINE BESYLATE 10 MG/1
10 TABLET ORAL DAILY
Status: DISCONTINUED | OUTPATIENT
Start: 2023-01-30 | End: 2023-02-01 | Stop reason: HOSPADM

## 2023-01-30 RX ORDER — BUDESONIDE 0.5 MG/2ML
500 INHALANT ORAL 2 TIMES DAILY
Status: DISCONTINUED | OUTPATIENT
Start: 2023-01-30 | End: 2023-02-01 | Stop reason: HOSPADM

## 2023-01-30 RX ORDER — BUPROPION HYDROCHLORIDE 150 MG/1
150 TABLET, EXTENDED RELEASE ORAL DAILY
Status: DISCONTINUED | OUTPATIENT
Start: 2023-01-30 | End: 2023-01-30

## 2023-01-30 RX ORDER — OXYBUTYNIN CHLORIDE 5 MG/1
5 TABLET, EXTENDED RELEASE ORAL DAILY
Status: DISCONTINUED | OUTPATIENT
Start: 2023-01-30 | End: 2023-01-30

## 2023-01-30 RX ORDER — SODIUM CHLORIDE 0.9 % (FLUSH) 0.9 %
5-40 SYRINGE (ML) INJECTION PRN
Status: DISCONTINUED | OUTPATIENT
Start: 2023-01-30 | End: 2023-02-01 | Stop reason: HOSPADM

## 2023-01-30 RX ORDER — LORAZEPAM 2 MG/ML
3 INJECTION INTRAMUSCULAR
Status: DISCONTINUED | OUTPATIENT
Start: 2023-01-30 | End: 2023-02-01 | Stop reason: HOSPADM

## 2023-01-30 RX ORDER — LORAZEPAM 2 MG/ML
4 CONCENTRATE ORAL
Status: DISCONTINUED | OUTPATIENT
Start: 2023-01-30 | End: 2023-01-30

## 2023-01-30 RX ORDER — LORAZEPAM 2 MG/ML
2 CONCENTRATE ORAL
Status: DISCONTINUED | OUTPATIENT
Start: 2023-01-30 | End: 2023-01-30

## 2023-01-30 RX ORDER — SODIUM CHLORIDE 0.9 % (FLUSH) 0.9 %
5-40 SYRINGE (ML) INJECTION PRN
Status: DISCONTINUED | OUTPATIENT
Start: 2023-01-30 | End: 2023-01-30 | Stop reason: SDUPTHER

## 2023-01-30 RX ORDER — LORAZEPAM 2 MG/ML
4 INJECTION INTRAMUSCULAR
Status: DISCONTINUED | OUTPATIENT
Start: 2023-01-30 | End: 2023-02-01 | Stop reason: HOSPADM

## 2023-01-30 RX ORDER — LORAZEPAM 1 MG/1
3 TABLET ORAL
Status: DISCONTINUED | OUTPATIENT
Start: 2023-01-30 | End: 2023-01-30

## 2023-01-30 RX ORDER — LORAZEPAM 1 MG/1
3 TABLET ORAL
Status: DISCONTINUED | OUTPATIENT
Start: 2023-01-30 | End: 2023-02-01 | Stop reason: HOSPADM

## 2023-01-30 RX ORDER — ONDANSETRON 4 MG/1
4 TABLET, ORALLY DISINTEGRATING ORAL EVERY 8 HOURS PRN
Status: DISCONTINUED | OUTPATIENT
Start: 2023-01-30 | End: 2023-02-01 | Stop reason: HOSPADM

## 2023-01-30 RX ORDER — SODIUM CHLORIDE 0.9 % (FLUSH) 0.9 %
5-40 SYRINGE (ML) INJECTION EVERY 12 HOURS SCHEDULED
Status: DISCONTINUED | OUTPATIENT
Start: 2023-01-30 | End: 2023-01-30 | Stop reason: SDUPTHER

## 2023-01-30 RX ORDER — MELOXICAM 7.5 MG/1
7.5 TABLET ORAL DAILY PRN
Status: ON HOLD | COMMUNITY

## 2023-01-30 RX ORDER — PREDNISONE 20 MG/1
40 TABLET ORAL DAILY
Status: DISCONTINUED | OUTPATIENT
Start: 2023-02-01 | End: 2023-02-01 | Stop reason: HOSPADM

## 2023-01-30 RX ORDER — TAMSULOSIN HYDROCHLORIDE 0.4 MG/1
0.4 CAPSULE ORAL 2 TIMES DAILY
Status: DISCONTINUED | OUTPATIENT
Start: 2023-01-30 | End: 2023-02-01 | Stop reason: HOSPADM

## 2023-01-30 RX ORDER — METHYLPREDNISOLONE SODIUM SUCCINATE 40 MG/ML
40 INJECTION, POWDER, LYOPHILIZED, FOR SOLUTION INTRAMUSCULAR; INTRAVENOUS EVERY 6 HOURS
Status: COMPLETED | OUTPATIENT
Start: 2023-01-30 | End: 2023-02-01

## 2023-01-30 RX ORDER — LORAZEPAM 1 MG/1
2 TABLET ORAL
Status: DISCONTINUED | OUTPATIENT
Start: 2023-01-30 | End: 2023-01-30

## 2023-01-30 RX ORDER — SODIUM CHLORIDE 0.9 % (FLUSH) 0.9 %
5-40 SYRINGE (ML) INJECTION EVERY 12 HOURS SCHEDULED
Status: DISCONTINUED | OUTPATIENT
Start: 2023-01-30 | End: 2023-02-01 | Stop reason: HOSPADM

## 2023-01-30 RX ORDER — OXYBUTYNIN CHLORIDE 10 MG/1
10 TABLET, EXTENDED RELEASE ORAL 2 TIMES DAILY
Status: DISCONTINUED | OUTPATIENT
Start: 2023-01-30 | End: 2023-02-01 | Stop reason: HOSPADM

## 2023-01-30 RX ORDER — LORAZEPAM 2 MG/ML
3 CONCENTRATE ORAL
Status: DISCONTINUED | OUTPATIENT
Start: 2023-01-30 | End: 2023-01-30

## 2023-01-30 RX ORDER — LORAZEPAM 1 MG/1
1 TABLET ORAL
Status: DISCONTINUED | OUTPATIENT
Start: 2023-01-30 | End: 2023-02-01 | Stop reason: HOSPADM

## 2023-01-30 RX ORDER — LORAZEPAM 1 MG/1
2 TABLET ORAL
Status: DISCONTINUED | OUTPATIENT
Start: 2023-01-30 | End: 2023-02-01 | Stop reason: HOSPADM

## 2023-01-30 RX ORDER — LANOLIN ALCOHOL/MO/W.PET/CERES
100 CREAM (GRAM) TOPICAL DAILY
Status: DISCONTINUED | OUTPATIENT
Start: 2023-01-30 | End: 2023-02-01 | Stop reason: HOSPADM

## 2023-01-30 RX ORDER — ASPIRIN 81 MG/1
81 TABLET, CHEWABLE ORAL DAILY
Status: DISCONTINUED | OUTPATIENT
Start: 2023-01-30 | End: 2023-02-01 | Stop reason: HOSPADM

## 2023-01-30 RX ORDER — CHLORDIAZEPOXIDE HYDROCHLORIDE 5 MG/1
5 CAPSULE, GELATIN COATED ORAL EVERY 6 HOURS PRN
Status: DISCONTINUED | OUTPATIENT
Start: 2023-01-30 | End: 2023-01-30

## 2023-01-30 RX ORDER — POLYETHYLENE GLYCOL 3350 17 G/17G
17 POWDER, FOR SOLUTION ORAL DAILY PRN
Status: DISCONTINUED | OUTPATIENT
Start: 2023-01-30 | End: 2023-02-01 | Stop reason: HOSPADM

## 2023-01-30 RX ORDER — GABAPENTIN 300 MG/1
300 CAPSULE ORAL 3 TIMES DAILY
Status: DISCONTINUED | OUTPATIENT
Start: 2023-01-30 | End: 2023-02-01 | Stop reason: HOSPADM

## 2023-01-30 RX ORDER — ACETAMINOPHEN 325 MG/1
650 TABLET ORAL EVERY 6 HOURS PRN
Status: DISCONTINUED | OUTPATIENT
Start: 2023-01-30 | End: 2023-02-01 | Stop reason: HOSPADM

## 2023-01-30 RX ORDER — LORAZEPAM 1 MG/1
4 TABLET ORAL
Status: DISCONTINUED | OUTPATIENT
Start: 2023-01-30 | End: 2023-02-01 | Stop reason: HOSPADM

## 2023-01-30 RX ORDER — LORAZEPAM 2 MG/ML
2 INJECTION INTRAMUSCULAR
Status: DISCONTINUED | OUTPATIENT
Start: 2023-01-30 | End: 2023-02-01 | Stop reason: HOSPADM

## 2023-01-30 RX ORDER — ATORVASTATIN CALCIUM 10 MG/1
10 TABLET, FILM COATED ORAL DAILY
Status: DISCONTINUED | OUTPATIENT
Start: 2023-01-30 | End: 2023-02-01 | Stop reason: HOSPADM

## 2023-01-30 RX ORDER — ONDANSETRON 2 MG/ML
4 INJECTION INTRAMUSCULAR; INTRAVENOUS EVERY 6 HOURS PRN
Status: DISCONTINUED | OUTPATIENT
Start: 2023-01-30 | End: 2023-02-01 | Stop reason: HOSPADM

## 2023-01-30 RX ORDER — SODIUM CHLORIDE 9 MG/ML
INJECTION, SOLUTION INTRAVENOUS PRN
Status: DISCONTINUED | OUTPATIENT
Start: 2023-01-30 | End: 2023-02-01 | Stop reason: HOSPADM

## 2023-01-30 RX ORDER — LORAZEPAM 1 MG/1
4 TABLET ORAL
Status: DISCONTINUED | OUTPATIENT
Start: 2023-01-30 | End: 2023-01-30

## 2023-01-30 RX ORDER — ENOXAPARIN SODIUM 100 MG/ML
30 INJECTION SUBCUTANEOUS 2 TIMES DAILY
Status: DISCONTINUED | OUTPATIENT
Start: 2023-01-30 | End: 2023-02-01 | Stop reason: HOSPADM

## 2023-01-30 RX ORDER — SODIUM CHLORIDE 9 MG/ML
INJECTION, SOLUTION INTRAVENOUS PRN
Status: DISCONTINUED | OUTPATIENT
Start: 2023-01-30 | End: 2023-01-30 | Stop reason: SDUPTHER

## 2023-01-30 RX ORDER — LORAZEPAM 2 MG/ML
1 CONCENTRATE ORAL
Status: DISCONTINUED | OUTPATIENT
Start: 2023-01-30 | End: 2023-01-30

## 2023-01-30 RX ORDER — LORAZEPAM 1 MG/1
1 TABLET ORAL
Status: DISCONTINUED | OUTPATIENT
Start: 2023-01-30 | End: 2023-01-30

## 2023-01-30 RX ORDER — ACETAMINOPHEN 650 MG/1
650 SUPPOSITORY RECTAL EVERY 6 HOURS PRN
Status: DISCONTINUED | OUTPATIENT
Start: 2023-01-30 | End: 2023-02-01 | Stop reason: HOSPADM

## 2023-01-30 RX ORDER — IPRATROPIUM BROMIDE AND ALBUTEROL SULFATE 2.5; .5 MG/3ML; MG/3ML
1 SOLUTION RESPIRATORY (INHALATION)
Status: DISCONTINUED | OUTPATIENT
Start: 2023-01-30 | End: 2023-02-01 | Stop reason: HOSPADM

## 2023-01-30 RX ORDER — ZOLPIDEM TARTRATE 5 MG/1
5 TABLET ORAL NIGHTLY PRN
Status: DISCONTINUED | OUTPATIENT
Start: 2023-01-30 | End: 2023-02-01 | Stop reason: HOSPADM

## 2023-01-30 RX ORDER — LORAZEPAM 2 MG/ML
1 INJECTION INTRAMUSCULAR
Status: DISCONTINUED | OUTPATIENT
Start: 2023-01-30 | End: 2023-02-01 | Stop reason: HOSPADM

## 2023-01-30 RX ADMIN — IPRATROPIUM BROMIDE AND ALBUTEROL SULFATE 1 AMPULE: .5; 2.5 SOLUTION RESPIRATORY (INHALATION) at 19:25

## 2023-01-30 RX ADMIN — SODIUM CHLORIDE, PRESERVATIVE FREE 10 ML: 5 INJECTION INTRAVENOUS at 15:15

## 2023-01-30 RX ADMIN — BUDESONIDE 500 MCG: 0.5 SUSPENSION RESPIRATORY (INHALATION) at 19:25

## 2023-01-30 RX ADMIN — LORAZEPAM 1 MG: 2 INJECTION INTRAMUSCULAR; INTRAVENOUS at 21:14

## 2023-01-30 RX ADMIN — OXYBUTYNIN CHLORIDE 5 MG: 5 TABLET, EXTENDED RELEASE ORAL at 08:39

## 2023-01-30 RX ADMIN — AMLODIPINE BESYLATE 10 MG: 10 TABLET ORAL at 08:39

## 2023-01-30 RX ADMIN — SODIUM CHLORIDE, PRESERVATIVE FREE 10 ML: 5 INJECTION INTRAVENOUS at 08:40

## 2023-01-30 RX ADMIN — Medication 100 MG: at 18:26

## 2023-01-30 RX ADMIN — ZOLPIDEM TARTRATE 5 MG: 5 TABLET ORAL at 21:03

## 2023-01-30 RX ADMIN — BUPROPION HYDROCHLORIDE 150 MG: 150 TABLET, EXTENDED RELEASE ORAL at 08:38

## 2023-01-30 RX ADMIN — SODIUM CHLORIDE, PRESERVATIVE FREE 10 ML: 5 INJECTION INTRAVENOUS at 18:26

## 2023-01-30 RX ADMIN — IPRATROPIUM BROMIDE AND ALBUTEROL SULFATE 1 AMPULE: .5; 2.5 SOLUTION RESPIRATORY (INHALATION) at 11:58

## 2023-01-30 RX ADMIN — TAMSULOSIN HYDROCHLORIDE 0.4 MG: 0.4 CAPSULE ORAL at 08:40

## 2023-01-30 RX ADMIN — METHYLPREDNISOLONE SODIUM SUCCINATE 40 MG: 40 INJECTION, POWDER, FOR SOLUTION INTRAMUSCULAR; INTRAVENOUS at 06:34

## 2023-01-30 RX ADMIN — ATORVASTATIN CALCIUM 10 MG: 10 TABLET, FILM COATED ORAL at 08:38

## 2023-01-30 RX ADMIN — METHYLPREDNISOLONE SODIUM SUCCINATE 40 MG: 40 INJECTION, POWDER, FOR SOLUTION INTRAMUSCULAR; INTRAVENOUS at 18:26

## 2023-01-30 RX ADMIN — GABAPENTIN 300 MG: 300 CAPSULE ORAL at 14:09

## 2023-01-30 RX ADMIN — ENOXAPARIN SODIUM 30 MG: 100 INJECTION SUBCUTANEOUS at 21:03

## 2023-01-30 RX ADMIN — IPRATROPIUM BROMIDE AND ALBUTEROL SULFATE 1 AMPULE: .5; 2.5 SOLUTION RESPIRATORY (INHALATION) at 15:37

## 2023-01-30 RX ADMIN — GABAPENTIN 300 MG: 300 CAPSULE ORAL at 21:03

## 2023-01-30 RX ADMIN — SODIUM CHLORIDE, PRESERVATIVE FREE 10 ML: 5 INJECTION INTRAVENOUS at 21:03

## 2023-01-30 RX ADMIN — METHYLPREDNISOLONE SODIUM SUCCINATE 40 MG: 40 INJECTION, POWDER, FOR SOLUTION INTRAMUSCULAR; INTRAVENOUS at 11:48

## 2023-01-30 RX ADMIN — SODIUM CHLORIDE, PRESERVATIVE FREE 10 ML: 5 INJECTION INTRAVENOUS at 11:48

## 2023-01-30 RX ADMIN — GABAPENTIN 300 MG: 300 CAPSULE ORAL at 08:39

## 2023-01-30 RX ADMIN — IPRATROPIUM BROMIDE AND ALBUTEROL SULFATE 1 AMPULE: .5; 2.5 SOLUTION RESPIRATORY (INHALATION) at 07:58

## 2023-01-30 RX ADMIN — ENOXAPARIN SODIUM 30 MG: 100 INJECTION SUBCUTANEOUS at 08:39

## 2023-01-30 RX ADMIN — ASPIRIN 81 MG CHEWABLE TABLET 81 MG: 81 TABLET CHEWABLE at 08:38

## 2023-01-30 RX ADMIN — TAMSULOSIN HYDROCHLORIDE 0.4 MG: 0.4 CAPSULE ORAL at 21:03

## 2023-01-30 RX ADMIN — BUDESONIDE 500 MCG: 0.5 SUSPENSION RESPIRATORY (INHALATION) at 07:58

## 2023-01-30 RX ADMIN — LORAZEPAM 2 MG: 2 INJECTION INTRAMUSCULAR; INTRAVENOUS at 15:15

## 2023-01-30 ASSESSMENT — PAIN DESCRIPTION - ORIENTATION: ORIENTATION: MID

## 2023-01-30 ASSESSMENT — PAIN DESCRIPTION - PAIN TYPE: TYPE: ACUTE PAIN

## 2023-01-30 ASSESSMENT — PAIN DESCRIPTION - FREQUENCY: FREQUENCY: CONTINUOUS

## 2023-01-30 ASSESSMENT — PAIN SCALES - GENERAL
PAINLEVEL_OUTOF10: 5
PAINLEVEL_OUTOF10: 0

## 2023-01-30 ASSESSMENT — PAIN DESCRIPTION - LOCATION: LOCATION: CHEST

## 2023-01-30 ASSESSMENT — PAIN SCALES - WONG BAKER: WONGBAKER_NUMERICALRESPONSE: 0

## 2023-01-30 ASSESSMENT — PAIN DESCRIPTION - DESCRIPTORS: DESCRIPTORS: ACHING

## 2023-01-30 NOTE — ED NOTES
Department of Emergency Medicine  FIRST PROVIDER TRIAGE NOTE             Independent MLP           1/29/23  11:04 PM EST    Date of Encounter: 1/29/23   MRN: [id number]      HPI: Edu Bailey is a 45 y.o. male who presents to the ED for No chief complaint on file. Patient presents to the emergency department with original complaints of being depressed on arrival noted to be 85% on room air. Notably intoxicated. Does have throw up all over him. Has been admitted in the past for acute respiratory failure with hypoxia, pneumonia, COPD    ROS: Negative for rash or headache. PE: Gen Appearance/Constitutional: drowsy  Pulm: abnormal breath sounds auscultated     Initial Plan of Care: All treatment areas with department are currently occupied. Plan to order/Initiate the following while awaiting opening in ED: labs, EKG, imaging studies, and COVID-19 testing.   Initiate Treatment-Testing, Proceed toTreatment Area When Bed Available for ED Attending/MLP to Continue Care    Electronically signed by MIKE Cisse CNP   DD: 1/29/23       MIKE Cisse CNP  01/29/23 6457  ATTENDING PROVIDER ATTESTATION:     Supervising Physician, on-site, available for consultation, non-participatory in the evaluation or care of this patient       Devora Rice MD  01/29/23 2329

## 2023-01-30 NOTE — ED NOTES
Fax to nursing office for [address] [place] [address], RN  01/29/23 1120 [facility], RN  01/29/23 1496

## 2023-01-30 NOTE — PROGRESS NOTES
Unable to obtain patient information for admission assessment due to patient being unresponsive. Dr. Tamara Rodriguez notified.

## 2023-01-30 NOTE — H&P
Hospitalist History & Physical      PCP: Jericho Hollis    Date of Service: Pt seen/examined on 1/30/2023    Chief Complaint:  had concerns including Depression (Patient to the ED for depression, states no plan to harm himself but has SI. Patient 82% on room air, admits to drinking \"a lot\" and patient is covered in emesis. Patient placed on 6L and up to 86% in triage. ). History Of Present Illness:    Mr. Ariela Ibarra, a 45y.o. year old male  who  has a past medical history of Acid reflux, Asthma, Asthma, COPD (chronic obstructive pulmonary disease) ([facility] [address] Hypertension, Pancreatitis, Prediabetes, Psychiatric problem, Sleep apnea, and Substance abuse ([facility] [address] Ariela Ibarra is a 45 y.o. male presenting to the ED for depressed and having suicidal thoughts, beginning days ago. The complaint has been persistent, moderate in severity, and worsened by nothing. Patient reports having thoughts of harming self. Patient does admit to drinking alcohol today. Patient was brought in by friend. Patient also reporting feeling short of breath he was hypoxic out in the waiting room. Patient does have history of COPD as well as asthma reportedly not on oxygen patient also reporting chest pain throughout his whole chest.  The pain does not radiate. .  Patient had reportedly vomited prior to arrival.  Patient reporting no abdominal pain there is no history of black or tarry stools. Patient does have a history of acid reflux history of COPD history of substance abuse and hypertension. Vital signs show the patient was hypoxic as low as 82% on room air. Currently on BiPAP is 98%. The patient is afebrile. [employer] studies demonstrate potassium 3.4, lactic acid 2.9, glucose 120, proBNP 139, troponin 20, alcohol level 386. Chest x-ray unremarkable. Patient was given breathing treatments and steroids in the emergency department. Suicide precautions initiated. Medicine consulted for admission.       Past Medical History: Diagnosis Date    Acid reflux     Asthma     Asthma     COPD (chronic obstructive pulmonary disease) ([facility] [address]     Hypertension     Pancreatitis     Prediabetes     Psychiatric problem     depressed    Sleep apnea     Substance abuse ([facility] [address]     alcohol and cocaine       Past Surgical History:   Procedure Laterality Date    COLONOSCOPY      2010 neg    NERVE BLOCK Left 08/27/2018    lumbar epidural #1 paramedian    [place] NJX DX/THER SBST INTRLMNR LMBR/SAC W/IMG GDN Left 8/27/2018    LUMBAR EPIDURAL STEROID INJECTION L4-5 LEFT PARAMEDIAN #1 performed by Leonides Quintanilla MD at [address] [facility]         Prior to Admission medications    Medication Sig Start Date End Date Taking? Authorizing Provider   predniSONE (DELTASONE) 10 MG tablet Take 4 tabs daily x 3 days then 3 tabs daily x 3 days then 2 tabs daily x 3 days then 1 tab daily x 3 days then stop 6/24/22   Paula aCrrillo APRN - CNP   metFORMIN (GLUCOPHAGE) 500 MG tablet Take 1 tablet by mouth 2 times daily (with meals) 6/24/22   Naa Rosales DO   fluticasone [facility]) 50 MCG/ACT nasal spray instill 2 sprays into each nostril twice a day 4/13/22   Historical Provider, MD   pantoprazole (PROTONIX) 40 MG tablet  6/14/22   Historical Provider, MD   tadalafil (CIALIS) 5 MG tablet take 1 tablet by mouth once daily 3/31/22   Historical Provider, MD   traMADol (ULTRAM) 50 MG tablet take 1 to 2 tablets by mouth every 6 hours if needed for pain 6/7/22   Historical Provider, MD   gabapentin (NEURONTIN) 300 MG capsule Take 300 mg by mouth 3 times daily.     Historical Provider, MD   oxybutynin (DITROPAN-XL) 5 MG extended release tablet Take 5 mg by mouth daily    Historical Provider, MD   albuterol (PROVENTIL) (2.5 MG/3ML) 0.083% nebulizer solution Take 2.5 mg by nebulization every 6 hours as needed for Wheezing    Historical Provider, MD   albuterol sulfate HFA (VENTOLIN HFA) 108 (90 Base) MCG/ACT inhaler Inhale 2 puffs into the lungs every 6 hours as needed for Wheezing    Historical Provider, MD   atorvastatin (LIPITOR) 10 MG tablet Take 10 mg by mouth daily    Historical Provider, MD   buPROPion (WELLBUTRIN SR) 150 MG extended release tablet Take 150 mg by mouth daily    Historical Provider, MD   zolpidem (AMBIEN) 5 MG tablet Take 5 mg by mouth nightly as needed for Sleep. Historical Provider, MD   Fluticasone-Umeclidin-Vilant (Max Pila) 200-62.5-25 MCG/INH AEPB Inhale 2 puffs into the lungs 2 times daily     Historical Provider, MD   aspirin 81 MG chewable tablet Take 1 tablet by mouth daily 11/18/20   Cyndy Merlos MD   tamsulosin (FLOMAX) 0.4 MG capsule Take 0.4 mg by mouth 2 times daily     Historical Provider, MD   amLODIPine (NORVASC) 10 MG tablet Take 1 tablet by mouth daily. 1/16/15   Zaheer Levi MD         Allergies:  Dust mite extract    Social History:    TOBACCO:   reports that he has been smoking cigarettes and cigars. He has a 28.00 pack-year smoking history. He has never used smokeless tobacco.  ETOH:   reports current alcohol use. Family History:    Reviewed in detail and negative for DM, CAD, Cancer, CVA. Positive as follows\"      Problem Relation Age of Onset    Other Sister     High Blood Pressure Maternal Grandmother     Other Maternal Grandmother        REVIEW OF SYSTEMS:   Pertinent positives as noted in the HPI. All other systems reviewed and negative. PHYSICAL EXAM:  /62   Pulse 69   Temp 97.6 °F (36.4 °C)   Resp 15   Ht 5' 10\" (1.778 m)   Wt 270 lb (122.5 kg)   SpO2 98%   BMI 38.74 kg/m²   General appearance: No apparent distress, appears stated age and cooperative. HEENT: Normal cephalic, atraumatic without obvious deformity. Pupils equal, round, and reactive to light. Extra ocular muscles intact. Conjunctivae/corneas clear. Neck: Supple, with full range of motion. No jugular venous distention. Trachea midline.   Respiratory: Diminished  Cardiovascular: Regular rate and rhythm  Abdomen: Soft, nontender, nondistended  Musculoskeletal: No clubbing, cyanosis, edema of bilateral lower extremities. Brisk capillary refill. Skin: Normal skin color. No rashes or lesions. Neurologic:  Neurovascularly intact without any focal sensory/motor deficits. Cranial nerves: II-XII intact, grossly non-focal.  Reviewed EKG and CXR personally      CBC:   Recent Labs     01/29/23  2327   WBC 6.8   RBC 5.27   HGB 14.8   HCT 47.5   MCV 90.1   RDW 15.7*        BMP:   Recent Labs     01/29/23  2327      K 3.4*      CO2 29   BUN 6   CREATININE 0.7   MG 1.6     LFT:  Recent Labs     01/29/23 2327   PROT 6.7   ALKPHOS 99   ALT 48*   AST 34   BILITOT 0.4   LIPASE 29     CE:  No results for input(s): Brigida Nasuti in the last 72 hours. PT/INR: No results for input(s): INR, APTT in the last 72 hours. BNP: No results for input(s): BNP in the last 72 hours.   ESR:   Lab Results   Component Value Date    SEDRATE 1 06/25/2020     CRP:   Lab Results   Component Value Date    CRP 0.5 (H) 06/01/2022     D Dimer:   Lab Results   Component Value Date    DDIMER <200 06/01/2022      Folate and B12: No results found for: PUOSOEQR03, No results found for: FOLATE  Lactic Acid:   Lab Results   Component Value Date    LACTA 2.9 (H) 01/29/2023     Thyroid Studies:   Lab Results   Component Value Date    TSH 1.740 10/14/2020       Oupatient labs:  Lab Results   Component Value Date    CHOL 156 04/20/2020    TRIG 171 (H) 02/14/2022    HDL 58 04/20/2020    LDLCALC 76 04/20/2020    TSH 1.740 10/14/2020    PSA 0.60 10/28/2015    INR 1.1 11/14/2020    LABA1C 6.6 (H) 06/22/2022       Urinalysis:    Lab Results   Component Value Date/Time    NITRU Negative 01/29/2023 11:27 PM    Juvenal [place] 1-3 06/25/2020 07:29 AM    BACTERIA FEW 06/25/2020 07:29 AM    RBCUA NONE 06/25/2020 07:29 AM    RBCUA NONE 02/13/2014 08:59 AM    BLOODU Negative 01/29/2023 11:27 PM    SPECGRAV <=1.005 01/29/2023 11:27 PM    GLUCOSEU Negative 01/29/2023 11:27 PM Imaging:  XR CHEST PORTABLE    Result Date: 1/30/2023  EXAMINATION: ONE XRAY VIEW OF THE CHEST 1/30/2023 12:09 am COMPARISON: 06/21/2022 HISTORY: ORDERING SYSTEM PROVIDED HISTORY: Hypoxic TECHNOLOGIST PROVIDED HISTORY: Reason for exam:->Hypoxic What reading provider will be dictating this exam?->CRC FINDINGS: The lungs are without acute focal process. There is no effusion or pneumothorax. The cardiomediastinal silhouette is without acute process. The osseous structures are without acute process. No acute process. ASSESSMENT:  -Acute respiratory failure with hypercapnia and hypoxia  -COPD exacerbation  -Lactic acidosis  -Hypokalemia  -Suicidal ideation  -Major depressive disorder      PLAN:  -Admit to medicine  -Consult psychiatry  -Suicide precautions  -BiPAP  -DuoNebs every 4 hours while awake  -Albuterol as needed  -Pulmicort twice daily  -Methylprednisolone 40 mg IV every 6 hours  -Telemetry  -Repeat lactic acid level  -Monitor serum electrolytes replete as needed        Diet: No diet orders on file  Code Status: Prior  Surrogate decision maker confirmed with patient:   Extended Emergency Contact Information  Primary Emergency Contact: Jocelyn Kat  Address: [facility] [address]  Home Phone: [phone]  Mobile Phone: [phone]  Relation: Brother/Sister   needed? No  Secondary Emergency Contact: Mariajose Waller  Home Phone: [phone]  Mobile Phone: [phone]  Relation: Other   needed? No    DVT Prophylaxis: []Lovenox []Heparin []PCD [] [address] []Encouraged ambulation  Disposition: []Med/Surg [] Intermediate [] ICU/CCU  Admit status: [] Observation [] Inpatient     +++++++++++++++++++++++++++++++++++++++++++++++++  Jonnie Marie DO  +++++++++++++++++++++++++++++++++++++++++++++++++  NOTE: This report was transcribed using voice recognition software.  Every effort was made to ensure accuracy; however, inadvertent computerized transcription errors may be present.

## 2023-01-30 NOTE — CONSULTS
Department of Psychiatry  [address] [place] [address] Drive seen and assessed the patient this afternoon I agree with the below assessment evaluation and recommendations by the medical student. Mental status examination reveals a 28-year-old male average hygiene average grooming superficially cooperative and forthcoming for assessment. Psychomotor reveals slight tremor 2/2 EtOH WD. Speech is clear he is able to answer questions with relevance and there is no latency of response, speech is slightly pressured. Eye contact is average during assessment. Mood is \" I am really depressed\". Affect is anxious, sad, cooperative, congruent with stated mood. Thought process linear goal-directed no looseness of association no flight of ideas. Thought content devoid of auditory or visual hallucinations there is no overt or covert sign of psychosis or paranoia. Memory intact during conversation cognitive function baseline. Insight judgment and pulse control poor. Alert and oriented to person place time situation. REASON FOR CONSULT: Patient reports feeling depressed and made suicidal statements    CONSULTING PHYSICIAN: Dr. Keke Cox DO    History obtained from: Patient himself and review of medical records    HISTORY OF PRESENT ILLNESS:      The patient is a 45 y.o.  employed male father of 3, with significant past psychiatric history of major depressive disorder with mixed features treated with Wellbutrin 150mg and multiple inpatient psychiatric admissions to this facility as well as [facility] and [address] [place] [address] in [place] who presented to the ED feeling depressed and having made suicidal statements. In the ED, pt UDS was negative but EtOH was 386 and the patient was covered in vomitus. Due to the patient medical presentation in the ED, the decision was made to admit him to a medical floor secondary to hypoxia rather than the behavioral health unit.      The patient was seen and examined today in his hospital bed. He is currently on supplemental oxygen via nasal canula. His level of consciousness was fair but he was alert and oriented x3. He recounts that he has been experiencing increased depression and having suicidal thoughts over the past few days secondary to stresses over his family and his job. He had been drinking an unknown amount of vodka for the past 3 days. The patient has experienced withdrawal in the past from alcohol. He remarks that he has not been sleeping as much lately and feels his thoughts have been racing. He is \"unsure\" if he is currently suicidal but is positive that he has no intent or plan to attempt suicide. He has a previous diagnosis of depression treated with Wellbutrin 150mg which he is currently still taking. He denies auditory visual hallucinations and does not appear internally stimulated. He is not having paranoid thoughts or delusions. Pt denies grandiose thoughts or feeling easily distractible. Patient is wearing a hospital gown and has fair grooming and hygiene. He is displaying psychomotor retardation but no agitation. His speech is somewhat mumbled and occasionally difficult to initially understand. His eyes remained closed during the interview. His thought process was coherent without a flight of ideas or looseness of association and his thought content was devoid of auditory visual hallucinations, delusions, or perceptual abnormalities. He denies homicidal ideations intent or plan. Patient has fair insight but poor judgement and impulse control.      On further assessment today, the patient states that he was not himself, he started drinking heavily and spending a lot of money, he states that he felt like he was out of character and     Psychiatric Review of Systems       Depression: see above     Theresa or Hypomania:  no     Panic Attacks:  no     Phobias:  no     Obsessions and Compulsions:  no     PTSD : no     Hallucinations:  no, patient does not appear internally stimulated     Delusions:  no      Substance Abuse History:  ETOH: Current alcohol usage:  Type of Drink(s):  vodka. Unknown amount over the last 3 days. Approximate date of last drink:  yesterday. Pt has been in withdrawal before  Marijuana: denies  Opiates: denies  Other Drugs: denies      Past Psychiatric History:  Prior Diagnosis: major depressive disorder with mixed features  Psychiatrist: none  Therapist:none  Hospitalization: yes  Hx of Suicidal Attempts: no  Hx of violence:  no  ECT: no    Past Medical History:        Diagnosis Date    Acid reflux     Asthma     Asthma     COPD (chronic obstructive pulmonary disease) ([facility]     Hypertension     Pancreatitis     Prediabetes     Psychiatric problem     depressed    Sleep apnea     Substance abuse ([facility] [address]     alcohol and cocaine       Past Surgical History:        Procedure Laterality Date    COLONOSCOPY      2010 neg    NERVE BLOCK Left 08/27/2018    lumbar epidural #1 paramedian    [place] NJX DX/THER SBST INTRLMNR LMBR/SAC W/IMG GDN Left 8/27/2018    LUMBAR EPIDURAL STEROID INJECTION L4-5 LEFT PARAMEDIAN #1 performed by Nikkie Parra MD at [address] [facility]         Medications Prior to Admission:   Medications Prior to Admission: predniSONE (DELTASONE) 10 MG tablet, Take 4 tabs daily x 3 days then 3 tabs daily x 3 days then 2 tabs daily x 3 days then 1 tab daily x 3 days then stop  metFORMIN (GLUCOPHAGE) 500 MG tablet, Take 1 tablet by mouth 2 times daily (with meals)  fluticasone (FLONASE) 50 MCG/ACT nasal spray, instill 2 sprays into each nostril twice a day  pantoprazole (PROTONIX) 40 MG tablet,   tadalafil (CIALIS) 5 MG tablet, take 1 tablet by mouth once daily  traMADol (ULTRAM) 50 MG tablet, take 1 to 2 tablets by mouth every 6 hours if needed for pain  gabapentin (NEURONTIN) 300 MG capsule, Take 300 mg by mouth 3 times daily.   oxybutynin (DITROPAN-XL) 5 MG extended release tablet, Take 5 mg by mouth daily  albuterol (PROVENTIL) (2.5 MG/3ML) 0.083% nebulizer solution, Take 2.5 mg by nebulization every 6 hours as needed for Wheezing  albuterol sulfate HFA (VENTOLIN HFA) 108 (90 Base) MCG/ACT inhaler, Inhale 2 puffs into the lungs every 6 hours as needed for Wheezing  atorvastatin (LIPITOR) 10 MG tablet, Take 10 mg by mouth daily  buPROPion (WELLBUTRIN SR) 150 MG extended release tablet, Take 150 mg by mouth daily  zolpidem (AMBIEN) 5 MG tablet, Take 5 mg by mouth nightly as needed for Sleep. Fluticasone-Umeclidin-Vilant (TRELEGY ELLIPTA) 200-62.5-25 MCG/INH AEPB, Inhale 2 puffs into the lungs 2 times daily   aspirin 81 MG chewable tablet, Take 1 tablet by mouth daily  tamsulosin (FLOMAX) 0.4 MG capsule, Take 0.4 mg by mouth 2 times daily   amLODIPine (NORVASC) 10 MG tablet, Take 1 tablet by mouth daily. Allergies:  Dust mite extract    FAMILY/SOCIAL HISTORY:  Family History   Problem Relation Age of Onset    Other Sister     High Blood Pressure Maternal Grandmother     Other Maternal Grandmother      Social History     Socioeconomic History    Marital status: Single     Spouse name: Not on file    Number of children: 3    Years of education: 14    Highest education level: Not on file   Occupational History    Occupation:    Tobacco Use    Smoking status: Every Day     Packs/day: 1.00     Years: 28.00     Pack years: 28.00     Types: Cigarettes, Cigars    Smokeless tobacco: Never    Tobacco comments:     quit cigarettes 3 weeks ago. 5 cigars a week now.     Vaping Use    Vaping Use: Never used   Substance and Sexual Activity    Alcohol use: Yes     Comment: last drank 2/12 0600    Drug use: Yes     Types: Cocaine     Comment: last used \"few\" months ago    Sexual activity: Not on file   Other Topics Concern    Not on file   Social History Narrative    Not on file     Social Determinants of Health     Financial Resource Strain: Not on file   Food Insecurity: Not on file   Transportation Needs: Not on file   Physical Activity: Not on file   Stress: Not on file   Social Connections: Not on file   Intimate Partner Violence: Not on file   Housing Stability: Not on file       REVIEW OF SYSTEMS    Constitutional: [] fever  [] chills  [] weight loss  []weakness [] Other:  Eyes:  [] photophobia  [] discharge [] acuity change   [] Diplopia   [] Other:  HENT:  [] sore throat  [] ear pain [] Tinnitus   [] Other  Respiratory:  [] Cough  [] Shortness of breath   [] Sputum   [] Other:   Cardiac: []Chest pain   []Palpitations []Edema  []PND  [] Other:  GI:  []Abdominal pain   []Nausea  []Vomiting  []Diarrhea  [] Other:  :  [] Dysuria   []Frequency  []Hematuria  []Discharge  [] Other:  Musculoskeletal:  []Back pain  []Neck pain  []Recent Injury   Skin:  []Rash  [] Itching  [] Other:  Neurologic:  [] Headache  [] Focal weakness  [] Sensory changes []Other:  Endocrine:  [] Polyuria  [] Polydipsia  [] Hair Loss  [] Other:  Lymphatic:   [] Swollen glands   Psychiatric:  As per HPI      All other systems negative except as marked or mentioned/indicated in the HPI.   .     PHYSICAL EXAM:  Vitals:  /75   Pulse 94   Temp 97.4 °F (36.3 °C) (Oral)   Resp 20   Ht 5' 10\" (1.778 m)   Wt 270 lb (122.5 kg)   SpO2 92%   BMI 38.74 kg/m²      Neuro Exam:   Muscle Strength & Tone: did not assess  Gait:  did not assess    Involuntary Movements: No    Mental Status Examination:    Level of consciousness:  lethargic   Appearance:  hospital attire, fair grooming, and fair hygiene  Behavior/Motor:  psychomotor retardation but no psychomotor agitation  Attitude toward examiner:  cooperative, pleasant and pt eyes remained closed throughout the interview  Speech:  mumbled and occasionally difficult to interpret.    Mood: decreased range and sad  Affect:  mood congruent, blunted, and flat  Thought processes:  coherent with no flight of ideas or looseness of association   Thought content:  logical without flight of ideas or looseness of  association  Cognition:  oriented to person, place, and time   Concentration poor  Memory intact  Insight poor  Judgement poor   Fund of Knowledge adequate      DIAGNOSIS:     Major Depressive Disorder with mixed features   Substance disorders:  Alcohol      LABS: REVIEWED TODAY:  Recent Labs     01/29/23 2327 01/30/23  0542   WBC 6.8 6.5   HGB 14.8 15.1    302     Recent Labs     01/29/23 2327 01/30/23  0542    147*   K 3.4* 3.5    105   CO2 29 30*   BUN 6 6   CREATININE 0.7 0.7   GLUCOSE 120* 160*     Recent Labs     01/29/23 2327 01/30/23  0542   BILITOT 0.4 0.4   ALKPHOS 99 80   AST 34 25   ALT 48* 46*     Lab Results   Component Value Date/Time    LABAMPH NOT DETECTED 01/29/2023 11:27 PM    BARBSCNU NOT DETECTED 01/29/2023 11:27 PM    LABBENZ NOT DETECTED 01/29/2023 11:27 PM    LABBENZ SEE BELOW 09/13/2014 04:20 PM    CANNAB NOT DETECTED 05/19/2013 09:54 PM    COCAINESCRN NOT DETECTED 05/19/2013 09:54 PM    LABMETH NOT DETECTED 01/29/2023 11:27 PM    OPIATESCREENURINE NOT DETECTED 01/29/2023 11:27 PM    PHENCYCLIDINESCREENURINE NOT DETECTED 01/29/2023 11:27 PM    PPXUR NOT DETECTED 05/19/2013 09:54 PM    ETOH 386 01/29/2023 11:27 PM     Lab Results   Component Value Date/Time    TSH 1.740 10/14/2020 08:59 AM     No results found for: LITHIUM  No results found for: VALPROATE, CBMZ  No results found for: LITHIUM, VALPROATE    FURTHER LABS ORDERED :      Radiology   XR CHEST PORTABLE    Result Date: 1/30/2023  EXAMINATION: ONE XRAY VIEW OF THE CHEST 1/30/2023 12:09 am COMPARISON: 06/21/2022 HISTORY: ORDERING SYSTEM PROVIDED HISTORY: Hypoxic TECHNOLOGIST PROVIDED HISTORY: Reason for exam:->Hypoxic What reading provider will be dictating this exam?->CRC FINDINGS: The lungs are without acute focal process. There is no effusion or pneumothorax. The cardiomediastinal silhouette is without acute process. The osseous structures are without acute process. No acute process.        EKG: TRACING REVIEWED    RECOMMENDATIONS:  The patient's diagnosis, treatment plan, medication management were formulated after patient was seen directly by the attending physician and myself and all relevant documentation was reviewed. Medications:    Discussed with the treating physician/ team about the patient and treatment plan  Reviewed the chart    Discussed with the patient risk, benefit, alternative and common side effects for the  proposed medication treatment. Patient is consenting to the treatment. Risk, benefit, side effects, possible outcomes of the medication and alternatives discussed with the patient and the patient demonstrated understanding. The patient was also educated that the outcome of treatment will depend on the medication compliance as directed by the prescribers along with regular follow-up, compliance with the labs and other work-up, as clinically indicated. The patient was referred to outpatient/inpatient substance abuse rehabilitation programming. He was educated multiple times during the hospitalization that if he chooses to continue to use drugs or alcohol, he may potentially act out impulsively, resulting in serious harm to self or others, even though unintentional.  He was also educated that mental health treatment cannot be optimized with ongoing use of drugs. He demonstrated understanding has the capacity to understand that. Patient is pink slipped once he is medically stabilized he will benefit from inpatient psychiatric admission for medication adjustments. He would also benefit from inpatient substance abuse rehabilitation programming and he has been seen by peer support. We will discontinue the Wellbutrin as this medication may be contributing to his mood instability he did describe somewhat of a manic episode leading to relapse and hospitalization.       Will defer introduction of psychotropic medications until patient through acute withdrawal and patient is on the inpatient unit for full assessment. Thanks for the consult. Electronically signed by Neno Rivera on 1/30/2023 at 8:57 AM    NOTE: This report was transcribed using voice recognition software. Every effort was made to ensure accuracy; however, inadvertent computerized transcription errors may be present.

## 2023-01-30 NOTE — PROGRESS NOTES
Hospitalist Progress Note    Briefly, patient with history significant for asthma, COPD, HPT, pancreatitis, DM, depression, sleep apnea, and substance abuse.  Presented to ED with complaints of shortness of breath as well as suicidal ideation.  Of note patient sat was as low as 82% on room air needed nasal cannula to recover. Also reporting heavy alcohol use.  ER course significant for hypokalemia with potassium 3.4, elevated lactic acid 2.9, troponin 20, alcohol level 386.  ABG with respiratory acidosis.  Patient was admitted for COPD exacerbation started on breathing treatments and IV steroids, suicide precautions were initiated and behavioral health was consulted.      +++++++++++++++++++++++++++++++++++++++++++++++++  MIKE Tarango Physician - Hospitalist  [place], [place]  +++++++++++++++++++++++++++++++++++++++++++++++++  NOTE: This report was transcribed using voice recognition software. Every effort was made to ensure accuracy; however, inadvertent computerized transcription errors may be present.

## 2023-01-30 NOTE — ED NOTES
Report called to [employer], RT paged to transport on [facility] [facility]  01/30/23 1606

## 2023-01-30 NOTE — FLOWSHEET NOTE
Patient to the ED for depression, states no plan to harm himself but has SI. Patient 82% on room air, admits to drinking \"a lot\" and patient is covered in emesis. Patient placed on 6L and up to 86% in triage.

## 2023-01-30 NOTE — CARE COORDINATION
Peer Recovery Support Note    Name: Yeison Lantigua  Date: 1/30/2023    Chief Complaint   Patient presents with    Depression     Patient to the ED for depression, states no plan to harm himself but has SI. Patient 82% on room air, admits to drinking \"a lot\" and patient is covered in emesis. Patient placed on 6L and up to 86% in triage. Peer Support met with patient. [x] Support and education provided  [] Resources provided   [] Treatment referral:   [] Other:   [] Patient declined peer recovery services     Referred By: Michael Guidry (RN)    Notes: Patient was very sad and depressed. Binge drinking and using cocaine. Peer was asked to call his significant other to let her know he is safe. Patient is willing to go to inpatient when he is medically discharged.      Signed: Sandi Chowdary, 1/30/2023

## 2023-01-30 NOTE — CARE COORDINATION
Met with pt at bedside to discuss discharge / transition of care; pt reports from apartment with daughter 17 yo, and s/o; pt unable to focus on questions or relay answers with any certainty; reports no counseling in the community; h/o AA unable to recall when; mostly responds to questions with, \"it's been years\"; verified PCP and pharmacy; requesting librium for alcohol withdrawal; appears diaphoretic, requested fan for the room; reports having home oxygen as needed, uses 3 liters when needed, unsure of company; thinks maybe [facility], unsure of what original script was written for; has nebulizer; ambulates in room independently; awaiting psych consult at time of review for suicidal ideation and depression; this CM notified [facility] via  and spoke with Lolita; discharge plan is undetermined at this time; pt was pink slipped 1/30/2023; sitter at bedside for c/o.

## 2023-01-30 NOTE — ED PROVIDER NOTES
HPI:  1/29/23, Time: 11:10 PM VITALIY Dos Santos is a 45 y.o. male presenting to the ED for depressed and having suicidal thoughts, beginning days ago. The complaint has been persistent, moderate in severity, and worsened by nothing. Patient reports having thoughts of harming self. Patient does admit to drinking alcohol today. Patient was brought in by friend. Patient also reporting feeling short of breath he was hypoxic out in the waiting room. Patient does have history of COPD as well as asthma reportedly not on oxygen patient also reporting chest pain throughout his whole chest.  The pain does not radiate. .  Patient had reportedly vomited prior to arrival.  Patient reporting no abdominal pain there is no history of black or tarry stools. Patient does have a history of acid reflux history of COPD history of substance abuse and hypertension    ROS:   Pertinent positives and negatives are stated within HPI, all other systems reviewed and are negative.  --------------------------------------------- PAST HISTORY ---------------------------------------------  Past Medical History:  has a past medical history of Acid reflux, Asthma, Asthma, COPD (chronic obstructive pulmonary disease) ([facility] Hypertension, Pancreatitis, Prediabetes, Psychiatric problem, Sleep apnea, and Substance abuse ([facility] Past Surgical History:  has a past surgical history that includes [place] tooth extraction; Colonoscopy; Nerve Block (Left, 08/27/2018); and pr njx dx/ther sbst intrlmnr lmbr/sac w/img gdn (Left, 8/27/2018). Social History:  reports that he has been smoking cigarettes and cigars. He has a 28.00 pack-year smoking history. He has never used smokeless tobacco. He reports current alcohol use. He reports current drug use. Drugs:  and Cocaine. Family History: family history includes High Blood Pressure in his maternal grandmother; Other in his maternal grandmother and sister.      The patients home medications have been reviewed. Allergies: Dust mite extract    ---------------------------------------------------PHYSICAL EXAM--------------------------------------    Constitutional/General: Alert and oriented x3, intoxicated  Head: Normocephalic and atraumatic  Eyes: PERRL, EOMI  Mouth: Oropharynx clear, handling secretions, no trismus  Neck: Supple, full ROM, non tender to palpation in the midline, no stridor, no crepitus, no meningeal signs  Pulmonary: Lungs lungs are diminished  Cardiovascular:  Regular rate. Regular rhythm. No murmurs, gallops, or rubs. 2+ distal pulses  Chest: no chest wall tenderness  Abdomen: Soft. Non tender. Non distended. +BS. No rebound, guarding, or rigidity. No pulsatile masses appreciated. Musculoskeletal: Moves all extremities x 4. Warm and well perfused, no clubbing, cyanosis, or edema. Capillary refill <3 seconds  Skin: warm and dry. No rashes. Neurologic: GCS 15, CN 2-12 grossly intact, no focal deficits, symmetric strength 5/5 in the upper and lower extremities bilaterally  Psych: Affect flat depressed suicidal no active plan not homicidal does report hallucinations    -------------------------------------------------- RESULTS -------------------------------------------------  I have personally reviewed all laboratory and imaging results for this patient. Results are listed below.      LABS:  Results for orders placed or performed during the hospital encounter of 01/29/23   Respiratory Panel, Molecular, with COVID-19 (Restricted: peds pts or suitable admitted adults)    Specimen: Nasopharyngeal   Result Value Ref Range    Adenovirus by PCR Not Detected Not Detected    Bordetella parapertussis by PCR Not Detected Not Detected    Bordetella pertussis by PCR Not Detected Not Detected    Chlamydophilia pneumoniae by PCR Not Detected Not Detected    Coronavirus 229E by PCR Not Detected Not Detected    Coronavirus HKU1 by PCR DETECTED (A) Not Detected    Coronavirus NL63 by PCR Not Detected Not Detected    Coronavirus OC43 by PCR Not Detected Not Detected    SARS-CoV-2, PCR Not Detected Not Detected    Human Metapneumovirus by PCR Not Detected Not Detected    Human Rhinovirus/Enterovirus by PCR Not Detected Not Detected    Influenza A by PCR Not Detected Not Detected    Influenza B by PCR Not Detected Not Detected    Mycoplasma pneumoniae by PCR Not Detected Not Detected    Parainfluenza Virus 1 by PCR Not Detected Not Detected    Parainfluenza Virus 2 by PCR Not Detected Not Detected    Parainfluenza Virus 3 by PCR Not Detected Not Detected    Parainfluenza Virus 4 by PCR Not Detected Not Detected    Respiratory Syncytial Virus by PCR Not Detected Not Detected   Troponin   Result Value Ref Range    Troponin, High Sensitivity 20 (H) 0 - 11 ng/L   CBC with Auto Differential   Result Value Ref Range    WBC 6.8 4.5 - 11.5 E9/L    RBC 5.27 3.80 - 5.80 E12/L    Hemoglobin 14.8 12.5 - 16.5 g/dL    Hematocrit 47.5 37.0 - 54.0 %    MCV 90.1 80.0 - 99.9 fL    MCH 28.1 26.0 - 35.0 pg    MCHC 31.2 (L) 32.0 - 34.5 %    RDW 15.7 (H) 11.5 - 15.0 fL    Platelets 152 840 - 596 E9/L    MPV 9.2 7.0 - 12.0 fL    Neutrophils % 39.6 (L) 43.0 - 80.0 %    Immature Granulocytes % 0.7 0.0 - 5.0 %    Lymphocytes % 47.9 (H) 20.0 - 42.0 %    Monocytes % 10.8 2.0 - 12.0 %    Eosinophils % 0.6 0.0 - 6.0 %    Basophils % 0.4 0.0 - 2.0 %    Neutrophils Absolute 2.69 1.80 - 7.30 E9/L    Immature Granulocytes # 0.05 E9/L    Lymphocytes Absolute 3.25 1.50 - 4.00 E9/L    Monocytes Absolute 0.73 0.10 - 0.95 E9/L    Eosinophils Absolute 0.04 (L) 0.05 - 0.50 E9/L    Basophils Absolute 0.03 0.00 - 0.20 E9/L   Comprehensive Metabolic Panel   Result Value Ref Range    Sodium 146 132 - 146 mmol/L    Potassium 3.4 (L) 3.5 - 5.0 mmol/L    Chloride 103 98 - 107 mmol/L    CO2 29 22 - 29 mmol/L    Anion Gap 14 7 - 16 mmol/L    Glucose 120 (H) 74 - 99 mg/dL    BUN 6 6 - 20 mg/dL    Creatinine 0.7 0.7 - 1.2 mg/dL    Est, Glom Filt Rate >60 >=60 mL/min/1.73    Calcium 8.3 (L) 8.6 - 10.2 mg/dL    Total Protein 6.7 6.4 - 8.3 g/dL    Albumin 3.9 3.5 - 5.2 g/dL    Total Bilirubin 0.4 0.0 - 1.2 mg/dL    Alkaline Phosphatase 99 40 - 129 U/L    ALT 48 (H) 0 - 40 U/L    AST 34 0 - 39 U/L   Brain Natriuretic Peptide   Result Value Ref Range    Pro- (H) 0 - 125 pg/mL   Urine Drug Screen   Result Value Ref Range    Amphetamine Screen, Urine NOT DETECTED Negative <1000 ng/mL    Barbiturate Screen, Ur NOT DETECTED Negative < 200 ng/mL    Benzodiazepine Screen, Urine NOT DETECTED Negative < 200 ng/mL    Cannabinoid Scrn, Ur NOT DETECTED Negative < 50ng/mL    Cocaine Metabolite Screen, Urine NOT DETECTED Negative < 300 ng/mL    Opiate Scrn, Ur NOT DETECTED Negative < 300ng/mL    PCP Screen, Urine NOT DETECTED Negative < 25 ng/mL    Methadone Screen, Urine NOT DETECTED Negative <300 ng/mL    Oxycodone Urine NOT DETECTED Negative <100 ng/mL    FENTANYL SCREEN, URINE NOT DETECTED Negative <1 ng/mL    Drug Screen Comment: see below    Serum Drug Screen   Result Value Ref Range    Ethanol Lvl 386 (HH) mg/dL    Acetaminophen Level <5.0 (L) 10.0 - 27.6 mcg/mL    Salicylate, Serum <3.7 0.0 - 30.0 mg/dL    TCA Scrn NEGATIVE Cutoff:300 ng/mL   Urinalysis   Result Value Ref Range    Color, UA Yellow Straw/Yellow    Clarity, UA Clear Clear    Glucose, Ur Negative Negative mg/dL    Bilirubin Urine Negative Negative    Ketones, Urine Negative Negative mg/dL    Specific Gravity, UA <=1.005 1.005 - 1.030    Blood, Urine Negative Negative    pH, UA 6.0 5.0 - 9.0    Protein, UA Negative Negative mg/dL    Urobilinogen, Urine 0.2 <2.0 E.U./dL    Nitrite, Urine Negative Negative    Leukocyte Esterase, Urine Negative Negative   Lipase   Result Value Ref Range    Lipase 29 13 - 60 U/L   Lactic Acid   Result Value Ref Range    Lactic Acid 2.9 (H) 0.5 - 2.2 mmol/L   Magnesium   Result Value Ref Range    Magnesium 1.6 1.6 - 2.6 mg/dL   Blood Gas, Arterial   Result Value Ref Range    Date Analyzed [id number]     Time Analyzed 5453     Source: Blood Arterial     pH, Blood Gas 7.271 (L) 7.350 - 7.450    PCO2 69.7 (HH) 35.0 - 45.0 mmHg    PO2 81.9 75.0 - 100.0 mmHg    HCO3 31.4 (H) 22.0 - 26.0 mmol/L    B.E. 2.2 -3.0 - 3.0 mmol/L    O2 Sat 93.9 92.0 - 98.5 %    O2Hb 90.2 (L) 94.0 - 97.0 %    COHb 3.5 (H) 0.0 - 1.5 %    MetHb 0.4 0.0 - 1.5 %    O2 Content 19.4 mL/dL    HHb 5.9 (H) 0.0 - 5.0 %    tHb (est) 15.3 11.5 - 16.5 g/dL    Mode NC-4  L     Date Of Collection      Time Collected      Pt Temp 37.0 C     ID [id number]     Lab [id number]     Critical(s) Notified Handed report to Dr/RN    EKG 12 Lead   Result Value Ref Range    Ventricular Rate 76 BPM    Atrial Rate 277 BPM    QRS Duration 116 ms    Q-T Interval 408 ms    QTc Calculation (Bazett) 459 ms    R Axis -47 degrees    T Axis 65 degrees       RADIOLOGY:  Interpreted by Radiologist.  XR CHEST PORTABLE   Final Result   No acute process. EKG:  This EKG is signed and interpreted by me. Rate: 76  Rhythm: Sinus  Interpretation: non-specific EKG  Comparison: stable as compared to patient's most recent EKG 6/20/22      ------------------------- NURSING NOTES AND VITALS REVIEWED ---------------------------   The nursing notes within the ED encounter and vital signs as below have been reviewed by myself. /81   Pulse 81   Temp 97.6 °F (36.4 °C)   Resp 19   Ht 5' 10\" (1.778 m)   Wt 270 lb (122.5 kg)   SpO2 96%   BMI 38.74 kg/m²   Oxygen Saturation Interpretation: Abnormal    The patients available past medical records and past encounters were reviewed.         ------------------------------ ED COURSE/MEDICAL DECISION MAKING----------------------  Medications   ipratropium-albuterol (DUONEB) nebulizer solution 3 ampule (3 ampules Inhalation Given 1/29/23 2347)   methylPREDNISolone sodium (SOLU-MEDROL) injection 125 mg (125 mg IntraVENous Given 1/29/23 2340)   0.9 % sodium chloride bolus (0 mLs IntraVENous Stopped 1/30/23 0045) Medical Decision Making:      History From: Patient with history of COPD history of hypertension history of substance abuse presenting here because of feeling depressed having suicidal thoughts for the last several days no active plan. Patient also reporting feeling short of breath he has been coughing up productive sputum he reports no fever he does report diffuse chest pain. CC/HPI Summary, DDx, ED Course, Reassessment, Tests Considered, Patient expectation:   With history of COPD hypertension and substance abuse presenting here because of feeling depressed having suicidal thoughts with no plan. Patient reporting also feeling short of breath for last several days has had cough with productive sputum he reports no fever he does report of diffuse chest pain with no radiation of pain. Patient does admit to drinking alcohol today. Patient awake alert. Patient noted to be hypoxic. Patient was placed on supplemental oxygen here cannula. Patient lungs are diminished heart exam normal abdomen is soft nontender he is moving all extremities affect is flat depressed suicidal with no active plan. Patient is intoxicated. Differential includes PE as well as COPD exacerbation as well as pneumonia as well as suicidal ideation/mood disorder. Patient had IV established placed on monitor here in the emergency department. Patient was placed on BiPAP after ABG findings patient was given DuoNeb treatments as well as Solu-Medrol IV. Patient's alcohol level over 300. Patient pulse ox stable with supplemental oxygen. Patient was made aware of findings and results. Patient was placed left ear in the emergency department due to his depression as well as suicidal thoughts. Patient also hypoxic. Which I suspect is related to his COPD. Patient's chest x-ray shows no infiltrate. Patient will be admitted to intermediate bed.     EKG is ordered to have documentation of patient's current rhythm, and to rule out any obvious acute cardiac illnesses such as ACS. Additionally, QT interval may be of use in decision making regarding any medications administered here in the ED. Patient is placed on cardiac monitor and continuous pulse ox for monitoring. CBC is ordered to evaluate for any signs of infection or inflammation by obtaining a WBC count, or any signs of acute anemia by interpreting hemoglobin. CMP was ordered to evaluate for any electrolyte imbalances, kidney function, or any elevations in anion gap. Troponin ordered to evaluate for possible cardiac etiology of symptoms including but not limited to STEMI or NSTEMI. Chest x-ray is ordered to evaluate for any possible signs of pneumonia, pleural effusions, cardiomegaly, pneumothorax, atelectasis, rib or sternal abnormalities including fractures. Social Determinants affecting Dx or Tx: Patient does smoke as well as drinks    Chronic Conditions: History of COPD history of hypertension history of substance abuse    Records Reviewed: Patient was admitted in June 2022 for respiratory failure with hypoxia        Re-Evaluations:             Re-evaluation. Patients symptoms are improving      Consultations:             IM    Critical Care: This patient's ED course included: a personal history and physicial eaxmination    This patient has been closely monitored during their ED course. Counseling: The emergency provider has spoken with the patient and discussed todays results, in addition to providing specific details for the plan of care and counseling regarding the diagnosis and prognosis. Questions are answered at this time and they are agreeable with the plan.       --------------------------------- IMPRESSION AND DISPOSITION ---------------------------------    IMPRESSION  1. Mood disorder ([facility]    2. COPD with acute exacerbation ([facility]    3. Acute respiratory failure with hypoxia and hypercapnia (HCC)    4.  Acute alcoholic intoxication with complication ([facility]        DISPOSITION  Disposition: Admit to telemetry  Patient condition is fair        NOTE: This report was transcribed using voice recognition software.  Every effort was made to ensure accuracy; however, inadvertent computerized transcription errors may be present          Lennie Tate MD  01/30/23 8512

## 2023-01-30 NOTE — CARE COORDINATION
This CM attempted to meet with pt at bedside however, on unit pharmacist reviewing medications currently; this CM will re-attempt if time allows.

## 2023-01-30 NOTE — PROGRESS NOTES
01/30/23 0012   NIV Type   $NIV $Daily Charge   Skin Assessment Clean, dry, & intact   Skin Protection for O2 Device Yes   Orientation Middle   Location Nose   NIV Started/Stopped On   Equipment Type (S)  V60   Mode Bilevel   Mask Type (S)  Full face mask   Mask Size Large   Settings/Measurements   PIP Observed 14 cm H20   IPAP (S)  12 cmH20   CPAP/EPAP (S)  8 cmH2O   Vt (Measured) 612 mL   Rate Ordered (S)  14   Resp 17   Insp Rise Time (%) (S)  3 %   FiO2  (S)  40 %   I Time/ I Time % (S)  0.95 s   Minute Volume (L/min) 11.4 Liters   Mask Leak (lpm) 23 lpm   Comfort Level Good   Using Accessory Muscles No   SpO2 99   Date: 1/30/2023    Time: 12:24 AM    Patient Placed On BIPAP/CPAP/ Non-Invasive Ventilation? Yes    If no must comment. Facial area red/color change? No           If YES are Blister/Lesion present? No   If yes must notify nursing staff  BIPAP/CPAP skin barrier? Yes    Skin barrier type:mepilexlite       Comments:  Patient placed on BiPAP. Mepilex placed on nose. Patient was getting aggressive with other staff. Nurse aware.  No issues otherwise      [employer], RCP

## 2023-01-30 NOTE — PROGRESS NOTES
RN placed message to Dr Gloria Andrade via perfect serve at patient request for Librium/possibly CIWA protocol/ativan. CIWA score currently is 11. RN awaiting any new orders at this time.

## 2023-01-31 LAB
ALBUMIN SERPL-MCNC: 3.8 G/DL (ref 3.5–5.2)
ALP BLD-CCNC: 88 U/L (ref 40–129)
ALT SERPL-CCNC: 41 U/L (ref 0–40)
ANION GAP SERPL CALCULATED.3IONS-SCNC: 11 MMOL/L (ref 7–16)
ANISOCYTOSIS: ABNORMAL
AST SERPL-CCNC: 21 U/L (ref 0–39)
BASOPHILIC STIPPLING: ABNORMAL
BASOPHILS ABSOLUTE: 0.01 E9/L (ref 0–0.2)
BASOPHILS RELATIVE PERCENT: 0.1 % (ref 0–2)
BILIRUB SERPL-MCNC: 0.7 MG/DL (ref 0–1.2)
BOTTLE TYPE: NORMAL
BUN BLDV-MCNC: 12 MG/DL (ref 6–20)
BURR CELLS: ABNORMAL
CALCIUM SERPL-MCNC: 9.3 MG/DL (ref 8.6–10.2)
CHLORIDE BLD-SCNC: 98 MMOL/L (ref 98–107)
CO2: 29 MMOL/L (ref 22–29)
CREAT SERPL-MCNC: 0.7 MG/DL (ref 0.7–1.2)
EOSINOPHILS ABSOLUTE: 0 E9/L (ref 0.05–0.5)
EOSINOPHILS RELATIVE PERCENT: 0 % (ref 0–6)
GFR SERPL CREATININE-BSD FRML MDRD: >60 ML/MIN/1.73
GLUCOSE BLD-MCNC: 213 MG/DL (ref 74–99)
HCT VFR BLD CALC: 45.6 % (ref 37–54)
HEMOGLOBIN: 14.1 G/DL (ref 12.5–16.5)
HYPOCHROMIA: ABNORMAL
IMMATURE GRANULOCYTES #: 0.08 E9/L
IMMATURE GRANULOCYTES %: 0.8 % (ref 0–5)
LYMPHOCYTES ABSOLUTE: 0.49 E9/L (ref 1.5–4)
LYMPHOCYTES RELATIVE PERCENT: 4.6 % (ref 20–42)
MCH RBC QN AUTO: 27.7 PG (ref 26–35)
MCHC RBC AUTO-ENTMCNC: 30.9 % (ref 32–34.5)
MCV RBC AUTO: 89.6 FL (ref 80–99.9)
MONOCYTES ABSOLUTE: 0.43 E9/L (ref 0.1–0.95)
MONOCYTES RELATIVE PERCENT: 4 % (ref 2–12)
NEUTROPHILS ABSOLUTE: 9.61 E9/L (ref 1.8–7.3)
NEUTROPHILS RELATIVE PERCENT: 90.5 % (ref 43–80)
ORDER NUMBER: NORMAL
PDW BLD-RTO: 15.3 FL (ref 11.5–15)
PLATELET # BLD: 297 E9/L (ref 130–450)
PMV BLD AUTO: 9.5 FL (ref 7–12)
POIKILOCYTES: ABNORMAL
POLYCHROMASIA: ABNORMAL
POTASSIUM REFLEX MAGNESIUM: 3.8 MMOL/L (ref 3.5–5)
RBC # BLD: 5.09 E12/L (ref 3.8–5.8)
SODIUM BLD-SCNC: 138 MMOL/L (ref 132–146)
SOURCE OF BLOOD CULTURE: NORMAL
TOTAL PROTEIN: 6.7 G/DL (ref 6.4–8.3)
WBC # BLD: 10.6 E9/L (ref 4.5–11.5)

## 2023-01-31 PROCEDURE — 36415 COLL VENOUS BLD VENIPUNCTURE: CPT

## 2023-01-31 PROCEDURE — 6360000002 HC RX W HCPCS

## 2023-01-31 PROCEDURE — 2060000000 HC ICU INTERMEDIATE R&B

## 2023-01-31 PROCEDURE — 87491 CHLMYD TRACH DNA AMP PROBE: CPT

## 2023-01-31 PROCEDURE — 6360000002 HC RX W HCPCS: Performed by: FAMILY MEDICINE

## 2023-01-31 PROCEDURE — 2580000003 HC RX 258

## 2023-01-31 PROCEDURE — 94640 AIRWAY INHALATION TREATMENT: CPT

## 2023-01-31 PROCEDURE — 87591 N.GONORRHOEAE DNA AMP PROB: CPT

## 2023-01-31 PROCEDURE — 6370000000 HC RX 637 (ALT 250 FOR IP): Performed by: INTERNAL MEDICINE

## 2023-01-31 PROCEDURE — 6370000000 HC RX 637 (ALT 250 FOR IP): Performed by: FAMILY MEDICINE

## 2023-01-31 PROCEDURE — 87040 BLOOD CULTURE FOR BACTERIA: CPT

## 2023-01-31 PROCEDURE — 94660 CPAP INITIATION&MGMT: CPT

## 2023-01-31 PROCEDURE — 6370000000 HC RX 637 (ALT 250 FOR IP)

## 2023-01-31 PROCEDURE — 80053 COMPREHEN METABOLIC PANEL: CPT

## 2023-01-31 PROCEDURE — 85025 COMPLETE CBC W/AUTO DIFF WBC: CPT

## 2023-01-31 RX ORDER — NICOTINE 21 MG/24HR
1 PATCH, TRANSDERMAL 24 HOURS TRANSDERMAL DAILY
Status: DISCONTINUED | OUTPATIENT
Start: 2023-01-31 | End: 2023-02-01 | Stop reason: HOSPADM

## 2023-01-31 RX ORDER — GUAIFENESIN 400 MG/1
400 TABLET ORAL 3 TIMES DAILY
Status: DISCONTINUED | OUTPATIENT
Start: 2023-01-31 | End: 2023-02-01 | Stop reason: HOSPADM

## 2023-01-31 RX ORDER — GUAIFENESIN 600 MG/1
600 TABLET, EXTENDED RELEASE ORAL 2 TIMES DAILY
Status: DISCONTINUED | OUTPATIENT
Start: 2023-01-31 | End: 2023-01-31 | Stop reason: CLARIF

## 2023-01-31 RX ADMIN — METHYLPREDNISOLONE SODIUM SUCCINATE 40 MG: 40 INJECTION, POWDER, FOR SOLUTION INTRAMUSCULAR; INTRAVENOUS at 00:43

## 2023-01-31 RX ADMIN — TAMSULOSIN HYDROCHLORIDE 0.4 MG: 0.4 CAPSULE ORAL at 20:48

## 2023-01-31 RX ADMIN — ASPIRIN 81 MG CHEWABLE TABLET 81 MG: 81 TABLET CHEWABLE at 08:24

## 2023-01-31 RX ADMIN — ATORVASTATIN CALCIUM 10 MG: 10 TABLET, FILM COATED ORAL at 08:23

## 2023-01-31 RX ADMIN — GUAIFENESIN 400 MG: 400 TABLET ORAL at 13:03

## 2023-01-31 RX ADMIN — BUDESONIDE 500 MCG: 0.5 SUSPENSION RESPIRATORY (INHALATION) at 09:06

## 2023-01-31 RX ADMIN — ZOLPIDEM TARTRATE 5 MG: 5 TABLET ORAL at 20:49

## 2023-01-31 RX ADMIN — IPRATROPIUM BROMIDE AND ALBUTEROL SULFATE 1 AMPULE: .5; 2.5 SOLUTION RESPIRATORY (INHALATION) at 09:06

## 2023-01-31 RX ADMIN — TAMSULOSIN HYDROCHLORIDE 0.4 MG: 0.4 CAPSULE ORAL at 08:24

## 2023-01-31 RX ADMIN — OXYBUTYNIN CHLORIDE 10 MG: 10 TABLET, EXTENDED RELEASE ORAL at 20:48

## 2023-01-31 RX ADMIN — SODIUM CHLORIDE, PRESERVATIVE FREE 10 ML: 5 INJECTION INTRAVENOUS at 20:49

## 2023-01-31 RX ADMIN — BUDESONIDE 500 MCG: 0.5 SUSPENSION RESPIRATORY (INHALATION) at 19:24

## 2023-01-31 RX ADMIN — ENOXAPARIN SODIUM 30 MG: 100 INJECTION SUBCUTANEOUS at 20:49

## 2023-01-31 RX ADMIN — LORAZEPAM 1 MG: 2 INJECTION INTRAMUSCULAR; INTRAVENOUS at 20:49

## 2023-01-31 RX ADMIN — AMLODIPINE BESYLATE 10 MG: 10 TABLET ORAL at 08:24

## 2023-01-31 RX ADMIN — IPRATROPIUM BROMIDE AND ALBUTEROL SULFATE 1 AMPULE: .5; 2.5 SOLUTION RESPIRATORY (INHALATION) at 11:52

## 2023-01-31 RX ADMIN — SODIUM CHLORIDE, PRESERVATIVE FREE 10 ML: 5 INJECTION INTRAVENOUS at 08:24

## 2023-01-31 RX ADMIN — IPRATROPIUM BROMIDE AND ALBUTEROL SULFATE 1 AMPULE: .5; 2.5 SOLUTION RESPIRATORY (INHALATION) at 19:24

## 2023-01-31 RX ADMIN — GABAPENTIN 300 MG: 300 CAPSULE ORAL at 20:48

## 2023-01-31 RX ADMIN — METHYLPREDNISOLONE SODIUM SUCCINATE 40 MG: 40 INJECTION, POWDER, FOR SOLUTION INTRAMUSCULAR; INTRAVENOUS at 06:14

## 2023-01-31 RX ADMIN — GABAPENTIN 300 MG: 300 CAPSULE ORAL at 13:06

## 2023-01-31 RX ADMIN — IPRATROPIUM BROMIDE AND ALBUTEROL SULFATE 1 AMPULE: .5; 2.5 SOLUTION RESPIRATORY (INHALATION) at 15:57

## 2023-01-31 RX ADMIN — OXYBUTYNIN CHLORIDE 10 MG: 10 TABLET, EXTENDED RELEASE ORAL at 08:24

## 2023-01-31 RX ADMIN — GUAIFENESIN 400 MG: 400 TABLET ORAL at 20:49

## 2023-01-31 RX ADMIN — SODIUM CHLORIDE, PRESERVATIVE FREE 10 ML: 5 INJECTION INTRAVENOUS at 17:32

## 2023-01-31 RX ADMIN — GABAPENTIN 300 MG: 300 CAPSULE ORAL at 08:24

## 2023-01-31 RX ADMIN — METHYLPREDNISOLONE SODIUM SUCCINATE 40 MG: 40 INJECTION, POWDER, FOR SOLUTION INTRAMUSCULAR; INTRAVENOUS at 13:02

## 2023-01-31 RX ADMIN — Medication 100 MG: at 08:24

## 2023-01-31 RX ADMIN — LORAZEPAM 1 MG: 2 INJECTION INTRAMUSCULAR; INTRAVENOUS at 15:05

## 2023-01-31 RX ADMIN — ENOXAPARIN SODIUM 30 MG: 100 INJECTION SUBCUTANEOUS at 08:24

## 2023-01-31 RX ADMIN — METHYLPREDNISOLONE SODIUM SUCCINATE 40 MG: 40 INJECTION, POWDER, FOR SOLUTION INTRAMUSCULAR; INTRAVENOUS at 17:32

## 2023-01-31 ASSESSMENT — PAIN SCALES - GENERAL: PAINLEVEL_OUTOF10: 0

## 2023-01-31 ASSESSMENT — PAIN SCALES - WONG BAKER: WONGBAKER_NUMERICALRESPONSE: 0

## 2023-01-31 NOTE — PROGRESS NOTES
Date: 1/31/2023    Time: 12:22 AM    Patient Placed On BIPAP/CPAP/ Non-Invasive Ventilation? Yes    If no must comment. Facial area red/color change? No           If YES are Blister/Lesion present? No   If yes must notify nursing staff  BIPAP/CPAP skin barrier?   Yes    Skin barrier type:mepilexlite       Comments:        Radha Payne RCP

## 2023-01-31 NOTE — CONSULTS
[facility] CONSULT NOTE    Reason for Consult: Gram-positive cocci in clusters on blood culture  Requested by: Jasper Kraus MD     Chief complaint: Suicidal thoughts    History Obtained From: Patient and EMR      HISTORY Zachary              The patient is a 45 y.o. male with history of COPD, hypertension, substance abuse, alcohol abuse, pancreatitis, major depressive disorder, presented on 01/29 with suicidal thoughts, found to be hypoxemic presumed to be associated with COPD exacerbation. On admission, he was afebrile and hemodynamically stable with no leukocytosis. Chest x-ray showed no acute process. Respiratory pathogen PCR panel was positive for Coronavirus HKU1. Urinalysis was unremarkable. Blood cultures showed gram-positive cocci in clusters in 1 out of 2 sets. ID service was subsequently consulted for further recommendations.     Past Medical History  Past Medical History:   Diagnosis Date    Acid reflux     Asthma     Asthma     COPD (chronic obstructive pulmonary disease) (HCC)     Hypertension     Pancreatitis     Prediabetes     Psychiatric problem     depressed    Sleep apnea     Substance abuse ([facility] [address]     alcohol and cocaine       Current Facility-Administered Medications   Medication Dose Route Frequency Provider Last Rate Last Admin    nicotine (NICODERM CQ) 21 MG/24HR 1 patch  1 patch TransDERmal Daily Jasper Kraus MD        guaiFENesin tablet 400 mg  400 mg Oral TID Jasper Kraus MD        amLODIPine (NORVASC) tablet 10 mg  10 mg Oral Daily Feroz Bloodgood, DO   10 mg at 01/31/23 0957    aspirin chewable tablet 81 mg  81 mg Oral Daily Feroz Bloodgood, DO   81 mg at 01/31/23 0824    atorvastatin (LIPITOR) tablet 10 mg  10 mg Oral Daily Feroz Bloodgood, DO   10 mg at 01/31/23 9449    gabapentin (NEURONTIN) capsule 300 mg  300 mg Oral TID Feroz Bloodgood, DO   300 mg at 01/31/23 0824    tamsulosin (FLOMAX) capsule 0.4 mg  0.4 mg Oral BID Feroz Bloodgood, DO   0.4 mg at 01/31/23 0824    zolpidem (AMBIEN) tablet 5 mg  5 mg Oral Nightly PRN Diania Shorts, DO   5 mg at 01/30/23 2103    ondansetron (ZOFRAN-ODT) disintegrating tablet 4 mg  4 mg Oral Q8H PRN Diania Shorts, DO        Or    ondansetron [facility]) injection 4 mg  4 mg IntraVENous Q6H PRN Diania Shorts, DO        polyethylene glycol (GLYCOLAX) packet 17 g  17 g Oral Daily PRN Diania Shorts, DO        enoxaparin Sodium (LOVENOX) injection 30 mg  30 mg SubCUTAneous BID Diania Shorts, DO   30 mg at 01/31/23 8768    acetaminophen (TYLENOL) tablet 650 mg  650 mg Oral Q6H PRN Diania Shorts, DO        Or    acetaminophen (TYLENOL) suppository 650 mg  650 mg Rectal Q6H PRN Diania Shorts, DO        budesonide (PULMICORT) nebulizer suspension 500 mcg  500 mcg Nebulization BID Diania Shorts, DO   500 mcg at 01/31/23 0906    ipratropium-albuterol (DUONEB) nebulizer solution 1 ampule  1 ampule Inhalation Q4H WA Dianatty Shorts, DO   1 ampule at 01/31/23 0906    methylPREDNISolone sodium (SOLU-MEDROL) injection 40 mg  40 mg IntraVENous Q6H Diania Shorts, DO   40 mg at 01/31/23 0277    Followed by    Hernando Fink ON 2/1/2023] predniSONE (DELTASONE) tablet 40 mg  40 mg Oral Daily Diania Shorts, DO        oxybutynin (DITROPAN-XL) extended release tablet 10 mg  10 mg Oral BID Munira Prior, APRN - CNP   10 mg at 01/31/23 4513    sodium chloride flush 0.9 % injection 5-40 mL  5-40 mL IntraVENous 2 times per day Munira Prior, APRN - CNP   10 mL at 01/31/23 0824    sodium chloride flush 0.9 % injection 5-40 mL  5-40 mL IntraVENous PRN Munira Prior, APRN - CNP   10 mL at 01/30/23 1826    0.9 % sodium chloride infusion   IntraVENous PRN Munira Prior, APRN - CNP        thiamine tablet 100 mg  100 mg Oral Daily Munira Prior, APRN - CNP   100 mg at 01/31/23 0794    LORazepam (ATIVAN) tablet 1 mg  1 mg Oral Q1H PRN Munira Prior, APRN - CNP        Or    LORazepam (ATIVAN) injection 1 mg  1 mg IntraVENous Q1H PRN Munira Prior, APRN - CNP   1 mg at 01/30/23 2625 Or    LORazepam (ATIVAN) tablet 2 mg  2 mg Oral Q1H PRN Francisco Cornell, APRN - CNP        Or    LORazepam (ATIVAN) injection 2 mg  2 mg IntraVENous Q1H PRN Francisco Cornell, APRN - CNP   2 mg at 01/30/23 1515    Or    LORazepam (ATIVAN) tablet 3 mg  3 mg Oral Q1H PRN Francisco Cornell, APRN - CNP        Or    LORazepam (ATIVAN) injection 3 mg  3 mg IntraVENous Q1H PRN Francisco Cornell, APRN - CNP        Or    LORazepam (ATIVAN) tablet 4 mg  4 mg Oral Q1H PRN Francisco Cornell, APRN - CNP        Or    LORazepam (ATIVAN) injection 4 mg  4 mg IntraVENous Q1H PRN Francisco Cornell, APRN - CNP           Allergies   Allergen Reactions    Dust Mite Extract        Surgical History  Past Surgical History:   Procedure Laterality Date    COLONOSCOPY      2010 neg    NERVE BLOCK Left 08/27/2018    lumbar epidural #1 paramedian    NM NJX DX/THER SBST INTRLMNR LMBR/SAC W/IMG GDN Left 8/27/2018    LUMBAR EPIDURAL STEROID INJECTION L4-5 LEFT PARAMEDIAN #1 performed by Alfonso Lombardi MD at [address] [facility] History  Social History     Socioeconomic History    Marital status: Single    Number of children: 3    Years of education: 15   Occupational History    Occupation:    Tobacco Use    Smoking status: Every Day     Packs/day: 1.00     Years: 28.00     Pack years: 28.00     Types: Cigarettes, Cigars    Smokeless tobacco: Never    Tobacco comments:     quit cigarettes 3 weeks ago. 5 cigars a week now.     Vaping Use    Vaping Use: Never used   Substance and Sexual Activity    Alcohol use: Yes     Comment: last drank 2/12 0600    Drug use: Yes     Types: Cocaine     Comment: last used \"few\" months ago       Family Medical History  Family History   Problem Relation Age of Onset    Other Sister     High Blood Pressure Maternal Grandmother     Other Maternal Grandmother        Review of Systems:  Constitutional: No fever, no chills  Eyes: No vision changes, no retroorbital pain  ENT: No hearing changes, no ear pain  Respiratory: No cough, no dyspnea  Cardiovascular: No chest pain, no palpitations  Gastrointestinal: No abdominal pain, no diarrhea  Genitourinary: No dysuria, no hematuria  Integumentary: No rash, no itching  Musculoskeletal: No muscle pain, no joint pain  Neurologic: No headache, no numbness in extremities    Physical Examination:  Vitals:    01/31/23 0022 01/31/23 0225 01/31/23 0815 01/31/23 0907   BP:  (!) 143/88 (!) 140/80    Pulse:  86 94 (!) 102   Resp: 18 17 18 20   Temp:  97.5 °F (36.4 °C) (!) 96.7 °F (35.9 °C)    TempSrc:  Temporal Temporal    SpO2:  96% 97% 93%   Weight:       Height:         Constitutional: Alert, not in distress  Eyes: Sclerae anicteric, no conjunctival erythema  ENT: No buccal lesion, no pharyngeal exudates  Neck: No nuchal rigidity, no cervical adenopathy  Lungs: Clear breath sounds, no crackles, no wheezes  Heart: Regular rate and rhythm, no murmurs  Abdomen: Bowel sounds present, soft, nontender  Skin: Warm and dry, no active dermatoses  Musculoskeletal: No joint erythema, no joint swelling    Labs, imaging, and medical records/notes were personally reviewed. Assessment:  Gram-positive cocci in clusters on blood culture, significance to be determined  Acute hypoxic respiratory failure  Major depression  Substance abuse  Alcohol abuse    Plan:  No antibiotic therapy indicated for now pending blood culture molecular ID results. Follow up blood cultures. Check HIV screen, RPR, hepatitis panel, urine GC/Chlamydia PCR (per patient request for STI testing). Thank you for involving me in the care of Edu Bailey. I will continue to follow. Please do not hesitate to call for any questions or concerns.       Electronically signed by Estefania Saini MD on 1/31/2023 at 11:39 AM

## 2023-01-31 NOTE — PROGRESS NOTES
Infectious diseases consult called to id line waiting call back to see who is assigned to pt case.  assigned and add to census.

## 2023-01-31 NOTE — PLAN OF CARE
Problem: Discharge Planning  Goal: Discharge to home or other facility with appropriate resources  Outcome: Progressing  Flowsheets (Taken 1/31/2023 0815)  Discharge to home or other facility with appropriate resources:   Identify barriers to discharge with patient and caregiver   Arrange for needed discharge resources and transportation as appropriate     Problem: Pain  Goal: Verbalizes/displays adequate comfort level or baseline comfort level  Outcome: Progressing     Problem: Chronic Conditions and Co-morbidities  Goal: Patient's chronic conditions and co-morbidity symptoms are monitored and maintained or improved  Outcome: Progressing  Flowsheets (Taken 1/31/2023 0815)  Care Plan - Patient's Chronic Conditions and Co-Morbidity Symptoms are Monitored and Maintained or Improved: Monitor and assess patient's chronic conditions and comorbid symptoms for stability, deterioration, or improvement     Problem: Safety - Adult  Goal: Free from fall injury  Outcome: Progressing

## 2023-01-31 NOTE — PROGRESS NOTES
Hospitalist Progress Note      SYNOPSIS: Patient admitted on 2023 for Acute respiratory failure ([facility] [address] He has a history of asthma, COPD, Hypertension, pancreatitis, diabetes mellitus, depression, sleep apnea and substance abuse. He was admitted with a complaint of shortness of breath. He also complained of suicidal ideation. He is being managed for COPD exacerbation and suicidal ideation. SUBJECTIVE:    Patient seen and examined. He says his shortness of breath has improved. He however feels very depressed and anxious. He is also requesting for a nicotine patch. He denies fever, chills, cough, chest pain, palpitations, dizziness, nausea, vomiting or diarrhea. Review of systems is otherwise negative. Records reviewed. Temp (24hrs), Av.4 °F (36.3 °C), Min:96.7 °F (35.9 °C), Max:97.8 °F (36.6 °C)    DIET: ADULT DIET; Regular; Safety Tray; Safety Tray (Disposables)  CODE: Full Code    Intake/Output Summary (Last 24 hours) at 2023 1315  Last data filed at 2023 1014  Gross per 24 hour   Intake 1100 ml   Output --   Net 1100 ml       OBJECTIVE:    BP (!) 140/80   Pulse (!) 108   Temp (!) 96.7 °F (35.9 °C) (Temporal)   Resp 18   Ht 5' 10\" (1.778 m)   Wt 270 lb (122.5 kg)   SpO2 94%   BMI 38.74 kg/m²     General appearance: No apparent distress, appears stated age and cooperative. anxious  HEENT:  Conjunctivae/corneas clear. Neck: Supple. No jugular venous distention. Respiratory: diminished breath sounds bibasally, no wheezes or crackles. On 2L of oxygen. Cardiovascular: Regular rate rhythm, normal S1-S2  Abdomen: Soft, nontender, nondistended  Musculoskeletal: No clubbing, cyanosis, no bilateral lower extremity edema. Brisk capillary refill. Skin:  No rashes  on visible skin  Neurologic: awake, alert and following commands , anxious    ASSESSMENT:  #Hypoxia due to COPD exacerbation  -shortness of breath is improving.  Now on 2L of oxygen  -On IV solumedrol  -breathing treatment with bronchodilators.  -titrate oxgyen to maintain sats >90%  -he was saturating as low as 82% on room air.      #Suicidal ideation  -denies suicidal ideation now,   -Psych on board; they did evaluate patient and he is to be transferred to the acute Psych unit once medically stable    #bacteremia  -blood cultures positive for gram positive cocci in clusters, in one out of 2 samples  -repeat blood culture ordered  -ID consulted    #Alcohol use disorder  -at risk of withdrawal  -on alcohol withdrawal protocol with ativan  -thiamine, folic acid and multivite    #BPH: on flomax    #Hypertension; on amlodipine    #Hyperlipidemia: on statin                                                                                              DVT prophylaxis: lovenox         DISPOSITION: to dc to Psych unit once medically stable    Medications:  REVIEWED DAILY    Infusion Medications    sodium chloride       Scheduled Medications    nicotine  1 patch TransDERmal Daily    guaiFENesin  400 mg Oral TID    amLODIPine  10 mg Oral Daily    aspirin  81 mg Oral Daily    atorvastatin  10 mg Oral Daily    gabapentin  300 mg Oral TID    tamsulosin  0.4 mg Oral BID    enoxaparin  30 mg SubCUTAneous BID    budesonide  500 mcg Nebulization BID    ipratropium-albuterol  1 ampule Inhalation Q4H WA    methylPREDNISolone  40 mg IntraVENous Q6H    Followed by    Jenny Lopez ON 2/1/2023] predniSONE  40 mg Oral Daily    oxybutynin  10 mg Oral BID    sodium chloride flush  5-40 mL IntraVENous 2 times per day    thiamine  100 mg Oral Daily     PRN Meds: zolpidem, ondansetron **OR** ondansetron, polyethylene glycol, acetaminophen **OR** acetaminophen, sodium chloride flush, sodium chloride, LORazepam **OR** LORazepam **OR** LORazepam **OR** LORazepam **OR** LORazepam **OR** LORazepam **OR** LORazepam **OR** LORazepam    Labs:     Recent Labs     01/29/23  2327 01/30/23  0542 01/31/23  0648   WBC 6.8 6.5 10.6   HGB 14.8 15.1 14.1   HCT 47.5 49.1 45.6  302 297       Recent Labs     01/29/23 2327 01/30/23  0542 01/31/23  0648    147* 138   K 3.4* 3.5 3.8    105 98   CO2 29 30* 29   BUN 6 6 12   CREATININE 0.7 0.7 0.7   CALCIUM 8.3* 8.1* 9.3       Recent Labs     01/29/23 2327 01/30/23  0542 01/31/23  0648   PROT 6.7 6.9 6.7   ALKPHOS 99 80 88   ALT 48* 46* 41*   AST 34 25 21   BILITOT 0.4 0.4 0.7   LIPASE 29  --   --        No results for input(s): INR in the last 72 hours. No results for input(s): Godfrey Gironr in the last 72 hours. Chronic labs:    Lab Results   Component Value Date    CHOL 156 04/20/2020    TRIG 171 (H) 02/14/2022    HDL 58 04/20/2020    LDLCALC 76 04/20/2020    TSH 1.740 10/14/2020    PSA 0.60 10/28/2015    INR 1.1 11/14/2020    LABA1C 6.6 (H) 06/22/2022       Radiology: REVIEWED DAILY    +++++++++++++++++++++++++++++++++++++++++++++++++  Shirley Peterson MD  [employer] - [address] [place], [place]  +++++++++++++++++++++++++++++++++++++++++++++++++  NOTE: This report was transcribed using voice recognition software. Every effort was made to ensure accuracy; however, inadvertent computerized transcription errors may be present.

## 2023-02-01 ENCOUNTER — HOSPITAL ENCOUNTER (INPATIENT)
Age: 53
LOS: 5 days | Discharge: HOME | DRG: 885 | End: 2023-02-06
Attending: PSYCHIATRY & NEUROLOGY | Admitting: PSYCHIATRY & NEUROLOGY
Payer: MEDICARE

## 2023-02-01 VITALS
HEIGHT: 70 IN | TEMPERATURE: 97.8 F | BODY MASS INDEX: 38.65 KG/M2 | HEART RATE: 97 BPM | DIASTOLIC BLOOD PRESSURE: 92 MMHG | OXYGEN SATURATION: 94 % | RESPIRATION RATE: 18 BRPM | SYSTOLIC BLOOD PRESSURE: 145 MMHG | WEIGHT: 270 LBS

## 2023-02-01 LAB
ALBUMIN SERPL-MCNC: 3.9 G/DL (ref 3.5–5.2)
ALP BLD-CCNC: 73 U/L (ref 40–129)
ALT SERPL-CCNC: 41 U/L (ref 0–40)
ANION GAP SERPL CALCULATED.3IONS-SCNC: 9 MMOL/L (ref 7–16)
ANISOCYTOSIS: ABNORMAL
AST SERPL-CCNC: 20 U/L (ref 0–39)
BASOPHILS ABSOLUTE: 0.01 E9/L (ref 0–0.2)
BASOPHILS RELATIVE PERCENT: 0.1 % (ref 0–2)
BILIRUB SERPL-MCNC: 0.7 MG/DL (ref 0–1.2)
BUN BLDV-MCNC: 11 MG/DL (ref 6–20)
CALCIUM SERPL-MCNC: 9.3 MG/DL (ref 8.6–10.2)
CHLORIDE BLD-SCNC: 99 MMOL/L (ref 98–107)
CO2: 32 MMOL/L (ref 22–29)
CREAT SERPL-MCNC: 0.6 MG/DL (ref 0.7–1.2)
EOSINOPHILS ABSOLUTE: 0 E9/L (ref 0.05–0.5)
EOSINOPHILS RELATIVE PERCENT: 0 % (ref 0–6)
GFR SERPL CREATININE-BSD FRML MDRD: >60 ML/MIN/1.73
GLUCOSE BLD-MCNC: 222 MG/DL (ref 74–99)
HAV IGM SER IA-ACNC: NORMAL
HCT VFR BLD CALC: 44 % (ref 37–54)
HEMOGLOBIN: 14.4 G/DL (ref 12.5–16.5)
HEPATITIS B CORE IGM ANTIBODY: NORMAL
HEPATITIS B SURFACE ANTIGEN INTERPRETATION: NORMAL
HEPATITIS C ANTIBODY INTERPRETATION: NORMAL
HIV-1 AND HIV-2 ANTIBODIES: NORMAL
IMMATURE GRANULOCYTES #: 0.07 E9/L
IMMATURE GRANULOCYTES %: 0.6 % (ref 0–5)
LYMPHOCYTES ABSOLUTE: 0.44 E9/L (ref 1.5–4)
LYMPHOCYTES RELATIVE PERCENT: 3.6 % (ref 20–42)
MCH RBC QN AUTO: 28 PG (ref 26–35)
MCHC RBC AUTO-ENTMCNC: 32.7 % (ref 32–34.5)
MCV RBC AUTO: 85.6 FL (ref 80–99.9)
MONOCYTES ABSOLUTE: 0.43 E9/L (ref 0.1–0.95)
MONOCYTES RELATIVE PERCENT: 3.5 % (ref 2–12)
NEUTROPHILS ABSOLUTE: 11.28 E9/L (ref 1.8–7.3)
NEUTROPHILS RELATIVE PERCENT: 92.2 % (ref 43–80)
OVALOCYTES: ABNORMAL
PDW BLD-RTO: 15.7 FL (ref 11.5–15)
PLATELET # BLD: 310 E9/L (ref 130–450)
PMV BLD AUTO: 9.2 FL (ref 7–12)
POIKILOCYTES: ABNORMAL
POLYCHROMASIA: ABNORMAL
POTASSIUM REFLEX MAGNESIUM: 4 MMOL/L (ref 3.5–5)
RBC # BLD: 5.14 E12/L (ref 3.8–5.8)
SODIUM BLD-SCNC: 140 MMOL/L (ref 132–146)
TOTAL PROTEIN: 6.3 G/DL (ref 6.4–8.3)
WBC # BLD: 12.2 E9/L (ref 4.5–11.5)

## 2023-02-01 PROCEDURE — 85025 COMPLETE CBC W/AUTO DIFF WBC: CPT

## 2023-02-01 PROCEDURE — 1240000000 HC EMOTIONAL WELLNESS R&B

## 2023-02-01 PROCEDURE — 6370000000 HC RX 637 (ALT 250 FOR IP): Performed by: INTERNAL MEDICINE

## 2023-02-01 PROCEDURE — 86703 HIV-1/HIV-2 1 RESULT ANTBDY: CPT

## 2023-02-01 PROCEDURE — 94660 CPAP INITIATION&MGMT: CPT

## 2023-02-01 PROCEDURE — 2580000003 HC RX 258

## 2023-02-01 PROCEDURE — 80053 COMPREHEN METABOLIC PANEL: CPT

## 2023-02-01 PROCEDURE — 6360000002 HC RX W HCPCS: Performed by: INTERNAL MEDICINE

## 2023-02-01 PROCEDURE — 6370000000 HC RX 637 (ALT 250 FOR IP): Performed by: NURSE PRACTITIONER

## 2023-02-01 PROCEDURE — 36415 COLL VENOUS BLD VENIPUNCTURE: CPT

## 2023-02-01 PROCEDURE — 6370000000 HC RX 637 (ALT 250 FOR IP): Performed by: PSYCHIATRY & NEUROLOGY

## 2023-02-01 PROCEDURE — 2700000000 HC OXYGEN THERAPY PER DAY

## 2023-02-01 PROCEDURE — 80074 ACUTE HEPATITIS PANEL: CPT

## 2023-02-01 PROCEDURE — 6370000000 HC RX 637 (ALT 250 FOR IP): Performed by: FAMILY MEDICINE

## 2023-02-01 PROCEDURE — 94640 AIRWAY INHALATION TREATMENT: CPT

## 2023-02-01 PROCEDURE — 6360000002 HC RX W HCPCS: Performed by: FAMILY MEDICINE

## 2023-02-01 PROCEDURE — 6370000000 HC RX 637 (ALT 250 FOR IP)

## 2023-02-01 PROCEDURE — 86592 SYPHILIS TEST NON-TREP QUAL: CPT

## 2023-02-01 RX ORDER — ACETAMINOPHEN 325 MG/1
650 TABLET ORAL EVERY 6 HOURS PRN
Status: DISCONTINUED | OUTPATIENT
Start: 2023-02-01 | End: 2023-02-06 | Stop reason: HOSPADM

## 2023-02-01 RX ORDER — NICOTINE 21 MG/24HR
1 PATCH, TRANSDERMAL 24 HOURS TRANSDERMAL DAILY
Status: DISCONTINUED | OUTPATIENT
Start: 2023-02-02 | End: 2023-02-06 | Stop reason: HOSPADM

## 2023-02-01 RX ORDER — BUDESONIDE 0.5 MG/2ML
500 INHALANT ORAL 2 TIMES DAILY
Status: CANCELLED | OUTPATIENT
Start: 2023-02-01

## 2023-02-01 RX ORDER — IPRATROPIUM BROMIDE AND ALBUTEROL SULFATE 2.5; .5 MG/3ML; MG/3ML
1 SOLUTION RESPIRATORY (INHALATION)
Status: CANCELLED | OUTPATIENT
Start: 2023-02-01

## 2023-02-01 RX ORDER — LORAZEPAM 1 MG/1
3 TABLET ORAL
Status: CANCELLED | OUTPATIENT
Start: 2023-02-01

## 2023-02-01 RX ORDER — LORAZEPAM 2 MG/ML
2 INJECTION INTRAMUSCULAR
Status: CANCELLED | OUTPATIENT
Start: 2023-02-01

## 2023-02-01 RX ORDER — ASPIRIN 81 MG/1
81 TABLET, CHEWABLE ORAL DAILY
Status: DISCONTINUED | OUTPATIENT
Start: 2023-02-02 | End: 2023-02-06 | Stop reason: HOSPADM

## 2023-02-01 RX ORDER — LORAZEPAM 1 MG/1
1 TABLET ORAL
Status: CANCELLED | OUTPATIENT
Start: 2023-02-01

## 2023-02-01 RX ORDER — GUAIFENESIN 400 MG/1
400 TABLET ORAL 3 TIMES DAILY
Status: CANCELLED | OUTPATIENT
Start: 2023-02-01

## 2023-02-01 RX ORDER — HYDROXYZINE HYDROCHLORIDE 10 MG/1
50 TABLET, FILM COATED ORAL 3 TIMES DAILY PRN
Status: DISCONTINUED | OUTPATIENT
Start: 2023-02-01 | End: 2023-02-02 | Stop reason: SDUPTHER

## 2023-02-01 RX ORDER — LORAZEPAM 1 MG/1
4 TABLET ORAL
Status: DISCONTINUED | OUTPATIENT
Start: 2023-02-01 | End: 2023-02-01

## 2023-02-01 RX ORDER — LORAZEPAM 2 MG/ML
4 INJECTION INTRAMUSCULAR
Status: CANCELLED | OUTPATIENT
Start: 2023-02-01

## 2023-02-01 RX ORDER — ACETAMINOPHEN 325 MG/1
650 TABLET ORAL EVERY 6 HOURS PRN
Status: CANCELLED | OUTPATIENT
Start: 2023-02-01

## 2023-02-01 RX ORDER — DOCUSATE SODIUM 100 MG/1
100 CAPSULE, LIQUID FILLED ORAL DAILY
Status: DISCONTINUED | OUTPATIENT
Start: 2023-02-01 | End: 2023-02-01 | Stop reason: HOSPADM

## 2023-02-01 RX ORDER — ASPIRIN 81 MG/1
81 TABLET, CHEWABLE ORAL DAILY
Status: CANCELLED | OUTPATIENT
Start: 2023-02-02

## 2023-02-01 RX ORDER — POLYETHYLENE GLYCOL 3350 17 G/17G
17 POWDER, FOR SOLUTION ORAL DAILY PRN
Status: DISCONTINUED | OUTPATIENT
Start: 2023-02-01 | End: 2023-02-06 | Stop reason: HOSPADM

## 2023-02-01 RX ORDER — LORAZEPAM 2 MG/ML
1 INJECTION INTRAMUSCULAR
Status: DISCONTINUED | OUTPATIENT
Start: 2023-02-01 | End: 2023-02-01

## 2023-02-01 RX ORDER — TAMSULOSIN HYDROCHLORIDE 0.4 MG/1
0.4 CAPSULE ORAL 2 TIMES DAILY
Status: CANCELLED | OUTPATIENT
Start: 2023-02-01

## 2023-02-01 RX ORDER — LORAZEPAM 1 MG/1
1 TABLET ORAL
Status: DISCONTINUED | OUTPATIENT
Start: 2023-02-01 | End: 2023-02-01

## 2023-02-01 RX ORDER — ONDANSETRON 4 MG/1
4 TABLET, ORALLY DISINTEGRATING ORAL EVERY 8 HOURS PRN
Status: DISCONTINUED | OUTPATIENT
Start: 2023-02-01 | End: 2023-02-06 | Stop reason: HOSPADM

## 2023-02-01 RX ORDER — TAMSULOSIN HYDROCHLORIDE 0.4 MG/1
0.4 CAPSULE ORAL 2 TIMES DAILY
Status: DISCONTINUED | OUTPATIENT
Start: 2023-02-01 | End: 2023-02-06 | Stop reason: HOSPADM

## 2023-02-01 RX ORDER — LANOLIN ALCOHOL/MO/W.PET/CERES
100 CREAM (GRAM) TOPICAL DAILY
Status: DISCONTINUED | OUTPATIENT
Start: 2023-02-02 | End: 2023-02-06 | Stop reason: HOSPADM

## 2023-02-01 RX ORDER — LANOLIN ALCOHOL/MO/W.PET/CERES
100 CREAM (GRAM) TOPICAL DAILY
Status: CANCELLED | OUTPATIENT
Start: 2023-02-02

## 2023-02-01 RX ORDER — ACETAMINOPHEN 650 MG/1
650 SUPPOSITORY RECTAL EVERY 6 HOURS PRN
Status: CANCELLED | OUTPATIENT
Start: 2023-02-01

## 2023-02-01 RX ORDER — AMLODIPINE BESYLATE 10 MG/1
10 TABLET ORAL DAILY
Status: DISCONTINUED | OUTPATIENT
Start: 2023-02-02 | End: 2023-02-06 | Stop reason: HOSPADM

## 2023-02-01 RX ORDER — DOCUSATE SODIUM 100 MG/1
100 CAPSULE, LIQUID FILLED ORAL DAILY
Status: CANCELLED | OUTPATIENT
Start: 2023-02-02

## 2023-02-01 RX ORDER — LORAZEPAM 1 MG/1
4 TABLET ORAL
Status: CANCELLED | OUTPATIENT
Start: 2023-02-01

## 2023-02-01 RX ORDER — BUDESONIDE 0.5 MG/2ML
500 INHALANT ORAL 2 TIMES DAILY
Status: DISCONTINUED | OUTPATIENT
Start: 2023-02-01 | End: 2023-02-06 | Stop reason: HOSPADM

## 2023-02-01 RX ORDER — OXYBUTYNIN CHLORIDE 10 MG/1
10 TABLET, EXTENDED RELEASE ORAL 2 TIMES DAILY
Status: DISCONTINUED | OUTPATIENT
Start: 2023-02-01 | End: 2023-02-06 | Stop reason: HOSPADM

## 2023-02-01 RX ORDER — ONDANSETRON 2 MG/ML
4 INJECTION INTRAMUSCULAR; INTRAVENOUS EVERY 6 HOURS PRN
Status: CANCELLED | OUTPATIENT
Start: 2023-02-01

## 2023-02-01 RX ORDER — POLYETHYLENE GLYCOL 3350 17 G/17G
17 POWDER, FOR SOLUTION ORAL DAILY PRN
Status: CANCELLED | OUTPATIENT
Start: 2023-02-01

## 2023-02-01 RX ORDER — GABAPENTIN 300 MG/1
300 CAPSULE ORAL 3 TIMES DAILY
Status: DISCONTINUED | OUTPATIENT
Start: 2023-02-01 | End: 2023-02-06 | Stop reason: HOSPADM

## 2023-02-01 RX ORDER — GABAPENTIN 300 MG/1
300 CAPSULE ORAL 3 TIMES DAILY
Status: CANCELLED | OUTPATIENT
Start: 2023-02-01

## 2023-02-01 RX ORDER — HALOPERIDOL 5 MG/ML
3 INJECTION INTRAMUSCULAR EVERY 6 HOURS PRN
Status: DISCONTINUED | OUTPATIENT
Start: 2023-02-01 | End: 2023-02-06 | Stop reason: HOSPADM

## 2023-02-01 RX ORDER — ENOXAPARIN SODIUM 100 MG/ML
30 INJECTION SUBCUTANEOUS 2 TIMES DAILY
Status: DISCONTINUED | OUTPATIENT
Start: 2023-02-01 | End: 2023-02-06 | Stop reason: HOSPADM

## 2023-02-01 RX ORDER — PREDNISONE 20 MG/1
40 TABLET ORAL DAILY
Status: CANCELLED | OUTPATIENT
Start: 2023-02-02

## 2023-02-01 RX ORDER — HALOPERIDOL 2 MG/1
3 TABLET ORAL EVERY 6 HOURS PRN
Status: DISCONTINUED | OUTPATIENT
Start: 2023-02-01 | End: 2023-02-06 | Stop reason: HOSPADM

## 2023-02-01 RX ORDER — LORAZEPAM 2 MG/ML
3 INJECTION INTRAMUSCULAR
Status: CANCELLED | OUTPATIENT
Start: 2023-02-01

## 2023-02-01 RX ORDER — ATORVASTATIN CALCIUM 10 MG/1
10 TABLET, FILM COATED ORAL DAILY
Status: DISCONTINUED | OUTPATIENT
Start: 2023-02-02 | End: 2023-02-06 | Stop reason: HOSPADM

## 2023-02-01 RX ORDER — LORAZEPAM 2 MG/ML
1 INJECTION INTRAMUSCULAR
Status: CANCELLED | OUTPATIENT
Start: 2023-02-01

## 2023-02-01 RX ORDER — IPRATROPIUM BROMIDE AND ALBUTEROL SULFATE 2.5; .5 MG/3ML; MG/3ML
1 SOLUTION RESPIRATORY (INHALATION)
Status: DISCONTINUED | OUTPATIENT
Start: 2023-02-01 | End: 2023-02-06 | Stop reason: HOSPADM

## 2023-02-01 RX ORDER — LORAZEPAM 2 MG/ML
2 INJECTION INTRAMUSCULAR
Status: DISCONTINUED | OUTPATIENT
Start: 2023-02-01 | End: 2023-02-01

## 2023-02-01 RX ORDER — NICOTINE 21 MG/24HR
1 PATCH, TRANSDERMAL 24 HOURS TRANSDERMAL DAILY
Status: CANCELLED | OUTPATIENT
Start: 2023-02-02

## 2023-02-01 RX ORDER — LANOLIN ALCOHOL/MO/W.PET/CERES
3 CREAM (GRAM) TOPICAL NIGHTLY
Status: DISCONTINUED | OUTPATIENT
Start: 2023-02-01 | End: 2023-02-06 | Stop reason: HOSPADM

## 2023-02-01 RX ORDER — GUAIFENESIN 400 MG/1
400 TABLET ORAL 3 TIMES DAILY
Status: DISCONTINUED | OUTPATIENT
Start: 2023-02-01 | End: 2023-02-06 | Stop reason: HOSPADM

## 2023-02-01 RX ORDER — ONDANSETRON 2 MG/ML
4 INJECTION INTRAMUSCULAR; INTRAVENOUS EVERY 6 HOURS PRN
Status: DISCONTINUED | OUTPATIENT
Start: 2023-02-01 | End: 2023-02-06 | Stop reason: HOSPADM

## 2023-02-01 RX ORDER — ACETAMINOPHEN 650 MG/1
650 SUPPOSITORY RECTAL EVERY 6 HOURS PRN
Status: DISCONTINUED | OUTPATIENT
Start: 2023-02-01 | End: 2023-02-06 | Stop reason: HOSPADM

## 2023-02-01 RX ORDER — ONDANSETRON 4 MG/1
4 TABLET, ORALLY DISINTEGRATING ORAL EVERY 8 HOURS PRN
Status: CANCELLED | OUTPATIENT
Start: 2023-02-01

## 2023-02-01 RX ORDER — LORAZEPAM 2 MG/ML
3 INJECTION INTRAMUSCULAR
Status: DISCONTINUED | OUTPATIENT
Start: 2023-02-01 | End: 2023-02-01

## 2023-02-01 RX ORDER — CHLORDIAZEPOXIDE HYDROCHLORIDE 25 MG/1
25 CAPSULE, GELATIN COATED ORAL EVERY 4 HOURS PRN
Status: DISCONTINUED | OUTPATIENT
Start: 2023-02-01 | End: 2023-02-02

## 2023-02-01 RX ORDER — MAGNESIUM HYDROXIDE/ALUMINUM HYDROXICE/SIMETHICONE 120; 1200; 1200 MG/30ML; MG/30ML; MG/30ML
30 SUSPENSION ORAL PRN
Status: DISCONTINUED | OUTPATIENT
Start: 2023-02-01 | End: 2023-02-06 | Stop reason: HOSPADM

## 2023-02-01 RX ORDER — ZOLPIDEM TARTRATE 5 MG/1
5 TABLET ORAL NIGHTLY PRN
Status: CANCELLED | OUTPATIENT
Start: 2023-02-01

## 2023-02-01 RX ORDER — LORAZEPAM 2 MG/ML
4 INJECTION INTRAMUSCULAR
Status: DISCONTINUED | OUTPATIENT
Start: 2023-02-01 | End: 2023-02-01

## 2023-02-01 RX ORDER — ZOLPIDEM TARTRATE 5 MG/1
5 TABLET ORAL NIGHTLY PRN
Status: DISCONTINUED | OUTPATIENT
Start: 2023-02-01 | End: 2023-02-01

## 2023-02-01 RX ORDER — LORAZEPAM 1 MG/1
2 TABLET ORAL
Status: DISCONTINUED | OUTPATIENT
Start: 2023-02-01 | End: 2023-02-01

## 2023-02-01 RX ORDER — ATORVASTATIN CALCIUM 10 MG/1
10 TABLET, FILM COATED ORAL DAILY
Status: CANCELLED | OUTPATIENT
Start: 2023-02-02

## 2023-02-01 RX ORDER — DOCUSATE SODIUM 100 MG/1
100 CAPSULE, LIQUID FILLED ORAL DAILY
Status: DISCONTINUED | OUTPATIENT
Start: 2023-02-02 | End: 2023-02-06 | Stop reason: HOSPADM

## 2023-02-01 RX ORDER — AMLODIPINE BESYLATE 10 MG/1
10 TABLET ORAL DAILY
Status: CANCELLED | OUTPATIENT
Start: 2023-02-02

## 2023-02-01 RX ORDER — LORAZEPAM 1 MG/1
2 TABLET ORAL
Status: CANCELLED | OUTPATIENT
Start: 2023-02-01

## 2023-02-01 RX ORDER — LORAZEPAM 1 MG/1
3 TABLET ORAL
Status: DISCONTINUED | OUTPATIENT
Start: 2023-02-01 | End: 2023-02-01

## 2023-02-01 RX ORDER — PREDNISONE 20 MG/1
40 TABLET ORAL DAILY
Status: DISCONTINUED | OUTPATIENT
Start: 2023-02-02 | End: 2023-02-06 | Stop reason: HOSPADM

## 2023-02-01 RX ORDER — ENOXAPARIN SODIUM 100 MG/ML
30 INJECTION SUBCUTANEOUS 2 TIMES DAILY
Status: CANCELLED | OUTPATIENT
Start: 2023-02-01

## 2023-02-01 RX ORDER — OXYBUTYNIN CHLORIDE 10 MG/1
10 TABLET, EXTENDED RELEASE ORAL 2 TIMES DAILY
Status: CANCELLED | OUTPATIENT
Start: 2023-02-01

## 2023-02-01 RX ADMIN — TAMSULOSIN HYDROCHLORIDE 0.4 MG: 0.4 CAPSULE ORAL at 08:27

## 2023-02-01 RX ADMIN — IPRATROPIUM BROMIDE AND ALBUTEROL SULFATE 1 AMPULE: .5; 2.5 SOLUTION RESPIRATORY (INHALATION) at 15:26

## 2023-02-01 RX ADMIN — GABAPENTIN 300 MG: 300 CAPSULE ORAL at 15:39

## 2023-02-01 RX ADMIN — Medication 100 MG: at 08:27

## 2023-02-01 RX ADMIN — IPRATROPIUM BROMIDE AND ALBUTEROL SULFATE 1 AMPULE: .5; 2.5 SOLUTION RESPIRATORY (INHALATION) at 11:06

## 2023-02-01 RX ADMIN — GUAIFENESIN 400 MG: 400 TABLET ORAL at 08:27

## 2023-02-01 RX ADMIN — AMLODIPINE BESYLATE 10 MG: 10 TABLET ORAL at 08:27

## 2023-02-01 RX ADMIN — GUAIFENESIN 400 MG: 400 TABLET ORAL at 20:47

## 2023-02-01 RX ADMIN — MELATONIN 3 MG ORAL TABLET 3 MG: 3 TABLET ORAL at 20:47

## 2023-02-01 RX ADMIN — PREDNISONE 40 MG: 20 TABLET ORAL at 08:27

## 2023-02-01 RX ADMIN — ATORVASTATIN CALCIUM 10 MG: 10 TABLET, FILM COATED ORAL at 08:27

## 2023-02-01 RX ADMIN — BUDESONIDE 500 MCG: 0.5 SUSPENSION RESPIRATORY (INHALATION) at 19:12

## 2023-02-01 RX ADMIN — ASPIRIN 81 MG CHEWABLE TABLET 81 MG: 81 TABLET CHEWABLE at 08:27

## 2023-02-01 RX ADMIN — LORAZEPAM 1 MG: 1 TABLET ORAL at 08:27

## 2023-02-01 RX ADMIN — GABAPENTIN 300 MG: 300 CAPSULE ORAL at 20:47

## 2023-02-01 RX ADMIN — CHLORDIAZEPOXIDE HYDROCHLORIDE 25 MG: 25 CAPSULE ORAL at 21:09

## 2023-02-01 RX ADMIN — TAMSULOSIN HYDROCHLORIDE 0.4 MG: 0.4 CAPSULE ORAL at 20:47

## 2023-02-01 RX ADMIN — GUAIFENESIN 400 MG: 400 TABLET ORAL at 15:38

## 2023-02-01 RX ADMIN — OXYBUTYNIN CHLORIDE 10 MG: 10 TABLET, EXTENDED RELEASE ORAL at 08:27

## 2023-02-01 RX ADMIN — ENOXAPARIN SODIUM 30 MG: 100 INJECTION SUBCUTANEOUS at 08:28

## 2023-02-01 RX ADMIN — DOCUSATE SODIUM 100 MG: 100 CAPSULE, LIQUID FILLED ORAL at 08:55

## 2023-02-01 RX ADMIN — GABAPENTIN 300 MG: 300 CAPSULE ORAL at 08:27

## 2023-02-01 RX ADMIN — OXYBUTYNIN CHLORIDE 10 MG: 10 TABLET, EXTENDED RELEASE ORAL at 21:14

## 2023-02-01 RX ADMIN — ENOXAPARIN SODIUM 30 MG: 100 INJECTION SUBCUTANEOUS at 20:46

## 2023-02-01 RX ADMIN — HYDROXYZINE HYDROCHLORIDE 50 MG: 10 TABLET, FILM COATED ORAL at 20:48

## 2023-02-01 RX ADMIN — SODIUM CHLORIDE, PRESERVATIVE FREE 10 ML: 5 INJECTION INTRAVENOUS at 08:29

## 2023-02-01 RX ADMIN — METHYLPREDNISOLONE SODIUM SUCCINATE 40 MG: 40 INJECTION, POWDER, FOR SOLUTION INTRAMUSCULAR; INTRAVENOUS at 00:15

## 2023-02-01 RX ADMIN — IPRATROPIUM BROMIDE AND ALBUTEROL SULFATE 1 AMPULE: .5; 2.5 SOLUTION RESPIRATORY (INHALATION) at 19:12

## 2023-02-01 ASSESSMENT — SLEEP AND FATIGUE QUESTIONNAIRES
DO YOU HAVE DIFFICULTY SLEEPING: YES
AVERAGE NUMBER OF SLEEP HOURS: 5
DO YOU USE A SLEEP AID: NO

## 2023-02-01 ASSESSMENT — PAIN SCALES - GENERAL: PAINLEVEL_OUTOF10: 0

## 2023-02-01 ASSESSMENT — LIFESTYLE VARIABLES
HOW OFTEN DO YOU HAVE A DRINK CONTAINING ALCOHOL: 2-4 TIMES A MONTH
HOW MANY STANDARD DRINKS CONTAINING ALCOHOL DO YOU HAVE ON A TYPICAL DAY: 7 TO 9

## 2023-02-01 ASSESSMENT — PAIN SCALES - WONG BAKER: WONGBAKER_NUMERICALRESPONSE: 0

## 2023-02-01 NOTE — ED NOTES
Per Derick Ruiz, charge RN on [address], pt accepted to [facility] service, going to room 7[address] Titi Alvares in admitting made aware of assigned room, spoke with Vidya Robison, pt's assigned RN on [facility]  and advised of admit status.       [address] [facility], MSW, Rosario Garcia  02/01/23 8615

## 2023-02-01 NOTE — PROGRESS NOTES
[facility] PROGRESS NOTE      Chief complaint: Follow-up of CoNS on blood culture     The patient is a 45 y.o. male with history of COPD, hypertension, substance abuse, alcohol abuse, pancreatitis, major depressive disorder, presented on 01/29 with suicidal thoughts, found to be hypoxemic presumed to be associated with COPD exacerbation. On admission, he was afebrile and hemodynamically stable with no leukocytosis. Chest x-ray showed no acute process. Respiratory pathogen PCR panel was positive for Coronavirus HKU1. Urinalysis was unremarkable. Blood cultures showed CoNS in 1 out of 2 sets, deemed contaminant. HIV and hepatitis panel were nonreactive. Subjective: Patient was seen and examined. No chills, no abdominal pain, no diarrhea, no rash, no itching. Objective:    Vitals:    02/01/23 0748   BP: (!) 138/93   Pulse: (!) 111   Resp: 18   Temp: 97.8 °F (36.6 °C)   SpO2: 94%     Constitutional: Alert, not in distress  Respiratory: Clear breath sounds, no crackles, no wheezes  Cardiovascular: Regular rate and rhythm, no murmurs  Gastrointestinal: Bowel sounds present, soft, nontender  Skin: Warm and dry, no active dermatoses  Musculoskeletal: No joint swelling, no joint erythema    Labs, imaging, and medical records/notes were personally reviewed. Assessment:  Gram-positive cocci in clusters (CoNS) on blood culture, deemed contaminant  Acute hypoxic respiratory failure  Major depression  Substance abuse  Alcohol abuse    Recommendations:  No antibiotic therapy indicated for now. Follow up blood cultures. Follow up RPR, urine GC/Chlamydia PCR (per patient request for STI testing). Thank you for involving me in the care of Fabrizio Houston. I will sign off for now but will notify patient of STI testing results once available. Please do not hesitate to call for any questions, concerns, or new findings.         Electronically signed by Paul Encinas MD on 2/1/2023 at 10:02 AM

## 2023-02-01 NOTE — PROGRESS NOTES
LILI notified that patient is medically cleared to transfer to [facility].   Patient information given to [address] slip and voluntary admission faxed to [phone]

## 2023-02-01 NOTE — PROGRESS NOTES
Hospitalist Progress Note      SYNOPSIS: Patient admitted on 2023 for Acute respiratory failure ([facility] [address] He has a history of asthma, COPD, Hypertension, pancreatitis, diabetes mellitus, depression, sleep apnea and substance abuse. He was admitted with a complaint of shortness of breath. He also complained of suicidal ideation. He is being managed for COPD exacerbation and suicidal ideation. SUBJECTIVE:    Patient seen and examined. He complains of shortness of breath, which appears to be due to anxiety and panic attacks. His nurse checked his walking pulse ox, and his oxygen saturation was above 92% even on room air. He is concerned about his forms that need filled for short term disability. Patient advised he would need to contact his PCP to see if the forms can be filled out for him as the document states he needs to submit medical records for the last 3 months, and his PCP will be best placed to give this information. Records reviewed. Temp (24hrs), Av °F (36.7 °C), Min:97.8 °F (36.6 °C), Max:98.2 °F (36.8 °C)    DIET: ADULT DIET; Regular; Safety Tray; Safety Tray (Disposables)  CODE: Full Code    Intake/Output Summary (Last 24 hours) at 2023 1213  Last data filed at 2023 0744  Gross per 24 hour   Intake 840 ml   Output --   Net 840 ml       OBJECTIVE:    BP (!) 138/93   Pulse (!) 111   Temp 97.8 °F (36.6 °C) (Temporal)   Resp 18   Ht 5' 10\" (1.778 m)   Wt 270 lb (122.5 kg)   SpO2 94%   BMI 38.74 kg/m²     General appearance: No apparent distress, appears stated age and cooperative. HEENT:  Conjunctivae/corneas clear. Neck: Supple. No jugular venous distention. Respiratory: Clear to auscultation bilaterally, normal respiratory effort  Cardiovascular: Regular rate rhythm, normal S1-S2  Abdomen: Soft, nontender, nondistended  Musculoskeletal: No clubbing, cyanosis, no bilateral lower extremity edema. Brisk capillary refill.    Skin:  No rashes  on visible skin  Neurologic: awake, alert and following commands, anxious    ASSESSMENT:     #Hypoxia due to COPD exacerbation  -shortness of breath is improving. Now on 2L of oxygen  -On IV solumedrol; will switch to PO prednisone for 5 days  -breathing treatment with bronchodilators.  -titrate oxgyen to maintain sats >90%     #Suicidal ideation  -denies suicidal ideation now,   -Psych on board; they did evaluate patient and he is to be transferred to the acute Psych unit once medically stable     #bacteremia  -blood cultures positive for gram positive cocci in clusters, in one out of 2 samples  -repeat blood culture ordered  -ID on board; to hold off on antibiotics for now. Repeat blood cultures pending     #Alcohol use disorder  -at risk of withdrawal  -on alcohol withdrawal protocol with ativan  -thiamine, folic acid and multivite     #BPH: on flomax     #Hypertension; on amlodipine     #Hyperlipidemia:on statin. DVT prophylaxis: lovenox      DISPOSITION: currently [place] [facility]lipped.  Transfer to Psych floor once medically stable    Medications:  REVIEWED DAILY    Infusion Medications    sodium chloride       Scheduled Medications    docusate sodium  100 mg Oral Daily    nicotine  1 patch TransDERmal Daily    guaiFENesin  400 mg Oral TID    amLODIPine  10 mg Oral Daily    aspirin  81 mg Oral Daily    atorvastatin  10 mg Oral Daily    gabapentin  300 mg Oral TID    tamsulosin  0.4 mg Oral BID    enoxaparin  30 mg SubCUTAneous BID    budesonide  500 mcg Nebulization BID    ipratropium-albuterol  1 ampule Inhalation Q4H WA    predniSONE  40 mg Oral Daily    oxybutynin  10 mg Oral BID    sodium chloride flush  5-40 mL IntraVENous 2 times per day    thiamine  100 mg Oral Daily     PRN Meds: zolpidem, ondansetron **OR** ondansetron, polyethylene glycol, acetaminophen **OR** acetaminophen, sodium chloride flush, sodium chloride, LORazepam **OR** LORazepam **OR** LORazepam **OR** LORazepam **OR** LORazepam **OR** LORazepam **OR** LORazepam **OR** LORazepam    Labs:     Recent Labs     01/30/23  0542 01/31/23  0648 02/01/23  0624   WBC 6.5 10.6 12.2*   HGB 15.1 14.1 14.4   HCT 49.1 45.6 44.0    297 310       Recent Labs     01/30/23  0542 01/31/23  0648 02/01/23  0624   * 138 140   K 3.5 3.8 4.0    98 99   CO2 30* 29 32*   BUN 6 12 11   CREATININE 0.7 0.7 0.6*   CALCIUM 8.1* 9.3 9.3       Recent Labs     01/29/23 2327 01/30/23 0542 01/31/23 0648 02/01/23  0624   PROT 6.7 6.9 6.7 6.3*   ALKPHOS 99 80 88 73   ALT 48* 46* 41* 41*   AST 34 25 21 20   BILITOT 0.4 0.4 0.7 0.7   LIPASE 29  --   --   --        No results for input(s): INR in the last 72 hours. No results for input(s): Brigida Nasuti in the last 72 hours. Chronic labs:    Lab Results   Component Value Date    CHOL 156 04/20/2020    TRIG 171 (H) 02/14/2022    HDL 58 04/20/2020    LDLCALC 76 04/20/2020    TSH 1.740 10/14/2020    PSA 0.60 10/28/2015    INR 1.1 11/14/2020    LABA1C 6.6 (H) 06/22/2022       Radiology: REVIEWED DAILY    +++++++++++++++++++++++++++++++++++++++++++++++++  Castro Whitfield MD  [employer] - [address] [place], [place]  +++++++++++++++++++++++++++++++++++++++++++++++++  NOTE: This report was transcribed using voice recognition software. Every effort was made to ensure accuracy; however, inadvertent computerized transcription errors may be present.

## 2023-02-01 NOTE — CARE COORDINATION
Discussed with attending, potential discharge tomorrow d/t f/u blood Cx; no Abx at this time per ID; discharge plan is to psych once medically cleared; pink slip written on 1/30/23 however, bedside RN reported in morning rounds pt is agreeable to voluntary admission.

## 2023-02-01 NOTE — ED NOTES
BAC reviewed chart, psych consult completed 1/30/2023, medical clearance noted by Dr Shelbi Harris 2/1/2023, spoke with assigned RN Winnie Rhodes, advised beds are available on [facility], can call [phone] to give info to [employer] who will discuss with on-call psychiatrist re: admission to [address] [place] [address] is aware of pending admission. Please call and discuss with charge nurse Bharathi who will discuss with psychiatrist. Zeb Severin ACCOMPANIES PT TO [facility].       [address], TODD, [facility]  02/01/23 [address] Kat[address] Mcmillan, MSW, [facility]  02/01/23 0828

## 2023-02-01 NOTE — LETTER
[address] [place] [address] Way  Phone: [phone]        February 6, 2023     Patient: Pedro Lawson   YOB: 1970   Date of Visit: 1/29/2023       To Whom It May Concern:    Please be advised that Pedro Lawson was admitted to [facility] on 1/29/2023 and was discharged on 2/6/2023. If you have any questions or concerns, please don't hesitate to call.     Sincerely,        Ernie Caceres, MSW, LSW
[address] [place] [address]  Phone: [phone]            February 6, 2023     Patient: Vandana Coello   YOB: 1970   Date of Visit: 2/1/2023       To Whom It May Concern:    [address]  [address] [place]  Phone: [phone]        February 6, 2023     Patient: Vandana Coello   YOB: 1970   Date of Visit: 1/29/2023       To Whom It May Concern:    Please be advised that Vandana Coello was admitted to [facility] on 1/29/2023 and was discharged on 2/6/2023.        Sincerely,        Amber Hernandez, MSW, [address]  [address] [address]  Phone: [phone]            February 6, 2023     Patient: Peng Schmidt   YOB: 1970   Date of Visit: 2/1/2023       To Whom It May Concern:    Peng Schmidt was admitted to [facility] on 1/28/2023 and discharged on 2/6/2023. Sincerely,        MELISSA Morse, TODD, [address]  [place] [address]  Phone: [phone]          February 4, 2023     Patient: Eric Wang   YOB: 1970   Date of Visit: 2/1/2023       To Whom It May Concern:    Please be advised that  Eric Wang was admitted to [facility]. [facility] on 1/29/2023 and discharged on 2/4/2023. If you have any questions or concerns, please don't hesitate to call. Sincerely,    Jess BROOKS, LSW.
[address] [place] [address]  Phone: [phone]          February 6, 2023     Patient: Tish Ayala   YOB: 1970   Date of Visit: 1/29/2023       To Whom It May Concern:    Please be advised that Tish Ayala was admitted to [facility] in [facility] on 1/29/2023 and discharged on 2/6/2023. Sincerely,            MELISSA Rodarte, TODD, [address]
Labs/EKG/Imaging Studies/Medications

## 2023-02-01 NOTE — DISCHARGE SUMMARY
Hospital Medicine Discharge Summary    Patient ID: Nina Escobar      Patient's PCP: Karl Humbertomarisol    Admit Date: 1/29/2023     Discharge Date:   2/1/2023    Admitting Physician: Keke Cox DO     Discharge Physician: Kwasi Issa MD     Discharge Diagnoses: Active Hospital Problems    Diagnosis Date Noted    Acute respiratory failure ([facility] [J96.00] 06/01/2022     Priority: Medium    Major depressive disorder, recurrent episode with mixed features ([facility] [F33.9] 05/14/2020       The patient was seen and examined on day of discharge and this discharge summary is in conjunction with any daily progress note from day of discharge. Hospital Course: Patient  is a 51 y/o male with a Pmh as outlined who was admitted on 1/29/2023 for Acute respiratory failure ([facility] He has a history of asthma, COPD, Hypertension, pancreatitis, diabetes mellitus, depression, sleep apnea and substance abuse. He was admitted with a complaint of shortness of breath. He also complained of suicidal ideation. He was  managed for acute COPD exacerbation and suicidal ideation. Psych was consulted, and patient was pink slipped. He was started on breathing treatment with bronchodilators, oxygen ad IV solumedrol. He was also pink slipped. His breathing improved and he felt much better. In light of his depression and suicidal ideation, he was discharged to the inpatient psych unit on 2/1/2023. He is to follow up with his PCP within 1-2 weeks after discharge from the psych unit. Patient was seen and examined prior to discharge. He had no active complaints. Review of systems was otherwise negative. Labs and vitals reviewed. Home meds reviewed and reconciled. Patient was clinically stable at time of discharge.            Exam:     BP (!) 145/92   Pulse (!) 115   Temp 97.8 °F (36.6 °C) (Temporal)   Resp 18   Ht 5' 10\" (1.778 m)   Wt 270 lb (122.5 kg)   SpO2 94%   BMI 38.74 kg/m²     General appearance: No apparent distress, appears stated age and cooperative. HEENT: Pupils equal, round, and reactive to light. Conjunctivae/corneas clear. Neck: Supple, with full range of motion. No jugular venous distention. Trachea midline. Respiratory:  Normal respiratory effort. Clear to auscultation, bilaterally without Rales/Wheezes/Rhonchi. Cardiovascular: Regular rate and rhythm with normal S1/S2 without murmurs, rubs or gallops. Abdomen: Soft, non-tender, non-distended with normal bowel sounds. Musculoskeletal: No clubbing, cyanosis or edema bilaterally. Full range of motion without deformity. Skin: Skin color, texture, turgor normal.  No rashes or lesions. Neurologic:  Neurovascularly intact without any focal sensory/motor deficits. Cranial nerves: II-XII intact, grossly non-focal.  Psychiatric: Alert and oriented, thought content appropriate, normal insight      Consults:     IP CONSULT TO SOCIAL WORK  IP CONSULT TO INTERNAL MEDICINE  IP CONSULT TO PSYCHIATRY  IP CONSULT TO SOCIAL WORK  IP CONSULT TO INFECTIOUS DISEASES    Significant Diagnostic Studies:     XR CHEST PORTABLE   Final Result   No acute process. Disposition:  inpatient Psych unit     Discharge Instructions/Follow-up:  PCP within 1-2 weeks after dc from psych unit    Code Status:  Full Code     Activity: activity as tolerated    Diet: cardiac diet    Labs:  For convenience and continuity at follow-up the following most recent labs are provided:      CBC:    Lab Results   Component Value Date/Time    WBC 12.2 02/01/2023 06:24 AM    HGB 14.4 02/01/2023 06:24 AM    HCT 44.0 02/01/2023 06:24 AM     02/01/2023 06:24 AM       Renal:    Lab Results   Component Value Date/Time     02/01/2023 06:24 AM    K 4.0 02/01/2023 06:24 AM    CL 99 02/01/2023 06:24 AM    CO2 32 02/01/2023 06:24 AM    BUN 11 02/01/2023 06:24 AM    CREATININE 0.6 02/01/2023 06:24 AM    CALCIUM 9.3 02/01/2023 06:24 AM    PHOS 4.7 06/05/2022 05:51 AM       Discharge Medications:     Current Discharge Medication List             Details   meloxicam (MOBIC) 7.5 MG tablet Take 7.5 mg by mouth daily as needed for Pain      fluticasone (FLONASE) 50 MCG/ACT nasal spray 2 sprays by Nasal route daily as needed      pantoprazole (PROTONIX) 40 MG tablet Take 40 mg by mouth daily      traMADol (ULTRAM) 50 MG tablet take 1 to 2 tablets by mouth every 6 hours if needed for pain      gabapentin (NEURONTIN) 300 MG capsule Take 300 mg by mouth 3 times daily.      oxybutynin (DITROPAN-XL) 10 MG extended release tablet Take 10 mg by mouth 2 times daily      albuterol (PROVENTIL) (2.5 MG/3ML) 0.083% nebulizer solution Take 2.5 mg by nebulization every 6 hours as needed for Wheezing      albuterol sulfate HFA (PROVENTIL;VENTOLIN;PROAIR) 108 (90 Base) MCG/ACT inhaler Inhale 2 puffs into the lungs every 6 hours as needed for Wheezing      atorvastatin (LIPITOR) 10 MG tablet Take 10 mg by mouth daily      buPROPion (WELLBUTRIN SR) 150 MG extended release tablet Take 150 mg by mouth daily      zolpidem (AMBIEN) 5 MG tablet Take 5 mg by mouth nightly as needed for Sleep.       Fluticasone-Umeclidin-Vilant (TRELEGY ELLIPTA) 200-62.5-25 MCG/INH AEPB Inhale 1 puff into the lungs daily      aspirin 81 MG chewable tablet Take 1 tablet by mouth daily  Qty: 30 tablet, Refills: 3      tamsulosin (FLOMAX) 0.4 MG capsule Take 0.4 mg by mouth 2 times daily       amLODIPine (NORVASC) 10 MG tablet Take 1 tablet by mouth daily.  Qty: 30 tablet, Refills: 2    Associated Diagnoses: HTN (hypertension)             Time Spent on discharge is more than 1 hour in the examination, evaluation, counseling and review of medications and discharge plan.      Signed:    Naty Mayberry MD   2/1/2023

## 2023-02-01 NOTE — PROGRESS NOTES
Dr Uma Mathias via perfect serve to notify that Dr Jose Elias Rivera said patient can be transferred to Psych

## 2023-02-02 PROBLEM — F31.81 MIXED BIPOLAR II DISORDER (HCC): Status: ACTIVE | Noted: 2023-02-02

## 2023-02-02 PROBLEM — F33.9 MAJOR DEPRESSIVE DISORDER, RECURRENT EPISODE WITH MIXED FEATURES (HCC): Status: RESOLVED | Noted: 2020-05-14 | Resolved: 2023-02-02

## 2023-02-02 LAB
ALBUMIN SERPL-MCNC: 3.6 G/DL (ref 3.5–5.2)
ALP BLD-CCNC: 71 U/L (ref 40–129)
ALT SERPL-CCNC: 64 U/L (ref 0–40)
ANION GAP SERPL CALCULATED.3IONS-SCNC: 10 MMOL/L (ref 7–16)
AST SERPL-CCNC: 31 U/L (ref 0–39)
BASOPHILS ABSOLUTE: 0.03 E9/L (ref 0–0.2)
BASOPHILS RELATIVE PERCENT: 0.3 % (ref 0–2)
BILIRUB SERPL-MCNC: 0.7 MG/DL (ref 0–1.2)
BUN BLDV-MCNC: 13 MG/DL (ref 6–20)
CALCIUM SERPL-MCNC: 8.9 MG/DL (ref 8.6–10.2)
CHLORIDE BLD-SCNC: 101 MMOL/L (ref 98–107)
CO2: 31 MMOL/L (ref 22–29)
CREAT SERPL-MCNC: 0.7 MG/DL (ref 0.7–1.2)
EOSINOPHILS ABSOLUTE: 0.01 E9/L (ref 0.05–0.5)
EOSINOPHILS RELATIVE PERCENT: 0.1 % (ref 0–6)
GFR SERPL CREATININE-BSD FRML MDRD: >60 ML/MIN/1.73
GLUCOSE BLD-MCNC: 146 MG/DL (ref 74–99)
HCT VFR BLD CALC: 45.8 % (ref 37–54)
HEMOGLOBIN: 14.8 G/DL (ref 12.5–16.5)
IMMATURE GRANULOCYTES #: 0.08 E9/L
IMMATURE GRANULOCYTES %: 0.8 % (ref 0–5)
LYMPHOCYTES ABSOLUTE: 2.09 E9/L (ref 1.5–4)
LYMPHOCYTES RELATIVE PERCENT: 21 % (ref 20–42)
MAGNESIUM: 2.1 MG/DL (ref 1.6–2.6)
MCH RBC QN AUTO: 27.6 PG (ref 26–35)
MCHC RBC AUTO-ENTMCNC: 32.3 % (ref 32–34.5)
MCV RBC AUTO: 85.4 FL (ref 80–99.9)
MONOCYTES ABSOLUTE: 0.86 E9/L (ref 0.1–0.95)
MONOCYTES RELATIVE PERCENT: 8.6 % (ref 2–12)
NEUTROPHILS ABSOLUTE: 6.9 E9/L (ref 1.8–7.3)
NEUTROPHILS RELATIVE PERCENT: 69.2 % (ref 43–80)
PDW BLD-RTO: 15.9 FL (ref 11.5–15)
PLATELET # BLD: 296 E9/L (ref 130–450)
PMV BLD AUTO: 8.9 FL (ref 7–12)
POTASSIUM REFLEX MAGNESIUM: 3.4 MMOL/L (ref 3.5–5)
RBC # BLD: 5.36 E12/L (ref 3.8–5.8)
RPR: NORMAL
SODIUM BLD-SCNC: 142 MMOL/L (ref 132–146)
TOTAL PROTEIN: 6.2 G/DL (ref 6.4–8.3)
WBC # BLD: 10 E9/L (ref 4.5–11.5)

## 2023-02-02 PROCEDURE — 6370000000 HC RX 637 (ALT 250 FOR IP): Performed by: PSYCHIATRY & NEUROLOGY

## 2023-02-02 PROCEDURE — 6370000000 HC RX 637 (ALT 250 FOR IP): Performed by: INTERNAL MEDICINE

## 2023-02-02 PROCEDURE — 85025 COMPLETE CBC W/AUTO DIFF WBC: CPT

## 2023-02-02 PROCEDURE — 1240000000 HC EMOTIONAL WELLNESS R&B

## 2023-02-02 PROCEDURE — 83735 ASSAY OF MAGNESIUM: CPT

## 2023-02-02 PROCEDURE — 6360000002 HC RX W HCPCS: Performed by: INTERNAL MEDICINE

## 2023-02-02 PROCEDURE — 80053 COMPREHEN METABOLIC PANEL: CPT

## 2023-02-02 PROCEDURE — 6370000000 HC RX 637 (ALT 250 FOR IP): Performed by: NURSE PRACTITIONER

## 2023-02-02 PROCEDURE — 36415 COLL VENOUS BLD VENIPUNCTURE: CPT

## 2023-02-02 PROCEDURE — 94640 AIRWAY INHALATION TREATMENT: CPT

## 2023-02-02 PROCEDURE — 94660 CPAP INITIATION&MGMT: CPT

## 2023-02-02 RX ORDER — FLUTICASONE PROPIONATE 50 MCG
1 SPRAY, SUSPENSION (ML) NASAL DAILY
Status: DISCONTINUED | OUTPATIENT
Start: 2023-02-02 | End: 2023-02-06 | Stop reason: HOSPADM

## 2023-02-02 RX ORDER — PANTOPRAZOLE SODIUM 40 MG/1
40 TABLET, DELAYED RELEASE ORAL
Status: DISCONTINUED | OUTPATIENT
Start: 2023-02-02 | End: 2023-02-06 | Stop reason: HOSPADM

## 2023-02-02 RX ORDER — DIVALPROEX SODIUM 250 MG/1
250 TABLET, DELAYED RELEASE ORAL EVERY 12 HOURS SCHEDULED
Status: DISCONTINUED | OUTPATIENT
Start: 2023-02-02 | End: 2023-02-06 | Stop reason: HOSPADM

## 2023-02-02 RX ORDER — CHLORDIAZEPOXIDE HYDROCHLORIDE 25 MG/1
25 CAPSULE, GELATIN COATED ORAL EVERY 8 HOURS PRN
Status: DISCONTINUED | OUTPATIENT
Start: 2023-02-02 | End: 2023-02-06 | Stop reason: HOSPADM

## 2023-02-02 RX ORDER — OLANZAPINE 5 MG/1
5 TABLET ORAL NIGHTLY
Status: DISCONTINUED | OUTPATIENT
Start: 2023-02-02 | End: 2023-02-06 | Stop reason: HOSPADM

## 2023-02-02 RX ORDER — HYDROXYZINE PAMOATE 50 MG/1
50 CAPSULE ORAL 3 TIMES DAILY PRN
Status: DISCONTINUED | OUTPATIENT
Start: 2023-02-02 | End: 2023-02-06 | Stop reason: HOSPADM

## 2023-02-02 RX ADMIN — FLUTICASONE PROPIONATE 1 SPRAY: 50 SPRAY, METERED NASAL at 12:30

## 2023-02-02 RX ADMIN — ATORVASTATIN CALCIUM 10 MG: 10 TABLET, FILM COATED ORAL at 08:30

## 2023-02-02 RX ADMIN — PREDNISONE 40 MG: 20 TABLET ORAL at 08:30

## 2023-02-02 RX ADMIN — CHLORDIAZEPOXIDE HYDROCHLORIDE 25 MG: 25 CAPSULE ORAL at 22:57

## 2023-02-02 RX ADMIN — ENOXAPARIN SODIUM 30 MG: 100 INJECTION SUBCUTANEOUS at 22:11

## 2023-02-02 RX ADMIN — OLANZAPINE 5 MG: 5 TABLET, FILM COATED ORAL at 22:10

## 2023-02-02 RX ADMIN — GABAPENTIN 300 MG: 300 CAPSULE ORAL at 08:31

## 2023-02-02 RX ADMIN — GUAIFENESIN 400 MG: 400 TABLET ORAL at 08:31

## 2023-02-02 RX ADMIN — GABAPENTIN 300 MG: 300 CAPSULE ORAL at 22:10

## 2023-02-02 RX ADMIN — GUAIFENESIN 400 MG: 400 TABLET ORAL at 14:19

## 2023-02-02 RX ADMIN — PANTOPRAZOLE SODIUM 40 MG: 40 TABLET, DELAYED RELEASE ORAL at 12:30

## 2023-02-02 RX ADMIN — HYDROXYZINE PAMOATE 50 MG: 50 CAPSULE ORAL at 22:17

## 2023-02-02 RX ADMIN — AMLODIPINE BESYLATE 10 MG: 10 TABLET ORAL at 08:31

## 2023-02-02 RX ADMIN — OXYBUTYNIN CHLORIDE 10 MG: 10 TABLET, EXTENDED RELEASE ORAL at 08:31

## 2023-02-02 RX ADMIN — TAMSULOSIN HYDROCHLORIDE 0.4 MG: 0.4 CAPSULE ORAL at 22:10

## 2023-02-02 RX ADMIN — IPRATROPIUM BROMIDE AND ALBUTEROL SULFATE 1 AMPULE: .5; 2.5 SOLUTION RESPIRATORY (INHALATION) at 19:30

## 2023-02-02 RX ADMIN — DOCUSATE SODIUM 100 MG: 100 CAPSULE, LIQUID FILLED ORAL at 08:31

## 2023-02-02 RX ADMIN — BUDESONIDE 500 MCG: 0.5 SUSPENSION RESPIRATORY (INHALATION) at 11:46

## 2023-02-02 RX ADMIN — CHLORDIAZEPOXIDE HYDROCHLORIDE 25 MG: 25 CAPSULE ORAL at 12:46

## 2023-02-02 RX ADMIN — BUDESONIDE 500 MCG: 0.5 SUSPENSION RESPIRATORY (INHALATION) at 19:30

## 2023-02-02 RX ADMIN — TAMSULOSIN HYDROCHLORIDE 0.4 MG: 0.4 CAPSULE ORAL at 08:30

## 2023-02-02 RX ADMIN — ENOXAPARIN SODIUM 30 MG: 100 INJECTION SUBCUTANEOUS at 09:41

## 2023-02-02 RX ADMIN — ASPIRIN 81 MG CHEWABLE TABLET 81 MG: 81 TABLET CHEWABLE at 08:30

## 2023-02-02 RX ADMIN — MELATONIN 3 MG ORAL TABLET 3 MG: 3 TABLET ORAL at 22:10

## 2023-02-02 RX ADMIN — GUAIFENESIN 400 MG: 400 TABLET ORAL at 22:10

## 2023-02-02 RX ADMIN — Medication 100 MG: at 08:31

## 2023-02-02 RX ADMIN — HYDROXYZINE HYDROCHLORIDE 50 MG: 10 TABLET, FILM COATED ORAL at 09:08

## 2023-02-02 RX ADMIN — GABAPENTIN 300 MG: 300 CAPSULE ORAL at 14:19

## 2023-02-02 RX ADMIN — DIVALPROEX SODIUM 250 MG: 250 TABLET, DELAYED RELEASE ORAL at 22:09

## 2023-02-02 RX ADMIN — OXYBUTYNIN CHLORIDE 10 MG: 10 TABLET, EXTENDED RELEASE ORAL at 22:10

## 2023-02-02 RX ADMIN — IPRATROPIUM BROMIDE AND ALBUTEROL SULFATE 1 AMPULE: .5; 2.5 SOLUTION RESPIRATORY (INHALATION) at 11:46

## 2023-02-02 ASSESSMENT — PAIN SCALES - GENERAL
PAINLEVEL_OUTOF10: 0
PAINLEVEL_OUTOF10: 0

## 2023-02-02 ASSESSMENT — LIFESTYLE VARIABLES
HOW OFTEN DO YOU HAVE A DRINK CONTAINING ALCOHOL: MONTHLY OR LESS
HOW MANY STANDARD DRINKS CONTAINING ALCOHOL DO YOU HAVE ON A TYPICAL DAY: 1 OR 2

## 2023-02-02 ASSESSMENT — SLEEP AND FATIGUE QUESTIONNAIRES
AVERAGE NUMBER OF SLEEP HOURS: 6
DO YOU USE A SLEEP AID: NO
SLEEP PATTERN: DIFFICULTY FALLING ASLEEP
DO YOU HAVE DIFFICULTY SLEEPING: YES

## 2023-02-02 ASSESSMENT — PATIENT HEALTH QUESTIONNAIRE - PHQ9: SUM OF ALL RESPONSES TO PHQ QUESTIONS 1-9: 19

## 2023-02-02 NOTE — PROGRESS NOTES
Date: 2/1/2023    Time: 11:13 PM    Patient Placed On BIPAP/CPAP/ Non-Invasive Ventilation? Yes    If no must comment. Facial area red/color change? No           If YES are Blister/Lesion present? No   If yes must notify nursing staff  BIPAP/CPAP skin barrier?   Yes    Skin barrier type:mepilexlite       Comments:        Reymundo Caputo RCP

## 2023-02-02 NOTE — PLAN OF CARE
Problem: Chronic Conditions and Co-morbidities  Goal: Patient's chronic conditions and co-morbidity symptoms are monitored and maintained or improved  Outcome: Progressing     Problem: Risk for Elopement  Goal: Patient will not exit the unit/facility without proper excort  Outcome: Progressing  Flowsheets (Taken 2/1/2023 9170)  Nursing Interventions for Elopement Risk:   Place patient in room far away from exits and stairways   Make sure patient has all necessary personal care items   Collaborate with treatment team for nicotine replacement   Reduce environmental triggers     Problem: Safety - Adult  Goal: Free from fall injury  Outcome: Progressing

## 2023-02-02 NOTE — PROGRESS NOTES
[facility]  Initial Interdisciplinary Treatment Plan NOTE    Review Date & Time: 02/02/2023 0900    Patient was in treatment team    Admission Type:   Admission Type: Voluntary    Reason for admission:  Reason for Admission: I was binge drinking and got extremely depressed and then I ended up here. Estimated Length of Stay Update:  1-3  Estimated Discharge Date Update: 02/06/2023    EDUCATION:   Learner Progress Toward Treatment Goals: Reviewed results and recommendations of this team    Method: Small group    Outcome: Verbalized understanding    PATIENT GOALS: \"Get my mind thinking better. \"    PLAN/TREATMENT RECOMMENDATIONS UPDATE: Encourage patient to attend and participate in groups. Take medications as prescribed. GOALS UPDATE:   Time frame for Short-Term Goals: Prior to discharge.     Becky Naylor RN

## 2023-02-02 NOTE — DISCHARGE INSTRUCTIONS
Follow up for Tobacco Cessation at:    [facility] Tobacco Treatment                                 Date:  Friday Feb. 10 at [address]  [place],  [address]   ([address]    take B elevators to 7th floor)   Phone: [phone]   Fax: [phone]       NO ALCOHOL    NO STREET DRUGS    CALL 211 FOR HELP HOTLINE    CALL 911 OR GO TO NEAREST HOSPITAL WITH ANY EMERGENCY    CALL  WITH ANY QUESTIONS ABOUT THESE INSTRUCTIONS    TAKE ALL MEDICATIONS AS ORDERED    KEEP ALL FOLLOW UP AS SCHEDULED    TAKE YOUR MEDICATIONS, THESE INSTRUCTIONS, YOUR I.D., AND ANY INSURANCE CARD WITH YOU TO YOUR FIRST OUTPATIENT APPOINTMENT

## 2023-02-02 NOTE — PLAN OF CARE
Problem: Self Harm/Suicidality  Goal: Will have no self-injury during hospital stay  Description: INTERVENTIONS:  1. Ensure constant observer at bedside with Q15M safety checks  2. Maintain a safe environment  3. Secure patient belongings  4. Ensure family/visitors adhere to safety recommendations  5. Ensure safety tray has been added to patient's diet order  6. Every shift and PRN: Re-assess suicidal risk via Frequent Screener    Outcome: Progressing     Problem: Depression  Goal: Will be euthymic at discharge  Description: INTERVENTIONS:  1. Administer medication as ordered  2. Provide emotional support via 1:1 interaction with staff  3. Encourage involvement in milieu/groups/activities  4. Monitor for social isolation  Outcome: Progressing     Problem: Behavior  Goal: Pt/Family maintain appropriate behavior and adhere to behavioral management agreement, if implemented  Description: INTERVENTIONS:  1. Assess patient/family's coping skills and  non-compliant behavior (including use of illegal substances)  2. Notify security of behavior or suspected illegal substances which indicate the need for search of the family and/or belongings  3. Encourage verbalization of thoughts and concerns in a socially appropriate manner  4. Utilize positive, consistent limit setting strategies supporting safety of patient, staff and others  5. Encourage participation in the decision making process about the behavioral management agreement  6. If a visitor's behavior poses a threat to safety call refer to organization policy. 7. Initiate consult with , Psychosocial CNS, Spiritual Care as appropriate  Outcome: Progressing     Problem: Anxiety  Goal: Will report anxiety at manageable levels  Description: INTERVENTIONS:  1. Administer medication as ordered  2. Teach and rehearse alternative coping skills  3.  Provide emotional support with 1:1 interaction with staff  Outcome: Progressing     Problem: Drug Abuse/Detox  Goal: Will have no detox symptoms and will verbalize plan for changing drug-related behavior  Description: INTERVENTIONS:  1. Administer medication as ordered  2. Monitor physical status  3. Provide emotional support with 1:1 interaction with staff  4. Encourage  recovery focused treatment   Outcome: Progressing     Problem: Sleep Disturbance  Goal: Will exhibit normal sleeping pattern  Description: INTERVENTIONS:  1. Administer medication as ordered  2. Decrease environmental stimuli, including noise, as appropriate  3. Discourage social isolation and naps during the day  Outcome: Progressing    Patient denies suicidal ideations, homicidal ideations, and hallucinations. Patient is isolative to his room this shift. Evasive, demanding attitude. Reports anxiety and depression and helplessness/hopelessness. Patient is medication compliant.

## 2023-02-02 NOTE — PROGRESS NOTES
Patient approached nurse's station and stated \"I don't know if it's bothering you but it's bothering me that this other patient is making racial slurs. If he does another thing, I am going to have to defend myself. \" Patient provided reassurance and education on the situation. NP Ivy Lorenzana was called and informed of the situation. For the safety of both patients, it was advised for this patient to be transferred to [facility].

## 2023-02-02 NOTE — GROUP NOTE
SW invited this pt to her cognitive skills group. Pt observed sitting in the dayroom watching television. Pt got up and left the dayroom when alerted it was group time. NETO unable to provide a group due to lack of participants.

## 2023-02-02 NOTE — CARE COORDINATION
Biopsychosocial Assessment Note    Social work met with patient to complete the biopsychosocial assessment and C-SSRS. Chief Complaint: Per pt report, \"I was feeling depressed. \"    Mental Status Exam: Pt appeared to be alert and oriented x 4. Pt was withdrawn and guarded during this 's assessment. Pt's thought process is preoccupied, speech is clear. Pt's eye-contact is good. Pt's affect is flat. Clinical Summary: Pt's last admission to this psychiatric facility was 5/13/2020. Pt states that he came to the hospital due to increase in depression and a suicide attempt. Pt states that he has been having an increase in depression due to family, job, and financial stressors. When asked how the pt attempted to commit suicide, the pt replied \"I'm not really here to talk about that. \" Pt continuously brought up the fact that he needs the psychiatrist to sign off on his short-term disability paperwork. SW advised the pt that he would have to discuss this directly with the doctor. Pt admits to 5 lifetime suicide attempts. Pt denied a history of self-injurious behavior. Pt is currently denying SI/ HI/ hallucinations/ delusions. Pt denied admits to occasionally drinking 1-2 beers weekly. Pt denied trauma history. Pt states that he will either return home with his child's mother and his child, or go to his apartment in [facility]. Pt is not currently active outpatient with an agency- but has been connected to [facility] in the past.    Risk Factors: Job stressors, family stressors, financial, not connected outpatient, multiple psych admissions, minimizing, not forth-coming with information, and hx of suicide attempts. Protective Factors: Stable housing, support from friends, and future focused.     Gender  [x] Male [] Female [] Transgender  [] Other    Sexual Orientation    [x] Heterosexual [] Homosexual [] Bisexual [] Other    Suicidal Ideation  [x] Past [] Present [] Denies     C-SSRS Screening Completed: Current Suicide Risk:  [] No Risk  [] Low [x] Moderate [] High    Homicidal Ideation  [] Past [] Present [x] Denies     Hallucinations/Delusions (Specify type)  [] Reports [x] Denies     Current or Past Mental Health Treatment:  [x] Yes, When and Where: [facility]- past  [] No    Substance Use/Alcohol Use/Addiction  [] Reports  [x]Denies     Tobacco Use (within the last 6 months)  [x] Reports [] Denies     Trauma History  [] Reports [x] Denies     Self Injurious/Self Mutilation Behaviors:   [] Reports:    [] Past [] Present   [x] Denies    Legal History:  []  Yes (Specify)    [x] No    Collateral Contact (KANU signed)  Name:   Relationship:  Number:     Collateral Information: Pt refused KANU     Access to Weapons per Collateral Contact: [] Reports [x] Denies     After consideration of C-SSRS screening results, C-SSRS assessments, and this professional's assessment the patient's overall suicide risk assessed to be:  [] None   [] Low   [x] Moderate   [] High     [x] Discussed current suicide risk, protective and risk factors with RN and NP/Psychiatrist.    Discharge Plan:  [x] Home: with his child's mother or to his apartment in [facility]  [] Shelter:  [] Crisis Unit:  [] Substance Abuse Rehab:  [] Nursing Facility:  [] Other (Specify):     Follow up Provider: Jenise Coronado
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
## 2023-02-02 NOTE — BH NOTE
Patient assessed at this time. Patient denies any suicidal ideations. Patient approached nurse's station and stated, \"I don't know if it's bothering you but it's bothering me that this other patient is making racial slurs, if he does another thing, I am going to have to defend myself. \" Patient provided reassurance, Donis Loyola NP notified. For the safety of both patients, it was advised for this patient to be transferred to [facility] per Donis Loyola NP. Will continue to monitor patient every 15 minute rounds to ensure patient safety.

## 2023-02-02 NOTE — PROGRESS NOTES
[facility]  Admission Note   Patient transferred from [facility]. Patient came in per report after a drinking binge with acute respiratory failure. Patient states to RN on admission that after he drinks like this he generally gets really depressed and this time scared him because he called off work and could only think about ending his life. Patient states, \"that's not me. \" Patient states I dont drink for weeks and weeks and then I will go on a six or seven day rowell. Patient currently denies SI and states he has good family support. Patient wears a CPAP at night and will have Sound following while admitted here on [address].   Admission Type:   Admission Type: Voluntary    Reason for admission:  Reason for Admission: I was binge drinking and got extremely depressed and then I ended up here.       Addictive Behavior:   Addictive Behavior  In the Past 3 Months, Have You Felt or Has Someone Told You That You Have a Problem With  : None    Medical Problems:   Past Medical History:   Diagnosis Date    Acid reflux     Asthma     Asthma     COPD (chronic obstructive pulmonary disease) ([facility] [address]     Hypertension     Pancreatitis     Prediabetes     Psychiatric problem     depressed    Sleep apnea     Substance abuse ([facility])     alcohol and cocaine       Status EXAM:  Mental Status and Behavioral Exam  Normal: No  Level of Assistance: Independent/Self  Facial Expression: Expressionless, Flat  Affect: Blunt  Level of Consciousness: Alert  Frequency of Checks: 4 times per hour, close  Mood:Normal: No  Mood: Depressed, Anxious, Sad  Motor Activity:Normal: Yes  Eye Contact: Fair  Observed Behavior: Guarded, Tearful  Sexual Misconduct History: Past - no  Preception: [place] to person, [place] to time, [place] to place, [place] to situation  Attention:Normal: Yes  Thought Processes: Blocking  Thought Content:Normal: No  Depression Symptoms: Change in energy level, Feelings of helplessness, Feelings of hopelessess, Feelings of worthlessness  Anxiety Symptoms: Generalized  Theresa Symptoms: No problems reported or observed. Hallucinations: None  Delusions: No  Memory:Normal: No  Memory: Poor recent, Poor remote  Insight and Judgment: No  Insight and Judgment: Poor judgment, Poor insight    Tobacco Screening:  Practical Counseling, on admission, patel X, if applicable and completed (first 3 are required if patient doesn't refuse)          (X ) Recognizing danger situations (included triggers and roadblocks)                    ( X) Coping skills (new ways to manage stress,relaxation techniques, changing routine, distraction)                                                           ( X) Basic information about quitting (benefits of quitting, techniques in how to quit, available resources  ( ) Referral for counseling faxed to [facility]                                                                                                                   ( ) Patient refused counseling  ( ) Patient has not smoked in the last 30 days    Metabolic Screening:    Lab Results   Component Value Date    LABA1C 6.6 (H) 06/22/2022       Lab Results   Component Value Date    CHOL 156 04/20/2020    CHOL 147 04/26/2014    CHOL 202 (H) 02/13/2014    CHOL 135 03/15/2013     Lab Results   Component Value Date    TRIG 171 (H) 02/14/2022    TRIG 120 02/13/2022    TRIG 109 04/20/2020    TRIG 65 04/26/2014    TRIG 193 (H) 02/13/2014    TRIG 191 (H) 03/15/2013     Lab Results   Component Value Date    HDL 58 04/20/2020    HDL 57 04/26/2014    HDL 63 02/13/2014    HDL 37.0 (A) 03/15/2013     No components found for: LDLCAL  Lab Results   Component Value Date    LABVLDL 22 04/20/2020    LABVLDL 13 04/26/2014         There is no height or weight on file to calculate BMI.     BP Readings from Last 2 Encounters:   02/01/23 (!) 130/100   02/01/23 (!) 145/92           Pt admitted with followings belongings:  Vision - Corrective Lenses: Eyeglasses  Clothing: Undergarments, Shirt, Footwear, Hat, Socks, Jacket/Coat, Pants (sneakers,5socks,3tees,5briefs,gryjoggers,blkhat,gryjacket,blkwashcloth,)  Other Valuables:  (bible in k case,)    Nicolas Park RN

## 2023-02-02 NOTE — PROGRESS NOTES
Patient out on floor for psycho-education group, however was sleeping. Able to share goal for the day as to get my mind thinking better. Recreation assessment completed.

## 2023-02-02 NOTE — CARE COORDINATION
SW spoke with this pt per pt request. Pt continues to state that this he needs the psychiatrist at this hospital to fill out his short-term disability paperwork. SW once again educated the pt that this is something  Must be agreeable to, but typically his PCP will fill out this documentation as our psychiatrist will not be following him out into the community. Pt continues to push and told this  that she needs to call Jorge Nesbitt ([phone]) in short-term claims. Pt states that Melba Meier needs to send paperwork over to this treatment team to filled out. SW offered to give the fax number of the unit to this pt so that Melba Meier can fax over information. Pt states that he will not be calling Melba Meier himself due to being tired and needs this  to do it. SW called [facility] and VIVIAN.

## 2023-02-03 LAB
ALBUMIN SERPL-MCNC: 3.6 G/DL (ref 3.5–5.2)
ALP BLD-CCNC: 85 U/L (ref 40–129)
ALT SERPL-CCNC: 62 U/L (ref 0–40)
ANION GAP SERPL CALCULATED.3IONS-SCNC: 7 MMOL/L (ref 7–16)
AST SERPL-CCNC: 17 U/L (ref 0–39)
BASOPHILS ABSOLUTE: 0.02 E9/L (ref 0–0.2)
BASOPHILS RELATIVE PERCENT: 0.2 % (ref 0–2)
BILIRUB SERPL-MCNC: 0.8 MG/DL (ref 0–1.2)
BUN BLDV-MCNC: 18 MG/DL (ref 6–20)
C. TRACHOMATIS DNA ,URINE: NEGATIVE
CALCIUM SERPL-MCNC: 9 MG/DL (ref 8.6–10.2)
CHLORIDE BLD-SCNC: 99 MMOL/L (ref 98–107)
CO2: 33 MMOL/L (ref 22–29)
CREAT SERPL-MCNC: 0.7 MG/DL (ref 0.7–1.2)
EOSINOPHILS ABSOLUTE: 0.05 E9/L (ref 0.05–0.5)
EOSINOPHILS RELATIVE PERCENT: 0.5 % (ref 0–6)
GFR SERPL CREATININE-BSD FRML MDRD: >60 ML/MIN/1.73
GLUCOSE BLD-MCNC: 173 MG/DL (ref 74–99)
HCT VFR BLD CALC: 46.1 % (ref 37–54)
HEMOGLOBIN: 14.4 G/DL (ref 12.5–16.5)
IMMATURE GRANULOCYTES #: 0.09 E9/L
IMMATURE GRANULOCYTES %: 1 % (ref 0–5)
LYMPHOCYTES ABSOLUTE: 2.59 E9/L (ref 1.5–4)
LYMPHOCYTES RELATIVE PERCENT: 28 % (ref 20–42)
MCH RBC QN AUTO: 28 PG (ref 26–35)
MCHC RBC AUTO-ENTMCNC: 31.2 % (ref 32–34.5)
MCV RBC AUTO: 89.5 FL (ref 80–99.9)
MONOCYTES ABSOLUTE: 0.71 E9/L (ref 0.1–0.95)
MONOCYTES RELATIVE PERCENT: 7.7 % (ref 2–12)
N. GONORRHOEAE DNA, URINE: NEGATIVE
NEUTROPHILS ABSOLUTE: 5.79 E9/L (ref 1.8–7.3)
NEUTROPHILS RELATIVE PERCENT: 62.6 % (ref 43–80)
PDW BLD-RTO: 15.2 FL (ref 11.5–15)
PLATELET # BLD: 248 E9/L (ref 130–450)
PMV BLD AUTO: 9.1 FL (ref 7–12)
POTASSIUM REFLEX MAGNESIUM: 3.6 MMOL/L (ref 3.5–5)
RBC # BLD: 5.15 E12/L (ref 3.8–5.8)
SODIUM BLD-SCNC: 139 MMOL/L (ref 132–146)
SOURCE: NORMAL
TOTAL PROTEIN: 5.8 G/DL (ref 6.4–8.3)
WBC # BLD: 9.3 E9/L (ref 4.5–11.5)

## 2023-02-03 PROCEDURE — 6370000000 HC RX 637 (ALT 250 FOR IP): Performed by: INTERNAL MEDICINE

## 2023-02-03 PROCEDURE — 80053 COMPREHEN METABOLIC PANEL: CPT

## 2023-02-03 PROCEDURE — 94660 CPAP INITIATION&MGMT: CPT

## 2023-02-03 PROCEDURE — 85025 COMPLETE CBC W/AUTO DIFF WBC: CPT

## 2023-02-03 PROCEDURE — 6370000000 HC RX 637 (ALT 250 FOR IP): Performed by: NURSE PRACTITIONER

## 2023-02-03 PROCEDURE — 6370000000 HC RX 637 (ALT 250 FOR IP): Performed by: PSYCHIATRY & NEUROLOGY

## 2023-02-03 PROCEDURE — 94640 AIRWAY INHALATION TREATMENT: CPT

## 2023-02-03 PROCEDURE — 1240000000 HC EMOTIONAL WELLNESS R&B

## 2023-02-03 PROCEDURE — 36415 COLL VENOUS BLD VENIPUNCTURE: CPT

## 2023-02-03 RX ADMIN — PREDNISONE 40 MG: 20 TABLET ORAL at 09:00

## 2023-02-03 RX ADMIN — IPRATROPIUM BROMIDE AND ALBUTEROL SULFATE 1 AMPULE: .5; 2.5 SOLUTION RESPIRATORY (INHALATION) at 20:48

## 2023-02-03 RX ADMIN — ACETAMINOPHEN 650 MG: 325 TABLET ORAL at 08:55

## 2023-02-03 RX ADMIN — GUAIFENESIN 400 MG: 400 TABLET ORAL at 09:01

## 2023-02-03 RX ADMIN — DIVALPROEX SODIUM 250 MG: 250 TABLET, DELAYED RELEASE ORAL at 08:54

## 2023-02-03 RX ADMIN — GABAPENTIN 300 MG: 300 CAPSULE ORAL at 21:22

## 2023-02-03 RX ADMIN — TAMSULOSIN HYDROCHLORIDE 0.4 MG: 0.4 CAPSULE ORAL at 08:54

## 2023-02-03 RX ADMIN — BUDESONIDE 500 MCG: 0.5 SUSPENSION RESPIRATORY (INHALATION) at 09:40

## 2023-02-03 RX ADMIN — HYDROXYZINE PAMOATE 50 MG: 50 CAPSULE ORAL at 08:59

## 2023-02-03 RX ADMIN — AMLODIPINE BESYLATE 10 MG: 10 TABLET ORAL at 08:54

## 2023-02-03 RX ADMIN — IPRATROPIUM BROMIDE AND ALBUTEROL SULFATE 1 AMPULE: .5; 2.5 SOLUTION RESPIRATORY (INHALATION) at 09:40

## 2023-02-03 RX ADMIN — Medication 100 MG: at 08:54

## 2023-02-03 RX ADMIN — GABAPENTIN 300 MG: 300 CAPSULE ORAL at 13:51

## 2023-02-03 RX ADMIN — OLANZAPINE 5 MG: 5 TABLET, FILM COATED ORAL at 21:22

## 2023-02-03 RX ADMIN — DIVALPROEX SODIUM 250 MG: 250 TABLET, DELAYED RELEASE ORAL at 21:21

## 2023-02-03 RX ADMIN — IPRATROPIUM BROMIDE AND ALBUTEROL SULFATE 1 AMPULE: .5; 2.5 SOLUTION RESPIRATORY (INHALATION) at 12:50

## 2023-02-03 RX ADMIN — GUAIFENESIN 400 MG: 400 TABLET ORAL at 21:22

## 2023-02-03 RX ADMIN — CHLORDIAZEPOXIDE HYDROCHLORIDE 25 MG: 25 CAPSULE ORAL at 22:09

## 2023-02-03 RX ADMIN — HYDROXYZINE PAMOATE 50 MG: 50 CAPSULE ORAL at 21:21

## 2023-02-03 RX ADMIN — MELATONIN 3 MG ORAL TABLET 3 MG: 3 TABLET ORAL at 21:22

## 2023-02-03 RX ADMIN — ATORVASTATIN CALCIUM 10 MG: 10 TABLET, FILM COATED ORAL at 09:01

## 2023-02-03 RX ADMIN — IPRATROPIUM BROMIDE AND ALBUTEROL SULFATE 1 AMPULE: .5; 2.5 SOLUTION RESPIRATORY (INHALATION) at 16:34

## 2023-02-03 RX ADMIN — OXYBUTYNIN CHLORIDE 10 MG: 10 TABLET, EXTENDED RELEASE ORAL at 21:22

## 2023-02-03 RX ADMIN — PANTOPRAZOLE SODIUM 40 MG: 40 TABLET, DELAYED RELEASE ORAL at 06:35

## 2023-02-03 RX ADMIN — ASPIRIN 81 MG CHEWABLE TABLET 81 MG: 81 TABLET CHEWABLE at 08:54

## 2023-02-03 RX ADMIN — TAMSULOSIN HYDROCHLORIDE 0.4 MG: 0.4 CAPSULE ORAL at 21:21

## 2023-02-03 RX ADMIN — BUDESONIDE 500 MCG: 0.5 SUSPENSION RESPIRATORY (INHALATION) at 20:48

## 2023-02-03 RX ADMIN — GABAPENTIN 300 MG: 300 CAPSULE ORAL at 08:54

## 2023-02-03 RX ADMIN — GUAIFENESIN 400 MG: 400 TABLET ORAL at 13:51

## 2023-02-03 RX ADMIN — OXYBUTYNIN CHLORIDE 10 MG: 10 TABLET, EXTENDED RELEASE ORAL at 09:01

## 2023-02-03 ASSESSMENT — PAIN SCALES - GENERAL: PAINLEVEL_OUTOF10: 9

## 2023-02-03 ASSESSMENT — PAIN DESCRIPTION - LOCATION: LOCATION: THROAT

## 2023-02-03 ASSESSMENT — PAIN DESCRIPTION - DESCRIPTORS: DESCRIPTORS: SORE;TENDER

## 2023-02-03 NOTE — BH NOTE
Patient is consistently needing something throughout the evening. Patient seemingly makes up issues to report. Patent upset about being here saying its worse for him to be here than at home, comparing it to [facility] longterm. Denies si/hi/avh.

## 2023-02-03 NOTE — PROGRESS NOTES
BEHAVIORAL HEALTH FOLLOW-UP NOTE     2/3/2023     Patient was seen and examined in person, Chart reviewed   Patient's case discussed with staff/team    Chief Complaint: \"I am low, and I am tired of being given the runaround about my disability papers\"     Interim History:     Patient focused on disability paperwork. He has been counseled that these will need to be completed by his outpatient provider, acute care inpatient does not complete disability papers. He challenges this, however it was made quite clear that we will not be doing this. He states then \"I have depression\" to which he was counseled is not a disability, he then states \"I have manic depression\" again counseled not a disability. He is demonstrating clear secondary gain. No SI/HI intent or plan.        Appetite:   [] Normal/Unchanged  [] Increased  [] Decreased      Sleep:       [] Normal/Unchanged  [] Fair       [] Poor              Energy:    [] Normal/Unchanged  [] Increased  [] Decreased        SI [] Present  [] Absent    HI  []Present  [] Absent     Aggression:  [] yes  [] no    Patient is [] able  [] unable to CONTRACT FOR SAFETY     PAST MEDICAL/PSYCHIATRIC HISTORY:   Past Medical History:   Diagnosis Date    Acid reflux     Asthma     Asthma     COPD (chronic obstructive pulmonary disease) ([facility] [facility]     Hypertension     Pancreatitis     Prediabetes     Psychiatric problem     depressed    Sleep apnea     Substance abuse ([facility] [address]     alcohol and cocaine       FAMILY/SOCIAL HISTORY:  Family History   Problem Relation Age of Onset    Other Sister     High Blood Pressure Maternal Grandmother     Other Maternal Grandmother      Social History     Socioeconomic History    Marital status: Single     Spouse name: Not on file    Number of children: 3    Years of education: 14    Highest education level: Not on file   Occupational History    Occupation:    Tobacco Use    Smoking status: Every Day     Packs/day: 1.00     Years: 28.00     Pack years: 28.00     Types: Cigarettes, Cigars    Smokeless tobacco: Never    Tobacco comments:     quit cigarettes 3 weeks ago. 5 cigars a week now. Vaping Use    Vaping Use: Never used   Substance and Sexual Activity    Alcohol use: Yes     Comment: last drank 2/12 0600    Drug use: Yes     Types: Cocaine     Comment: last used \"few\" months ago    Sexual activity: Not on file   Other Topics Concern    Not on file   Social History Narrative    Not on file     Social Determinants of Health     Financial Resource Strain: Not on file   Food Insecurity: Not on file   Transportation Needs: Not on file   Physical Activity: Not on file   Stress: Not on file   Social Connections: Not on file   Intimate Partner Violence: Not on file   Housing Stability: Not on file           ROS:  [x] All negative/unchanged except if checked.  Explain positive(checked items) below:  [] Constitutional  [] Eyes  [] Ear/Nose/Mouth/Throat  [] Respiratory  [] CV  [] GI  []   [] Musculoskeletal  [] Skin/Breast  [] Neurological  [] Endocrine  [] Heme/Lymph  [] Allergic/Immunologic    Explanation:     MEDICATIONS:    Current Facility-Administered Medications:     chlordiazePOXIDE (LIBRIUM) capsule 25 mg, 25 mg, Oral, Q8H PRN, [place] Kyree, APRN - CNP, 25 mg at 02/02/23 2257    fluticasone (FLONASE) 50 MCG/ACT nasal spray 1 spray, 1 spray, Each Nostril, Daily, [place] Kyree, APRN - CNP, 1 spray at 02/02/23 1230    pantoprazole (PROTONIX) tablet 40 mg, 40 mg, Oral, QAM AC, [place] Kyree, APRN - CNP, 40 mg at 02/03/23 7863    divalproex (DEPAKOTE) DR tablet 250 mg, 250 mg, Oral, 2 times per day, Precious Kyree, APRN - CNP, 250 mg at 02/03/23 0854    OLANZapine (ZYPREXA) tablet 5 mg, 5 mg, Oral, Nightly, Precious Kyree, APRN - CNP, 5 mg at 02/02/23 2210    hydrOXYzine pamoate (VISTARIL) capsule 50 mg, 50 mg, Oral, TID PRN, Stan Gutiérrez MD, 50 mg at 02/03/23 0859    amLODIPine (NORVASC) tablet 10 mg, 10 mg, Oral, Daily, Midge MD Nola, 10 mg at 02/03/23 0854    aspirin chewable tablet 81 mg, 81 mg, Oral, Daily, Cassidy Reynolds MD, 81 mg at 02/03/23 0854    atorvastatin (LIPITOR) tablet 10 mg, 10 mg, Oral, Daily, Cassidy Reynolds MD, 10 mg at 02/03/23 0901    gabapentin (NEURONTIN) capsule 300 mg, 300 mg, Oral, TID, Cassidy Reynolds MD, 300 mg at 02/03/23 1351    tamsulosin (FLOMAX) capsule 0.4 mg, 0.4 mg, Oral, BID, Cassidy Reynolds MD, 0.4 mg at 02/03/23 0854    ondansetron (ZOFRAN-ODT) disintegrating tablet 4 mg, 4 mg, Oral, Q8H PRN **OR** ondansetron (ZOFRAN) injection 4 mg, 4 mg, IntraVENous, Q6H PRN, Cassidy Reynolds MD    polyethylene glycol (GLYCOLAX) packet 17 g, 17 g, Oral, Daily PRN, Cassidy Reynolds MD    enoxaparin Sodium (LOVENOX) injection 30 mg, 30 mg, SubCUTAneous, BID, Cassidy Reynolds MD, 30 mg at 02/02/23 2211    acetaminophen (TYLENOL) tablet 650 mg, 650 mg, Oral, Q6H PRN, 650 mg at 02/03/23 0855 **OR** acetaminophen (TYLENOL) suppository 650 mg, 650 mg, Rectal, Q6H PRN, Cassidy Reynolds MD    budesonide (PULMICORT) nebulizer suspension 500 mcg, 500 mcg, Nebulization, BID, Cassidy Reynolds MD, 500 mcg at 02/03/23 0940    ipratropium-albuterol (DUONEB) nebulizer solution 1 ampule, 1 ampule, Inhalation, Q4H WA, Cassidy Reynolds MD, 1 ampule at 02/03/23 1250    predniSONE (DELTASONE) tablet 40 mg, 40 mg, Oral, Daily, Cassidy Reynolds MD, 40 mg at 02/03/23 0900    oxybutynin (DITROPAN-XL) extended release tablet 10 mg, 10 mg, Oral, BID, Cassidy Reynolds MD, 10 mg at 02/03/23 0901    thiamine tablet 100 mg, 100 mg, Oral, Daily, Cassidy Reynolds MD, 100 mg at 02/03/23 0854    nicotine (NICODERM CQ) 21 MG/24HR 1 patch, 1 patch, TransDERmal, Daily, Cassidy Reynolds MD    guaiFENesin tablet 400 mg, 400 mg, Oral, TID, Cassidy Reynolds MD, 400 mg at 02/03/23 1351    docusate sodium (COLACE) capsule 100 mg, 100 mg, Oral, Daily, Cassidy Reynolds MD, 100 mg at 02/02/23 0831    magnesium hydroxide (MILK OF MAGNESIA) 400 MG/5ML suspension 30 mL, 30 mL, Oral, Daily PRN, Susi Holm MD aluminum & magnesium hydroxide-simethicone (MAALOX) 200-200-20 MG/5ML suspension 30 mL, 30 mL, Oral, PRN, Sirena Ortiz MD    haloperidol (HALDOL) tablet 3 mg, 3 mg, Oral, Q6H PRN **OR** haloperidol lactate (HALDOL) injection 3 mg, 3 mg, IntraMUSCular, Q6H PRN, Sirena Ortiz MD    melatonin tablet 3 mg, 3 mg, Oral, Nightly, Sirena Ortiz MD, 3 mg at 02/02/23 2210      Examination:  /66   Pulse 76   Temp 96.8 °F (36 °C) (Tympanic)   Resp 18   Ht 6' (1.829 m)   Wt 260 lb (117.9 kg)   SpO2 96%   BMI 35.26 kg/m²   Gait - steady  Medication side effects(SE): none reported     Mental Status Examination:    Level of consciousness:  within normal limits   Appearance:  fair grooming and fair hygiene  Behavior/Motor:  no abnormalities noted  Attitude toward examiner:  cooperative  Speech:  spontaneous, normal rate and normal volume  Mood: \" My mood is low. \"  Affect: Preoccupied and anxious congruent with stated mood  Thought processes: Linear without  flight of ideas or loose associations  Thought content: Devoid of any auditory visual hallucinations delusions or other perceptual normalities.   Denies SI/HI intent or plan   Language: able to name objects and repeate phrases  Remote Memory: intact  Recent   Memory: intact  Cognition:  oriented to person, place, and time   Fund of Knowledge: Vocabulary intact, pt is aware of current events and past history  Attention and Concentration intact  Insight fair   Judgement fair     ASSESSMENT:   Patient symptoms are:  [] Well controlled  [x] Improving  [] Worsening  [] No change      Diagnosis:   Principal Problem:    Mixed bipolar II disorder ([facility]  Active Problems:    Alcohol abuse  Resolved Problems:    Major depressive disorder, recurrent episode with mixed features ([facility]      LABS:    Recent Labs     02/01/23  0624 02/02/23  0739 02/03/23  0801   WBC 12.2* 10.0 9.3   HGB 14.4 14.8 14.4    296 248     Recent Labs     02/01/23  0624 02/02/23  0739 02/03/23  0801    142 139   K 4.0 3.4* 3.6   CL 99 101 99   CO2 32* 31* 33*   BUN 11 13 18   CREATININE 0.6* 0.7 0.7   GLUCOSE 222* 146* 173*     Recent Labs     02/01/23  0624 02/02/23  0739 02/03/23  0801   BILITOT 0.7 0.7 0.8   ALKPHOS 73 71 85   AST 20 31 17   ALT 41* 64* 62*     Lab Results   Component Value Date/Time    LABAMPH NOT DETECTED 01/29/2023 11:27 PM    BARBSCNU NOT DETECTED 01/29/2023 11:27 PM    LABBENZ NOT DETECTED 01/29/2023 11:27 PM    LABBENZ SEE BELOW 09/13/2014 04:20 PM    CANNAB NOT DETECTED 05/19/2013 09:54 PM    COCAINESCRN NOT DETECTED 05/19/2013 09:54 PM    LABMETH NOT DETECTED 01/29/2023 11:27 PM    OPIATESCREENURINE NOT DETECTED 01/29/2023 11:27 PM    PHENCYCLIDINESCREENURINE NOT DETECTED 01/29/2023 11:27 PM    PPXUR NOT DETECTED 05/19/2013 09:54 PM    ETOH 386 01/29/2023 11:27 PM     Lab Results   Component Value Date/Time    TSH 1.740 10/14/2020 08:59 AM     No results found for: LITHIUM  No results found for: VALPROATE, CBMZ        Treatment Plan:  The patient's diagnosis, treatment plan, medication management were formulated after patient was seen directly by the attending physician and myself and all relevant documentation was reviewed. Risk, benefit, side effects, possible outcomes of the medication and alternatives discussed with the patient and the patient demonstrated understanding. The patient was also educated that the outcome of treatment will depend on the medication compliance as directed by the prescribers along with regular follow-up, compliance with the labs and other work-up, as clinically indicated. Risk Management: Based on the diagnosis and assessment biopsychosocial treatment model was presented to the patient and was given the opportunity to ask any question. The patient was agreeable to the plan and all the patient's questions were answered to the patient's satisfaction.   I discussed with the patient the risk, benefit, alternative and common side effects for the proposed medication treatment. The patient is consenting to this treatment. The patient was referred to outpatient/inpatient substance abuse rehabilitation programming. He was educated multiple times during the hospitalization that if he chooses to continue to use drugs or alcohol, he may potentially act out impulsively, resulting in serious harm to self or others, even though unintentional.  He was also educated that mental health treatment cannot be optimized with ongoing use of drugs. He demonstrated understanding has the capacity to understand that. New Medications started during this admission :    Depakote 250 mg twice daily for mood stabilization  Zyprexa 5 mg nightly for augmentation of treatment     Collateral information: Followed by social work  CD evaluation  Encourage patient to attend group and other milieu activities. Discharge planning discussed with the patient and treatment team.    PSYCHOTHERAPY/COUNSELING:  [x] Therapeutic interview  [x] Supportive  [] CBT  [] Ongoing  [] Other    [x] Patient continues to need, on a daily basis, active treatment furnished directly by or requiring the supervision of inpatient psychiatric personnel      Anticipated Length of stay: 3 - 5 days based on stability     NOTE: This report was transcribed using voice recognition software. Every effort was made to ensure accuracy; however, inadvertent computerized transcription errors may be present.        Electronically signed by MIKE Page CNP on 2/3/2023 at 2:37 PM

## 2023-02-03 NOTE — PROGRESS NOTES
Declined to attend morning community meeting. Did not attend pscyho-education group either. Will continue to encourage patient to attend group.

## 2023-02-03 NOTE — BH NOTE
Patient in room majority of the day. Patient out in the day area rarely and keeps to self when he is out. Patient has little to say upon approach. Patient denies si/hi/avh. No complaints of discomfort.

## 2023-02-04 PROBLEM — F60.9 PERSONALITY DISORDER (HCC): Status: ACTIVE | Noted: 2023-02-04

## 2023-02-04 PROBLEM — Z76.5 MALINGERING: Status: ACTIVE | Noted: 2023-02-04

## 2023-02-04 LAB
ALBUMIN SERPL-MCNC: 3.6 G/DL (ref 3.5–5.2)
ALP BLD-CCNC: 110 U/L (ref 40–129)
ALT SERPL-CCNC: 52 U/L (ref 0–40)
ANION GAP SERPL CALCULATED.3IONS-SCNC: 12 MMOL/L (ref 7–16)
AST SERPL-CCNC: 13 U/L (ref 0–39)
BASOPHILS ABSOLUTE: 0.03 E9/L (ref 0–0.2)
BASOPHILS RELATIVE PERCENT: 0.3 % (ref 0–2)
BILIRUB SERPL-MCNC: 0.5 MG/DL (ref 0–1.2)
BLOOD CULTURE, ROUTINE: NORMAL
BUN BLDV-MCNC: 13 MG/DL (ref 6–20)
CALCIUM SERPL-MCNC: 8.8 MG/DL (ref 8.6–10.2)
CHLORIDE BLD-SCNC: 97 MMOL/L (ref 98–107)
CO2: 30 MMOL/L (ref 22–29)
CREAT SERPL-MCNC: 0.7 MG/DL (ref 0.7–1.2)
CULTURE, BLOOD 2: ABNORMAL
EOSINOPHILS ABSOLUTE: 0.02 E9/L (ref 0.05–0.5)
EOSINOPHILS RELATIVE PERCENT: 0.2 % (ref 0–6)
GFR SERPL CREATININE-BSD FRML MDRD: >60 ML/MIN/1.73
GLUCOSE BLD-MCNC: 250 MG/DL (ref 74–99)
HCT VFR BLD CALC: 41.7 % (ref 37–54)
HEMOGLOBIN: 13.5 G/DL (ref 12.5–16.5)
IMMATURE GRANULOCYTES #: 0.1 E9/L
IMMATURE GRANULOCYTES %: 1.1 % (ref 0–5)
LYMPHOCYTES ABSOLUTE: 1.71 E9/L (ref 1.5–4)
LYMPHOCYTES RELATIVE PERCENT: 18 % (ref 20–42)
MCH RBC QN AUTO: 27.9 PG (ref 26–35)
MCHC RBC AUTO-ENTMCNC: 32.4 % (ref 32–34.5)
MCV RBC AUTO: 86.2 FL (ref 80–99.9)
MONOCYTES ABSOLUTE: 0.77 E9/L (ref 0.1–0.95)
MONOCYTES RELATIVE PERCENT: 8.1 % (ref 2–12)
NEUTROPHILS ABSOLUTE: 6.87 E9/L (ref 1.8–7.3)
NEUTROPHILS RELATIVE PERCENT: 72.3 % (ref 43–80)
ORGANISM: ABNORMAL
PDW BLD-RTO: 15.1 FL (ref 11.5–15)
PLATELET # BLD: 255 E9/L (ref 130–450)
PMV BLD AUTO: 9.5 FL (ref 7–12)
POTASSIUM REFLEX MAGNESIUM: 4 MMOL/L (ref 3.5–5)
RBC # BLD: 4.84 E12/L (ref 3.8–5.8)
SODIUM BLD-SCNC: 139 MMOL/L (ref 132–146)
TOTAL PROTEIN: 5.9 G/DL (ref 6.4–8.3)
WBC # BLD: 9.5 E9/L (ref 4.5–11.5)

## 2023-02-04 PROCEDURE — 6370000000 HC RX 637 (ALT 250 FOR IP): Performed by: PSYCHIATRY & NEUROLOGY

## 2023-02-04 PROCEDURE — 94660 CPAP INITIATION&MGMT: CPT

## 2023-02-04 PROCEDURE — 1240000000 HC EMOTIONAL WELLNESS R&B

## 2023-02-04 PROCEDURE — 94640 AIRWAY INHALATION TREATMENT: CPT

## 2023-02-04 PROCEDURE — 6370000000 HC RX 637 (ALT 250 FOR IP): Performed by: INTERNAL MEDICINE

## 2023-02-04 PROCEDURE — 36415 COLL VENOUS BLD VENIPUNCTURE: CPT

## 2023-02-04 PROCEDURE — 80053 COMPREHEN METABOLIC PANEL: CPT

## 2023-02-04 PROCEDURE — 85025 COMPLETE CBC W/AUTO DIFF WBC: CPT

## 2023-02-04 PROCEDURE — 6370000000 HC RX 637 (ALT 250 FOR IP): Performed by: NURSE PRACTITIONER

## 2023-02-04 RX ORDER — OLANZAPINE 5 MG/1
5 TABLET ORAL NIGHTLY
Qty: 30 TABLET | Refills: 3 | Status: SHIPPED | OUTPATIENT
Start: 2023-02-04

## 2023-02-04 RX ORDER — DIVALPROEX SODIUM 250 MG/1
250 TABLET, DELAYED RELEASE ORAL EVERY 12 HOURS SCHEDULED
Qty: 60 TABLET | Refills: 0 | Status: SHIPPED | OUTPATIENT
Start: 2023-02-04 | End: 2023-03-06

## 2023-02-04 RX ORDER — LANOLIN ALCOHOL/MO/W.PET/CERES
100 CREAM (GRAM) TOPICAL DAILY
Qty: 30 TABLET | Refills: 3 | COMMUNITY
Start: 2023-02-05

## 2023-02-04 RX ORDER — LANOLIN ALCOHOL/MO/W.PET/CERES
3 CREAM (GRAM) TOPICAL NIGHTLY
Refills: 3 | COMMUNITY
Start: 2023-02-04

## 2023-02-04 RX ORDER — NICOTINE 21 MG/24HR
1 PATCH, TRANSDERMAL 24 HOURS TRANSDERMAL DAILY
Qty: 30 PATCH | Refills: 3
Start: 2023-02-05

## 2023-02-04 RX ADMIN — GUAIFENESIN 400 MG: 400 TABLET ORAL at 08:40

## 2023-02-04 RX ADMIN — OXYBUTYNIN CHLORIDE 10 MG: 10 TABLET, EXTENDED RELEASE ORAL at 08:40

## 2023-02-04 RX ADMIN — BUDESONIDE 500 MCG: 0.5 SUSPENSION RESPIRATORY (INHALATION) at 21:10

## 2023-02-04 RX ADMIN — MENTHOL 1 LOZENGE: 7.5 LOZENGE ORAL at 23:20

## 2023-02-04 RX ADMIN — OLANZAPINE 5 MG: 5 TABLET, FILM COATED ORAL at 21:06

## 2023-02-04 RX ADMIN — GUAIFENESIN 400 MG: 400 TABLET ORAL at 14:07

## 2023-02-04 RX ADMIN — IPRATROPIUM BROMIDE AND ALBUTEROL SULFATE 1 AMPULE: .5; 2.5 SOLUTION RESPIRATORY (INHALATION) at 21:10

## 2023-02-04 RX ADMIN — CHLORDIAZEPOXIDE HYDROCHLORIDE 25 MG: 25 CAPSULE ORAL at 23:20

## 2023-02-04 RX ADMIN — Medication 100 MG: at 08:40

## 2023-02-04 RX ADMIN — DIVALPROEX SODIUM 250 MG: 250 TABLET, DELAYED RELEASE ORAL at 21:06

## 2023-02-04 RX ADMIN — DOCUSATE SODIUM 100 MG: 100 CAPSULE, LIQUID FILLED ORAL at 10:08

## 2023-02-04 RX ADMIN — DIVALPROEX SODIUM 250 MG: 250 TABLET, DELAYED RELEASE ORAL at 08:40

## 2023-02-04 RX ADMIN — OXYBUTYNIN CHLORIDE 10 MG: 10 TABLET, EXTENDED RELEASE ORAL at 21:06

## 2023-02-04 RX ADMIN — GABAPENTIN 300 MG: 300 CAPSULE ORAL at 21:06

## 2023-02-04 RX ADMIN — FLUTICASONE PROPIONATE 1 SPRAY: 50 SPRAY, METERED NASAL at 10:09

## 2023-02-04 RX ADMIN — IPRATROPIUM BROMIDE AND ALBUTEROL SULFATE 1 AMPULE: .5; 2.5 SOLUTION RESPIRATORY (INHALATION) at 16:00

## 2023-02-04 RX ADMIN — CHLORDIAZEPOXIDE HYDROCHLORIDE 25 MG: 25 CAPSULE ORAL at 08:48

## 2023-02-04 RX ADMIN — PREDNISONE 40 MG: 20 TABLET ORAL at 08:40

## 2023-02-04 RX ADMIN — GABAPENTIN 300 MG: 300 CAPSULE ORAL at 14:07

## 2023-02-04 RX ADMIN — AMLODIPINE BESYLATE 10 MG: 10 TABLET ORAL at 08:48

## 2023-02-04 RX ADMIN — BENZOCAINE AND MENTHOL 1 LOZENGE: 15; 3.6 LOZENGE ORAL at 16:10

## 2023-02-04 RX ADMIN — ASPIRIN 81 MG CHEWABLE TABLET 81 MG: 81 TABLET CHEWABLE at 08:40

## 2023-02-04 RX ADMIN — ATORVASTATIN CALCIUM 10 MG: 10 TABLET, FILM COATED ORAL at 08:40

## 2023-02-04 RX ADMIN — BUDESONIDE 500 MCG: 0.5 SUSPENSION RESPIRATORY (INHALATION) at 09:30

## 2023-02-04 RX ADMIN — GABAPENTIN 300 MG: 300 CAPSULE ORAL at 08:40

## 2023-02-04 RX ADMIN — TAMSULOSIN HYDROCHLORIDE 0.4 MG: 0.4 CAPSULE ORAL at 21:06

## 2023-02-04 RX ADMIN — PANTOPRAZOLE SODIUM 40 MG: 40 TABLET, DELAYED RELEASE ORAL at 06:34

## 2023-02-04 RX ADMIN — MENTHOL 1 LOZENGE: 7.5 LOZENGE ORAL at 14:25

## 2023-02-04 RX ADMIN — TAMSULOSIN HYDROCHLORIDE 0.4 MG: 0.4 CAPSULE ORAL at 08:40

## 2023-02-04 RX ADMIN — IPRATROPIUM BROMIDE AND ALBUTEROL SULFATE 1 AMPULE: .5; 2.5 SOLUTION RESPIRATORY (INHALATION) at 12:13

## 2023-02-04 RX ADMIN — GUAIFENESIN 400 MG: 400 TABLET ORAL at 21:06

## 2023-02-04 RX ADMIN — MELATONIN 3 MG ORAL TABLET 3 MG: 3 TABLET ORAL at 21:07

## 2023-02-04 RX ADMIN — HYDROXYZINE PAMOATE 50 MG: 50 CAPSULE ORAL at 21:06

## 2023-02-04 RX ADMIN — IPRATROPIUM BROMIDE AND ALBUTEROL SULFATE 1 AMPULE: .5; 2.5 SOLUTION RESPIRATORY (INHALATION) at 09:30

## 2023-02-04 RX ADMIN — HYDROXYZINE PAMOATE 50 MG: 50 CAPSULE ORAL at 08:41

## 2023-02-04 ASSESSMENT — PAIN SCALES - GENERAL: PAINLEVEL_OUTOF10: 0

## 2023-02-04 NOTE — PLAN OF CARE
Problem: Self Harm/Suicidality  Goal: Will have no self-injury during hospital stay  Description: INTERVENTIONS:  1. Ensure constant observer at bedside with Q15M safety checks  2. Maintain a safe environment  3. Secure patient belongings  4. Ensure family/visitors adhere to safety recommendations  5. Ensure safety tray has been added to patient's diet order  6. Every shift and PRN: Re-assess suicidal risk via Frequent Screener    Outcome: Not Progressing     Problem: Depression  Goal: Will be euthymic at discharge  Description: INTERVENTIONS:  1. Administer medication as ordered  2. Provide emotional support via 1:1 interaction with staff  3. Encourage involvement in milieu/groups/activities  4. Monitor for social isolation  Outcome: Not Progressing     Patient initially  denying suicidal and homicidal thoughts. While I was asking him about what time he was ready for discharge. He verbalized that he was not ready and further explained he didn't feel safe. Now he is saying he sometimes has suicidal thoughts. Continue complain of racing thoughts, and confusion . During this conversation patient did not look me in the eye. Staff splitting and says \" how can you just discharge someone the same day with a few days notice\". Verbalizes maybe tomorrow he would feel better mind set in a day or two. Denies hallucinations. Will continue to observe and support. Attending group and medication compliant.

## 2023-02-04 NOTE — PLAN OF CARE
Pt out in common area watching TV. Pt observed to use phone with an elevated voice, though Pt was taking a breathing treatment at the time. Pt states he did not mean to raise his voice, and states he was talking to his wife over the behavior of his daughter. Pt states that he does feel bad that he is in here dealing with his own problems while his family is having problems and he isn't there. Pt denies SI, HI and AVH. Pt cooperative and friendly, denies symptoms of withdrawal except for some difficulty in concentrating. Problem: Depression  Goal: Will be euthymic at discharge  Description: INTERVENTIONS:  1. Administer medication as ordered  2. Provide emotional support via 1:1 interaction with staff  3. Encourage involvement in milieu/groups/activities  4. Monitor for social isolation  Outcome: Progressing     Problem: Anxiety  Goal: Will report anxiety at manageable levels  Description: INTERVENTIONS:  1. Administer medication as ordered  2. Teach and rehearse alternative coping skills  3. Provide emotional support with 1:1 interaction with staff  Outcome: Progressing     Problem: Drug Abuse/Detox  Goal: Will have no detox symptoms and will verbalize plan for changing drug-related behavior  Description: INTERVENTIONS:  1. Administer medication as ordered  2. Monitor physical status  3. Provide emotional support with 1:1 interaction with staff  4.  Encourage  recovery focused treatment   Outcome: Progressing

## 2023-02-04 NOTE — BH NOTE
Pt states he doesn't want to use his Bi-Pap tonight as he believes it may be causing a slight sore throat. Pt states he will let staff know if he wants to use it later in the night.

## 2023-02-04 NOTE — PROGRESS NOTES
Patient clearly malingering demonstrating secondary gain. Requesting documentation for medical excuses prior to admission here. .   Only wishing to discuss his disability papers. Attempting to manipulate for work excuses and disability documentation, has been advised multiple times these cannot be done here.    Patient now reporting to staff that he does not feel ready for discharge and that he may be suicidal. There has been no report of any suicidal ideations since coming to [facility], now when discussing discharge he is suddenly deciding that he is confused and might be suicidal.

## 2023-02-04 NOTE — PROGRESS NOTES
BEHAVIORAL HEALTH FOLLOW-UP NOTE     2/4/2023     Patient was seen and examined in person, Chart reviewed   Patient's case discussed with staff/team    Chief Complaint: \"I  need my disability papers\"     Interim History:     Patient remains focused on disability paperwork. He has been counseled that these will need to be completed by his outpatient provider, acute care inpatient does not complete disability papers. He is demonstrating clear secondary gain. No SI/HI intent or plan reported since prior to admission to [facility]. He has clear malingering behaviors with secondary gain only wishes to speak about his disability papers. He is not demonstrating any clinical indication for psychiatric admission.        Appetite:   [] Normal/Unchanged  [] Increased  [] Decreased      Sleep:       [] Normal/Unchanged  [] Fair       [] Poor              Energy:    [] Normal/Unchanged  [] Increased  [] Decreased        SI [] Present  [] Absent    HI  []Present  [] Absent     Aggression:  [] yes  [] no    Patient is [] able  [] unable to CONTRACT FOR SAFETY     PAST MEDICAL/PSYCHIATRIC HISTORY:   Past Medical History:   Diagnosis Date    Acid reflux     Asthma     Asthma     COPD (chronic obstructive pulmonary disease) ([facility]     Hypertension     Pancreatitis     Prediabetes     Psychiatric problem     depressed    Sleep apnea     Substance abuse ([facility]     alcohol and cocaine       FAMILY/SOCIAL HISTORY:  Family History   Problem Relation Age of Onset    Other Sister     High Blood Pressure Maternal Grandmother     Other Maternal Grandmother      Social History     Socioeconomic History    Marital status: Single     Spouse name: Not on file    Number of children: 3    Years of education: 14    Highest education level: Not on file   Occupational History    Occupation:    Tobacco Use    Smoking status: Every Day     Packs/day: 1.00     Years: 28.00     Pack years: 28.00     Types: Cigarettes, Cigars    Smokeless tobacco: Never Tobacco comments:     quit cigarettes 3 weeks ago. 5 cigars a week now. Vaping Use    Vaping Use: Never used   Substance and Sexual Activity    Alcohol use: Yes     Comment: last drank 2/12 0600    Drug use: Yes     Types: Cocaine     Comment: last used \"few\" months ago    Sexual activity: Not on file   Other Topics Concern    Not on file   Social History Narrative    Not on file     Social Determinants of Health     Financial Resource Strain: Not on file   Food Insecurity: Not on file   Transportation Needs: Not on file   Physical Activity: Not on file   Stress: Not on file   Social Connections: Not on file   Intimate Partner Violence: Not on file   Housing Stability: Not on file           ROS:  [x] All negative/unchanged except if checked.  Explain positive(checked items) below:  [] Constitutional  [] Eyes  [] Ear/Nose/Mouth/Throat  [] Respiratory  [] CV  [] GI  []   [] Musculoskeletal  [] Skin/Breast  [] Neurological  [] Endocrine  [] Heme/Lymph  [] Allergic/Immunologic    Explanation:     MEDICATIONS:    Current Facility-Administered Medications:     benzocaine-menthol (CEPACOL SORE THROAT) lozenge 1 lozenge, 1 lozenge, Oral, Q2H PRN, Timmy Randi, APRN - CNP    chlordiazePOXIDE (LIBRIUM) capsule 25 mg, 25 mg, Oral, Q8H PRN, Timmy Randi, APRN - CNP, 25 mg at 02/04/23 0848    fluticasone (FLONASE) 50 MCG/ACT nasal spray 1 spray, 1 spray, Each Nostril, Daily, Timmy Randi, APRN - CNP, 1 spray at 02/04/23 1009    pantoprazole (PROTONIX) tablet 40 mg, 40 mg, Oral, QAM AC, Timmy Randi, APRN - CNP, 40 mg at 02/04/23 6823    divalproex (DEPAKOTE) DR tablet 250 mg, 250 mg, Oral, 2 times per day, Timmy Randi, APRN - CNP, 250 mg at 02/04/23 0840    OLANZapine (ZYPREXA) tablet 5 mg, 5 mg, Oral, Nightly, Timmy Randi, APRN - CNP, 5 mg at 02/03/23 2122    hydrOXYzine pamoate (VISTARIL) capsule 50 mg, 50 mg, Oral, TID PRN, Sandhya Holden MD, 50 mg at 02/04/23 0841    amLODIPine (NORVASC) tablet 10 mg, 10 mg, Oral, Daily, Amy Monet MD, 10 mg at 02/04/23 0848    aspirin chewable tablet 81 mg, 81 mg, Oral, Daily, Amy Monet MD, 81 mg at 02/04/23 0840    atorvastatin (LIPITOR) tablet 10 mg, 10 mg, Oral, Daily, Amy Monet MD, 10 mg at 02/04/23 0840    gabapentin (NEURONTIN) capsule 300 mg, 300 mg, Oral, TID, Amy Monet MD, 300 mg at 02/04/23 0840    tamsulosin (FLOMAX) capsule 0.4 mg, 0.4 mg, Oral, BID, Amy Monet MD, 0.4 mg at 02/04/23 0840    ondansetron (ZOFRAN-ODT) disintegrating tablet 4 mg, 4 mg, Oral, Q8H PRN **OR** ondansetron (ZOFRAN) injection 4 mg, 4 mg, IntraVENous, Q6H PRN, Amy Monet MD    polyethylene glycol (GLYCOLAX) packet 17 g, 17 g, Oral, Daily PRN, Amy Monet MD    enoxaparin Sodium (LOVENOX) injection 30 mg, 30 mg, SubCUTAneous, BID, Amy Monet MD, 30 mg at 02/02/23 2211    acetaminophen (TYLENOL) tablet 650 mg, 650 mg, Oral, Q6H PRN, 650 mg at 02/03/23 0855 **OR** acetaminophen (TYLENOL) suppository 650 mg, 650 mg, Rectal, Q6H PRN, Amy Monet MD    budesonide (PULMICORT) nebulizer suspension 500 mcg, 500 mcg, Nebulization, BID, Amy Monet MD, 500 mcg at 02/04/23 0930    ipratropium-albuterol (DUONEB) nebulizer solution 1 ampule, 1 ampule, Inhalation, Q4H WA, Amy Monet MD, 1 ampule at 02/04/23 1213    predniSONE (DELTASONE) tablet 40 mg, 40 mg, Oral, Daily, Amy Monet MD, 40 mg at 02/04/23 0840    oxybutynin (DITROPAN-XL) extended release tablet 10 mg, 10 mg, Oral, BID, Amy Monet MD, 10 mg at 02/04/23 0840    thiamine tablet 100 mg, 100 mg, Oral, Daily, Amy Monet MD, 100 mg at 02/04/23 0840    nicotine (NICODERM CQ) 21 MG/24HR 1 patch, 1 patch, TransDERmal, Daily, Amy Monet MD, 1 patch at 02/04/23 0840    guaiFENesin tablet 400 mg, 400 mg, Oral, TID, Amy Monet MD, 400 mg at 02/04/23 0840    docusate sodium (COLACE) capsule 100 mg, 100 mg, Oral, Daily, Amy Monet MD, 100 mg at 02/04/23 1008    magnesium hydroxide (MILK OF MAGNESIA) 400 MG/5ML suspension 30 mL, 30 mL, Oral, Daily PRN, Surya Clark MD    aluminum & magnesium hydroxide-simethicone (MAALOX) 200-200-20 MG/5ML suspension 30 mL, 30 mL, Oral, PRN, Surya Clark MD    haloperidol (HALDOL) tablet 3 mg, 3 mg, Oral, Q6H PRN **OR** haloperidol lactate (HALDOL) injection 3 mg, 3 mg, IntraMUSCular, Q6H PRN, Surya Clark MD    melatonin tablet 3 mg, 3 mg, Oral, Nightly, Surya Clark MD, 3 mg at 02/03/23 2122      Examination:  /65   Pulse 75   Temp 98.4 °F (36.9 °C) (Temporal)   Resp 18   Ht 6' (1.829 m)   Wt 260 lb (117.9 kg)   SpO2 98%   BMI 35.26 kg/m²   Gait - steady  Medication side effects(SE): none reported     Mental Status Examination:    Level of consciousness:  within normal limits   Appearance:  fair grooming and fair hygiene  Behavior/Motor:  no abnormalities noted  Attitude toward examiner:  cooperative  Speech:  spontaneous, normal rate and normal volume  Mood: \" My mood is low. \"  Affect: Preoccupied and anxious congruent with stated mood  Thought processes: Linear without  flight of ideas or loose associations  Thought content: Devoid of any auditory visual hallucinations delusions or other perceptual normalities.   Denies SI/HI intent or plan   Language: able to name objects and repeate phrases  Remote Memory: intact  Recent   Memory: intact  Cognition:  oriented to person, place, and time   Fund of Knowledge: Vocabulary intact, pt is aware of current events and past history  Attention and Concentration intact  Insight fair   Judgement fair     ASSESSMENT:   Patient symptoms are:  [] Well controlled  [x] Improving  [] Worsening  [] No change      Diagnosis:   Principal Problem:    Mixed bipolar II disorder ([facility]  Active Problems:    Alcohol abuse  Resolved Problems:    Major depressive disorder, recurrent episode with mixed features ([facility]      LABS:    Recent Labs     02/02/23  0739 02/03/23  0801 02/04/23  0551   WBC 10.0 9.3 9.5   HGB 14.8 14.4 13.5    248 255     Recent Labs     02/02/23  0739 02/03/23  0801 02/04/23  0551    139 139   K 3.4* 3.6 4.0    99 97*   CO2 31* 33* 30*   BUN 13 18 13   CREATININE 0.7 0.7 0.7   GLUCOSE 146* 173* 250*     Recent Labs     02/02/23  0739 02/03/23  0801 02/04/23  0551   BILITOT 0.7 0.8 0.5   ALKPHOS 71 85 110   AST 31 17 13   ALT 64* 62* 52*     Lab Results   Component Value Date/Time    LABAMPH NOT DETECTED 01/29/2023 11:27 PM    BARBSCNU NOT DETECTED 01/29/2023 11:27 PM    LABBENZ NOT DETECTED 01/29/2023 11:27 PM    LABBENZ SEE BELOW 09/13/2014 04:20 PM    CANNAB NOT DETECTED 05/19/2013 09:54 PM    COCAINESCRN NOT DETECTED 05/19/2013 09:54 PM    LABMETH NOT DETECTED 01/29/2023 11:27 PM    OPIATESCREENURINE NOT DETECTED 01/29/2023 11:27 PM    PHENCYCLIDINESCREENURINE NOT DETECTED 01/29/2023 11:27 PM    PPXUR NOT DETECTED 05/19/2013 09:54 PM    ETOH 386 01/29/2023 11:27 PM     Lab Results   Component Value Date/Time    TSH 1.740 10/14/2020 08:59 AM     No results found for: LITHIUM  No results found for: VALPROATE, CBMZ        Treatment Plan:  The patient's diagnosis, treatment plan, medication management were formulated after patient was seen directly by the attending physician and myself and all relevant documentation was reviewed. Risk, benefit, side effects, possible outcomes of the medication and alternatives discussed with the patient and the patient demonstrated understanding. The patient was also educated that the outcome of treatment will depend on the medication compliance as directed by the prescribers along with regular follow-up, compliance with the labs and other work-up, as clinically indicated. Risk Management: Based on the diagnosis and assessment biopsychosocial treatment model was presented to the patient and was given the opportunity to ask any question. The patient was agreeable to the plan and all the patient's questions were answered to the patient's satisfaction.   I discussed with the patient the risk, benefit, alternative and common side effects for the proposed medication treatment. The patient is consenting to this treatment. The patient was referred to outpatient/inpatient substance abuse rehabilitation programming. He was educated multiple times during the hospitalization that if he chooses to continue to use drugs or alcohol, he may potentially act out impulsively, resulting in serious harm to self or others, even though unintentional.  He was also educated that mental health treatment cannot be optimized with ongoing use of drugs. He demonstrated understanding has the capacity to understand that. New Medications started during this admission :    Depakote 250 mg twice daily for mood stabilization  Zyprexa 5 mg nightly for augmentation of treatment     Collateral information: Followed by social work  CD evaluation  Encourage patient to attend group and other milieu activities. Discharge planning discussed with the patient and treatment team.    PSYCHOTHERAPY/COUNSELING:  [x] Therapeutic interview  [x] Supportive  [] CBT  [] Ongoing  [] Other    [x] Patient continues to need, on a daily basis, active treatment furnished directly by or requiring the supervision of inpatient psychiatric personnel      Anticipated Length of stay: 3 - 5 days based on stability     NOTE: This report was transcribed using voice recognition software. Every effort was made to ensure accuracy; however, inadvertent computerized transcription errors may be present.        Electronically signed by MIKE Cardenas CNP on 2/4/2023 at 12:50 PM

## 2023-02-04 NOTE — CARE COORDINATION
SW met with pt to discuss discharge plan. Pt reported that he is not sure if he is ready to be discharged from the hospital because he just got started on his medications and he does not think that they are right. Pt reported that he thinks it would be better for him to be discharged on Monday and then he will have time to call his PCP Dr. Amado Love and see if he offers [address] services at his facility. Pt is currently getting breathing treatment and SW will discuss discharge plan at a later time. UPDATE: SW met with pt again who reported that he is going to be discharing to the mother of his child in [facility]. Pt reported that she lives at [place] [id number]. Pt stated that he believes that the mother of his child will be able to pick him up but he does not feel that he is ready to be discharged today. Pt reported that he will need follow [facility] with his outpatient provider Shannon Reno in [facility]. Pt stated that their facility has 2 psychiatrist and they can offer counseling services if it is needed. Pt then requested for SW to provide him with a note stating that he has not been able to work since 1/15 and this is when he called off work. SW informed pt that he can only be given a letter stating when he came into the hospital and when he was discharged. Pt then requested for SW to put in the letter when his symptoms began on 1/15. SW informed pt that SW is not able to do this due to SW not knowing when symptoms started. Pt will need follow up appointment with his PCP upon request. Pt has [address] appointment with [facility] 2/10.     In order to ensure appropriate transition and discharge planning is in place, the following documents have been transmitted to [facility], as the new outpatient provider:    The d/c diagnosis was transmitted to the next care provider  The reason for hospitalization was transmitted to the next care provider  The d/c medications (dosage and indication) were transmitted to the next care provider   The continuing care plan was transmitted to the next care provider      [facility]  Address: [address] [place] [address], [place], . [address]  Phone: [phone]  Fax:

## 2023-02-04 NOTE — PLAN OF CARE
Attended / particip well in today's 90 min group on home improvement. Scored 100 on both quiz activities. Attentive to a/v materials used / presented. Supportive listening to peers. Excellent presentation to group. Appeared to fully digest key learning points.

## 2023-02-04 NOTE — GROUP NOTE
Group Therapy Note    Date: 2/4/2023    Group Start Time: 1100  Group End Time: 7130  Group Topic: Cognitive Skills    [facility]  Av[address] TODD Meier, [facility]        Group Therapy Note    Attendees: 10       Patient's Goal:  Pt will be able to define stress in their own way, determine different coping skills they can use to help manage stress and identify stressor they have been experiencing. Notes:  Pt participated in group and made connections with other group members and nursing students. After group nursing students provided pt with coloring activities and art project. Status After Intervention:  Improved    Participation Level:  Active Listener and Interactive    Participation Quality: Appropriate, Attentive, Sharing, and Supportive      Speech:  normal      Thought Process/Content: Logical  Linear      Affective Functioning: Congruent      Mood: anxious      Level of consciousness:  Alert, Oriented x4, and Attentive      Response to Learning: Able to verbalize current knowledge/experience, Able to verbalize/acknowledge new learning, Able to retain information, and Capable of insight      Endings: None Reported    Modes of Intervention: Education, Support, Socialization, Exploration, Clarifying, Problem-solving, and Activity      Discipline Responsible: /Counselor      Signature:  TODD Parker, [place]

## 2023-02-05 LAB
ALBUMIN SERPL-MCNC: 3.7 G/DL (ref 3.5–5.2)
ALP BLD-CCNC: 102 U/L (ref 40–129)
ALT SERPL-CCNC: 45 U/L (ref 0–40)
ANION GAP SERPL CALCULATED.3IONS-SCNC: 9 MMOL/L (ref 7–16)
AST SERPL-CCNC: 10 U/L (ref 0–39)
BASOPHILS ABSOLUTE: 0.02 E9/L (ref 0–0.2)
BASOPHILS RELATIVE PERCENT: 0.2 % (ref 0–2)
BILIRUB SERPL-MCNC: 0.7 MG/DL (ref 0–1.2)
BLOOD CULTURE, ROUTINE: NORMAL
BUN BLDV-MCNC: 14 MG/DL (ref 6–20)
CALCIUM SERPL-MCNC: 9.1 MG/DL (ref 8.6–10.2)
CHLORIDE BLD-SCNC: 98 MMOL/L (ref 98–107)
CO2: 31 MMOL/L (ref 22–29)
CREAT SERPL-MCNC: 0.7 MG/DL (ref 0.7–1.2)
CULTURE, BLOOD 2: NORMAL
EOSINOPHILS ABSOLUTE: 0.05 E9/L (ref 0.05–0.5)
EOSINOPHILS RELATIVE PERCENT: 0.5 % (ref 0–6)
GFR SERPL CREATININE-BSD FRML MDRD: >60 ML/MIN/1.73
GLUCOSE BLD-MCNC: 229 MG/DL (ref 74–99)
HCT VFR BLD CALC: 44.8 % (ref 37–54)
HEMOGLOBIN: 14.1 G/DL (ref 12.5–16.5)
IMMATURE GRANULOCYTES #: 0.09 E9/L
IMMATURE GRANULOCYTES %: 0.9 % (ref 0–5)
LYMPHOCYTES ABSOLUTE: 1.92 E9/L (ref 1.5–4)
LYMPHOCYTES RELATIVE PERCENT: 20.1 % (ref 20–42)
MCH RBC QN AUTO: 28.1 PG (ref 26–35)
MCHC RBC AUTO-ENTMCNC: 31.5 % (ref 32–34.5)
MCV RBC AUTO: 89.4 FL (ref 80–99.9)
MONOCYTES ABSOLUTE: 0.82 E9/L (ref 0.1–0.95)
MONOCYTES RELATIVE PERCENT: 8.6 % (ref 2–12)
NEUTROPHILS ABSOLUTE: 6.63 E9/L (ref 1.8–7.3)
NEUTROPHILS RELATIVE PERCENT: 69.7 % (ref 43–80)
PDW BLD-RTO: 14.6 FL (ref 11.5–15)
PLATELET # BLD: 239 E9/L (ref 130–450)
PMV BLD AUTO: 10.1 FL (ref 7–12)
POTASSIUM REFLEX MAGNESIUM: 4.1 MMOL/L (ref 3.5–5)
RBC # BLD: 5.01 E12/L (ref 3.8–5.8)
SODIUM BLD-SCNC: 138 MMOL/L (ref 132–146)
TOTAL PROTEIN: 6.2 G/DL (ref 6.4–8.3)
WBC # BLD: 9.5 E9/L (ref 4.5–11.5)

## 2023-02-05 PROCEDURE — 94640 AIRWAY INHALATION TREATMENT: CPT

## 2023-02-05 PROCEDURE — 1240000000 HC EMOTIONAL WELLNESS R&B

## 2023-02-05 PROCEDURE — 6370000000 HC RX 637 (ALT 250 FOR IP): Performed by: NURSE PRACTITIONER

## 2023-02-05 PROCEDURE — 36415 COLL VENOUS BLD VENIPUNCTURE: CPT

## 2023-02-05 PROCEDURE — 6370000000 HC RX 637 (ALT 250 FOR IP): Performed by: INTERNAL MEDICINE

## 2023-02-05 PROCEDURE — 6370000000 HC RX 637 (ALT 250 FOR IP): Performed by: PSYCHIATRY & NEUROLOGY

## 2023-02-05 PROCEDURE — 80053 COMPREHEN METABOLIC PANEL: CPT

## 2023-02-05 PROCEDURE — 94660 CPAP INITIATION&MGMT: CPT

## 2023-02-05 PROCEDURE — 85025 COMPLETE CBC W/AUTO DIFF WBC: CPT

## 2023-02-05 RX ORDER — NALTREXONE HYDROCHLORIDE 50 MG/1
50 TABLET, FILM COATED ORAL
Status: DISCONTINUED | OUTPATIENT
Start: 2023-02-05 | End: 2023-02-06 | Stop reason: HOSPADM

## 2023-02-05 RX ORDER — NALTREXONE HYDROCHLORIDE 50 MG/1
50 TABLET, FILM COATED ORAL
Qty: 30 TABLET | Refills: 0 | Status: SHIPPED | OUTPATIENT
Start: 2023-02-05 | End: 2023-03-07

## 2023-02-05 RX ADMIN — DIVALPROEX SODIUM 250 MG: 250 TABLET, DELAYED RELEASE ORAL at 21:00

## 2023-02-05 RX ADMIN — DOCUSATE SODIUM 100 MG: 100 CAPSULE, LIQUID FILLED ORAL at 09:29

## 2023-02-05 RX ADMIN — GABAPENTIN 300 MG: 300 CAPSULE ORAL at 13:55

## 2023-02-05 RX ADMIN — NALTREXONE HYDROCHLORIDE 50 MG: 50 TABLET, FILM COATED ORAL at 13:55

## 2023-02-05 RX ADMIN — HYDROXYZINE PAMOATE 50 MG: 50 CAPSULE ORAL at 08:54

## 2023-02-05 RX ADMIN — IPRATROPIUM BROMIDE AND ALBUTEROL SULFATE 1 AMPULE: .5; 2.5 SOLUTION RESPIRATORY (INHALATION) at 08:10

## 2023-02-05 RX ADMIN — GUAIFENESIN 400 MG: 400 TABLET ORAL at 08:51

## 2023-02-05 RX ADMIN — MENTHOL 1 LOZENGE: 7.5 LOZENGE ORAL at 21:02

## 2023-02-05 RX ADMIN — GUAIFENESIN 400 MG: 400 TABLET ORAL at 13:55

## 2023-02-05 RX ADMIN — IPRATROPIUM BROMIDE AND ALBUTEROL SULFATE 1 AMPULE: .5; 2.5 SOLUTION RESPIRATORY (INHALATION) at 16:25

## 2023-02-05 RX ADMIN — GABAPENTIN 300 MG: 300 CAPSULE ORAL at 08:51

## 2023-02-05 RX ADMIN — MENTHOL 1 LOZENGE: 7.5 LOZENGE ORAL at 08:58

## 2023-02-05 RX ADMIN — TAMSULOSIN HYDROCHLORIDE 0.4 MG: 0.4 CAPSULE ORAL at 09:29

## 2023-02-05 RX ADMIN — BUDESONIDE 500 MCG: 0.5 SUSPENSION RESPIRATORY (INHALATION) at 20:32

## 2023-02-05 RX ADMIN — DIVALPROEX SODIUM 250 MG: 250 TABLET, DELAYED RELEASE ORAL at 08:52

## 2023-02-05 RX ADMIN — ASPIRIN 81 MG CHEWABLE TABLET 81 MG: 81 TABLET CHEWABLE at 08:51

## 2023-02-05 RX ADMIN — HYDROXYZINE PAMOATE 50 MG: 50 CAPSULE ORAL at 21:02

## 2023-02-05 RX ADMIN — MENTHOL 1 LOZENGE: 7.5 LOZENGE ORAL at 18:51

## 2023-02-05 RX ADMIN — PANTOPRAZOLE SODIUM 40 MG: 40 TABLET, DELAYED RELEASE ORAL at 06:38

## 2023-02-05 RX ADMIN — OXYBUTYNIN CHLORIDE 10 MG: 10 TABLET, EXTENDED RELEASE ORAL at 21:00

## 2023-02-05 RX ADMIN — FLUTICASONE PROPIONATE 1 SPRAY: 50 SPRAY, METERED NASAL at 08:52

## 2023-02-05 RX ADMIN — PREDNISONE 40 MG: 20 TABLET ORAL at 08:51

## 2023-02-05 RX ADMIN — TAMSULOSIN HYDROCHLORIDE 0.4 MG: 0.4 CAPSULE ORAL at 21:00

## 2023-02-05 RX ADMIN — IPRATROPIUM BROMIDE AND ALBUTEROL SULFATE 1 AMPULE: .5; 2.5 SOLUTION RESPIRATORY (INHALATION) at 20:32

## 2023-02-05 RX ADMIN — IPRATROPIUM BROMIDE AND ALBUTEROL SULFATE 1 AMPULE: .5; 2.5 SOLUTION RESPIRATORY (INHALATION) at 12:10

## 2023-02-05 RX ADMIN — GABAPENTIN 300 MG: 300 CAPSULE ORAL at 21:00

## 2023-02-05 RX ADMIN — ATORVASTATIN CALCIUM 10 MG: 10 TABLET, FILM COATED ORAL at 08:51

## 2023-02-05 RX ADMIN — BUDESONIDE 500 MCG: 0.5 SUSPENSION RESPIRATORY (INHALATION) at 08:10

## 2023-02-05 RX ADMIN — AMLODIPINE BESYLATE 10 MG: 10 TABLET ORAL at 08:51

## 2023-02-05 RX ADMIN — Medication 100 MG: at 08:51

## 2023-02-05 RX ADMIN — MELATONIN 3 MG ORAL TABLET 3 MG: 3 TABLET ORAL at 21:00

## 2023-02-05 RX ADMIN — OLANZAPINE 5 MG: 5 TABLET, FILM COATED ORAL at 21:00

## 2023-02-05 RX ADMIN — GUAIFENESIN 400 MG: 400 TABLET ORAL at 21:00

## 2023-02-05 RX ADMIN — OXYBUTYNIN CHLORIDE 10 MG: 10 TABLET, EXTENDED RELEASE ORAL at 08:51

## 2023-02-05 ASSESSMENT — PAIN DESCRIPTION - DESCRIPTORS: DESCRIPTORS: ACHING;SORE

## 2023-02-05 ASSESSMENT — PAIN SCALES - GENERAL
PAINLEVEL_OUTOF10: 0
PAINLEVEL_OUTOF10: 0
PAINLEVEL_OUTOF10: 6

## 2023-02-05 ASSESSMENT — PAIN DESCRIPTION - LOCATION: LOCATION: THROAT

## 2023-02-05 NOTE — GROUP NOTE
Group Therapy Note    Date: 2/5/2023    Group Start Time: 1000  Group End Time: 1100  Group Topic: Cognitive Skills    [facility]    Thankful TODD Chamberlain, [place]        Group Therapy Note    Attendees: 9          Patient's Goal:  Pt attended community support group. We listened to a pod cast about the secret of happiness. Pt reports his daily goal as, \"focus on discharge plan. \"     Notes:  Pt was an active listener in group therapy. He came late but he participated in the coloring activities provided while the pod cast was playing. Status After Intervention:  Unchanged    Participation Level: Active Listener and Interactive    Participation Quality: Attentive and Sharing      Speech:  normal      Thought Process/Content: Logical      Affective Functioning: Congruent      Mood: euthymic      Level of consciousness:  Alert and Attentive      Response to Learning: Able to verbalize current knowledge/experience and Able to retain information      Endings: None Reported    Modes of Intervention: Education, Support, Socialization, Exploration, Clarifying, and Problem-solving      Discipline Responsible: /Counselor      Signature:   TODD Jones, [place]

## 2023-02-05 NOTE — PLAN OF CARE
5 [facility]  Day 3 Interdisciplinary Treatment Plan NOTE    Review Date & Time:  2/5/2023    11:50 AM      Patient was in treatment team    Estimated Length of Stay Update:   5 days  Estimated Discharge Date Update:  2/6/23    EDUCATION:   Learner Progress Toward Treatment Goals: Reviewed results and recommendations of this team    Method: Group    Outcome: needs reinforcement. PATIENT GOALS:  \"go to rehab\"    PLAN/TREATMENT RECOMMENDATIONS UPDATE: encourage daily goals and groups\".      GOALS UPDATE:   Time frame for Short-Term Goals:  today      Author LEÓN Morfin

## 2023-02-05 NOTE — CARE COORDINATION
NETO met with pt after group to discuss discharge plan. Pt stated that he is going to be staying with the mother of his child in [facility] when he is discharged temporally. Pt stated that he spoke with her yesterday. Pt did not want to sign KANU for mother of children. Pt reported that she is going to be picking him up when he is discharged and he wants to follow up with his outpatient PCP in [facility] where they also offer [facility] services. Pt then reported that he has an apartment in [facility] he will need to go to prior to going to the home in [place] but it is not safe for him to stay there. Pt reported that he does not feel safe being discharged without his follow up appointments scheduled. SW informed pt that SW will call and schedule appointments tomorrow and call him with appointment date and time. Pt stated that he would rather leave the hospital when the appointments are scheduled. Earlier in the day pt reported that he may be interested in inpatient substance use treatment but he is unsure what facilities accept his insurance, pt then stated that he is interested in IOP. Pt then stated that he will need documentation for his work stating that he was in the hospital. SW informed pt that a letter can be provided for him stated that he was in the hospital. Pt then stated that he will need to provide his hospital discharge paperwork to his work and he does not want it to state that he has alcohol use. SW informed pt that SW is not able to change what prints on DC paperwork and SW can only provide him with a letter stating time he was admitted and discharged from the hospital. Pt then stated that it is against HIPAA to give his work the information. SW again informed pt that a letter can be provided stating he was in the hospital and it is a general letter. Pt then stated that he has to give work his discharge paper work.  SW informed pt that he can chose to do what he wishes with DC paperwork but him providing his work with that would not be a HIPAA violation from the hospital.     Pt provided his phone number [phone]. Pt then stated that he needs to be informed in advance when he is leaving and not the day of so he can make arrangements. NETO informed pt that he was aware of potential discharge yesterday. Pt stated that he was told he is going to be getting discharged on Monday. Pt stated that there is no one to pick him up from the hospital today. Pt ws not receptive of any information provided to him by [employer] has not been able to contact anyone to discuss discharge plan due to pt not signing KANU. NETO informed NP of encounter.

## 2023-02-05 NOTE — BH NOTE
PATIENT INFORMED THAT HE NEEDS TO MAKE DISCHARGE PLANS/RIDE FOR TOMORROWS DISCHARGE.  HE VERBALIZED KNOWLEDGE AND REPORTED THAT IT WOULD BE AROUND 330 -345 PM.

## 2023-02-05 NOTE — PLAN OF CARE
Problem: Self Harm/Suicidality  Goal: Will have no self-injury during hospital stay  Description: INTERVENTIONS:  1. Ensure constant observer at bedside with Q15M safety checks  2. Maintain a safe environment  3. Secure patient belongings  4. Ensure family/visitors adhere to safety recommendations  5. Ensure safety tray has been added to patient's diet order  6. Every shift and PRN: Re-assess suicidal risk via Frequent Screener    Outcome: Progressing     Problem: Depression  Goal: Will be euthymic at discharge  Description: INTERVENTIONS:  1. Administer medication as ordered  2. Provide emotional support via 1:1 interaction with staff  3. Encourage involvement in milieu/groups/activities  4. Monitor for social isolation  Outcome: Progressing      Patient still verbalizing he is feeling confusion still and poor concentration. Continues to staff split and manipulate. He does complain of anxiety but appears calm. Denies suicidal and homicidal thoughts. Denies hallucinations. Will continue to observe and support.

## 2023-02-05 NOTE — GROUP NOTE
Group Therapy Note    Date: 2/5/2023    Group Start Time: 1100  Group End Time: 1130  Group Topic: Cognitive Skills    [facility]  Av. TODD Meier, [place]        Group Therapy Note    Attendees: 9       Patient's Goal:  Pt will be able to determine discharge plan and goals for the future. SW explained discharge process to patients. Notes:  Pt stated that when he is discharged he wants to go to the mother of his child temporarily in [facility]. Pt stated that she will be providing him with transportation. Pt stated that he wants to go to outpatient counseling and did not specify where. Pt stated that in the future he wants to go to counseling, get back to work, attend counseling on days off. Pt stated that inpatient rehab \"is a thought. \" Pt stated that he would like more groups on depression and how to cope with it. Status After Intervention:  Improved    Participation Level:  Active Listener and Interactive    Participation Quality: Appropriate, Attentive, and Sharing      Speech:  normal      Thought Process/Content: Logical      Affective Functioning: Congruent      Mood: anxious      Level of consciousness:  Alert and Oriented x4      Response to Learning: Able to verbalize current knowledge/experience, Able to verbalize/acknowledge new learning, and Able to retain information      Endings: None Reported    Modes of Intervention: Education, Support, Socialization, Exploration, Clarifying, Problem-solving, and Activity      Discipline Responsible: /Counselor      Signature:  TODD Hardy, [place]

## 2023-02-05 NOTE — PLAN OF CARE
Patient denies suicidal ideation, homicidal ideations and AVH. Patient reports anxiety and depression is up/down, rating it a 9-10 out of 10 due feeling \"anxious and restlessness. \"  Patient states he is feeling \"not too bad; still having confusing thoughts, can't get brain to explain what I'm feeling and concentration is not the best.\"  Patient asking about discharge, stating they asked him if he was ready today but reports he does not because he just started on medications. Emotional support given; encouraged patient to speak with SW, NP and doctor about how he is feeling and how medications are working. Patient states that when he is discharged would like to see outpatient provider in the [place], [place] area. Patient is flat, sad and worried on approach but does brighten some with conversation. Presents calm and cooperative during assessment. Patient is out on the unit and is social with peers. Medications taken without issue. No complaints or concerns verbalized at this time. No unit problems reported. Will continue to observe and support. Problem: Chronic Conditions and Co-morbidities  Goal: Patient's chronic conditions and co-morbidity symptoms are monitored and maintained or improved  Outcome: Progressing     Problem: Safety - Adult  Goal: Free from fall injury  Outcome: Progressing     Problem: Self Harm/Suicidality  Goal: Will have no self-injury during hospital stay  Description: INTERVENTIONS:  1. Ensure constant observer at bedside with Q15M safety checks  2. Maintain a safe environment  3. Secure patient belongings  4. Ensure family/visitors adhere to safety recommendations  5. Ensure safety tray has been added to patient's diet order  6. Every shift and PRN: Re-assess suicidal risk via Frequent Screener    2/4/2023 2059 by Sebastian Pitt RN  Outcome: Progressing     Problem: Depression  Goal: Will be euthymic at discharge  Description: INTERVENTIONS:  1.  Administer medication as ordered  2. Provide emotional support via 1:1 interaction with staff  3. Encourage involvement in milieu/groups/activities  4. Monitor for social isolation  2/4/2023 2059 by Newton Reyes RN  Outcome: Progressing     Problem: Behavior  Goal: Pt/Family maintain appropriate behavior and adhere to behavioral management agreement, if implemented  Description: INTERVENTIONS:  1. Assess patient/family's coping skills and  non-compliant behavior (including use of illegal substances)  2. Notify security of behavior or suspected illegal substances which indicate the need for search of the family and/or belongings  3. Encourage verbalization of thoughts and concerns in a socially appropriate manner  4. Utilize positive, consistent limit setting strategies supporting safety of patient, staff and others  5. Encourage participation in the decision making process about the behavioral management agreement  6. If a visitor's behavior poses a threat to safety call refer to organization policy. 7. Initiate consult with , Psychosocial CNS, Spiritual Care as appropriate  Outcome: Progressing     Problem: Self Harm/Suicidality  Goal: Will have no self-injury during hospital stay  Description: INTERVENTIONS:  1. Ensure constant observer at bedside with Q15M safety checks  2. Maintain a safe environment  3. Secure patient belongings  4. Ensure family/visitors adhere to safety recommendations  5. Ensure safety tray has been added to patient's diet order  6. Every shift and PRN: Re-assess suicidal risk via Frequent Screener    2/4/2023 2059 by Newton Reyes RN  Outcome: Progressing  2/4/2023 1316 by Justin Canavan, RN  Outcome: Not Progressing     Problem: Depression  Goal: Will be euthymic at discharge  Description: INTERVENTIONS:  1. Administer medication as ordered  2. Provide emotional support via 1:1 interaction with staff  3. Encourage involvement in milieu/groups/activities  4.  Monitor for social isolation  2/4/2023 2059 by Florence Freeman RN  Outcome: Progressing  2/4/2023 1316 by Noemi Carbajal RN  Outcome: Not Progressing

## 2023-02-06 VITALS
SYSTOLIC BLOOD PRESSURE: 120 MMHG | WEIGHT: 260 LBS | HEIGHT: 72 IN | BODY MASS INDEX: 35.21 KG/M2 | OXYGEN SATURATION: 95 % | RESPIRATION RATE: 16 BRPM | HEART RATE: 84 BPM | DIASTOLIC BLOOD PRESSURE: 76 MMHG | TEMPERATURE: 98.2 F

## 2023-02-06 PROCEDURE — 94640 AIRWAY INHALATION TREATMENT: CPT

## 2023-02-06 PROCEDURE — 6370000000 HC RX 637 (ALT 250 FOR IP): Performed by: INTERNAL MEDICINE

## 2023-02-06 PROCEDURE — 6370000000 HC RX 637 (ALT 250 FOR IP): Performed by: PSYCHIATRY & NEUROLOGY

## 2023-02-06 PROCEDURE — 94660 CPAP INITIATION&MGMT: CPT

## 2023-02-06 PROCEDURE — 6370000000 HC RX 637 (ALT 250 FOR IP): Performed by: NURSE PRACTITIONER

## 2023-02-06 RX ADMIN — DOCUSATE SODIUM 100 MG: 100 CAPSULE, LIQUID FILLED ORAL at 08:56

## 2023-02-06 RX ADMIN — IPRATROPIUM BROMIDE AND ALBUTEROL SULFATE 1 AMPULE: .5; 2.5 SOLUTION RESPIRATORY (INHALATION) at 09:10

## 2023-02-06 RX ADMIN — TAMSULOSIN HYDROCHLORIDE 0.4 MG: 0.4 CAPSULE ORAL at 08:56

## 2023-02-06 RX ADMIN — PANTOPRAZOLE SODIUM 40 MG: 40 TABLET, DELAYED RELEASE ORAL at 06:42

## 2023-02-06 RX ADMIN — BUDESONIDE 500 MCG: 0.5 SUSPENSION RESPIRATORY (INHALATION) at 09:10

## 2023-02-06 RX ADMIN — NALTREXONE HYDROCHLORIDE 50 MG: 50 TABLET, FILM COATED ORAL at 08:57

## 2023-02-06 RX ADMIN — MENTHOL 1 LOZENGE: 7.5 LOZENGE ORAL at 06:44

## 2023-02-06 RX ADMIN — HYDROXYZINE PAMOATE 50 MG: 50 CAPSULE ORAL at 09:01

## 2023-02-06 RX ADMIN — ATORVASTATIN CALCIUM 10 MG: 10 TABLET, FILM COATED ORAL at 08:56

## 2023-02-06 RX ADMIN — ASPIRIN 81 MG CHEWABLE TABLET 81 MG: 81 TABLET CHEWABLE at 08:56

## 2023-02-06 RX ADMIN — GABAPENTIN 300 MG: 300 CAPSULE ORAL at 13:39

## 2023-02-06 RX ADMIN — OXYBUTYNIN CHLORIDE 10 MG: 10 TABLET, EXTENDED RELEASE ORAL at 08:56

## 2023-02-06 RX ADMIN — DIVALPROEX SODIUM 250 MG: 250 TABLET, DELAYED RELEASE ORAL at 08:56

## 2023-02-06 RX ADMIN — AMLODIPINE BESYLATE 10 MG: 10 TABLET ORAL at 08:56

## 2023-02-06 RX ADMIN — GABAPENTIN 300 MG: 300 CAPSULE ORAL at 08:57

## 2023-02-06 RX ADMIN — CHLORDIAZEPOXIDE HYDROCHLORIDE 25 MG: 25 CAPSULE ORAL at 09:01

## 2023-02-06 RX ADMIN — PREDNISONE 40 MG: 20 TABLET ORAL at 08:56

## 2023-02-06 RX ADMIN — GUAIFENESIN 400 MG: 400 TABLET ORAL at 08:57

## 2023-02-06 RX ADMIN — MENTHOL 1 LOZENGE: 7.5 LOZENGE ORAL at 13:39

## 2023-02-06 RX ADMIN — GUAIFENESIN 400 MG: 400 TABLET ORAL at 13:39

## 2023-02-06 RX ADMIN — Medication 100 MG: at 08:56

## 2023-02-06 RX ADMIN — FLUTICASONE PROPIONATE 1 SPRAY: 50 SPRAY, METERED NASAL at 09:04

## 2023-02-06 ASSESSMENT — PAIN SCALES - GENERAL
PAINLEVEL_OUTOF10: 0

## 2023-02-06 NOTE — GROUP NOTE
Group Therapy Note    Date: 2/6/2023    Group Start Time: 1100  Group End Time: 3757  Group Topic: Psychotherapy    [facility]    Thankful TODD Chamberlain LSW        Group Therapy Note    Attendees: 10       Patient's Goal:  To increase social interaction and improve relationships with others. Notes:  Pt was attentive in group and was able to identify an agenda. They were also able to verbalize relating to others within the group. Status After Intervention:  Unchanged    Participation Level: Active Listener and Interactive    Participation Quality: Attentive and Sharing      Speech:  normal      Thought Process/Content: Logical      Affective Functioning: Congruent      Mood: euthymic      Level of consciousness:  Alert, Oriented x4, and Attentive      Response to Learning: Able to verbalize current knowledge/experience and Able to retain information      Endings: None Reported    Modes of Intervention: Education, Support, Socialization, Exploration, Clarifying, and Problem-solving      Discipline Responsible: /Counselor      Signature:   TODD Jeronimo LSW

## 2023-02-06 NOTE — CARE COORDINATION
Pt asked to speak with me after group. He wanted me to remove his \"If you have any questions or concerns please don't hesitate to call\" from his work excuse letter. Completed this date and gave him two copies.

## 2023-02-06 NOTE — PROGRESS NOTES
BEHAVIORAL HEALTH FOLLOW-UP NOTE     2/6/2023     Patient was seen and examined in person, Chart reviewed   Patient's case discussed with staff/team    Chief Complaint: \"I don't have a ride\"     Interim History:     Patient remains focused on disability paperwork. He has been counseled that these will need to be completed by his outpatient provider, acute care inpatient does not complete disability papers. He is demonstrating clear secondary gain. No SI/HI intent or plan reported since prior to admission to [facility] only voices complaint of \"confusion and maybe suicidal\" when discussing discharge. He has clear malingering behaviors with secondary gain only wishes to speak about his disability papers. He is not demonstrating any clinical indication for psychiatric admission.        Appetite:   [] Normal/Unchanged  [] Increased  [] Decreased      Sleep:       [] Normal/Unchanged  [] Fair       [] Poor              Energy:    [] Normal/Unchanged  [] Increased  [] Decreased        SI [] Present  [] Absent    HI  []Present  [] Absent     Aggression:  [] yes  [] no    Patient is [] able  [] unable to CONTRACT FOR SAFETY     PAST MEDICAL/PSYCHIATRIC HISTORY:   Past Medical History:   Diagnosis Date    Acid reflux     Asthma     Asthma     COPD (chronic obstructive pulmonary disease) ([facility] [address]     Hypertension     Pancreatitis     Prediabetes     Psychiatric problem     depressed    Sleep apnea     Substance abuse ([facility] [address]     alcohol and cocaine       FAMILY/SOCIAL HISTORY:  Family History   Problem Relation Age of Onset    Other Sister     High Blood Pressure Maternal Grandmother     Other Maternal Grandmother      Social History     Socioeconomic History    Marital status: Single     Spouse name: Not on file    Number of children: 3    Years of education: 14    Highest education level: Not on file   Occupational History    Occupation:    Tobacco Use    Smoking status: Every Day     Packs/day: 1.00     Years: 28.00 Pack years: 28.00     Types: Cigarettes, Cigars    Smokeless tobacco: Never    Tobacco comments:     quit cigarettes 3 weeks ago. 5 cigars a week now. Vaping Use    Vaping Use: Never used   Substance and Sexual Activity    Alcohol use: Yes     Comment: last drank 2/12 0600    Drug use: Yes     Types: Cocaine     Comment: last used \"few\" months ago    Sexual activity: Not on file   Other Topics Concern    Not on file   Social History Narrative    Not on file     Social Determinants of Health     Financial Resource Strain: Not on file   Food Insecurity: Not on file   Transportation Needs: Not on file   Physical Activity: Not on file   Stress: Not on file   Social Connections: Not on file   Intimate Partner Violence: Not on file   Housing Stability: Not on file           ROS:  [x] All negative/unchanged except if checked.  Explain positive(checked items) below:  [] Constitutional  [] Eyes  [] Ear/Nose/Mouth/Throat  [] Respiratory  [] CV  [] GI  []   [] Musculoskeletal  [] Skin/Breast  [] Neurological  [] Endocrine  [] Heme/Lymph  [] Allergic/Immunologic    Explanation:     MEDICATIONS:    Current Facility-Administered Medications:     naltrexone (DEPADE) tablet 50 mg, 50 mg, Oral, Daily with breakfast, Trentonisabella Coley, APRN - CNP, 50 mg at 02/05/23 1355    Menthol LOZG 1 lozenge, 1 lozenge, Mouth/Throat, Q2H PRN, Trenton Dress, APRN - CNP, 1 lozenge at 02/06/23 0644    benzocaine-menthol (CEPACOL SORE THROAT) lozenge 1 lozenge, 1 lozenge, Oral, Q2H PRN, Trenton Coley, APRN - CNP, 1 lozenge at 02/04/23 1610    chlordiazePOXIDE (LIBRIUM) capsule 25 mg, 25 mg, Oral, Q8H PRN, Trenton Dress, APRN - CNP, 25 mg at 02/04/23 2320    fluticasone (FLONASE) 50 MCG/ACT nasal spray 1 spray, 1 spray, Each Nostril, Daily, Trenton Dress, APRN - CNP, 1 spray at 02/05/23 0852    pantoprazole (PROTONIX) tablet 40 mg, 40 mg, Oral, QAM AC, Trenton Dress, APRN - CNP, 40 mg at 02/06/23 0642    divalproex (DEPAKOTE) DR tablet 250 mg, 250 mg, Oral, 2 times per day, Yara Batch, APRN - CNP, 250 mg at 02/05/23 2100    OLANZapine (ZYPREXA) tablet 5 mg, 5 mg, Oral, Nightly, Yara Batch, APRN - CNP, 5 mg at 02/05/23 2100    hydrOXYzine pamoate (VISTARIL) capsule 50 mg, 50 mg, Oral, TID PRN, Mane Munson MD, 50 mg at 02/05/23 2102    amLODIPine (NORVASC) tablet 10 mg, 10 mg, Oral, Daily, Tobin Arauz MD, 10 mg at 02/05/23 6229    aspirin chewable tablet 81 mg, 81 mg, Oral, Daily, Tobin Arauz MD, 81 mg at 02/05/23 0851    atorvastatin (LIPITOR) tablet 10 mg, 10 mg, Oral, Daily, Tobin Arauz MD, 10 mg at 02/05/23 0851    gabapentin (NEURONTIN) capsule 300 mg, 300 mg, Oral, TID, Tobin Arauz MD, 300 mg at 02/05/23 2100    tamsulosin (FLOMAX) capsule 0.4 mg, 0.4 mg, Oral, BID, Tobin Arauz MD, 0.4 mg at 02/05/23 2100    ondansetron (ZOFRAN-ODT) disintegrating tablet 4 mg, 4 mg, Oral, Q8H PRN **OR** ondansetron (ZOFRAN) injection 4 mg, 4 mg, IntraVENous, Q6H PRN, Tobin Arauz MD    polyethylene glycol (GLYCOLAX) packet 17 g, 17 g, Oral, Daily PRN, Tobin Arauz MD    enoxaparin Sodium (LOVENOX) injection 30 mg, 30 mg, SubCUTAneous, BID, Tobin Arauz MD, 30 mg at 02/02/23 2211    acetaminophen (TYLENOL) tablet 650 mg, 650 mg, Oral, Q6H PRN, 650 mg at 02/03/23 0855 **OR** acetaminophen (TYLENOL) suppository 650 mg, 650 mg, Rectal, Q6H PRN, Tobin Arauz MD    budesonide (PULMICORT) nebulizer suspension 500 mcg, 500 mcg, Nebulization, BID, Tobin Arauz MD, 500 mcg at 02/05/23 2032    ipratropium-albuterol (DUONEB) nebulizer solution 1 ampule, 1 ampule, Inhalation, Q4H WA, Tobin Arauz MD, 1 ampule at 02/05/23 2032    predniSONE (DELTASONE) tablet 40 mg, 40 mg, Oral, Daily, Tobin Arauz MD, 40 mg at 02/05/23 0851    oxybutynin (DITROPAN-XL) extended release tablet 10 mg, 10 mg, Oral, BID, Tobin Arauz MD, 10 mg at 02/05/23 2100    thiamine tablet 100 mg, 100 mg, Oral, Daily, Tobin Arauz MD, 100 mg at 02/05/23 0851    nicotine (Anisa Iba CQ) 21 MG/24HR 1 patch, 1 patch, TransDERmal, Daily, Fernanda Butler MD, 1 patch at 02/04/23 0840    guaiFENesin tablet 400 mg, 400 mg, Oral, TID, Fernanda Butler MD, 400 mg at 02/05/23 2100    docusate sodium (COLACE) capsule 100 mg, 100 mg, Oral, Daily, Fernanda Butler MD, 100 mg at 02/05/23 6735    magnesium hydroxide (MILK OF MAGNESIA) 400 MG/5ML suspension 30 mL, 30 mL, Oral, Daily PRN, Jose Fuentes MD    aluminum & magnesium hydroxide-simethicone (MAALOX) 200-200-20 MG/5ML suspension 30 mL, 30 mL, Oral, PRN, Jose Fuentes MD    haloperidol (HALDOL) tablet 3 mg, 3 mg, Oral, Q6H PRN **OR** haloperidol lactate (HALDOL) injection 3 mg, 3 mg, IntraMUSCular, Q6H PRN, Jose Fuentes MD    melatonin tablet 3 mg, 3 mg, Oral, Nightly, Jose Fuentes MD, 3 mg at 02/05/23 2100      Examination:  /78   Pulse 84   Temp 98.2 °F (36.8 °C) (Oral)   Resp 16   Ht 6' (1.829 m)   Wt 260 lb (117.9 kg)   SpO2 95%   BMI 35.26 kg/m²   Gait - steady  Medication side effects(SE): none reported     Mental Status Examination:    Level of consciousness:  within normal limits   Appearance:  fair grooming and fair hygiene  Behavior/Motor:  no abnormalities noted  Attitude toward examiner:  cooperative  Speech:  spontaneous, normal rate and normal volume  Mood: \" My mood is low. \"  Affect: Preoccupied and anxious congruent with stated mood  Thought processes: Linear without  flight of ideas or loose associations  Thought content: Devoid of any auditory visual hallucinations delusions or other perceptual normalities.   Denies SI/HI intent or plan   Language: able to name objects and repeate phrases  Remote Memory: intact  Recent   Memory: intact  Cognition:  oriented to person, place, and time   Fund of Knowledge: Vocabulary intact, pt is aware of current events and past history  Attention and Concentration intact  Insight fair   Judgement fair     ASSESSMENT:   Patient symptoms are:  [] Well controlled  [x] Improving  [] Worsening  [] No change      Diagnosis:   Principal Problem:    Malingering  Active Problems:    Personality disorder (HCC)    Alcohol abuse  Resolved Problems:    Major depressive disorder, recurrent episode with mixed features ([facility] [address]      LABS:    Recent Labs     02/04/23  0551 02/05/23  0635   WBC 9.5 9.5   HGB 13.5 14.1    239     Recent Labs     02/04/23  0551 02/05/23  0635    138   K 4.0 4.1   CL 97* 98   CO2 30* 31*   BUN 13 14   CREATININE 0.7 0.7   GLUCOSE 250* 229*     Recent Labs     02/04/23  0551 02/05/23  0635   BILITOT 0.5 0.7   ALKPHOS 110 102   AST 13 10   ALT 52* 45*     Lab Results   Component Value Date/Time    LABAMPH NOT DETECTED 01/29/2023 11:27 PM    BARBSCNU NOT DETECTED 01/29/2023 11:27 PM    LABBENZ NOT DETECTED 01/29/2023 11:27 PM    LABBENZ SEE BELOW 09/13/2014 04:20 PM    CANNAB NOT DETECTED 05/19/2013 09:54 PM    COCAINESCRN NOT DETECTED 05/19/2013 09:54 PM    LABMETH NOT DETECTED 01/29/2023 11:27 PM    OPIATESCREENURINE NOT DETECTED 01/29/2023 11:27 PM    PHENCYCLIDINESCREENURINE NOT DETECTED 01/29/2023 11:27 PM    PPXUR NOT DETECTED 05/19/2013 09:54 PM    ETOH 386 01/29/2023 11:27 PM     Lab Results   Component Value Date/Time    TSH 1.740 10/14/2020 08:59 AM     No results found for: LITHIUM  No results found for: VALPROATE, CBMZ        Treatment Plan:  The patient's diagnosis, treatment plan, medication management were formulated after patient was seen directly by the attending physician and myself and all relevant documentation was reviewed. Risk, benefit, side effects, possible outcomes of the medication and alternatives discussed with the patient and the patient demonstrated understanding. The patient was also educated that the outcome of treatment will depend on the medication compliance as directed by the prescribers along with regular follow-up, compliance with the labs and other work-up, as clinically indicated.      Risk Management: Based on the diagnosis and assessment biopsychosocial treatment model was presented to the patient and was given the opportunity to ask any question. The patient was agreeable to the plan and all the patient's questions were answered to the patient's satisfaction. I discussed with the patient the risk, benefit, alternative and common side effects for the proposed medication treatment. The patient is consenting to this treatment. The patient was referred to outpatient/inpatient substance abuse rehabilitation programming. He was educated multiple times during the hospitalization that if he chooses to continue to use drugs or alcohol, he may potentially act out impulsively, resulting in serious harm to self or others, even though unintentional.  He was also educated that mental health treatment cannot be optimized with ongoing use of drugs. He demonstrated understanding has the capacity to understand that. New Medications started during this admission :    Depakote 250 mg twice daily for mood stabilization  Zyprexa 5 mg nightly for augmentation of treatment     Collateral information: Followed by social work  CD evaluation  Encourage patient to attend group and other milieu activities. Discharge planning discussed with the patient and treatment team.    PSYCHOTHERAPY/COUNSELING:  [x] Therapeutic interview  [x] Supportive  [] CBT  [] Ongoing  [] Other    [x] Patient continues to need, on a daily basis, active treatment furnished directly by or requiring the supervision of inpatient psychiatric personnel      Anticipated Length of stay: 3 - 5 days based on stability     NOTE: This report was transcribed using voice recognition software. Every effort was made to ensure accuracy; however, inadvertent computerized transcription errors may be present.        Electronically signed by MIKE Andrade CNP on 2/6/2023 at 8:21 AM

## 2023-02-06 NOTE — CARE COORDINATION
NETO contacted [id number] [facility] [phone] and scheduled a follow up appointment with pt PCP Dr Sandi Jha on 2/8.

## 2023-02-06 NOTE — PLAN OF CARE
Patient denies suicidal ideation, homicidal ideations and AVH. Patient reports anxiety and depression is \"up and down\" reporting it 7-8 out of 10. Patient is exaggerated, worried and fixated on medications, labs results, date of admission and somatic complaints. Patient states he feels \"off balance\" and asked if \"that's what the Depakote does? \"  Patient educated on medication and given emotional support. Presents calm and cooperative during assessment. Patient is out on the unit and is social with select peers. Medications taken without issue. No complaints or concerns verbalized at this time. No unit problems reported. Will continue to observe and support. Problem: Chronic Conditions and Co-morbidities  Goal: Patient's chronic conditions and co-morbidity symptoms are monitored and maintained or improved  Outcome: Progressing     Problem: Risk for Elopement  Goal: Patient will not exit the unit/facility without proper excort  Outcome: Progressing     Problem: Safety - Adult  Goal: Free from fall injury  Outcome: Progressing     Problem: Self Harm/Suicidality  Goal: Will have no self-injury during hospital stay  Description: INTERVENTIONS:  1. Ensure constant observer at bedside with Q15M safety checks  2. Maintain a safe environment  3. Secure patient belongings  4. Ensure family/visitors adhere to safety recommendations  5. Ensure safety tray has been added to patient's diet order  6. Every shift and PRN: Re-assess suicidal risk via Frequent Screener    2/5/2023 2124 by Kym Singh RN  Outcome: Progressing     Problem: Depression  Goal: Will be euthymic at discharge  Description: INTERVENTIONS:  1. Administer medication as ordered  2. Provide emotional support via 1:1 interaction with staff  3. Encourage involvement in milieu/groups/activities  4.  Monitor for social isolation  2/5/2023 2124 by Kym Singh RN  Outcome: Progressing     Problem: Behavior  Goal: Pt/Family maintain appropriate behavior and adhere to behavioral management agreement, if implemented  Description: INTERVENTIONS:  1. Assess patient/family's coping skills and  non-compliant behavior (including use of illegal substances)  2. Notify security of behavior or suspected illegal substances which indicate the need for search of the family and/or belongings  3. Encourage verbalization of thoughts and concerns in a socially appropriate manner  4. Utilize positive, consistent limit setting strategies supporting safety of patient, staff and others  5. Encourage participation in the decision making process about the behavioral management agreement  6. If a visitor's behavior poses a threat to safety call refer to organization policy.   7. Initiate consult with , Psychosocial CNS, Spiritual Care as appropriate  Outcome: Progressing

## 2023-02-06 NOTE — PROGRESS NOTES
[facility]  Discharge Note    Pt discharged with followings belongings:   Dental Appliances: None  Vision - Corrective Lenses: None  Hearing Aid: None  Jewelry: None  Body Piercings Removed: N/A  Clothing: Footwear, Shirt, Jacket/Coat, Pants  Other Valuables: Other (Comment)   Valuables sent home with  or returned to patient. Patient educated on aftercare instructions: pt refused to sign  Information faxed to n/a by n/a  at 3:34 PM .Patient verbalize understanding of AVS:  yes. Status EXAM upon discharge:  Mental Status and Behavioral Exam  Normal: No  Level of Assistance: Independent/Self  Facial Expression: Worried, Flat, Sad  Affect: Congruent  Level of Consciousness: Alert  Frequency of Checks: Twice per hour, standard  Mood:Normal: No  Mood: Anxious  Motor Activity:Normal: No  Motor Activity: Repetitive acts  Eye Contact: Fair  Observed Behavior: Cooperative  Sexual Misconduct History: Current - no  Preception: [place] to person, [place] to time, [place] to place, [place] to situation  Attention:Normal: No  Attention: Distractible  Thought Processes: Circumstantial, Tangential  Thought Content:Normal: No  Thought Content: Preoccupations  Depression Symptoms: Impaired concentration  Anxiety Symptoms: Generalized  Theresa Symptoms: No problems reported or observed. Hallucinations: None  Delusions: No  Delusions: Other (comment) (NONE VOICED)  Memory:Normal: No  Memory: Other (comment) (IMPAIRED)  Insight and Judgment: No  Insight and Judgment: Other (comment) (IMPAIRED)    Tobacco Screening:  Practical Counseling, on admission, patel X, if applicable and completed (first 3 are required if patient doesn't refuse):             ( x) Recognizing danger situations (included triggers and roadblocks)                    ( x) Coping skills (new ways to manage stress,relaxation techniques, changing routine, distraction)                                                           ( x) Basic information about quitting (benefits of quitting, techniques in how to quit, available resources  ( ) Referral for counseling faxed to [facility]                                                                                                                   ( ) Patient refused counseling  ( ) Patient refused referral  ( ) Patient refused prescription upon discharge  ( ) Patient has not smoked in the last 30 days    Metabolic Screening:    Lab Results   Component Value Date    LABA1C 6.6 (H) 06/22/2022       Lab Results   Component Value Date    CHOL 156 04/20/2020    CHOL 147 04/26/2014    CHOL 202 (H) 02/13/2014    CHOL 135 03/15/2013     Lab Results   Component Value Date    TRIG 171 (H) 02/14/2022    TRIG 120 02/13/2022    TRIG 109 04/20/2020    TRIG 65 04/26/2014    TRIG 193 (H) 02/13/2014    TRIG 191 (H) 03/15/2013     Lab Results   Component Value Date    HDL 58 04/20/2020    HDL 57 04/26/2014    HDL 63 02/13/2014    HDL 37.0 (A) 03/15/2013     No components found for: [facility] EVALUATION AND TREAT[facility]  Lab Results   Component Value Date    LABVLDL 22 04/20/2020    LABVLDL 13 04/26/2014       Hilario Goode RN

## 2023-02-06 NOTE — PLAN OF CARE
[facility] Interdisciplinary Treatment Plan Note     Review Date & Time:  2/6/23 0900    Patient was not in treatment team.    Estimated Length of Stay Update:   1 WEEK   Estimated Discharge Date Update:  TODAY    EDUCATION:   Learner Progress Toward Treatment Goals: Reviewed results and recommendations of this team    Method: Individual    Outcome: Verbalized understanding    PATIENT GOALS: NONE REPORTED    PLAN/TREATMENT RECOMMENDATIONS UPDATE:  DISCHARGED TODAY TO FAMILY HOME    GOALS UPDATE:  Time frame for Short-Term Goals:  1 WEEK      Sarah Lino RN

## 2023-02-06 NOTE — PROGRESS NOTES
DR. Mahoney Credit NOTIFIED OF DISCHARGE AND REQUEST TO REVIEW AVS FOR ANY FURTHER NEEDS FROM THE HOSPITALIST/SOUND GROUP TEAM.

## 2023-02-06 NOTE — GROUP NOTE
Date: 2/6/2023    Group Start Time: 1000  Group End Time: 3815  Group Topic: Psychoeducation    [facility]  [id number] [place], [place]                                                                        Group Therapy Note    Date: 2/6/2023  Module Name:  id of stress/coping skills     Patient's Goal:  Patient will be able to id daily/life events that one is currently exercising. Will be educated on positive coping skills for stress management. .    Notes:  Pleasant and sharing when prompted. Willing to id daily and life events. Status After Intervention:  Improved    Participation Level:  Active Listener and Interactive    Participation Quality: Appropriate, Attentive, Sharing, and Supportive      Speech:  normal      Thought Process/Content: Logical      Affective Functioning: Congruent      Mood: euthymic      Level of consciousness:  Alert, Oriented x4, and Attentive      Response to Learning: Able to verbalize/acknowledge new learning, Able to retain information, and Progressing to goal      Endings: None Reported    Modes of Intervention: Education, Support, Socialization, Exploration, and Problem-solving      Discipline Responsible: Psychoeducational Specialist      Signature:  Lisa Sanchez

## 2023-02-06 NOTE — DISCHARGE SUMMARY
DISCHARGE SUMMARY      Patient ID:  Nancy Granado  [id number]  52 y.o.  1970    Admit date: 2/1/2023    Discharge date and time: 2/6/2023    Admitting Physician: Soni Chin MD     Discharge Physician: Dr Rebecca Mohan MD    Discharge Diagnoses:   Patient Active Problem List   Diagnosis    Pancreatitis    HTN (hypertension)    Asthma    Reflux    Asthma exacerbation    Acute respiratory failure with hypercapnia ([address]    Suicide attempt by drug ingestion ([address]    Chronic pain syndrome    Lumbar facet arthropathy    Lumbar radiculopathy    Lumbar disc disorder    Meralgia paraesthetica, left    Type 2 diabetes mellitus, without long-term current use of insulin ([address]    Alcohol abuse    Hypokalemia    Acute respiratory failure with hypoxia and hypercapnia (HCC)    Moderate protein-calorie malnutrition (HCC)    Polysubstance abuse ([address]    Acute respiratory failure with hypoxia (HCC)    COPD exacerbation (HCC)    Chronic obstructive pulmonary disease ([address]    Alcohol induced acute pancreatitis without necrosis or infection    GERD (gastroesophageal reflux disease)    Acute respiratory failure (HCC)    Mixed bipolar II disorder ([address]    Malingering    Personality disorder ([address]       Admission Condition: poor    Discharged Condition: stable    Admission Circumstance:   Presented to the ED with ETOH of 386, made suicidal statements, and was hospitalized for ETOH withdrawal and acute hypoxic respiratory failure. Psychiatry was consulted for his suicidal statements and the patient had been pink slipped in the emergency department due to his suicidal statements.       PAST MEDICAL/PSYCHIATRIC HISTORY:   Past Medical History:   Diagnosis Date    Acid reflux     Asthma     Asthma     COPD (chronic obstructive pulmonary disease) (HCC)     Hypertension     Pancreatitis     Prediabetes     Psychiatric problem     depressed    Sleep apnea     Substance abuse ([address]     alcohol and cocaine       FAMILY/SOCIAL HISTORY:  Family History Problem Relation Age of Onset    Other Sister     High Blood Pressure Maternal Grandmother     Other Maternal Grandmother      Social History     Socioeconomic History    Marital status: Single     Spouse name: Not on file    Number of children: 3    Years of education: 14    Highest education level: Not on file   Occupational History    Occupation:    Tobacco Use    Smoking status: Every Day     Packs/day: 1.00     Years: 28.00     Pack years: 28.00     Types: Cigarettes, Cigars    Smokeless tobacco: Never    Tobacco comments:     quit cigarettes 3 weeks ago. 5 cigars a week now.     Vaping Use    Vaping Use: Never used   Substance and Sexual Activity    Alcohol use: Yes     Comment: last drank 2/12 0600    Drug use: Yes     Types: Cocaine     Comment: last used \"few\" months ago    Sexual activity: Not on file   Other Topics Concern    Not on file   Social History Narrative    Not on file     Social Determinants of Health     Financial Resource Strain: Not on file   Food Insecurity: Not on file   Transportation Needs: Not on file   Physical Activity: Not on file   Stress: Not on file   Social Connections: Not on file   Intimate Partner Violence: Not on file   Housing Stability: Not on file       MEDICATIONS:    Current Facility-Administered Medications:     naltrexone (DEPADE) tablet 50 mg, 50 mg, Oral, Daily with breakfast, Whittington Ban, APRN - CNP, 50 mg at 02/05/23 1355    Menthol LOZG 1 lozenge, 1 lozenge, Mouth/Throat, Q2H PRN, Whittington Ban, APRN - CNP, 1 lozenge at 02/06/23 0644    benzocaine-menthol (CEPACOL SORE THROAT) lozenge 1 lozenge, 1 lozenge, Oral, Q2H PRN, Whittington Ban, APRN - CNP, 1 lozenge at 02/04/23 1610    chlordiazePOXIDE (LIBRIUM) capsule 25 mg, 25 mg, Oral, Q8H PRN, Whittington Ban, APRN - CNP, 25 mg at 02/04/23 2320    fluticasone (FLONASE) 50 MCG/ACT nasal spray 1 spray, 1 spray, Each Nostril, Daily, Whittington Ban, APRN - CNP, 1 spray at 02/05/23 0852    pantoprazole (PROTONIX) tablet 40 mg, 40 mg, Oral, QAM AC, Isidro Daniel, APRN - CNP, 40 mg at 02/06/23 0655    divalproex (DEPAKOTE) DR tablet 250 mg, 250 mg, Oral, 2 times per day, Isidro Sanchez, APRN - CNP, 250 mg at 02/05/23 2100    OLANZapine (ZYPREXA) tablet 5 mg, 5 mg, Oral, Nightly, Isidro Sanchez, APRN - CNP, 5 mg at 02/05/23 2100    hydrOXYzine pamoate (VISTARIL) capsule 50 mg, 50 mg, Oral, TID PRN, Zohaib Linares MD, 50 mg at 02/05/23 2102    amLODIPine (NORVASC) tablet 10 mg, 10 mg, Oral, Daily, Lolly Hernandez MD, 10 mg at 02/05/23 8473    aspirin chewable tablet 81 mg, 81 mg, Oral, Daily, Lolly Hernandez MD, 81 mg at 02/05/23 0851    atorvastatin (LIPITOR) tablet 10 mg, 10 mg, Oral, Daily, Lolly Hernandez MD, 10 mg at 02/05/23 0851    gabapentin (NEURONTIN) capsule 300 mg, 300 mg, Oral, TID, Lolly Hernandez MD, 300 mg at 02/05/23 2100    tamsulosin (FLOMAX) capsule 0.4 mg, 0.4 mg, Oral, BID, Lolly Hernandez MD, 0.4 mg at 02/05/23 2100    ondansetron (ZOFRAN-ODT) disintegrating tablet 4 mg, 4 mg, Oral, Q8H PRN **OR** ondansetron (ZOFRAN) injection 4 mg, 4 mg, IntraVENous, Q6H PRN, Lolly Hernandez MD    polyethylene glycol (GLYCOLAX) packet 17 g, 17 g, Oral, Daily PRN, Lolly Hernandez MD    enoxaparin Sodium (LOVENOX) injection 30 mg, 30 mg, SubCUTAneous, BID, Lolly Hernandez MD, 30 mg at 02/02/23 2211    acetaminophen (TYLENOL) tablet 650 mg, 650 mg, Oral, Q6H PRN, 650 mg at 02/03/23 0855 **OR** acetaminophen (TYLENOL) suppository 650 mg, 650 mg, Rectal, Q6H PRN, Lolly Hernandez MD    budesonide (PULMICORT) nebulizer suspension 500 mcg, 500 mcg, Nebulization, BID, Lolly Hernandez MD, 500 mcg at 02/05/23 2032    ipratropium-albuterol (DUONEB) nebulizer solution 1 ampule, 1 ampule, Inhalation, Q4H WA, Lolly Hernandez MD, 1 ampule at 02/05/23 2032    predniSONE (DELTASONE) tablet 40 mg, 40 mg, Oral, Daily, Lolly Hernandez MD, 40 mg at 02/05/23 0851    oxybutynin (DITROPAN-XL) extended release tablet 10 mg, 10 mg, Oral, BID, Lolly Hernandez, MD, 10 mg at 02/05/23 2100    thiamine tablet 100 mg, 100 mg, Oral, Daily, Susan Wright MD, 100 mg at 02/05/23 0851    nicotine (NICODERM CQ) 21 MG/24HR 1 patch, 1 patch, TransDERmal, Daily, Susan Wright MD, 1 patch at 02/04/23 0840    guaiFENesin tablet 400 mg, 400 mg, Oral, TID, Susan Wright MD, 400 mg at 02/05/23 2100    docusate sodium (COLACE) capsule 100 mg, 100 mg, Oral, Daily, Susan Wright MD, 100 mg at 02/05/23 4886    magnesium hydroxide (MILK OF MAGNESIA) 400 MG/5ML suspension 30 mL, 30 mL, Oral, Daily PRN, Melissa Agudelo MD    aluminum & magnesium hydroxide-simethicone (MAALOX) 200-200-20 MG/5ML suspension 30 mL, 30 mL, Oral, PRN, Melissa Agudelo MD    haloperidol (HALDOL) tablet 3 mg, 3 mg, Oral, Q6H PRN **OR** haloperidol lactate (HALDOL) injection 3 mg, 3 mg, IntraMUSCular, Q6H PRN, Melissa Agudelo MD    melatonin tablet 3 mg, 3 mg, Oral, Nightly, Melissa Agudelo MD, 3 mg at 02/05/23 2100    Examination:  /78   Pulse 84   Temp 98.2 °F (36.8 °C) (Oral)   Resp 16   Ht 6' (1.829 m)   Wt 260 lb (117.9 kg)   SpO2 95%   BMI 35.26 kg/m²   Gait - steady    HOSPITAL COURSE[de-identified]  Following admission to the hospital, patient had a complete physical exam and blood work up, which he was medically cleared and admitted to [facility] for psychiatric evaluation and stabilization. The patient was monitored closely with suicide and appropriate precautions. He was started on Depakote 250 mg twice daily for mood stabilization and naltrexone 50 mg daily for his alcohol cravings. During admission the patient did not endorse any suicidal ideation rather he repeatedly asked for his disability forms to be signed, and repeatedly asked for medical excuses dating prior to his admission of the hospitalization he was asking for medical documentation going back to January 15, 2023 and he wanted the psychiatric team to write him off of work for that time stating that his symptoms started on 1/15/2023.   Patient was informed that we were not able to complete the disability forms for him here as this was an acute care setting and this is no demonstration of long-term disability or even short-term disability for that matter. Patient presented with acute alcohol intoxication after relapsing on alcohol and his blood alcohol level in the emergency room was 386. He then began sabotaging his discharge from the psychiatric unit and was making suicidal statements such as \"I might be suicidal\" he also became difficult about signing releases of information and participating with his discharge planning. He was quite evidently demonstrating malingering behaviors he was manipulative he was staff splitting he was asking every staff member he came in contact with about getting his disability papers signed and he asked multiple social workers for documentation that they are not able to provide for him since the dates he was requesting were prior to his hospitalization at this facility. Patient did become quite irritable and condescending when his requests were not able to be fulfilled. He at no time demonstrated any suicidal ideation on day of discharge he did not endorse any suicidal ideations however the day before he stated that he might be suicidal, and stated that he felt confused though he operated very linear demonstrated no signs of confusion and at no point during psychiatric hospitalization did he demonstrate any acute psychiatric disorder. We were concerned about his malingering, he also has a personality disorder which remain a static risk factor for him he also has an alcohol use disorder and a history consistent with substance abuse and dependence. He was very concerned about his substance abuse diagnosis being on his medical record and in his discharge summary. At no point did he demonstrate any behaviors consistent with suicidality or depression or psychiatric illness.   He was linear goal-directed future focused manipulative with significant secondary gain. He was encouraged to participate in group and other milieu activity and started to feel better with this combination of treatment. There has been significant progress in the improvement of symptoms since admission. The patient has been an active participant in his treatment, and discharge The patient has been an active participant in his treatment, and discharge planning. The patient was able to spontaneously describe with richness of detail their future plans and goals. Patient was no longer suicidal, homicidal, manic or psychotic. He received the required treatment with medication, participated in group milieu, remained engaged in unit activities, learned appropriate coping skills. He was seen to be watching television socializing with peers using the phone. There were no mention or gestures of self-harm or harm to others. His mental status has returned to baseline. The treatment team believes the patient obtain maximum benefit out of this hospitalization and does not meet the criteria for inpatient hospitalization anymore. However he will continue to benefit from outpatient follow-up and treatment to maintain stability. Collateral information has been obtained and reconciled and there are no concerns about his safety. He has no access to guns or weapons. He appreciates the help that he received here. This patient no longer meets criteria for inpatient hospitalization. He was discharged home to his family in psychiatrically stable condition. No active SI/HI  Appetite:  [x] Normal  [] Increased  [] Decreased    Sleep:       [x] Normal  [] Fair       [] Poor            Energy:    [x] Normal  [] Increased  [] Decreased     SI [] Present  [x] Absent  HI  []Present  [x] Absent   Aggression:  [] yes  [x] no  Patient is [x] able  [] unable to CONTRACT FOR SAFETY   Medication side effects(SE):  [x] None(Psych.  Meds.) [] Other      Mental Status Examination on discharge:    Level of consciousness:  within normal limits   Appearance:  well-appearing  Behavior/Motor:  no abnormalities noted  Attitude toward examiner:  attentive and good eye contact  Speech:  spontaneous, normal rate and normal volume   Mood: euthymic  Affect:  mood congruent  Thought processes:  linear, goal directed, and coherent   Thought content: The patient is devoid of suicidal or homicidal ideation intent or plan. Devoid of auditory or visual hallucinations or other perceptual disturbances, there are no overt or covert signs of psychosis or paranoia. There are no neurovegetative signs of depression.   Cognition:  oriented to person, place, and time   Concentration intact  Memory intact  Insight good   Judgement fair   Fund of Knowledge adequate      ASSESSMENT:  Patient symptoms are:  [x] Well controlled  [x] Improving  [] Worsening  [] No change    Reason for more than one antipsychotic:  [x] N/A  [] 3 Failed Monotherapy attempts (Drugs tried:)  [] Crossover to a new antipsychotic  [] Taper to Monotherapy from Polypharmacy  [] Augmentation of clozapine therapy due to treatment resistance to single therapy    Diagnosis:  Principal Problem:    Malingering  Active Problems:    Personality disorder ([facility]    Alcohol abuse  Resolved Problems:    Major depressive disorder, recurrent episode with mixed features ([facility]      LABS:    Recent Labs     02/04/23  0551 02/05/23  0635   WBC 9.5 9.5   HGB 13.5 14.1    239     Recent Labs     02/04/23  0551 02/05/23  0635    138   K 4.0 4.1   CL 97* 98   CO2 30* 31*   BUN 13 14   CREATININE 0.7 0.7   GLUCOSE 250* 229*     Recent Labs     02/04/23  0551 02/05/23  0635   BILITOT 0.5 0.7   ALKPHOS 110 102   AST 13 10   ALT 52* 45*     Lab Results   Component Value Date/Time    LABAMPH NOT DETECTED 01/29/2023 11:27 PM    BARBSCNU NOT DETECTED 01/29/2023 11:27 PM    LABBENZ NOT DETECTED 01/29/2023 11:27 PM    LABBENZ SEE BELOW 09/13/2014 04:20 PM    CANNAB NOT DETECTED 05/19/2013 09:54 PM    COCAINESCRN NOT DETECTED 05/19/2013 09:54 PM    LABMETH NOT DETECTED 01/29/2023 11:27 PM    OPIATESCREENURINE NOT DETECTED 01/29/2023 11:27 PM    PHENCYCLIDINESCREENURINE NOT DETECTED 01/29/2023 11:27 PM    PPXUR NOT DETECTED 05/19/2013 09:54 PM    ETOH 386 01/29/2023 11:27 PM     Lab Results   Component Value Date/Time    TSH 1.740 10/14/2020 08:59 AM     No results found for: LITHIUM  No results found for: VALPROATE, CBMZ    RISK ASSESSMENT AT DISCHARGE: Low risk for suicide and homicide. Treatment Plan:  The patient's diagnosis, treatment plan, medication management were formulated after patient was seen directly by the attending physician and myself and all relevant documentation was reviewed. Risk, benefit, side effects, possible outcomes of the medication and alternatives discussed with the patient and the patient demonstrated understanding. The patient was also educated that the outcome of treatment will depend on the medication compliance as directed by the prescribers along with regular follow-up, compliance with the labs and other work-up, as clinically indicated. The patient was referred to outpatient/inpatient substance abuse rehabilitation programming. He was educated multiple times during the hospitalization that if he chooses to continue to use drugs or alcohol, he may potentially act out impulsively, resulting in serious harm to self or others, even though unintentional.  He was also educated that mental health treatment cannot be optimized with ongoing use of drugs. He demonstrated understanding has the capacity to understand that. Patient significantly minimizing his substance abuse he declined referral to substance abuse rehabilitation programming. He was very concerned about substance abuse diagnosis being on his discharge summary.     In addition to the patient's alcohol abuse he has demonstrated significant personality traits consistent with antisocial behaviors, this will remain a static risk factor for this patient. Risks, benefits, side effects, drug-to-drug interactions and alternatives to treatment were discussed. Encourage patient to attend outpatient follow up appointment and therapy, outpatient follow-up appointment scheduled prior to discharge patient was scheduled appointments for both his primary care and psychiatric needs. Patient was advised to call the outpatient provider, visit the nearest ED or call 911 if symptoms are not manageable. Patient's family member was contacted prior to the discharge, patient has been discharged home in psychiatrically stable condition     Medication List        START taking these medications      divalproex 250 MG DR tablet  Commonly known as: DEPAKOTE  Take 1 tablet by mouth every 12 hours     melatonin 3 MG Tabs tablet  Take 1 tablet by mouth nightly     naltrexone 50 MG tablet  Commonly known as: DEPADE  Take 1 tablet by mouth daily (with breakfast)     nicotine 21 MG/24HR  Commonly known as: NICODERM CQ  Place 1 patch onto the skin daily     OLANZapine 5 MG tablet  Commonly known as: ZYPREXA  Take 1 tablet by mouth nightly     thiamine 100 MG tablet  Take 1 tablet by mouth daily            CHANGE how you take these medications      albuterol sulfate  (90 Base) MCG/ACT inhaler  Commonly known as: PROVENTIL;VENTOLIN;PROAIR  What changed: Another medication with the same name was removed. Continue taking this medication, and follow the directions you see here. CONTINUE taking these medications      amLODIPine 10 MG tablet  Commonly known as: Norvasc  Take 1 tablet by mouth daily.      aspirin 81 MG chewable tablet  Take 1 tablet by mouth daily     atorvastatin 10 MG tablet  Commonly known as: LIPITOR     fluticasone 50 MCG/ACT nasal spray  Commonly known as: FLONASE     gabapentin 300 MG capsule  Commonly known as: NEURONTIN     meloxicam 7.5 MG tablet  Commonly known as: MOBIC     oxybutynin 10 MG extended release tablet  Commonly known as: DITROPAN-XL     pantoprazole 40 MG tablet  Commonly known as: PROTONIX     tamsulosin 0.4 MG capsule  Commonly known as: FLOMAX     Trelegy Ellipta 200-62.5-25 MCG/ACT Aepb inhaler  Generic drug: fluticasone-umeclidin-vilant            STOP taking these medications      buPROPion 150 MG extended release tablet  Commonly known as: WELLBUTRIN SR     traMADol 50 MG tablet  Commonly known as: ULTRAM     zolpidem 5 MG tablet  Commonly known as: AMBIEN               Where to Get Your Medications        These medications were sent to [facility], [address]  [address] [id number]      Phone: [phone]   divalproex 250 MG DR tablet  naltrexone 50 MG tablet  OLANZapine 5 MG tablet       You can get these medications from any pharmacy    You don't need a prescription for these medications  melatonin 3 MG Tabs tablet  thiamine 100 MG tablet       Information about where to get these medications is not yet available    Ask your nurse or doctor about these medications  nicotine 21 MG/24HR     NOTE: This report was transcribed using voice recognition software. Every effort was made to ensure accuracy; however, inadvertent computerized transcription errors may be present.       TIME SPEND - 35 MINUTES TO COMPLETE THE EVALUATION, DISCHARGE SUMMARY, MEDICATION RECONCILIATION AND FOLLOW UP CARE     Signed:  MIKE Cardenas CNP  2/6/2023  8:19 AM

## 2023-02-06 NOTE — CARE COORDINATION
NETO met with pt to discuss his discharge. Pt reports feeling good today, denied SI/HI/AVH. Pt expressed feeling ready to discharge home with his radha mother. Pt stated his ride has been scheduled and they will be able to pick him up around 3:45 pm today. Pt is aware that an appointment has been scheduled with his PCP Dr Darrick Woodson. Pt is also aware that his PCP will be able to refer him to their counselors and or psychiatrists at that appointment. Pt reports he already informed someone that he does not want any follow up appointments in [place] because he does not have any transportation. SW advised pt that SW will cancel his intake appointment with [facility] in [place]. Pt is aware that a work excuse has been provided to his RN. Pt reports his symptoms started on the 15th. SW advised pt that SW can only provide him an excuse for the dates that he was in the hospital. Pt then stated he needs his excuse to say he needs time off long term. SW advised pt that SW cannot give him an extended length of time off and encouraged him to discuss this with his primary care physician. Pt verbalized understanding. Collateral was unable to be obtained due to pt denying to sign an KANU. Pt has access to safe/stable housing, essential needs, friend support, outpatient provider. Pt cooperative, calm, pleasant, with good eye contact, clear speech, and improved insight/judgement. NETO contacted [address] [place] [address] and cancelled pt follow up appointment on 2/10.     In order to ensure appropriate transition and discharge planning is in place, the following documents have been transmitted to [address] [place] [address] , as the new outpatient provider:    The d/c diagnosis was transmitted to the next care provider  The reason for hospitalization was transmitted to the next care provider  The d/c medications (dosage and indication) were transmitted to the next care provider   The continuing care plan was transmitted to the next care provider

## 2023-02-06 NOTE — BH NOTE
Patient irritated that this nurse did not know what each pill looked like. Wanted to know which pill was which in the medicine cup, even after telling him what was in the cup. Argumentative. Patient blood pressure taken before amlodipine given, 120/76. CIWA 1, requested librium. Also requested vistaril.

## 2023-02-06 NOTE — PROGRESS NOTES
Attended morning community meeting. Updated on staffing, schedule and expectations of the day. Appeared to be actively listening and engaged on instructions. Patient was 1 of 14 in attendance.

## 2023-04-13 NOTE — PROGRESS NOTES
Secure perfect serve message sent to Dr Kayla Cueto regarding critical lactic acid level of 5.06.  See new orders Abdomen soft, non-tender and non-distended, no rebound, no guarding and no masses. no hepatosplenomegaly.

## 2023-04-21 ENCOUNTER — HOSPITAL ENCOUNTER (OUTPATIENT)
Age: 53
Setting detail: OBSERVATION
Discharge: HOME | End: 2023-04-22
Attending: EMERGENCY MEDICINE | Admitting: INTERNAL MEDICINE
Payer: MEDICARE

## 2023-04-21 ENCOUNTER — APPOINTMENT (OUTPATIENT)
Dept: GENERAL RADIOLOGY | Age: 53
End: 2023-04-21
Payer: MEDICARE

## 2023-04-21 DIAGNOSIS — J96.01 ACUTE RESPIRATORY FAILURE WITH HYPOXIA (HCC): ICD-10-CM

## 2023-04-21 DIAGNOSIS — J44.1 COPD EXACERBATION (HCC): Primary | ICD-10-CM

## 2023-04-21 PROBLEM — J45.901 ACUTE EXACERBATION OF ASTHMA WITH ALLERGIC RHINITIS: Status: ACTIVE | Noted: 2023-04-21

## 2023-04-21 LAB
ALBUMIN SERPL-MCNC: 4.1 G/DL (ref 3.5–5.2)
ALP SERPL-CCNC: 86 U/L (ref 40–129)
ALT SERPL-CCNC: 27 U/L (ref 0–40)
ANION GAP SERPL CALCULATED.3IONS-SCNC: 16 MMOL/L (ref 7–16)
AST SERPL-CCNC: 27 U/L (ref 0–39)
B PARAP IS1001 DNA NPH QL NAA+NON-PROBE: NOT DETECTED
B PERT.PT PRMT NPH QL NAA+NON-PROBE: NOT DETECTED
B.E.: 0.5 MMOL/L (ref -3–3)
BASOPHILS # BLD: 0.03 E9/L (ref 0–0.2)
BASOPHILS NFR BLD: 0.4 % (ref 0–2)
BILIRUB SERPL-MCNC: 0.5 MG/DL (ref 0–1.2)
BNP BLD-MCNC: 60 PG/ML (ref 0–125)
BUN SERPL-MCNC: 10 MG/DL (ref 6–20)
C PNEUM DNA NPH QL NAA+NON-PROBE: NOT DETECTED
CALCIUM SERPL-MCNC: 8.7 MG/DL (ref 8.6–10.2)
CHLORIDE SERPL-SCNC: 96 MMOL/L (ref 98–107)
CO2 SERPL-SCNC: 29 MMOL/L (ref 22–29)
COHB: 3.4 % (ref 0–1.5)
CREAT SERPL-MCNC: 0.9 MG/DL (ref 0.7–1.2)
CRITICAL: ABNORMAL
D DIMER: <200 NG/ML DDU
DATE ANALYZED: ABNORMAL
DATE OF COLLECTION: ABNORMAL
EKG ATRIAL RATE: 101 BPM
EKG ATRIAL RATE: 107 BPM
EKG P AXIS: 61 DEGREES
EKG P AXIS: 66 DEGREES
EKG P-R INTERVAL: 166 MS
EKG P-R INTERVAL: 170 MS
EKG Q-T INTERVAL: 352 MS
EKG Q-T INTERVAL: 354 MS
EKG QRS DURATION: 104 MS
EKG QRS DURATION: 98 MS
EKG QTC CALCULATION (BAZETT): 456 MS
EKG QTC CALCULATION (BAZETT): 472 MS
EKG R AXIS: -52 DEGREES
EKG R AXIS: -61 DEGREES
EKG T AXIS: 58 DEGREES
EKG T AXIS: 65 DEGREES
EKG VENTRICULAR RATE: 101 BPM
EKG VENTRICULAR RATE: 107 BPM
EOSINOPHIL # BLD: 0.05 E9/L (ref 0.05–0.5)
EOSINOPHIL NFR BLD: 0.7 % (ref 0–6)
ERYTHROCYTE [DISTWIDTH] IN BLOOD BY AUTOMATED COUNT: 14 FL (ref 11.5–15)
FLUAV RNA NPH QL NAA+NON-PROBE: NOT DETECTED
FLUBV RNA NPH QL NAA+NON-PROBE: NOT DETECTED
GLUCOSE SERPL-MCNC: 150 MG/DL (ref 74–99)
HADV DNA NPH QL NAA+NON-PROBE: NOT DETECTED
HCO3: 26.1 MMOL/L (ref 22–26)
HCOV 229E RNA NPH QL NAA+NON-PROBE: NOT DETECTED
HCOV HKU1 RNA NPH QL NAA+NON-PROBE: NOT DETECTED
HCOV NL63 RNA NPH QL NAA+NON-PROBE: NOT DETECTED
HCOV OC43 RNA NPH QL NAA+NON-PROBE: NOT DETECTED
HCT VFR BLD AUTO: 46.5 % (ref 37–54)
HGB BLD-MCNC: 14.7 G/DL (ref 12.5–16.5)
HHB: 6.7 % (ref 0–5)
HMPV RNA NPH QL NAA+NON-PROBE: NOT DETECTED
HPIV1 RNA NPH QL NAA+NON-PROBE: NOT DETECTED
HPIV2 RNA NPH QL NAA+NON-PROBE: NOT DETECTED
HPIV3 RNA NPH QL NAA+NON-PROBE: NOT DETECTED
HPIV4 RNA NPH QL NAA+NON-PROBE: NOT DETECTED
IMM GRANULOCYTES # BLD: 0.02 E9/L
IMM GRANULOCYTES NFR BLD: 0.3 % (ref 0–5)
LAB: ABNORMAL
LACTATE BLDV-SCNC: 4.2 MMOL/L (ref 0.5–1.9)
LACTATE BLDV-SCNC: 4.3 MMOL/L (ref 0.5–1.9)
LYMPHOCYTES # BLD: 1.91 E9/L (ref 1.5–4)
LYMPHOCYTES NFR BLD: 28.6 % (ref 20–42)
Lab: ABNORMAL
M PNEUMO DNA NPH QL NAA+NON-PROBE: NOT DETECTED
MCH RBC QN AUTO: 28.3 PG (ref 26–35)
MCHC RBC AUTO-ENTMCNC: 31.6 % (ref 32–34.5)
MCV RBC AUTO: 89.6 FL (ref 80–99.9)
METHB: 0.4 % (ref 0–1.5)
MODE: ABNORMAL
MONOCYTES # BLD: 0.64 E9/L (ref 0.1–0.95)
MONOCYTES NFR BLD: 9.6 % (ref 2–12)
NEUTROPHILS # BLD: 4.02 E9/L (ref 1.8–7.3)
NEUTS SEG NFR BLD: 60.4 % (ref 43–80)
O2 CONTENT: 19.2 ML/DL
O2 SATURATION: 93 % (ref 92–98.5)
O2HB: 89.5 % (ref 94–97)
OPERATOR ID: 359
PATIENT TEMP: 37 C
PCO2: 45.4 MMHG (ref 35–45)
PH BLOOD GAS: 7.38 (ref 7.35–7.45)
PLATELET # BLD AUTO: 282 E9/L (ref 130–450)
PMV BLD AUTO: 9.4 FL (ref 7–12)
PO2: 67.7 MMHG (ref 75–100)
POTASSIUM SERPL-SCNC: 3.8 MMOL/L (ref 3.5–5)
PROT SERPL-MCNC: 7.3 G/DL (ref 6.4–8.3)
RBC # BLD AUTO: 5.19 E12/L (ref 3.8–5.8)
RSV RNA NPH QL NAA+NON-PROBE: NOT DETECTED
RV+EV RNA NPH QL NAA+NON-PROBE: NOT DETECTED
SARS-COV-2 RNA NPH QL NAA+NON-PROBE: NOT DETECTED
SODIUM SERPL-SCNC: 141 MMOL/L (ref 132–146)
SOURCE, BLOOD GAS: ABNORMAL
THB: 15.3 G/DL (ref 11.5–16.5)
TIME ANALYZED: 1721
TROPONIN, HIGH SENSITIVITY: 11 NG/L (ref 0–11)
TROPONIN, HIGH SENSITIVITY: 14 NG/L (ref 0–11)
WBC # BLD: 6.7 E9/L (ref 4.5–11.5)

## 2023-04-21 PROCEDURE — 82805 BLOOD GASES W/O2 SATURATION: CPT

## 2023-04-21 PROCEDURE — 83605 ASSAY OF LACTIC ACID: CPT

## 2023-04-21 PROCEDURE — G0378 HOSPITAL OBSERVATION PER HR: HCPCS

## 2023-04-21 PROCEDURE — 93005 ELECTROCARDIOGRAM TRACING: CPT | Performed by: EMERGENCY MEDICINE

## 2023-04-21 PROCEDURE — 2580000003 HC RX 258: Performed by: EMERGENCY MEDICINE

## 2023-04-21 PROCEDURE — 36415 COLL VENOUS BLD VENIPUNCTURE: CPT

## 2023-04-21 PROCEDURE — 99285 EMERGENCY DEPT VISIT HI MDM: CPT

## 2023-04-21 PROCEDURE — 83880 ASSAY OF NATRIURETIC PEPTIDE: CPT

## 2023-04-21 PROCEDURE — 2700000000 HC OXYGEN THERAPY PER DAY

## 2023-04-21 PROCEDURE — 0202U NFCT DS 22 TRGT SARS-COV-2: CPT

## 2023-04-21 PROCEDURE — 93010 ELECTROCARDIOGRAM REPORT: CPT | Performed by: INTERNAL MEDICINE

## 2023-04-21 PROCEDURE — 71045 X-RAY EXAM CHEST 1 VIEW: CPT

## 2023-04-21 PROCEDURE — 94640 AIRWAY INHALATION TREATMENT: CPT

## 2023-04-21 PROCEDURE — 6360000002 HC RX W HCPCS: Performed by: CHIROPRACTOR

## 2023-04-21 PROCEDURE — 85025 COMPLETE CBC W/AUTO DIFF WBC: CPT

## 2023-04-21 PROCEDURE — 85378 FIBRIN DEGRADE SEMIQUANT: CPT

## 2023-04-21 PROCEDURE — 6370000000 HC RX 637 (ALT 250 FOR IP): Performed by: EMERGENCY MEDICINE

## 2023-04-21 PROCEDURE — 6370000000 HC RX 637 (ALT 250 FOR IP): Performed by: CHIROPRACTOR

## 2023-04-21 PROCEDURE — 94664 DEMO&/EVAL PT USE INHALER: CPT

## 2023-04-21 PROCEDURE — 80053 COMPREHEN METABOLIC PANEL: CPT

## 2023-04-21 PROCEDURE — 93005 ELECTROCARDIOGRAM TRACING: CPT | Performed by: CHIROPRACTOR

## 2023-04-21 PROCEDURE — 96372 THER/PROPH/DIAG INJ SC/IM: CPT

## 2023-04-21 PROCEDURE — 96361 HYDRATE IV INFUSION ADD-ON: CPT

## 2023-04-21 PROCEDURE — 6360000002 HC RX W HCPCS: Performed by: EMERGENCY MEDICINE

## 2023-04-21 PROCEDURE — 2580000003 HC RX 258: Performed by: CHIROPRACTOR

## 2023-04-21 PROCEDURE — 84484 ASSAY OF TROPONIN QUANT: CPT

## 2023-04-21 PROCEDURE — 96374 THER/PROPH/DIAG INJ IV PUSH: CPT

## 2023-04-21 RX ORDER — ACETAMINOPHEN 325 MG/1
650 TABLET ORAL EVERY 6 HOURS PRN
Status: DISCONTINUED | OUTPATIENT
Start: 2023-04-21 | End: 2023-04-22 | Stop reason: HOSPADM

## 2023-04-21 RX ORDER — LANOLIN ALCOHOL/MO/W.PET/CERES
100 CREAM (GRAM) TOPICAL DAILY
Status: DISCONTINUED | OUTPATIENT
Start: 2023-04-21 | End: 2023-04-22 | Stop reason: HOSPADM

## 2023-04-21 RX ORDER — SODIUM CHLORIDE 0.9 % (FLUSH) 0.9 %
5-40 SYRINGE (ML) INJECTION PRN
Status: DISCONTINUED | OUTPATIENT
Start: 2023-04-21 | End: 2023-04-22 | Stop reason: HOSPADM

## 2023-04-21 RX ORDER — LORAZEPAM 2 MG/ML
1 CONCENTRATE ORAL
Status: DISCONTINUED | OUTPATIENT
Start: 2023-04-21 | End: 2023-04-21 | Stop reason: SDUPTHER

## 2023-04-21 RX ORDER — DEXTROSE MONOHYDRATE 100 MG/ML
INJECTION, SOLUTION INTRAVENOUS CONTINUOUS PRN
Status: DISCONTINUED | OUTPATIENT
Start: 2023-04-21 | End: 2023-04-22 | Stop reason: HOSPADM

## 2023-04-21 RX ORDER — NICOTINE 21 MG/24HR
1 PATCH, TRANSDERMAL 24 HOURS TRANSDERMAL DAILY
Status: DISCONTINUED | OUTPATIENT
Start: 2023-04-21 | End: 2023-04-22 | Stop reason: HOSPADM

## 2023-04-21 RX ORDER — LORAZEPAM 1 MG/1
1 TABLET ORAL
Status: DISCONTINUED | OUTPATIENT
Start: 2023-04-21 | End: 2023-04-22 | Stop reason: HOSPADM

## 2023-04-21 RX ORDER — LORAZEPAM 2 MG/ML
4 CONCENTRATE ORAL
Status: DISCONTINUED | OUTPATIENT
Start: 2023-04-21 | End: 2023-04-21 | Stop reason: SDUPTHER

## 2023-04-21 RX ORDER — IPRATROPIUM BROMIDE AND ALBUTEROL SULFATE 2.5; .5 MG/3ML; MG/3ML
3 SOLUTION RESPIRATORY (INHALATION) ONCE
Status: COMPLETED | OUTPATIENT
Start: 2023-04-21 | End: 2023-04-21

## 2023-04-21 RX ORDER — LORAZEPAM 2 MG/ML
2 CONCENTRATE ORAL
Status: DISCONTINUED | OUTPATIENT
Start: 2023-04-21 | End: 2023-04-21 | Stop reason: SDUPTHER

## 2023-04-21 RX ORDER — PANTOPRAZOLE SODIUM 40 MG/1
40 TABLET, DELAYED RELEASE ORAL DAILY
Status: DISCONTINUED | OUTPATIENT
Start: 2023-04-22 | End: 2023-04-22 | Stop reason: HOSPADM

## 2023-04-21 RX ORDER — DIVALPROEX SODIUM 250 MG/1
250 TABLET, DELAYED RELEASE ORAL EVERY 12 HOURS SCHEDULED
Status: DISCONTINUED | OUTPATIENT
Start: 2023-04-21 | End: 2023-04-22 | Stop reason: HOSPADM

## 2023-04-21 RX ORDER — ATORVASTATIN CALCIUM 10 MG/1
10 TABLET, FILM COATED ORAL DAILY
Status: DISCONTINUED | OUTPATIENT
Start: 2023-04-21 | End: 2023-04-22 | Stop reason: HOSPADM

## 2023-04-21 RX ORDER — SODIUM CHLORIDE 9 MG/ML
INJECTION, SOLUTION INTRAVENOUS PRN
Status: DISCONTINUED | OUTPATIENT
Start: 2023-04-21 | End: 2023-04-22 | Stop reason: HOSPADM

## 2023-04-21 RX ORDER — LORAZEPAM 1 MG/1
2 TABLET ORAL
Status: DISCONTINUED | OUTPATIENT
Start: 2023-04-21 | End: 2023-04-21 | Stop reason: SDUPTHER

## 2023-04-21 RX ORDER — GABAPENTIN 300 MG/1
300 CAPSULE ORAL 3 TIMES DAILY
Status: DISCONTINUED | OUTPATIENT
Start: 2023-04-21 | End: 2023-04-22 | Stop reason: HOSPADM

## 2023-04-21 RX ORDER — LORAZEPAM 1 MG/1
2 TABLET ORAL
Status: DISCONTINUED | OUTPATIENT
Start: 2023-04-21 | End: 2023-04-22 | Stop reason: HOSPADM

## 2023-04-21 RX ORDER — POLYETHYLENE GLYCOL 3350 17 G/17G
17 POWDER, FOR SOLUTION ORAL DAILY PRN
Status: DISCONTINUED | OUTPATIENT
Start: 2023-04-21 | End: 2023-04-22 | Stop reason: HOSPADM

## 2023-04-21 RX ORDER — LORAZEPAM 1 MG/1
4 TABLET ORAL
Status: DISCONTINUED | OUTPATIENT
Start: 2023-04-21 | End: 2023-04-22 | Stop reason: HOSPADM

## 2023-04-21 RX ORDER — OLANZAPINE 5 MG/1
5 TABLET ORAL NIGHTLY
Status: DISCONTINUED | OUTPATIENT
Start: 2023-04-21 | End: 2023-04-22 | Stop reason: HOSPADM

## 2023-04-21 RX ORDER — 0.9 % SODIUM CHLORIDE 0.9 %
1000 INTRAVENOUS SOLUTION INTRAVENOUS ONCE
Status: COMPLETED | OUTPATIENT
Start: 2023-04-21 | End: 2023-04-21

## 2023-04-21 RX ORDER — BUDESONIDE 0.5 MG/2ML
0.5 INHALANT ORAL 2 TIMES DAILY
Status: DISCONTINUED | OUTPATIENT
Start: 2023-04-21 | End: 2023-04-22 | Stop reason: HOSPADM

## 2023-04-21 RX ORDER — ENOXAPARIN SODIUM 100 MG/ML
30 INJECTION SUBCUTANEOUS 2 TIMES DAILY
Status: DISCONTINUED | OUTPATIENT
Start: 2023-04-21 | End: 2023-04-22 | Stop reason: HOSPADM

## 2023-04-21 RX ORDER — LORAZEPAM 1 MG/1
1 TABLET ORAL
Status: DISCONTINUED | OUTPATIENT
Start: 2023-04-21 | End: 2023-04-21 | Stop reason: SDUPTHER

## 2023-04-21 RX ORDER — DOXYCYCLINE HYCLATE 100 MG/1
100 CAPSULE ORAL EVERY 12 HOURS SCHEDULED
Status: DISCONTINUED | OUTPATIENT
Start: 2023-04-21 | End: 2023-04-22 | Stop reason: HOSPADM

## 2023-04-21 RX ORDER — ZOLPIDEM TARTRATE 10 MG/1
10 TABLET ORAL NIGHTLY PRN
COMMUNITY

## 2023-04-21 RX ORDER — LORAZEPAM 2 MG/ML
2 INJECTION INTRAMUSCULAR
Status: DISCONTINUED | OUTPATIENT
Start: 2023-04-21 | End: 2023-04-22 | Stop reason: HOSPADM

## 2023-04-21 RX ORDER — INSULIN LISPRO 100 [IU]/ML
0-4 INJECTION, SOLUTION INTRAVENOUS; SUBCUTANEOUS NIGHTLY
Status: DISCONTINUED | OUTPATIENT
Start: 2023-04-21 | End: 2023-04-22 | Stop reason: HOSPADM

## 2023-04-21 RX ORDER — LORAZEPAM 1 MG/1
4 TABLET ORAL
Status: DISCONTINUED | OUTPATIENT
Start: 2023-04-21 | End: 2023-04-21 | Stop reason: SDUPTHER

## 2023-04-21 RX ORDER — TAMSULOSIN HYDROCHLORIDE 0.4 MG/1
0.4 CAPSULE ORAL 2 TIMES DAILY
Status: DISCONTINUED | OUTPATIENT
Start: 2023-04-21 | End: 2023-04-22 | Stop reason: HOSPADM

## 2023-04-21 RX ORDER — ONDANSETRON 4 MG/1
4 TABLET, ORALLY DISINTEGRATING ORAL EVERY 8 HOURS PRN
Status: DISCONTINUED | OUTPATIENT
Start: 2023-04-21 | End: 2023-04-22 | Stop reason: HOSPADM

## 2023-04-21 RX ORDER — BUPROPION HYDROCHLORIDE 150 MG/1
150 TABLET, EXTENDED RELEASE ORAL 2 TIMES DAILY
COMMUNITY

## 2023-04-21 RX ORDER — LORAZEPAM 2 MG/ML
1 INJECTION INTRAMUSCULAR
Status: DISCONTINUED | OUTPATIENT
Start: 2023-04-21 | End: 2023-04-22 | Stop reason: HOSPADM

## 2023-04-21 RX ORDER — METHYLPREDNISOLONE SODIUM SUCCINATE 40 MG/ML
40 INJECTION, POWDER, LYOPHILIZED, FOR SOLUTION INTRAMUSCULAR; INTRAVENOUS DAILY
Status: DISCONTINUED | OUTPATIENT
Start: 2023-04-22 | End: 2023-04-22 | Stop reason: HOSPADM

## 2023-04-21 RX ORDER — LORAZEPAM 2 MG/ML
3 INJECTION INTRAMUSCULAR
Status: DISCONTINUED | OUTPATIENT
Start: 2023-04-21 | End: 2023-04-22 | Stop reason: HOSPADM

## 2023-04-21 RX ORDER — ARFORMOTEROL TARTRATE 15 UG/2ML
15 SOLUTION RESPIRATORY (INHALATION) 2 TIMES DAILY
Status: DISCONTINUED | OUTPATIENT
Start: 2023-04-21 | End: 2023-04-22 | Stop reason: HOSPADM

## 2023-04-21 RX ORDER — AMLODIPINE BESYLATE 10 MG/1
10 TABLET ORAL DAILY
Status: DISCONTINUED | OUTPATIENT
Start: 2023-04-21 | End: 2023-04-22 | Stop reason: HOSPADM

## 2023-04-21 RX ORDER — LORAZEPAM 1 MG/1
3 TABLET ORAL
Status: DISCONTINUED | OUTPATIENT
Start: 2023-04-21 | End: 2023-04-22 | Stop reason: HOSPADM

## 2023-04-21 RX ORDER — IPRATROPIUM BROMIDE AND ALBUTEROL SULFATE 2.5; .5 MG/3ML; MG/3ML
1 SOLUTION RESPIRATORY (INHALATION)
Status: DISCONTINUED | OUTPATIENT
Start: 2023-04-22 | End: 2023-04-22 | Stop reason: HOSPADM

## 2023-04-21 RX ORDER — ONDANSETRON 2 MG/ML
4 INJECTION INTRAMUSCULAR; INTRAVENOUS EVERY 6 HOURS PRN
Status: DISCONTINUED | OUTPATIENT
Start: 2023-04-21 | End: 2023-04-22 | Stop reason: HOSPADM

## 2023-04-21 RX ORDER — LORAZEPAM 2 MG/ML
4 INJECTION INTRAMUSCULAR
Status: DISCONTINUED | OUTPATIENT
Start: 2023-04-21 | End: 2023-04-22 | Stop reason: HOSPADM

## 2023-04-21 RX ORDER — SODIUM CHLORIDE 0.9 % (FLUSH) 0.9 %
5-40 SYRINGE (ML) INJECTION EVERY 12 HOURS SCHEDULED
Status: DISCONTINUED | OUTPATIENT
Start: 2023-04-21 | End: 2023-04-22 | Stop reason: HOSPADM

## 2023-04-21 RX ORDER — MECOBALAMIN 5000 MCG
5 TABLET,DISINTEGRATING ORAL NIGHTLY
Status: DISCONTINUED | OUTPATIENT
Start: 2023-04-22 | End: 2023-04-22 | Stop reason: HOSPADM

## 2023-04-21 RX ORDER — LORAZEPAM 2 MG/ML
3 CONCENTRATE ORAL
Status: DISCONTINUED | OUTPATIENT
Start: 2023-04-21 | End: 2023-04-21 | Stop reason: SDUPTHER

## 2023-04-21 RX ORDER — ACETAMINOPHEN 650 MG/1
650 SUPPOSITORY RECTAL EVERY 6 HOURS PRN
Status: DISCONTINUED | OUTPATIENT
Start: 2023-04-21 | End: 2023-04-22 | Stop reason: HOSPADM

## 2023-04-21 RX ORDER — INSULIN LISPRO 100 [IU]/ML
0-4 INJECTION, SOLUTION INTRAVENOUS; SUBCUTANEOUS
Status: DISCONTINUED | OUTPATIENT
Start: 2023-04-22 | End: 2023-04-22 | Stop reason: HOSPADM

## 2023-04-21 RX ORDER — ASPIRIN 325 MG
325 TABLET ORAL ONCE
Status: COMPLETED | OUTPATIENT
Start: 2023-04-21 | End: 2023-04-21

## 2023-04-21 RX ORDER — METHYLPREDNISOLONE SODIUM SUCCINATE 125 MG/2ML
125 INJECTION, POWDER, LYOPHILIZED, FOR SOLUTION INTRAMUSCULAR; INTRAVENOUS ONCE
Status: COMPLETED | OUTPATIENT
Start: 2023-04-21 | End: 2023-04-21

## 2023-04-21 RX ORDER — ASPIRIN 81 MG/1
81 TABLET, CHEWABLE ORAL DAILY
Status: DISCONTINUED | OUTPATIENT
Start: 2023-04-22 | End: 2023-04-22 | Stop reason: HOSPADM

## 2023-04-21 RX ORDER — LORAZEPAM 1 MG/1
3 TABLET ORAL
Status: DISCONTINUED | OUTPATIENT
Start: 2023-04-21 | End: 2023-04-21 | Stop reason: SDUPTHER

## 2023-04-21 RX ADMIN — SODIUM CHLORIDE 1000 ML: 9 INJECTION, SOLUTION INTRAVENOUS at 15:29

## 2023-04-21 RX ADMIN — SODIUM CHLORIDE, PRESERVATIVE FREE 10 ML: 5 INJECTION INTRAVENOUS at 23:45

## 2023-04-21 RX ADMIN — SODIUM CHLORIDE 1000 ML: 9 INJECTION, SOLUTION INTRAVENOUS at 17:40

## 2023-04-21 RX ADMIN — METHYLPREDNISOLONE SODIUM SUCCINATE 125 MG: 125 INJECTION, POWDER, FOR SOLUTION INTRAMUSCULAR; INTRAVENOUS at 14:14

## 2023-04-21 RX ADMIN — GABAPENTIN 300 MG: 300 CAPSULE ORAL at 23:45

## 2023-04-21 RX ADMIN — BUDESONIDE 500 MCG: 0.5 SUSPENSION RESPIRATORY (INHALATION) at 21:04

## 2023-04-21 RX ADMIN — ARFORMOTEROL TARTRATE 15 MCG: 15 SOLUTION RESPIRATORY (INHALATION) at 21:04

## 2023-04-21 RX ADMIN — DOXYCYCLINE HYCLATE 100 MG: 100 CAPSULE ORAL at 23:44

## 2023-04-21 RX ADMIN — ATORVASTATIN CALCIUM 10 MG: 10 TABLET, FILM COATED ORAL at 23:44

## 2023-04-21 RX ADMIN — IPRATROPIUM BROMIDE AND ALBUTEROL SULFATE 3 AMPULE: 2.5; .5 SOLUTION RESPIRATORY (INHALATION) at 14:16

## 2023-04-21 RX ADMIN — ENOXAPARIN SODIUM 30 MG: 100 INJECTION SUBCUTANEOUS at 23:44

## 2023-04-21 RX ADMIN — ASPIRIN 325 MG: 325 TABLET ORAL at 14:15

## 2023-04-21 ASSESSMENT — ENCOUNTER SYMPTOMS
CHEST TIGHTNESS: 1
EYES NEGATIVE: 1
ALLERGIC/IMMUNOLOGIC NEGATIVE: 1
SHORTNESS OF BREATH: 1

## 2023-04-21 ASSESSMENT — PAIN - FUNCTIONAL ASSESSMENT: PAIN_FUNCTIONAL_ASSESSMENT: NONE - DENIES PAIN

## 2023-04-21 NOTE — ED NOTES
The following labs were labeled with appropriate pt sticker and tubed to lab:     [] Blue     [] Lavender   [] on ice  [x] Green/yellow  [] Green/black [] on ice  [x] Grey  [x] on ice  [] Yellow  [] Red  [] Type/ Screen  [] ABG  [] VBG    [] COVID-19 swab    [] Rapid  [] PCR  [] Flu swab  [] Peds Viral Panel     [] Urine Sample  [] Fecal Sample  [] Pelvic Cultures  [] Blood Cultures  [] X 2  [] STREP Cultures]       Pam Mayer RN  04/21/23 5033

## 2023-04-21 NOTE — ED NOTES
The following labs were labeled with appropriate pt sticker and tubed to lab:     [x] Blue     [x] Lavender   [] on ice  [x] Green/yellow  [] Green/black [] on ice  [x] Grey  [x] on ice  [] Yellow  [] Red  [] Type/ Screen  [] ABG  [] VBG    [] COVID-19 swab    [] Rapid  [] PCR  [] Flu swab  [] Peds Viral Panel     [] Urine Sample  [] Fecal Sample  [] Pelvic Cultures  [] Blood Cultures  [] X 2  [] STREP Cultures       Susi Jackson RN  04/21/23 8917

## 2023-04-21 NOTE — ED PROVIDER NOTES
within normal limits   COMPREHENSIVE METABOLIC PANEL W/ REFLEX TO MG FOR LOW K - Abnormal; Notable for the following components:    Chloride 96 (*)     Glucose 150 (*)     All other components within normal limits   TROPONIN - Abnormal; Notable for the following components:    Troponin, High Sensitivity 14 (*)     All other components within normal limits   LACTATE, SEPSIS - Abnormal; Notable for the following components:    Lactic Acid, Sepsis 4.2 (*)     All other components within normal limits    Narrative:     CALL  Gonzalez  H34 tel. ,  Chemistry results called to and read back by олег mora, 04/21/2023 15:10, by  [address]   D-DIMER, QUANTITATIVE   TROPONIN    Narrative:     High Sensitivity Troponin T       As interpreted by me, the above displayed labs are abnormal. All other labs obtained during this visit were within normal range or not returned as of this dictation. RADIOLOGY:   Unless a wet read is documented in the ED course or MDM, non-plain film images such as CT, Ultrasound and MRI are read by the radiologist unless otherwise specified in the medical decision-making. Plain radiographic images are preliminarily interpreted by this ED Provider with the findings documented in the ED Course with official radiology read to follow. Interpretation per the Radiologist below, if available at the time of this note:    XR CHEST PORTABLE   Final Result   No acute process. No results found. No results found. PAST MEDICAL HISTORY/Chronic Conditions Affecting Care      has a past medical history of Acid reflux, Asthma, Asthma, COPD (chronic obstructive pulmonary disease) ([address] Hypertension, Pancreatitis, Prediabetes, Psychiatric problem, Sleep apnea, and Substance abuse ([address]      EMERGENCY DEPARTMENT COURSE    Vitals:    Vitals:    04/21/23 1415 04/21/23 1418 04/21/23 1424 04/21/23 1430   BP: 122/72   96/73   Pulse: (!) 114 (!) 111 (!) 104 (!) 108   Resp: 17 24 17 20

## 2023-04-21 NOTE — ED NOTES
The following labs were labeled with appropriate pt sticker and tubed to lab:     [] Blue     [] Lavender   [] on ice  [x] Green/yellow  [] Green/black [] on ice  [] [place]  [] on ice  [] Yellow  [] Red  [] Type/ Screen  [] ABG  [] VBG    [] COVID-19 swab    [] Rapid  [] PCR  [] Flu swab  [] Peds Viral Panel     [] Urine Sample  [] Fecal Sample  [] Pelvic Cultures  [] Blood Cultures  [] X 2  [] STREP Cultures       Val Ruiz RN  04/21/23 4107

## 2023-04-21 NOTE — H&P
nature. ED Course: In the ED the patient was hemodynamically stable. Initial vitals show tachycardia and normotension with episode of borderline low BP. Initial work-up showed lactic acidosis at 4.2 with an otherwise unremarkable BMP. proBNP at 60, troponin 14, LFTs unremarkable, CBC showed no leukocytosis. He had to be placed on NC 2-4L to maintain sat  95%. Chest x-ray showed left lower lobe atelectasis, but otherwise no acute process. ED Meds: Patient was given methylprednisolone, DuoNebs,   ED Fluids: Patient was given 2 L bolus NS    Previous Hospital Admission: 02/01/2023 for suicidal ideations, alcohol withdrawal and acute hypoxic respiratory failure. Past Medical History:       Diagnosis Date    Acid reflux     Asthma     Asthma     COPD (chronic obstructive pulmonary disease) ([facility])     Hypertension     Pancreatitis     Prediabetes     Psychiatric problem     depressed    Sleep apnea     Substance abuse ([facility]     alcohol and cocaine       Past Surgical History:        Procedure Laterality Date    COLONOSCOPY      2010 neg    NERVE BLOCK Left 08/27/2018    lumbar epidural #1 paramedian    [place] NJX DX/THER SBST INTRLMNR LMBR/SAC W/IMG GDN Left 8/27/2018    LUMBAR EPIDURAL STEROID INJECTION L4-5 LEFT PARAMEDIAN #1 performed by Jacinto Talavera MD at [address] [facility]         Medications Prior to Admission:    Prior to Admission medications    Medication Sig Start Date End Date Taking?  Authorizing Provider   divalproex (DEPAKOTE) 250 MG DR tablet Take 1 tablet by mouth every 12 hours 2/4/23 3/6/23  MIKE Grubbs CNP   OLANZapine (ZYPREXA) 5 MG tablet Take 1 tablet by mouth nightly 2/4/23   MIKE Grubbs CNP   melatonin 3 MG TABS tablet Take 1 tablet by mouth nightly 2/4/23   MIKE Grubbs CNP   nicotine (NICODERM CQ) 21 MG/24HR Place 1 patch onto the skin daily 2/5/23   MIKE Grubbs CNP   thiamine 100 MG tablet Take 1 tablet by

## 2023-04-21 NOTE — ED NOTES
Nurse to nurse called to [facility]. Patient in transport to go to the unit.       Pepe Senior RN  04/21/23 8528

## 2023-04-22 VITALS
TEMPERATURE: 97.7 F | HEART RATE: 76 BPM | HEIGHT: 69 IN | WEIGHT: 257.9 LBS | BODY MASS INDEX: 38.2 KG/M2 | DIASTOLIC BLOOD PRESSURE: 88 MMHG | OXYGEN SATURATION: 93 % | RESPIRATION RATE: 18 BRPM | SYSTOLIC BLOOD PRESSURE: 137 MMHG

## 2023-04-22 PROBLEM — J96.12 CHRONIC HYPERCAPNIC RESPIRATORY FAILURE (HCC): Status: ACTIVE | Noted: 2023-04-22

## 2023-04-22 LAB
ABO + RH BLD: NORMAL
AMMONIA PLAS-SCNC: 43 UMOL/L (ref 16–60)
ANION GAP SERPL CALCULATED.3IONS-SCNC: 9 MMOL/L (ref 7–16)
ANISOCYTOSIS: ABNORMAL
BASOPHILS # BLD: 0 E9/L (ref 0–0.2)
BASOPHILS NFR BLD: 0 % (ref 0–2)
BLD GP AB SCN SERPL QL: NORMAL
BUN SERPL-MCNC: 9 MG/DL (ref 6–20)
CALCIUM SERPL-MCNC: 9.2 MG/DL (ref 8.6–10.2)
CHLORIDE SERPL-SCNC: 99 MMOL/L (ref 98–107)
CO2 SERPL-SCNC: 31 MMOL/L (ref 22–29)
CREAT SERPL-MCNC: 0.6 MG/DL (ref 0.7–1.2)
EOSINOPHIL # BLD: 0 E9/L (ref 0.05–0.5)
EOSINOPHIL NFR BLD: 0 % (ref 0–6)
ERYTHROCYTE [DISTWIDTH] IN BLOOD BY AUTOMATED COUNT: 13.8 FL (ref 11.5–15)
GLUCOSE SERPL-MCNC: 191 MG/DL (ref 74–99)
HCT VFR BLD AUTO: 46.5 % (ref 37–54)
HGB BLD-MCNC: 14.7 G/DL (ref 12.5–16.5)
LACTATE BLDV-SCNC: 1.3 MMOL/L (ref 0.5–2.2)
LACTATE BLDV-SCNC: 1.9 MMOL/L (ref 0.5–2.2)
LACTATE BLDV-SCNC: 2.4 MMOL/L (ref 0.5–2.2)
LYMPHOCYTES # BLD: 0.55 E9/L (ref 1.5–4)
LYMPHOCYTES NFR BLD: 5.2 % (ref 20–42)
MCH RBC QN AUTO: 27.7 PG (ref 26–35)
MCHC RBC AUTO-ENTMCNC: 31.6 % (ref 32–34.5)
MCV RBC AUTO: 87.6 FL (ref 80–99.9)
METAMYELOCYTES NFR BLD MANUAL: 0.8 % (ref 0–1)
MONOCYTES # BLD: 0.28 E9/L (ref 0.1–0.95)
MONOCYTES NFR BLD: 4.3 % (ref 2–12)
NEUTROPHILS # BLD: 6.07 E9/L (ref 1.8–7.3)
NEUTS SEG NFR BLD: 87.1 % (ref 43–80)
NRBC BLD-RTO: 0.9 /100 WBC
OVALOCYTES: ABNORMAL
PLATELET # BLD AUTO: 291 E9/L (ref 130–450)
PMV BLD AUTO: 9.1 FL (ref 7–12)
POIKILOCYTES: ABNORMAL
POLYCHROMASIA: ABNORMAL
POTASSIUM SERPL-SCNC: 4 MMOL/L (ref 3.5–5)
PROCALCITONIN: 0.03 NG/ML (ref 0–0.08)
RBC # BLD AUTO: 5.31 E12/L (ref 3.8–5.8)
SODIUM SERPL-SCNC: 139 MMOL/L (ref 132–146)
TARGET CELLS: ABNORMAL
VARIANT LYMPHS NFR BLD: 2.6 % (ref 0–4)
WBC # BLD: 6.9 E9/L (ref 4.5–11.5)

## 2023-04-22 PROCEDURE — 85025 COMPLETE CBC W/AUTO DIFF WBC: CPT

## 2023-04-22 PROCEDURE — 96376 TX/PRO/DX INJ SAME DRUG ADON: CPT

## 2023-04-22 PROCEDURE — 80048 BASIC METABOLIC PNL TOTAL CA: CPT

## 2023-04-22 PROCEDURE — 82140 ASSAY OF AMMONIA: CPT

## 2023-04-22 PROCEDURE — 94640 AIRWAY INHALATION TREATMENT: CPT

## 2023-04-22 PROCEDURE — 86901 BLOOD TYPING SEROLOGIC RH(D): CPT

## 2023-04-22 PROCEDURE — 2580000003 HC RX 258: Performed by: CHIROPRACTOR

## 2023-04-22 PROCEDURE — G0378 HOSPITAL OBSERVATION PER HR: HCPCS

## 2023-04-22 PROCEDURE — 6370000000 HC RX 637 (ALT 250 FOR IP): Performed by: CHIROPRACTOR

## 2023-04-22 PROCEDURE — 2700000000 HC OXYGEN THERAPY PER DAY

## 2023-04-22 PROCEDURE — 6370000000 HC RX 637 (ALT 250 FOR IP): Performed by: STUDENT IN AN ORGANIZED HEALTH CARE EDUCATION/TRAINING PROGRAM

## 2023-04-22 PROCEDURE — 99222 1ST HOSP IP/OBS MODERATE 55: CPT | Performed by: INTERNAL MEDICINE

## 2023-04-22 PROCEDURE — 84145 PROCALCITONIN (PCT): CPT

## 2023-04-22 PROCEDURE — 6360000002 HC RX W HCPCS: Performed by: CHIROPRACTOR

## 2023-04-22 PROCEDURE — 83605 ASSAY OF LACTIC ACID: CPT

## 2023-04-22 PROCEDURE — 86850 RBC ANTIBODY SCREEN: CPT

## 2023-04-22 PROCEDURE — 36415 COLL VENOUS BLD VENIPUNCTURE: CPT

## 2023-04-22 PROCEDURE — 86900 BLOOD TYPING SEROLOGIC ABO: CPT

## 2023-04-22 PROCEDURE — 96372 THER/PROPH/DIAG INJ SC/IM: CPT

## 2023-04-22 PROCEDURE — 6370000000 HC RX 637 (ALT 250 FOR IP): Performed by: INTERNAL MEDICINE

## 2023-04-22 RX ORDER — DOXYCYCLINE HYCLATE 100 MG/1
100 CAPSULE ORAL EVERY 12 HOURS SCHEDULED
Qty: 8 CAPSULE | Refills: 0 | OUTPATIENT
Start: 2023-04-22 | End: 2023-04-26

## 2023-04-22 RX ORDER — ZOLPIDEM TARTRATE 5 MG/1
10 TABLET ORAL NIGHTLY PRN
Status: DISCONTINUED | OUTPATIENT
Start: 2023-04-22 | End: 2023-04-22 | Stop reason: HOSPADM

## 2023-04-22 RX ORDER — DOXYCYCLINE HYCLATE 100 MG/1
100 CAPSULE ORAL EVERY 12 HOURS SCHEDULED
Qty: 8 CAPSULE | Refills: 0 | Status: SHIPPED | OUTPATIENT
Start: 2023-04-22 | End: 2023-04-26

## 2023-04-22 RX ORDER — DOXYCYCLINE HYCLATE 100 MG
100 TABLET ORAL 2 TIMES DAILY
Qty: 8 TABLET | Refills: 0 | Status: CANCELLED | OUTPATIENT
Start: 2023-04-22 | End: 2023-04-26

## 2023-04-22 RX ORDER — PREDNISONE 20 MG/1
40 TABLET ORAL DAILY
Qty: 8 TABLET | Refills: 0 | Status: SHIPPED | OUTPATIENT
Start: 2023-04-22 | End: 2023-04-26

## 2023-04-22 RX ORDER — PREDNISONE 20 MG/1
40 TABLET ORAL DAILY
Qty: 8 TABLET | Refills: 0 | OUTPATIENT
Start: 2023-04-22 | End: 2023-04-26

## 2023-04-22 RX ORDER — BUPROPION HYDROCHLORIDE 150 MG/1
150 TABLET, EXTENDED RELEASE ORAL 2 TIMES DAILY
Status: DISCONTINUED | OUTPATIENT
Start: 2023-04-22 | End: 2023-04-22 | Stop reason: HOSPADM

## 2023-04-22 RX ADMIN — PANTOPRAZOLE SODIUM 40 MG: 40 TABLET, DELAYED RELEASE ORAL at 06:08

## 2023-04-22 RX ADMIN — TAMSULOSIN HYDROCHLORIDE 0.4 MG: 0.4 CAPSULE ORAL at 09:53

## 2023-04-22 RX ADMIN — GABAPENTIN 300 MG: 300 CAPSULE ORAL at 09:53

## 2023-04-22 RX ADMIN — Medication 100 MG: at 09:52

## 2023-04-22 RX ADMIN — Medication 5 MG: at 00:27

## 2023-04-22 RX ADMIN — DIVALPROEX SODIUM 250 MG: 250 TABLET, DELAYED RELEASE ORAL at 09:53

## 2023-04-22 RX ADMIN — ASPIRIN 81 MG 81 MG: 81 TABLET ORAL at 09:53

## 2023-04-22 RX ADMIN — AMLODIPINE BESYLATE 10 MG: 10 TABLET ORAL at 09:52

## 2023-04-22 RX ADMIN — BUDESONIDE 500 MCG: 0.5 SUSPENSION RESPIRATORY (INHALATION) at 09:10

## 2023-04-22 RX ADMIN — ENOXAPARIN SODIUM 30 MG: 100 INJECTION SUBCUTANEOUS at 09:52

## 2023-04-22 RX ADMIN — ARFORMOTEROL TARTRATE 15 MCG: 15 SOLUTION RESPIRATORY (INHALATION) at 09:10

## 2023-04-22 RX ADMIN — METHYLPREDNISOLONE SODIUM SUCCINATE 40 MG: 40 INJECTION, POWDER, FOR SOLUTION INTRAMUSCULAR; INTRAVENOUS at 09:53

## 2023-04-22 RX ADMIN — SODIUM CHLORIDE, PRESERVATIVE FREE 10 ML: 5 INJECTION INTRAVENOUS at 09:59

## 2023-04-22 RX ADMIN — DOXYCYCLINE HYCLATE 100 MG: 100 CAPSULE ORAL at 09:52

## 2023-04-22 RX ADMIN — BUPROPION HYDROCHLORIDE 150 MG: 150 TABLET, EXTENDED RELEASE ORAL at 10:08

## 2023-04-22 RX ADMIN — IPRATROPIUM BROMIDE AND ALBUTEROL SULFATE 1 AMPULE: 2.5; .5 SOLUTION RESPIRATORY (INHALATION) at 09:10

## 2023-04-22 ASSESSMENT — ENCOUNTER SYMPTOMS
EYES NEGATIVE: 1
RESPIRATORY NEGATIVE: 1
GASTROINTESTINAL NEGATIVE: 1
ALLERGIC/IMMUNOLOGIC NEGATIVE: 1

## 2023-04-22 NOTE — PROGRESS NOTES
Discharge instrictions reviewed with patient at the bedside. All questions answered at at this time. Patient ambulated off the unit independently.

## 2023-04-22 NOTE — PROGRESS NOTES
[facility]  Internal Medicine Residency / [address]    Attending Physician Statement  I have discussed the case, including pertinent history and exam findings with the resident and the team.  I have seen and examined the patient and the key elements of the encounter have been performed by me. I have also personally reviewed imaging studies and labs. I agree with the assessment, plan and orders as documented by the resident. Appears comfortable. On 3 - 4 L oxygen per NC which he uses as needed at home, usually when he as respiratory infections. Psych admission in February reviewed - concerns for malingering, personality disorder. Has not endorsed any SI/HI on this admission. Saturating well on oxygen supplementation. Clear breath sounds. No leukocytosis, procalcitonin negative, no acute process on CXR. He is requesting to know his blood type. Assessment:  Acute hypoxic respiratory insufficiency from COPD exacerbation and off his oxygen supplementation  Chronic hypercapnic respiratory failure. Baseline pCO2 is in 40s. LA has resolved  BPH, on flomax  HTN - initially held antihypertensives as BP was low normal (96/73(. This was likely from dehydration (also with tachycardia, lactic acidosis). All his signs of dehydration have resolved with fluids. Plan: For discharge  Ambulatory pulse ox  May need to be on continuous oxygen at home instead of PRN depending on pulse ox  Can resume other home meds - ambien (PDMP reviewed), wellbutrin, flomax  Doxycycline for antiflammatory properties to complete 5 days  Steroids to complete 5 days  Continue prescribed inhalers (on Trelegy and albuterol which patient states he still has at home)  He will follow up with his PCP, Dr Amber Diane. Remainder of medical problems as per resident note. Jaylon Vieira MD  Internal Medicine

## 2023-04-22 NOTE — DISCHARGE SUMMARY
work-up showed lactic acidosis at 4.2 with an otherwise unremarkable BMP. proBNP at 60, troponin 14, LFTs unremarkable, CBC showed no leukocytosis. He had to be placed on NC 2-4L to maintain sat  95%. Chest x-ray showed left lower lobe atelectasis, but otherwise no acute process. Over the course of his hospitalization the patient was started on breathing treatment, doxycycline and steroids. The patient tolerated the treatment well - his wheezing was resolved - het will be discharged on prednisone and doxycyline to be completed in the outpatient setting and advised to follow-up with PCP. EKG and troponin trend did not show any signs of ischemia. Ambulatory pulse ox was unremarkable, therefore, the patient will not be discharged on home oxygen today. On day of discharge :   Subjective: This morning the patient was seen and examined at bedside in no acute distress. He was seen on 3L. The patient had no complaint today. He wants to know his blood type prior to discharge. He was lethargic when seen in the ED - the patient is awake, alert, with no dyspnea today. Review of Systems   Constitutional: Negative. HENT: Negative. Eyes: Negative. Respiratory: Negative. Cardiovascular: Negative. Gastrointestinal: Negative. Endocrine: Negative. Genitourinary: Negative. Musculoskeletal: Negative. Skin: Negative. Allergic/Immunologic: Negative. Neurological: Negative. Hematological: Negative. Psychiatric/Behavioral: Negative. Follow up in Clinic:   PCP    Significant findings (history and exam, laboratory, radiological, pathology, other tests):   General Appearance: alert, appears stated age, and cooperative  HEENT:  Head: Normal, normocephalic, atraumatic.   Lung: clear to auscultation bilaterally  Heart: regular rate and rhythm, S1, S2 normal, no murmur, click, rub or gallop  Abdomen: soft, non-tender; bowel sounds normal; no masses,  no organomegaly  Extremities:

## 2023-04-22 NOTE — PROGRESS NOTES
Pulse ox on room air sitting 93%  Pulse ox on room air ambulating 90%  Pulse ox on 2 liters, sitting recovery 95%  Pulse ox on 2 liters, ambulating recovery 94%

## 2023-04-22 NOTE — PROGRESS NOTES
Patient told he would likely discharge today. Patient disagrees and would like to speak to physician concerning him staying one more day. Attending notified.

## 2023-04-22 NOTE — DISCHARGE INSTRUCTIONS
Internal medicine    Follow ups  Please follow up with your primary care physician Dr. Jami Blas within 7-10 days of discharge from hospital. Please call as soon as possible to make an appointment. Please contact the Internal Medicine Clinic for an appointment if you are unable to get an appointment with your PCP. Please keep all other follow up appointments:  Patient to follow-up with PCP as instructed. Changes in healthcare   Please take all medications as indicated  Diet: regular diet   Activity: activity as tolerated  New Medications started during this hospital stay  Doxycycline  Prednisone   Changes to your medications  None   Medications you should stop taking   None  Additional labs, testing or imaging needed after discharge   None   Even if you are feeling better and not having symptoms do not stop taking antibiotic and the steroids earlier then prescribed   Please contact us if you have any concerns to change PCP or follow with the Internal Medicine Clinic instead. Internal medicine clinic   Phone: [phone]  Fax: [phone]  [address]  Should you have further questions in regards to this visit, you can review your clinical note and after visit summary document on your [employer] account. Other than any new prescriptions given to you today, the list of home medications on this After Visit Summary are based on information provided to us from you and your healthcare providers. This information, including the list, dose, and frequency of medications is only as accurate as the information you provided. If you have any questions or concerns about your home medications, please contact your Primary Care Physician for further clarification.     Electronically signed by Thomas Hernadnez MD on 4/22/2023 at 10:08 AM

## 2023-04-24 LAB
EKG ATRIAL RATE: 101 BPM
EKG P AXIS: 61 DEGREES
EKG P-R INTERVAL: 166 MS
EKG Q-T INTERVAL: 352 MS
EKG QRS DURATION: 104 MS
EKG QTC CALCULATION (BAZETT): 456 MS
EKG R AXIS: -61 DEGREES
EKG T AXIS: 58 DEGREES
EKG VENTRICULAR RATE: 101 BPM

## 2024-01-07 ENCOUNTER — APPOINTMENT (OUTPATIENT)
Dept: GENERAL RADIOLOGY | Age: 54
DRG: 190 | End: 2024-01-07
Payer: MEDICARE

## 2024-01-07 ENCOUNTER — HOSPITAL ENCOUNTER (INPATIENT)
Age: 54
LOS: 3 days | Discharge: HOME | DRG: 190 | End: 2024-01-10
Attending: EMERGENCY MEDICINE | Admitting: INTERNAL MEDICINE
Payer: MEDICARE

## 2024-01-07 DIAGNOSIS — F10.10 ALCOHOL ABUSE: ICD-10-CM

## 2024-01-07 DIAGNOSIS — E87.6 HYPOKALEMIA: ICD-10-CM

## 2024-01-07 DIAGNOSIS — R05.1 ACUTE COUGH: ICD-10-CM

## 2024-01-07 DIAGNOSIS — R07.9 CHEST PAIN, UNSPECIFIED TYPE: ICD-10-CM

## 2024-01-07 DIAGNOSIS — R09.02 HYPOXIA: Primary | ICD-10-CM

## 2024-01-07 DIAGNOSIS — F19.10 DRUG ABUSE (HCC): ICD-10-CM

## 2024-01-07 LAB
ALBUMIN SERPL-MCNC: 4.3 G/DL (ref 3.5–5.2)
ALP SERPL-CCNC: 114 U/L (ref 40–129)
ALT SERPL-CCNC: 70 U/L (ref 0–40)
AMPHET UR QL SCN: NEGATIVE
ANION GAP SERPL CALCULATED.3IONS-SCNC: 23 MMOL/L (ref 7–16)
APAP SERPL-MCNC: <5 UG/ML (ref 10–30)
AST SERPL-CCNC: 97 U/L (ref 0–39)
B PARAP IS1001 DNA NPH QL NAA+NON-PROBE: NOT DETECTED
B PERT DNA SPEC QL NAA+PROBE: NOT DETECTED
BARBITURATES UR QL SCN: NEGATIVE
BASOPHILS # BLD: 0.03 K/UL (ref 0–0.2)
BASOPHILS NFR BLD: 0 % (ref 0–2)
BENZODIAZ UR QL: NEGATIVE
BILIRUB SERPL-MCNC: 0.8 MG/DL (ref 0–1.2)
BNP SERPL-MCNC: <36 PG/ML (ref 0–125)
BUN SERPL-MCNC: 14 MG/DL (ref 6–20)
BUPRENORPHINE UR QL: NEGATIVE
C PNEUM DNA NPH QL NAA+NON-PROBE: NOT DETECTED
CALCIUM SERPL-MCNC: 8.7 MG/DL (ref 8.6–10.2)
CANNABINOIDS UR QL SCN: NEGATIVE
CHLORIDE SERPL-SCNC: 90 MMOL/L (ref 98–107)
CO2 SERPL-SCNC: 21 MMOL/L (ref 22–29)
COCAINE UR QL SCN: POSITIVE
CREAT SERPL-MCNC: 0.7 MG/DL (ref 0.7–1.2)
D DIMER: <200 NG/ML DDU (ref 0–232)
EOSINOPHIL # BLD: 0.07 K/UL (ref 0.05–0.5)
EOSINOPHILS RELATIVE PERCENT: 0 % (ref 0–6)
ERYTHROCYTE [DISTWIDTH] IN BLOOD BY AUTOMATED COUNT: 14 % (ref 11.5–15)
ETHANOLAMINE SERPL-MCNC: 100 MG/DL
FENTANYL UR QL: NEGATIVE
FLUAV RNA NPH QL NAA+NON-PROBE: NOT DETECTED
FLUBV RNA NPH QL NAA+NON-PROBE: NOT DETECTED
GFR SERPL CREATININE-BSD FRML MDRD: >60 ML/MIN/1.73M2
GLUCOSE SERPL-MCNC: 121 MG/DL (ref 74–99)
HADV DNA NPH QL NAA+NON-PROBE: NOT DETECTED
HCOV 229E RNA NPH QL NAA+NON-PROBE: NOT DETECTED
HCOV HKU1 RNA NPH QL NAA+NON-PROBE: NOT DETECTED
HCOV NL63 RNA NPH QL NAA+NON-PROBE: NOT DETECTED
HCOV OC43 RNA NPH QL NAA+NON-PROBE: NOT DETECTED
HCT VFR BLD AUTO: 42.2 % (ref 37–54)
HGB BLD-MCNC: 13.8 G/DL (ref 12.5–16.5)
HMPV RNA NPH QL NAA+NON-PROBE: NOT DETECTED
HPIV1 RNA NPH QL NAA+NON-PROBE: NOT DETECTED
HPIV2 RNA NPH QL NAA+NON-PROBE: NOT DETECTED
HPIV3 RNA NPH QL NAA+NON-PROBE: NOT DETECTED
HPIV4 RNA NPH QL NAA+NON-PROBE: NOT DETECTED
IMM GRANULOCYTES # BLD AUTO: 0.09 K/UL (ref 0–0.58)
IMM GRANULOCYTES NFR BLD: 1 % (ref 0–5)
LACTATE BLDV-SCNC: 2.3 MMOL/L (ref 0.5–2.2)
LACTATE BLDV-SCNC: 2.4 MMOL/L (ref 0.5–2.2)
LIPASE SERPL-CCNC: 80 U/L (ref 13–60)
LYMPHOCYTES NFR BLD: 1.72 K/UL (ref 1.5–4)
LYMPHOCYTES RELATIVE PERCENT: 10 % (ref 20–42)
M PNEUMO DNA NPH QL NAA+NON-PROBE: NOT DETECTED
MAGNESIUM SERPL-MCNC: 2.5 MG/DL (ref 1.6–2.6)
MCH RBC QN AUTO: 27.5 PG (ref 26–35)
MCHC RBC AUTO-ENTMCNC: 32.7 G/DL (ref 32–34.5)
MCV RBC AUTO: 84.1 FL (ref 80–99.9)
METHADONE UR QL: NEGATIVE
MONOCYTES NFR BLD: 0.96 K/UL (ref 0.1–0.95)
MONOCYTES NFR BLD: 6 % (ref 2–12)
NEUTROPHILS NFR BLD: 83 % (ref 43–80)
NEUTS SEG NFR BLD: 14.22 K/UL (ref 1.8–7.3)
OPIATES UR QL SCN: NEGATIVE
OXYCODONE UR QL SCN: NEGATIVE
PCP UR QL SCN: NEGATIVE
PLATELET # BLD AUTO: 277 K/UL (ref 130–450)
PMV BLD AUTO: 9.3 FL (ref 7–12)
POTASSIUM SERPL-SCNC: 3 MMOL/L (ref 3.5–5)
PROT SERPL-MCNC: 7.9 G/DL (ref 6.4–8.3)
RBC # BLD AUTO: 5.02 M/UL (ref 3.8–5.8)
RSV RNA NPH QL NAA+NON-PROBE: NOT DETECTED
RV+EV RNA NPH QL NAA+NON-PROBE: NOT DETECTED
SALICYLATES SERPL-MCNC: 0.7 MG/DL (ref 0–30)
SARS-COV-2 RNA NPH QL NAA+NON-PROBE: NOT DETECTED
SODIUM SERPL-SCNC: 134 MMOL/L (ref 132–146)
SPECIMEN DESCRIPTION: NORMAL
TEST INFORMATION: ABNORMAL
TOXIC TRICYCLIC SC,BLOOD: NEGATIVE
TROPONIN I SERPL HS-MCNC: 13 NG/L (ref 0–11)
TROPONIN I SERPL HS-MCNC: 14 NG/L (ref 0–11)
WBC OTHER # BLD: 17.1 K/UL (ref 4.5–11.5)

## 2024-01-07 PROCEDURE — 87449 NOS EACH ORGANISM AG IA: CPT

## 2024-01-07 PROCEDURE — 83735 ASSAY OF MAGNESIUM: CPT

## 2024-01-07 PROCEDURE — 2500000003 HC RX 250 WO HCPCS: Performed by: NURSE PRACTITIONER

## 2024-01-07 PROCEDURE — 85025 COMPLETE CBC W/AUTO DIFF WBC: CPT

## 2024-01-07 PROCEDURE — 0202U NFCT DS 22 TRGT SARS-COV-2: CPT

## 2024-01-07 PROCEDURE — 99285 EMERGENCY DEPT VISIT HI MDM: CPT

## 2024-01-07 PROCEDURE — 71046 X-RAY EXAM CHEST 2 VIEWS: CPT

## 2024-01-07 PROCEDURE — 84484 ASSAY OF TROPONIN QUANT: CPT

## 2024-01-07 PROCEDURE — 83690 ASSAY OF LIPASE: CPT

## 2024-01-07 PROCEDURE — 6360000002 HC RX W HCPCS

## 2024-01-07 PROCEDURE — 6370000000 HC RX 637 (ALT 250 FOR IP): Performed by: NURSE PRACTITIONER

## 2024-01-07 PROCEDURE — 2060000000 HC ICU INTERMEDIATE R&B

## 2024-01-07 PROCEDURE — 94640 AIRWAY INHALATION TREATMENT: CPT

## 2024-01-07 PROCEDURE — 80179 DRUG ASSAY SALICYLATE: CPT

## 2024-01-07 PROCEDURE — 80307 DRUG TEST PRSMV CHEM ANLYZR: CPT

## 2024-01-07 PROCEDURE — 83605 ASSAY OF LACTIC ACID: CPT

## 2024-01-07 PROCEDURE — 6360000002 HC RX W HCPCS: Performed by: NURSE PRACTITIONER

## 2024-01-07 PROCEDURE — 93005 ELECTROCARDIOGRAM TRACING: CPT | Performed by: NURSE PRACTITIONER

## 2024-01-07 PROCEDURE — 80053 COMPREHEN METABOLIC PANEL: CPT

## 2024-01-07 PROCEDURE — 83880 ASSAY OF NATRIURETIC PEPTIDE: CPT

## 2024-01-07 PROCEDURE — 2580000003 HC RX 258: Performed by: NURSE PRACTITIONER

## 2024-01-07 PROCEDURE — 87040 BLOOD CULTURE FOR BACTERIA: CPT

## 2024-01-07 PROCEDURE — G0480 DRUG TEST DEF 1-7 CLASSES: HCPCS

## 2024-01-07 PROCEDURE — 84145 PROCALCITONIN (PCT): CPT

## 2024-01-07 PROCEDURE — 80143 DRUG ASSAY ACETAMINOPHEN: CPT

## 2024-01-07 PROCEDURE — 85379 FIBRIN DEGRADATION QUANT: CPT

## 2024-01-07 RX ORDER — SODIUM CHLORIDE 0.9 % (FLUSH) 0.9 %
5-40 SYRINGE (ML) INJECTION PRN
Status: DISCONTINUED | OUTPATIENT
Start: 2024-01-07 | End: 2024-01-10 | Stop reason: HOSPADM

## 2024-01-07 RX ORDER — AMLODIPINE BESYLATE 10 MG/1
10 TABLET ORAL DAILY
Status: DISCONTINUED | OUTPATIENT
Start: 2024-01-08 | End: 2024-01-10 | Stop reason: HOSPADM

## 2024-01-07 RX ORDER — POLYETHYLENE GLYCOL 3350 17 G/17G
17 POWDER, FOR SOLUTION ORAL DAILY PRN
Status: DISCONTINUED | OUTPATIENT
Start: 2024-01-07 | End: 2024-01-10 | Stop reason: HOSPADM

## 2024-01-07 RX ORDER — GUAIFENESIN/DEXTROMETHORPHAN 100-10MG/5
5 SYRUP ORAL EVERY 4 HOURS PRN
Status: DISCONTINUED | OUTPATIENT
Start: 2024-01-07 | End: 2024-01-10 | Stop reason: HOSPADM

## 2024-01-07 RX ORDER — 0.9 % SODIUM CHLORIDE 0.9 %
30 INTRAVENOUS SOLUTION INTRAVENOUS ONCE
Status: COMPLETED | OUTPATIENT
Start: 2024-01-07 | End: 2024-01-07

## 2024-01-07 RX ORDER — AZITHROMYCIN 250 MG/1
500 TABLET, FILM COATED ORAL DAILY
Status: DISPENSED | OUTPATIENT
Start: 2024-01-07 | End: 2024-01-10

## 2024-01-07 RX ORDER — CALCIUM CARBONATE 500 MG/1
500 TABLET, CHEWABLE ORAL 3 TIMES DAILY PRN
Status: DISCONTINUED | OUTPATIENT
Start: 2024-01-07 | End: 2024-01-10 | Stop reason: HOSPADM

## 2024-01-07 RX ORDER — TAMSULOSIN HYDROCHLORIDE 0.4 MG/1
0.4 CAPSULE ORAL 2 TIMES DAILY
Status: DISCONTINUED | OUTPATIENT
Start: 2024-01-07 | End: 2024-01-10 | Stop reason: HOSPADM

## 2024-01-07 RX ORDER — IPRATROPIUM BROMIDE AND ALBUTEROL SULFATE 2.5; .5 MG/3ML; MG/3ML
1 SOLUTION RESPIRATORY (INHALATION) EVERY 4 HOURS PRN
Status: DISCONTINUED | OUTPATIENT
Start: 2024-01-07 | End: 2024-01-10 | Stop reason: HOSPADM

## 2024-01-07 RX ORDER — POLYVINYL ALCOHOL 14 MG/ML
1 SOLUTION/ DROPS OPHTHALMIC PRN
Status: DISCONTINUED | OUTPATIENT
Start: 2024-01-07 | End: 2024-01-10 | Stop reason: HOSPADM

## 2024-01-07 RX ORDER — NICOTINE 21 MG/24HR
1 PATCH, TRANSDERMAL 24 HOURS TRANSDERMAL DAILY
Status: DISCONTINUED | OUTPATIENT
Start: 2024-01-08 | End: 2024-01-10 | Stop reason: HOSPADM

## 2024-01-07 RX ORDER — ATORVASTATIN CALCIUM 10 MG/1
10 TABLET, FILM COATED ORAL DAILY
Status: DISCONTINUED | OUTPATIENT
Start: 2024-01-08 | End: 2024-01-10 | Stop reason: HOSPADM

## 2024-01-07 RX ORDER — ONDANSETRON 4 MG/1
4 TABLET, ORALLY DISINTEGRATING ORAL EVERY 8 HOURS PRN
Status: DISCONTINUED | OUTPATIENT
Start: 2024-01-07 | End: 2024-01-10 | Stop reason: HOSPADM

## 2024-01-07 RX ORDER — METHYLPREDNISOLONE SODIUM SUCCINATE 125 MG/2ML
INJECTION, POWDER, LYOPHILIZED, FOR SOLUTION INTRAMUSCULAR; INTRAVENOUS
Status: COMPLETED
Start: 2024-01-07 | End: 2024-01-07

## 2024-01-07 RX ORDER — BUPROPION HYDROCHLORIDE 150 MG/1
150 TABLET, EXTENDED RELEASE ORAL 2 TIMES DAILY
Status: DISCONTINUED | OUTPATIENT
Start: 2024-01-07 | End: 2024-01-08

## 2024-01-07 RX ORDER — ENOXAPARIN SODIUM 100 MG/ML
30 INJECTION SUBCUTANEOUS 2 TIMES DAILY
Status: DISCONTINUED | OUTPATIENT
Start: 2024-01-07 | End: 2024-01-08

## 2024-01-07 RX ORDER — ONDANSETRON 2 MG/ML
4 INJECTION INTRAMUSCULAR; INTRAVENOUS EVERY 6 HOURS PRN
Status: DISCONTINUED | OUTPATIENT
Start: 2024-01-07 | End: 2024-01-10 | Stop reason: HOSPADM

## 2024-01-07 RX ORDER — MECOBALAMIN 5000 MCG
5 TABLET,DISINTEGRATING ORAL NIGHTLY PRN
Status: DISCONTINUED | OUTPATIENT
Start: 2024-01-08 | End: 2024-01-10 | Stop reason: HOSPADM

## 2024-01-07 RX ORDER — ACETAMINOPHEN 325 MG/1
650 TABLET ORAL EVERY 6 HOURS PRN
Status: DISCONTINUED | OUTPATIENT
Start: 2024-01-07 | End: 2024-01-10 | Stop reason: HOSPADM

## 2024-01-07 RX ORDER — PREDNISONE 20 MG/1
40 TABLET ORAL DAILY
Status: DISCONTINUED | OUTPATIENT
Start: 2024-01-10 | End: 2024-01-10 | Stop reason: HOSPADM

## 2024-01-07 RX ORDER — HYDROXYZINE PAMOATE 25 MG/1
25 CAPSULE ORAL 3 TIMES DAILY PRN
Status: DISCONTINUED | OUTPATIENT
Start: 2024-01-07 | End: 2024-01-10 | Stop reason: HOSPADM

## 2024-01-07 RX ORDER — MECOBALAMIN 5000 MCG
5 TABLET,DISINTEGRATING ORAL EVERY EVENING
Status: DISCONTINUED | OUTPATIENT
Start: 2024-01-07 | End: 2024-01-10 | Stop reason: HOSPADM

## 2024-01-07 RX ORDER — SODIUM CHLORIDE 9 MG/ML
INJECTION, SOLUTION INTRAVENOUS PRN
Status: DISCONTINUED | OUTPATIENT
Start: 2024-01-07 | End: 2024-01-10 | Stop reason: HOSPADM

## 2024-01-07 RX ORDER — BENZONATATE 100 MG/1
100 CAPSULE ORAL 3 TIMES DAILY PRN
Status: DISCONTINUED | OUTPATIENT
Start: 2024-01-07 | End: 2024-01-10 | Stop reason: HOSPADM

## 2024-01-07 RX ORDER — POTASSIUM CHLORIDE 20 MEQ/1
40 TABLET, EXTENDED RELEASE ORAL ONCE
Status: COMPLETED | OUTPATIENT
Start: 2024-01-07 | End: 2024-01-07

## 2024-01-07 RX ORDER — MULTIVITAMIN WITH IRON
1 TABLET ORAL DAILY
Status: DISCONTINUED | OUTPATIENT
Start: 2024-01-08 | End: 2024-01-10 | Stop reason: HOSPADM

## 2024-01-07 RX ORDER — ASPIRIN 81 MG/1
324 TABLET, CHEWABLE ORAL ONCE
Status: COMPLETED | OUTPATIENT
Start: 2024-01-07 | End: 2024-01-07

## 2024-01-07 RX ORDER — ZOLPIDEM TARTRATE 5 MG/1
10 TABLET ORAL NIGHTLY PRN
Status: DISCONTINUED | OUTPATIENT
Start: 2024-01-07 | End: 2024-01-10 | Stop reason: HOSPADM

## 2024-01-07 RX ORDER — OXYBUTYNIN CHLORIDE 10 MG/1
10 TABLET, EXTENDED RELEASE ORAL 2 TIMES DAILY
Status: DISCONTINUED | OUTPATIENT
Start: 2024-01-07 | End: 2024-01-10 | Stop reason: HOSPADM

## 2024-01-07 RX ORDER — ASPIRIN 81 MG/1
81 TABLET, CHEWABLE ORAL DAILY
Status: DISCONTINUED | OUTPATIENT
Start: 2024-01-08 | End: 2024-01-10 | Stop reason: HOSPADM

## 2024-01-07 RX ORDER — GABAPENTIN 300 MG/1
300 CAPSULE ORAL 3 TIMES DAILY
Status: DISCONTINUED | OUTPATIENT
Start: 2024-01-07 | End: 2024-01-10 | Stop reason: HOSPADM

## 2024-01-07 RX ORDER — LORAZEPAM 1 MG/1
1 TABLET ORAL ONCE
Status: COMPLETED | OUTPATIENT
Start: 2024-01-07 | End: 2024-01-07

## 2024-01-07 RX ORDER — SODIUM CHLORIDE 0.9 % (FLUSH) 0.9 %
5-40 SYRINGE (ML) INJECTION EVERY 12 HOURS SCHEDULED
Status: DISCONTINUED | OUTPATIENT
Start: 2024-01-07 | End: 2024-01-10 | Stop reason: HOSPADM

## 2024-01-07 RX ORDER — LANOLIN ALCOHOL/MO/W.PET/CERES
100 CREAM (GRAM) TOPICAL DAILY
Status: DISCONTINUED | OUTPATIENT
Start: 2024-01-08 | End: 2024-01-10 | Stop reason: HOSPADM

## 2024-01-07 RX ORDER — ACETAMINOPHEN 650 MG/1
650 SUPPOSITORY RECTAL EVERY 6 HOURS PRN
Status: DISCONTINUED | OUTPATIENT
Start: 2024-01-07 | End: 2024-01-10 | Stop reason: HOSPADM

## 2024-01-07 RX ORDER — IPRATROPIUM BROMIDE AND ALBUTEROL SULFATE 2.5; .5 MG/3ML; MG/3ML
2 SOLUTION RESPIRATORY (INHALATION) ONCE
Status: COMPLETED | OUTPATIENT
Start: 2024-01-07 | End: 2024-01-07

## 2024-01-07 RX ORDER — LORAZEPAM 2 MG/ML
1 INJECTION INTRAMUSCULAR ONCE
Status: DISCONTINUED | OUTPATIENT
Start: 2024-01-07 | End: 2024-01-07

## 2024-01-07 RX ORDER — PANTOPRAZOLE SODIUM 40 MG/1
40 TABLET, DELAYED RELEASE ORAL DAILY
Status: DISCONTINUED | OUTPATIENT
Start: 2024-01-08 | End: 2024-01-10 | Stop reason: HOSPADM

## 2024-01-07 RX ORDER — DEXTROSE MONOHYDRATE 100 MG/ML
INJECTION, SOLUTION INTRAVENOUS CONTINUOUS PRN
Status: DISCONTINUED | OUTPATIENT
Start: 2024-01-07 | End: 2024-01-10 | Stop reason: HOSPADM

## 2024-01-07 RX ORDER — HYDRALAZINE HYDROCHLORIDE 20 MG/ML
10 INJECTION INTRAMUSCULAR; INTRAVENOUS EVERY 6 HOURS PRN
Status: DISCONTINUED | OUTPATIENT
Start: 2024-01-07 | End: 2024-01-10 | Stop reason: HOSPADM

## 2024-01-07 RX ADMIN — DOXYCYCLINE 100 MG: 100 INJECTION, POWDER, LYOPHILIZED, FOR SOLUTION INTRAVENOUS at 23:10

## 2024-01-07 RX ADMIN — ENOXAPARIN SODIUM 30 MG: 100 INJECTION SUBCUTANEOUS at 23:10

## 2024-01-07 RX ADMIN — SODIUM CHLORIDE, PRESERVATIVE FREE 10 ML: 5 INJECTION INTRAVENOUS at 23:11

## 2024-01-07 RX ADMIN — Medication 5 MG: at 23:11

## 2024-01-07 RX ADMIN — LORAZEPAM 1 MG: 1 TABLET ORAL at 20:58

## 2024-01-07 RX ADMIN — AZITHROMYCIN DIHYDRATE 500 MG: 250 TABLET, FILM COATED ORAL at 23:10

## 2024-01-07 RX ADMIN — METHYLPREDNISOLONE SODIUM SUCCINATE 125 MG: 125 INJECTION INTRAMUSCULAR; INTRAVENOUS at 23:11

## 2024-01-07 RX ADMIN — IPRATROPIUM BROMIDE AND ALBUTEROL SULFATE 2 DOSE: .5; 3 SOLUTION RESPIRATORY (INHALATION) at 19:10

## 2024-01-07 RX ADMIN — METHYLPREDNISOLONE SODIUM SUCCINATE 40 MG: 40 INJECTION, POWDER, FOR SOLUTION INTRAMUSCULAR; INTRAVENOUS at 23:12

## 2024-01-07 RX ADMIN — SODIUM CHLORIDE 3129 ML: 9 INJECTION, SOLUTION INTRAVENOUS at 20:58

## 2024-01-07 RX ADMIN — ASPIRIN 324 MG: 81 TABLET, CHEWABLE ORAL at 19:31

## 2024-01-07 RX ADMIN — POTASSIUM CHLORIDE 40 MEQ: 1500 TABLET, EXTENDED RELEASE ORAL at 20:58

## 2024-01-07 RX ADMIN — WATER 2000 MG: 1 INJECTION INTRAMUSCULAR; INTRAVENOUS; SUBCUTANEOUS at 23:09

## 2024-01-08 LAB
25(OH)D3 SERPL-MCNC: 17.3 NG/ML (ref 30–100)
ALBUMIN SERPL-MCNC: 3.9 G/DL (ref 3.5–5.2)
ALP SERPL-CCNC: 97 U/L (ref 40–129)
ALT SERPL-CCNC: 59 U/L (ref 0–40)
ANION GAP SERPL CALCULATED.3IONS-SCNC: 12 MMOL/L (ref 7–16)
AST SERPL-CCNC: 63 U/L (ref 0–39)
BILIRUB SERPL-MCNC: 0.7 MG/DL (ref 0–1.2)
BUN SERPL-MCNC: 8 MG/DL (ref 6–20)
CALCIUM SERPL-MCNC: 8.3 MG/DL (ref 8.6–10.2)
CHLORIDE SERPL-SCNC: 98 MMOL/L (ref 98–107)
CHOLEST SERPL-MCNC: 106 MG/DL
CO2 SERPL-SCNC: 24 MMOL/L (ref 22–29)
CREAT SERPL-MCNC: 0.6 MG/DL (ref 0.7–1.2)
EKG ATRIAL RATE: 93 BPM
EKG P AXIS: 96 DEGREES
EKG P-R INTERVAL: 160 MS
EKG Q-T INTERVAL: 414 MS
EKG QRS DURATION: 116 MS
EKG QTC CALCULATION (BAZETT): 514 MS
EKG R AXIS: -122 DEGREES
EKG T AXIS: 99 DEGREES
EKG VENTRICULAR RATE: 93 BPM
ERYTHROCYTE [DISTWIDTH] IN BLOOD BY AUTOMATED COUNT: 14.2 % (ref 11.5–15)
FOLATE SERPL-MCNC: >20 NG/ML (ref 4.8–24.2)
GFR SERPL CREATININE-BSD FRML MDRD: >60 ML/MIN/1.73M2
GLUCOSE SERPL-MCNC: 143 MG/DL (ref 74–99)
HCT VFR BLD AUTO: 41.6 % (ref 37–54)
HDLC SERPL-MCNC: 55 MG/DL
HGB BLD-MCNC: 13.4 G/DL (ref 12.5–16.5)
IRON SATN MFR SERPL: 8 % (ref 20–55)
IRON SERPL-MCNC: 26 UG/DL (ref 59–158)
L PNEUMO1 AG UR QL IA.RAPID: NEGATIVE
LACTATE BLDV-SCNC: 0.9 MMOL/L (ref 0.5–2.2)
LACTATE BLDV-SCNC: 2.6 MMOL/L (ref 0.5–2.2)
LDLC SERPL CALC-MCNC: 41 MG/DL
MAGNESIUM SERPL-MCNC: 2.4 MG/DL (ref 1.6–2.6)
MCH RBC QN AUTO: 27.8 PG (ref 26–35)
MCHC RBC AUTO-ENTMCNC: 32.2 G/DL (ref 32–34.5)
MCV RBC AUTO: 86.3 FL (ref 80–99.9)
PHOSPHATE SERPL-MCNC: 2.5 MG/DL (ref 2.5–4.5)
PLATELET # BLD AUTO: 244 K/UL (ref 130–450)
PMV BLD AUTO: 9.8 FL (ref 7–12)
POTASSIUM SERPL-SCNC: 3.8 MMOL/L (ref 3.5–5)
PROCALCITONIN SERPL-MCNC: 0.04 NG/ML (ref 0–0.08)
PROCALCITONIN SERPL-MCNC: 0.06 NG/ML (ref 0–0.08)
PROT SERPL-MCNC: 7.2 G/DL (ref 6.4–8.3)
RBC # BLD AUTO: 4.82 M/UL (ref 3.8–5.8)
SODIUM SERPL-SCNC: 134 MMOL/L (ref 132–146)
TIBC SERPL-MCNC: 323 UG/DL (ref 250–450)
TRIGL SERPL-MCNC: 50 MG/DL
TSH SERPL DL<=0.05 MIU/L-ACNC: 0.78 UIU/ML (ref 0.27–4.2)
VIT B12 SERPL-MCNC: 1081 PG/ML (ref 211–946)
VLDLC SERPL CALC-MCNC: 10 MG/DL
WBC OTHER # BLD: 14.2 K/UL (ref 4.5–11.5)

## 2024-01-08 PROCEDURE — 83550 IRON BINDING TEST: CPT

## 2024-01-08 PROCEDURE — 2060000000 HC ICU INTERMEDIATE R&B

## 2024-01-08 PROCEDURE — 83735 ASSAY OF MAGNESIUM: CPT

## 2024-01-08 PROCEDURE — 80053 COMPREHEN METABOLIC PANEL: CPT

## 2024-01-08 PROCEDURE — 93005 ELECTROCARDIOGRAM TRACING: CPT

## 2024-01-08 PROCEDURE — 87899 AGENT NOS ASSAY W/OPTIC: CPT

## 2024-01-08 PROCEDURE — 84145 PROCALCITONIN (PCT): CPT

## 2024-01-08 PROCEDURE — 99223 1ST HOSP IP/OBS HIGH 75: CPT | Performed by: NURSE PRACTITIONER

## 2024-01-08 PROCEDURE — 6370000000 HC RX 637 (ALT 250 FOR IP): Performed by: NURSE PRACTITIONER

## 2024-01-08 PROCEDURE — 84100 ASSAY OF PHOSPHORUS: CPT

## 2024-01-08 PROCEDURE — 84443 ASSAY THYROID STIM HORMONE: CPT

## 2024-01-08 PROCEDURE — 85027 COMPLETE CBC AUTOMATED: CPT

## 2024-01-08 PROCEDURE — 6360000002 HC RX W HCPCS: Performed by: NURSE PRACTITIONER

## 2024-01-08 PROCEDURE — 94640 AIRWAY INHALATION TREATMENT: CPT

## 2024-01-08 PROCEDURE — 82746 ASSAY OF FOLIC ACID SERUM: CPT

## 2024-01-08 PROCEDURE — 6370000000 HC RX 637 (ALT 250 FOR IP): Performed by: EMERGENCY MEDICINE

## 2024-01-08 PROCEDURE — 36415 COLL VENOUS BLD VENIPUNCTURE: CPT

## 2024-01-08 PROCEDURE — 83540 ASSAY OF IRON: CPT

## 2024-01-08 PROCEDURE — 80061 LIPID PANEL: CPT

## 2024-01-08 PROCEDURE — 2580000003 HC RX 258: Performed by: NURSE PRACTITIONER

## 2024-01-08 PROCEDURE — 83605 ASSAY OF LACTIC ACID: CPT

## 2024-01-08 PROCEDURE — 93010 ELECTROCARDIOGRAM REPORT: CPT | Performed by: INTERNAL MEDICINE

## 2024-01-08 PROCEDURE — 82607 VITAMIN B-12: CPT

## 2024-01-08 PROCEDURE — 82306 VITAMIN D 25 HYDROXY: CPT

## 2024-01-08 RX ORDER — IBUPROFEN 800 MG/1
800 TABLET ORAL EVERY 8 HOURS PRN
COMMUNITY

## 2024-01-08 RX ORDER — ENOXAPARIN SODIUM 150 MG/ML
1 INJECTION SUBCUTANEOUS ONCE
Status: DISCONTINUED | OUTPATIENT
Start: 2024-01-08 | End: 2024-01-10 | Stop reason: HOSPADM

## 2024-01-08 RX ORDER — BUDESONIDE 0.5 MG/2ML
0.5 INHALANT ORAL
Status: DISCONTINUED | OUTPATIENT
Start: 2024-01-08 | End: 2024-01-10 | Stop reason: HOSPADM

## 2024-01-08 RX ORDER — ASPIRIN 81 MG/1
81 TABLET, CHEWABLE ORAL ONCE
Status: COMPLETED | OUTPATIENT
Start: 2024-01-08 | End: 2024-01-08

## 2024-01-08 RX ORDER — BUPROPION HYDROCHLORIDE 300 MG/1
300 TABLET ORAL DAILY
Status: DISCONTINUED | OUTPATIENT
Start: 2024-01-08 | End: 2024-01-10 | Stop reason: HOSPADM

## 2024-01-08 RX ORDER — NITROGLYCERIN 0.4 MG/1
0.4 TABLET SUBLINGUAL ONCE
Status: COMPLETED | OUTPATIENT
Start: 2024-01-08 | End: 2024-01-08

## 2024-01-08 RX ORDER — BUPROPION HYDROCHLORIDE 300 MG/1
300 TABLET ORAL EVERY MORNING
COMMUNITY

## 2024-01-08 RX ORDER — ARFORMOTEROL TARTRATE 15 UG/2ML
15 SOLUTION RESPIRATORY (INHALATION)
Status: DISCONTINUED | OUTPATIENT
Start: 2024-01-08 | End: 2024-01-10 | Stop reason: HOSPADM

## 2024-01-08 RX ORDER — ASPIRIN 81 MG/1
81 TABLET ORAL DAILY
COMMUNITY

## 2024-01-08 RX ADMIN — ASPIRIN 81 MG: 81 TABLET, CHEWABLE ORAL at 20:55

## 2024-01-08 RX ADMIN — METHYLPREDNISOLONE SODIUM SUCCINATE 40 MG: 40 INJECTION, POWDER, FOR SOLUTION INTRAMUSCULAR; INTRAVENOUS at 21:42

## 2024-01-08 RX ADMIN — SODIUM CHLORIDE, PRESERVATIVE FREE 10 ML: 5 INJECTION INTRAVENOUS at 20:55

## 2024-01-08 RX ADMIN — OXYBUTYNIN CHLORIDE 10 MG: 10 TABLET, EXTENDED RELEASE ORAL at 21:42

## 2024-01-08 RX ADMIN — GABAPENTIN 300 MG: 300 CAPSULE ORAL at 20:55

## 2024-01-08 RX ADMIN — TAMSULOSIN HYDROCHLORIDE 0.4 MG: 0.4 CAPSULE ORAL at 20:54

## 2024-01-08 RX ADMIN — Medication 5 MG: at 20:54

## 2024-01-08 RX ADMIN — NITROGLYCERIN 0.4 MG: 0.4 TABLET, ORALLY DISINTEGRATING SUBLINGUAL at 18:52

## 2024-01-08 RX ADMIN — IPRATROPIUM BROMIDE AND ALBUTEROL SULFATE 1 DOSE: .5; 3 SOLUTION RESPIRATORY (INHALATION) at 22:28

## 2024-01-08 NOTE — PLAN OF CARE
Seen and examined.  Seen by my colleague this morning. Agree with assessment and plan.  C/w Azithro for antiinflammatory properties   IV solumedrol transition to oral   Breathing treatment   Follow labs   Very sleepy during my encounter   Consider pulm consult if not improving     Ariel Gil MD

## 2024-01-08 NOTE — H&P
Hospitalist History & Physical      PCP: Marco A Dotson    Date of Service: Pt seen/examined on 1/7/2024 and is admitted to Inpatient with expected LOS greater than two midnights due to medical therapy.      Chief Complaint:  had concerns including Cough (C/o cough, congestion and asthma starting 1 week ago. ), Chest Pain (Patient c/o midsternal piercing chest pain starting \"the ladt few days now\". Patient is eating fritos and talking on the phone in triage. ), and Depression (Starting 3 days ago).    History of Present Illness:    Mr. Ryan Mcghee, a 53 y.o. year old male  who  has a past medical history of Acid reflux, Asthma, Asthma, COPD (chronic obstructive pulmonary disease) ([facility]), Hypertension, Pancreatitis, Prediabetes, Psychiatric problem, Sleep apnea, and Substance abuse ([facility]).     Patient presented to the ED with complaints of chest pain, SOB, and a cough productive of yellow mucus.  He reports he thinks his asthma is acting up.  He reports he has been noncompliant with his medications over the last couple of days.  He also admits he has not slept over the past couple of days due to to heavy alcohol and cocaine use.      ER COURSE:  In ED he was mildly tachycardic.  He was also reportedly hypoxic at 86% with ambulation on room air.  Laboratory workup significant for hypokalemia, HAGMA, lactic acidosis, elevated but adynamic troponin, elevated LFTs, positive ethanol, and leukocytosis.  Dimer negative.  Respiratory panel negative.  CXR negative.  He was given ceftriaxone, doxycycline, DuoNebs, potassium replacement, and IV fluids in ED.      Past Medical History:        Diagnosis Date    Acid reflux     Asthma     Asthma     COPD (chronic obstructive pulmonary disease) (HCC)     Hypertension     Pancreatitis     Prediabetes     Psychiatric problem     depressed    Sleep apnea     Substance abuse ([facility])     alcohol and cocaine       Past Surgical History:        Procedure Laterality Date    COLONOSCOPY

## 2024-01-08 NOTE — ED PROVIDER NOTES
23 (H) 7 - 16 mmol/L    Glucose 121 (H) 74 - 99 mg/dL    BUN 14 6 - 20 mg/dL    Creatinine 0.7 0.70 - 1.20 mg/dL    Est, Glom Filt Rate >60 >60 mL/min/1.73m2    Calcium 8.7 8.6 - 10.2 mg/dL    Total Protein 7.9 6.4 - 8.3 g/dL    Albumin 4.3 3.5 - 5.2 g/dL    Total Bilirubin 0.8 0.0 - 1.2 mg/dL    Alkaline Phosphatase 114 40 - 129 U/L    ALT 70 (H) 0 - 40 U/L    AST 97 (H) 0 - 39 U/L   Troponin   Result Value Ref Range    Troponin, High Sensitivity 14 (H) 0 - 11 ng/L   Brain Natriuretic Peptide   Result Value Ref Range    Pro-BNP <36 0 - 125 pg/mL   SERUM DRUG SCREEN   Result Value Ref Range    Acetaminophen Level <5 (L) 10.0 - 30.0 ug/mL    Ethanol 100 (H) <10 mg/dL    Salicylate Lvl 0.7 0.0 - 30.0 mg/dL    Toxic Tricyclic Sc,Blood NEGATIVE NEGATIVE   D-Dimer, Quantitative   Result Value Ref Range    D-Dimer, Quant <200 0 - 232 ng/mL DDU   Lactic Acid   Result Value Ref Range    Lactic Acid 2.4 (H) 0.5 - 2.2 mmol/L   Lipase   Result Value Ref Range    Lipase 80 (H) 13 - 60 U/L   Magnesium   Result Value Ref Range    Magnesium 2.5 1.6 - 2.6 mg/dL   Troponin   Result Value Ref Range    Troponin, High Sensitivity 13 (H) 0 - 11 ng/L   Lactic Acid   Result Value Ref Range    Lactic Acid 2.3 (H) 0.5 - 2.2 mmol/L     Imaging:  All Radiology results interpreted by Radiologist unless otherwise noted.  XR CHEST (2 VW)   Final Result   No acute disease             ED COURSE   Vitals:    Vitals:    01/07/24 1810 01/07/24 1831 01/07/24 2006 01/07/24 2135   BP:  116/83     Pulse: (!) 104   (!) 106   Resp:  18  20   Temp: 97.4 °F (36.3 °C)   98.2 °F (36.8 °C)   SpO2: 96%   90%   Weight:   104.3 kg (230 lb)        Patient was given the following medications:  Medications   cefTRIAXone (ROCEPHIN) 2,000 mg in sterile water 20 mL IV syringe (has no administration in time range)   doxycycline (VIBRAMYCIN) 100 mg in sodium chloride 0.9 % 100 mL IVPB ([id number]) (has no administration in time range)   ipratropium 0.5 mg-albuterol 2.5 mg

## 2024-01-09 ENCOUNTER — APPOINTMENT (OUTPATIENT)
Age: 54
DRG: 190 | End: 2024-01-09
Attending: INTERNAL MEDICINE
Payer: MEDICARE

## 2024-01-09 ENCOUNTER — APPOINTMENT (OUTPATIENT)
Dept: NUCLEAR MEDICINE | Age: 54
DRG: 190 | End: 2024-01-09
Attending: INTERNAL MEDICINE
Payer: MEDICARE

## 2024-01-09 PROBLEM — R07.9 CHEST PAIN: Status: ACTIVE | Noted: 2020-04-20

## 2024-01-09 PROBLEM — F14.10 COCAINE ABUSE (HCC): Status: ACTIVE | Noted: 2024-01-09

## 2024-01-09 PROBLEM — R05.1 ACUTE COUGH: Status: ACTIVE | Noted: 2024-01-09

## 2024-01-09 PROBLEM — Z72.0 TOBACCO ABUSE: Status: ACTIVE | Noted: 2024-01-09

## 2024-01-09 PROBLEM — R94.31 QT PROLONGATION: Status: ACTIVE | Noted: 2024-01-09

## 2024-01-09 LAB
ALBUMIN SERPL-MCNC: 3.5 G/DL (ref 3.5–5.2)
ALP SERPL-CCNC: 96 U/L (ref 40–129)
ALT SERPL-CCNC: 47 U/L (ref 0–40)
ANION GAP SERPL CALCULATED.3IONS-SCNC: 9 MMOL/L (ref 7–16)
AST SERPL-CCNC: 35 U/L (ref 0–39)
BILIRUB SERPL-MCNC: 0.3 MG/DL (ref 0–1.2)
BUN SERPL-MCNC: 9 MG/DL (ref 6–20)
CALCIUM SERPL-MCNC: 8.9 MG/DL (ref 8.6–10.2)
CHLORIDE SERPL-SCNC: 103 MMOL/L (ref 98–107)
CO2 SERPL-SCNC: 27 MMOL/L (ref 22–29)
CREAT SERPL-MCNC: 0.6 MG/DL (ref 0.7–1.2)
ECHO AO ASC DIAM: 3.6 CM
ECHO AO ASCENDING AORTA INDEX: 1.64 CM/M2
ECHO AV AREA PEAK VELOCITY: 2.2 CM2
ECHO AV AREA VTI: 2.5 CM2
ECHO AV AREA/BSA PEAK VELOCITY: 1 CM2/M2
ECHO AV AREA/BSA VTI: 1.1 CM2/M2
ECHO AV MEAN GRADIENT: 6 MMHG
ECHO AV MEAN VELOCITY: 1.1 M/S
ECHO AV PEAK GRADIENT: 11 MMHG
ECHO AV PEAK VELOCITY: 1.6 M/S
ECHO AV VELOCITY RATIO: 0.69
ECHO AV VTI: 29.7 CM
ECHO BSA: 2.25 M2
ECHO BSA: 2.25 M2
ECHO EST RA PRESSURE: 8 MMHG
ECHO LA DIAMETER INDEX: 1.46 CM/M2
ECHO LA DIAMETER: 3.2 CM
ECHO LA VOL A-L A2C: 66 ML (ref 18–58)
ECHO LA VOL A-L A4C: 44 ML (ref 18–58)
ECHO LA VOL MOD A2C: 60 ML (ref 18–58)
ECHO LA VOL MOD A4C: 39 ML (ref 18–58)
ECHO LA VOLUME AREA LENGTH: 57 ML
ECHO LA VOLUME INDEX A-L A2C: 30 ML/M2 (ref 16–34)
ECHO LA VOLUME INDEX A-L A4C: 20 ML/M2 (ref 16–34)
ECHO LA VOLUME INDEX AREA LENGTH: 26 ML/M2 (ref 16–34)
ECHO LA VOLUME INDEX MOD A2C: 27 ML/M2 (ref 16–34)
ECHO LA VOLUME INDEX MOD A4C: 18 ML/M2 (ref 16–34)
ECHO LV FRACTIONAL SHORTENING: 30 % (ref 28–44)
ECHO LV INTERNAL DIMENSION DIASTOLE INDEX: 2.15 CM/M2
ECHO LV INTERNAL DIMENSION DIASTOLIC: 4.7 CM (ref 4.2–5.9)
ECHO LV INTERNAL DIMENSION SYSTOLIC INDEX: 1.51 CM/M2
ECHO LV INTERNAL DIMENSION SYSTOLIC: 3.3 CM
ECHO LV IVSD: 1.2 CM (ref 0.6–1)
ECHO LV IVSS: 1.3 CM
ECHO LV MASS 2D: 175.8 G (ref 88–224)
ECHO LV MASS INDEX 2D: 80.3 G/M2 (ref 49–115)
ECHO LV POSTERIOR WALL DIASTOLIC: 0.9 CM (ref 0.6–1)
ECHO LV POSTERIOR WALL SYSTOLIC: 1.1 CM
ECHO LV RELATIVE WALL THICKNESS RATIO: 0.38
ECHO LVOT AREA: 3.5 CM2
ECHO LVOT AV VTI INDEX: 0.75
ECHO LVOT DIAM: 2.1 CM
ECHO LVOT MEAN GRADIENT: 3 MMHG
ECHO LVOT PEAK GRADIENT: 5 MMHG
ECHO LVOT PEAK VELOCITY: 1.1 M/S
ECHO LVOT STROKE VOLUME INDEX: 35.1 ML/M2
ECHO LVOT SV: 76.9 ML
ECHO LVOT VTI: 22.2 CM
ECHO MV "A" WAVE DURATION: 110.7 MSEC
ECHO MV A VELOCITY: 0.81 M/S
ECHO MV AREA PHT: 2.4 CM2
ECHO MV AREA VTI: 3.1 CM2
ECHO MV E DECELERATION TIME (DT): 271.9 MS
ECHO MV E VELOCITY: 0.77 M/S
ECHO MV E/A RATIO: 0.95
ECHO MV LVOT VTI INDEX: 1.12
ECHO MV MAX VELOCITY: 0.9 M/S
ECHO MV MEAN GRADIENT: 1 MMHG
ECHO MV MEAN VELOCITY: 0.5 M/S
ECHO MV PEAK GRADIENT: 3 MMHG
ECHO MV PRESSURE HALF TIME (PHT): 92.4 MS
ECHO MV VTI: 24.8 CM
ECHO PV MAX VELOCITY: 1 M/S
ECHO PV MEAN GRADIENT: 2 MMHG
ECHO PV MEAN VELOCITY: 0.8 M/S
ECHO PV PEAK GRADIENT: 4 MMHG
ECHO PV VTI: 19.7 CM
ECHO PVEIN A DURATION: 131.5 MS
ECHO PVEIN A VELOCITY: 0.3 M/S
ECHO PVEIN PEAK D VELOCITY: 0.4 M/S
ECHO PVEIN PEAK S VELOCITY: 0.3 M/S
ECHO PVEIN S/D RATIO: 0.8
ECHO RIGHT VENTRICULAR SYSTOLIC PRESSURE (RVSP): 30 MMHG
ECHO RV INTERNAL DIMENSION: 3.2 CM
ECHO RV TAPSE: 2.5 CM (ref 1.7–?)
ECHO TV REGURGITANT MAX VELOCITY: 2.32 M/S
ECHO TV REGURGITANT PEAK GRADIENT: 22 MMHG
ERYTHROCYTE [DISTWIDTH] IN BLOOD BY AUTOMATED COUNT: 14.4 % (ref 11.5–15)
GFR SERPL CREATININE-BSD FRML MDRD: >60 ML/MIN/1.73M2
GLUCOSE SERPL-MCNC: 157 MG/DL (ref 74–99)
HCT VFR BLD AUTO: 41.9 % (ref 37–54)
HGB BLD-MCNC: 13.6 G/DL (ref 12.5–16.5)
LACTATE BLDV-SCNC: 1.8 MMOL/L (ref 0.5–2.2)
LACTATE BLDV-SCNC: 2.2 MMOL/L (ref 0.5–2.2)
MCH RBC QN AUTO: 28 PG (ref 26–35)
MCHC RBC AUTO-ENTMCNC: 32.5 G/DL (ref 32–34.5)
MCV RBC AUTO: 86.2 FL (ref 80–99.9)
NUC STRESS EJECTION FRACTION: 78 %
PLATELET # BLD AUTO: 262 K/UL (ref 130–450)
PMV BLD AUTO: 9.4 FL (ref 7–12)
POTASSIUM SERPL-SCNC: 4.5 MMOL/L (ref 3.5–5)
PROT SERPL-MCNC: 6.5 G/DL (ref 6.4–8.3)
RBC # BLD AUTO: 4.86 M/UL (ref 3.8–5.8)
SODIUM SERPL-SCNC: 139 MMOL/L (ref 132–146)
STRESS BASELINE DIAS BP: 82 MMHG
STRESS BASELINE HR: 73 BPM
STRESS BASELINE ST DEPRESSION: 0 MM
STRESS BASELINE SYS BP: 130 MMHG
STRESS ESTIMATED WORKLOAD: 1 METS
STRESS PEAK DIAS BP: 82 MMHG
STRESS PEAK SYS BP: 130 MMHG
STRESS PERCENT HR ACHIEVED: 66 %
STRESS POST PEAK HR: 109 BPM
STRESS RATE PRESSURE PRODUCT: NORMAL BPM*MMHG
STRESS ST DEPRESSION: 0 MM
STRESS STAGE 1 DURATION: 1 MIN:SEC
STRESS STAGE 1 HR: 85 BPM
STRESS STAGE 2 BP: NORMAL MMHG
STRESS STAGE 2 COMMENTS: NORMAL
STRESS STAGE 2 DURATION: 1 MIN:SEC
STRESS STAGE 2 HR: 107 BPM
STRESS STAGE 3 BP: NORMAL MMHG
STRESS STAGE 3 COMMENTS: NORMAL
STRESS STAGE 3 DURATION: 1 MIN:SEC
STRESS STAGE 3 HR: 102 BPM
STRESS STAGE 4 BP: NORMAL MMHG
STRESS STAGE 4 COMMENTS: NORMAL
STRESS STAGE 4 DURATION: 1 MIN:SEC
STRESS STAGE 4 HR: 98 BPM
STRESS STAGE RECOVERY 1 BP: NORMAL MMHG
STRESS STAGE RECOVERY 1 COMMENTS: NORMAL
STRESS STAGE RECOVERY 1 DURATION: 1 MIN:SEC
STRESS STAGE RECOVERY 1 HR: 90 BPM
STRESS STAGE RECOVERY 2 DURATION: 1 MIN:SEC
STRESS STAGE RECOVERY 2 HR: 99 BPM
STRESS STAGE RECOVERY 3 BP: NORMAL MMHG
STRESS STAGE RECOVERY 3 DURATION: 1 MIN:SEC
STRESS STAGE RECOVERY 3 HR: 96 BPM
STRESS TARGET HR: 165 BPM
TID: 0.96
WBC OTHER # BLD: 12.8 K/UL (ref 4.5–11.5)

## 2024-01-09 PROCEDURE — 6370000000 HC RX 637 (ALT 250 FOR IP): Performed by: NURSE PRACTITIONER

## 2024-01-09 PROCEDURE — 94640 AIRWAY INHALATION TREATMENT: CPT

## 2024-01-09 PROCEDURE — 78452 HT MUSCLE IMAGE SPECT MULT: CPT

## 2024-01-09 PROCEDURE — 93017 CV STRESS TEST TRACING ONLY: CPT

## 2024-01-09 PROCEDURE — A9500 TC99M SESTAMIBI: HCPCS | Performed by: RADIOLOGY

## 2024-01-09 PROCEDURE — 3430000000 HC RX DIAGNOSTIC RADIOPHARMACEUTICAL: Performed by: RADIOLOGY

## 2024-01-09 PROCEDURE — 6360000002 HC RX W HCPCS: Performed by: STUDENT IN AN ORGANIZED HEALTH CARE EDUCATION/TRAINING PROGRAM

## 2024-01-09 PROCEDURE — 85027 COMPLETE CBC AUTOMATED: CPT

## 2024-01-09 PROCEDURE — 93306 TTE W/DOPPLER COMPLETE: CPT | Performed by: INTERNAL MEDICINE

## 2024-01-09 PROCEDURE — 93005 ELECTROCARDIOGRAM TRACING: CPT | Performed by: INTERNAL MEDICINE

## 2024-01-09 PROCEDURE — 6360000002 HC RX W HCPCS: Performed by: INTERNAL MEDICINE

## 2024-01-09 PROCEDURE — 80053 COMPREHEN METABOLIC PANEL: CPT

## 2024-01-09 PROCEDURE — 6360000002 HC RX W HCPCS: Performed by: NURSE PRACTITIONER

## 2024-01-09 PROCEDURE — 6370000000 HC RX 637 (ALT 250 FOR IP): Performed by: STUDENT IN AN ORGANIZED HEALTH CARE EDUCATION/TRAINING PROGRAM

## 2024-01-09 PROCEDURE — 36415 COLL VENOUS BLD VENIPUNCTURE: CPT

## 2024-01-09 PROCEDURE — 99222 1ST HOSP IP/OBS MODERATE 55: CPT | Performed by: INTERNAL MEDICINE

## 2024-01-09 PROCEDURE — 93306 TTE W/DOPPLER COMPLETE: CPT

## 2024-01-09 PROCEDURE — 2060000000 HC ICU INTERMEDIATE R&B

## 2024-01-09 PROCEDURE — APPSS180 APP SPLIT SHARED TIME > 60 MINUTES: Performed by: NURSE PRACTITIONER

## 2024-01-09 PROCEDURE — 83605 ASSAY OF LACTIC ACID: CPT

## 2024-01-09 PROCEDURE — 2580000003 HC RX 258: Performed by: NURSE PRACTITIONER

## 2024-01-09 RX ORDER — TETRAKIS(2-METHOXYISOBUTYLISOCYANIDE)COPPER(I) TETRAFLUOROBORATE 1 MG/ML
35 INJECTION, POWDER, LYOPHILIZED, FOR SOLUTION INTRAVENOUS
Status: COMPLETED | OUTPATIENT
Start: 2024-01-09 | End: 2024-01-09

## 2024-01-09 RX ORDER — REGADENOSON 0.08 MG/ML
0.4 INJECTION, SOLUTION INTRAVENOUS
Status: COMPLETED | OUTPATIENT
Start: 2024-01-09 | End: 2024-01-09

## 2024-01-09 RX ORDER — TETRAKIS(2-METHOXYISOBUTYLISOCYANIDE)COPPER(I) TETRAFLUOROBORATE 1 MG/ML
11.2 INJECTION, POWDER, LYOPHILIZED, FOR SOLUTION INTRAVENOUS
Status: COMPLETED | OUTPATIENT
Start: 2024-01-09 | End: 2024-01-09

## 2024-01-09 RX ADMIN — Medication 11.2 MILLICURIE: at 12:05

## 2024-01-09 RX ADMIN — AZITHROMYCIN DIHYDRATE 500 MG: 250 TABLET, FILM COATED ORAL at 18:55

## 2024-01-09 RX ADMIN — TAMSULOSIN HYDROCHLORIDE 0.4 MG: 0.4 CAPSULE ORAL at 10:09

## 2024-01-09 RX ADMIN — IPRATROPIUM BROMIDE 0.5 MG: 0.5 SOLUTION RESPIRATORY (INHALATION) at 15:30

## 2024-01-09 RX ADMIN — MULTIVITAMIN TABLET 1 TABLET: TABLET at 10:08

## 2024-01-09 RX ADMIN — BUDESONIDE INHALATION 500 MCG: 0.5 SUSPENSION RESPIRATORY (INHALATION) at 19:37

## 2024-01-09 RX ADMIN — SODIUM CHLORIDE, PRESERVATIVE FREE 10 ML: 5 INJECTION INTRAVENOUS at 10:14

## 2024-01-09 RX ADMIN — IPRATROPIUM BROMIDE 0.5 MG: 0.5 SOLUTION RESPIRATORY (INHALATION) at 19:37

## 2024-01-09 RX ADMIN — IPRATROPIUM BROMIDE 0.5 MG: 0.5 SOLUTION RESPIRATORY (INHALATION) at 07:52

## 2024-01-09 RX ADMIN — REGADENOSON 0.4 MG: 0.08 INJECTION, SOLUTION INTRAVENOUS at 13:22

## 2024-01-09 RX ADMIN — ARFORMOTEROL TARTRATE 15 MCG: 15 SOLUTION RESPIRATORY (INHALATION) at 07:52

## 2024-01-09 RX ADMIN — GABAPENTIN 300 MG: 300 CAPSULE ORAL at 16:23

## 2024-01-09 RX ADMIN — Medication 100 MG: at 10:08

## 2024-01-09 RX ADMIN — GABAPENTIN 300 MG: 300 CAPSULE ORAL at 10:08

## 2024-01-09 RX ADMIN — OXYBUTYNIN CHLORIDE 10 MG: 10 TABLET, EXTENDED RELEASE ORAL at 10:09

## 2024-01-09 RX ADMIN — ATORVASTATIN CALCIUM 10 MG: 10 TABLET, FILM COATED ORAL at 10:08

## 2024-01-09 RX ADMIN — METHYLPREDNISOLONE SODIUM SUCCINATE 40 MG: 40 INJECTION, POWDER, FOR SOLUTION INTRAMUSCULAR; INTRAVENOUS at 16:22

## 2024-01-09 RX ADMIN — BUPROPION HYDROCHLORIDE 300 MG: 300 TABLET, FILM COATED, EXTENDED RELEASE ORAL at 10:09

## 2024-01-09 RX ADMIN — AMLODIPINE BESYLATE 10 MG: 10 TABLET ORAL at 10:08

## 2024-01-09 RX ADMIN — ARFORMOTEROL TARTRATE 15 MCG: 15 SOLUTION RESPIRATORY (INHALATION) at 19:37

## 2024-01-09 RX ADMIN — METHYLPREDNISOLONE SODIUM SUCCINATE 40 MG: 40 INJECTION, POWDER, FOR SOLUTION INTRAMUSCULAR; INTRAVENOUS at 05:53

## 2024-01-09 RX ADMIN — OXYBUTYNIN CHLORIDE 10 MG: 10 TABLET, EXTENDED RELEASE ORAL at 21:45

## 2024-01-09 RX ADMIN — Medication 5 MG: at 21:45

## 2024-01-09 RX ADMIN — PANTOPRAZOLE SODIUM 40 MG: 40 TABLET, DELAYED RELEASE ORAL at 10:09

## 2024-01-09 RX ADMIN — ASPIRIN 81 MG: 81 TABLET, CHEWABLE ORAL at 10:08

## 2024-01-09 RX ADMIN — Medication 33 MILLICURIE: at 13:31

## 2024-01-09 RX ADMIN — BUDESONIDE INHALATION 500 MCG: 0.5 SUSPENSION RESPIRATORY (INHALATION) at 07:52

## 2024-01-09 RX ADMIN — IPRATROPIUM BROMIDE 0.5 MG: 0.5 SOLUTION RESPIRATORY (INHALATION) at 11:14

## 2024-01-09 RX ADMIN — TAMSULOSIN HYDROCHLORIDE 0.4 MG: 0.4 CAPSULE ORAL at 21:45

## 2024-01-09 RX ADMIN — GABAPENTIN 300 MG: 300 CAPSULE ORAL at 21:45

## 2024-01-09 RX ADMIN — Medication 5 MG: at 16:22

## 2024-01-09 RX ADMIN — METHYLPREDNISOLONE SODIUM SUCCINATE 40 MG: 40 INJECTION, POWDER, FOR SOLUTION INTRAMUSCULAR; INTRAVENOUS at 10:07

## 2024-01-09 RX ADMIN — SODIUM CHLORIDE, PRESERVATIVE FREE 10 ML: 5 INJECTION INTRAVENOUS at 21:46

## 2024-01-09 ASSESSMENT — PAIN SCALES - GENERAL
PAINLEVEL_OUTOF10: 0
PAINLEVEL_OUTOF10: 0

## 2024-01-09 NOTE — CONSULTS
>60   CALCIUM 8.3* 8.9     Mag:   Recent Labs     01/07/24 1903 01/08/24 0449   MG 2.5 2.4     Phos:   Recent Labs     01/08/24 0449   PHOS 2.5     TFT:   Lab Results   Component Value Date    TSH 0.78 01/08/2024    T4FREE 1.07 04/26/2014      HgA1c:   Lab Results   Component Value Date/Time    LABA1C 6.6 06/22/2022 04:59 AM    LABA1C 5.8 06/03/2022 06:09 AM    LABA1C 6.1 02/14/2022 06:25 AM     proBNP:   Lab Results   Component Value Date/Time    PROBNP <36 01/07/2024 07:03 PM    PROBNP 60 04/21/2023 02:03 PM    PROBNP 139 01/29/2023 11:27 PM    PROBNP 82 06/20/2022 11:54 PM    PROBNP 35 06/01/2022 05:03 PM     TROPONIN:  Lab Results   Component Value Date/Time    TROPHS 13 01/07/2024 08:59 PM    TROPHS 14 01/07/2024 07:03 PM    TROPHS 11 04/21/2023 04:22 PM    TROPHS 14 04/21/2023 02:03 PM    TROPHS 19 01/30/2023 01:43 AM     CK:  Lab Results   Component Value Date/Time    CKTOTAL 357 06/27/2020 05:20 AM    CKTOTAL 1,338 06/26/2020 05:09 AM    CKTOTAL 1,982 06/25/2020 10:28 PM     FASTING LIPID PANEL:  Lab Results   Component Value Date/Time    CHOL 106 01/08/2024 04:49 AM    CHOL 156 04/20/2020 02:43 PM    HDL 55 01/08/2024 04:49 AM    LDLCALC 76 04/20/2020 02:43 PM    TRIG 50 01/08/2024 04:49 AM     LIVER PROFILE:  Recent Labs     01/08/24  0449 01/09/24  0445   AST 63* 35   ALT 59* 47*   LABALBU 3.9 3.5     COVID19:   Recent Labs     01/07/24 1903   COVID19 Not Detected   A&P per Dr Cain  Electronically signed by BARBIE TIMMONS on 1/9/2024 at 6:36 AM   ______________________________________________________________________  Cardiology attending attestation:  I have independently interviewed and examined the patient.  I personally reviewed pertinent laboratory values and diagnostic testing (including, if applicable, chest xray, electrocardiogram, telemetry, echocardiogram, stress testing, and coronary angiography).  I have reviewed the above documentation completed by Ely Jha, APRN-CNP

## 2024-01-09 NOTE — ACP (ADVANCE CARE PLANNING)
Advance Care Planning   Healthcare Decision Maker:    Primary Decision Maker: Mariajose Waller - Other - [phone]    Secondary Decision Maker: Oliver Mcghee - Aunt/Uncle - [phone]    Click here to complete Healthcare Decision Makers including selection of the Healthcare Decision Maker Relationship (ie \"Primary\").           Patient does not have HCPOA. I let him know that Mariajose cannot make decisions for patient if he becomes confusions as she is not a blood relative without HCPOA. He wants to list his uncle as an alternate who is a blood relative.

## 2024-01-09 NOTE — CARE COORDINATION
Spoke with patient, from home, lives alone. He does not use assistive devices. PCP is Dr. LOIS Dotson. 2 steps to enter home. Plans to return home with no needs when released. He will need transport arranged through [facility] medicaid.     Case Management Assessment  Initial Evaluation    Date/Time of Evaluation: 1/9/2024 2:26 PM  Assessment Completed by: GABINO Gan    If patient is discharged prior to next notation, then this note serves as note for discharge by case management.    Patient Name: Ryan Mcghee                   YOB: 1970  Diagnosis: Hypokalemia [E87.6]  Drug abuse (HCC) [F19.10]  Alcohol abuse [F10.10]  Hypoxia [R09.02]  Acute respiratory failure with hypoxia (HCC) [J96.01]  Chest pain, unspecified type [R07.9]  Acute cough [R05.1]                   Date / Time: 1/7/2024  9:45 PM    Patient Admission Status: Inpatient   Readmission Risk (Low < 19, Mod (19-27), High > 27): Readmission Risk Score: 12.8    Current PCP: Marco A Dotson  PCP verified by CM? Yes    Chart Reviewed: Yes      History Provided by: Patient  Patient Orientation: Alert and Oriented    Patient Cognition: Alert    Hospitalization in the last 30 days (Readmission):  No    If yes, Readmission Assessment in CM Navigator will be completed.    Advance Directives:      Code Status: Full Code   Patient's Primary Decision Maker is: Legal Next of Kin      Discharge Planning:    Patient lives with: Family Members Type of Home: House  Primary Care Giver: Self  Patient Support Systems include: Family Members   Current Financial resources:    Current community resources:    Current services prior to admission: None            Current DME:              Type of Home Care services:  None    ADLS  Prior functional level: Independent in ADLs/IADLs  Current functional level: Independent in ADLs/IADLs    PT AM-PAC:   /24  OT AM-PAC:   /24    Family can provide assistance at DC: No  Would you like Case Management to discuss the

## 2024-01-10 VITALS
OXYGEN SATURATION: 94 % | DIASTOLIC BLOOD PRESSURE: 83 MMHG | RESPIRATION RATE: 18 BRPM | HEIGHT: 69 IN | SYSTOLIC BLOOD PRESSURE: 138 MMHG | HEART RATE: 73 BPM | WEIGHT: 229 LBS | TEMPERATURE: 98 F | BODY MASS INDEX: 33.92 KG/M2

## 2024-01-10 LAB
ALBUMIN SERPL-MCNC: 3.6 G/DL (ref 3.5–5.2)
ALP SERPL-CCNC: 112 U/L (ref 40–129)
ALT SERPL-CCNC: 46 U/L (ref 0–40)
ANION GAP SERPL CALCULATED.3IONS-SCNC: 11 MMOL/L (ref 7–16)
AST SERPL-CCNC: 25 U/L (ref 0–39)
BILIRUB SERPL-MCNC: 0.2 MG/DL (ref 0–1.2)
BUN SERPL-MCNC: 9 MG/DL (ref 6–20)
CALCIUM SERPL-MCNC: 8.6 MG/DL (ref 8.6–10.2)
CHLORIDE SERPL-SCNC: 100 MMOL/L (ref 98–107)
CO2 SERPL-SCNC: 28 MMOL/L (ref 22–29)
CREAT SERPL-MCNC: 0.6 MG/DL (ref 0.7–1.2)
ERYTHROCYTE [DISTWIDTH] IN BLOOD BY AUTOMATED COUNT: 14 % (ref 11.5–15)
GFR SERPL CREATININE-BSD FRML MDRD: >60 ML/MIN/1.73M2
GLUCOSE SERPL-MCNC: 241 MG/DL (ref 74–99)
HCT VFR BLD AUTO: 38.2 % (ref 37–54)
HGB BLD-MCNC: 12.2 G/DL (ref 12.5–16.5)
MCH RBC QN AUTO: 28 PG (ref 26–35)
MCHC RBC AUTO-ENTMCNC: 31.9 G/DL (ref 32–34.5)
MCV RBC AUTO: 87.6 FL (ref 80–99.9)
PLATELET # BLD AUTO: 287 K/UL (ref 130–450)
PMV BLD AUTO: 9.9 FL (ref 7–12)
POTASSIUM SERPL-SCNC: 3.6 MMOL/L (ref 3.5–5)
PROT SERPL-MCNC: 6.3 G/DL (ref 6.4–8.3)
RBC # BLD AUTO: 4.36 M/UL (ref 3.8–5.8)
SODIUM SERPL-SCNC: 139 MMOL/L (ref 132–146)
WBC OTHER # BLD: 14.4 K/UL (ref 4.5–11.5)

## 2024-01-10 PROCEDURE — 6370000000 HC RX 637 (ALT 250 FOR IP): Performed by: NURSE PRACTITIONER

## 2024-01-10 PROCEDURE — 6370000000 HC RX 637 (ALT 250 FOR IP): Performed by: STUDENT IN AN ORGANIZED HEALTH CARE EDUCATION/TRAINING PROGRAM

## 2024-01-10 PROCEDURE — 6360000002 HC RX W HCPCS: Performed by: STUDENT IN AN ORGANIZED HEALTH CARE EDUCATION/TRAINING PROGRAM

## 2024-01-10 PROCEDURE — 94640 AIRWAY INHALATION TREATMENT: CPT

## 2024-01-10 PROCEDURE — 36415 COLL VENOUS BLD VENIPUNCTURE: CPT

## 2024-01-10 PROCEDURE — 99232 SBSQ HOSP IP/OBS MODERATE 35: CPT | Performed by: INTERNAL MEDICINE

## 2024-01-10 PROCEDURE — 85027 COMPLETE CBC AUTOMATED: CPT

## 2024-01-10 PROCEDURE — 2580000003 HC RX 258: Performed by: NURSE PRACTITIONER

## 2024-01-10 PROCEDURE — 80053 COMPREHEN METABOLIC PANEL: CPT

## 2024-01-10 RX ORDER — PREDNISONE 20 MG/1
40 TABLET ORAL DAILY
Qty: 4 TABLET | Refills: 0 | Status: SHIPPED | OUTPATIENT
Start: 2024-01-11 | End: 2024-01-13

## 2024-01-10 RX ORDER — GUAIFENESIN/DEXTROMETHORPHAN 100-10MG/5
5 SYRUP ORAL EVERY 4 HOURS PRN
Qty: 120 ML | Refills: 1 | Status: SHIPPED | OUTPATIENT
Start: 2024-01-10 | End: 2024-01-20

## 2024-01-10 RX ORDER — AZITHROMYCIN 500 MG/1
500 TABLET, FILM COATED ORAL DAILY
Qty: 1 TABLET | Refills: 0 | Status: SHIPPED | OUTPATIENT
Start: 2024-01-10 | End: 2024-01-11

## 2024-01-10 RX ORDER — BENZONATATE 100 MG/1
100 CAPSULE ORAL 3 TIMES DAILY PRN
Qty: 30 CAPSULE | Refills: 1 | Status: SHIPPED | OUTPATIENT
Start: 2024-01-10

## 2024-01-10 RX ADMIN — AMLODIPINE BESYLATE 10 MG: 10 TABLET ORAL at 09:12

## 2024-01-10 RX ADMIN — ASPIRIN 81 MG: 81 TABLET, CHEWABLE ORAL at 09:13

## 2024-01-10 RX ADMIN — ATORVASTATIN CALCIUM 10 MG: 10 TABLET, FILM COATED ORAL at 09:13

## 2024-01-10 RX ADMIN — PANTOPRAZOLE SODIUM 40 MG: 40 TABLET, DELAYED RELEASE ORAL at 09:13

## 2024-01-10 RX ADMIN — BUDESONIDE INHALATION 500 MCG: 0.5 SUSPENSION RESPIRATORY (INHALATION) at 10:07

## 2024-01-10 RX ADMIN — OXYBUTYNIN CHLORIDE 10 MG: 10 TABLET, EXTENDED RELEASE ORAL at 09:14

## 2024-01-10 RX ADMIN — TAMSULOSIN HYDROCHLORIDE 0.4 MG: 0.4 CAPSULE ORAL at 09:12

## 2024-01-10 RX ADMIN — BUPROPION HYDROCHLORIDE 300 MG: 300 TABLET, FILM COATED, EXTENDED RELEASE ORAL at 09:14

## 2024-01-10 RX ADMIN — PREDNISONE 40 MG: 20 TABLET ORAL at 09:14

## 2024-01-10 RX ADMIN — Medication 100 MG: at 09:12

## 2024-01-10 RX ADMIN — GABAPENTIN 300 MG: 300 CAPSULE ORAL at 09:12

## 2024-01-10 RX ADMIN — IPRATROPIUM BROMIDE 0.5 MG: 0.5 SOLUTION RESPIRATORY (INHALATION) at 10:07

## 2024-01-10 RX ADMIN — MULTIVITAMIN TABLET 1 TABLET: TABLET at 09:12

## 2024-01-10 RX ADMIN — ARFORMOTEROL TARTRATE 15 MCG: 15 SOLUTION RESPIRATORY (INHALATION) at 10:07

## 2024-01-10 RX ADMIN — SODIUM CHLORIDE, PRESERVATIVE FREE 10 ML: 5 INJECTION INTRAVENOUS at 09:15

## 2024-01-10 ASSESSMENT — PAIN SCALES - GENERAL
PAINLEVEL_OUTOF10: 0
PAINLEVEL_OUTOF10: 0

## 2024-01-10 NOTE — PROGRESS NOTES
[facility] Hospitalist Progress Note    Admitting Date and Time: 1/7/2024  9:45 PM  Admit Dx: Hypokalemia [E87.6]  Drug abuse (HCC) [F19.10]  Alcohol abuse [F10.10]  Hypoxia [R09.02]  Acute respiratory failure with hypoxia (HCC) [J96.01]  Chest pain, unspecified type [R07.9]  Acute cough [R05.1]    Subjective:  Patient is being followed for Hypokalemia [E87.6]  Drug abuse (HCC) [F19.10]  Alcohol abuse [F10.10]  Hypoxia [R09.02]  Acute respiratory failure with hypoxia (HCC) [J96.01]  Chest pain, unspecified type [R07.9]  Acute cough [R05.1]     Patient seen and examined at bedside this morning.  Reports some mild shortness of breath.  Otherwise no complaints.    ROS: denies fever, chills, cp, sob, n/v, HA unless stated above.      budesonide  0.5 mg Nebulization BID RT    And    arformoterol tartrate  15 mcg Nebulization BID RT    And    ipratropium  0.5 mg Nebulization 4x Daily RT    buPROPion  300 mg Oral Daily    enoxaparin  1 mg/kg SubCUTAneous Once    amLODIPine  10 mg Oral Daily    aspirin  81 mg Oral Daily    atorvastatin  10 mg Oral Daily    gabapentin  300 mg Oral TID    nicotine  1 patch TransDERmal Daily    oxyBUTYnin  10 mg Oral BID    pantoprazole  40 mg Oral Daily    tamsulosin  0.4 mg Oral BID    thiamine  100 mg Oral Daily    sodium chloride flush  5-40 mL IntraVENous 2 times per day    azithromycin  500 mg Oral Daily    methylPREDNISolone  40 mg IntraVENous Q6H    Followed by    [START ON 1/10/2024] predniSONE  40 mg Oral Daily    melatonin  5 mg Oral QPM    multivitamin  1 tablet Oral Daily     zolpidem, 10 mg, Nightly PRN  sodium chloride flush, 5-40 mL, PRN  sodium chloride, , PRN  ondansetron, 4 mg, Q8H PRN   Or  ondansetron, 4 mg, Q6H PRN  polyethylene glycol, 17 g, Daily PRN  acetaminophen, 650 mg, Q6H PRN   Or  acetaminophen, 650 mg, Q6H PRN  ipratropium 0.5 mg-albuterol 2.5 mg, 1 Dose, Q4H PRN  guaiFENesin-dextromethorphan, 5 mL, Q4H PRN  benzonatate, 100 mg, TID PRN  melatonin, 5 
 CLINICAL PHARMACY NOTE: MEDS TO BEDS    Total # of Prescriptions Filled: 4   The following medications were delivered to the patient:  Benzonatate 100  Azithromycin 500  Prednisone 20  Yesica-tussin dm  mg/5ml syrup    Additional Documentation:  Delivered to pt   
Per Dr Munoz the patient may return to work after hospital discharge.  
Per the pt, nursing staff is allowed to reveal to [facility] that the patient is in fact admitted to [facility]. Proof of hospital stay being sent via fax.  
Refused heart monitor once arrival to the floor. Education provided but continues to refuse. Refused skin assessment as well. Unable to do 4 eyes assessment. Will make day nurse aware.   
limb lead reversal.  QTc 514 ms.    1/9/2024: Sinus rhythm 84 bpm.  Leftward axis.  IVCD QRS duration 122 ms.  QTc 463 ms     Telemetry: Sinus rhythm 60s to 70s    Chest X-ray:   Impression:        No acute disease       Echocardiogram:     Left Ventricle: Normal left ventricular systolic function with a visually estimated EF of 60 - 65%. Left ventricle size is normal. Mild basal septal thickening. Normal wall motion. Normal diastolic function.    Right Ventricle: Right ventricle is mildly dilated. Normal systolic function. TAPSE is 2.5 cm.    Right Atrium: Right atrium is mildly dilated.    Stress test:      Perfusion Comments: LV perfusion is normal. There is no evidence of inducible ischemia.    Stress Function: Left ventricular function post-stress is normal. Post-stress ejection fraction is >70%. The stress end diastolic cavity size is normal.    ECG: The ECG was negative for ischemia.    Stress Combined Conclusion: Normal pharmacological myocardial perfusion study. Findings suggest a low risk of cardiac events.    Cardiac catheterization:     ----------------------------------------------------------------------------------------------------------------------------------------------------------------  IMPRESSION:  Atypical chest pain.  No ACS.  Normal MPI  Prolonged QT resolved likely due to hypokalemia  IVCD/left axis  Dilated right heart, likely due to untreated sleep apnea  Positive cocaine metabolites  Elevated lactic acid resolved  Mildly abnormal LFTs improving  Hypokalemia improved  Hypertension  Type 2 diabetes A1c 6.6% 2022  COPD with exacerbation  Obesity BMI 34 kg/m²  Tobacco abuse, alcohol abuse  GERD  JANESSA noncompliant with CPAP  Chronic back pain    RECOMMENDATIONS:  Reassuring noninvasive testing.  Stable from cardiac standpoint.    No further cardiac testing planned  Continue cardiac medications  Encourage compliance with CPAP  Counseled to avoid cocaine and tobacco abuse, moderate alcohol

## 2024-01-10 NOTE — DISCHARGE SUMMARY
[facility] Hospitalist Physician Discharge Summary       No follow-up provider specified.    Activity level: As tolerated     Dispo: Home      Condition on discharge: Stable     Patient ID:  Ryan Mcghee  [id number]  53 y.o.  1970    Admit date: 1/7/2024    Discharge date and time:  1/10/2024  11:48 AM    Admission Diagnoses: Principal Problem:    Acute respiratory failure with hypoxia (HCC)  Active Problems:    Chest pain    Acute cough    Cocaine abuse (HCC)    Tobacco abuse    QT prolongation  Resolved Problems:    * No resolved hospital problems. *      Discharge Diagnoses: Principal Problem:    Acute respiratory failure with hypoxia (HCC)  Active Problems:    Chest pain    Acute cough    Cocaine abuse (HCC)    Tobacco abuse    QT prolongation  Resolved Problems:    * No resolved hospital problems. *      Consults:  IP CONSULT TO HOSPITALIST  IP CONSULT TO CARDIOLOGY    Hospital Course:   Patient Ryan Mcghee is a 53 y.o. presented with Hypokalemia [E87.6]  Drug abuse (HCC) [F19.10]  Alcohol abuse [F10.10]  Hypoxia [R09.02]  Acute respiratory failure with hypoxia (HCC) [J96.01]  Chest pain, unspecified type [R07.9]  Acute cough [R05.1]    Mr. Ryan Mcghee, a 53 y.o. year old male  who  has a past medical history of Acid reflux, Asthma, Asthma, COPD (chronic obstructive pulmonary disease) ([facility]), Hypertension, Pancreatitis, Prediabetes, Psychiatric problem, Sleep apnea, and Substance abuse ([facility]).      Patient presented to the ED with complaints of chest pain, SOB, and a cough productive of yellow mucus.  He reports he thinks his asthma is acting up.  He reports he has been noncompliant with his medications over the last couple of days.  He also admits he has not slept over the past couple of days due to to heavy alcohol and cocaine use.       In ED he was mildly tachycardic.  He was also reportedly hypoxic at 86% with ambulation on room air.  Laboratory workup significant for hypokalemia, HAGMA,

## 2024-01-10 NOTE — CARE COORDINATION
Transport arranged through [facility] for marie to arrive between now and 5:30pm. Trip #[id number]. Taxi voucher left on soft chart if [facility] is a no show. Discussed with nursing.     For questions I can be reached at [phone]. GABINO Gan

## 2024-01-12 LAB
EKG ATRIAL RATE: 73 BPM
EKG ATRIAL RATE: 84 BPM
EKG P AXIS: 21 DEGREES
EKG P AXIS: 26 DEGREES
EKG P-R INTERVAL: 140 MS
EKG P-R INTERVAL: 140 MS
EKG Q-T INTERVAL: 392 MS
EKG Q-T INTERVAL: 416 MS
EKG QRS DURATION: 122 MS
EKG QRS DURATION: 124 MS
EKG QTC CALCULATION (BAZETT): 458 MS
EKG QTC CALCULATION (BAZETT): 463 MS
EKG R AXIS: -30 DEGREES
EKG R AXIS: -48 DEGREES
EKG T AXIS: 26 DEGREES
EKG T AXIS: 49 DEGREES
EKG VENTRICULAR RATE: 73 BPM
EKG VENTRICULAR RATE: 84 BPM
MICROORGANISM SPEC CULT: NORMAL
MICROORGANISM SPEC CULT: NORMAL
S PNEUM AG SPEC QL: NEGATIVE
SERVICE CMNT-IMP: NORMAL
SERVICE CMNT-IMP: NORMAL
SPECIMEN DESCRIPTION: NORMAL
SPECIMEN DESCRIPTION: NORMAL
SPECIMEN SOURCE: NORMAL

## 2024-01-12 PROCEDURE — 93010 ELECTROCARDIOGRAM REPORT: CPT | Performed by: INTERNAL MEDICINE

## 2024-05-06 NOTE — PROGRESS NOTES
For runny nose, can try Claritin, Xyzal or Allegra ----- anti-histamine otc  Some can cause sleepiness more than others, take once a day.   Maicol Booth M.D.,[facility]  Chetan Trevino D.O., F.EILEENC.OBetzyI., Pamela Alvarez M.D. Keyshawn Valero M.D., Nathalie Pritchard M.D. Valerie Haro D.O. Daily Pulmonary Progress Note    Patient:  Malia Caldwell 44 y.o. male MRN: [id number]     Date of Service: 6/27/2020      Synopsis     We are following patient for respiratory failure, possible cocaine pneumonitis    \"CC\" shortness of breath    Code status: full      Subjective      Patient was seen and examined sitting up in bed somewhat anxious. He was just extubated. Voice is strong and lung sounds are clear. He is on 6L oxygen. He is hypertensive at 173/95.     Review of Systems:   Not able to obtain     24-hour events:  extubated    Objective   Vitals: BP (!) 173/95   Pulse 130   Temp 98 °F (36.7 °C) (Axillary)   Resp 17   Ht 5' 6\" (1.676 m)   Wt 266 lb 1.5 oz (120.7 kg)   SpO2 91%   BMI 42.95 kg/m²     I/O:    Intake/Output Summary (Last 24 hours) at 6/27/2020 1111  Last data filed at 6/27/2020 0800  Gross per 24 hour   Intake 3734 ml   Output 3910 ml   Net -176 ml       Vent Information  $Ventilation: Off Vent  Skin Assessment: Clean, dry, & intact  Equipment ID: [id number]  Vent Type: 840  Vent Mode: PS  Vt Ordered: 550 mL  Rate Set: 0 bmp  Peak Flow: 60 L/min  Pressure Support: 8 cmH20  FiO2 : 40 %  SpO2: 91 %  SpO2/FiO2 ratio: 230  Sensitivity: 3  PEEP/CPAP: 5  I Time/ I Time %: 0 s  Humidification Source: Heated wire  Humidification Temp: 37  Humidification Temp Measured: 36.9       IPAP: (S) 16 cmH20  CPAP/EPAP: (S) 8 cmH2O     CURRENT MEDS :  Scheduled Meds:   methylPREDNISolone  40 mg Intravenous Q6H    ipratropium-albuterol  1 ampule Inhalation Q6H WA    sodium chloride flush  10 mL Intravenous 2 times per day    enoxaparin  40 mg Subcutaneous Daily    cefTRIAXone (ROCEPHIN) IV  1 g Intravenous Q24H    azithromycin  500 mg Intravenous Q24H    pantoprazole  40 mg Intravenous Daily    chlorhexidine  15 mL Mouth/Throat BID  sodium chloride flush  10 mL Intravenous 2 times per day    thiamine  100 mg Intravenous Daily    insulin lispro  0-6 Units Subcutaneous 4 times per day       Physical Exam:  General Appearance: appears comfortable in no acute distress. HEENT: Normocephalic atraumatic without obvious abnormality   Neck: Lips, mucosa, and tongue normal.  Supple, symmetrical, trachea midline, no adenopathy;thyroid:  no enlargement/tenderness/nodules or JVD. Lung: Breath sounds CTA. Respirations   unlabored. Symmetrical expansion. Heart: RRR, normal S1, S2. No MRG  Abdomen: Soft, NT, ND. BS present x 4 quadrants. No bruit or organomegaly. Extremities: Pedal pulses 2+ symmetric b/l. Extremities normal, no cyanosis, clubbing, or edema. Musculokeletal: No joint swelling, no muscle tenderness. ROM normal in all joints of extremities. Neurologic: Mental status: Alert and Oriented X3 . Pertinent/ New Labs and Imaging Studies     Imaging Personally Reviewed:  Findings:   Study is technically limited due to low lung volumes. An endotracheal tube is noted in position with tip projecting   approximately 3.5 cm above the oliva    An NG tube is noted with tip projecting within the stomach. There is a left-sided internal jugular central venous catheter noted   the tip is in the superior vena cava. The heart is enlarged   The lung fields are unremarkable. The aorta is tortuous and ectatic           Impression   Limited study due to low lung volumes. No significant interval change. Stable positioning of support lines and tubes. .       This study was dictated by Silvina Eduardo PA-C and Wilmer Arevalo MD   reviewed and concurred with the findings.         cxr 6/27  Subtle worsening of aeration at the left lower lobe medially which is   likely secondary to atelectasis. ECHO  2016  Summary   Left ventricular size is grossly normal.   Borderline left ventricular concentric hypertrophy noted.    Ejection fraction is visually estimated at 65%. No evidence of left ventricular mass or thrombus noted. No regional wall motion abnormalities seen. The left atrium is mildly dilated. Interatrial septum appears intact. No evidence of thrombus within left atrium. No evidence of mass within left atrium. Physiologic and/or trace mitral regurgitation is present. No evidence of mitral valve stenosis. Physiologic and/or trace tricuspid regurgitation. RVSP is 26.00 mmHg. Regular rhythm. Labs:  Lab Results   Component Value Date    WBC 14.9 06/27/2020    HGB 13.7 06/27/2020    HCT 43.2 06/27/2020    MCV 89.6 06/27/2020    MCH 28.4 06/27/2020    MCHC 31.7 06/27/2020    RDW 15.5 06/27/2020     06/27/2020    MPV 9.6 06/27/2020     Lab Results   Component Value Date     06/27/2020    K 3.6 06/27/2020    K 4.3 06/25/2020     06/27/2020    CO2 30 06/27/2020    BUN 12 06/27/2020    CREATININE 0.7 06/27/2020    LABALBU 3.2 06/26/2020    CALCIUM 8.3 06/27/2020    GFRAA >60 06/27/2020    LABGLOM >60 06/27/2020     Lab Results   Component Value Date    PROTIME 11.9 06/25/2020    INR 1.0 06/25/2020     Recent Labs     06/25/20  0635   PROBNP 217*     Recent Labs     06/25/20  1550   PROCAL 0.05     This SmartLink has not been configured with any valid records. Micro:  Recent Labs     06/25/20  1720   CULTRESP Oral Pharyngeal Carley present        Assessment:    1. Acute respiratory failure with hypoxia  2. Intubation for airway protection, ventilator dependence  3. Cocaine induced pneumonitis vs chf vs aspiration pneumonia   4. COPD, noncompliance with medication  5. Current every day smoker  6. Substance abuse  7. Hypertension  8. EtOH abuse, history of pancreatitis, elevated LFTs  9. GERD  10. Rhabdomyolysis   11. Medical non compliance      Plan:     1. Solumedrol 40 mg IV Q 6 hrs  2. Duonebs Q 6 w/a  3. Follow daily Imaging-slightly worse today, atelectasis LLL,  and ABG   4. Replace lytes.  Lasix 40 IV x 1

## 2024-06-18 ENCOUNTER — HOSPITAL ENCOUNTER (INPATIENT)
Age: 54
LOS: 3 days | Discharge: HOME | End: 2024-06-21
Attending: STUDENT IN AN ORGANIZED HEALTH CARE EDUCATION/TRAINING PROGRAM | Admitting: INTERNAL MEDICINE
Payer: MEDICARE

## 2024-06-18 ENCOUNTER — APPOINTMENT (OUTPATIENT)
Dept: GENERAL RADIOLOGY | Age: 54
End: 2024-06-18
Payer: MEDICARE

## 2024-06-18 DIAGNOSIS — J44.1 COPD EXACERBATION (HCC): Primary | ICD-10-CM

## 2024-06-18 DIAGNOSIS — J96.02 ACUTE RESPIRATORY FAILURE WITH HYPERCAPNIA (HCC): ICD-10-CM

## 2024-06-18 PROBLEM — R05.1 ACUTE COUGH: Status: RESOLVED | Noted: 2024-01-09 | Resolved: 2024-06-18

## 2024-06-18 PROBLEM — J96.01 ACUTE RESPIRATORY FAILURE WITH HYPOXEMIA (HCC): Status: RESOLVED | Noted: 2023-04-21 | Resolved: 2024-06-18

## 2024-06-18 PROBLEM — J96.00 ACUTE RESPIRATORY FAILURE (HCC): Status: RESOLVED | Noted: 2022-06-01 | Resolved: 2024-06-18

## 2024-06-18 PROBLEM — J45.901 ACUTE EXACERBATION OF ASTHMA WITH ALLERGIC RHINITIS: Status: RESOLVED | Noted: 2023-04-21 | Resolved: 2024-06-18

## 2024-06-18 PROBLEM — J96.01 ACUTE RESPIRATORY FAILURE WITH HYPOXIA (HCC): Status: RESOLVED | Noted: 2020-10-14 | Resolved: 2024-06-18

## 2024-06-18 LAB
AADO2: 139.7 MMHG
ALBUMIN SERPL-MCNC: 4.2 G/DL (ref 3.5–5.2)
ALP SERPL-CCNC: 99 U/L (ref 40–129)
ALT SERPL-CCNC: 59 U/L (ref 0–40)
ANION GAP SERPL CALCULATED.3IONS-SCNC: 16 MMOL/L (ref 7–16)
AST SERPL-CCNC: 63 U/L (ref 0–39)
B.E.: -2.1 MMOL/L (ref -3–3)
B.E.: -3.2 MMOL/L (ref -3–3)
BASOPHILS # BLD: 0.02 K/UL (ref 0–0.2)
BASOPHILS NFR BLD: 0 % (ref 0–2)
BILIRUB SERPL-MCNC: 0.8 MG/DL (ref 0–1.2)
BNP SERPL-MCNC: 139 PG/ML (ref 0–125)
BUN SERPL-MCNC: 12 MG/DL (ref 6–20)
CALCIUM SERPL-MCNC: 8.5 MG/DL (ref 8.6–10.2)
CHLORIDE SERPL-SCNC: 96 MMOL/L (ref 98–107)
CO2 SERPL-SCNC: 23 MMOL/L (ref 22–29)
COHB: 4.1 % (ref 0–1.5)
COHB: 4.4 % (ref 0–1.5)
CREAT SERPL-MCNC: 0.6 MG/DL (ref 0.7–1.2)
CRITICAL: ABNORMAL
CRITICAL: ABNORMAL
DATE ANALYZED: ABNORMAL
DATE ANALYZED: ABNORMAL
DATE OF COLLECTION: ABNORMAL
DATE OF COLLECTION: ABNORMAL
EOSINOPHIL # BLD: 0.03 K/UL (ref 0.05–0.5)
EOSINOPHILS RELATIVE PERCENT: 0 % (ref 0–6)
ERYTHROCYTE [DISTWIDTH] IN BLOOD BY AUTOMATED COUNT: 14.1 % (ref 11.5–15)
FIO2: 40 %
GFR, ESTIMATED: >90 ML/MIN/1.73M2
GLUCOSE SERPL-MCNC: 75 MG/DL (ref 74–99)
HCO3: 26.5 MMOL/L (ref 22–26)
HCO3: 26.9 MMOL/L (ref 22–26)
HCT VFR BLD AUTO: 46 % (ref 37–54)
HGB BLD-MCNC: 13.7 G/DL (ref 12.5–16.5)
HHB: 1.5 % (ref 0–5)
HHB: 10.5 % (ref 0–5)
IMM GRANULOCYTES # BLD AUTO: 0.08 K/UL (ref 0–0.58)
IMM GRANULOCYTES NFR BLD: 1 % (ref 0–5)
LAB: ABNORMAL
LAB: ABNORMAL
LYMPHOCYTES NFR BLD: 1.48 K/UL (ref 1.5–4)
LYMPHOCYTES RELATIVE PERCENT: 13 % (ref 20–42)
Lab: 1635
Lab: 1823
MAGNESIUM SERPL-MCNC: 2 MG/DL (ref 1.6–2.6)
MCH RBC QN AUTO: 26.6 PG (ref 26–35)
MCHC RBC AUTO-ENTMCNC: 29.8 G/DL (ref 32–34.5)
MCV RBC AUTO: 89.1 FL (ref 80–99.9)
METHB: 0.3 % (ref 0–1.5)
METHB: 0.4 % (ref 0–1.5)
MODE: ABNORMAL
MODE: ABNORMAL
MONOCYTES NFR BLD: 0.62 K/UL (ref 0.1–0.95)
MONOCYTES NFR BLD: 5 % (ref 2–12)
NEUTROPHILS NFR BLD: 81 % (ref 43–80)
NEUTS SEG NFR BLD: 9.51 K/UL (ref 1.8–7.3)
O2 SATURATION: 89 % (ref 92–98.5)
O2 SATURATION: 98.4 % (ref 92–98.5)
O2HB: 85.1 % (ref 94–97)
O2HB: 93.7 % (ref 94–97)
OPERATOR ID: 5100
OPERATOR ID: 5100
PATIENT TEMP: 37 C
PATIENT TEMP: 37 C
PCO2: 65 MMHG (ref 35–45)
PCO2: 69 MMHG (ref 35–45)
PFO2: 1.52 MMHG/%
PH BLOOD GAS: 7.2 (ref 7.35–7.45)
PH BLOOD GAS: 7.24 (ref 7.35–7.45)
PLATELET # BLD AUTO: 381 K/UL (ref 130–450)
PMV BLD AUTO: 10 FL (ref 7–12)
PO2: 136.3 MMHG (ref 75–100)
PO2: 60.8 MMHG (ref 75–100)
POTASSIUM SERPL-SCNC: 4.5 MMOL/L (ref 3.5–5)
PROT SERPL-MCNC: 7.6 G/DL (ref 6.4–8.3)
RBC # BLD AUTO: 5.16 M/UL (ref 3.8–5.8)
RI(T): 2.3
SODIUM SERPL-SCNC: 135 MMOL/L (ref 132–146)
SOURCE, BLOOD GAS: ABNORMAL
SOURCE, BLOOD GAS: ABNORMAL
THB: 15 G/DL (ref 11.5–16.5)
THB: 15.3 G/DL (ref 11.5–16.5)
TIME ANALYZED: 1639
TIME ANALYZED: 1828
WBC OTHER # BLD: 11.7 K/UL (ref 4.5–11.5)

## 2024-06-18 PROCEDURE — 6370000000 HC RX 637 (ALT 250 FOR IP): Performed by: INTERNAL MEDICINE

## 2024-06-18 PROCEDURE — 6360000002 HC RX W HCPCS

## 2024-06-18 PROCEDURE — 96375 TX/PRO/DX INJ NEW DRUG ADDON: CPT

## 2024-06-18 PROCEDURE — 96365 THER/PROPH/DIAG IV INF INIT: CPT

## 2024-06-18 PROCEDURE — 99285 EMERGENCY DEPT VISIT HI MDM: CPT

## 2024-06-18 PROCEDURE — 80053 COMPREHEN METABOLIC PANEL: CPT

## 2024-06-18 PROCEDURE — 5A09357 ASSISTANCE WITH RESPIRATORY VENTILATION, LESS THAN 24 CONSECUTIVE HOURS, CONTINUOUS POSITIVE AIRWAY PRESSURE: ICD-10-PCS

## 2024-06-18 PROCEDURE — 2580000003 HC RX 258

## 2024-06-18 PROCEDURE — 6370000000 HC RX 637 (ALT 250 FOR IP)

## 2024-06-18 PROCEDURE — 83735 ASSAY OF MAGNESIUM: CPT

## 2024-06-18 PROCEDURE — 82805 BLOOD GASES W/O2 SATURATION: CPT

## 2024-06-18 PROCEDURE — 71045 X-RAY EXAM CHEST 1 VIEW: CPT

## 2024-06-18 PROCEDURE — 94664 DEMO&/EVAL PT USE INHALER: CPT

## 2024-06-18 PROCEDURE — 93005 ELECTROCARDIOGRAM TRACING: CPT

## 2024-06-18 PROCEDURE — 2140000000 HC CCU INTERMEDIATE R&B

## 2024-06-18 PROCEDURE — 83880 ASSAY OF NATRIURETIC PEPTIDE: CPT

## 2024-06-18 PROCEDURE — 85025 COMPLETE CBC W/AUTO DIFF WBC: CPT

## 2024-06-18 PROCEDURE — 94640 AIRWAY INHALATION TREATMENT: CPT

## 2024-06-18 PROCEDURE — 84484 ASSAY OF TROPONIN QUANT: CPT

## 2024-06-18 PROCEDURE — 99222 1ST HOSP IP/OBS MODERATE 55: CPT | Performed by: INTERNAL MEDICINE

## 2024-06-18 RX ORDER — POLYETHYLENE GLYCOL 3350 17 G/17G
17 POWDER, FOR SOLUTION ORAL DAILY PRN
Status: DISCONTINUED | OUTPATIENT
Start: 2024-06-18 | End: 2024-06-21 | Stop reason: HOSPADM

## 2024-06-18 RX ORDER — ALBUTEROL SULFATE 2.5 MG/3ML
2.5 SOLUTION RESPIRATORY (INHALATION) ONCE
Status: COMPLETED | OUTPATIENT
Start: 2024-06-18 | End: 2024-06-18

## 2024-06-18 RX ORDER — ACETAMINOPHEN 650 MG/1
650 SUPPOSITORY RECTAL EVERY 6 HOURS PRN
Status: DISCONTINUED | OUTPATIENT
Start: 2024-06-18 | End: 2024-06-21 | Stop reason: HOSPADM

## 2024-06-18 RX ORDER — OXYBUTYNIN CHLORIDE 5 MG/1
5 TABLET, EXTENDED RELEASE ORAL DAILY
Status: DISCONTINUED | OUTPATIENT
Start: 2024-06-19 | End: 2024-06-21 | Stop reason: HOSPADM

## 2024-06-18 RX ORDER — SODIUM CHLORIDE 9 MG/ML
INJECTION, SOLUTION INTRAVENOUS PRN
Status: DISCONTINUED | OUTPATIENT
Start: 2024-06-18 | End: 2024-06-21 | Stop reason: HOSPADM

## 2024-06-18 RX ORDER — SODIUM CHLORIDE 0.9 % (FLUSH) 0.9 %
5-40 SYRINGE (ML) INJECTION PRN
Status: DISCONTINUED | OUTPATIENT
Start: 2024-06-18 | End: 2024-06-21 | Stop reason: HOSPADM

## 2024-06-18 RX ORDER — HYDRALAZINE HYDROCHLORIDE 20 MG/ML
10 INJECTION INTRAMUSCULAR; INTRAVENOUS EVERY 6 HOURS PRN
Status: DISCONTINUED | OUTPATIENT
Start: 2024-06-18 | End: 2024-06-21 | Stop reason: HOSPADM

## 2024-06-18 RX ORDER — MAGNESIUM SULFATE IN WATER 40 MG/ML
2000 INJECTION, SOLUTION INTRAVENOUS ONCE
Status: DISCONTINUED | OUTPATIENT
Start: 2024-06-18 | End: 2024-06-18

## 2024-06-18 RX ORDER — PANTOPRAZOLE SODIUM 40 MG/1
40 TABLET, DELAYED RELEASE ORAL DAILY
Status: DISCONTINUED | OUTPATIENT
Start: 2024-06-19 | End: 2024-06-21 | Stop reason: HOSPADM

## 2024-06-18 RX ORDER — CALCIUM CARBONATE 500 MG/1
500 TABLET, CHEWABLE ORAL 3 TIMES DAILY PRN
Status: DISCONTINUED | OUTPATIENT
Start: 2024-06-18 | End: 2024-06-21 | Stop reason: HOSPADM

## 2024-06-18 RX ORDER — POTASSIUM CHLORIDE 20 MEQ/1
40 TABLET, EXTENDED RELEASE ORAL PRN
Status: DISCONTINUED | OUTPATIENT
Start: 2024-06-18 | End: 2024-06-21 | Stop reason: HOSPADM

## 2024-06-18 RX ORDER — ENOXAPARIN SODIUM 100 MG/ML
40 INJECTION SUBCUTANEOUS DAILY
Status: DISCONTINUED | OUTPATIENT
Start: 2024-06-19 | End: 2024-06-19

## 2024-06-18 RX ORDER — IPRATROPIUM BROMIDE AND ALBUTEROL SULFATE 2.5; .5 MG/3ML; MG/3ML
1 SOLUTION RESPIRATORY (INHALATION)
Status: DISCONTINUED | OUTPATIENT
Start: 2024-06-18 | End: 2024-06-21 | Stop reason: HOSPADM

## 2024-06-18 RX ORDER — ZOLPIDEM TARTRATE 5 MG/1
5 TABLET ORAL NIGHTLY PRN
Status: DISCONTINUED | OUTPATIENT
Start: 2024-06-18 | End: 2024-06-21 | Stop reason: HOSPADM

## 2024-06-18 RX ORDER — BENZONATATE 100 MG/1
100 CAPSULE ORAL 3 TIMES DAILY PRN
Status: DISCONTINUED | OUTPATIENT
Start: 2024-06-18 | End: 2024-06-21 | Stop reason: HOSPADM

## 2024-06-18 RX ORDER — ASPIRIN 81 MG/1
81 TABLET ORAL DAILY
Status: DISCONTINUED | OUTPATIENT
Start: 2024-06-19 | End: 2024-06-21 | Stop reason: HOSPADM

## 2024-06-18 RX ORDER — IPRATROPIUM BROMIDE AND ALBUTEROL SULFATE 2.5; .5 MG/3ML; MG/3ML
3 SOLUTION RESPIRATORY (INHALATION)
Status: COMPLETED | OUTPATIENT
Start: 2024-06-18 | End: 2024-06-18

## 2024-06-18 RX ORDER — LANOLIN ALCOHOL/MO/W.PET/CERES
3 CREAM (GRAM) TOPICAL NIGHTLY PRN
Status: DISCONTINUED | OUTPATIENT
Start: 2024-06-18 | End: 2024-06-21 | Stop reason: HOSPADM

## 2024-06-18 RX ORDER — TAMSULOSIN HYDROCHLORIDE 0.4 MG/1
0.4 CAPSULE ORAL 2 TIMES DAILY
Status: DISCONTINUED | OUTPATIENT
Start: 2024-06-18 | End: 2024-06-21 | Stop reason: HOSPADM

## 2024-06-18 RX ORDER — ONDANSETRON 4 MG/1
4 TABLET, ORALLY DISINTEGRATING ORAL EVERY 8 HOURS PRN
Status: DISCONTINUED | OUTPATIENT
Start: 2024-06-18 | End: 2024-06-21 | Stop reason: HOSPADM

## 2024-06-18 RX ORDER — MAGNESIUM SULFATE IN WATER 40 MG/ML
2000 INJECTION, SOLUTION INTRAVENOUS ONCE
Status: COMPLETED | OUTPATIENT
Start: 2024-06-18 | End: 2024-06-18

## 2024-06-18 RX ORDER — ONDANSETRON 2 MG/ML
4 INJECTION INTRAMUSCULAR; INTRAVENOUS EVERY 6 HOURS PRN
Status: DISCONTINUED | OUTPATIENT
Start: 2024-06-18 | End: 2024-06-21 | Stop reason: HOSPADM

## 2024-06-18 RX ORDER — MAGNESIUM SULFATE IN WATER 40 MG/ML
2000 INJECTION, SOLUTION INTRAVENOUS PRN
Status: DISCONTINUED | OUTPATIENT
Start: 2024-06-18 | End: 2024-06-21 | Stop reason: HOSPADM

## 2024-06-18 RX ORDER — ACETAMINOPHEN 325 MG/1
650 TABLET ORAL EVERY 6 HOURS PRN
Status: DISCONTINUED | OUTPATIENT
Start: 2024-06-18 | End: 2024-06-21 | Stop reason: HOSPADM

## 2024-06-18 RX ORDER — POTASSIUM CHLORIDE 7.45 MG/ML
10 INJECTION INTRAVENOUS PRN
Status: DISCONTINUED | OUTPATIENT
Start: 2024-06-18 | End: 2024-06-21 | Stop reason: HOSPADM

## 2024-06-18 RX ORDER — SODIUM CHLORIDE 0.9 % (FLUSH) 0.9 %
5-40 SYRINGE (ML) INJECTION EVERY 12 HOURS SCHEDULED
Status: DISCONTINUED | OUTPATIENT
Start: 2024-06-18 | End: 2024-06-21 | Stop reason: HOSPADM

## 2024-06-18 RX ORDER — GABAPENTIN 400 MG/1
800 CAPSULE ORAL 3 TIMES DAILY
Status: DISCONTINUED | OUTPATIENT
Start: 2024-06-18 | End: 2024-06-21 | Stop reason: HOSPADM

## 2024-06-18 RX ORDER — BUPROPION HYDROCHLORIDE 300 MG/1
300 TABLET ORAL EVERY MORNING
Status: DISCONTINUED | OUTPATIENT
Start: 2024-06-19 | End: 2024-06-21 | Stop reason: HOSPADM

## 2024-06-18 RX ORDER — ATORVASTATIN CALCIUM 20 MG/1
10 TABLET, FILM COATED ORAL NIGHTLY
Status: DISCONTINUED | OUTPATIENT
Start: 2024-06-18 | End: 2024-06-21 | Stop reason: HOSPADM

## 2024-06-18 RX ORDER — POLYVINYL ALCOHOL 14 MG/ML
1 SOLUTION/ DROPS OPHTHALMIC PRN
Status: DISCONTINUED | OUTPATIENT
Start: 2024-06-18 | End: 2024-06-21 | Stop reason: HOSPADM

## 2024-06-18 RX ORDER — IBUPROFEN 400 MG/1
800 TABLET ORAL EVERY 8 HOURS PRN
Status: DISCONTINUED | OUTPATIENT
Start: 2024-06-18 | End: 2024-06-21 | Stop reason: CLARIF

## 2024-06-18 RX ORDER — AMLODIPINE BESYLATE 10 MG/1
10 TABLET ORAL DAILY
Status: DISCONTINUED | OUTPATIENT
Start: 2024-06-19 | End: 2024-06-21 | Stop reason: HOSPADM

## 2024-06-18 RX ADMIN — METHYLPREDNISOLONE SODIUM SUCCINATE 125 MG: 125 INJECTION, POWDER, LYOPHILIZED, FOR SOLUTION INTRAMUSCULAR; INTRAVENOUS at 18:15

## 2024-06-18 RX ADMIN — IPRATROPIUM BROMIDE AND ALBUTEROL SULFATE 3 DOSE: 2.5; .5 SOLUTION RESPIRATORY (INHALATION) at 16:34

## 2024-06-18 RX ADMIN — IPRATROPIUM BROMIDE AND ALBUTEROL SULFATE 1 DOSE: 2.5; .5 SOLUTION RESPIRATORY (INHALATION) at 19:52

## 2024-06-18 RX ADMIN — ATORVASTATIN CALCIUM 10 MG: 20 TABLET, FILM COATED ORAL at 21:27

## 2024-06-18 RX ADMIN — TAMSULOSIN HYDROCHLORIDE 0.4 MG: 0.4 CAPSULE ORAL at 21:27

## 2024-06-18 RX ADMIN — MAGNESIUM SULFATE HEPTAHYDRATE 2000 MG: 40 INJECTION, SOLUTION INTRAVENOUS at 18:17

## 2024-06-18 RX ADMIN — GABAPENTIN 800 MG: 400 CAPSULE ORAL at 21:27

## 2024-06-18 RX ADMIN — ALBUTEROL SULFATE 2.5 MG: 2.5 SOLUTION RESPIRATORY (INHALATION) at 19:52

## 2024-06-18 NOTE — H&P
Inpatient H&P      PCP:  Marco A Dotson  Admitting Physician:  Farzana Walls DO  Consultants:  None at this time   Chief Complaint:    Chief Complaint   Patient presents with    Shortness of Breath     Patient c/o SOB hx of asthma Patient 84% on RA       History of Present Illness  Ryan Mcghee is a 54 y.o. male who presents to [facility] ER complaining of SOB.    Ryan Mcghee has a past medical history that includes COPD, diabetes, hypertension, history of tobacco abuse    Ryan presents to the ER with complaint of shortness of breath.  He was found to be 84% on room air.  He does have a history of COPD and tobacco abuse.  He was placed on BiPAP in the ER.  ABGs were found to be pH 7.2, pCO2 69.  CXR without acute abnormality.  He was given DuoNebs and IV Solu-Medrol and will be admitted for COPD exacerbation  Discussed patient's case with ED physician.    ER Course  Upon presentation to the ER, routine labwork was performed which revealed WBC 11.7.  Imaging results are as outlined below in the Imaging section of this note.  E Upon arrival to the ER, patient was 105, 114/88.  The patient received DuoNeb, Solu-Medrol, magnesium in the emergency room and was admitted to [facility].    Last Hospital Admission -I personally reviewed previous admission  Acute hypoxic respiratory failure secondary to COPD  Hypertension diabetes  JANESSA  Nicotine dependence  History of drug abuse  History of ethanol abuse and history of pancreatitis      Last Echocardiogram -I personally reviewed echocardiogram results from 1/9/2024   Left Ventricle: Normal left ventricular systolic function with a visually estimated EF of 60 - 65%. Left ventricle size is normal. Mild basal septal thickening. Normal wall motion. Normal diastolic function.    Right Ventricle: Right ventricle is mildly dilated. Normal systolic function. TAPSE is 2.5 cm.    Right Atrium: Right atrium is mildly dilated.     ED TRIAGE VITALS  BP: (!) 134/99, Temp: 98.1

## 2024-06-18 NOTE — PROGRESS NOTES
Date: 6/18/2024    Time: 5:00 PM    Patient Placed On BIPAP/CPAP/ Non-Invasive Ventilation?  Yes    If no must comment.  Facial area red/color change? No           If YES are Blister/Lesion present?No   If yes must notify nursing staff  BIPAP/CPAP skin barrier?  Yes    Skin barrier type:mepilexlite     Marilu carballojennie, [facility]         06/18/24 9638   NIV Type   NIV Started/Stopped On   Equipment Type V60   Mode Bilevel   Mask Type Full face mask   Mask Size Large   Assessment   Pulse (!) 105   Respirations 19   SpO2 96 %   Level of Consciousness 1   Comfort Level Good   Using Accessory Muscles Yes   Mask Compliance Good   Skin Assessment Clean, dry, & intact   Skin Protection for O2 Device Yes   Orientation Middle   Location Nose   Intervention(s) Skin Barrier   Settings/Measurements   PIP Observed 12 cm H20   IPAP 12 cmH20   CPAP/EPAP 8 cmH2O   Vt (Measured) 569 mL   Rate Ordered 16   Insp Rise Time (%) 2 %   FiO2  40 %   I Time/ I Time % 0.9 s   Minute Volume (L/min) 8.8 Liters   Mask Leak (lpm) 49 lpm   Patient's Home Machine No   Alarm Settings   Alarms On Y   Low Pressure (cmH2O) 8 cmH2O   High Pressure (cmH2O) 30 cmH2O   RR Low (bpm) 14   RR High (bpm) 40 br/min

## 2024-06-19 PROBLEM — G47.33 OBSTRUCTIVE SLEEP APNEA SYNDROME: Status: ACTIVE | Noted: 2024-06-19

## 2024-06-19 LAB
AADO2: 158.6 MMHG
ALBUMIN SERPL-MCNC: 4.1 G/DL (ref 3.5–5.2)
ALLEN TEST: POSITIVE
ALP SERPL-CCNC: 100 U/L (ref 40–129)
ALT SERPL-CCNC: 48 U/L (ref 0–40)
AMPHET UR QL SCN: NEGATIVE
ANION GAP SERPL CALCULATED.3IONS-SCNC: 8 MMOL/L (ref 7–16)
AST SERPL-CCNC: 27 U/L (ref 0–39)
B PARAP IS1001 DNA NPH QL NAA+NON-PROBE: NOT DETECTED
B PERT DNA SPEC QL NAA+PROBE: NOT DETECTED
B.E.: 5.4 MMOL/L (ref -3–3)
B.E.: 6.3 MMOL/L (ref -3–3)
BARBITURATES UR QL SCN: NEGATIVE
BENZODIAZ UR QL: NEGATIVE
BILIRUB SERPL-MCNC: 0.5 MG/DL (ref 0–1.2)
BUN SERPL-MCNC: 14 MG/DL (ref 6–20)
BUPRENORPHINE UR QL: NEGATIVE
C PNEUM DNA NPH QL NAA+NON-PROBE: NOT DETECTED
CALCIUM SERPL-MCNC: 8.6 MG/DL (ref 8.6–10.2)
CANNABINOIDS UR QL SCN: NEGATIVE
CHLORIDE SERPL-SCNC: 96 MMOL/L (ref 98–107)
CHOLEST SERPL-MCNC: 139 MG/DL
CO2 SERPL-SCNC: 30 MMOL/L (ref 22–29)
COCAINE UR QL SCN: POSITIVE
COHB: 1 % (ref 0–1.5)
COHB: 1.3 % (ref 0–1.5)
COMMENT: ABNORMAL
CREAT SERPL-MCNC: 0.6 MG/DL (ref 0.7–1.2)
CRITICAL: ABNORMAL
CRITICAL: ABNORMAL
DATE ANALYZED: ABNORMAL
DATE ANALYZED: ABNORMAL
DATE OF COLLECTION: ABNORMAL
DATE OF COLLECTION: ABNORMAL
EKG ATRIAL RATE: 102 BPM
EKG P AXIS: 69 DEGREES
EKG P-R INTERVAL: 162 MS
EKG Q-T INTERVAL: 364 MS
EKG QRS DURATION: 100 MS
EKG QTC CALCULATION (BAZETT): 474 MS
EKG R AXIS: -72 DEGREES
EKG T AXIS: 72 DEGREES
EKG VENTRICULAR RATE: 102 BPM
ERYTHROCYTE [DISTWIDTH] IN BLOOD BY AUTOMATED COUNT: 13.7 % (ref 11.5–15)
FENTANYL UR QL: NEGATIVE
FIO2: 50
FIO2: 50 %
FLUAV RNA NPH QL NAA+NON-PROBE: NOT DETECTED
FLUBV RNA NPH QL NAA+NON-PROBE: NOT DETECTED
GFR, ESTIMATED: >90 ML/MIN/1.73M2
GLUCOSE BLD-MCNC: 144 MG/DL (ref 74–99)
GLUCOSE SERPL-MCNC: 216 MG/DL (ref 74–99)
HADV DNA NPH QL NAA+NON-PROBE: NOT DETECTED
HBA1C MFR BLD: 6 % (ref 4–5.6)
HCO3: 34 MMOL/L (ref 22–26)
HCO3: 34.4 MMOL/L (ref 22–26)
HCOV 229E RNA NPH QL NAA+NON-PROBE: NOT DETECTED
HCOV HKU1 RNA NPH QL NAA+NON-PROBE: NOT DETECTED
HCOV NL63 RNA NPH QL NAA+NON-PROBE: NOT DETECTED
HCOV OC43 RNA NPH QL NAA+NON-PROBE: NOT DETECTED
HCT VFR BLD AUTO: 43 % (ref 37–54)
HCT VFR BLD AUTO: 43.9 % (ref 37–54)
HDLC SERPL-MCNC: 70 MG/DL
HGB BLD-MCNC: 13.6 G/DL (ref 12.5–16.5)
HHB: 1.4 % (ref 0–5)
HHB: 1.5 % (ref 0–5)
HMPV RNA NPH QL NAA+NON-PROBE: NOT DETECTED
HPIV1 RNA NPH QL NAA+NON-PROBE: NOT DETECTED
HPIV2 RNA NPH QL NAA+NON-PROBE: NOT DETECTED
HPIV3 RNA NPH QL NAA+NON-PROBE: NOT DETECTED
HPIV4 RNA NPH QL NAA+NON-PROBE: NOT DETECTED
LAB: ABNORMAL
LAB: ABNORMAL
LDLC SERPL CALC-MCNC: 60 MG/DL
Lab: 1053
Lab: 747
M PNEUMO DNA NPH QL NAA+NON-PROBE: NOT DETECTED
MAGNESIUM SERPL-MCNC: 2.6 MG/DL (ref 1.6–2.6)
MCH RBC QN AUTO: 27.9 PG (ref 26–35)
MCHC RBC AUTO-ENTMCNC: 31 G/DL (ref 32–34.5)
MCV RBC AUTO: 90.1 FL (ref 80–99.9)
METHADONE UR QL: NEGATIVE
METHB: 0.4 % (ref 0–1.5)
METHB: 0.4 % (ref 0–1.5)
MODE: ABNORMAL
MODE: ABNORMAL
O2 DELIVERY DEVICE: ABNORMAL
O2 SATURATION: 98.5 % (ref 92–98.5)
O2 SATURATION: 98.6 % (ref 92–98.5)
O2HB: 96.9 % (ref 94–97)
O2HB: 97.1 % (ref 94–97)
OPERATOR ID: 2220
OPERATOR ID: 421
OPIATES UR QL SCN: NEGATIVE
OXYCODONE UR QL SCN: NEGATIVE
PATIENT TEMP: 37
PATIENT TEMP: 37 C
PATIENT TEMP: 37 C
PCO2: 62.7 MMHG (ref 35–45)
PCO2: 71.7 MMHG (ref 35–45)
PCP UR QL SCN: NEGATIVE
PEEP/CPAP: 8 CMH2O
PEEP: 8
PFO2: 2.3 MMHG/%
PH BLOOD GAS: 7.3 (ref 7.35–7.45)
PH BLOOD GAS: 7.35 (ref 7.35–7.45)
PHOSPHATE SERPL-MCNC: 3.2 MG/DL (ref 2.5–4.5)
PLATELET # BLD AUTO: 327 K/UL (ref 130–450)
PMV BLD AUTO: 10.1 FL (ref 7–12)
PO2: 114.9 MMHG (ref 75–100)
PO2: 122.6 MMHG (ref 75–100)
POC HCO3: 32.5 MMOL/L (ref 22–26)
POC HEMOGLOBIN (CALC): 14.6 G/DL (ref 12.5–15.5)
POC O2 SATURATION: 99.7 % (ref 92–98.5)
POC PCO2 TEMP: 39.9 MM HG
POC PCO2: 39.9 MM HG (ref 35–45)
POC PH TEMP: 7.52
POC PH: 7.52 (ref 7.35–7.45)
POC PO2 TEMP: 178 MM HG
POC PO2: 178 MM HG (ref 80–100)
POSITIVE BASE EXCESS, ART: 8.9 MMOL/L (ref 0–3)
POTASSIUM SERPL-SCNC: 4.7 MMOL/L (ref 3.5–5)
PROT SERPL-MCNC: 7.2 G/DL (ref 6.4–8.3)
RBC # BLD AUTO: 4.87 M/UL (ref 3.8–5.8)
RI(T): 1.38
RR MECHANICAL: 18 B/MIN
RSV RNA NPH QL NAA+NON-PROBE: NOT DETECTED
RV+EV RNA NPH QL NAA+NON-PROBE: NOT DETECTED
SAMPLE SITE: ABNORMAL
SARS-COV-2 RNA NPH QL NAA+NON-PROBE: NOT DETECTED
SET RATE, POC: 18
SODIUM SERPL-SCNC: 134 MMOL/L (ref 132–146)
SOURCE, BLOOD GAS: ABNORMAL
SOURCE, BLOOD GAS: ABNORMAL
SPECIMEN DESCRIPTION: NORMAL
TEST INFORMATION: ABNORMAL
THB: 14.2 G/DL (ref 11.5–16.5)
THB: 14.5 G/DL (ref 11.5–16.5)
TIDAL VOLUME: 450
TIME ANALYZED: 1057
TIME ANALYZED: 802
TRIGL SERPL-MCNC: 44 MG/DL
TROPONIN I SERPL HS-MCNC: 13 NG/L (ref 0–11)
TROPONIN I SERPL HS-MCNC: 13 NG/L (ref 0–11)
TSH SERPL DL<=0.05 MIU/L-ACNC: 0.15 UIU/ML (ref 0.27–4.2)
VLDLC SERPL CALC-MCNC: 9 MG/DL
VT MECHANICAL: 450 ML
WBC OTHER # BLD: 7.6 K/UL (ref 4.5–11.5)

## 2024-06-19 PROCEDURE — 6370000000 HC RX 637 (ALT 250 FOR IP): Performed by: INTERNAL MEDICINE

## 2024-06-19 PROCEDURE — 94640 AIRWAY INHALATION TREATMENT: CPT

## 2024-06-19 PROCEDURE — 82962 GLUCOSE BLOOD TEST: CPT

## 2024-06-19 PROCEDURE — 83735 ASSAY OF MAGNESIUM: CPT

## 2024-06-19 PROCEDURE — 2580000003 HC RX 258: Performed by: INTERNAL MEDICINE

## 2024-06-19 PROCEDURE — 80307 DRUG TEST PRSMV CHEM ANLYZR: CPT

## 2024-06-19 PROCEDURE — 6360000002 HC RX W HCPCS: Performed by: INTERNAL MEDICINE

## 2024-06-19 PROCEDURE — 2140000000 HC CCU INTERMEDIATE R&B

## 2024-06-19 PROCEDURE — 0202U NFCT DS 22 TRGT SARS-COV-2: CPT

## 2024-06-19 PROCEDURE — 85027 COMPLETE CBC AUTOMATED: CPT

## 2024-06-19 PROCEDURE — 82805 BLOOD GASES W/O2 SATURATION: CPT

## 2024-06-19 PROCEDURE — 94660 CPAP INITIATION&MGMT: CPT

## 2024-06-19 PROCEDURE — 85014 HEMATOCRIT: CPT

## 2024-06-19 PROCEDURE — 80053 COMPREHEN METABOLIC PANEL: CPT

## 2024-06-19 PROCEDURE — 80061 LIPID PANEL: CPT

## 2024-06-19 PROCEDURE — 84100 ASSAY OF PHOSPHORUS: CPT

## 2024-06-19 PROCEDURE — 82803 BLOOD GASES ANY COMBINATION: CPT

## 2024-06-19 PROCEDURE — 36415 COLL VENOUS BLD VENIPUNCTURE: CPT

## 2024-06-19 PROCEDURE — 84443 ASSAY THYROID STIM HORMONE: CPT

## 2024-06-19 PROCEDURE — 84484 ASSAY OF TROPONIN QUANT: CPT

## 2024-06-19 PROCEDURE — 83036 HEMOGLOBIN GLYCOSYLATED A1C: CPT

## 2024-06-19 PROCEDURE — 93010 ELECTROCARDIOGRAM REPORT: CPT | Performed by: INTERNAL MEDICINE

## 2024-06-19 PROCEDURE — 99233 SBSQ HOSP IP/OBS HIGH 50: CPT | Performed by: INTERNAL MEDICINE

## 2024-06-19 PROCEDURE — 2700000000 HC OXYGEN THERAPY PER DAY

## 2024-06-19 RX ORDER — AZITHROMYCIN 250 MG/1
500 TABLET, FILM COATED ORAL DAILY
Status: COMPLETED | OUTPATIENT
Start: 2024-06-19 | End: 2024-06-21

## 2024-06-19 RX ORDER — LANOLIN ALCOHOL/MO/W.PET/CERES
10 CREAM (GRAM) TOPICAL NIGHTLY PRN
COMMUNITY

## 2024-06-19 RX ORDER — NICOTINE 21 MG/24HR
1 PATCH, TRANSDERMAL 24 HOURS TRANSDERMAL DAILY
Status: DISCONTINUED | OUTPATIENT
Start: 2024-06-19 | End: 2024-06-21 | Stop reason: HOSPADM

## 2024-06-19 RX ORDER — TROSPIUM CHLORIDE 20 MG/1
20 TABLET, FILM COATED ORAL NIGHTLY
Status: DISCONTINUED | OUTPATIENT
Start: 2024-06-19 | End: 2024-06-21 | Stop reason: HOSPADM

## 2024-06-19 RX ORDER — BUDESONIDE, GLYCOPYRROLATE, AND FORMOTEROL FUMARATE 160; 9; 4.8 UG/1; UG/1; UG/1
AEROSOL, METERED RESPIRATORY (INHALATION)
COMMUNITY

## 2024-06-19 RX ORDER — ENOXAPARIN SODIUM 100 MG/ML
30 INJECTION SUBCUTANEOUS 2 TIMES DAILY
Status: DISCONTINUED | OUTPATIENT
Start: 2024-06-19 | End: 2024-06-21 | Stop reason: HOSPADM

## 2024-06-19 RX ORDER — FINASTERIDE 5 MG/1
5 TABLET, FILM COATED ORAL DAILY
Status: DISCONTINUED | OUTPATIENT
Start: 2024-06-19 | End: 2024-06-21 | Stop reason: HOSPADM

## 2024-06-19 RX ORDER — PREDNISONE 20 MG/1
20 TABLET ORAL 2 TIMES DAILY
Status: DISCONTINUED | OUTPATIENT
Start: 2024-06-20 | End: 2024-06-21 | Stop reason: HOSPADM

## 2024-06-19 RX ORDER — ARFORMOTEROL TARTRATE 15 UG/2ML
15 SOLUTION RESPIRATORY (INHALATION)
Status: DISCONTINUED | OUTPATIENT
Start: 2024-06-19 | End: 2024-06-21 | Stop reason: HOSPADM

## 2024-06-19 RX ADMIN — IPRATROPIUM BROMIDE AND ALBUTEROL SULFATE 1 DOSE: 2.5; .5 SOLUTION RESPIRATORY (INHALATION) at 12:47

## 2024-06-19 RX ADMIN — ASPIRIN 81 MG: 81 TABLET, COATED ORAL at 16:13

## 2024-06-19 RX ADMIN — WATER 40 MG: 1 INJECTION INTRAMUSCULAR; INTRAVENOUS; SUBCUTANEOUS at 18:42

## 2024-06-19 RX ADMIN — METFORMIN HYDROCHLORIDE 500 MG: 500 TABLET ORAL at 18:44

## 2024-06-19 RX ADMIN — ARFORMOTEROL TARTRATE 15 MCG: 15 SOLUTION RESPIRATORY (INHALATION) at 20:11

## 2024-06-19 RX ADMIN — AZITHROMYCIN DIHYDRATE 500 MG: 250 TABLET ORAL at 18:50

## 2024-06-19 RX ADMIN — IPRATROPIUM BROMIDE AND ALBUTEROL SULFATE 1 DOSE: 2.5; .5 SOLUTION RESPIRATORY (INHALATION) at 16:21

## 2024-06-19 RX ADMIN — AMLODIPINE BESYLATE 10 MG: 10 TABLET ORAL at 16:12

## 2024-06-19 RX ADMIN — TROSPIUM CHLORIDE 20 MG: 20 TABLET, FILM COATED ORAL at 20:58

## 2024-06-19 RX ADMIN — GABAPENTIN 800 MG: 400 CAPSULE ORAL at 19:55

## 2024-06-19 RX ADMIN — TAMSULOSIN HYDROCHLORIDE 0.4 MG: 0.4 CAPSULE ORAL at 19:55

## 2024-06-19 RX ADMIN — ENOXAPARIN SODIUM 30 MG: 100 INJECTION SUBCUTANEOUS at 19:55

## 2024-06-19 RX ADMIN — FINASTERIDE 5 MG: 5 TABLET, FILM COATED ORAL at 20:00

## 2024-06-19 RX ADMIN — Medication 3 MG: at 21:21

## 2024-06-19 RX ADMIN — IPRATROPIUM BROMIDE AND ALBUTEROL SULFATE 1 DOSE: 2.5; .5 SOLUTION RESPIRATORY (INHALATION) at 08:34

## 2024-06-19 RX ADMIN — ATORVASTATIN CALCIUM 10 MG: 20 TABLET, FILM COATED ORAL at 19:55

## 2024-06-19 RX ADMIN — SODIUM CHLORIDE, PRESERVATIVE FREE 10 ML: 5 INJECTION INTRAVENOUS at 19:56

## 2024-06-19 RX ADMIN — ZOLPIDEM TARTRATE 5 MG: 5 TABLET ORAL at 19:55

## 2024-06-19 RX ADMIN — IPRATROPIUM BROMIDE AND ALBUTEROL SULFATE 1 DOSE: 2.5; .5 SOLUTION RESPIRATORY (INHALATION) at 20:11

## 2024-06-19 RX ADMIN — PANTOPRAZOLE SODIUM 40 MG: 40 TABLET, DELAYED RELEASE ORAL at 16:01

## 2024-06-19 NOTE — PROGRESS NOTES
DVT Prophylaxis Adjustment Policy (DVT Prophylaxis)     This patient is on DVT Prophylaxis medication that requires a dose adjustment      Date Body Weight IBW  Adjusted BW SCr  CrCl Dialysis status   6/19/2024 106 kg (233 lb 11 oz) Ideal body weight: 70.7 kg (155 lb 13.8 oz)  Adjusted ideal body weight: 84.8 kg (186 lb 15.9 oz) Serum creatinine: 0.6 mg/dL (L) 06/19/24 0642  Estimated creatinine clearance: 165 mL/min (A) N/a       Pharmacy has dose-adjusted the DVT Prophylaxis regimen to match   the recommendations from the following table        Ordered Medication:Lovenox 40mg daily    Order Changed/converted to: Lovenox 30mg BID      These changes were made per protocol according to the [facility] Pharmacist   Review for Appropriate Use and Automatic Dose Adjustments of   Subcutaneous Anticoagulants Policy     *Please note this dose may need readjusted if patient's condition changes.    Please contact pharmacy with any questions regarding these changes.    Gavino Evans RPH  6/19/2024  11:54 AM

## 2024-06-19 NOTE — PROGRESS NOTES
06/19/24 0844   NIV Type   $NIV $Daily Charge   NIV Started/Stopped On   Equipment Type V60   Mode (S)  AVAPS   Mask Type Full face mask   Mask Size Large   Assessment   Pulse 73   Respirations 15   Comfort Level Good   Using Accessory Muscles No   Mask Compliance Good   Skin Assessment Clean, dry, & intact   Skin Protection for O2 Device Yes   Orientation Middle   Location Nose   Intervention(s) Skin Barrier   Settings/Measurements   PIP Observed 22 cm H20   CPAP/EPAP 8 cmH2O   IPAP Min 20 cmH2O   IPAP Max 30 cmH2O   Vt (Set, mL) 450 mL   Vt (Measured) 393 mL   Rate Ordered 18   Insp Rise Time (%) 3 %   FiO2  50 %   I Time/ I Time % 0.8 s   Minute Volume (L/min) 9.6 Liters   Mask Leak (lpm) 45 lpm   Alarm Settings   Alarms On Y   Low Pressure (cmH2O) 8 cmH2O   High Pressure (cmH2O) 30 cmH2O   Apnea (secs) 20 secs   RR Low (bpm) 14   RR High (bpm) 40 br/min     Date: 6/19/2024    Time: 8:45 AM    Patient Placed On BIPAP/CPAP/ Non-Invasive Ventilation? No  Facial area red/color change? No     If YES are Blister/Lesion present? No    BIPAP/CPAP skin barrier?  Yes   Skin barrier type:mepilexlite     Comments: Pt remains on from previous shift, Switched to AVAPS per order    Kayla Pak RCP RRT-ACCS

## 2024-06-19 NOTE — PROGRESS NOTES
Hospitalist Progress Note      Synopsis: Patient admitted on 6/18/2024  54 y.o. male who presents to [facility] ER complaining of SOB.     Ryan Mcghee has a past medical history that includes COPD, diabetes, hypertension, history of tobacco abuse     Ryan presents to the ER with complaint of shortness of breath.  He was found to be 84% on room air.  He does have a history of COPD and tobacco abuse.  He was placed on BiPAP in the ER.  ABGs were found to be pH 7.2, pCO2 69.  CXR without acute abnormality.  He was given DuoNebs and IV Solu-Medrol and will be admitted for COPD exacerbation  Discussed patient's case with ED physician.     ER Course  Upon presentation to the ER, routine labwork was performed which revealed WBC 11.7.  Imaging results are as outlined below in the Imaging section of this note.  E Upon arrival to the ER, patient was 105, 114/88.  The patient received DuoNeb, Solu-Medrol, magnesium in the emergency room and was admitted to [facility].     Last Hospital Admission -I personally reviewed previous admission  Acute hypoxic respiratory failure secondary to COPD  Hypertension diabetes  JANESSA  Nicotine dependence  History of drug abuse  History of ethanol abuse and history of pancreatitis        Last Echocardiogram -I personally reviewed echocardiogram results from 1/9/2024   Left Ventricle: Normal left ventricular systolic function with a visually estimated EF of 60 - 65%. Left ventricle size is normal. Mild basal septal thickening. Normal wall motion. Normal diastolic function.    Right Ventricle: Right ventricle is mildly dilated. Normal systolic function. TAPSE is 2.5 cm.    Right Atrium: Right atrium is mildly dilated.     ED TRIAGE VITALS  BP: (!) 134/99, Temp: 98.1 °F (36.7 °C), Pulse: 96, Respirations: 17, SpO2: 96 %    ASSESSMENT:    Principal Problem:    COPD exacerbation (HCC)  Active Problems:    HTN (hypertension)    Type 2 diabetes mellitus, without long-term current use of     enoxaparin  40 mg SubCUTAneous Daily    methylPREDNISolone  40 mg IntraVENous Q12H    ipratropium 0.5 mg-albuterol 2.5 mg  1 Dose Inhalation Q4H While awake    amLODIPine  10 mg Oral Daily    aspirin  81 mg Oral Daily    atorvastatin  10 mg Oral Nightly    buPROPion  300 mg Oral QAM    metFORMIN  500 mg Oral Daily with breakfast    oxyBUTYnin  5 mg Oral Daily    pantoprazole  40 mg Oral Daily    tamsulosin  0.4 mg Oral BID    gabapentin  800 mg Oral TID     PRN Meds: benzocaine-menthol, benzonatate, calcium carbonate, hydrALAZINE, melatonin, sodium chloride, polyvinyl alcohol, sodium chloride flush, sodium chloride, potassium chloride **OR** potassium alternative oral replacement **OR** potassium chloride, magnesium sulfate, ondansetron **OR** ondansetron, polyethylene glycol, acetaminophen **OR** acetaminophen, ibuprofen, zolpidem    I/O  No intake or output data in the 24 hours ending 06/19/24 0840    Labs:   Recent Labs     06/18/24  1630 06/19/24  0642   WBC 11.7* 7.6   HGB 13.7 13.6   HCT 46.0 43.9    327       Recent Labs     06/18/24  1630 06/19/24  0642    134   K 4.5 4.7   CL 96* 96*   CO2 23 30*   BUN 12 14   CREATININE 0.6* 0.6*   CALCIUM 8.5* 8.6   PHOS  --  3.2       Recent Labs     06/18/24  1630 06/19/24  0642   ALKPHOS 99 100   ALT 59* 48*   AST 63* 27   BILITOT 0.8 0.5       No results for input(s): \"INR\" in the last 72 hours.    No results for input(s): \"CKTOTAL\", \"TROPONINI\" in the last 72 hours.    Chronic labs:  Lab Results   Component Value Date    CHOL PENDING 06/19/2024    TRIG PENDING 06/19/2024    HDL PENDING 06/19/2024    TSH 0.15 (L) 06/19/2024    PSA 0.60 10/28/2015    INR 1.1 11/14/2020    LABA1C 6.0 (H) 06/19/2024       Radiology:  Imaging studies reviewed today.        +++++++++++++++++++++++++++++++++++++++++++++++++  Christopher Connelly, MD   [facility].  +++++++++++++++++++++++++++++++++++++++++++++++++  NOTE: This report was transcribed using voice

## 2024-06-19 NOTE — ED NOTES
Pt given 3rd meal box at this time. Pt informed that he cannot have another meal box and needs to be placed back on bipap.

## 2024-06-19 NOTE — ED NOTES
Pt took bipap off and was walking around room looking for something to eat. This RN found pt, brought him back to his bed and attempted to place bipap back on pt. Pt refused, placed on 6L NC. ED resident aware of pt refusing bipap. Per ED resident, pt can come off of bipap to eat and pt is agreeable to go back on bipap after he is done eating. Pt remains on 6L NC while eating. Pt given meal box at this time

## 2024-06-19 NOTE — ED PROVIDER NOTES
[facility] EMERGENCY DEPARTMENT  EMERGENCY DEPARTMENT ENCOUNTER        Pt Name: Ryan Mcghee  MRN: [id number]  Birthdate 1970  Date of evaluation: 6/18/2024  Provider: Helen Suazo MD  PCP: Marco A Dotson  Note Started: 10:21 PM EDT 6/18/24    CHIEF COMPLAINT       Chief Complaint   Patient presents with    Shortness of Breath     Patient c/o SOB hx of asthma Patient 84% on RA       HISTORY OF PRESENT ILLNESS: 1 or more Elements     Ryan Mcghee is a 54 y.o. male past medical history of COPD, hypertension, substance use who presents with shortness of breath.  Patient states symptoms have been persistent over the past couple days.  States symptoms are worsened with exertion.  States that he had some alcohol today prior to arrival.  On arrival patient is 84% on room air.  Review of system is limited due to patient's mentation and respiratory distress.      Nursing Notes were all reviewed and agreed with or any disagreements were addressed in the HPI.    ROS:   Pertinent positives and negatives are stated within HPI, all other systems reviewed and are negative.      --------------------------------------------- PAST HISTORY ---------------------------------------------  Past Medical History:  has a past medical history of Acid reflux, Asthma, Asthma, COPD (chronic obstructive pulmonary disease) (HCC), Hypertension, Pancreatitis, Prediabetes, Psychiatric problem, Sleep apnea, and Substance abuse ([facility]).    Past Surgical History:  has a past surgical history that includes [place] tooth extraction; Colonoscopy; Nerve Block (Left, 08/27/2018); and pr njx dx/ther sbst intrlmnr lmbr/sac w/img gdn (Left, 8/27/2018).    Social History:  reports that he has been smoking cigarettes and cigars. He has a 28.0 pack-year smoking history. He has never used smokeless tobacco. He reports current alcohol use. He reports current drug use. Drugs:  and Cocaine.    Family History: family history  specific details for the plan of care and counseling regarding the diagnosis and prognosis.  Questions are answered at this time and they are agreeable with the plan.       --------------------------------- IMPRESSION AND DISPOSITION ---------------------------------    IMPRESSION  1. COPD exacerbation (HCC)    2. Acute respiratory failure with hypercapnia (HCC)        DISPOSITION  Disposition: Admit to telemetry  Patient condition is stable        NOTE: This report was transcribed using voice recognition software. Every effort was made to ensure accuracy; however, inadvertent computerized transcription errors may be present

## 2024-06-20 LAB
B.E.: 5.6 MMOL/L (ref -3–3)
COHB: 0.6 % (ref 0–1.5)
CRITICAL: ABNORMAL
DATE ANALYZED: ABNORMAL
DATE OF COLLECTION: ABNORMAL
HCO3: 32.8 MMOL/L (ref 22–26)
HHB: 6.6 % (ref 0–5)
LAB: ABNORMAL
Lab: 615
METHB: 0.4 % (ref 0–1.5)
MODE: ABNORMAL
O2 SATURATION: 92.5 % (ref 92–98.5)
O2HB: 92.4 % (ref 94–97)
OPERATOR ID: 3214
PATIENT TEMP: 37 C
PCO2: 59.7 MMHG (ref 35–45)
PH BLOOD GAS: 7.36 (ref 7.35–7.45)
PO2: 68.2 MMHG (ref 75–100)
SOURCE, BLOOD GAS: ABNORMAL
THB: 13.5 G/DL (ref 11.5–16.5)
TIME ANALYZED: 618

## 2024-06-20 PROCEDURE — 99232 SBSQ HOSP IP/OBS MODERATE 35: CPT | Performed by: INTERNAL MEDICINE

## 2024-06-20 PROCEDURE — 2580000003 HC RX 258: Performed by: INTERNAL MEDICINE

## 2024-06-20 PROCEDURE — 1200000000 HC SEMI PRIVATE

## 2024-06-20 PROCEDURE — 94640 AIRWAY INHALATION TREATMENT: CPT

## 2024-06-20 PROCEDURE — 6370000000 HC RX 637 (ALT 250 FOR IP): Performed by: INTERNAL MEDICINE

## 2024-06-20 PROCEDURE — 2700000000 HC OXYGEN THERAPY PER DAY

## 2024-06-20 PROCEDURE — 94660 CPAP INITIATION&MGMT: CPT

## 2024-06-20 PROCEDURE — 6360000002 HC RX W HCPCS: Performed by: INTERNAL MEDICINE

## 2024-06-20 PROCEDURE — 82805 BLOOD GASES W/O2 SATURATION: CPT

## 2024-06-20 RX ORDER — PREDNISONE 10 MG/1
TABLET ORAL
Qty: 30 TABLET | Refills: 0 | Status: CANCELLED | OUTPATIENT
Start: 2024-06-20

## 2024-06-20 RX ORDER — FINASTERIDE 5 MG/1
5 TABLET, FILM COATED ORAL DAILY
Qty: 30 TABLET | Refills: 3 | Status: CANCELLED | OUTPATIENT
Start: 2024-06-21

## 2024-06-20 RX ORDER — AZITHROMYCIN 500 MG/1
500 TABLET, FILM COATED ORAL DAILY
Qty: 1 TABLET | Refills: 0 | Status: CANCELLED | OUTPATIENT
Start: 2024-06-21 | End: 2024-06-22

## 2024-06-20 RX ORDER — NICOTINE 21 MG/24HR
1 PATCH, TRANSDERMAL 24 HOURS TRANSDERMAL DAILY
Qty: 30 PATCH | Refills: 3 | Status: CANCELLED | OUTPATIENT
Start: 2024-06-21

## 2024-06-20 RX ADMIN — GABAPENTIN 800 MG: 400 CAPSULE ORAL at 22:15

## 2024-06-20 RX ADMIN — ARFORMOTEROL TARTRATE 15 MCG: 15 SOLUTION RESPIRATORY (INHALATION) at 08:41

## 2024-06-20 RX ADMIN — GABAPENTIN 800 MG: 400 CAPSULE ORAL at 13:36

## 2024-06-20 RX ADMIN — IPRATROPIUM BROMIDE AND ALBUTEROL SULFATE 1 DOSE: 2.5; .5 SOLUTION RESPIRATORY (INHALATION) at 12:03

## 2024-06-20 RX ADMIN — BENZONATATE 100 MG: 100 CAPSULE ORAL at 17:53

## 2024-06-20 RX ADMIN — IPRATROPIUM BROMIDE AND ALBUTEROL SULFATE 1 DOSE: 2.5; .5 SOLUTION RESPIRATORY (INHALATION) at 23:52

## 2024-06-20 RX ADMIN — SODIUM CHLORIDE, PRESERVATIVE FREE 10 ML: 5 INJECTION INTRAVENOUS at 08:31

## 2024-06-20 RX ADMIN — ZOLPIDEM TARTRATE 5 MG: 5 TABLET ORAL at 22:15

## 2024-06-20 RX ADMIN — ENOXAPARIN SODIUM 30 MG: 100 INJECTION SUBCUTANEOUS at 08:31

## 2024-06-20 RX ADMIN — METFORMIN HYDROCHLORIDE 500 MG: 500 TABLET ORAL at 17:52

## 2024-06-20 RX ADMIN — GABAPENTIN 800 MG: 400 CAPSULE ORAL at 08:29

## 2024-06-20 RX ADMIN — BENZONATATE 100 MG: 100 CAPSULE ORAL at 08:37

## 2024-06-20 RX ADMIN — PANTOPRAZOLE SODIUM 40 MG: 40 TABLET, DELAYED RELEASE ORAL at 08:30

## 2024-06-20 RX ADMIN — IPRATROPIUM BROMIDE AND ALBUTEROL SULFATE 1 DOSE: 2.5; .5 SOLUTION RESPIRATORY (INHALATION) at 20:20

## 2024-06-20 RX ADMIN — TROSPIUM CHLORIDE 20 MG: 20 TABLET, FILM COATED ORAL at 22:15

## 2024-06-20 RX ADMIN — PREDNISONE 20 MG: 5 TABLET ORAL at 22:15

## 2024-06-20 RX ADMIN — ENOXAPARIN SODIUM 30 MG: 100 INJECTION SUBCUTANEOUS at 22:15

## 2024-06-20 RX ADMIN — ATORVASTATIN CALCIUM 10 MG: 20 TABLET, FILM COATED ORAL at 22:15

## 2024-06-20 RX ADMIN — PREDNISONE 20 MG: 5 TABLET ORAL at 08:30

## 2024-06-20 RX ADMIN — IPRATROPIUM BROMIDE AND ALBUTEROL SULFATE 1 DOSE: 2.5; .5 SOLUTION RESPIRATORY (INHALATION) at 08:40

## 2024-06-20 RX ADMIN — TAMSULOSIN HYDROCHLORIDE 0.4 MG: 0.4 CAPSULE ORAL at 22:16

## 2024-06-20 RX ADMIN — SODIUM CHLORIDE, PRESERVATIVE FREE 10 ML: 5 INJECTION INTRAVENOUS at 22:22

## 2024-06-20 RX ADMIN — AZITHROMYCIN DIHYDRATE 500 MG: 250 TABLET ORAL at 08:29

## 2024-06-20 RX ADMIN — ARFORMOTEROL TARTRATE 15 MCG: 15 SOLUTION RESPIRATORY (INHALATION) at 20:20

## 2024-06-20 RX ADMIN — METFORMIN HYDROCHLORIDE 500 MG: 500 TABLET ORAL at 08:29

## 2024-06-20 RX ADMIN — POLYETHYLENE GLYCOL 3350 17 G: 17 POWDER, FOR SOLUTION ORAL at 22:42

## 2024-06-20 RX ADMIN — AMLODIPINE BESYLATE 10 MG: 10 TABLET ORAL at 08:29

## 2024-06-20 RX ADMIN — TAMSULOSIN HYDROCHLORIDE 0.4 MG: 0.4 CAPSULE ORAL at 08:29

## 2024-06-20 RX ADMIN — ASPIRIN 81 MG: 81 TABLET, COATED ORAL at 08:31

## 2024-06-20 RX ADMIN — BUPROPION HYDROCHLORIDE 300 MG: 300 TABLET, EXTENDED RELEASE ORAL at 08:29

## 2024-06-20 RX ADMIN — Medication 3 MG: at 22:15

## 2024-06-20 RX ADMIN — FINASTERIDE 5 MG: 5 TABLET, FILM COATED ORAL at 08:29

## 2024-06-20 ASSESSMENT — PAIN DESCRIPTION - LOCATION: LOCATION: LEG

## 2024-06-20 ASSESSMENT — PAIN SCALES - GENERAL
PAINLEVEL_OUTOF10: 7
PAINLEVEL_OUTOF10: 0

## 2024-06-20 ASSESSMENT — PAIN DESCRIPTION - ORIENTATION: ORIENTATION: LEFT;UPPER

## 2024-06-20 ASSESSMENT — PAIN DESCRIPTION - PAIN TYPE: TYPE: CHRONIC PAIN;NEUROPATHIC PAIN

## 2024-06-20 NOTE — PLAN OF CARE
Problem: Chronic Conditions and Co-morbidities  Goal: Patient's chronic conditions and co-morbidity symptoms are monitored and maintained or improved  Outcome: Progressing     Problem: Discharge Planning  Goal: Discharge to home or other facility with appropriate resources  Outcome: Progressing     Problem: Safety - Adult  Goal: Free from fall injury  Outcome: Progressing  Flowsheets (Taken 6/20/2024 0212)  Free From Fall Injury: Instruct family/caregiver on patient safety

## 2024-06-20 NOTE — PLAN OF CARE
Problem: Chronic Conditions and Co-morbidities  Goal: Patient's chronic conditions and co-morbidity symptoms are monitored and maintained or improved  Outcome: Progressing  Flowsheets (Taken 6/19/2024 1952)  Care Plan - Patient's Chronic Conditions and Co-Morbidity Symptoms are Monitored and Maintained or Improved: Monitor and assess patient's chronic conditions and comorbid symptoms for stability, deterioration, or improvement     Problem: Discharge Planning  Goal: Discharge to home or other facility with appropriate resources  Outcome: Progressing  Flowsheets (Taken 6/19/2024 1952)  Discharge to home or other facility with appropriate resources: Identify barriers to discharge with patient and caregiver     Problem: Safety - Adult  Goal: Free from fall injury  Outcome: Progressing     Problem: ABCDS Injury Assessment  Goal: Absence of physical injury  Outcome: Progressing

## 2024-06-20 NOTE — PROGRESS NOTES
Pt placed on bipap by pt request, doing well sat = 94%   06/20/24 1212   NIV Type   NIV Started/Stopped On   Equipment Type v60   Mode (S)  Bilevel  (changed per orders)   Mask Type Full face mask   Mask Size Large   Assessment   Comfort Level Good   Using Accessory Muscles Yes   Mask Compliance Good   Skin Assessment Clean, dry, & intact   Skin Protection for O2 Device Yes   Orientation Middle   Location Nose   Intervention(s) Skin Barrier   Settings/Measurements   PIP Observed 15 cm H20   IPAP (S)  15 cmH20  (changed per order)   CPAP/EPAP 8 cmH2O   Vt (Measured) 723 mL   Rate Ordered 16   Insp Rise Time (%) 3 %   FiO2  40 %   I Time/ I Time % 0.8 s   Minute Volume (L/min) 15.4 Liters   Mask Leak (lpm) 43 lpm   Patient's Home Machine No   Alarm Settings   Alarms On Y

## 2024-06-20 NOTE — CARE COORDINATION
6/20:  Update CM Note:  Pt will see if his girlfriend can deliver his concentrator, nebulizer & medications to his home address.  CM did ask [facility] to see if they can see about requesting a Bipap for pt with his Pulmonary Dr.  Electronically signed by Lilliana Varma RN on 6/20/2024 at 1:14 PM

## 2024-06-20 NOTE — PROGRESS NOTES
Hospitalist Progress Note      Synopsis: Patient admitted on 6/18/2024  54 y.o. male who presents to [facility] ER complaining of SOB.     Ryan Mcghee has a past medical history that includes COPD, diabetes, hypertension, history of tobacco abuse     Ryan presents to the ER with complaint of shortness of breath.  He was found to be 84% on room air.  He does have a history of COPD and tobacco abuse.  He was placed on BiPAP in the ER.  ABGs were found to be pH 7.2, pCO2 69.  CXR without acute abnormality.  He was given DuoNebs and IV Solu-Medrol and will be admitted for COPD exacerbation  Discussed patient's case with ED physician.     ER Course  Upon presentation to the ER, routine labwork was performed which revealed WBC 11.7.  Imaging results are as outlined below in the Imaging section of this note.  E Upon arrival to the ER, patient was 105, 114/88.  The patient received DuoNeb, Solu-Medrol, magnesium in the emergency room and was admitted to [facility].     Last Hospital Admission -I personally reviewed previous admission  Acute hypoxic respiratory failure secondary to COPD  Hypertension diabetes  JANESSA  Nicotine dependence  History of drug abuse  History of ethanol abuse and history of pancreatitis        Last Echocardiogram -I personally reviewed echocardiogram results from 1/9/2024   Left Ventricle: Normal left ventricular systolic function with a visually estimated EF of 60 - 65%. Left ventricle size is normal. Mild basal septal thickening. Normal wall motion. Normal diastolic function.    Right Ventricle: Right ventricle is mildly dilated. Normal systolic function. TAPSE is 2.5 cm.    Right Atrium: Right atrium is mildly dilated.     ED TRIAGE VITALS  BP: (!) 134/99, Temp: 98.1 °F (36.7 °C), Pulse: 96, Respirations: 17, SpO2: 96 %    ASSESSMENT:    Principal Problem:    COPD exacerbation (HCC)  Active Problems:    HTN (hypertension)    Type 2 diabetes mellitus, without long-term current use of

## 2024-06-20 NOTE — PROGRESS NOTES
Called Marilyn with Respiratory and requested the BiPap to be brought down to new room. Provided room number.

## 2024-06-20 NOTE — CARE COORDINATION
6/20:  Transition of care:  Pt presented to the ER for SOB from home.  Pt is on 6L/NC/PAP at 98%, Iv Apresoline PRN & SQ Lovenox.  Pulmonary is consulted.  CM spoke with pt bedside today to discuss CM role & dc planning.  Pt's PCP is Larry Dotson & uses [facility] in [place].  Pt lives alone in an apartment with an elevator to enter.  The bed/bathroom are on the entrance level.  PTA pt was independent with 02/NC 3L continuous via [facility] - he believes.  Pt is asking about a BiPAP.  CM left message for HC'S to inquiry about 02/BiPAP.  Will need 02 testing & a tank delivered to the hospital.  Will need to make sure he picks up his concentrator, nebulizer & medications from his girlfriend's house.  Pt has no hx of HHC/SNF.  Pt's dc plan is home & will need transportation set up via his insurance.  Pt's insurance will need called & set up at [phone].  Sw/CM will continue to follow.    Case Management Assessment  Initial Evaluation    Date/Time of Evaluation: 6/20/2024 12:52 PM  Assessment Completed by: Lilliana Varma RN    If patient is discharged prior to next notation, then this note serves as note for discharge by case management.    Patient Name: Ryan Mcghee                   YOB: 1970  Diagnosis: COPD exacerbation (HCC) [J44.1]  Acute respiratory failure with hypercapnia (HCC) [J96.02]                   Date / Time: 6/18/2024  4:38 PM    Patient Admission Status: Inpatient   Readmission Risk (Low < 19, Mod (19-27), High > 27): Readmission Risk Score: 10.5    Current PCP: Marco A Dotson  PCP verified by CM? Yes    Chart Reviewed: Yes      History Provided by: Patient  Patient Orientation: Alert and Oriented, Person, Place, Situation    Patient Cognition: Alert    Hospitalization in the last 30 days (Readmission):  No    If yes, Readmission Assessment in  Navigator will be completed.    Advance Directives:      Code Status: Full Code   Patient's Primary Decision Maker is:      Primary Decision Maker:

## 2024-06-20 NOTE — PROGRESS NOTES
Messaged Dr Connelly per patient request to ask for the Linzess to be ordered. Patient takes PRN and would like to have it.

## 2024-06-20 NOTE — PROGRESS NOTES
4 Eyes Skin Assessment     NAME:  Ryan Mcghee  YOB: 1970  MEDICAL RECORD NUMBER:  [id number]    The patient is being assessed for  Transfer to New Unit    I agree that at least one RN has performed a thorough Head to Toe Skin Assessment on the patient. ALL assessment sites listed below have been assessed.      Areas assessed by both nurses:    Head, Face, Ears, Shoulders, Back, Chest, Arms, Elbows, Hands, Sacrum. Buttock, Coccyx, Ischium, Legs. Feet and Heels, Under Medical Devices , and Other .        Does the Patient have a Wound? No noted wound(s)       Jaycob Prevention initiated by RN: No  Wound Care Orders initiated by RN: No    Pressure Injury (Stage 3,4, Unstageable, DTI, NWPT, and Complex wounds) if present, place Wound referral order by RN under : No    New Ostomies, if present place, Ostomy referral order under : No     Nurse 1 eSignature: Electronically signed by Maggy Holder RN on 6/20/24 at 5:32 PM EDT    **SHARE this note so that the co-signing nurse can place an eSignature**    Nurse 2 eSignature: Electronically signed by Leticia Arndt RN on 6/20/24 at 5:33 PM EDT

## 2024-06-21 VITALS
HEART RATE: 84 BPM | OXYGEN SATURATION: 93 % | HEIGHT: 69 IN | SYSTOLIC BLOOD PRESSURE: 129 MMHG | RESPIRATION RATE: 18 BRPM | DIASTOLIC BLOOD PRESSURE: 88 MMHG | TEMPERATURE: 97.8 F | BODY MASS INDEX: 34.61 KG/M2 | WEIGHT: 233.69 LBS

## 2024-06-21 LAB
B.E.: 7.2 MMOL/L (ref -3–3)
COHB: 1 % (ref 0–1.5)
CRITICAL: ABNORMAL
DATE ANALYZED: ABNORMAL
DATE OF COLLECTION: ABNORMAL
HCO3: 32.2 MMOL/L (ref 22–26)
HHB: 8.4 % (ref 0–5)
LAB: ABNORMAL
Lab: 1345
METHB: 0.3 % (ref 0–1.5)
MODE: ABNORMAL
O2 SATURATION: 90.5 % (ref 92–98.5)
O2HB: 90.3 % (ref 94–97)
OPERATOR ID: 1112
PATIENT TEMP: 37 C
PCO2: 46.9 MMHG (ref 35–45)
PH BLOOD GAS: 7.46 (ref 7.35–7.45)
PO2: 56.4 MMHG (ref 75–100)
SOURCE, BLOOD GAS: ABNORMAL
THB: 14.8 G/DL (ref 11.5–16.5)
TIME ANALYZED: 1347

## 2024-06-21 PROCEDURE — 94660 CPAP INITIATION&MGMT: CPT

## 2024-06-21 PROCEDURE — 82805 BLOOD GASES W/O2 SATURATION: CPT

## 2024-06-21 PROCEDURE — 2700000000 HC OXYGEN THERAPY PER DAY

## 2024-06-21 PROCEDURE — 6370000000 HC RX 637 (ALT 250 FOR IP): Performed by: INTERNAL MEDICINE

## 2024-06-21 PROCEDURE — 2580000003 HC RX 258: Performed by: INTERNAL MEDICINE

## 2024-06-21 PROCEDURE — 36600 WITHDRAWAL OF ARTERIAL BLOOD: CPT

## 2024-06-21 PROCEDURE — 6360000002 HC RX W HCPCS: Performed by: INTERNAL MEDICINE

## 2024-06-21 PROCEDURE — 94640 AIRWAY INHALATION TREATMENT: CPT

## 2024-06-21 PROCEDURE — 99239 HOSP IP/OBS DSCHRG MGMT >30: CPT | Performed by: INTERNAL MEDICINE

## 2024-06-21 RX ORDER — PREDNISONE 10 MG/1
TABLET ORAL
Qty: 30 TABLET | Refills: 0 | Status: SHIPPED | OUTPATIENT
Start: 2024-06-21

## 2024-06-21 RX ORDER — IBUPROFEN 400 MG/1
800 TABLET ORAL EVERY 8 HOURS PRN
Status: DISCONTINUED | OUTPATIENT
Start: 2024-06-21 | End: 2024-06-21 | Stop reason: HOSPADM

## 2024-06-21 RX ORDER — IBUPROFEN 200 MG
TABLET ORAL 2 TIMES DAILY
Status: DISCONTINUED | OUTPATIENT
Start: 2024-06-21 | End: 2024-06-21 | Stop reason: HOSPADM

## 2024-06-21 RX ORDER — FINASTERIDE 5 MG/1
5 TABLET, FILM COATED ORAL DAILY
Qty: 30 TABLET | Refills: 3 | Status: SHIPPED | OUTPATIENT
Start: 2024-06-22

## 2024-06-21 RX ORDER — NEBULIZER ACCESSORIES
1 KIT MISCELLANEOUS DAILY PRN
Qty: 1 KIT | Refills: 0 | Status: SHIPPED | OUTPATIENT
Start: 2024-06-21

## 2024-06-21 RX ORDER — IPRATROPIUM BROMIDE AND ALBUTEROL SULFATE 2.5; .5 MG/3ML; MG/3ML
3 SOLUTION RESPIRATORY (INHALATION) EVERY 4 HOURS PRN
Qty: 360 ML | Refills: 3 | Status: SHIPPED | OUTPATIENT
Start: 2024-06-21

## 2024-06-21 RX ADMIN — BENZOCAINE AND MENTHOL 1 LOZENGE: 15; 3.6 LOZENGE ORAL at 09:07

## 2024-06-21 RX ADMIN — METFORMIN HYDROCHLORIDE 500 MG: 500 TABLET ORAL at 09:15

## 2024-06-21 RX ADMIN — FINASTERIDE 5 MG: 5 TABLET, FILM COATED ORAL at 09:15

## 2024-06-21 RX ADMIN — IPRATROPIUM BROMIDE AND ALBUTEROL SULFATE 1 DOSE: 2.5; .5 SOLUTION RESPIRATORY (INHALATION) at 11:35

## 2024-06-21 RX ADMIN — AMLODIPINE BESYLATE 10 MG: 10 TABLET ORAL at 09:06

## 2024-06-21 RX ADMIN — GABAPENTIN 800 MG: 400 CAPSULE ORAL at 09:04

## 2024-06-21 RX ADMIN — IPRATROPIUM BROMIDE AND ALBUTEROL SULFATE 1 DOSE: 2.5; .5 SOLUTION RESPIRATORY (INHALATION) at 05:39

## 2024-06-21 RX ADMIN — GABAPENTIN 800 MG: 400 CAPSULE ORAL at 13:28

## 2024-06-21 RX ADMIN — BUPROPION HYDROCHLORIDE 300 MG: 300 TABLET, EXTENDED RELEASE ORAL at 09:16

## 2024-06-21 RX ADMIN — ARFORMOTEROL TARTRATE 15 MCG: 15 SOLUTION RESPIRATORY (INHALATION) at 05:39

## 2024-06-21 RX ADMIN — ENOXAPARIN SODIUM 30 MG: 100 INJECTION SUBCUTANEOUS at 09:03

## 2024-06-21 RX ADMIN — TAMSULOSIN HYDROCHLORIDE 0.4 MG: 0.4 CAPSULE ORAL at 09:31

## 2024-06-21 RX ADMIN — AZITHROMYCIN DIHYDRATE 500 MG: 250 TABLET ORAL at 09:16

## 2024-06-21 RX ADMIN — ASPIRIN 81 MG: 81 TABLET, COATED ORAL at 09:04

## 2024-06-21 RX ADMIN — SODIUM CHLORIDE, PRESERVATIVE FREE 10 ML: 5 INJECTION INTRAVENOUS at 09:28

## 2024-06-21 RX ADMIN — PREDNISONE 20 MG: 5 TABLET ORAL at 10:59

## 2024-06-21 RX ADMIN — POLYMYXIN B SULFATE, BACITRACIN ZINC, NEOMYCIN SULFATE: 5000; 3.5; 4 OINTMENT TOPICAL at 10:37

## 2024-06-21 RX ADMIN — PANTOPRAZOLE SODIUM 40 MG: 40 TABLET, DELAYED RELEASE ORAL at 09:15

## 2024-06-21 ASSESSMENT — PAIN DESCRIPTION - ORIENTATION: ORIENTATION: LEFT

## 2024-06-21 ASSESSMENT — PAIN - FUNCTIONAL ASSESSMENT: PAIN_FUNCTIONAL_ASSESSMENT: PREVENTS OR INTERFERES SOME ACTIVE ACTIVITIES AND ADLS

## 2024-06-21 ASSESSMENT — PAIN SCALES - GENERAL: PAINLEVEL_OUTOF10: 7

## 2024-06-21 NOTE — CARE COORDINATION
06/21/24 Update CM Note: Patient is now on general medical floor. Met with patient at the bedside. He is alert and oriented. He has 3lnc on currently. He states he is going home today. He does have a concentrator at home and states he only wears oxygen if needed. Call has been placed to [employer] and spoke to Tess who states patient has a concentrator and an E tank from them. He has a nebulizer and medicines from them. He has not ordered tanks since 2022. He does not use an portable tank. He does not have a cpap/bipap from [facility]. When asking the patient he states he does not recall who its from or where it is. He states he has not used the cpap/bipap for at least 8 months and does not recall who the supplier is. He states he has a pulmonary doctor but he has not had a recent visit. Call placed to Dr Aguilar in [place] and spoke with Maritza who states patient was in the office on 4/9/24. He has a bipap machine from [email or web address] out of [place] and is not eligible for another machine. Patient has been instructed to locate his machine. He states he will attempt to find it. He is not eligible for a nebulizer or bipap at this time. Electronically signed by Marily Corcoran RN CM on 6/21/2024 at 11:02 AM      Addendum: Dr SHIRLEY Connelly notified of above. Electronically signed by Marily Corcoran RN CM on 6/21/2024 at 12:40 PM

## 2024-06-21 NOTE — PROGRESS NOTES
Gave patient discharge instructions. All questions answered at this time. Patient left with paperwork, medications that were delivered by pharmacy and a excuse from work notifying them of his hospital stay. Patient was walked down with nurses aid.

## 2024-06-21 NOTE — PROGRESS NOTES
Messaged Dr Connelly the following:     The OP Nebulizer cannot be done here at our OP Pharmacy so I verified the home pharmacy as [facility] in [place]. I changed it in the system and want to see if you can re-direct the nebulizer to that pharmacy so he can get it? ty

## 2024-06-21 NOTE — PLAN OF CARE
Problem: Chronic Conditions and Co-morbidities  Goal: Patient's chronic conditions and co-morbidity symptoms are monitored and maintained or improved  6/21/2024 0413 by Jayy Og RN  Outcome: Progressing  6/20/2024 1531 by Esau Bright RN  Outcome: Progressing     Problem: Discharge Planning  Goal: Discharge to home or other facility with appropriate resources  6/21/2024 0413 by Jayy Og RN  Outcome: Progressing  6/20/2024 1531 by Esau Bright RN  Outcome: Progressing     Problem: Safety - Adult  Goal: Free from fall injury  6/21/2024 0413 by Jayy Og RN  Outcome: Progressing  6/20/2024 1531 by Esau Bright RN  Outcome: Progressing  Flowsheets (Taken 6/20/2024 1426)  Free From Fall Injury: Instruct family/caregiver on patient safety     Problem: ABCDS Injury Assessment  Goal: Absence of physical injury  Outcome: Progressing  Flowsheets (Taken 6/20/2024 1426 by Esau Bright RN)  Absence of Physical Injury: Implement safety measures based on patient assessment     Problem: Pain  Goal: Verbalizes/displays adequate comfort level or baseline comfort level  Outcome: Progressing

## 2024-06-21 NOTE — PLAN OF CARE
Problem: Chronic Conditions and Co-morbidities  Goal: Patient's chronic conditions and co-morbidity symptoms are monitored and maintained or improved  6/21/2024 1226 by Elenita Wade RN  Outcome: Progressing     Problem: Discharge Planning  Goal: Discharge to home or other facility with appropriate resources  6/21/2024 1226 by Elenita Wade RN  Outcome: Progressing     Problem: Safety - Adult  Goal: Free from fall injury  6/21/2024 1226 by Elenita Wade RN  Outcome: Progressing     Problem: ABCDS Injury Assessment  Goal: Absence of physical injury  6/21/2024 1226 by Elenita Wade RN  Outcome: Progressing     Problem: Pain  Goal: Verbalizes/displays adequate comfort level or baseline comfort level  6/21/2024 1226 by Elenita Wade RN  Outcome: Progressing

## 2024-06-21 NOTE — DISCHARGE SUMMARY
Physician Discharge Summary     Patient ID:  Ryan Mcghee  [id number]  54 y.o.  1970    Admit date: 6/18/2024    Discharge date and time:  6/21/2024    Discharge Diagnoses: Principal Problem:    COPD exacerbation (HCC)  Active Problems:    HTN (hypertension)    Type 2 diabetes mellitus, without long-term current use of insulin (HCC)    Acute respiratory failure with hypoxia and hypercapnia (HCC)    Cocaine use disorder (HCC)    Obstructive sleep apnea syndrome noncompliant with positive pressure ventilation  Resolved Problems:    * No resolved hospital problems. *      Consults: IP CONSULT TO HOSPITALIST  IP CONSULT TO SPIRITUAL SERVICES    Procedures: See below    Hospital Course:  Patient admitted on 6/18/2024  54 y.o. male who presents to [facility] ER complaining of SOB.     Ryan Mcghee has a past medical history that includes COPD, diabetes, hypertension, history of tobacco abuse     Ryan presents to the ER with complaint of shortness of breath.  He was found to be 84% on room air.  He does have a history of COPD and tobacco abuse.  He was placed on BiPAP in the ER.  ABGs were found to be pH 7.2, pCO2 69.  CXR without acute abnormality.  He was given DuoNebs and IV Solu-Medrol and will be admitted for COPD exacerbation  Discussed patient's case with ED physician.     ER Course  Upon presentation to the ER, routine labwork was performed which revealed WBC 11.7.  Imaging results are as outlined below in the Imaging section of this note.  E Upon arrival to the ER, patient was 105, 114/88.  The patient received DuoNeb, Solu-Medrol, magnesium in the emergency room and was admitted to [facility].     Last Hospital Admission -I personally reviewed previous admission  Acute hypoxic respiratory failure secondary to COPD  Hypertension diabetes  JANESSA  Nicotine dependence  History of drug abuse  History of ethanol abuse and history of pancreatitis        Last Echocardiogram -I personally reviewed echocardiogram  tablet Take 1 tablet by mouth daily, Disp-30 tablet, R-3Normal      Respiratory Therapy Supplies (NEBULIZER/TUBING/MOUTHPIECE) KIT DAILY PRN Starting Fri 6/21/2024, Disp-1 kit, R-0, Normal           CONTINUE these medications which have NOT CHANGED    Details   linaclotide (LINZESS) 145 MCG capsule Take 1 capsule by mouth every morning (before breakfast) Patient takes PRNHistorical Med      Budeson-Glycopyrrol-Formoterol (BREZTRI AEROSPHERE) 160-9-4.8 MCG/ACT AERO Inhale into the lungsHistorical Med      melatonin 3 MG TABS tablet Take 10 mg by mouth nightly as needed (insomnia)Historical Med      benzonatate (TESSALON) 100 MG capsule Take 1 capsule by mouth 3 times daily as needed for Cough, Disp-30 capsule, R-1Normal      aspirin 81 MG EC tablet Take 1 tablet by mouth dailyHistorical Med      buPROPion (WELLBUTRIN XL) 300 MG extended release tablet Take 1 tablet by mouth every morningHistorical Med      ibuprofen (ADVIL;MOTRIN) 800 MG tablet Take 1 tablet by mouth every 8 hours as needed for PainHistorical Med      metFORMIN (GLUCOPHAGE) 500 MG tablet Take 1 tablet by mouth in the morning and at bedtimeHistorical Med      zolpidem (AMBIEN) 5 MG tablet Take 1 tablet by mouth nightly as needed for Sleep.Historical Med      pantoprazole (PROTONIX) 40 MG tablet Take 1 tablet by mouth dailyHistorical Med      atorvastatin (LIPITOR) 10 MG tablet Take 1 tablet by mouth at bedtimeHistorical Med      tamsulosin (FLOMAX) 0.4 MG capsule Take 1 capsule by mouth 2 times dailyHistorical Med      amLODIPine (NORVASC) 10 MG tablet Take 1 tablet by mouth daily., Disp-30 tablet, R-2Normal           STOP taking these medications       gabapentin (NEURONTIN) 300 MG capsule Comments:   Reason for Stopping:         oxyBUTYnin (DITROPAN-XL) 5 MG extended release tablet Comments:   Reason for Stopping:         albuterol sulfate HFA (PROVENTIL;VENTOLIN;PROAIR) 108 (90 Base) MCG/ACT inhaler Comments:   Reason for Stopping:

## 2024-06-21 NOTE — CARE COORDINATION
06/21/24 Update CM Note: Call placed to [facility] at [phone] for discharge transport home. Spoke with Ayana. Trip ticket: [id number]. Time of arrival to be called to unit by . Electronically signed by Marily Corcoran RN CM on 6/21/2024 at 1:28 PM

## 2024-06-21 NOTE — PROGRESS NOTES
CLINICAL PHARMACY NOTE: MEDS TO BEDS    Total # of Prescriptions Filled: 3   The following medications were delivered to the patient:  Iprat- albut soln  Finasteride 5mg  Prednisone 10mg    Additional Documentation:  Delivered to pt- RN aware nebulizer rx will need resent elsewhere

## 2024-08-21 ENCOUNTER — HOSPITAL ENCOUNTER (OUTPATIENT)
Dept: CT IMAGING | Age: 54
Discharge: HOME | End: 2024-08-21
Payer: MEDICARE

## 2024-08-21 ENCOUNTER — TRANSCRIBE ORDERS (OUTPATIENT)
Dept: ADMINISTRATIVE | Age: 54
End: 2024-08-21

## 2024-08-21 DIAGNOSIS — R93.7 MUSCULOSKELETAL SYSTEM IMAGING ABNORMALITY: ICD-10-CM

## 2024-08-21 DIAGNOSIS — M25.551 RIGHT HIP PAIN: ICD-10-CM

## 2024-08-21 DIAGNOSIS — M25.551 RIGHT HIP PAIN: Primary | ICD-10-CM

## 2024-08-21 PROCEDURE — 73700 CT LOWER EXTREMITY W/O DYE: CPT

## 2024-08-24 ENCOUNTER — HOSPITAL ENCOUNTER (OUTPATIENT)
Age: 54
Setting detail: OBSERVATION
Discharge: HOME | End: 2024-08-27
Attending: EMERGENCY MEDICINE | Admitting: INTERNAL MEDICINE
Payer: MEDICARE

## 2024-08-24 ENCOUNTER — APPOINTMENT (OUTPATIENT)
Dept: GENERAL RADIOLOGY | Age: 54
End: 2024-08-24
Payer: MEDICARE

## 2024-08-24 DIAGNOSIS — M25.551 CHRONIC PAIN OF RIGHT HIP: ICD-10-CM

## 2024-08-24 DIAGNOSIS — G89.29 CHRONIC PAIN OF RIGHT HIP: ICD-10-CM

## 2024-08-24 DIAGNOSIS — J44.1 COPD EXACERBATION (HCC): Primary | ICD-10-CM

## 2024-08-24 DIAGNOSIS — J96.01 ACUTE RESPIRATORY FAILURE WITH HYPOXIA (HCC): ICD-10-CM

## 2024-08-24 LAB
ALBUMIN SERPL-MCNC: 4.1 G/DL (ref 3.5–5.2)
ALP SERPL-CCNC: 73 U/L (ref 40–129)
ALT SERPL-CCNC: 27 U/L (ref 0–40)
ANION GAP SERPL CALCULATED.3IONS-SCNC: 6 MMOL/L (ref 7–16)
AST SERPL-CCNC: 30 U/L (ref 0–39)
B PARAP IS1001 DNA NPH QL NAA+NON-PROBE: NOT DETECTED
B PERT DNA SPEC QL NAA+PROBE: NOT DETECTED
BASOPHILS # BLD: 0.03 K/UL (ref 0–0.2)
BASOPHILS NFR BLD: 1 % (ref 0–2)
BILIRUB SERPL-MCNC: 0.4 MG/DL (ref 0–1.2)
BILIRUB UR QL STRIP: NEGATIVE
BNP SERPL-MCNC: 54 PG/ML (ref 0–125)
BUN SERPL-MCNC: 12 MG/DL (ref 6–20)
C PNEUM DNA NPH QL NAA+NON-PROBE: NOT DETECTED
CALCIUM SERPL-MCNC: 9.1 MG/DL (ref 8.6–10.2)
CHLORIDE SERPL-SCNC: 102 MMOL/L (ref 98–107)
CK SERPL-CCNC: 472 U/L (ref 20–200)
CLARITY UR: CLEAR
CO2 SERPL-SCNC: 31 MMOL/L (ref 22–29)
COLOR UR: YELLOW
CREAT SERPL-MCNC: 0.6 MG/DL (ref 0.7–1.2)
D-DIMER QUANTITATIVE: <200 NG/ML DDU (ref 0–230)
EOSINOPHIL # BLD: 0.08 K/UL (ref 0.05–0.5)
EOSINOPHILS RELATIVE PERCENT: 2 % (ref 0–6)
ERYTHROCYTE [DISTWIDTH] IN BLOOD BY AUTOMATED COUNT: 13.8 % (ref 11.5–15)
FLUAV RNA NPH QL NAA+NON-PROBE: NOT DETECTED
FLUBV RNA NPH QL NAA+NON-PROBE: NOT DETECTED
GFR, ESTIMATED: >90 ML/MIN/1.73M2
GLUCOSE BLD-MCNC: 222 MG/DL (ref 74–99)
GLUCOSE SERPL-MCNC: 97 MG/DL (ref 74–99)
GLUCOSE UR STRIP-MCNC: NEGATIVE MG/DL
HADV DNA NPH QL NAA+NON-PROBE: NOT DETECTED
HCOV 229E RNA NPH QL NAA+NON-PROBE: NOT DETECTED
HCOV HKU1 RNA NPH QL NAA+NON-PROBE: NOT DETECTED
HCOV NL63 RNA NPH QL NAA+NON-PROBE: NOT DETECTED
HCOV OC43 RNA NPH QL NAA+NON-PROBE: NOT DETECTED
HCT VFR BLD AUTO: 41.6 % (ref 37–54)
HGB BLD-MCNC: 12.8 G/DL (ref 12.5–16.5)
HGB UR QL STRIP.AUTO: NEGATIVE
HMPV RNA NPH QL NAA+NON-PROBE: NOT DETECTED
HPIV1 RNA NPH QL NAA+NON-PROBE: NOT DETECTED
HPIV2 RNA NPH QL NAA+NON-PROBE: NOT DETECTED
HPIV3 RNA NPH QL NAA+NON-PROBE: NOT DETECTED
HPIV4 RNA NPH QL NAA+NON-PROBE: NOT DETECTED
IMM GRANULOCYTES # BLD AUTO: <0.03 K/UL (ref 0–0.58)
IMM GRANULOCYTES NFR BLD: 0 % (ref 0–5)
KETONES UR STRIP-MCNC: NEGATIVE MG/DL
LEUKOCYTE ESTERASE UR QL STRIP: NEGATIVE
LYMPHOCYTES NFR BLD: 1.62 K/UL (ref 1.5–4)
LYMPHOCYTES RELATIVE PERCENT: 32 % (ref 20–42)
M PNEUMO DNA NPH QL NAA+NON-PROBE: NOT DETECTED
MAGNESIUM SERPL-MCNC: 2 MG/DL (ref 1.6–2.6)
MCH RBC QN AUTO: 27.6 PG (ref 26–35)
MCHC RBC AUTO-ENTMCNC: 30.8 G/DL (ref 32–34.5)
MCV RBC AUTO: 89.8 FL (ref 80–99.9)
MONOCYTES NFR BLD: 0.83 K/UL (ref 0.1–0.95)
MONOCYTES NFR BLD: 16 % (ref 2–12)
NEUTROPHILS NFR BLD: 50 % (ref 43–80)
NEUTS SEG NFR BLD: 2.55 K/UL (ref 1.8–7.3)
NITRITE UR QL STRIP: NEGATIVE
PH UR STRIP: 7 [PH] (ref 5–9)
PLATELET # BLD AUTO: 267 K/UL (ref 130–450)
PMV BLD AUTO: 10 FL (ref 7–12)
POTASSIUM SERPL-SCNC: 3.7 MMOL/L (ref 3.5–5)
PROT SERPL-MCNC: 6.8 G/DL (ref 6.4–8.3)
PROT UR STRIP-MCNC: NEGATIVE MG/DL
RBC # BLD AUTO: 4.63 M/UL (ref 3.8–5.8)
RBC #/AREA URNS HPF: ABNORMAL /HPF
RSV RNA NPH QL NAA+NON-PROBE: NOT DETECTED
RV+EV RNA NPH QL NAA+NON-PROBE: DETECTED
SARS-COV-2 RNA NPH QL NAA+NON-PROBE: NOT DETECTED
SODIUM SERPL-SCNC: 139 MMOL/L (ref 132–146)
SP GR UR STRIP: 1.02 (ref 1–1.03)
SPECIMEN DESCRIPTION: ABNORMAL
TROPONIN I SERPL HS-MCNC: 15 NG/L (ref 0–11)
TROPONIN I SERPL HS-MCNC: 16 NG/L (ref 0–11)
UROBILINOGEN UR STRIP-ACNC: 4 EU/DL (ref 0–1)
WBC #/AREA URNS HPF: ABNORMAL /HPF
WBC OTHER # BLD: 5.1 K/UL (ref 4.5–11.5)

## 2024-08-24 PROCEDURE — 6370000000 HC RX 637 (ALT 250 FOR IP)

## 2024-08-24 PROCEDURE — 96361 HYDRATE IV INFUSION ADD-ON: CPT

## 2024-08-24 PROCEDURE — 83735 ASSAY OF MAGNESIUM: CPT

## 2024-08-24 PROCEDURE — 71045 X-RAY EXAM CHEST 1 VIEW: CPT

## 2024-08-24 PROCEDURE — G0378 HOSPITAL OBSERVATION PER HR: HCPCS

## 2024-08-24 PROCEDURE — 0202U NFCT DS 22 TRGT SARS-COV-2: CPT

## 2024-08-24 PROCEDURE — 94664 DEMO&/EVAL PT USE INHALER: CPT

## 2024-08-24 PROCEDURE — 99285 EMERGENCY DEPT VISIT HI MDM: CPT

## 2024-08-24 PROCEDURE — 94640 AIRWAY INHALATION TREATMENT: CPT

## 2024-08-24 PROCEDURE — 6370000000 HC RX 637 (ALT 250 FOR IP): Performed by: EMERGENCY MEDICINE

## 2024-08-24 PROCEDURE — 96365 THER/PROPH/DIAG IV INF INIT: CPT

## 2024-08-24 PROCEDURE — 93005 ELECTROCARDIOGRAM TRACING: CPT

## 2024-08-24 PROCEDURE — 80307 DRUG TEST PRSMV CHEM ANLYZR: CPT

## 2024-08-24 PROCEDURE — 82550 ASSAY OF CK (CPK): CPT

## 2024-08-24 PROCEDURE — 83880 ASSAY OF NATRIURETIC PEPTIDE: CPT

## 2024-08-24 PROCEDURE — 2580000003 HC RX 258: Performed by: INTERNAL MEDICINE

## 2024-08-24 PROCEDURE — 6370000000 HC RX 637 (ALT 250 FOR IP): Performed by: INTERNAL MEDICINE

## 2024-08-24 PROCEDURE — 84484 ASSAY OF TROPONIN QUANT: CPT

## 2024-08-24 PROCEDURE — 85379 FIBRIN DEGRADATION QUANT: CPT

## 2024-08-24 PROCEDURE — 96366 THER/PROPH/DIAG IV INF ADDON: CPT

## 2024-08-24 PROCEDURE — 73502 X-RAY EXAM HIP UNI 2-3 VIEWS: CPT

## 2024-08-24 PROCEDURE — 99222 1ST HOSP IP/OBS MODERATE 55: CPT | Performed by: INTERNAL MEDICINE

## 2024-08-24 PROCEDURE — 2580000003 HC RX 258

## 2024-08-24 PROCEDURE — 80053 COMPREHEN METABOLIC PANEL: CPT

## 2024-08-24 PROCEDURE — 6360000002 HC RX W HCPCS

## 2024-08-24 PROCEDURE — 96375 TX/PRO/DX INJ NEW DRUG ADDON: CPT

## 2024-08-24 PROCEDURE — 85025 COMPLETE CBC W/AUTO DIFF WBC: CPT

## 2024-08-24 PROCEDURE — 81001 URINALYSIS AUTO W/SCOPE: CPT

## 2024-08-24 PROCEDURE — 82962 GLUCOSE BLOOD TEST: CPT

## 2024-08-24 RX ORDER — LANOLIN ALCOHOL/MO/W.PET/CERES
10.5 CREAM (GRAM) TOPICAL NIGHTLY PRN
Status: DISCONTINUED | OUTPATIENT
Start: 2024-08-24 | End: 2024-08-27 | Stop reason: HOSPADM

## 2024-08-24 RX ORDER — INSULIN LISPRO 100 [IU]/ML
0-8 INJECTION, SOLUTION INTRAVENOUS; SUBCUTANEOUS
Status: DISCONTINUED | OUTPATIENT
Start: 2024-08-25 | End: 2024-08-27 | Stop reason: HOSPADM

## 2024-08-24 RX ORDER — IPRATROPIUM BROMIDE AND ALBUTEROL SULFATE 2.5; .5 MG/3ML; MG/3ML
3 SOLUTION RESPIRATORY (INHALATION) ONCE
Status: COMPLETED | OUTPATIENT
Start: 2024-08-24 | End: 2024-08-24

## 2024-08-24 RX ORDER — SODIUM CHLORIDE 0.9 % (FLUSH) 0.9 %
5-40 SYRINGE (ML) INJECTION PRN
Status: DISCONTINUED | OUTPATIENT
Start: 2024-08-24 | End: 2024-08-27 | Stop reason: HOSPADM

## 2024-08-24 RX ORDER — BUPROPION HYDROCHLORIDE 300 MG/1
300 TABLET ORAL EVERY MORNING
Status: DISCONTINUED | OUTPATIENT
Start: 2024-08-25 | End: 2024-08-27 | Stop reason: HOSPADM

## 2024-08-24 RX ORDER — AMLODIPINE BESYLATE 10 MG/1
10 TABLET ORAL DAILY
Status: DISCONTINUED | OUTPATIENT
Start: 2024-08-24 | End: 2024-08-27 | Stop reason: HOSPADM

## 2024-08-24 RX ORDER — FINASTERIDE 5 MG/1
5 TABLET, FILM COATED ORAL DAILY
Status: DISCONTINUED | OUTPATIENT
Start: 2024-08-24 | End: 2024-08-27 | Stop reason: HOSPADM

## 2024-08-24 RX ORDER — ATORVASTATIN CALCIUM 10 MG/1
10 TABLET, FILM COATED ORAL NIGHTLY
Status: DISCONTINUED | OUTPATIENT
Start: 2024-08-24 | End: 2024-08-27 | Stop reason: HOSPADM

## 2024-08-24 RX ORDER — INSULIN LISPRO 100 [IU]/ML
0-4 INJECTION, SOLUTION INTRAVENOUS; SUBCUTANEOUS NIGHTLY
Status: DISCONTINUED | OUTPATIENT
Start: 2024-08-24 | End: 2024-08-27 | Stop reason: HOSPADM

## 2024-08-24 RX ORDER — DOXYCYCLINE 100 MG/1
100 CAPSULE ORAL EVERY 12 HOURS SCHEDULED
Status: DISCONTINUED | OUTPATIENT
Start: 2024-08-24 | End: 2024-08-27 | Stop reason: HOSPADM

## 2024-08-24 RX ORDER — IPRATROPIUM BROMIDE AND ALBUTEROL SULFATE 2.5; .5 MG/3ML; MG/3ML
1 SOLUTION RESPIRATORY (INHALATION) EVERY 4 HOURS PRN
Status: DISCONTINUED | OUTPATIENT
Start: 2024-08-24 | End: 2024-08-27 | Stop reason: HOSPADM

## 2024-08-24 RX ORDER — 0.9 % SODIUM CHLORIDE 0.9 %
500 INTRAVENOUS SOLUTION INTRAVENOUS ONCE
Status: COMPLETED | OUTPATIENT
Start: 2024-08-24 | End: 2024-08-24

## 2024-08-24 RX ORDER — SODIUM CHLORIDE 9 MG/ML
INJECTION, SOLUTION INTRAVENOUS PRN
Status: DISCONTINUED | OUTPATIENT
Start: 2024-08-24 | End: 2024-08-27 | Stop reason: HOSPADM

## 2024-08-24 RX ORDER — ACETAMINOPHEN 650 MG/1
650 SUPPOSITORY RECTAL EVERY 6 HOURS PRN
Status: DISCONTINUED | OUTPATIENT
Start: 2024-08-24 | End: 2024-08-27 | Stop reason: SDUPTHER

## 2024-08-24 RX ORDER — BENZONATATE 100 MG/1
200 CAPSULE ORAL
Status: COMPLETED | OUTPATIENT
Start: 2024-08-24 | End: 2024-08-24

## 2024-08-24 RX ORDER — DOXYCYCLINE 100 MG/1
100 CAPSULE ORAL ONCE
Status: COMPLETED | OUTPATIENT
Start: 2024-08-24 | End: 2024-08-24

## 2024-08-24 RX ORDER — PANTOPRAZOLE SODIUM 40 MG/1
40 TABLET, DELAYED RELEASE ORAL DAILY
Status: DISCONTINUED | OUTPATIENT
Start: 2024-08-25 | End: 2024-08-27 | Stop reason: HOSPADM

## 2024-08-24 RX ORDER — ASPIRIN 81 MG/1
81 TABLET ORAL DAILY
Status: DISCONTINUED | OUTPATIENT
Start: 2024-08-24 | End: 2024-08-27 | Stop reason: HOSPADM

## 2024-08-24 RX ORDER — METHYLPREDNISOLONE SODIUM SUCCINATE 40 MG/ML
40 INJECTION, POWDER, LYOPHILIZED, FOR SOLUTION INTRAMUSCULAR; INTRAVENOUS EVERY 12 HOURS
Status: DISCONTINUED | OUTPATIENT
Start: 2024-08-25 | End: 2024-08-25

## 2024-08-24 RX ORDER — SODIUM CHLORIDE 0.9 % (FLUSH) 0.9 %
5-40 SYRINGE (ML) INJECTION EVERY 12 HOURS SCHEDULED
Status: DISCONTINUED | OUTPATIENT
Start: 2024-08-24 | End: 2024-08-27 | Stop reason: HOSPADM

## 2024-08-24 RX ORDER — POLYETHYLENE GLYCOL 3350 17 G/17G
17 POWDER, FOR SOLUTION ORAL DAILY PRN
Status: DISCONTINUED | OUTPATIENT
Start: 2024-08-24 | End: 2024-08-27 | Stop reason: HOSPADM

## 2024-08-24 RX ORDER — METHYLPREDNISOLONE SODIUM SUCCINATE 125 MG/2ML
125 INJECTION, POWDER, LYOPHILIZED, FOR SOLUTION INTRAMUSCULAR; INTRAVENOUS ONCE
Status: COMPLETED | OUTPATIENT
Start: 2024-08-24 | End: 2024-08-24

## 2024-08-24 RX ORDER — ONDANSETRON 4 MG/1
4 TABLET, ORALLY DISINTEGRATING ORAL EVERY 8 HOURS PRN
Status: DISCONTINUED | OUTPATIENT
Start: 2024-08-24 | End: 2024-08-27 | Stop reason: HOSPADM

## 2024-08-24 RX ORDER — ONDANSETRON 2 MG/ML
4 INJECTION INTRAMUSCULAR; INTRAVENOUS EVERY 6 HOURS PRN
Status: DISCONTINUED | OUTPATIENT
Start: 2024-08-24 | End: 2024-08-27 | Stop reason: HOSPADM

## 2024-08-24 RX ORDER — MAGNESIUM SULFATE IN WATER 40 MG/ML
2000 INJECTION, SOLUTION INTRAVENOUS ONCE
Status: COMPLETED | OUTPATIENT
Start: 2024-08-24 | End: 2024-08-24

## 2024-08-24 RX ORDER — POTASSIUM CHLORIDE 7.45 MG/ML
10 INJECTION INTRAVENOUS PRN
Status: DISCONTINUED | OUTPATIENT
Start: 2024-08-24 | End: 2024-08-25

## 2024-08-24 RX ORDER — TAMSULOSIN HYDROCHLORIDE 0.4 MG/1
0.4 CAPSULE ORAL 2 TIMES DAILY
Status: DISCONTINUED | OUTPATIENT
Start: 2024-08-24 | End: 2024-08-27 | Stop reason: HOSPADM

## 2024-08-24 RX ORDER — MAGNESIUM SULFATE IN WATER 40 MG/ML
2000 INJECTION, SOLUTION INTRAVENOUS PRN
Status: DISCONTINUED | OUTPATIENT
Start: 2024-08-24 | End: 2024-08-25

## 2024-08-24 RX ORDER — ENOXAPARIN SODIUM 100 MG/ML
30 INJECTION SUBCUTANEOUS 2 TIMES DAILY
Status: DISCONTINUED | OUTPATIENT
Start: 2024-08-24 | End: 2024-08-27 | Stop reason: HOSPADM

## 2024-08-24 RX ORDER — NAPROXEN 250 MG/1
500 TABLET ORAL ONCE
Status: COMPLETED | OUTPATIENT
Start: 2024-08-24 | End: 2024-08-24

## 2024-08-24 RX ORDER — BENZONATATE 100 MG/1
100 CAPSULE ORAL 3 TIMES DAILY PRN
Status: DISCONTINUED | OUTPATIENT
Start: 2024-08-24 | End: 2024-08-25

## 2024-08-24 RX ORDER — POTASSIUM CHLORIDE 1500 MG/1
40 TABLET, EXTENDED RELEASE ORAL PRN
Status: DISCONTINUED | OUTPATIENT
Start: 2024-08-24 | End: 2024-08-25

## 2024-08-24 RX ORDER — ACETAMINOPHEN 325 MG/1
650 TABLET ORAL EVERY 6 HOURS PRN
Status: DISCONTINUED | OUTPATIENT
Start: 2024-08-24 | End: 2024-08-27 | Stop reason: SDUPTHER

## 2024-08-24 RX ORDER — IPRATROPIUM BROMIDE AND ALBUTEROL SULFATE 2.5; .5 MG/3ML; MG/3ML
1 SOLUTION RESPIRATORY (INHALATION)
Status: DISCONTINUED | OUTPATIENT
Start: 2024-08-24 | End: 2024-08-27 | Stop reason: HOSPADM

## 2024-08-24 RX ADMIN — SODIUM CHLORIDE, PRESERVATIVE FREE 10 ML: 5 INJECTION INTRAVENOUS at 21:56

## 2024-08-24 RX ADMIN — BENZONATATE 200 MG: 100 CAPSULE ORAL at 19:23

## 2024-08-24 RX ADMIN — MAGNESIUM SULFATE HEPTAHYDRATE 2000 MG: 40 INJECTION, SOLUTION INTRAVENOUS at 17:36

## 2024-08-24 RX ADMIN — METHYLPREDNISOLONE SODIUM SUCCINATE 125 MG: 125 INJECTION INTRAMUSCULAR; INTRAVENOUS at 14:01

## 2024-08-24 RX ADMIN — NAPROXEN 500 MG: 250 TABLET ORAL at 14:47

## 2024-08-24 RX ADMIN — DOXYCYCLINE HYCLATE 100 MG: 100 CAPSULE ORAL at 21:51

## 2024-08-24 RX ADMIN — SODIUM CHLORIDE 500 ML: 9 INJECTION, SOLUTION INTRAVENOUS at 14:00

## 2024-08-24 RX ADMIN — IPRATROPIUM BROMIDE AND ALBUTEROL SULFATE 1 DOSE: 2.5; .5 SOLUTION RESPIRATORY (INHALATION) at 21:21

## 2024-08-24 RX ADMIN — ATORVASTATIN CALCIUM 10 MG: 10 TABLET, FILM COATED ORAL at 21:51

## 2024-08-24 RX ADMIN — TAMSULOSIN HYDROCHLORIDE 0.4 MG: 0.4 CAPSULE ORAL at 21:51

## 2024-08-24 RX ADMIN — Medication 10.5 MG: at 21:51

## 2024-08-24 RX ADMIN — IPRATROPIUM BROMIDE AND ALBUTEROL SULFATE 3 DOSE: 2.5; .5 SOLUTION RESPIRATORY (INHALATION) at 13:47

## 2024-08-24 RX ADMIN — DOXYCYCLINE HYCLATE 100 MG: 100 CAPSULE ORAL at 15:13

## 2024-08-24 ASSESSMENT — LIFESTYLE VARIABLES
HOW MANY STANDARD DRINKS CONTAINING ALCOHOL DO YOU HAVE ON A TYPICAL DAY: PATIENT DOES NOT DRINK
HOW OFTEN DO YOU HAVE A DRINK CONTAINING ALCOHOL: NEVER

## 2024-08-24 ASSESSMENT — PAIN - FUNCTIONAL ASSESSMENT: PAIN_FUNCTIONAL_ASSESSMENT: 0-10

## 2024-08-24 ASSESSMENT — PAIN SCALES - GENERAL: PAINLEVEL_OUTOF10: 7

## 2024-08-24 ASSESSMENT — PAIN DESCRIPTION - DESCRIPTORS: DESCRIPTORS: DISCOMFORT;ACHING;SHARP;CRAMPING

## 2024-08-24 ASSESSMENT — PAIN DESCRIPTION - LOCATION: LOCATION: HIP

## 2024-08-24 ASSESSMENT — PAIN DESCRIPTION - ORIENTATION: ORIENTATION: RIGHT

## 2024-08-24 NOTE — H&P
[facility] Hospitalist Group History and Physical      CHIEF COMPLAINT:  sob    History of Present Illness:      This is a 54 year old male with past medical history of asthma, COPD, HTN, prediabetes, depression, JANESSA, polysubstance abuse who presents to ER with ongoing shortness of breath following COVID one month ago. He went to urgent care and was given a Z-pack without relief and then returned and was given a steroid shot last week; but still feels short of breath with persistent yellow sputum. He is also complaining of right hip pain and states he was diagnosed arthritis. Lab workup: CO2 31, , Troponin 16 < 15, imaging negative. Patient was given Doxycycline, Duoneb, Magnesium, Solu-medrol, Naproxen and 500 mL bolus in Er.     Patient was recently admitted in June 2024 with COPD exacerbation.     Informant(s) for H&P: patient, epic     REVIEW OF SYSTEMS:  A comprehensive review of systems was negative except for: what is in the HPI    PMH:  Past Medical History:   Diagnosis Date    Acid reflux     Asthma     Asthma     COPD (chronic obstructive pulmonary disease) (HCC)     Hypertension     Pancreatitis     Prediabetes     Psychiatric problem     depressed    Sleep apnea     Substance abuse (HCC)     alcohol and cocaine       Surgical History:  Past Surgical History:   Procedure Laterality Date    COLONOSCOPY      2010 neg    NERVE BLOCK Left 08/27/2018    lumbar epidural #1 paramedian    SD NJX DX/THER SBST INTRLMNR LMBR/SAC W/IMG GDN Left 8/27/2018    LUMBAR EPIDURAL STEROID INJECTION L4-5 LEFT PARAMEDIAN #1 performed by Yordan Chacon MD at [facility] OR    WISDOM TOOTH EXTRACTION         Medications Prior to Admission:    Prior to Admission medications    Medication Sig Start Date End Date Taking? Authorizing Provider   ipratropium 0.5 mg-albuterol 2.5 mg (DUONEB) 0.5-2.5 (3) MG/3ML SOLN nebulizer solution Inhale 3 mLs into the lungs every 4 hours as needed for Shortness of Breath or Wheezing  cigarettes and cigars. He has a 28 pack-year smoking history. He has never used smokeless tobacco. He reports current alcohol use. He reports current drug use. Drugs:  and Cocaine.    Family History:   family history includes High Blood Pressure in his maternal grandmother; Other in his maternal grandmother and sister.       PHYSICAL EXAM:  Vitals:  BP (!) 148/84   Pulse 91   Temp 98.2 °F (36.8 °C) (Oral)   Resp 16   Wt 111.1 kg (245 lb)   SpO2 93%   BMI 36.18 kg/m²     General Appearance: alert and oriented to person, place and time and in no acute distress  Skin: warm and dry  Head: normocephalic and atraumatic  Eyes: pupils equal, round, and reactive to light, extraocular eye movements intact, conjunctivae normal  Neck: neck supple and non tender without mass   Pulmonary/Chest: decreased to auscultation bilaterally- + wheezes, no rales or rhonchi, normal air movement, no respiratory distress  Cardiovascular: normal rate, normal S1 and S2 and no carotid bruits  Abdomen: soft, non-tender, non-distended, normal bowel sounds, no masses or organomegaly  Extremities: no cyanosis, no clubbing and no edema  Neurologic: no cranial nerve deficit and speech normal  Peripheral     LABS:  Recent Labs     08/24/24  1344      K 3.7      CO2 31*   BUN 12   CREATININE 0.6*   GLUCOSE 97   CALCIUM 9.1       Recent Labs     08/24/24  1344   WBC 5.1   RBC 4.63   HGB 12.8   HCT 41.6   MCV 89.8   MCH 27.6   MCHC 30.8*   RDW 13.8      MPV 10.0       No results for input(s): \"POCGLU\" in the last 72 hours.    Radiology:   XR HIP RIGHT (2-3 VIEWS)   Final Result   No acute osseous findings.         XR CHEST PORTABLE   Final Result   No acute cardiopulmonary findings.           EKG: reviewed     ASSESSMENT:      Active Problems:    * No active hospital problems. *  Resolved Problems:    * No resolved hospital problems. *      PLAN:    Acute hypoxic respiratory failure secondary to COPD exacerbation vs recent COVID

## 2024-08-24 NOTE — ED NOTES
Department of Emergency Medicine  FIRST PROVIDER TRIAGE NOTE             Independent MLP           8/24/24  12:37 PM EDT    Date of Encounter: 8/24/24   MRN: [id number]      HPI: Ryan Mcghee is a 54 y.o. male who presents to the ED for Shortness of Breath (sob. 92% at pivot in wc. states worse with exertion. ) and Hip Pain (Right. States was just diagnosed with arthritis. But worsened pain at this time)   Patient reporting to the ED for exertional dyspnea x 1 month after he was diagnosed with COVID. Denies leg swelling.  Also reporting right hip pain radiating to the thigh. History of arthritis that thinks is flaring up.     ROS: Negative for vomiting or diarrhea.    PE: Gen Appearance/Constitutional: alert  Musculoskeletal: moves all extremities x 4    Initial Plan of Care: All treatment areas with department are currently occupied.      Plan to order/Initiate the following while awaiting opening in ED: Triage evaluation  .     Provider-Patient relationship only established for Provider In Triage (PIT).  Full assessment, HPI and examination not performed, therefore, it is not yet possible to state whether or not an emergency medical condition exists     Initial Plan of Care: Initiate Treatment-Testing, Proceed toTreatment Area When Bed Available for ED Attending/MLP to Continue Care  Secondary to high volume, low staffing, and/or boarding- patient to await bed availability.     This ends my PIT-Patient relationship.  Care of patient relinquished after triage         Electronically signed by TERRY Christine   DD: 8/24/24       Cherei Connelly PA  08/24/24 2297

## 2024-08-24 NOTE — ED PROVIDER NOTES
HPI:  8/24/24, Time: 1:05 PM EDT         Ryan Mcghee is a 54 y.o. male presenting to the ED for shortness of breath with exertion beginning about a month and a half ago after being diagnosed with COVID-19.  Patient states that he followed up with [facility] and was given a Z-David which she took without relief.  He also states that he was on a course of steroids about 3 weeks ago and had a steroid shot last week at [facility].  He states he is having persistent yellow sputum and cough.  He denies chest pain.  He denies leg swelling, calf tenderness, hormone use, hemoptysis, syncope, recent travel or immobilization, and history of DVT/PE.  He is a current smoker.  He has been using inhalers at home.  States he has been clean from cocaine for approximately 60 days.  He states he was told at 1 time that he did have a heart attack but had a negative stress test a few years ago by Dr. Santoyo was reportedly normal.  He denies documented fevers, abdominal pain, nausea, vomiting.  Also reports atraumatic pain to his right hip for about a month and a half.  States he had x-rays which were unrevealing.  No focal numbness or weakness.    --------------------------------------------- PAST HISTORY ---------------------------------------------  Past Medical History:  has a past medical history of Acid reflux, Asthma, Asthma, COPD (chronic obstructive pulmonary disease) (Formerly [facility]), Hypertension, Pancreatitis, Prediabetes, Psychiatric problem, Sleep apnea, and Substance abuse (Formerly [facility]).    Past Surgical History:  has a past surgical history that includes [place] tooth extraction; Colonoscopy; Nerve Block (Left, 08/27/2018); and pr njx dx/ther sbst intrlmnr lmbr/sac w/img gdn (Left, 8/27/2018).    Social History:  reports that he has been smoking cigarettes and cigars. He has a 28 pack-year smoking history. He has never used smokeless tobacco. He reports current alcohol use. He reports current drug use. Drugs:  and Cocaine.    Family History:  (TESSALON) capsule 200 mg (200 mg Oral Given 8/24/24 1923)       Medical Decision Making/ED COURSE:    History From: Patient     Patient is a 54 y.o. male presenting to the ED for acute onset shortness of breath and cough, moderate in severity. In the ED, patient was mildly tachycardic on arrival stable blood pressures.  He was afebrile. Labs and imaging obtained. Differential diagnosis includes COPD exacerbation, pneumonia, pulmonary embolus, ACS. Patient administered IV fluids and 3 DuoNeb treatments.    I reviewed and interpreted labs. CBC and CMP was unremarkable with no acute findings. No leukocytosis or anemia. CMP showed normal electrolytes and baseline kidney function.  CPK mildly elevated at 472.  Cardiac enzymes stable x 2.  No acute ischemic changes on EKG.  ACS not suspected at this time.  Patient with cough and increased sputum production so clinically I suspect a COPD exacerbation versus pneumonia.  D-dimer negative.  PE not suspected.    Chest xray interpreted by me. Interpretation-lungs clear, no infiltrate. Radiologist confirms read.    Right hip xray interpreted by me. Interpretation-no fracture or dislocation. Radiologist confirms read.    Patient ambulated on pulse ox and desaturated to 86% after breathing treatments.  Return to bed and oxygen saturations improved.  Plan to admit the patient due to hypoxia.  Due to increased sputum production will give dose of oral doxycycline.  Hospitalist was consulted and will admit.      CONSULTS: (Who and What was discussed)  IP CONSULT TO PULMONOLOGY  Hospitalist    Social Determinants of Health : None    Chronic Conditions affecting care:    has a past medical history of Acid reflux, Asthma, Asthma, COPD (chronic obstructive pulmonary disease) (HCC), Hypertension, Pancreatitis, Prediabetes, Psychiatric problem, Sleep apnea, and Substance abuse (HCC).     Medical Decision Making  Problems Addressed:  Acute respiratory failure with hypoxia (HCC): acute

## 2024-08-24 NOTE — ED NOTES
Patient ambulated around unit for walking spO2. Dropped to 86% RA. Denies dizziness, but increased shortness of breath with ambulation. Returned to ED room 09, returned to 93% RA once resting in bed.

## 2024-08-24 NOTE — ED NOTES
ED to Inpatient Handoff Report    Notified 5th floor that electronic handoff available and patient ready for transport to room 530.    Safety Risks: None identified    Patient in Restraints: no    Constant Observer or Patient : no    Telemetry Monitoring Ordered: Yes          Order to transfer to unit without monitor: YES    Last MEWS: 1 Time completed: 1801    Deterioration Index: 20.13    Vitals:    08/24/24 1238 08/24/24 1509 08/24/24 1801   BP: 122/84 (!) 148/84 (!) 144/95   Pulse: (!) 105 91 95   Resp: 16 16 19   Temp: 98.2 °F (36.8 °C)  98.3 °F (36.8 °C)   TempSrc: Oral     SpO2: 100% 93% 94%   Weight: 111.1 kg (245 lb)         Opportunity for questions and clarification was provided.

## 2024-08-25 PROBLEM — E11.9 CONTROLLED TYPE 2 DIABETES MELLITUS WITHOUT COMPLICATION (HCC): Status: ACTIVE | Noted: 2024-08-25

## 2024-08-25 PROBLEM — B34.8 RHINOVIRUS INFECTION: Status: ACTIVE | Noted: 2024-08-25

## 2024-08-25 PROBLEM — J96.01 ACUTE RESPIRATORY FAILURE WITH HYPOXIA (HCC): Status: ACTIVE | Noted: 2024-08-25

## 2024-08-25 LAB
AMPHET UR QL SCN: NEGATIVE
BARBITURATES UR QL SCN: NEGATIVE
BENZODIAZ UR QL: NEGATIVE
BUPRENORPHINE UR QL: NEGATIVE
CANNABINOIDS UR QL SCN: NEGATIVE
COCAINE UR QL SCN: NEGATIVE
FENTANYL UR QL: NEGATIVE
GLUCOSE BLD-MCNC: 126 MG/DL (ref 74–99)
GLUCOSE BLD-MCNC: 175 MG/DL (ref 74–99)
GLUCOSE BLD-MCNC: 217 MG/DL (ref 74–99)
GLUCOSE BLD-MCNC: 217 MG/DL (ref 74–99)
METHADONE UR QL: NEGATIVE
OPIATES UR QL SCN: NEGATIVE
OXYCODONE UR QL SCN: NEGATIVE
PCP UR QL SCN: NEGATIVE
PROCALCITONIN SERPL-MCNC: <0.02 NG/ML (ref 0–0.08)
TEST INFORMATION: NORMAL

## 2024-08-25 PROCEDURE — 6360000002 HC RX W HCPCS: Performed by: INTERNAL MEDICINE

## 2024-08-25 PROCEDURE — G0378 HOSPITAL OBSERVATION PER HR: HCPCS

## 2024-08-25 PROCEDURE — 82962 GLUCOSE BLOOD TEST: CPT

## 2024-08-25 PROCEDURE — 6370000000 HC RX 637 (ALT 250 FOR IP): Performed by: INTERNAL MEDICINE

## 2024-08-25 PROCEDURE — 94640 AIRWAY INHALATION TREATMENT: CPT

## 2024-08-25 PROCEDURE — 2580000003 HC RX 258: Performed by: INTERNAL MEDICINE

## 2024-08-25 PROCEDURE — 96372 THER/PROPH/DIAG INJ SC/IM: CPT

## 2024-08-25 PROCEDURE — 99232 SBSQ HOSP IP/OBS MODERATE 35: CPT | Performed by: INTERNAL MEDICINE

## 2024-08-25 PROCEDURE — 84145 PROCALCITONIN (PCT): CPT

## 2024-08-25 PROCEDURE — 96376 TX/PRO/DX INJ SAME DRUG ADON: CPT

## 2024-08-25 RX ORDER — NAPROXEN 500 MG/1
500 TABLET ORAL DAILY PRN
Status: DISCONTINUED | OUTPATIENT
Start: 2024-08-25 | End: 2024-08-27 | Stop reason: HOSPADM

## 2024-08-25 RX ORDER — METHYLPREDNISOLONE SODIUM SUCCINATE 40 MG/ML
40 INJECTION, POWDER, LYOPHILIZED, FOR SOLUTION INTRAMUSCULAR; INTRAVENOUS DAILY
Status: DISCONTINUED | OUTPATIENT
Start: 2024-08-26 | End: 2024-08-25

## 2024-08-25 RX ORDER — BENZONATATE 100 MG/1
200 CAPSULE ORAL 3 TIMES DAILY
Status: DISCONTINUED | OUTPATIENT
Start: 2024-08-25 | End: 2024-08-27 | Stop reason: HOSPADM

## 2024-08-25 RX ORDER — FLUTICASONE PROPIONATE 50 MCG
1 SPRAY, SUSPENSION (ML) NASAL DAILY
Status: DISCONTINUED | OUTPATIENT
Start: 2024-08-25 | End: 2024-08-27 | Stop reason: HOSPADM

## 2024-08-25 RX ORDER — ARFORMOTEROL TARTRATE 15 UG/2ML
15 SOLUTION RESPIRATORY (INHALATION)
Status: DISCONTINUED | OUTPATIENT
Start: 2024-08-25 | End: 2024-08-27 | Stop reason: HOSPADM

## 2024-08-25 RX ORDER — BUDESONIDE 0.5 MG/2ML
0.5 INHALANT ORAL
Status: DISCONTINUED | OUTPATIENT
Start: 2024-08-25 | End: 2024-08-27 | Stop reason: HOSPADM

## 2024-08-25 RX ORDER — METHYLPREDNISOLONE SODIUM SUCCINATE 40 MG/ML
40 INJECTION, POWDER, LYOPHILIZED, FOR SOLUTION INTRAMUSCULAR; INTRAVENOUS EVERY 8 HOURS
Status: DISCONTINUED | OUTPATIENT
Start: 2024-08-25 | End: 2024-08-26

## 2024-08-25 RX ORDER — HYDROCODONE BITARTRATE AND HOMATROPINE METHYLBROMIDE ORAL SOLUTION 5; 1.5 MG/5ML; MG/5ML
5 LIQUID ORAL EVERY 6 HOURS PRN
Status: DISCONTINUED | OUTPATIENT
Start: 2024-08-25 | End: 2024-08-27 | Stop reason: HOSPADM

## 2024-08-25 RX ADMIN — TAMSULOSIN HYDROCHLORIDE 0.4 MG: 0.4 CAPSULE ORAL at 20:31

## 2024-08-25 RX ADMIN — TAMSULOSIN HYDROCHLORIDE 0.4 MG: 0.4 CAPSULE ORAL at 09:31

## 2024-08-25 RX ADMIN — IPRATROPIUM BROMIDE AND ALBUTEROL SULFATE 1 DOSE: 2.5; .5 SOLUTION RESPIRATORY (INHALATION) at 10:14

## 2024-08-25 RX ADMIN — IPRATROPIUM BROMIDE AND ALBUTEROL SULFATE 1 DOSE: 2.5; .5 SOLUTION RESPIRATORY (INHALATION) at 15:28

## 2024-08-25 RX ADMIN — SODIUM CHLORIDE, PRESERVATIVE FREE 10 ML: 5 INJECTION INTRAVENOUS at 09:31

## 2024-08-25 RX ADMIN — ARFORMOTEROL TARTRATE 15 MCG: 15 SOLUTION RESPIRATORY (INHALATION) at 13:02

## 2024-08-25 RX ADMIN — ATORVASTATIN CALCIUM 10 MG: 10 TABLET, FILM COATED ORAL at 20:31

## 2024-08-25 RX ADMIN — Medication 10.5 MG: at 22:03

## 2024-08-25 RX ADMIN — IPRATROPIUM BROMIDE AND ALBUTEROL SULFATE 1 DOSE: 2.5; .5 SOLUTION RESPIRATORY (INHALATION) at 19:56

## 2024-08-25 RX ADMIN — AMLODIPINE BESYLATE 10 MG: 10 TABLET ORAL at 09:30

## 2024-08-25 RX ADMIN — SODIUM CHLORIDE, PRESERVATIVE FREE 10 ML: 5 INJECTION INTRAVENOUS at 20:33

## 2024-08-25 RX ADMIN — Medication 5 ML: at 22:04

## 2024-08-25 RX ADMIN — INSULIN LISPRO 2 UNITS: 100 INJECTION, SOLUTION INTRAVENOUS; SUBCUTANEOUS at 06:00

## 2024-08-25 RX ADMIN — ACETAMINOPHEN 650 MG: 325 TABLET ORAL at 10:50

## 2024-08-25 RX ADMIN — ARFORMOTEROL TARTRATE 15 MCG: 15 SOLUTION RESPIRATORY (INHALATION) at 19:56

## 2024-08-25 RX ADMIN — SALINE NASAL SPRAY 1 SPRAY: 1.5 SOLUTION NASAL at 15:20

## 2024-08-25 RX ADMIN — ENOXAPARIN SODIUM 30 MG: 100 INJECTION SUBCUTANEOUS at 09:32

## 2024-08-25 RX ADMIN — DOXYCYCLINE HYCLATE 100 MG: 100 CAPSULE ORAL at 20:31

## 2024-08-25 RX ADMIN — DOXYCYCLINE HYCLATE 100 MG: 100 CAPSULE ORAL at 09:30

## 2024-08-25 RX ADMIN — ASPIRIN 81 MG: 81 TABLET, COATED ORAL at 09:30

## 2024-08-25 RX ADMIN — BUDESONIDE 500 MCG: 0.5 SUSPENSION RESPIRATORY (INHALATION) at 13:02

## 2024-08-25 RX ADMIN — FINASTERIDE 5 MG: 5 TABLET, FILM COATED ORAL at 09:31

## 2024-08-25 RX ADMIN — Medication 5 ML: at 15:16

## 2024-08-25 RX ADMIN — BENZONATATE 200 MG: 100 CAPSULE ORAL at 20:31

## 2024-08-25 RX ADMIN — BENZONATATE 100 MG: 100 CAPSULE ORAL at 01:51

## 2024-08-25 RX ADMIN — IPRATROPIUM BROMIDE AND ALBUTEROL SULFATE 1 DOSE: 2.5; .5 SOLUTION RESPIRATORY (INHALATION) at 05:19

## 2024-08-25 RX ADMIN — FLUTICASONE PROPIONATE 1 SPRAY: 50 SPRAY, METERED NASAL at 15:20

## 2024-08-25 RX ADMIN — POLYETHYLENE GLYCOL 3350 17 G: 17 POWDER, FOR SOLUTION ORAL at 15:35

## 2024-08-25 RX ADMIN — IPRATROPIUM BROMIDE AND ALBUTEROL SULFATE 1 DOSE: 2.5; .5 SOLUTION RESPIRATORY (INHALATION) at 13:02

## 2024-08-25 RX ADMIN — METHYLPREDNISOLONE SODIUM SUCCINATE 40 MG: 40 INJECTION, POWDER, LYOPHILIZED, FOR SOLUTION INTRAMUSCULAR; INTRAVENOUS at 20:31

## 2024-08-25 RX ADMIN — BUDESONIDE 500 MCG: 0.5 SUSPENSION RESPIRATORY (INHALATION) at 19:56

## 2024-08-25 RX ADMIN — METHYLPREDNISOLONE SODIUM SUCCINATE 40 MG: 40 INJECTION, POWDER, LYOPHILIZED, FOR SOLUTION INTRAMUSCULAR; INTRAVENOUS at 15:26

## 2024-08-25 RX ADMIN — NAPROXEN 500 MG: 500 TABLET ORAL at 11:43

## 2024-08-25 RX ADMIN — BENZONATATE 200 MG: 100 CAPSULE ORAL at 18:10

## 2024-08-25 RX ADMIN — BENZONATATE 100 MG: 100 CAPSULE ORAL at 10:50

## 2024-08-25 RX ADMIN — PETROLATUM: 420 OINTMENT TOPICAL at 15:20

## 2024-08-25 RX ADMIN — PANTOPRAZOLE SODIUM 40 MG: 40 TABLET, DELAYED RELEASE ORAL at 05:52

## 2024-08-25 RX ADMIN — METHYLPREDNISOLONE SODIUM SUCCINATE 40 MG: 40 INJECTION INTRAMUSCULAR; INTRAVENOUS at 01:51

## 2024-08-25 ASSESSMENT — PAIN DESCRIPTION - ORIENTATION
ORIENTATION: RIGHT
ORIENTATION: RIGHT

## 2024-08-25 ASSESSMENT — PAIN DESCRIPTION - LOCATION
LOCATION: HIP
LOCATION: HIP

## 2024-08-25 ASSESSMENT — PAIN SCALES - GENERAL
PAINLEVEL_OUTOF10: 7
PAINLEVEL_OUTOF10: 9

## 2024-08-25 ASSESSMENT — PAIN DESCRIPTION - DESCRIPTORS: DESCRIPTORS: ACHING;DISCOMFORT;SORE

## 2024-08-25 NOTE — PROGRESS NOTES
Ryan Mcghee was ordered Linzess 145 mcg which is a nonformulary medication.  This medication will need to be supplied by the patient as the pharmacy does not carry this non-formulary medication.    If the medication has not been administered by 1400 on the following day from the time the order was placed, a pharmacist will follow-up with the nurse of the patient to assess the capability of the patient to bring in the medication.    If it is determined that the patient cannot supply the medication and it is not available to be dispensed from the pharmacy, the provider will be notified.    Thank You, Wendy Troy Pharm.D 8/24/2024 8:09 PM

## 2024-08-25 NOTE — PROGRESS NOTES
4 Eyes Skin Assessment     NAME:  Ryan Mcghee  YOB: 1970  MEDICAL RECORD NUMBER:  [id number]    The patient is being assessed for  Admission    I agree that at least one RN has performed a thorough Head to Toe Skin Assessment on the patient. ALL assessment sites listed below have been assessed.      Areas assessed by both nurses:    Head, Face, Ears, Shoulders, Back, Chest, Arms, Elbows, Hands, Sacrum. Buttock, Coccyx, Ischium, Legs. Feet and Heels, and Under Medical Devices         Does the Patient have a Wound? No noted wound(s)       Jaycob Prevention initiated by RN: No  Wound Care Orders initiated by RN: No    Pressure Injury (Stage 3,4, Unstageable, DTI, NWPT, and Complex wounds) if present, place Wound referral order by RN under : No    New Ostomies, if present place, Ostomy referral order under : No     Nurse 1 eSignature: Electronically signed by Gloria Shine RN on 8/25/24 at 3:53 AM EDT    **SHARE this note so that the co-signing nurse can place an eSignature**    Nurse 2 eSignature: {Esignature:[id number]}

## 2024-08-25 NOTE — CONSULTS
Kvng Hernández M.D.  Jefry Weiner D.O.  Kristine Whitten M.D.  Grace Ocampo M.D.  Lino Davenport D.O.  Je Monreal M.D.       Patient:  Ryan Mcghee 54 y.o. male MRN: [id number]     Date of Service: 8/25/2024      PULMONARY CONSULTATION    Reason for Consultation: Possible COPD exac  Referring Physician: Tien Lobato DO    Chief Complaint   Patient presents with    Shortness of Breath     sob. 92% at pivot in wc. states worse with exertion.     Hip Pain     Right. States was just diagnosed with arthritis. But worsened pain at this time         Code Status: Full Code        SUBJECTIVE:    HPI:  This is a 54-year-old male with history of asthma/severe COPD, JANESSA on CPAP, tobacco use, history of substance abuse, GERD that presents with worsening shortness of breath and cough.    Tells me he got COVID about 1.5 months ago.  He then developed pneumonia and was on a course of amoxicillin.  Is been struggling for the past month with shortness of breath, cough.  Symptoms of gotten worse over the past couple days.  Having a lot of chest congestion, wheezing.  Complaining of nasal congestion.    Has CPAP, but not always good with wearing.    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    Past Medical History:   Diagnosis Date    Acid reflux     Asthma     Asthma     COPD (chronic obstructive pulmonary disease) (HCC)     Hypertension     Pancreatitis     Prediabetes     Psychiatric problem     depressed    Sleep apnea     Substance abuse (HCC)     alcohol and cocaine     Past Surgical History:   Procedure Laterality Date    COLONOSCOPY      2010 neg    NERVE BLOCK Left 08/27/2018    lumbar epidural #1 paramedian    ME NJX DX/THER SBST INTRLMNR LMBR/SAC W/IMG GDN Left 8/27/2018    LUMBAR EPIDURAL STEROID INJECTION L4-5 LEFT PARAMEDIAN #1 performed by Yordan Chacon MD at [facility] OR    WISDOM TOOTH EXTRACTION       Family History   Problem Relation Age of Onset    Other Sister     High Blood

## 2024-08-25 NOTE — PLAN OF CARE
Problem: Discharge Planning  Goal: Discharge to home or other facility with appropriate resources  8/24/2024 2231 by Gloria Shine, RN  Outcome: Progressing     Problem: Safety - Adult  Goal: Free from fall injury  Outcome: Progressing

## 2024-08-25 NOTE — PROGRESS NOTES
[facility] Hospitalist   Progress Note    Admitting Date and Time: 8/24/2024 12:38 PM  Admit Dx: COPD exacerbation (HCC) [J44.1]  Acute respiratory failure with hypoxia (HCC) [J96.01]  Chronic pain of right hip [M25.551, G89.29]    Subjective:    Patient was admitted with COPD exacerbation (HCC) [J44.1]  Acute respiratory failure with hypoxia (HCC) [J96.01]  Chronic pain of right hip [M25.551, G89.29]. Patient denies fever, chills, cp, sob, n/v.     nicotine  1 patch TransDERmal Daily    fluticasone  1 spray Each Nostril Daily    methylPREDNISolone  40 mg IntraVENous Q8H    budesonide  0.5 mg Nebulization BID RT    arformoterol tartrate  15 mcg Nebulization BID RT    benzonatate  200 mg Oral TID    ipratropium 0.5 mg-albuterol 2.5 mg  1 Dose Inhalation Q4H WA RT    doxycycline hyclate  100 mg Oral 2 times per day    amLODIPine  10 mg Oral Daily    aspirin  81 mg Oral Daily    atorvastatin  10 mg Oral Nightly    buPROPion  300 mg Oral QAM    finasteride  5 mg Oral Daily    linaclotide  145 mcg Oral QAM AC    insulin lispro  0-8 Units SubCUTAneous TID WC    insulin lispro  0-4 Units SubCUTAneous Nightly    pantoprazole  40 mg Oral Daily    tamsulosin  0.4 mg Oral BID    sodium chloride flush  5-40 mL IntraVENous 2 times per day    enoxaparin  30 mg SubCUTAneous BID     naproxen, 500 mg, Daily PRN  white petrolatum, , PRN  sodium chloride, 1 spray, Q2H PRN  HYDROcodone homatropine, 5 mL, Q6H PRN  ipratropium 0.5 mg-albuterol 2.5 mg, 1 Dose, Q4H PRN  melatonin, 10.5 mg, Nightly PRN  sodium chloride flush, 5-40 mL, PRN  sodium chloride, , PRN  ondansetron, 4 mg, Q8H PRN   Or  ondansetron, 4 mg, Q6H PRN  polyethylene glycol, 17 g, Daily PRN  acetaminophen, 650 mg, Q6H PRN   Or  acetaminophen, 650 mg, Q6H PRN         Objective:    BP (!) 149/99   Pulse 66   Temp 97.7 °F (36.5 °C) (Oral)   Resp 16   Wt 111.1 kg (245 lb)   SpO2 96%   BMI 36.18 kg/m²   Skin: warm and dry, no rash or  erythema  Pulmonary/Chest: clear to auscultation bilaterally- no wheezes, rales or rhonchi, normal air movement, no respiratory distress  Cardiovascular: rhythm reg at rate of 70  Abdomen: soft, non-tender, non-distended, normal bowel sounds, no masses or organomegaly  Extremities: no cyanosis, no clubbing, and no edema      Recent Labs     08/24/24  1344      K 3.7      CO2 31*   BUN 12   CREATININE 0.6*   GLUCOSE 97   CALCIUM 9.1       Recent Labs     08/24/24  1344   WBC 5.1   RBC 4.63   HGB 12.8   HCT 41.6   MCV 89.8   MCH 27.6   MCHC 30.8*   RDW 13.8      MPV 10.0            Radiology:   XR HIP RIGHT (2-3 VIEWS)   Final Result   No acute osseous findings.         XR CHEST PORTABLE   Final Result   No acute cardiopulmonary findings.             Assessment:    Principal Problem:    COPD exacerbation (HCC)  Resolved Problems:    * No resolved hospital problems. *      Plan:  Acute respiratory failure with hypoxia(m1cul40%)POA wean o2 as able. Pulm consulted  Copd exac nebs and wean steroids  Rhinovirus infection supportive care. Discussed with pt about risk for further infection with current illness.   Htn continue med  Dm type 2 controlled monitor bs and tx with insulin        Electronically signed by Tien Lobato DO on 8/25/2024 at 5:11 PM

## 2024-08-26 LAB
EKG ATRIAL RATE: 101 BPM
EKG P AXIS: 75 DEGREES
EKG P-R INTERVAL: 164 MS
EKG Q-T INTERVAL: 350 MS
EKG QRS DURATION: 104 MS
EKG QTC CALCULATION (BAZETT): 453 MS
EKG R AXIS: -55 DEGREES
EKG T AXIS: 70 DEGREES
EKG VENTRICULAR RATE: 101 BPM
GLUCOSE BLD-MCNC: 217 MG/DL (ref 74–99)
GLUCOSE BLD-MCNC: 238 MG/DL (ref 74–99)
GLUCOSE BLD-MCNC: 271 MG/DL (ref 74–99)
GLUCOSE BLD-MCNC: 286 MG/DL (ref 74–99)

## 2024-08-26 PROCEDURE — 6370000000 HC RX 637 (ALT 250 FOR IP): Performed by: INTERNAL MEDICINE

## 2024-08-26 PROCEDURE — 6360000002 HC RX W HCPCS: Performed by: INTERNAL MEDICINE

## 2024-08-26 PROCEDURE — 96372 THER/PROPH/DIAG INJ SC/IM: CPT

## 2024-08-26 PROCEDURE — 6370000000 HC RX 637 (ALT 250 FOR IP): Performed by: NURSE PRACTITIONER

## 2024-08-26 PROCEDURE — 99232 SBSQ HOSP IP/OBS MODERATE 35: CPT | Performed by: INTERNAL MEDICINE

## 2024-08-26 PROCEDURE — G0378 HOSPITAL OBSERVATION PER HR: HCPCS

## 2024-08-26 PROCEDURE — 6360000002 HC RX W HCPCS: Performed by: NURSE PRACTITIONER

## 2024-08-26 PROCEDURE — 96376 TX/PRO/DX INJ SAME DRUG ADON: CPT

## 2024-08-26 PROCEDURE — 94640 AIRWAY INHALATION TREATMENT: CPT

## 2024-08-26 PROCEDURE — 93010 ELECTROCARDIOGRAM REPORT: CPT | Performed by: INTERNAL MEDICINE

## 2024-08-26 PROCEDURE — 2580000003 HC RX 258: Performed by: INTERNAL MEDICINE

## 2024-08-26 PROCEDURE — 82962 GLUCOSE BLOOD TEST: CPT

## 2024-08-26 RX ORDER — GUAIFENESIN 400 MG/1
400 TABLET ORAL 4 TIMES DAILY PRN
Status: DISCONTINUED | OUTPATIENT
Start: 2024-08-26 | End: 2024-08-27 | Stop reason: HOSPADM

## 2024-08-26 RX ORDER — DOXYCYCLINE 100 MG/1
100 CAPSULE ORAL EVERY 12 HOURS SCHEDULED
Qty: 10 CAPSULE | Refills: 0 | Status: SHIPPED | OUTPATIENT
Start: 2024-08-26 | End: 2024-08-27

## 2024-08-26 RX ORDER — METHYLPREDNISOLONE SODIUM SUCCINATE 40 MG/ML
40 INJECTION, POWDER, LYOPHILIZED, FOR SOLUTION INTRAMUSCULAR; INTRAVENOUS EVERY 12 HOURS
Status: DISCONTINUED | OUTPATIENT
Start: 2024-08-26 | End: 2024-08-27 | Stop reason: HOSPADM

## 2024-08-26 RX ORDER — PREDNISONE 10 MG/1
TABLET ORAL
Qty: 30 TABLET | Refills: 0 | Status: SHIPPED | OUTPATIENT
Start: 2024-08-26 | End: 2024-08-27

## 2024-08-26 RX ORDER — FLUTICASONE PROPIONATE 50 MCG
1 SPRAY, SUSPENSION (ML) NASAL DAILY
Qty: 16 G | Refills: 3 | Status: SHIPPED | OUTPATIENT
Start: 2024-08-27 | End: 2024-08-27

## 2024-08-26 RX ORDER — GUAIFENESIN 400 MG/1
400 TABLET ORAL 4 TIMES DAILY PRN
Qty: 56 TABLET | Refills: 0 | Status: SHIPPED | OUTPATIENT
Start: 2024-08-26 | End: 2024-08-27

## 2024-08-26 RX ORDER — ZOLPIDEM TARTRATE 5 MG/1
5 TABLET ORAL NIGHTLY PRN
Status: DISCONTINUED | OUTPATIENT
Start: 2024-08-26 | End: 2024-08-27 | Stop reason: HOSPADM

## 2024-08-26 RX ADMIN — IPRATROPIUM BROMIDE AND ALBUTEROL SULFATE 1 DOSE: 2.5; .5 SOLUTION RESPIRATORY (INHALATION) at 01:16

## 2024-08-26 RX ADMIN — NAPROXEN 500 MG: 500 TABLET ORAL at 09:08

## 2024-08-26 RX ADMIN — Medication 5 ML: at 05:18

## 2024-08-26 RX ADMIN — FINASTERIDE 5 MG: 5 TABLET, FILM COATED ORAL at 09:08

## 2024-08-26 RX ADMIN — TAMSULOSIN HYDROCHLORIDE 0.4 MG: 0.4 CAPSULE ORAL at 09:08

## 2024-08-26 RX ADMIN — GUAIFENESIN 400 MG: 400 TABLET ORAL at 13:24

## 2024-08-26 RX ADMIN — POLYETHYLENE GLYCOL 3350 17 G: 17 POWDER, FOR SOLUTION ORAL at 22:09

## 2024-08-26 RX ADMIN — SODIUM CHLORIDE, PRESERVATIVE FREE 10 ML: 5 INJECTION INTRAVENOUS at 09:11

## 2024-08-26 RX ADMIN — BUPROPION HYDROCHLORIDE 300 MG: 300 TABLET, EXTENDED RELEASE ORAL at 09:07

## 2024-08-26 RX ADMIN — FLUTICASONE PROPIONATE 1 SPRAY: 50 SPRAY, METERED NASAL at 09:10

## 2024-08-26 RX ADMIN — IPRATROPIUM BROMIDE AND ALBUTEROL SULFATE 1 DOSE: 2.5; .5 SOLUTION RESPIRATORY (INHALATION) at 13:26

## 2024-08-26 RX ADMIN — AMLODIPINE BESYLATE 10 MG: 10 TABLET ORAL at 09:06

## 2024-08-26 RX ADMIN — INSULIN LISPRO 4 UNITS: 100 INJECTION, SOLUTION INTRAVENOUS; SUBCUTANEOUS at 11:30

## 2024-08-26 RX ADMIN — DOXYCYCLINE HYCLATE 100 MG: 100 CAPSULE ORAL at 21:50

## 2024-08-26 RX ADMIN — ARFORMOTEROL TARTRATE 15 MCG: 15 SOLUTION RESPIRATORY (INHALATION) at 22:03

## 2024-08-26 RX ADMIN — ARFORMOTEROL TARTRATE 15 MCG: 15 SOLUTION RESPIRATORY (INHALATION) at 06:28

## 2024-08-26 RX ADMIN — GUAIFENESIN 400 MG: 400 TABLET ORAL at 21:59

## 2024-08-26 RX ADMIN — BENZONATATE 200 MG: 100 CAPSULE ORAL at 09:07

## 2024-08-26 RX ADMIN — BENZONATATE 200 MG: 100 CAPSULE ORAL at 13:24

## 2024-08-26 RX ADMIN — INSULIN LISPRO 2 UNITS: 100 INJECTION, SOLUTION INTRAVENOUS; SUBCUTANEOUS at 16:32

## 2024-08-26 RX ADMIN — BUDESONIDE 500 MCG: 0.5 SUSPENSION RESPIRATORY (INHALATION) at 06:28

## 2024-08-26 RX ADMIN — ENOXAPARIN SODIUM 30 MG: 100 INJECTION SUBCUTANEOUS at 09:05

## 2024-08-26 RX ADMIN — ZOLPIDEM TARTRATE 5 MG: 5 TABLET ORAL at 22:00

## 2024-08-26 RX ADMIN — IPRATROPIUM BROMIDE AND ALBUTEROL SULFATE 1 DOSE: 2.5; .5 SOLUTION RESPIRATORY (INHALATION) at 22:04

## 2024-08-26 RX ADMIN — Medication 5 ML: at 21:49

## 2024-08-26 RX ADMIN — ACETAMINOPHEN 650 MG: 325 TABLET ORAL at 09:07

## 2024-08-26 RX ADMIN — ATORVASTATIN CALCIUM 10 MG: 10 TABLET, FILM COATED ORAL at 21:52

## 2024-08-26 RX ADMIN — METHYLPREDNISOLONE SODIUM SUCCINATE 40 MG: 40 INJECTION, POWDER, LYOPHILIZED, FOR SOLUTION INTRAMUSCULAR; INTRAVENOUS at 05:12

## 2024-08-26 RX ADMIN — INSULIN LISPRO 2 UNITS: 100 INJECTION, SOLUTION INTRAVENOUS; SUBCUTANEOUS at 06:38

## 2024-08-26 RX ADMIN — METHYLPREDNISOLONE SODIUM SUCCINATE 40 MG: 40 INJECTION INTRAMUSCULAR; INTRAVENOUS at 16:32

## 2024-08-26 RX ADMIN — PANTOPRAZOLE SODIUM 40 MG: 40 TABLET, DELAYED RELEASE ORAL at 05:13

## 2024-08-26 RX ADMIN — IPRATROPIUM BROMIDE AND ALBUTEROL SULFATE 1 DOSE: 2.5; .5 SOLUTION RESPIRATORY (INHALATION) at 06:28

## 2024-08-26 RX ADMIN — SODIUM CHLORIDE, PRESERVATIVE FREE 10 ML: 5 INJECTION INTRAVENOUS at 21:50

## 2024-08-26 RX ADMIN — ENOXAPARIN SODIUM 30 MG: 100 INJECTION SUBCUTANEOUS at 21:49

## 2024-08-26 RX ADMIN — TAMSULOSIN HYDROCHLORIDE 0.4 MG: 0.4 CAPSULE ORAL at 21:50

## 2024-08-26 RX ADMIN — ASPIRIN 81 MG: 81 TABLET, COATED ORAL at 09:07

## 2024-08-26 RX ADMIN — BUDESONIDE 500 MCG: 0.5 SUSPENSION RESPIRATORY (INHALATION) at 22:04

## 2024-08-26 RX ADMIN — BENZONATATE 200 MG: 100 CAPSULE ORAL at 21:50

## 2024-08-26 RX ADMIN — DOXYCYCLINE HYCLATE 100 MG: 100 CAPSULE ORAL at 09:07

## 2024-08-26 RX ADMIN — Medication 10.5 MG: at 21:49

## 2024-08-26 RX ADMIN — IPRATROPIUM BROMIDE AND ALBUTEROL SULFATE 1 DOSE: 2.5; .5 SOLUTION RESPIRATORY (INHALATION) at 16:12

## 2024-08-26 ASSESSMENT — PAIN SCALES - GENERAL
PAINLEVEL_OUTOF10: 5
PAINLEVEL_OUTOF10: 3
PAINLEVEL_OUTOF10: 0

## 2024-08-26 ASSESSMENT — PAIN DESCRIPTION - LOCATION: LOCATION: HIP

## 2024-08-26 ASSESSMENT — PAIN DESCRIPTION - DESCRIPTORS: DESCRIPTORS: DISCOMFORT

## 2024-08-26 ASSESSMENT — PAIN DESCRIPTION - ORIENTATION: ORIENTATION: RIGHT

## 2024-08-26 NOTE — PLAN OF CARE
Problem: Discharge Planning  Goal: Discharge to home or other facility with appropriate resources  8/26/2024 0128 by Edyta Amaya RN  Outcome: Progressing  Flowsheets (Taken 8/26/2024 0122)  Discharge to home or other facility with appropriate resources:   Identify barriers to discharge with patient and caregiver   Identify discharge learning needs (meds, wound care, etc)   Arrange for needed discharge resources and transportation as appropriate   Refer to discharge planning if patient needs post-hospital services based on physician order or complex needs related to functional status, cognitive ability or social support system  8/25/2024 1826 by Gerardo Alvarado RN  Outcome: Progressing     Problem: Pain  Goal: Verbalizes/displays adequate comfort level or baseline comfort level  8/26/2024 0128 by Edyta Amaya RN  Outcome: Progressing  8/25/2024 1826 by Gerardo Alvarado RN  Outcome: Progressing     Problem: Safety - Adult  Goal: Free from fall injury  8/26/2024 0128 by Edyta Amaya RN  Outcome: Progressing  8/25/2024 1826 by Gerardo Alvarado RN  Outcome: Progressing

## 2024-08-26 NOTE — PROGRESS NOTES
Kvng Hernández M.D.,[facility]  Jefry Weiner D.O., [place]., [facility]  Kristine Whitten M.D.  Grace Ocampo M.D.   Lino Davenport D.O.  Je Monreal M.D.         Daily Pulmonary Progress Note    Patient:  Ryan Mcghee 54 y.o. male MRN: [id number]            Synopsis     We are following patient for COPD exacerbation    \"CC\" shortness of breath    Code status: Full      Subjective      Patient was seen and examined.  Shortness of breath and cough improving, oxygen weaned off 96% on room air.  Hope to taper IV steroids soon.  Doxycycline for COPD exacerbation. He has breztri at home. His last PFT was last year. He still smokes but is trying to quit. He does not use PAP at home, not treated for JANESSA.      Review of Systems:  Constitutional: Denies fever, weight loss, night sweats, positive fatigue  Skin: Denies pigmentation, dark lesions, and rashes   HEENT: Denies hearing loss, tinnitus, ear drainage, epistaxis, sore throat, and hoarseness.  Cardiovascular: Denies palpitations, chest pain, and chest pressure.  Respiratory: Cough, dyspnea improving, oxygen weaned off.  Improved chest tightness and wheezing  Gastrointestinal: Denies nausea, vomiting, poor appetite, diarrhea, heartburn or reflux  Genitourinary: Denies dysuria, frequency, urgency or hematuria  Musculoskeletal: Denies myalgias, muscle weakness, and bone pain  Neurological: Denies dizziness, vertigo, headache, and focal weakness  Psychological: Denies anxiety and depression  Endocrine: Denies heat intolerance and cold intolerance  Hematopoietic/Lymphatic: Denies bleeding problems and blood transfusions    24-hour events:  None    Objective   OBJECTIVE:   /77   Pulse 75   Temp 97.7 °F (36.5 °C) (Oral)   Resp 18   Wt 111.1 kg (245 lb)   SpO2 96%   BMI 36.18 kg/m²   SpO2 Readings from Last 1 Encounters:   08/26/24 96%        I/O:    Intake/Output Summary (Last 24 hours) at 8/26/2024 1025  Last data filed at 8/26/2024 0008  Gross per 24  made as appropriate.    Ingrid Whitten MD

## 2024-08-26 NOTE — PLAN OF CARE
Problem: Pain  Goal: Verbalizes/displays adequate comfort level or baseline comfort level  8/26/2024 1050 by Margaret Dhaliwal RN  Outcome: Progressing  8/26/2024 0132 by Edyta Amaya RN  Outcome: Progressing  8/26/2024 0128 by Edyta Amaya RN  Outcome: Progressing     Problem: Safety - Adult  Goal: Free from fall injury  8/26/2024 1050 by Margaret Dhaliwal RN  Outcome: Progressing  8/26/2024 0132 by Edyta Amaya RN  Outcome: Progressing  8/26/2024 0128 by Edyta Amaya RN  Outcome: Progressing     Problem: Chronic Conditions and Co-morbidities  Goal: Patient's chronic conditions and co-morbidity symptoms are monitored and maintained or improved  Outcome: Progressing

## 2024-08-26 NOTE — CARE COORDINATION
Initial CM Assessment-met with patient at the bedside, introduced myself and CM role in care coordination. Patient is currently waiting for his new home to be ready to move into, has been residing at a group home . He is independent ADL's, uses oxygen as needed, has a nebulizer and oxygen concentrator from [facility]. Primary care provided by Dorita Wells, preferred pharmacy is [facility] in [place]. Patient declined having any needs at discharge, confirmed he has transportation home.       Darlene MARTINEZN, RN  [facility]

## 2024-08-26 NOTE — PROGRESS NOTES
[facility] Hospitalist   Progress Note    Admitting Date and Time: 8/24/2024 12:38 PM  Admit Dx: COPD exacerbation (HCC) [J44.1]  Acute respiratory failure with hypoxia (HCC) [J96.01]  Chronic pain of right hip [M25.551, G89.29]    Subjective:    Patient was admitted with COPD exacerbation (HCC) [J44.1]  Acute respiratory failure with hypoxia (HCC) [J96.01]  Chronic pain of right hip [M25.551, G89.29]. Patient denies fever, chills, cp, n/v. Pt with some sob.     methylPREDNISolone  40 mg IntraVENous Q12H    nicotine  1 patch TransDERmal Daily    fluticasone  1 spray Each Nostril Daily    budesonide  0.5 mg Nebulization BID RT    arformoterol tartrate  15 mcg Nebulization BID RT    benzonatate  200 mg Oral TID    ipratropium 0.5 mg-albuterol 2.5 mg  1 Dose Inhalation Q4H WA RT    doxycycline hyclate  100 mg Oral 2 times per day    amLODIPine  10 mg Oral Daily    aspirin  81 mg Oral Daily    atorvastatin  10 mg Oral Nightly    buPROPion  300 mg Oral QAM    finasteride  5 mg Oral Daily    linaclotide  145 mcg Oral QAM AC    insulin lispro  0-8 Units SubCUTAneous TID WC    insulin lispro  0-4 Units SubCUTAneous Nightly    pantoprazole  40 mg Oral Daily    tamsulosin  0.4 mg Oral BID    sodium chloride flush  5-40 mL IntraVENous 2 times per day    enoxaparin  30 mg SubCUTAneous BID     guaiFENesin, 400 mg, 4x Daily PRN  naproxen, 500 mg, Daily PRN  white petrolatum, , PRN  sodium chloride, 1 spray, Q2H PRN  HYDROcodone homatropine, 5 mL, Q6H PRN  ipratropium 0.5 mg-albuterol 2.5 mg, 1 Dose, Q4H PRN  melatonin, 10.5 mg, Nightly PRN  sodium chloride flush, 5-40 mL, PRN  sodium chloride, , PRN  ondansetron, 4 mg, Q8H PRN   Or  ondansetron, 4 mg, Q6H PRN  polyethylene glycol, 17 g, Daily PRN  acetaminophen, 650 mg, Q6H PRN   Or  acetaminophen, 650 mg, Q6H PRN         Objective:    /77   Pulse 75   Temp 97.7 °F (36.5 °C) (Oral)   Resp 18   Wt 111.1 kg (245 lb)   SpO2 96%   BMI 36.18 kg/m²   Skin:  warm and dry, no rash or erythema  Pulmonary/Chest: clear to auscultation bilaterally- no wheezes, rales or rhonchi, normal air movement, no respiratory distress  Cardiovascular: rhythm reg at rate of 76  Abdomen: soft, non-tender, non-distended, normal bowel sounds, no masses or organomegaly  Extremities: no cyanosis, no clubbing, and no edema      Recent Labs     08/24/24  1344      K 3.7      CO2 31*   BUN 12   CREATININE 0.6*   GLUCOSE 97   CALCIUM 9.1       Recent Labs     08/24/24  1344   WBC 5.1   RBC 4.63   HGB 12.8   HCT 41.6   MCV 89.8   MCH 27.6   MCHC 30.8*   RDW 13.8      MPV 10.0            Radiology:   XR HIP RIGHT (2-3 VIEWS)   Final Result   No acute osseous findings.         XR CHEST PORTABLE   Final Result   No acute cardiopulmonary findings.             Assessment:    Principal Problem:    COPD exacerbation (HCC)  Active Problems:    Rhinovirus infection    Controlled type 2 diabetes mellitus without complication (HCC)    Acute respiratory failure with hypoxia (HCC)  Resolved Problems:    * No resolved hospital problems. *      Plan:  Acute respiratory failure with hypoxia(d0mum64%)POA wean o2 as able. Pulm consulted  Copd exac nebs and wean steroids  Rhinovirus infection supportive care. Discussed with pt about risk for further infection with current illness.   Htn continue med  Dm type 2 controlled monitor bs and tx with insulin    D/w pulmonary. Hopeful discharge tomorrow.    Increase activity. Check labs tomorrow.     Electronically signed by Tien Lobato DO on 8/26/2024 at 5:24 PM

## 2024-08-26 NOTE — PLAN OF CARE
Problem: Discharge Planning  Goal: Discharge to home or other facility with appropriate resources  8/26/2024 0132 by Edyta Amaya RN  Outcome: Progressing  8/26/2024 0128 by Edyta Amaya RN  Outcome: Progressing  Flowsheets (Taken 8/26/2024 0122)  Discharge to home or other facility with appropriate resources:   Identify barriers to discharge with patient and caregiver   Identify discharge learning needs (meds, wound care, etc)   Arrange for needed discharge resources and transportation as appropriate   Refer to discharge planning if patient needs post-hospital services based on physician order or complex needs related to functional status, cognitive ability or social support system  8/25/2024 1826 by Gerardo Alvarado RN  Outcome: Progressing     Problem: Pain  Goal: Verbalizes/displays adequate comfort level or baseline comfort level  8/26/2024 0132 by Edyta Amaya RN  Outcome: Progressing  8/26/2024 0128 by Edyta Amaya RN  Outcome: Progressing  8/25/2024 1826 by Gerardo Alvarado RN  Outcome: Progressing     Problem: Safety - Adult  Goal: Free from fall injury  8/26/2024 0132 by Edyta Amaya RN  Outcome: Progressing  8/26/2024 0128 by Edyta Amaya RN  Outcome: Progressing  8/25/2024 1826 by Gerardo Alvarado RN  Outcome: Progressing

## 2024-08-27 VITALS
OXYGEN SATURATION: 92 % | TEMPERATURE: 97.3 F | SYSTOLIC BLOOD PRESSURE: 139 MMHG | WEIGHT: 245 LBS | HEART RATE: 84 BPM | DIASTOLIC BLOOD PRESSURE: 86 MMHG | RESPIRATION RATE: 18 BRPM | BODY MASS INDEX: 36.18 KG/M2

## 2024-08-27 LAB
ANION GAP SERPL CALCULATED.3IONS-SCNC: 10 MMOL/L (ref 7–16)
BASOPHILS # BLD: 0.01 K/UL (ref 0–0.2)
BASOPHILS NFR BLD: 0 % (ref 0–2)
BUN SERPL-MCNC: 14 MG/DL (ref 6–20)
CALCIUM SERPL-MCNC: 9.8 MG/DL (ref 8.6–10.2)
CHLORIDE SERPL-SCNC: 96 MMOL/L (ref 98–107)
CO2 SERPL-SCNC: 32 MMOL/L (ref 22–29)
CREAT SERPL-MCNC: 0.6 MG/DL (ref 0.7–1.2)
EOSINOPHIL # BLD: 0 K/UL (ref 0.05–0.5)
EOSINOPHILS RELATIVE PERCENT: 0 % (ref 0–6)
ERYTHROCYTE [DISTWIDTH] IN BLOOD BY AUTOMATED COUNT: 13.6 % (ref 11.5–15)
GFR, ESTIMATED: >90 ML/MIN/1.73M2
GLUCOSE BLD-MCNC: 187 MG/DL (ref 74–99)
GLUCOSE BLD-MCNC: 310 MG/DL (ref 74–99)
GLUCOSE SERPL-MCNC: 168 MG/DL (ref 74–99)
HCT VFR BLD AUTO: 45.5 % (ref 37–54)
HGB BLD-MCNC: 14 G/DL (ref 12.5–16.5)
IMM GRANULOCYTES # BLD AUTO: 0.08 K/UL (ref 0–0.58)
IMM GRANULOCYTES NFR BLD: 1 % (ref 0–5)
LYMPHOCYTES NFR BLD: 1.49 K/UL (ref 1.5–4)
LYMPHOCYTES RELATIVE PERCENT: 12 % (ref 20–42)
MCH RBC QN AUTO: 27.1 PG (ref 26–35)
MCHC RBC AUTO-ENTMCNC: 30.8 G/DL (ref 32–34.5)
MCV RBC AUTO: 88.2 FL (ref 80–99.9)
MONOCYTES NFR BLD: 0.58 K/UL (ref 0.1–0.95)
MONOCYTES NFR BLD: 5 % (ref 2–12)
NEUTROPHILS NFR BLD: 83 % (ref 43–80)
NEUTS SEG NFR BLD: 10.6 K/UL (ref 1.8–7.3)
PLATELET # BLD AUTO: 333 K/UL (ref 130–450)
PMV BLD AUTO: 10.4 FL (ref 7–12)
POTASSIUM SERPL-SCNC: 4.1 MMOL/L (ref 3.5–5)
RBC # BLD AUTO: 5.16 M/UL (ref 3.8–5.8)
SODIUM SERPL-SCNC: 138 MMOL/L (ref 132–146)
WBC OTHER # BLD: 12.8 K/UL (ref 4.5–11.5)

## 2024-08-27 PROCEDURE — 6360000002 HC RX W HCPCS: Performed by: INTERNAL MEDICINE

## 2024-08-27 PROCEDURE — 6370000000 HC RX 637 (ALT 250 FOR IP): Performed by: INTERNAL MEDICINE

## 2024-08-27 PROCEDURE — 85025 COMPLETE CBC W/AUTO DIFF WBC: CPT

## 2024-08-27 PROCEDURE — 6360000002 HC RX W HCPCS: Performed by: NURSE PRACTITIONER

## 2024-08-27 PROCEDURE — 2580000003 HC RX 258: Performed by: INTERNAL MEDICINE

## 2024-08-27 PROCEDURE — G0378 HOSPITAL OBSERVATION PER HR: HCPCS

## 2024-08-27 PROCEDURE — 96376 TX/PRO/DX INJ SAME DRUG ADON: CPT

## 2024-08-27 PROCEDURE — 96372 THER/PROPH/DIAG INJ SC/IM: CPT

## 2024-08-27 PROCEDURE — 80048 BASIC METABOLIC PNL TOTAL CA: CPT

## 2024-08-27 PROCEDURE — 99239 HOSP IP/OBS DSCHRG MGMT >30: CPT | Performed by: INTERNAL MEDICINE

## 2024-08-27 PROCEDURE — 94640 AIRWAY INHALATION TREATMENT: CPT

## 2024-08-27 PROCEDURE — 6370000000 HC RX 637 (ALT 250 FOR IP): Performed by: NURSE PRACTITIONER

## 2024-08-27 PROCEDURE — 82962 GLUCOSE BLOOD TEST: CPT

## 2024-08-27 PROCEDURE — 36415 COLL VENOUS BLD VENIPUNCTURE: CPT

## 2024-08-27 RX ORDER — DEXTROSE MONOHYDRATE 100 MG/ML
INJECTION, SOLUTION INTRAVENOUS CONTINUOUS PRN
Status: DISCONTINUED | OUTPATIENT
Start: 2024-08-27 | End: 2024-08-27 | Stop reason: HOSPADM

## 2024-08-27 RX ORDER — PREDNISONE 10 MG/1
TABLET ORAL
Qty: 30 TABLET | Refills: 0 | Status: SHIPPED | OUTPATIENT
Start: 2024-08-27

## 2024-08-27 RX ORDER — ACETAMINOPHEN 650 MG/1
650 SUPPOSITORY RECTAL EVERY 6 HOURS PRN
Status: DISCONTINUED | OUTPATIENT
Start: 2024-08-27 | End: 2024-08-27 | Stop reason: HOSPADM

## 2024-08-27 RX ORDER — GLUCAGON 1 MG/ML
1 KIT INJECTION PRN
Status: DISCONTINUED | OUTPATIENT
Start: 2024-08-27 | End: 2024-08-27 | Stop reason: HOSPADM

## 2024-08-27 RX ORDER — DOXYCYCLINE 100 MG/1
100 CAPSULE ORAL EVERY 12 HOURS SCHEDULED
Qty: 10 CAPSULE | Refills: 0 | Status: SHIPPED | OUTPATIENT
Start: 2024-08-27 | End: 2024-08-27

## 2024-08-27 RX ORDER — PREDNISONE 10 MG/1
TABLET ORAL
Qty: 30 TABLET | Refills: 0 | Status: SHIPPED | OUTPATIENT
Start: 2024-08-27 | End: 2024-08-27

## 2024-08-27 RX ORDER — ACETAMINOPHEN 325 MG/1
650 TABLET ORAL EVERY 6 HOURS PRN
Status: DISCONTINUED | OUTPATIENT
Start: 2024-08-27 | End: 2024-08-27 | Stop reason: HOSPADM

## 2024-08-27 RX ORDER — FLUTICASONE PROPIONATE 50 MCG
1 SPRAY, SUSPENSION (ML) NASAL DAILY
Qty: 16 G | Refills: 3 | Status: SHIPPED | OUTPATIENT
Start: 2024-08-27 | End: 2024-08-27

## 2024-08-27 RX ORDER — GUAIFENESIN 400 MG/1
400 TABLET ORAL 4 TIMES DAILY PRN
Qty: 56 TABLET | Refills: 0 | Status: SHIPPED | OUTPATIENT
Start: 2024-08-27 | End: 2024-08-27

## 2024-08-27 RX ORDER — DOXYCYCLINE 100 MG/1
100 CAPSULE ORAL EVERY 12 HOURS SCHEDULED
Qty: 10 CAPSULE | Refills: 0 | Status: SHIPPED | OUTPATIENT
Start: 2024-08-27 | End: 2024-09-01

## 2024-08-27 RX ORDER — FLUTICASONE PROPIONATE 50 MCG
1 SPRAY, SUSPENSION (ML) NASAL DAILY
Qty: 16 G | Refills: 3 | Status: SHIPPED | OUTPATIENT
Start: 2024-08-27

## 2024-08-27 RX ORDER — PREDNISONE 20 MG/1
40 TABLET ORAL
Status: DISCONTINUED | OUTPATIENT
Start: 2024-08-28 | End: 2024-08-27 | Stop reason: HOSPADM

## 2024-08-27 RX ORDER — GUAIFENESIN 400 MG/1
400 TABLET ORAL 4 TIMES DAILY PRN
Qty: 56 TABLET | Refills: 0 | Status: SHIPPED | OUTPATIENT
Start: 2024-08-27

## 2024-08-27 RX ADMIN — FLUTICASONE PROPIONATE 1 SPRAY: 50 SPRAY, METERED NASAL at 08:51

## 2024-08-27 RX ADMIN — BENZONATATE 200 MG: 100 CAPSULE ORAL at 08:50

## 2024-08-27 RX ADMIN — BUDESONIDE 500 MCG: 0.5 SUSPENSION RESPIRATORY (INHALATION) at 07:40

## 2024-08-27 RX ADMIN — IPRATROPIUM BROMIDE AND ALBUTEROL SULFATE 1 DOSE: 2.5; .5 SOLUTION RESPIRATORY (INHALATION) at 11:42

## 2024-08-27 RX ADMIN — SODIUM CHLORIDE, PRESERVATIVE FREE 10 ML: 5 INJECTION INTRAVENOUS at 08:52

## 2024-08-27 RX ADMIN — BUPROPION HYDROCHLORIDE 300 MG: 300 TABLET, EXTENDED RELEASE ORAL at 08:49

## 2024-08-27 RX ADMIN — PANTOPRAZOLE SODIUM 40 MG: 40 TABLET, DELAYED RELEASE ORAL at 05:28

## 2024-08-27 RX ADMIN — FINASTERIDE 5 MG: 5 TABLET, FILM COATED ORAL at 08:50

## 2024-08-27 RX ADMIN — METHYLPREDNISOLONE SODIUM SUCCINATE 40 MG: 40 INJECTION INTRAMUSCULAR; INTRAVENOUS at 05:28

## 2024-08-27 RX ADMIN — IPRATROPIUM BROMIDE AND ALBUTEROL SULFATE 1 DOSE: 2.5; .5 SOLUTION RESPIRATORY (INHALATION) at 07:40

## 2024-08-27 RX ADMIN — NAPROXEN 500 MG: 500 TABLET ORAL at 01:40

## 2024-08-27 RX ADMIN — INSULIN LISPRO 6 UNITS: 100 INJECTION, SOLUTION INTRAVENOUS; SUBCUTANEOUS at 11:37

## 2024-08-27 RX ADMIN — ASPIRIN 81 MG: 81 TABLET, COATED ORAL at 08:51

## 2024-08-27 RX ADMIN — AMLODIPINE BESYLATE 10 MG: 10 TABLET ORAL at 08:50

## 2024-08-27 RX ADMIN — TAMSULOSIN HYDROCHLORIDE 0.4 MG: 0.4 CAPSULE ORAL at 08:50

## 2024-08-27 RX ADMIN — ENOXAPARIN SODIUM 30 MG: 100 INJECTION SUBCUTANEOUS at 08:49

## 2024-08-27 RX ADMIN — ARFORMOTEROL TARTRATE 15 MCG: 15 SOLUTION RESPIRATORY (INHALATION) at 07:40

## 2024-08-27 RX ADMIN — Medication 5 ML: at 11:36

## 2024-08-27 RX ADMIN — GUAIFENESIN 400 MG: 400 TABLET ORAL at 08:51

## 2024-08-27 RX ADMIN — DOXYCYCLINE HYCLATE 100 MG: 100 CAPSULE ORAL at 08:50

## 2024-08-27 ASSESSMENT — PAIN DESCRIPTION - LOCATION
LOCATION: GENERALIZED
LOCATION: GENERALIZED

## 2024-08-27 ASSESSMENT — PAIN - FUNCTIONAL ASSESSMENT: PAIN_FUNCTIONAL_ASSESSMENT: ACTIVITIES ARE NOT PREVENTED

## 2024-08-27 ASSESSMENT — PAIN SCALES - GENERAL
PAINLEVEL_OUTOF10: 3
PAINLEVEL_OUTOF10: 3

## 2024-08-27 ASSESSMENT — PAIN DESCRIPTION - ORIENTATION: ORIENTATION: MID

## 2024-08-27 ASSESSMENT — PAIN DESCRIPTION - DESCRIPTORS: DESCRIPTORS: ACHING

## 2024-08-27 NOTE — PROGRESS NOTES
Pt transportation established through insurance - awaiting  - ETA 15 min    Electronically signed by Gloria Martinez RN on 8/27/2024 at 4:41 PM

## 2024-08-27 NOTE — PLAN OF CARE
Problem: Pain  Goal: Verbalizes/displays adequate comfort level or baseline comfort level  8/27/2024 0944 by Margaret Dhaliwal RN  Outcome: Progressing  8/27/2024 0650 by Lula Vanessa RN  Outcome: Progressing     Problem: Safety - Adult  Goal: Free from fall injury  8/27/2024 0944 by Margaret Dhaliwal RN  Outcome: Progressing  8/27/2024 0650 by Lula Vanessa RN  Outcome: Progressing     Problem: Chronic Conditions and Co-morbidities  Goal: Patient's chronic conditions and co-morbidity symptoms are monitored and maintained or improved  8/27/2024 0944 by Margaret Dhaliwal RN  Outcome: Progressing  8/27/2024 0650 by Lula Vanessa RN  Outcome: Progressing

## 2024-08-27 NOTE — CARE COORDINATION
Transition of Care-Patient remains on Solu-Medrol Q12, taper to oral per Pulm, home oxygen, wears PRN-from [facility]. Discharge plan is home, no anticipated needs.     Darlene CISNEROS, RN  [facility]

## 2024-08-27 NOTE — DISCHARGE SUMMARY
[facility] Hospitalist       Hospitalist Physician Discharge Summary       Delmis Mcnair MD  [address] [address] [phone]    Call in 1 week(s)        Activity level: as deng    Diet: ADULT DIET; Regular    Dispo:home    Condition at discharge: fair         Patient ID:  Ryan Mcghee  [id number]  54 y.o.  1970    Admit date: 8/24/2024    Discharge date and time:  8/27/2024  5:49 PM    Admission Diagnoses: Principal Problem:    COPD exacerbation (HCC)  Active Problems:    Rhinovirus infection    Controlled type 2 diabetes mellitus without complication (HCC)    Acute respiratory failure with hypoxia (HCC)  Resolved Problems:    * No resolved hospital problems. *      Discharge Diagnoses: Principal Problem:    COPD exacerbation (HCC)  Active Problems:    Rhinovirus infection    Controlled type 2 diabetes mellitus without complication (HCC)    Acute respiratory failure with hypoxia (HCC)  Resolved Problems:    * No resolved hospital problems. *    Acute respiratory failure with hypoxia  Copd exac  Rhinovirus infection  Htn  Dm type 2 controlled      Consults:  IP CONSULT TO PULMONOLOGY  IP CONSULT TO SPIRITUAL SERVICES    Procedures: none    Hospital Course: Patient was admitted with COPD exacerbation (HCC) [J44.1]  Acute respiratory failure with hypoxia (HCC) [J96.01]  Chronic pain of right hip [M25.551, G89.29]. Patient is a 54 year old male with past medical history of asthma, COPD, HTN, prediabetes, depression, JANESSA, polysubstance abuse who presents to ER with ongoing shortness of breath following COVID one month ago. He went to urgent care and was given a Z-pack without relief and then returned and was given a steroid shot last week; but still feels short of breath with persistent yellow sputum. He is also complaining of right hip pain and states he was diagnosed arthritis. Lab workup: CO2 31, , Troponin 16 < 15, imaging negative. Patient was given Doxycycline, Duoneb,  Magnesium, Solu-medrol, Naproxen and 500 mL bolus in Er.     Pt seen and examined by pulm. Pt improved on nebs and steroids. On day of discharge, pt denied fevers, chills,n/v. Discharge planning d/w pt time given for questions and all questions answered. Encouraged pt to f/u with his doctors to address chronic conditions.       Discharge Exam:  Vitals:    08/26/24 0733 08/26/24 0906 08/26/24 2045 08/27/24 0815   BP: (!) 147/104 137/77 (!) 142/85 139/86   Pulse: 75  78 84   Resp: 18  17 18   Temp: 97.7 °F (36.5 °C)  98.7 °F (37.1 °C) 97.3 °F (36.3 °C)   TempSrc: Oral  Oral Oral   SpO2: 96%  94% 92%   Weight:           Skin: warm and dry, no rash or erythema  Pulmonary/Chest: clear to auscultation bilaterally- no wheezes, rales or rhonchi, normal air movement, no respiratory distress  Cardiovascular: rhythm reg at rate of 76  Abdomen: soft, non-tender, non-distended, normal bowel sounds, no masses or organomegaly  Extremities: no cyanosis, no clubbing, and no edema  I/O last 3 completed shifts:  In: 490 [P.O.:480; I.V.:10]  Out: -   I/O this shift:  In: 480 [P.O.:480]  Out: -       LABS:  Recent Labs     08/27/24  0718      K 4.1   CL 96*   CO2 32*   BUN 14   CREATININE 0.6*   GLUCOSE 168*   CALCIUM 9.8       Recent Labs     08/27/24  0718   WBC 12.8*   RBC 5.16   HGB 14.0   HCT 45.5   MCV 88.2   MCH 27.1   MCHC 30.8*   RDW 13.6      MPV 10.4       Recent Labs     08/26/24  1631 08/26/24  2141 08/27/24  0526 08/27/24  1134   POCGLU 217* 286* 187* 310*       CBC with Differential:    Lab Results   Component Value Date/Time    WBC 12.8 08/27/2024 07:18 AM    RBC 5.16 08/27/2024 07:18 AM    HGB 14.0 08/27/2024 07:18 AM    HCT 45.5 08/27/2024 07:18 AM     08/27/2024 07:18 AM    MCV 88.2 08/27/2024 07:18 AM    MCH 27.1 08/27/2024 07:18 AM    MCHC 30.8 08/27/2024 07:18 AM    RDW 13.6 08/27/2024 07:18 AM    NRBC 0.9 04/22/2023 06:23 AM    BANDSPCT 1 09/13/2014 07:00 AM    METASPCT 0.8 04/22/2023 06:23 AM

## 2024-08-27 NOTE — PROGRESS NOTES
Neurologic: Mental status: Alert and Oriented X3 .    Pertinent/ New Labs and Imaging Studies     Imaging personally reviewed:    CXR - no acute process            Echo:    Left Ventricle: Normal left ventricular systolic function with a visually estimated EF of 60 - 65%. Left ventricle size is normal. Mild basal septal thickening. Normal wall motion. Normal diastolic function.    Right Ventricle: Right ventricle is mildly dilated. Normal systolic function. TAPSE is 2.5 cm.    Right Atrium: Right atrium is mildly dilated.     PFTs 2018  DATA: Spirometry done in the office today demonstrates an FVC of 2.69 liters which is 66 % of predicted with an FEV1 of  1.52 liters which is 47 % of predicted.  FEV1/FVC ratio is57. Mid expiratory flow rates are 20% of predicted.  Maximum voluntary ventilation is decreased at 66 liters per minute or 44% of predicted. Total lung capacity is 6.49 liters which is 96% of predicted. DLCO is 26.43mm/min/mmHg which is 85% of predicted. Flow volume loop shows no signs of intrathoracic or extrathoracic obstruction.      Impressions: severe obstruction. Good bronchodilator response. Normal total lung capacity and diffusing capacity.     Labs:  Lab Results   Component Value Date/Time    WBC 12.8 08/27/2024 07:18 AM    RBC 5.16 08/27/2024 07:18 AM    HGB 14.0 08/27/2024 07:18 AM    HCT 45.5 08/27/2024 07:18 AM    MCV 88.2 08/27/2024 07:18 AM    MCH 27.1 08/27/2024 07:18 AM    MCHC 30.8 08/27/2024 07:18 AM    RDW 13.6 08/27/2024 07:18 AM     08/27/2024 07:18 AM    MPV 10.4 08/27/2024 07:18 AM     Lab Results   Component Value Date/Time     08/27/2024 07:18 AM    K 4.1 08/27/2024 07:18 AM    K 3.8 04/21/2023 02:03 PM    CL 96 08/27/2024 07:18 AM    CO2 32 08/27/2024 07:18 AM    BUN 14 08/27/2024 07:18 AM    CREATININE 0.6 08/27/2024 07:18 AM    CALCIUM 9.8 08/27/2024 07:18 AM    GFRAA >60 06/24/2022 06:21 AM    LABGLOM >90 08/27/2024 07:18 AM    LABGLOM >60 01/10/2024 04:57 AM      Lab Results   Component Value Date/Time    PROTIME 12.0 11/14/2020 05:24 AM    INR 1.1 11/14/2020 05:24 AM     Recent Labs     08/24/24  1344   PROBNP 54     No results for input(s): \"TROPONINI\" in the last 72 hours.  Recent Labs     08/25/24  0345   PROCAL <0.02     This SmartLink has not been configured with any valid records.       Micro:  No results for input(s): \"CULTRESP\" in the last 72 hours.  No results for input(s): \"LABGRAM\" in the last 72 hours.  No results for input(s): \"LEGUR\" in the last 72 hours.  No results for input(s): \"STREPNEUMAGU\" in the last 72 hours.  No results for input(s): \"LP1UAG\" in the last 72 hours.     Assessment:    Acute exacerbation of asthma/COPD.    Severe Gold stage III COPD with asthma overlap.  Acute bronchitis with rhinovirus infection  Sinus congestion  Recent COVID-19 infection 1.5 months ago  JANESSA, not compliant with CPAP  Tobacco use 1.5 pack/day  Obesity BMI 36  Medical noncompliance  History of substance use  Hx cocaine use pneumonitis require mechanical ventilation 6/2020    Plan:   Oxygen weaned off keep SpO2 greater than 92%  Prednisone taper for dc  Scheduled bronchodilators-Brovana budesonide twice daily, DuoNebs every 4 hours while awake.  May resume Breztri for discharge.  Patient has home nebulizer machine.  Antitussives-guaifenesin as needed, Tessalon 3 times daily  Doxycycline for COPD exacerbation x 5 days  Flonase daily, nasal saline spray  DVT, GI prophylaxis  Supportive care for rhinovirus infection  Tobacco cessation-NicoDerm patch  Follow with local pulmonary Dr Delmis Mcnair upon dc.   This plan of care was reviewed in collaboration with Dr. Whitten    Electronically signed by MIKE Lindquist - CNP on 8/27/2024 at 1:32 PM

## 2024-08-27 NOTE — PROGRESS NOTES
[facility] Med Surg/Tele  [address] [address]  Phone: [phone]             August 27, 2024    Patient: Ryan Mcghee   YOB: 1970   Date of Visit: 8/24/2024       To Whom It May Concern:    Ryan Mcghee was seen and treated in our facility beginning 8/24/2024 until 8/27/2024.      Sincerely,       Gloria Martinez RN         Signature:__________________________________

## 2024-08-27 NOTE — PROGRESS NOTES
Spiritual Health Assessment/Progress Note  [facility]    Initial Encounter, Spiritual/Emotional Needs,  ,  ,      Name: Ryan Mcghee MRN: [id number]    Age: 54 y.o.     Sex: male   Language: English   Gnosticism: Baptism   COPD exacerbation (HCC)     Date: 8/27/2024                           Spiritual Assessment began in [facility] MED SURG/TELE        Referral/Consult From: Nurse, Patient   Encounter Overview/Reason: Initial Encounter, Spiritual/Emotional Needs  Service Provided For: Patient    Maggy, Belief, Meaning:   Patient identifies as spiritual, is connected with a maggy tradition or spiritual practice, and has beliefs or practices that help with coping during difficult times  Family/Friends No family/friends present      Importance and Influence:  Patient has spiritual/personal beliefs that influence decisions regarding their health  Family/Friends no family/friends present    Community:  Patient is connected with a spiritual community  Family/Friends Other: N/A    Assessment and Plan of Care:     Patient Interventions include: Facilitated expression of thoughts and feelings, Explored spiritual coping/struggle/distress and theological reflection, and Affirmed coping skills/support systems  Family/Friends Interventions include: Other: N/A    Patient Plan of Care: Spiritual Care available upon further referral  Family/Friends Plan of Care: Other: N/A    Electronically signed by Chaplain Caprice on 8/27/2024 at 10:49 AM

## 2024-10-07 ENCOUNTER — APPOINTMENT (OUTPATIENT)
Dept: GENERAL RADIOLOGY | Age: 54
End: 2024-10-07
Payer: MEDICARE

## 2024-10-07 ENCOUNTER — HOSPITAL ENCOUNTER (INPATIENT)
Age: 54
LOS: 4 days | Discharge: HOME | End: 2024-10-11
Attending: EMERGENCY MEDICINE | Admitting: INTERNAL MEDICINE
Payer: MEDICARE

## 2024-10-07 DIAGNOSIS — J96.01 ACUTE RESPIRATORY FAILURE WITH HYPOXIA AND HYPERCAPNIA: ICD-10-CM

## 2024-10-07 DIAGNOSIS — J96.02 ACUTE RESPIRATORY FAILURE WITH HYPOXIA AND HYPERCAPNIA: ICD-10-CM

## 2024-10-07 DIAGNOSIS — J44.1 COPD EXACERBATION (HCC): Primary | ICD-10-CM

## 2024-10-07 LAB
AADO2: 246.7 MMHG
AADO2: 261.9 MMHG
AADO2: 8 MMHG
ALBUMIN SERPL-MCNC: 4.2 G/DL (ref 3.5–5.2)
ALP SERPL-CCNC: 96 U/L (ref 40–129)
ALT SERPL-CCNC: 46 U/L (ref 0–40)
AMPHET UR QL SCN: NEGATIVE
ANION GAP SERPL CALCULATED.3IONS-SCNC: 14 MMOL/L (ref 7–16)
APAP SERPL-MCNC: <5 UG/ML (ref 10–30)
AST SERPL-CCNC: 60 U/L (ref 0–39)
B.E.: 1.2 MMOL/L (ref -3–3)
B.E.: 2 MMOL/L (ref -3–3)
B.E.: 4 MMOL/L (ref -3–3)
B.E.: 4.6 MMOL/L (ref -3–3)
BARBITURATES UR QL SCN: NEGATIVE
BASOPHILS # BLD: 0.04 K/UL (ref 0–0.2)
BASOPHILS NFR BLD: 0 % (ref 0–2)
BENZODIAZ UR QL: NEGATIVE
BILIRUB SERPL-MCNC: 0.7 MG/DL (ref 0–1.2)
BNP SERPL-MCNC: 114 PG/ML (ref 0–125)
BUN SERPL-MCNC: 11 MG/DL (ref 6–20)
BUPRENORPHINE UR QL: NEGATIVE
CALCIUM SERPL-MCNC: 9.2 MG/DL (ref 8.6–10.2)
CANNABINOIDS UR QL SCN: NEGATIVE
CHLORIDE SERPL-SCNC: 90 MMOL/L (ref 98–107)
CO2 SERPL-SCNC: 31 MMOL/L (ref 22–29)
COCAINE UR QL SCN: POSITIVE
COHB: 2.2 % (ref 0–1.5)
COHB: 5.2 % (ref 0–1.5)
COHB: 6.4 % (ref 0–1.5)
COHB: 7.6 % (ref 0–1.5)
COMMENT: ABNORMAL
CREAT SERPL-MCNC: 0.7 MG/DL (ref 0.7–1.2)
CRITICAL: ABNORMAL
D-DIMER QUANTITATIVE: <200 NG/ML DDU (ref 0–230)
DATE ANALYZED: ABNORMAL
DATE OF COLLECTION: ABNORMAL
EOSINOPHIL # BLD: 0.11 K/UL (ref 0.05–0.5)
EOSINOPHILS RELATIVE PERCENT: 1 % (ref 0–6)
ERYTHROCYTE [DISTWIDTH] IN BLOOD BY AUTOMATED COUNT: 14.2 % (ref 11.5–15)
ETHANOLAMINE SERPL-MCNC: <10 MG/DL (ref 0–0.08)
FENTANYL UR QL: POSITIVE
FIO2: 28 %
FIO2: 70 %
FIO2: 90 %
FLUAV RNA RESP QL NAA+PROBE: NOT DETECTED
FLUBV RNA RESP QL NAA+PROBE: NOT DETECTED
GFR, ESTIMATED: >90 ML/MIN/1.73M2
GLUCOSE BLD-MCNC: 208 MG/DL (ref 74–99)
GLUCOSE BLD-MCNC: 335 MG/DL (ref 74–99)
GLUCOSE SERPL-MCNC: 120 MG/DL (ref 74–99)
HCO3: 28.6 MMOL/L (ref 22–26)
HCO3: 30.6 MMOL/L (ref 22–26)
HCO3: 33.2 MMOL/L (ref 22–26)
HCO3: 34 MMOL/L (ref 22–26)
HCT VFR BLD AUTO: 45.2 % (ref 37–54)
HGB BLD-MCNC: 13.8 G/DL (ref 12.5–16.5)
HHB: 0.5 % (ref 0–5)
HHB: 1.1 % (ref 0–5)
HHB: 2 % (ref 0–5)
HHB: 8 % (ref 0–5)
IMM GRANULOCYTES # BLD AUTO: 0.06 K/UL (ref 0–0.58)
IMM GRANULOCYTES NFR BLD: 1 % (ref 0–5)
LAB: ABNORMAL
LACTATE BLDV-SCNC: 1.5 MMOL/L (ref 0.5–1.9)
LYMPHOCYTES NFR BLD: 1.56 K/UL (ref 1.5–4)
LYMPHOCYTES RELATIVE PERCENT: 16 % (ref 20–42)
Lab: 1605
Lab: 627
Lab: 742
Lab: 859
MCH RBC QN AUTO: 26.6 PG (ref 26–35)
MCHC RBC AUTO-ENTMCNC: 30.5 G/DL (ref 32–34.5)
MCV RBC AUTO: 87.1 FL (ref 80–99.9)
METHADONE UR QL: NEGATIVE
METHB: 0.2 % (ref 0–1.5)
METHB: 0.4 % (ref 0–1.5)
METHB: 0.5 % (ref 0–1.5)
METHB: 0.6 % (ref 0–1.5)
MODE: ABNORMAL
MONOCYTES NFR BLD: 1.15 K/UL (ref 0.1–0.95)
MONOCYTES NFR BLD: 12 % (ref 2–12)
NEUTROPHILS NFR BLD: 69 % (ref 43–80)
NEUTS SEG NFR BLD: 6.62 K/UL (ref 1.8–7.3)
O2 SATURATION: 91.3 % (ref 92–98.5)
O2 SATURATION: 97.9 % (ref 92–98.5)
O2 SATURATION: 98.8 % (ref 92–98.5)
O2 SATURATION: 99.5 % (ref 92–98.5)
O2HB: 84.2 % (ref 94–97)
O2HB: 92.7 % (ref 94–97)
O2HB: 93.2 % (ref 94–97)
O2HB: 95.2 % (ref 94–97)
OPERATOR ID: 1023
OPERATOR ID: 1023
OPERATOR ID: 1661
OPERATOR ID: ABNORMAL
OPIATES UR QL SCN: NEGATIVE
OXYCODONE UR QL SCN: NEGATIVE
PATIENT TEMP: 37 C
PCO2: 52.5 MMHG (ref 35–45)
PCO2: 67.5 MMHG (ref 35–45)
PCO2: 70.7 MMHG (ref 35–45)
PCO2: 77.7 MMHG (ref 35–45)
PCP UR QL SCN: NEGATIVE
PEEP/CPAP: 8 CMH2O
PFO2: 2.27 MMHG/%
PFO2: 3.09 MMHG/%
PFO2: 4 MMHG/%
PH BLOOD GAS: 7.25 (ref 7.35–7.45)
PH BLOOD GAS: 7.26 (ref 7.35–7.45)
PH BLOOD GAS: 7.31 (ref 7.35–7.45)
PH BLOOD GAS: 7.35 (ref 7.35–7.45)
PLATELET # BLD AUTO: 351 K/UL (ref 130–450)
PMV BLD AUTO: 9.6 FL (ref 7–12)
PO2: 112 MMHG (ref 75–100)
PO2: 158.9 MMHG (ref 75–100)
PO2: 277.7 MMHG (ref 75–100)
PO2: 64.1 MMHG (ref 75–100)
POTASSIUM SERPL-SCNC: 3.8 MMOL/L (ref 3.5–5)
PROT SERPL-MCNC: 7.4 G/DL (ref 6.4–8.3)
PS: 18 CMH20
RBC # BLD AUTO: 5.19 M/UL (ref 3.8–5.8)
RI(T): 0.07
RI(T): 0.94
RI(T): 1.55
RR MECHANICAL: 20 B/MIN
RR MECHANICAL: 20 B/MIN
SALICYLATES SERPL-MCNC: <0.3 MG/DL (ref 0–30)
SARS-COV-2 RNA RESP QL NAA+PROBE: NOT DETECTED
SODIUM SERPL-SCNC: 135 MMOL/L (ref 132–146)
SOURCE, BLOOD GAS: ABNORMAL
SOURCE: NORMAL
SPECIMEN DESCRIPTION: NORMAL
TEST INFORMATION: ABNORMAL
THB: 14.7 G/DL (ref 11.5–16.5)
THB: 14.7 G/DL (ref 11.5–16.5)
THB: 14.9 G/DL (ref 11.5–16.5)
THB: 15.2 G/DL (ref 11.5–16.5)
TIME ANALYZED: 1609
TIME ANALYZED: 630
TIME ANALYZED: 751
TIME ANALYZED: 901
TOXIC TRICYCLIC SC,BLOOD: NEGATIVE
TROPONIN I SERPL HS-MCNC: 14 NG/L (ref 0–11)
TROPONIN I SERPL HS-MCNC: 15 NG/L (ref 0–11)
VT MECHANICAL: 500 ML
VT MECHANICAL: 550 ML
WBC OTHER # BLD: 9.5 K/UL (ref 4.5–11.5)

## 2024-10-07 PROCEDURE — 84484 ASSAY OF TROPONIN QUANT: CPT

## 2024-10-07 PROCEDURE — 94640 AIRWAY INHALATION TREATMENT: CPT

## 2024-10-07 PROCEDURE — G0480 DRUG TEST DEF 1-7 CLASSES: HCPCS

## 2024-10-07 PROCEDURE — 99223 1ST HOSP IP/OBS HIGH 75: CPT | Performed by: INTERNAL MEDICINE

## 2024-10-07 PROCEDURE — 82962 GLUCOSE BLOOD TEST: CPT

## 2024-10-07 PROCEDURE — 2580000003 HC RX 258: Performed by: INTERNAL MEDICINE

## 2024-10-07 PROCEDURE — 93005 ELECTROCARDIOGRAM TRACING: CPT | Performed by: EMERGENCY MEDICINE

## 2024-10-07 PROCEDURE — 5A09457 ASSISTANCE WITH RESPIRATORY VENTILATION, 24-96 CONSECUTIVE HOURS, CONTINUOUS POSITIVE AIRWAY PRESSURE: ICD-10-PCS | Performed by: INTERNAL MEDICINE

## 2024-10-07 PROCEDURE — 80307 DRUG TEST PRSMV CHEM ANLYZR: CPT

## 2024-10-07 PROCEDURE — 99285 EMERGENCY DEPT VISIT HI MDM: CPT

## 2024-10-07 PROCEDURE — 71045 X-RAY EXAM CHEST 1 VIEW: CPT

## 2024-10-07 PROCEDURE — 82805 BLOOD GASES W/O2 SATURATION: CPT

## 2024-10-07 PROCEDURE — 85025 COMPLETE CBC W/AUTO DIFF WBC: CPT

## 2024-10-07 PROCEDURE — 6360000002 HC RX W HCPCS

## 2024-10-07 PROCEDURE — 80053 COMPREHEN METABOLIC PANEL: CPT

## 2024-10-07 PROCEDURE — 83605 ASSAY OF LACTIC ACID: CPT

## 2024-10-07 PROCEDURE — 6360000002 HC RX W HCPCS: Performed by: INTERNAL MEDICINE

## 2024-10-07 PROCEDURE — 6370000000 HC RX 637 (ALT 250 FOR IP): Performed by: EMERGENCY MEDICINE

## 2024-10-07 PROCEDURE — 94660 CPAP INITIATION&MGMT: CPT

## 2024-10-07 PROCEDURE — 6370000000 HC RX 637 (ALT 250 FOR IP): Performed by: INTERNAL MEDICINE

## 2024-10-07 PROCEDURE — 85379 FIBRIN DEGRADATION QUANT: CPT

## 2024-10-07 PROCEDURE — 83880 ASSAY OF NATRIURETIC PEPTIDE: CPT

## 2024-10-07 PROCEDURE — 6360000002 HC RX W HCPCS: Performed by: EMERGENCY MEDICINE

## 2024-10-07 PROCEDURE — 80179 DRUG ASSAY SALICYLATE: CPT

## 2024-10-07 PROCEDURE — 2060000000 HC ICU INTERMEDIATE R&B

## 2024-10-07 PROCEDURE — 96374 THER/PROPH/DIAG INJ IV PUSH: CPT

## 2024-10-07 PROCEDURE — 80143 DRUG ASSAY ACETAMINOPHEN: CPT

## 2024-10-07 PROCEDURE — 87636 SARSCOV2 & INF A&B AMP PRB: CPT

## 2024-10-07 RX ORDER — ACETAMINOPHEN 650 MG/1
650 SUPPOSITORY RECTAL EVERY 6 HOURS PRN
Status: DISCONTINUED | OUTPATIENT
Start: 2024-10-07 | End: 2024-10-11 | Stop reason: HOSPADM

## 2024-10-07 RX ORDER — SODIUM CHLORIDE 0.9 % (FLUSH) 0.9 %
5-40 SYRINGE (ML) INJECTION EVERY 12 HOURS SCHEDULED
Status: DISCONTINUED | OUTPATIENT
Start: 2024-10-07 | End: 2024-10-11 | Stop reason: HOSPADM

## 2024-10-07 RX ORDER — FLUTICASONE PROPIONATE 50 MCG
1 SPRAY, SUSPENSION (ML) NASAL DAILY
Status: DISCONTINUED | OUTPATIENT
Start: 2024-10-07 | End: 2024-10-11 | Stop reason: HOSPADM

## 2024-10-07 RX ORDER — IPRATROPIUM BROMIDE AND ALBUTEROL SULFATE 2.5; .5 MG/3ML; MG/3ML
3 SOLUTION RESPIRATORY (INHALATION) ONCE
Status: COMPLETED | OUTPATIENT
Start: 2024-10-07 | End: 2024-10-07

## 2024-10-07 RX ORDER — ASPIRIN 81 MG/1
81 TABLET ORAL DAILY
Status: DISCONTINUED | OUTPATIENT
Start: 2024-10-07 | End: 2024-10-11 | Stop reason: HOSPADM

## 2024-10-07 RX ORDER — INSULIN LISPRO 100 [IU]/ML
0-8 INJECTION, SOLUTION INTRAVENOUS; SUBCUTANEOUS
Status: DISCONTINUED | OUTPATIENT
Start: 2024-10-07 | End: 2024-10-11 | Stop reason: HOSPADM

## 2024-10-07 RX ORDER — SODIUM CHLORIDE 9 MG/ML
INJECTION, SOLUTION INTRAVENOUS PRN
Status: DISCONTINUED | OUTPATIENT
Start: 2024-10-07 | End: 2024-10-11 | Stop reason: HOSPADM

## 2024-10-07 RX ORDER — FINASTERIDE 5 MG/1
5 TABLET, FILM COATED ORAL DAILY
Status: DISCONTINUED | OUTPATIENT
Start: 2024-10-07 | End: 2024-10-11 | Stop reason: HOSPADM

## 2024-10-07 RX ORDER — GABAPENTIN 300 MG/1
900 CAPSULE ORAL 3 TIMES DAILY
COMMUNITY

## 2024-10-07 RX ORDER — ALBUTEROL SULFATE 0.83 MG/ML
2.5 SOLUTION RESPIRATORY (INHALATION) ONCE
Status: COMPLETED | OUTPATIENT
Start: 2024-10-07 | End: 2024-10-07

## 2024-10-07 RX ORDER — ALBUTEROL SULFATE 90 UG/1
2 INHALANT RESPIRATORY (INHALATION) EVERY 6 HOURS PRN
COMMUNITY

## 2024-10-07 RX ORDER — ENOXAPARIN SODIUM 100 MG/ML
30 INJECTION SUBCUTANEOUS 2 TIMES DAILY
Status: DISCONTINUED | OUTPATIENT
Start: 2024-10-07 | End: 2024-10-11 | Stop reason: HOSPADM

## 2024-10-07 RX ORDER — TAMSULOSIN HYDROCHLORIDE 0.4 MG/1
0.4 CAPSULE ORAL 2 TIMES DAILY
Status: DISCONTINUED | OUTPATIENT
Start: 2024-10-07 | End: 2024-10-11 | Stop reason: HOSPADM

## 2024-10-07 RX ORDER — IPRATROPIUM BROMIDE AND ALBUTEROL SULFATE 2.5; .5 MG/3ML; MG/3ML
1 SOLUTION RESPIRATORY (INHALATION)
Status: DISCONTINUED | OUTPATIENT
Start: 2024-10-07 | End: 2024-10-11 | Stop reason: HOSPADM

## 2024-10-07 RX ORDER — NAPROXEN 500 MG/1
500 TABLET ORAL PRN
Status: ON HOLD | COMMUNITY
End: 2024-10-18 | Stop reason: HOSPADM

## 2024-10-07 RX ORDER — HYDROXYZINE HYDROCHLORIDE 50 MG/1
50 TABLET, FILM COATED ORAL NIGHTLY
Status: ON HOLD | COMMUNITY
End: 2024-10-07

## 2024-10-07 RX ORDER — GUAIFENESIN 400 MG/1
400 TABLET ORAL 4 TIMES DAILY PRN
Status: DISCONTINUED | OUTPATIENT
Start: 2024-10-07 | End: 2024-10-11 | Stop reason: HOSPADM

## 2024-10-07 RX ORDER — ACETAMINOPHEN 325 MG/1
650 TABLET ORAL EVERY 6 HOURS PRN
Status: DISCONTINUED | OUTPATIENT
Start: 2024-10-07 | End: 2024-10-11 | Stop reason: HOSPADM

## 2024-10-07 RX ORDER — ONDANSETRON 4 MG/1
4 TABLET, ORALLY DISINTEGRATING ORAL EVERY 8 HOURS PRN
Status: DISCONTINUED | OUTPATIENT
Start: 2024-10-07 | End: 2024-10-11 | Stop reason: HOSPADM

## 2024-10-07 RX ORDER — SODIUM CHLORIDE 0.9 % (FLUSH) 0.9 %
5-40 SYRINGE (ML) INJECTION PRN
Status: DISCONTINUED | OUTPATIENT
Start: 2024-10-07 | End: 2024-10-11 | Stop reason: HOSPADM

## 2024-10-07 RX ORDER — BUPROPION HYDROCHLORIDE 300 MG/1
300 TABLET ORAL EVERY MORNING
COMMUNITY

## 2024-10-07 RX ORDER — NALOXONE HYDROCHLORIDE 1 MG/ML
INJECTION INTRAMUSCULAR; INTRAVENOUS; SUBCUTANEOUS
Status: COMPLETED
Start: 2024-10-07 | End: 2024-10-07

## 2024-10-07 RX ORDER — GLUCAGON 1 MG/ML
1 KIT INJECTION PRN
Status: DISCONTINUED | OUTPATIENT
Start: 2024-10-07 | End: 2024-10-11 | Stop reason: HOSPADM

## 2024-10-07 RX ORDER — ONDANSETRON 2 MG/ML
4 INJECTION INTRAMUSCULAR; INTRAVENOUS EVERY 6 HOURS PRN
Status: DISCONTINUED | OUTPATIENT
Start: 2024-10-07 | End: 2024-10-11 | Stop reason: HOSPADM

## 2024-10-07 RX ORDER — HYDROXYZINE PAMOATE 50 MG/1
50 CAPSULE ORAL NIGHTLY
COMMUNITY

## 2024-10-07 RX ORDER — INSULIN GLARGINE 100 [IU]/ML
25 INJECTION, SOLUTION SUBCUTANEOUS NIGHTLY
Status: DISCONTINUED | OUTPATIENT
Start: 2024-10-07 | End: 2024-10-09

## 2024-10-07 RX ORDER — NALOXONE HYDROCHLORIDE 1 MG/ML
1 INJECTION INTRAMUSCULAR; INTRAVENOUS; SUBCUTANEOUS ONCE
Status: COMPLETED | OUTPATIENT
Start: 2024-10-07 | End: 2024-10-07

## 2024-10-07 RX ORDER — AMLODIPINE BESYLATE 10 MG/1
10 TABLET ORAL DAILY
Status: DISCONTINUED | OUTPATIENT
Start: 2024-10-07 | End: 2024-10-11 | Stop reason: HOSPADM

## 2024-10-07 RX ORDER — ARFORMOTEROL TARTRATE 15 UG/2ML
15 SOLUTION RESPIRATORY (INHALATION)
Status: DISCONTINUED | OUTPATIENT
Start: 2024-10-07 | End: 2024-10-11 | Stop reason: HOSPADM

## 2024-10-07 RX ORDER — BUDESONIDE 0.5 MG/2ML
500 INHALANT ORAL
Status: DISCONTINUED | OUTPATIENT
Start: 2024-10-07 | End: 2024-10-11 | Stop reason: HOSPADM

## 2024-10-07 RX ORDER — BENZONATATE 100 MG/1
100 CAPSULE ORAL 3 TIMES DAILY PRN
Status: DISCONTINUED | OUTPATIENT
Start: 2024-10-07 | End: 2024-10-07

## 2024-10-07 RX ORDER — INSULIN LISPRO 100 [IU]/ML
0-4 INJECTION, SOLUTION INTRAVENOUS; SUBCUTANEOUS NIGHTLY
Status: DISCONTINUED | OUTPATIENT
Start: 2024-10-07 | End: 2024-10-11 | Stop reason: HOSPADM

## 2024-10-07 RX ORDER — PREDNISONE 20 MG/1
20 TABLET ORAL 2 TIMES DAILY
Status: DISCONTINUED | OUTPATIENT
Start: 2024-10-09 | End: 2024-10-11 | Stop reason: HOSPADM

## 2024-10-07 RX ORDER — ATORVASTATIN CALCIUM 10 MG/1
10 TABLET, FILM COATED ORAL NIGHTLY
Status: DISCONTINUED | OUTPATIENT
Start: 2024-10-07 | End: 2024-10-11 | Stop reason: HOSPADM

## 2024-10-07 RX ORDER — DEXTROSE MONOHYDRATE 100 MG/ML
INJECTION, SOLUTION INTRAVENOUS CONTINUOUS PRN
Status: DISCONTINUED | OUTPATIENT
Start: 2024-10-07 | End: 2024-10-11 | Stop reason: HOSPADM

## 2024-10-07 RX ORDER — MECOBALAMIN 5000 MCG
10 TABLET,DISINTEGRATING ORAL NIGHTLY PRN
Status: DISCONTINUED | OUTPATIENT
Start: 2024-10-07 | End: 2024-10-11 | Stop reason: HOSPADM

## 2024-10-07 RX ORDER — PANTOPRAZOLE SODIUM 40 MG/1
40 TABLET, DELAYED RELEASE ORAL DAILY
Status: DISCONTINUED | OUTPATIENT
Start: 2024-10-07 | End: 2024-10-11 | Stop reason: HOSPADM

## 2024-10-07 RX ADMIN — ARFORMOTEROL TARTRATE 15 MCG: 15 SOLUTION RESPIRATORY (INHALATION) at 20:01

## 2024-10-07 RX ADMIN — NALOXONE HYDROCHLORIDE 1 MG: 1 INJECTION PARENTERAL at 08:03

## 2024-10-07 RX ADMIN — ASPIRIN 81 MG: 81 TABLET, COATED ORAL at 15:42

## 2024-10-07 RX ADMIN — IPRATROPIUM BROMIDE AND ALBUTEROL SULFATE 3 DOSE: 2.5; .5 SOLUTION RESPIRATORY (INHALATION) at 06:36

## 2024-10-07 RX ADMIN — INSULIN LISPRO 2 UNITS: 100 INJECTION, SOLUTION INTRAVENOUS; SUBCUTANEOUS at 15:51

## 2024-10-07 RX ADMIN — ATORVASTATIN CALCIUM 10 MG: 10 TABLET, FILM COATED ORAL at 20:34

## 2024-10-07 RX ADMIN — FLUTICASONE PROPIONATE 1 SPRAY: 50 SPRAY, METERED NASAL at 16:55

## 2024-10-07 RX ADMIN — ACETAMINOPHEN 650 MG: 325 TABLET ORAL at 21:52

## 2024-10-07 RX ADMIN — IPRATROPIUM BROMIDE AND ALBUTEROL SULFATE 1 DOSE: 2.5; .5 SOLUTION RESPIRATORY (INHALATION) at 20:01

## 2024-10-07 RX ADMIN — WATER 1000 MG: 1 INJECTION INTRAMUSCULAR; INTRAVENOUS; SUBCUTANEOUS at 15:31

## 2024-10-07 RX ADMIN — INSULIN GLARGINE 25 UNITS: 100 INJECTION, SOLUTION SUBCUTANEOUS at 20:34

## 2024-10-07 RX ADMIN — TAMSULOSIN HYDROCHLORIDE 0.4 MG: 0.4 CAPSULE ORAL at 20:34

## 2024-10-07 RX ADMIN — WATER 40 MG: 1 INJECTION INTRAMUSCULAR; INTRAVENOUS; SUBCUTANEOUS at 15:32

## 2024-10-07 RX ADMIN — ENOXAPARIN SODIUM 30 MG: 100 INJECTION SUBCUTANEOUS at 20:34

## 2024-10-07 RX ADMIN — GUAIFENESIN 400 MG: 400 TABLET ORAL at 21:52

## 2024-10-07 RX ADMIN — NALOXONE HYDROCHLORIDE 1 MG: 1 INJECTION INTRAMUSCULAR; INTRAVENOUS; SUBCUTANEOUS at 08:03

## 2024-10-07 RX ADMIN — FINASTERIDE 5 MG: 5 TABLET, FILM COATED ORAL at 15:42

## 2024-10-07 RX ADMIN — ALBUTEROL SULFATE 2.5 MG: 2.5 SOLUTION RESPIRATORY (INHALATION) at 09:12

## 2024-10-07 RX ADMIN — AZITHROMYCIN MONOHYDRATE 500 MG: 500 INJECTION, POWDER, LYOPHILIZED, FOR SOLUTION INTRAVENOUS at 15:30

## 2024-10-07 RX ADMIN — SODIUM CHLORIDE, PRESERVATIVE FREE 10 ML: 5 INJECTION INTRAVENOUS at 20:34

## 2024-10-07 RX ADMIN — BUDESONIDE 500 MCG: 0.5 SUSPENSION RESPIRATORY (INHALATION) at 20:01

## 2024-10-07 RX ADMIN — AMLODIPINE BESYLATE 10 MG: 10 TABLET ORAL at 15:42

## 2024-10-07 RX ADMIN — INSULIN LISPRO 4 UNITS: 100 INJECTION, SOLUTION INTRAVENOUS; SUBCUTANEOUS at 20:34

## 2024-10-07 RX ADMIN — IPRATROPIUM BROMIDE AND ALBUTEROL SULFATE 1 DOSE: 2.5; .5 SOLUTION RESPIRATORY (INHALATION) at 16:33

## 2024-10-07 RX ADMIN — PANTOPRAZOLE SODIUM 40 MG: 40 TABLET, DELAYED RELEASE ORAL at 15:42

## 2024-10-07 ASSESSMENT — PAIN SCALES - GENERAL
PAINLEVEL_OUTOF10: 0
PAINLEVEL_OUTOF10: 6

## 2024-10-07 ASSESSMENT — LIFESTYLE VARIABLES
HOW MANY STANDARD DRINKS CONTAINING ALCOHOL DO YOU HAVE ON A TYPICAL DAY: 5 OR 6
HOW OFTEN DO YOU HAVE A DRINK CONTAINING ALCOHOL: 4 OR MORE TIMES A WEEK

## 2024-10-07 ASSESSMENT — PAIN DESCRIPTION - DESCRIPTORS: DESCRIPTORS: ACHING;DISCOMFORT;SORE

## 2024-10-07 ASSESSMENT — PAIN - FUNCTIONAL ASSESSMENT: PAIN_FUNCTIONAL_ASSESSMENT: PREVENTS OR INTERFERES SOME ACTIVE ACTIVITIES AND ADLS

## 2024-10-07 ASSESSMENT — PAIN DESCRIPTION - ORIENTATION: ORIENTATION: LEFT;RIGHT

## 2024-10-07 ASSESSMENT — PAIN DESCRIPTION - LOCATION: LOCATION: BACK

## 2024-10-07 NOTE — PROGRESS NOTES
4 Eyes Skin Assessment     NAME:  Ryan Mcghee  YOB: 1970  MEDICAL RECORD NUMBER:  [id number]    The patient is being assessed for  Admission    I agree that at least one RN has performed a thorough Head to Toe Skin Assessment on the patient. ALL assessment sites listed below have been assessed.      Areas assessed by both nurses:    Head, Face, Ears, Shoulders, Back, Chest, Arms, Elbows, Hands, and Legs. Feet and Heels     *Patient refused for this RN to assess all areas including but not limited too; buttocks, scrotum, and groin.          Does the Patient have a Wound? No noted wound(s)       Jaycob Prevention initiated by RN: Yes  Wound Care Orders initiated by RN: No    Pressure Injury (Stage 3,4, Unstageable, DTI, NWPT, and Complex wounds) if present, place Wound referral order by RN under : No    New Ostomies, if present place, Ostomy referral order under : No     Nurse 1 eSignature: Electronically signed by Adele Hernández RN on 10/7/24 at 12:17 PM EDT    **SHARE this note so that the co-signing nurse can place an eSignature**    Nurse 2 eSignature: {Esignature:[id number]}

## 2024-10-07 NOTE — ED PROVIDER NOTES
[facility] EMERGENCY DEPARTMENT  EMERGENCY DEPARTMENT ENCOUNTER        Pt Name: Ryan Mcghee  MRN: [id number]  Birthdate 1970  Date of evaluation: 10/7/2024  Provider: Stef Anderson MD  PCP: Brigette, Pcp  Note Started: 6:13 AM EDT 10/7/24    CHIEF COMPLAINT       Chief Complaint   Patient presents with    Shortness of Breath     Increased sob X2-3 days worse this am with chest tightness and congestion, 80% on RA by EMS arrived on 6L nc received 125sul med and duoneb by EMS        HISTORY OF PRESENT ILLNESS: 1 or more Elements        Ryan Mcghee is a 54 y.o. male who presents for shortness of breath.  Patient reports shortness of breath for the last several days.  He reports nonproductive cough with congestion.  He reports his lungs and chest feel tight.  He reports his oxygen was low at home.  He reports that he has been wheezing.  He reports the symptoms are nonexertional.  He denies orthopnea.  He denies peripheral edema.  He reports he has history of COPD and not on chronic oxygen.  EMS reports his oxygen is in the 80s.  They gave him breathing treatments and steroids with improvement in his symptoms.  He also reports that he has a history of drug alcohol abuse.  He last drank alcohol yesterday, he also reports he has done cocaine and marijuana recently as well.  He denies any other drug use.    Nursing Notes were all reviewed and agreed with or any disagreements were addressed in the HPI.      REVIEW OF EXTERNAL NOTE :       EMS report from today  1-9-24 echo--normal EF      Chart Review/External Note Review    Last Echo reviewed by Me:  No results found for: \"LVEF\", \"LVEFMODE\"          Controlled Substance Monitoring:    Acute and Chronic Pain Monitoring:   RX Monitoring Attestation Periodic Controlled Substance Monitoring   11/16/2018   3:54 PM The Prescription Monitoring Report for this patient was reviewed today. No signs of potential drug abuse or diversion  apnea, and Substance abuse (HCC).     EMERGENCY DEPARTMENT COURSE    Vitals:    Vitals:    10/07/24 0901 10/07/24 0902 10/07/24 0906 10/07/24 0912   BP: 136/89      Pulse: 88 84 88 88   Resp: 17 18 22 21   Temp:       SpO2:  100% 100% 98%   Weight:       Height:           Patient was given the following medications:  Medications   ipratropium 0.5 mg-albuterol 2.5 mg (DUONEB) nebulizer solution 3 Dose (3 Doses Inhalation Given 10/7/24 0636)   naloxone (NARCAN) injection 1 mg (1 mg IntraVENous Given 10/7/24 0803)   albuterol (PROVENTIL) (2.5 MG/3ML) 0.083% nebulizer solution 2.5 mg (2.5 mg Nebulization Given 10/7/24 0912)                    Medical Decision Making/Differential Diagnosis:    CC/HPI Summary, Social Determinants of health, Records Reviewed, DDx, testing done/not done, ED Course, Reassessment, disposition considerations/shared decision making with patient, consults, disposition:       The following labs are interpreted by me:    Recent Results (from the past 24 hour(s))   COVID-19 & Influenza Combo    Collection Time: 10/07/24  6:15 AM    Specimen: Nasopharyngeal Swab   Result Value Ref Range    Specimen Description .NASOPHARYNGEAL SWAB     Source .NASOPHARYNGEAL SWAB     SARS-CoV-2 RNA, RT PCR Not Detected Not Detected    Influenza A Not Detected Not Detected    Influenza B Not Detected Not Detected   CBC with Auto Differential    Collection Time: 10/07/24  6:15 AM   Result Value Ref Range    WBC 9.5 4.5 - 11.5 k/uL    RBC 5.19 3.80 - 5.80 m/uL    Hemoglobin 13.8 12.5 - 16.5 g/dL    Hematocrit 45.2 37.0 - 54.0 %    MCV 87.1 80.0 - 99.9 fL    MCH 26.6 26.0 - 35.0 pg    MCHC 30.5 (L) 32.0 - 34.5 g/dL    RDW 14.2 11.5 - 15.0 %    Platelets 351 130 - 450 k/uL    MPV 9.6 7.0 - 12.0 fL    Neutrophils % 69 43.0 - 80.0 %    Lymphocytes % 16 (L) 20.0 - 42.0 %    Monocytes % 12 2.0 - 12.0 %    Eosinophils % 1 0 - 6 %    Basophils % 0 0.0 - 2.0 %    Immature Granulocytes % 1 0.0 - 5.0 %    Neutrophils Absolute 6.62

## 2024-10-07 NOTE — PROGRESS NOTES
Messaged Dr. Connelly regarding BP of 144/92 with a HR of 88. No new orders received at this time. Plan of care ongoing     Electronically signed by Adele Hernández RN on 10/7/2024 at 12:33 PM

## 2024-10-07 NOTE — H&P
History and Physical      CHIEF COMPLAINT: Shortness of breath      HISTORY OF PRESENT ILLNESS:      The patient is a 54 y.o. male patient of no PCP on file history of polysubstance abuse, COPD, obesity, sleep apnea noncompliant with nocturnal positive pressure ventilation who presents with shortness of breath.  Patient presents ED with complaint of multiple shortness of breath associated with nonproductive cough no fevers or chills patient continues to drink alcohol, smokes cigarettes and abuse cocaine and THC + wheezing.    In ED patient is initially 94% on 6 L nasal cannula.  Pulse 104, respirations 22, temp 98.  Initial ABG demonstrates pH 7.3/CO2 52/O2 64 on 6 L nasal cannula.  Patient placed on BiPAP repeat ABG 7.26/77.7/277.7-BiPAP was changed to AVAPS.  Repeat ABG 7.25/70.7/159.  Chest x-ray NAD, EKG  Initial laboratory evaluation reveals increased bicarb at 31, troponin 14, 15, AST 60, glucose 120.  CBC is unremarkable. EKG normal sinus rhythm.  Patient is admitted for further evaluation and treatment.  Past Medical History:    Past Medical History:   Diagnosis Date    Acid reflux     Asthma     Asthma     COPD (chronic obstructive pulmonary disease) (HCC)     Hypertension     Pancreatitis     Prediabetes     Psychiatric problem     depressed    Sleep apnea     Substance abuse (HCC)     alcohol and cocaine       Past Surgical History:    Past Surgical History:   Procedure Laterality Date    COLONOSCOPY      2010 neg    NERVE BLOCK Left 08/27/2018    lumbar epidural #1 paramedian    OH NJX DX/THER SBST INTRLMNR LMBR/SAC W/IMG GDN Left 8/27/2018    LUMBAR EPIDURAL STEROID INJECTION L4-5 LEFT PARAMEDIAN #1 performed by Yordan Chacon MD at [facility] OR    WISDOM TOOTH EXTRACTION         Medications Prior to Admission:    Not in a hospital admission.    Allergies:    Dust mite extract    Social History:    reports that he has been smoking cigarettes and cigars. He has a 28 pack-year smoking history. He

## 2024-10-07 NOTE — PROGRESS NOTES
Lizess 145 mcg is non-formulary and will not be dispensed by the pharmacy at this time. Please have the patient's supply of this medication brought in from home and delivered to the pharmacy for identification and barcode.  #      Je Shields, PharmCATE 10/7/2024 3:01 PM

## 2024-10-08 PROBLEM — G93.41 METABOLIC ENCEPHALOPATHY: Status: ACTIVE | Noted: 2024-10-08

## 2024-10-08 PROBLEM — E66.09 CLASS 1 OBESITY DUE TO EXCESS CALORIES WITH SERIOUS COMORBIDITY AND BODY MASS INDEX (BMI) OF 34.0 TO 34.9 IN ADULT: Status: ACTIVE | Noted: 2024-10-08

## 2024-10-08 PROBLEM — E66.811 CLASS 1 OBESITY DUE TO EXCESS CALORIES WITH SERIOUS COMORBIDITY AND BODY MASS INDEX (BMI) OF 34.0 TO 34.9 IN ADULT: Status: ACTIVE | Noted: 2024-10-08

## 2024-10-08 LAB
AADO2: 200.9 MMHG
ANION GAP SERPL CALCULATED.3IONS-SCNC: 8 MMOL/L (ref 7–16)
B.E.: 5.8 MMOL/L (ref -3–3)
BASOPHILS # BLD: 0 K/UL (ref 0–0.2)
BASOPHILS NFR BLD: 0 % (ref 0–2)
BUN SERPL-MCNC: 14 MG/DL (ref 6–20)
CALCIUM SERPL-MCNC: 9 MG/DL (ref 8.6–10.2)
CHLORIDE SERPL-SCNC: 95 MMOL/L (ref 98–107)
CO2 SERPL-SCNC: 33 MMOL/L (ref 22–29)
COHB: 0.7 % (ref 0–1.5)
CREAT SERPL-MCNC: 0.6 MG/DL (ref 0.7–1.2)
CRITICAL ACTION: NORMAL
CRITICAL ACTION: NORMAL
CRITICAL NOTIFICATION DATE/TIME: NORMAL
CRITICAL NOTIFICATION DATE/TIME: NORMAL
CRITICAL NOTIFICATION: NORMAL
CRITICAL VALUE READ BACK: YES
CRITICAL VALUE READ BACK: YES
CRITICAL: ABNORMAL
DATE ANALYZED: ABNORMAL
DATE OF COLLECTION: ABNORMAL
EOSINOPHIL # BLD: 0 K/UL (ref 0.05–0.5)
EOSINOPHILS RELATIVE PERCENT: 0 % (ref 0–6)
ERYTHROCYTE [DISTWIDTH] IN BLOOD BY AUTOMATED COUNT: 13.9 % (ref 11.5–15)
FIO2: 60
FIO2: 60
FIO2: 60 %
GFR, ESTIMATED: >90 ML/MIN/1.73M2
GLUCOSE BLD-MCNC: 169 MG/DL (ref 74–99)
GLUCOSE BLD-MCNC: 197 MG/DL (ref 74–99)
GLUCOSE BLD-MCNC: 214 MG/DL (ref 74–99)
GLUCOSE BLD-MCNC: 230 MG/DL (ref 74–99)
GLUCOSE BLD-MCNC: 241 MG/DL (ref 74–99)
GLUCOSE SERPL-MCNC: 160 MG/DL (ref 74–99)
HBA1C MFR BLD: 5.9 % (ref 4–5.6)
HCO3: 32.6 MMOL/L (ref 22–26)
HCT VFR BLD AUTO: 42.1 % (ref 37–54)
HGB BLD-MCNC: 12.6 G/DL (ref 12.5–16.5)
HHB: 0.6 % (ref 0–5)
IMM GRANULOCYTES # BLD AUTO: 0.04 K/UL (ref 0–0.58)
IMM GRANULOCYTES NFR BLD: 1 % (ref 0–5)
LAB: ABNORMAL
LYMPHOCYTES NFR BLD: 0.62 K/UL (ref 1.5–4)
LYMPHOCYTES RELATIVE PERCENT: 8 % (ref 20–42)
Lab: 1640
MCH RBC QN AUTO: 26.7 PG (ref 26–35)
MCHC RBC AUTO-ENTMCNC: 29.9 G/DL (ref 32–34.5)
MCV RBC AUTO: 89.2 FL (ref 80–99.9)
METHB: 0.5 % (ref 0–1.5)
MODE: ABNORMAL
MONOCYTES NFR BLD: 0.69 K/UL (ref 0.1–0.95)
MONOCYTES NFR BLD: 8 % (ref 2–12)
NEUTROPHILS NFR BLD: 84 % (ref 43–80)
NEUTS SEG NFR BLD: 6.89 K/UL (ref 1.8–7.3)
O2 DELIVERY DEVICE: ABNORMAL
O2 DELIVERY DEVICE: ABNORMAL
O2 SATURATION: 99.4 % (ref 92–98.5)
O2HB: 98.2 % (ref 94–97)
OPERATOR ID: 405
PATIENT TEMP: 37
PATIENT TEMP: 37
PATIENT TEMP: 37 C
PCO2: 56.7 MMHG (ref 35–45)
PEEP/CPAP: 8 CMH2O
PEEP: 8
PEEP: 8
PFO2: 2.74 MMHG/%
PH BLOOD GAS: 7.38 (ref 7.35–7.45)
PLATELET # BLD AUTO: 332 K/UL (ref 130–450)
PMV BLD AUTO: 9.8 FL (ref 7–12)
PO2: 164.5 MMHG (ref 75–100)
POC HCO3: 38.6 MMOL/L (ref 22–26)
POC HCO3: 38.8 MMOL/L (ref 22–26)
POC O2 SATURATION: 99.1 % (ref 92–98.5)
POC O2 SATURATION: 99.5 % (ref 92–98.5)
POC PCO2 TEMP: 68.1 MM HG
POC PCO2 TEMP: 71.5 MM HG
POC PCO2: 68.1 MM HG (ref 35–45)
POC PCO2: 71.5 MM HG (ref 35–45)
POC PH TEMP: 7.34
POC PH TEMP: 7.36
POC PH: 7.34 (ref 7.35–7.45)
POC PH: 7.36 (ref 7.35–7.45)
POC PO2 TEMP: 147.6 MM HG
POC PO2 TEMP: 182.4 MM HG
POC PO2: 147.6 MM HG (ref 80–100)
POC PO2: 182.4 MM HG (ref 80–100)
POSITIVE BASE EXCESS, ART: 10.3 MMOL/L (ref 0–3)
POSITIVE BASE EXCESS, ART: 9.8 MMOL/L (ref 0–3)
POTASSIUM SERPL-SCNC: 4.2 MMOL/L (ref 3.5–5)
PROCALCITONIN SERPL-MCNC: 0.05 NG/ML (ref 0–0.08)
RBC # BLD AUTO: 4.72 M/UL (ref 3.8–5.8)
RI(T): 1.22
RR MECHANICAL: 20 B/MIN
SAMPLE SITE: ABNORMAL
SAMPLE SITE: ABNORMAL
SET RATE, POC: 20
SET RATE, POC: 20
SODIUM SERPL-SCNC: 136 MMOL/L (ref 132–146)
SOURCE, BLOOD GAS: ABNORMAL
THB: 13.6 G/DL (ref 11.5–16.5)
TIDAL VOLUME: 550
TIDAL VOLUME: 550
TIME ANALYZED: 1646
VT MECHANICAL: 550 ML
WBC OTHER # BLD: 8.2 K/UL (ref 4.5–11.5)

## 2024-10-08 PROCEDURE — 82962 GLUCOSE BLOOD TEST: CPT

## 2024-10-08 PROCEDURE — 80048 BASIC METABOLIC PNL TOTAL CA: CPT

## 2024-10-08 PROCEDURE — 82805 BLOOD GASES W/O2 SATURATION: CPT

## 2024-10-08 PROCEDURE — 85025 COMPLETE CBC W/AUTO DIFF WBC: CPT

## 2024-10-08 PROCEDURE — 36415 COLL VENOUS BLD VENIPUNCTURE: CPT

## 2024-10-08 PROCEDURE — 6360000002 HC RX W HCPCS: Performed by: INTERNAL MEDICINE

## 2024-10-08 PROCEDURE — 99233 SBSQ HOSP IP/OBS HIGH 50: CPT | Performed by: INTERNAL MEDICINE

## 2024-10-08 PROCEDURE — 94640 AIRWAY INHALATION TREATMENT: CPT

## 2024-10-08 PROCEDURE — 6370000000 HC RX 637 (ALT 250 FOR IP): Performed by: HOSPITALIST

## 2024-10-08 PROCEDURE — 82803 BLOOD GASES ANY COMBINATION: CPT

## 2024-10-08 PROCEDURE — 6370000000 HC RX 637 (ALT 250 FOR IP): Performed by: INTERNAL MEDICINE

## 2024-10-08 PROCEDURE — 83036 HEMOGLOBIN GLYCOSYLATED A1C: CPT

## 2024-10-08 PROCEDURE — 84145 PROCALCITONIN (PCT): CPT

## 2024-10-08 PROCEDURE — 94660 CPAP INITIATION&MGMT: CPT

## 2024-10-08 PROCEDURE — 2060000000 HC ICU INTERMEDIATE R&B

## 2024-10-08 PROCEDURE — 2580000003 HC RX 258: Performed by: INTERNAL MEDICINE

## 2024-10-08 RX ORDER — NICOTINE 21 MG/24HR
1 PATCH, TRANSDERMAL 24 HOURS TRANSDERMAL DAILY
Status: DISCONTINUED | OUTPATIENT
Start: 2024-10-08 | End: 2024-10-11 | Stop reason: HOSPADM

## 2024-10-08 RX ORDER — SENNA AND DOCUSATE SODIUM 50; 8.6 MG/1; MG/1
2 TABLET, FILM COATED ORAL DAILY PRN
Status: DISCONTINUED | OUTPATIENT
Start: 2024-10-08 | End: 2024-10-11 | Stop reason: HOSPADM

## 2024-10-08 RX ORDER — NAPROXEN 500 MG/1
500 TABLET ORAL 2 TIMES DAILY PRN
Status: DISCONTINUED | OUTPATIENT
Start: 2024-10-08 | End: 2024-10-11 | Stop reason: HOSPADM

## 2024-10-08 RX ADMIN — Medication 10 MG: at 20:58

## 2024-10-08 RX ADMIN — AMLODIPINE BESYLATE 10 MG: 10 TABLET ORAL at 08:27

## 2024-10-08 RX ADMIN — ARFORMOTEROL TARTRATE 15 MCG: 15 SOLUTION RESPIRATORY (INHALATION) at 09:01

## 2024-10-08 RX ADMIN — WATER 40 MG: 1 INJECTION INTRAMUSCULAR; INTRAVENOUS; SUBCUTANEOUS at 02:44

## 2024-10-08 RX ADMIN — FINASTERIDE 5 MG: 5 TABLET, FILM COATED ORAL at 08:27

## 2024-10-08 RX ADMIN — INSULIN GLARGINE 25 UNITS: 100 INJECTION, SOLUTION SUBCUTANEOUS at 20:47

## 2024-10-08 RX ADMIN — WATER 40 MG: 1 INJECTION INTRAMUSCULAR; INTRAVENOUS; SUBCUTANEOUS at 15:32

## 2024-10-08 RX ADMIN — ATORVASTATIN CALCIUM 10 MG: 10 TABLET, FILM COATED ORAL at 20:47

## 2024-10-08 RX ADMIN — SODIUM CHLORIDE, PRESERVATIVE FREE 10 ML: 5 INJECTION INTRAVENOUS at 08:28

## 2024-10-08 RX ADMIN — GUAIFENESIN 400 MG: 400 TABLET ORAL at 20:58

## 2024-10-08 RX ADMIN — FLUTICASONE PROPIONATE 1 SPRAY: 50 SPRAY, METERED NASAL at 08:34

## 2024-10-08 RX ADMIN — SODIUM CHLORIDE, PRESERVATIVE FREE 10 ML: 5 INJECTION INTRAVENOUS at 20:46

## 2024-10-08 RX ADMIN — NAPROXEN 500 MG: 500 TABLET ORAL at 09:29

## 2024-10-08 RX ADMIN — INSULIN LISPRO 2 UNITS: 100 INJECTION, SOLUTION INTRAVENOUS; SUBCUTANEOUS at 15:45

## 2024-10-08 RX ADMIN — IPRATROPIUM BROMIDE AND ALBUTEROL SULFATE 1 DOSE: 2.5; .5 SOLUTION RESPIRATORY (INHALATION) at 20:03

## 2024-10-08 RX ADMIN — BUDESONIDE 500 MCG: 0.5 SUSPENSION RESPIRATORY (INHALATION) at 09:01

## 2024-10-08 RX ADMIN — IPRATROPIUM BROMIDE AND ALBUTEROL SULFATE 1 DOSE: 2.5; .5 SOLUTION RESPIRATORY (INHALATION) at 17:27

## 2024-10-08 RX ADMIN — TAMSULOSIN HYDROCHLORIDE 0.4 MG: 0.4 CAPSULE ORAL at 08:27

## 2024-10-08 RX ADMIN — AZITHROMYCIN MONOHYDRATE 500 MG: 500 INJECTION, POWDER, LYOPHILIZED, FOR SOLUTION INTRAVENOUS at 13:51

## 2024-10-08 RX ADMIN — TAMSULOSIN HYDROCHLORIDE 0.4 MG: 0.4 CAPSULE ORAL at 20:47

## 2024-10-08 RX ADMIN — ENOXAPARIN SODIUM 30 MG: 100 INJECTION SUBCUTANEOUS at 08:27

## 2024-10-08 RX ADMIN — ASPIRIN 81 MG: 81 TABLET, COATED ORAL at 08:27

## 2024-10-08 RX ADMIN — PANTOPRAZOLE SODIUM 40 MG: 40 TABLET, DELAYED RELEASE ORAL at 08:27

## 2024-10-08 RX ADMIN — IPRATROPIUM BROMIDE AND ALBUTEROL SULFATE 1 DOSE: 2.5; .5 SOLUTION RESPIRATORY (INHALATION) at 09:01

## 2024-10-08 RX ADMIN — NAPROXEN 500 MG: 500 TABLET ORAL at 22:08

## 2024-10-08 RX ADMIN — ARFORMOTEROL TARTRATE 15 MCG: 15 SOLUTION RESPIRATORY (INHALATION) at 20:03

## 2024-10-08 RX ADMIN — ENOXAPARIN SODIUM 30 MG: 100 INJECTION SUBCUTANEOUS at 20:55

## 2024-10-08 RX ADMIN — INSULIN LISPRO 2 UNITS: 100 INJECTION, SOLUTION INTRAVENOUS; SUBCUTANEOUS at 08:34

## 2024-10-08 RX ADMIN — BUDESONIDE 500 MCG: 0.5 SUSPENSION RESPIRATORY (INHALATION) at 20:03

## 2024-10-08 RX ADMIN — GUAIFENESIN 400 MG: 400 TABLET ORAL at 08:27

## 2024-10-08 RX ADMIN — WATER 1000 MG: 1 INJECTION INTRAMUSCULAR; INTRAVENOUS; SUBCUTANEOUS at 13:52

## 2024-10-08 RX ADMIN — ACETAMINOPHEN 650 MG: 325 TABLET ORAL at 08:27

## 2024-10-08 RX ADMIN — SENNOSIDES AND DOCUSATE SODIUM 2 TABLET: 50; 8.6 TABLET ORAL at 22:08

## 2024-10-08 ASSESSMENT — PAIN DESCRIPTION - ORIENTATION: ORIENTATION: RIGHT;LEFT

## 2024-10-08 ASSESSMENT — PAIN SCALES - WONG BAKER
WONGBAKER_NUMERICALRESPONSE: NO HURT
WONGBAKER_NUMERICALRESPONSE: HURTS A LITTLE BIT

## 2024-10-08 ASSESSMENT — PAIN SCALES - GENERAL
PAINLEVEL_OUTOF10: 5
PAINLEVEL_OUTOF10: 0
PAINLEVEL_OUTOF10: 2
PAINLEVEL_OUTOF10: 4
PAINLEVEL_OUTOF10: 0

## 2024-10-08 ASSESSMENT — PAIN DESCRIPTION - LOCATION
LOCATION: OTHER (COMMENT)
LOCATION: GENERALIZED

## 2024-10-08 ASSESSMENT — PAIN DESCRIPTION - DESCRIPTORS
DESCRIPTORS: ACHING;DISCOMFORT;DULL
DESCRIPTORS: ACHING;BURNING;CRAMPING

## 2024-10-08 NOTE — PROGRESS NOTES
ABG's drawn and collected by respiratory     Electronically signed by Adele Hernández RN on 10/8/2024 at 4:52 PM

## 2024-10-08 NOTE — PROGRESS NOTES
Occupational Therapy  Reviewed chart for OT eval, however, noted pt's Critical ABG levels.  Spoke with Charge Nurse who requested Therapy be held this date for pt's safety.  Will attempt Assessment next date.  Thank you for this referral. Ana Mcknight, HERMILO, OTR/L  # [phone]

## 2024-10-08 NOTE — PROGRESS NOTES
Dr. Connelly notified of critical ABG. Electronically signed by Ankita Sharma RN on 10/8/2024 at 7:57 AM

## 2024-10-08 NOTE — PROGRESS NOTES
Date: 10/8/2024       Patient Name: Ryan Mcghee  : 1970      MRN: [id number]    PT held per RN. Will follow up as appropriate.    Thuan Kerr PT, DPT  [id number]

## 2024-10-08 NOTE — PROGRESS NOTES
Pt placed on BiPAP after using restroom.     Electronically signed by Adele Hernández RN on 10/8/2024 at 8:45 AM

## 2024-10-08 NOTE — PROGRESS NOTES
Hospitalist Progress Note      Synopsis: Patient admitted on 10/7/2024 54 y.o. male patient of no PCP on file history of polysubstance abuse, COPD, obesity, sleep apnea noncompliant with nocturnal positive pressure ventilation who presents with shortness of breath.  Patient presents ED with complaint of multiple shortness of breath associated with nonproductive cough no fevers or chills patient continues to drink alcohol, smokes cigarettes and abuse cocaine and THC + wheezing.     In ED patient is initially 94% on 6 L nasal cannula.  Pulse 104, respirations 22, temp 98.  Initial ABG demonstrates pH 7.3/CO2 52/O2 64 on 6 L nasal cannula.  Patient placed on BiPAP repeat ABG 7.26/77.7/277.7-BiPAP was changed to AVAPS.  Repeat ABG 7.25/70.7/159.  Chest x-ray NAD, EKG  Initial laboratory evaluation reveals increased bicarb at 31, troponin 14, 15, AST 60, glucose 120.  CBC is unremarkable. EKG normal sinus rhythm.  Patient is admitted for further evaluation and treatment.    ASSESSMENT:    Principal Problem:    Acute respiratory failure with hypoxia and hypercapnia  Active Problems:    Type 2 diabetes mellitus, without long-term current use of insulin (HCC)    Polysubstance abuse (HCC)    COPD exacerbation (HCC)    Cocaine use disorder (HCC)    Tobacco abuse    Obstructive sleep apnea syndrome noncompliant with positive pressure ventilation    Class 1 obesity due to excess calories with serious comorbidity and body mass index (BMI) of 34.0 to 34.9 in adult    Metabolic encephalopathy  Resolved Problems:    * No resolved hospital problems. *       PLAN:    COPD exacerbation, acute hypercapnic and hypoxic respiratory failure  Admit  IV steroids  Nebulizers  IV ABX  proCalcitonin   NIV wean as tolerated-attempted intermittent BiPAP however patient CO2 increased this morning, resume AVAPS  Repeat ABG at 12 and 5-reviewed NIV adjusted discussed with respiratory therapy    Metabolic encephalopathy secondary to CO2  TC received from Percy Zazueta RN Care Coordinator to discuss patient status and f/u needs. She states patient has very good family support and would not want to return to [facility], as this writer found at discussion with patient this week. She states if patient is able to go home with home care for wound cares and if IV abx are needed at d/c should have enough family who could take her to outpatient infusion clinic daily in [place].  This will be dependant on the drug recommendations and frequency needed.  Will be determined by Ortho and ID. Writer informed CHELSEY Leung of discussion for f/u and discharge planning.  IV check requested from [facility], awaiting results. It is unlikely she will have coverage for home IV antibiotics thru her Medicare.   Addendum 1615:  Per [facility], thru patients [facility] Medicare, she would have some coverage for her IV drug, to be determined when verified. In addition to those costs, she has a per deim out of pocket cost of approximately $200-$300/week.    Jazmine Seo RN  Home Care Services Liaison  Phone: [phone]  Cell:  [phone]

## 2024-10-08 NOTE — ACP (ADVANCE CARE PLANNING)
Advance Care Planning   Healthcare Decision Maker:    Primary Decision Maker: Mariajose Waller - [facility] - [phone]    Secondary Decision Maker: Oliver Mcghee - Aunt/Uncle - [phone]    Click here to complete Healthcare Decision Makers including selection of the Healthcare Decision Maker Relationship (ie \"Primary\").

## 2024-10-08 NOTE — PROGRESS NOTES
Blood gases resulted, messaged Connelly via [employer]. Plan of care ongoing.     Electronically signed by Adele Hernández RN on 10/8/2024 at 5:08 PM

## 2024-10-08 NOTE — PROGRESS NOTES
Pt states he takes naproxen for pain & vistaril for sleep at home. Home med list reviewed and edited. Electronically signed by Fransico Rivera RN on 10/7/2024 at 9:39 PM

## 2024-10-08 NOTE — PROGRESS NOTES
Messaged Dr. Connelly via [employer] regarding critical ABG results. No new orders received at this time. Patient still, and has been, on BiPAP all morning     Electronically signed by Adele Hernández RN on 10/8/2024 at 11:37 AM

## 2024-10-08 NOTE — CARE COORDINATION
Transition of Care: Attempted to meet pt at bedside to discuss transition of care. Pt is very lethargic on continuous Bipap. Will revisit once pt is more alert and off Bipap. (TF)        JUAN SantiagoN,RN  Case Management  [phone]

## 2024-10-08 NOTE — PROGRESS NOTES
Repeat ABG draws @1700 per Dr. Connelly     Electronically signed by Adele Hernández RN on 10/8/2024 at 12:02 PM

## 2024-10-09 LAB
ANION GAP SERPL CALCULATED.3IONS-SCNC: 8 MMOL/L (ref 7–16)
BASOPHILS # BLD: 0.01 K/UL (ref 0–0.2)
BASOPHILS NFR BLD: 0 % (ref 0–2)
BUN SERPL-MCNC: 16 MG/DL (ref 6–20)
CALCIUM SERPL-MCNC: 8.8 MG/DL (ref 8.6–10.2)
CHLORIDE SERPL-SCNC: 98 MMOL/L (ref 98–107)
CO2 SERPL-SCNC: 34 MMOL/L (ref 22–29)
CREAT SERPL-MCNC: 0.5 MG/DL (ref 0.7–1.2)
EKG ATRIAL RATE: 98 BPM
EKG P AXIS: 57 DEGREES
EKG P-R INTERVAL: 150 MS
EKG Q-T INTERVAL: 360 MS
EKG QRS DURATION: 112 MS
EKG QTC CALCULATION (BAZETT): 459 MS
EKG R AXIS: -51 DEGREES
EKG T AXIS: 69 DEGREES
EKG VENTRICULAR RATE: 98 BPM
EOSINOPHIL # BLD: 0.01 K/UL (ref 0.05–0.5)
EOSINOPHILS RELATIVE PERCENT: 0 % (ref 0–6)
ERYTHROCYTE [DISTWIDTH] IN BLOOD BY AUTOMATED COUNT: 13.7 % (ref 11.5–15)
GFR, ESTIMATED: >90 ML/MIN/1.73M2
GLUCOSE BLD-MCNC: 200 MG/DL (ref 74–99)
GLUCOSE BLD-MCNC: 213 MG/DL (ref 74–99)
GLUCOSE BLD-MCNC: 213 MG/DL (ref 74–99)
GLUCOSE BLD-MCNC: 256 MG/DL (ref 74–99)
GLUCOSE SERPL-MCNC: 312 MG/DL (ref 74–99)
HCT VFR BLD AUTO: 41.8 % (ref 37–54)
HGB BLD-MCNC: 12.3 G/DL (ref 12.5–16.5)
IMM GRANULOCYTES # BLD AUTO: 0.04 K/UL (ref 0–0.58)
IMM GRANULOCYTES NFR BLD: 0 % (ref 0–5)
LYMPHOCYTES NFR BLD: 1.53 K/UL (ref 1.5–4)
LYMPHOCYTES RELATIVE PERCENT: 14 % (ref 20–42)
MCH RBC QN AUTO: 26.7 PG (ref 26–35)
MCHC RBC AUTO-ENTMCNC: 29.4 G/DL (ref 32–34.5)
MCV RBC AUTO: 90.7 FL (ref 80–99.9)
MONOCYTES NFR BLD: 0.85 K/UL (ref 0.1–0.95)
MONOCYTES NFR BLD: 8 % (ref 2–12)
NEUTROPHILS NFR BLD: 78 % (ref 43–80)
NEUTS SEG NFR BLD: 8.5 K/UL (ref 1.8–7.3)
PLATELET # BLD AUTO: 324 K/UL (ref 130–450)
PMV BLD AUTO: 9.9 FL (ref 7–12)
POTASSIUM SERPL-SCNC: 4.3 MMOL/L (ref 3.5–5)
RBC # BLD AUTO: 4.61 M/UL (ref 3.8–5.8)
SODIUM SERPL-SCNC: 140 MMOL/L (ref 132–146)
WBC OTHER # BLD: 10.9 K/UL (ref 4.5–11.5)

## 2024-10-09 PROCEDURE — 2060000000 HC ICU INTERMEDIATE R&B

## 2024-10-09 PROCEDURE — 97530 THERAPEUTIC ACTIVITIES: CPT

## 2024-10-09 PROCEDURE — 82962 GLUCOSE BLOOD TEST: CPT

## 2024-10-09 PROCEDURE — 94640 AIRWAY INHALATION TREATMENT: CPT

## 2024-10-09 PROCEDURE — 36415 COLL VENOUS BLD VENIPUNCTURE: CPT

## 2024-10-09 PROCEDURE — 6370000000 HC RX 637 (ALT 250 FOR IP): Performed by: INTERNAL MEDICINE

## 2024-10-09 PROCEDURE — 6360000002 HC RX W HCPCS: Performed by: INTERNAL MEDICINE

## 2024-10-09 PROCEDURE — 94660 CPAP INITIATION&MGMT: CPT

## 2024-10-09 PROCEDURE — 99232 SBSQ HOSP IP/OBS MODERATE 35: CPT | Performed by: INTERNAL MEDICINE

## 2024-10-09 PROCEDURE — 85025 COMPLETE CBC W/AUTO DIFF WBC: CPT

## 2024-10-09 PROCEDURE — 93010 ELECTROCARDIOGRAM REPORT: CPT | Performed by: INTERNAL MEDICINE

## 2024-10-09 PROCEDURE — 97161 PT EVAL LOW COMPLEX 20 MIN: CPT

## 2024-10-09 PROCEDURE — 80048 BASIC METABOLIC PNL TOTAL CA: CPT

## 2024-10-09 PROCEDURE — 2700000000 HC OXYGEN THERAPY PER DAY

## 2024-10-09 PROCEDURE — 2580000003 HC RX 258: Performed by: INTERNAL MEDICINE

## 2024-10-09 RX ORDER — INSULIN GLARGINE 100 [IU]/ML
30 INJECTION, SOLUTION SUBCUTANEOUS NIGHTLY
Status: DISCONTINUED | OUTPATIENT
Start: 2024-10-09 | End: 2024-10-11 | Stop reason: HOSPADM

## 2024-10-09 RX ADMIN — TAMSULOSIN HYDROCHLORIDE 0.4 MG: 0.4 CAPSULE ORAL at 09:28

## 2024-10-09 RX ADMIN — TAMSULOSIN HYDROCHLORIDE 0.4 MG: 0.4 CAPSULE ORAL at 20:40

## 2024-10-09 RX ADMIN — BUDESONIDE 500 MCG: 0.5 SUSPENSION RESPIRATORY (INHALATION) at 07:20

## 2024-10-09 RX ADMIN — INSULIN GLARGINE 30 UNITS: 100 INJECTION, SOLUTION SUBCUTANEOUS at 20:39

## 2024-10-09 RX ADMIN — GUAIFENESIN 400 MG: 400 TABLET ORAL at 09:30

## 2024-10-09 RX ADMIN — INSULIN LISPRO 2 UNITS: 100 INJECTION, SOLUTION INTRAVENOUS; SUBCUTANEOUS at 12:57

## 2024-10-09 RX ADMIN — ASPIRIN 81 MG: 81 TABLET, COATED ORAL at 09:27

## 2024-10-09 RX ADMIN — Medication 10 MG: at 20:40

## 2024-10-09 RX ADMIN — AZITHROMYCIN MONOHYDRATE 500 MG: 500 INJECTION, POWDER, LYOPHILIZED, FOR SOLUTION INTRAVENOUS at 16:23

## 2024-10-09 RX ADMIN — PREDNISONE 20 MG: 20 TABLET ORAL at 16:14

## 2024-10-09 RX ADMIN — IPRATROPIUM BROMIDE AND ALBUTEROL SULFATE 1 DOSE: 2.5; .5 SOLUTION RESPIRATORY (INHALATION) at 11:15

## 2024-10-09 RX ADMIN — SODIUM CHLORIDE, PRESERVATIVE FREE 10 ML: 5 INJECTION INTRAVENOUS at 09:31

## 2024-10-09 RX ADMIN — WATER 1000 MG: 1 INJECTION INTRAMUSCULAR; INTRAVENOUS; SUBCUTANEOUS at 16:14

## 2024-10-09 RX ADMIN — ARFORMOTEROL TARTRATE 15 MCG: 15 SOLUTION RESPIRATORY (INHALATION) at 20:00

## 2024-10-09 RX ADMIN — ENOXAPARIN SODIUM 30 MG: 100 INJECTION SUBCUTANEOUS at 09:30

## 2024-10-09 RX ADMIN — INSULIN LISPRO 2 UNITS: 100 INJECTION, SOLUTION INTRAVENOUS; SUBCUTANEOUS at 18:01

## 2024-10-09 RX ADMIN — BUDESONIDE 500 MCG: 0.5 SUSPENSION RESPIRATORY (INHALATION) at 20:00

## 2024-10-09 RX ADMIN — ACETAMINOPHEN 650 MG: 325 TABLET ORAL at 13:07

## 2024-10-09 RX ADMIN — PANTOPRAZOLE SODIUM 40 MG: 40 TABLET, DELAYED RELEASE ORAL at 09:29

## 2024-10-09 RX ADMIN — FLUTICASONE PROPIONATE 1 SPRAY: 50 SPRAY, METERED NASAL at 09:31

## 2024-10-09 RX ADMIN — INSULIN LISPRO 2 UNITS: 100 INJECTION, SOLUTION INTRAVENOUS; SUBCUTANEOUS at 09:30

## 2024-10-09 RX ADMIN — ACETAMINOPHEN 650 MG: 325 TABLET ORAL at 20:52

## 2024-10-09 RX ADMIN — IPRATROPIUM BROMIDE AND ALBUTEROL SULFATE 1 DOSE: 2.5; .5 SOLUTION RESPIRATORY (INHALATION) at 20:00

## 2024-10-09 RX ADMIN — IPRATROPIUM BROMIDE AND ALBUTEROL SULFATE 1 DOSE: 2.5; .5 SOLUTION RESPIRATORY (INHALATION) at 15:10

## 2024-10-09 RX ADMIN — FINASTERIDE 5 MG: 5 TABLET, FILM COATED ORAL at 09:29

## 2024-10-09 RX ADMIN — ARFORMOTEROL TARTRATE 15 MCG: 15 SOLUTION RESPIRATORY (INHALATION) at 07:20

## 2024-10-09 RX ADMIN — IPRATROPIUM BROMIDE AND ALBUTEROL SULFATE 1 DOSE: 2.5; .5 SOLUTION RESPIRATORY (INHALATION) at 07:20

## 2024-10-09 RX ADMIN — ENOXAPARIN SODIUM 30 MG: 100 INJECTION SUBCUTANEOUS at 20:40

## 2024-10-09 RX ADMIN — NAPROXEN 500 MG: 500 TABLET ORAL at 20:41

## 2024-10-09 RX ADMIN — AMLODIPINE BESYLATE 10 MG: 10 TABLET ORAL at 09:28

## 2024-10-09 RX ADMIN — GUAIFENESIN 400 MG: 400 TABLET ORAL at 20:52

## 2024-10-09 RX ADMIN — SODIUM CHLORIDE, PRESERVATIVE FREE 10 ML: 5 INJECTION INTRAVENOUS at 20:34

## 2024-10-09 RX ADMIN — WATER 40 MG: 1 INJECTION INTRAMUSCULAR; INTRAVENOUS; SUBCUTANEOUS at 04:04

## 2024-10-09 RX ADMIN — ATORVASTATIN CALCIUM 10 MG: 10 TABLET, FILM COATED ORAL at 20:40

## 2024-10-09 ASSESSMENT — PAIN DESCRIPTION - DESCRIPTORS
DESCRIPTORS: ACHING;DULL;DISCOMFORT
DESCRIPTORS: ACHING;CRAMPING;BURNING

## 2024-10-09 ASSESSMENT — PAIN SCALES - WONG BAKER
WONGBAKER_NUMERICALRESPONSE: NO HURT
WONGBAKER_NUMERICALRESPONSE: NO HURT
WONGBAKER_NUMERICALRESPONSE: HURTS A LITTLE BIT

## 2024-10-09 ASSESSMENT — PAIN SCALES - GENERAL
PAINLEVEL_OUTOF10: 0
PAINLEVEL_OUTOF10: 7
PAINLEVEL_OUTOF10: 2
PAINLEVEL_OUTOF10: 0
PAINLEVEL_OUTOF10: 5

## 2024-10-09 ASSESSMENT — PAIN - FUNCTIONAL ASSESSMENT: PAIN_FUNCTIONAL_ASSESSMENT: ACTIVITIES ARE NOT PREVENTED

## 2024-10-09 ASSESSMENT — PAIN DESCRIPTION - LOCATION
LOCATION: ABDOMEN
LOCATION: HEAD

## 2024-10-09 ASSESSMENT — PAIN DESCRIPTION - ORIENTATION
ORIENTATION: RIGHT;LEFT
ORIENTATION: POSTERIOR

## 2024-10-09 NOTE — PROGRESS NOTES
Hospitalist Progress Note      Synopsis: Patient admitted on 10/7/2024 54 y.o. male patient of no PCP on file history of polysubstance abuse, COPD, obesity, sleep apnea noncompliant with nocturnal positive pressure ventilation who presents with shortness of breath.  Patient presents ED with complaint of multiple shortness of breath associated with nonproductive cough no fevers or chills patient continues to drink alcohol, smokes cigarettes and abuse cocaine and THC + wheezing.     In ED patient is initially 94% on 6 L nasal cannula.  Pulse 104, respirations 22, temp 98.  Initial ABG demonstrates pH 7.3/CO2 52/O2 64 on 6 L nasal cannula.  Patient placed on BiPAP repeat ABG 7.26/77.7/277.7-BiPAP was changed to AVAPS.  Repeat ABG 7.25/70.7/159.  Chest x-ray NAD, EKG  Initial laboratory evaluation reveals increased bicarb at 31, troponin 14, 15, AST 60, glucose 120.  CBC is unremarkable. EKG normal sinus rhythm.  Patient is admitted for further evaluation and treatment.    ASSESSMENT:    Principal Problem:    Acute respiratory failure with hypoxia and hypercapnia  Active Problems:    Type 2 diabetes mellitus, without long-term current use of insulin (HCC)    Polysubstance abuse (HCC)    COPD exacerbation (HCC)    Cocaine use disorder (HCC)    Tobacco abuse    Obstructive sleep apnea syndrome noncompliant with positive pressure ventilation    Class 1 obesity due to excess calories with serious comorbidity and body mass index (BMI) of 34.0 to 34.9 in adult    Metabolic encephalopathy  Resolved Problems:    * No resolved hospital problems. *       PLAN:    COPD exacerbation, acute hypercapnic and hypoxic respiratory failure  Admit  IV steroids  Nebulizers  IV ABX  proCalcitonin   NIV wean as tolerated-attempted intermittent BiPAP however patient CO2 increased this morning, resume AVAPS  Repeat ABG at 12 and 5-reviewed NIV adjusted discussed with respiratory therapy    Metabolic encephalopathy secondary to CO2

## 2024-10-09 NOTE — CARE COORDINATION
CM Update: Met with pt at bedside. He worked with PT and did very well, ambulated 125 feet SBA no AD. Pt lives alone in a 2 story home with 3 victorina. He ambulated at home with no assistive device. Independent with ADL's. He has a nebulizer and O2 through [facility]. Pt is planning for home with no needs. IV Solu medrol q 12 continues. IV Zithromax and Rocephin continues. 3 liters vs Bipap on 4 off 4. CM to follow for any dc needs that arise (TF)        JUAN SantiagoN,RN  Case Management  [phone]

## 2024-10-09 NOTE — PROGRESS NOTES
Physical Therapy      Physical Therapy Initial Assessment       Name: Ryan Mcghee  : 1970  MRN: [id number]      Date of Service: 10/9/2024    Evaluating PT:  Thuan Kerr PT, DPT  [id number]    Room #:  4507/4507-B  Diagnosis:  COPD exacerbation ([facility]) [J44.1]  Acute respiratory failure with hypoxia and hypercapnia [J96.01, J96.02]  PMHx/PSHx:   has a past medical history of Acid reflux, Asthma, Asthma, COPD (chronic obstructive pulmonary disease) ([facility]), Hypertension, Pancreatitis, Prediabetes, Psychiatric problem, Sleep apnea, and Substance abuse ([facility]).  Procedure/Surgery:    Precautions:  3 L O2, Falls  Equipment Needs:      SUBJECTIVE:    Pt lives alone in a 2 story home with 3 stairs to enter and single rail.  Pt ambulated with no AD independently PTA.  Equipment Owned:     OBJECTIVE:   Initial Evaluation  Date: 10/9/24 Treatment Short Term/ Long Term   Goals   AM-PAC 6 Clicks      Was pt agreeable to Eval/treatment? yes     Does pt have pain? No c/o pain     Bed Mobility  Independent    Rolling: Independent    Supine to sit: Independent    Sit to supine: Independent    Scooting: Independent     Transfers Sit to stand: SBA  Stand to sit: SBA  Stand pivot: SBA  Sit to stand: Modified Independent    Stand to sit: Modified Independent    Stand pivot: Modified Independent     Ambulation    125 feet with SBA no AD  300 feet with Modified Independent      Stair negotiation: ascended and descended  NT  4 steps with single rail Modified Independent     ROM BUE:  Defer to OT  BLE:  WFL     Strength BUE:  Defer to OT  BLE:  5/5  Improve 1 MMT   Balance Sitting EOB:  SBA  Dynamic Standing:  SBA no AD  Sitting EOB:   Independent    Dynamic Standing:  Modified Independent       Pt is A & O x 4  Sensation:  WNL  Edema:  WNL    Vitals:    HR  80 Spo2 95% 3 L  PRE  HR 85  Spo2 95% 3 L   ACTIVITY  BP seated post ambulation 137/94      Therapeutic Exercises:  Functional mobility    Patient education  Pt educated on role  of PT    Patient response to education:   Pt verbalized understanding Pt demonstrated skill Pt requires further education in this area   x x x     ASSESSMENT:    Conditions Requiring Skilled Therapeutic Intervention:    [x]Decreased strength     []Decreased ROM  [x]Decreased functional mobility  [x]Decreased balance   []Decreased endurance   []Decreased posture  []Decreased sensation  []Decreased coordination   []Decreased vision  [x]Decreased safety awareness   []Increased pain       Comments:  Pt agreeable to PT evaluation, cleared by RN. Pt performing bed mobility, transfers, and ambulation without assist. Educated on pacing, energy conservation, and pursed lip breathing. Pt reported lightheadedness. Vitals noted to be stable. Pt gait is steady, no gross LOB. Patient would benefit from continued skilled PT to maximize functional mobility independence.       Treatment:  Patient practiced and was instructed in the following treatment:    Bed mobility- verbal cues to facilitate independence  Functional transfers-Verbal cues for proper positioning and sequencing to perform transfers safely with maximum independence.   Gait training-Verbal cues for proper positioning and sequencing to maximize functional mobility independence.      Pt's/ family goals   1. Get better    Prognosis is good for reaching above PT goals.    Patient and or family understand(s) diagnosis, prognosis, and plan of care.  yes    PHYSICAL THERAPY PLAN OF CARE:    PT POC is established based on physician order and patient diagnosis     Referring provider/PT Order:    10/07/24 1445  PT evaluation and treat  Start:  10/07/24 1445,   End:  10/07/24 1445,   ONE TIME,   Standing Count:  1 Occurrences,   R         Emmanuel Connelly MD     Diagnosis:  COPD exacerbation (HCC) [J44.1]  Acute respiratory failure with hypoxia and hypercapnia [J96.01, J96.02]  Specific instructions for next treatment:  Functional mobility    Current Treatment

## 2024-10-09 NOTE — PROGRESS NOTES
10/09/24 1530   NIV Type   NIV Started/Stopped On   Equipment Type v60   Mode AVAPS   Mask Type Full face mask   Mask Size Large   Assessment   Respirations 23   Comfort Level Good   Using Accessory Muscles No   Mask Compliance Good   Skin Assessment Clean, dry, & intact   Skin Protection for O2 Device Yes   Orientation Middle   Location Nose   Intervention(s) Skin Barrier   Settings/Measurements   PIP Observed 16 cm H20   CPAP/EPAP 8 cmH2O   Vt (Set, mL) 550 mL   Vt (Measured) 405 mL   Rate Ordered 20   Insp Rise Time (%) 3 %   FiO2  50 %   I Time/ I Time % 0.85 s   Minute Volume (L/min) 9.3 Liters   Mask Leak (lpm) 25 lpm   Patient's Home Machine No   Alarm Settings   Alarms On Y

## 2024-10-10 LAB
ANION GAP SERPL CALCULATED.3IONS-SCNC: 6 MMOL/L (ref 7–16)
BASOPHILS # BLD: 0.01 K/UL (ref 0–0.2)
BASOPHILS NFR BLD: 0 % (ref 0–2)
BUN SERPL-MCNC: 10 MG/DL (ref 6–20)
CALCIUM SERPL-MCNC: 9 MG/DL (ref 8.6–10.2)
CHLORIDE SERPL-SCNC: 95 MMOL/L (ref 98–107)
CO2 SERPL-SCNC: 35 MMOL/L (ref 22–29)
CREAT SERPL-MCNC: 0.5 MG/DL (ref 0.7–1.2)
EOSINOPHIL # BLD: 0.02 K/UL (ref 0.05–0.5)
EOSINOPHILS RELATIVE PERCENT: 0 % (ref 0–6)
ERYTHROCYTE [DISTWIDTH] IN BLOOD BY AUTOMATED COUNT: 13.6 % (ref 11.5–15)
GFR, ESTIMATED: >90 ML/MIN/1.73M2
GLUCOSE BLD-MCNC: 146 MG/DL (ref 74–99)
GLUCOSE BLD-MCNC: 183 MG/DL (ref 74–99)
GLUCOSE BLD-MCNC: 194 MG/DL (ref 74–99)
GLUCOSE BLD-MCNC: 222 MG/DL (ref 74–99)
GLUCOSE SERPL-MCNC: 329 MG/DL (ref 74–99)
HCT VFR BLD AUTO: 41.2 % (ref 37–54)
HGB BLD-MCNC: 12.5 G/DL (ref 12.5–16.5)
IMM GRANULOCYTES # BLD AUTO: 0.07 K/UL (ref 0–0.58)
IMM GRANULOCYTES NFR BLD: 1 % (ref 0–5)
LYMPHOCYTES NFR BLD: 1.79 K/UL (ref 1.5–4)
LYMPHOCYTES RELATIVE PERCENT: 17 % (ref 20–42)
MCH RBC QN AUTO: 27.1 PG (ref 26–35)
MCHC RBC AUTO-ENTMCNC: 30.3 G/DL (ref 32–34.5)
MCV RBC AUTO: 89.2 FL (ref 80–99.9)
MONOCYTES NFR BLD: 0.87 K/UL (ref 0.1–0.95)
MONOCYTES NFR BLD: 8 % (ref 2–12)
NEUTROPHILS NFR BLD: 74 % (ref 43–80)
NEUTS SEG NFR BLD: 7.72 K/UL (ref 1.8–7.3)
PLATELET # BLD AUTO: 335 K/UL (ref 130–450)
PMV BLD AUTO: 9.5 FL (ref 7–12)
POTASSIUM SERPL-SCNC: 4.1 MMOL/L (ref 3.5–5)
RBC # BLD AUTO: 4.62 M/UL (ref 3.8–5.8)
SODIUM SERPL-SCNC: 136 MMOL/L (ref 132–146)
WBC OTHER # BLD: 10.5 K/UL (ref 4.5–11.5)

## 2024-10-10 PROCEDURE — 6360000002 HC RX W HCPCS: Performed by: INTERNAL MEDICINE

## 2024-10-10 PROCEDURE — 2060000000 HC ICU INTERMEDIATE R&B

## 2024-10-10 PROCEDURE — 6370000000 HC RX 637 (ALT 250 FOR IP): Performed by: INTERNAL MEDICINE

## 2024-10-10 PROCEDURE — 80048 BASIC METABOLIC PNL TOTAL CA: CPT

## 2024-10-10 PROCEDURE — 97165 OT EVAL LOW COMPLEX 30 MIN: CPT

## 2024-10-10 PROCEDURE — 2580000003 HC RX 258: Performed by: INTERNAL MEDICINE

## 2024-10-10 PROCEDURE — 94660 CPAP INITIATION&MGMT: CPT

## 2024-10-10 PROCEDURE — 36415 COLL VENOUS BLD VENIPUNCTURE: CPT

## 2024-10-10 PROCEDURE — 99232 SBSQ HOSP IP/OBS MODERATE 35: CPT | Performed by: INTERNAL MEDICINE

## 2024-10-10 PROCEDURE — 85025 COMPLETE CBC W/AUTO DIFF WBC: CPT

## 2024-10-10 PROCEDURE — 94640 AIRWAY INHALATION TREATMENT: CPT

## 2024-10-10 PROCEDURE — 82962 GLUCOSE BLOOD TEST: CPT

## 2024-10-10 RX ORDER — AZITHROMYCIN 250 MG/1
250 TABLET, FILM COATED ORAL DAILY
Status: DISCONTINUED | OUTPATIENT
Start: 2024-10-11 | End: 2024-10-11 | Stop reason: HOSPADM

## 2024-10-10 RX ORDER — GABAPENTIN 300 MG/1
300 CAPSULE ORAL 3 TIMES DAILY
Status: DISCONTINUED | OUTPATIENT
Start: 2024-10-10 | End: 2024-10-11 | Stop reason: HOSPADM

## 2024-10-10 RX ORDER — BUPROPION HYDROCHLORIDE 300 MG/1
300 TABLET ORAL EVERY MORNING
Status: DISCONTINUED | OUTPATIENT
Start: 2024-10-10 | End: 2024-10-11 | Stop reason: HOSPADM

## 2024-10-10 RX ORDER — HYDROXYZINE PAMOATE 50 MG/1
50 CAPSULE ORAL NIGHTLY
Status: DISCONTINUED | OUTPATIENT
Start: 2024-10-10 | End: 2024-10-11 | Stop reason: HOSPADM

## 2024-10-10 RX ADMIN — PANTOPRAZOLE SODIUM 40 MG: 40 TABLET, DELAYED RELEASE ORAL at 09:39

## 2024-10-10 RX ADMIN — GABAPENTIN 300 MG: 300 CAPSULE ORAL at 21:13

## 2024-10-10 RX ADMIN — GUAIFENESIN 400 MG: 400 TABLET ORAL at 21:13

## 2024-10-10 RX ADMIN — BUDESONIDE 500 MCG: 0.5 SUSPENSION RESPIRATORY (INHALATION) at 20:02

## 2024-10-10 RX ADMIN — AMLODIPINE BESYLATE 10 MG: 10 TABLET ORAL at 09:37

## 2024-10-10 RX ADMIN — ASPIRIN 81 MG: 81 TABLET, COATED ORAL at 09:38

## 2024-10-10 RX ADMIN — ARFORMOTEROL TARTRATE 15 MCG: 15 SOLUTION RESPIRATORY (INHALATION) at 20:01

## 2024-10-10 RX ADMIN — PREDNISONE 20 MG: 20 TABLET ORAL at 21:13

## 2024-10-10 RX ADMIN — NAPROXEN 500 MG: 500 TABLET ORAL at 09:39

## 2024-10-10 RX ADMIN — GABAPENTIN 300 MG: 300 CAPSULE ORAL at 13:28

## 2024-10-10 RX ADMIN — HYDROXYZINE PAMOATE 50 MG: 50 CAPSULE ORAL at 21:13

## 2024-10-10 RX ADMIN — IPRATROPIUM BROMIDE AND ALBUTEROL SULFATE 1 DOSE: 2.5; .5 SOLUTION RESPIRATORY (INHALATION) at 16:40

## 2024-10-10 RX ADMIN — FLUTICASONE PROPIONATE 1 SPRAY: 50 SPRAY, METERED NASAL at 09:45

## 2024-10-10 RX ADMIN — METFORMIN HYDROCHLORIDE 500 MG: 1000 TABLET, FILM COATED ORAL at 09:46

## 2024-10-10 RX ADMIN — ARFORMOTEROL TARTRATE 15 MCG: 15 SOLUTION RESPIRATORY (INHALATION) at 08:27

## 2024-10-10 RX ADMIN — WATER 1000 MG: 1 INJECTION INTRAMUSCULAR; INTRAVENOUS; SUBCUTANEOUS at 13:28

## 2024-10-10 RX ADMIN — AZITHROMYCIN MONOHYDRATE 500 MG: 500 INJECTION, POWDER, LYOPHILIZED, FOR SOLUTION INTRAVENOUS at 13:32

## 2024-10-10 RX ADMIN — INSULIN GLARGINE 30 UNITS: 100 INJECTION, SOLUTION SUBCUTANEOUS at 23:38

## 2024-10-10 RX ADMIN — IPRATROPIUM BROMIDE AND ALBUTEROL SULFATE 1 DOSE: 2.5; .5 SOLUTION RESPIRATORY (INHALATION) at 20:01

## 2024-10-10 RX ADMIN — IPRATROPIUM BROMIDE AND ALBUTEROL SULFATE 1 DOSE: 2.5; .5 SOLUTION RESPIRATORY (INHALATION) at 08:27

## 2024-10-10 RX ADMIN — METFORMIN HYDROCHLORIDE 500 MG: 1000 TABLET, FILM COATED ORAL at 17:35

## 2024-10-10 RX ADMIN — BUPROPION HYDROCHLORIDE 300 MG: 300 TABLET, EXTENDED RELEASE ORAL at 09:44

## 2024-10-10 RX ADMIN — SODIUM CHLORIDE, PRESERVATIVE FREE 10 ML: 5 INJECTION INTRAVENOUS at 09:38

## 2024-10-10 RX ADMIN — Medication 10 MG: at 21:13

## 2024-10-10 RX ADMIN — GABAPENTIN 300 MG: 300 CAPSULE ORAL at 09:38

## 2024-10-10 RX ADMIN — FINASTERIDE 5 MG: 5 TABLET, FILM COATED ORAL at 09:39

## 2024-10-10 RX ADMIN — ATORVASTATIN CALCIUM 10 MG: 10 TABLET, FILM COATED ORAL at 21:13

## 2024-10-10 RX ADMIN — TAMSULOSIN HYDROCHLORIDE 0.4 MG: 0.4 CAPSULE ORAL at 21:13

## 2024-10-10 RX ADMIN — BUDESONIDE 500 MCG: 0.5 SUSPENSION RESPIRATORY (INHALATION) at 08:27

## 2024-10-10 RX ADMIN — PREDNISONE 20 MG: 20 TABLET ORAL at 09:38

## 2024-10-10 RX ADMIN — ENOXAPARIN SODIUM 30 MG: 100 INJECTION SUBCUTANEOUS at 09:38

## 2024-10-10 RX ADMIN — TAMSULOSIN HYDROCHLORIDE 0.4 MG: 0.4 CAPSULE ORAL at 09:38

## 2024-10-10 RX ADMIN — IPRATROPIUM BROMIDE AND ALBUTEROL SULFATE 1 DOSE: 2.5; .5 SOLUTION RESPIRATORY (INHALATION) at 12:22

## 2024-10-10 ASSESSMENT — PAIN DESCRIPTION - LOCATION: LOCATION: HEAD

## 2024-10-10 ASSESSMENT — PAIN SCALES - GENERAL: PAINLEVEL_OUTOF10: 3

## 2024-10-10 ASSESSMENT — PAIN DESCRIPTION - DESCRIPTORS: DESCRIPTORS: ACHING

## 2024-10-10 NOTE — CARE COORDINATION
CM Update: Pt is planning for home with no needs. IV Zithromax, IV Solu Medrol and IV Rocephin. CM to follow(TF)        JUAN SantiagoN,RN  Case Management  [phone]

## 2024-10-10 NOTE — PROGRESS NOTES
Occupational Therapy  OCCUPATIONAL THERAPY INITIAL EVALUATION  [facility]  [address], [place]    Date: 10/10/2024     Patient Name: Ryan Mcghee  MRN: [id number]  : 1970  Room: [address] [place] [address]    Evaluating OT: Anamika Bethea, OTR/L - [id number]    Referring Provider: Emmanuel Connelly MD   Specific Provider Orders/Date: \"OT eval and treat\" - 10/7/2024    Diagnosis: COPD exacerbation (HCC) [J44.1]  Acute respiratory failure with hypoxia and hypercapnia [J96.01, J96.02]      Pertinent Medical History: asthma, COPD, HTN, depression, substance abuse     Precautions: fall risk, O2    Assessment of Current Deficits:    [x] Functional mobility   [x]ADLs  [x] Strength               []Cognition   [x] Functional transfers   [x] IADLs         [x] Safety Awareness   [x]Endurance   [] Fine Coordination              [x] Balance      [] Vision/perception   []Sensation    []Gross Motor Coordination  [] ROM  [] Delirium                   [] Motor Control     OT PLAN OF CARE   OT POC is based on physician orders, patient diagnosis, and results of clinical assessment.  Frequency/Duration 1-3 days/week for 2 weeks PRN   Specific OT Treatment Interventions to Include:   * Instruction/training on adapted ADL techniques and AE recommendations to increase functional independence within precautions       * Training on energy conservation strategies, correct breathing pattern and techniques to improve independence/tolerance for self-care routine  * Functional transfer/mobility training/DME recommendations for increased independence, safety, and fall prevention  * Patient/Family education to increase follow through with safety techniques and functional independence  * Recommendation of environmental modifications for increased safety with functional transfers/mobility and ADLs  * Therapeutic exercise to improve motor endurance, ROM, and functional strength for  understanding.    Further skilled OT treatment indicated to increase patient's safety and independence with completion of ADL/IADL tasks in order to maximize patient's functional independence and quality of life.    Rehab Potential: Good for established goals.  Patient / Family Goal: to go home  Patient and/or family were instructed on functional diagnosis, prognosis/goals, and OT plan of care. Verbalized understanding.    Eval Complexity: low     Time In: 1234  Time Out: 1251  Total Treatment Time: 0 minutes      Minutes Units   OT Eval Low 45490 17 1   OT Eval Medium 63349     OT Eval High 13288     OT Re-Eval 23582     Therapeutic Ex 10335     Therapeutic Activities 87387     ADL/Self Care 81645     Orthotic Management 98402     Neuro Re-Ed 26981     Non-Billable Time N/A ---     Evaluation time includes thorough review of current medical information, gathering information on past medical history/social history and prior level of function, completion of standardized testing/informal observation of tasks, assessment of data, and education on plan of care and goals.    DARRIAN Clancy/L  License Number: [id number]

## 2024-10-10 NOTE — PROGRESS NOTES
Hospitalist Progress Note      Synopsis: Patient admitted on 10/7/2024 54 y.o. male patient of no PCP on file history of polysubstance abuse, COPD, obesity, sleep apnea noncompliant with nocturnal positive pressure ventilation who presents with shortness of breath.  Patient presents ED with complaint of multiple shortness of breath associated with nonproductive cough no fevers or chills patient continues to drink alcohol, smokes cigarettes and abuse cocaine and THC + wheezing.     In ED patient is initially 94% on 6 L nasal cannula.  Pulse 104, respirations 22, temp 98.  Initial ABG demonstrates pH 7.3/CO2 52/O2 64 on 6 L nasal cannula.  Patient placed on BiPAP repeat ABG 7.26/77.7/277.7-BiPAP was changed to AVAPS.  Repeat ABG 7.25/70.7/159.  Chest x-ray NAD, EKG  Initial laboratory evaluation reveals increased bicarb at 31, troponin 14, 15, AST 60, glucose 120.  CBC is unremarkable. EKG normal sinus rhythm.  Patient is admitted for further evaluation and treatment.    ASSESSMENT:    Principal Problem:    Acute respiratory failure with hypoxia and hypercapnia  Active Problems:    Type 2 diabetes mellitus, without long-term current use of insulin (HCC)    Polysubstance abuse (HCC)    COPD exacerbation (HCC)    Cocaine use disorder (HCC)    Tobacco abuse    Obstructive sleep apnea syndrome noncompliant with positive pressure ventilation    Class 1 obesity due to excess calories with serious comorbidity and body mass index (BMI) of 34.0 to 34.9 in adult    Metabolic encephalopathy  Resolved Problems:    * No resolved hospital problems. *       PLAN:    COPD exacerbation, acute hypercapnic and hypoxic respiratory failure  Admit  IV steroids transition to prednisone  Nebulizers  IV ABX transition to p.o.  proCalcitonin   NIV wean as tolerated-attempted intermittent BiPAP however patient CO2 increased this morning, resume AVAPS  Repeat ABG at 12 and 5-reviewed NIV adjusted discussed with respiratory  or edema bilaterally. Brisk capillary refill. 2+radial pulses.   Skin:  No rashes    Neurologic: awake, alert and following commands    Medications:  Reviewed    Infusion Medications    sodium chloride      dextrose       Scheduled Medications    insulin glargine  30 Units SubCUTAneous Nightly    nicotine  1 patch TransDERmal Daily    amLODIPine  10 mg Oral Daily    aspirin  81 mg Oral Daily    atorvastatin  10 mg Oral Nightly    arformoterol tartrate  15 mcg Nebulization BID RT    budesonide  500 mcg Nebulization BID RT    finasteride  5 mg Oral Daily    fluticasone  1 spray Each Nostril Daily    linaclotide  145 mcg Oral QAM AC    pantoprazole  40 mg Oral Daily    tamsulosin  0.4 mg Oral BID    sodium chloride flush  5-40 mL IntraVENous 2 times per day    enoxaparin  30 mg SubCUTAneous BID    ipratropium 0.5 mg-albuterol 2.5 mg  1 Dose Inhalation Q4H WA RT    predniSONE  20 mg Oral BID    cefTRIAXone (ROCEPHIN) IV  1,000 mg IntraVENous Q24H    azithromycin  500 mg IntraVENous Q24H    insulin lispro  0-8 Units SubCUTAneous TID WC    insulin lispro  0-4 Units SubCUTAneous Nightly     PRN Meds: naproxen, sennosides-docusate sodium, guaiFENesin, melatonin, sodium chloride, sodium chloride flush, sodium chloride, ondansetron **OR** ondansetron, magnesium hydroxide, acetaminophen **OR** acetaminophen, glucose, dextrose bolus **OR** dextrose bolus, glucagon (rDNA), dextrose    I/O  No intake or output data in the 24 hours ending 10/10/24 0807      Labs:   Recent Labs     10/08/24  0447 10/09/24  0507 10/10/24  0508   WBC 8.2 10.9 10.5   HGB 12.6 12.3* 12.5   HCT 42.1 41.8 41.2    324 335       Recent Labs     10/08/24  0447 10/09/24  0507 10/10/24  0508    140 136   K 4.2 4.3 4.1   CL 95* 98 95*   CO2 33* 34* 35*   BUN 14 16 10   CREATININE 0.6* 0.5* 0.5*   CALCIUM 9.0 8.8 9.0       No results for input(s): \"ALKPHOS\", \"ALT\", \"AST\", \"BILITOT\", \"AMYLASE\", \"LIPASE\" in the last 72 hours.    Invalid input(s):

## 2024-10-10 NOTE — PROGRESS NOTES
Date: 10/9/2024    Time: 9:57 PM    Patient Placed On BIPAP/CPAP/ Non-Invasive Ventilation?  Yes    If no must comment.  Facial area red/color change? No           If YES are Blister/Lesion present?No   If yes must notify nursing staff  BIPAP/CPAP skin barrier?  Yes    Skin barrier type:mepilexlite       Comments:        Minerva Hall RCP

## 2024-10-11 VITALS
OXYGEN SATURATION: 98 % | HEIGHT: 69 IN | TEMPERATURE: 96.7 F | HEART RATE: 104 BPM | SYSTOLIC BLOOD PRESSURE: 129 MMHG | RESPIRATION RATE: 18 BRPM | DIASTOLIC BLOOD PRESSURE: 89 MMHG | BODY MASS INDEX: 34.8 KG/M2 | WEIGHT: 235 LBS

## 2024-10-11 LAB
ANION GAP SERPL CALCULATED.3IONS-SCNC: 12 MMOL/L (ref 7–16)
BASOPHILS # BLD: 0.04 K/UL (ref 0–0.2)
BASOPHILS NFR BLD: 0 % (ref 0–2)
BUN SERPL-MCNC: 12 MG/DL (ref 6–20)
CALCIUM SERPL-MCNC: 9.7 MG/DL (ref 8.6–10.2)
CHLORIDE SERPL-SCNC: 95 MMOL/L (ref 98–107)
CO2 SERPL-SCNC: 31 MMOL/L (ref 22–29)
CREAT SERPL-MCNC: 0.6 MG/DL (ref 0.7–1.2)
EOSINOPHIL # BLD: 0 K/UL (ref 0.05–0.5)
EOSINOPHILS RELATIVE PERCENT: 0 % (ref 0–6)
ERYTHROCYTE [DISTWIDTH] IN BLOOD BY AUTOMATED COUNT: 13.6 % (ref 11.5–15)
GFR, ESTIMATED: >90 ML/MIN/1.73M2
GLUCOSE BLD-MCNC: 174 MG/DL (ref 74–99)
GLUCOSE BLD-MCNC: 233 MG/DL (ref 74–99)
GLUCOSE BLD-MCNC: 284 MG/DL (ref 74–99)
GLUCOSE SERPL-MCNC: 380 MG/DL (ref 74–99)
HCT VFR BLD AUTO: 44.2 % (ref 37–54)
HGB BLD-MCNC: 13.2 G/DL (ref 12.5–16.5)
IMM GRANULOCYTES # BLD AUTO: 0.08 K/UL (ref 0–0.58)
IMM GRANULOCYTES NFR BLD: 1 % (ref 0–5)
LYMPHOCYTES NFR BLD: 0.95 K/UL (ref 1.5–4)
LYMPHOCYTES RELATIVE PERCENT: 10 % (ref 20–42)
MCH RBC QN AUTO: 26.2 PG (ref 26–35)
MCHC RBC AUTO-ENTMCNC: 29.9 G/DL (ref 32–34.5)
MCV RBC AUTO: 87.9 FL (ref 80–99.9)
MONOCYTES NFR BLD: 0.58 K/UL (ref 0.1–0.95)
MONOCYTES NFR BLD: 6 % (ref 2–12)
NEUTROPHILS NFR BLD: 83 % (ref 43–80)
NEUTS SEG NFR BLD: 7.89 K/UL (ref 1.8–7.3)
PLATELET # BLD AUTO: 359 K/UL (ref 130–450)
PMV BLD AUTO: 9.5 FL (ref 7–12)
POTASSIUM SERPL-SCNC: 4.5 MMOL/L (ref 3.5–5)
RBC # BLD AUTO: 5.03 M/UL (ref 3.8–5.8)
SODIUM SERPL-SCNC: 138 MMOL/L (ref 132–146)
WBC OTHER # BLD: 9.5 K/UL (ref 4.5–11.5)

## 2024-10-11 PROCEDURE — 94640 AIRWAY INHALATION TREATMENT: CPT

## 2024-10-11 PROCEDURE — 82962 GLUCOSE BLOOD TEST: CPT

## 2024-10-11 PROCEDURE — 99239 HOSP IP/OBS DSCHRG MGMT >30: CPT | Performed by: INTERNAL MEDICINE

## 2024-10-11 PROCEDURE — 94660 CPAP INITIATION&MGMT: CPT

## 2024-10-11 PROCEDURE — 36415 COLL VENOUS BLD VENIPUNCTURE: CPT

## 2024-10-11 PROCEDURE — 6370000000 HC RX 637 (ALT 250 FOR IP): Performed by: INTERNAL MEDICINE

## 2024-10-11 PROCEDURE — 6360000002 HC RX W HCPCS: Performed by: INTERNAL MEDICINE

## 2024-10-11 PROCEDURE — 85025 COMPLETE CBC W/AUTO DIFF WBC: CPT

## 2024-10-11 PROCEDURE — 80048 BASIC METABOLIC PNL TOTAL CA: CPT

## 2024-10-11 PROCEDURE — 2700000000 HC OXYGEN THERAPY PER DAY

## 2024-10-11 RX ORDER — AZITHROMYCIN 250 MG/1
250 TABLET, FILM COATED ORAL DAILY
Qty: 2 TABLET | Refills: 0 | Status: ON HOLD | OUTPATIENT
Start: 2024-10-12 | End: 2024-10-16 | Stop reason: HOSPADM

## 2024-10-11 RX ORDER — PREDNISONE 10 MG/1
TABLET ORAL
Qty: 30 TABLET | Refills: 0 | Status: ON HOLD | OUTPATIENT
Start: 2024-10-11 | End: 2024-10-16 | Stop reason: HOSPADM

## 2024-10-11 RX ADMIN — PANTOPRAZOLE SODIUM 40 MG: 40 TABLET, DELAYED RELEASE ORAL at 09:14

## 2024-10-11 RX ADMIN — BUDESONIDE 500 MCG: 0.5 SUSPENSION RESPIRATORY (INHALATION) at 09:37

## 2024-10-11 RX ADMIN — GABAPENTIN 300 MG: 300 CAPSULE ORAL at 09:14

## 2024-10-11 RX ADMIN — NAPROXEN 500 MG: 500 TABLET ORAL at 09:15

## 2024-10-11 RX ADMIN — IPRATROPIUM BROMIDE AND ALBUTEROL SULFATE 1 DOSE: 2.5; .5 SOLUTION RESPIRATORY (INHALATION) at 09:37

## 2024-10-11 RX ADMIN — INSULIN LISPRO 2 UNITS: 100 INJECTION, SOLUTION INTRAVENOUS; SUBCUTANEOUS at 09:16

## 2024-10-11 RX ADMIN — PREDNISONE 20 MG: 20 TABLET ORAL at 09:15

## 2024-10-11 RX ADMIN — AZITHROMYCIN DIHYDRATE 250 MG: 250 TABLET ORAL at 09:15

## 2024-10-11 RX ADMIN — TAMSULOSIN HYDROCHLORIDE 0.4 MG: 0.4 CAPSULE ORAL at 09:14

## 2024-10-11 RX ADMIN — FINASTERIDE 5 MG: 5 TABLET, FILM COATED ORAL at 09:16

## 2024-10-11 RX ADMIN — GABAPENTIN 300 MG: 300 CAPSULE ORAL at 14:24

## 2024-10-11 RX ADMIN — ARFORMOTEROL TARTRATE 15 MCG: 15 SOLUTION RESPIRATORY (INHALATION) at 09:37

## 2024-10-11 RX ADMIN — FLUTICASONE PROPIONATE 1 SPRAY: 50 SPRAY, METERED NASAL at 09:21

## 2024-10-11 RX ADMIN — BUPROPION HYDROCHLORIDE 300 MG: 300 TABLET, EXTENDED RELEASE ORAL at 09:15

## 2024-10-11 RX ADMIN — AMLODIPINE BESYLATE 10 MG: 10 TABLET ORAL at 09:16

## 2024-10-11 RX ADMIN — METFORMIN HYDROCHLORIDE 500 MG: 1000 TABLET, FILM COATED ORAL at 09:26

## 2024-10-11 RX ADMIN — ASPIRIN 81 MG: 81 TABLET, COATED ORAL at 09:14

## 2024-10-11 RX ADMIN — IPRATROPIUM BROMIDE AND ALBUTEROL SULFATE 1 DOSE: 2.5; .5 SOLUTION RESPIRATORY (INHALATION) at 13:16

## 2024-10-11 ASSESSMENT — PAIN DESCRIPTION - LOCATION: LOCATION: HIP

## 2024-10-11 ASSESSMENT — PAIN SCALES - GENERAL
PAINLEVEL_OUTOF10: 3
PAINLEVEL_OUTOF10: 4
PAINLEVEL_OUTOF10: 3

## 2024-10-11 ASSESSMENT — PAIN DESCRIPTION - ORIENTATION: ORIENTATION: RIGHT

## 2024-10-11 ASSESSMENT — PAIN SCALES - WONG BAKER
WONGBAKER_NUMERICALRESPONSE: NO HURT
WONGBAKER_NUMERICALRESPONSE: NO HURT

## 2024-10-11 ASSESSMENT — PAIN - FUNCTIONAL ASSESSMENT: PAIN_FUNCTIONAL_ASSESSMENT: PREVENTS OR INTERFERES SOME ACTIVE ACTIVITIES AND ADLS

## 2024-10-11 ASSESSMENT — PAIN DESCRIPTION - DESCRIPTORS: DESCRIPTORS: ACHING

## 2024-10-11 NOTE — PLAN OF CARE
Problem: Chronic Conditions and Co-morbidities  Goal: Patient's chronic conditions and co-morbidity symptoms are monitored and maintained or improved  10/11/2024 0300 by Manuel Beck RN  Outcome: Progressing  10/10/2024 2142 by Manuel Beck RN  Outcome: Progressing  10/10/2024 2142 by Manuel Beck RN  Outcome: Progressing  10/10/2024 1436 by Marifer Almaraz RN  Outcome: Progressing     Problem: Discharge Planning  Goal: Discharge to home or other facility with appropriate resources  10/11/2024 0300 by Manuel Beck RN  Outcome: Progressing  10/10/2024 2142 by Manuel Beck RN  Outcome: Progressing  10/10/2024 2142 by Manuel Beck RN  Outcome: Progressing     Problem: Skin/Tissue Integrity  Goal: Absence of new skin breakdown  Description: 1.  Monitor for areas of redness and/or skin breakdown  2.  Assess vascular access sites hourly  3.  Every 4-6 hours minimum:  Change oxygen saturation probe site  4.  Every 4-6 hours:  If on nasal continuous positive airway pressure, respiratory therapy assess nares and determine need for appliance change or resting period.  10/11/2024 0300 by Manuel Beck RN  Outcome: Progressing  10/10/2024 2142 by Manuel Beck RN  Outcome: Progressing  10/10/2024 2142 by Manuel Beck RN  Outcome: Progressing     Problem: ABCDS Injury Assessment  Goal: Absence of physical injury  10/11/2024 0300 by Manuel Beck RN  Outcome: Progressing  10/10/2024 2142 by Manuel Beck RN  Outcome: Progressing  10/10/2024 2142 by Manuel Beck RN  Outcome: Progressing     Problem: Safety - Adult  Goal: Free from fall injury  10/11/2024 0300 by Manuel Beck RN  Outcome: Progressing  10/10/2024 2142 by Manuel Beck RN  Outcome: Progressing  10/10/2024 2142 by Manuel Beck RN  Outcome: Progressing  10/10/2024 1436 by Marifer Almaraz RN  Outcome: Progressing     Problem: Respiratory - Adult  Goal: Achieves optimal ventilation and oxygenation  10/11/2024 7980 
  Problem: Chronic Conditions and Co-morbidities  Goal: Patient's chronic conditions and co-morbidity symptoms are monitored and maintained or improved  10/11/2024 1202 by Gloria Griffith RN  Outcome: Progressing  10/11/2024 0300 by Manuel Beck RN  Outcome: Progressing     Problem: Discharge Planning  Goal: Discharge to home or other facility with appropriate resources  10/11/2024 0300 by Manuel Beck RN  Outcome: Progressing     Problem: Skin/Tissue Integrity  Goal: Absence of new skin breakdown  Description: 1.  Monitor for areas of redness and/or skin breakdown  2.  Assess vascular access sites hourly  3.  Every 4-6 hours minimum:  Change oxygen saturation probe site  4.  Every 4-6 hours:  If on nasal continuous positive airway pressure, respiratory therapy assess nares and determine need for appliance change or resting period.  10/11/2024 0300 by Manuel Beck RN  Outcome: Progressing     Problem: ABCDS Injury Assessment  Goal: Absence of physical injury  10/11/2024 0300 by Manuel Beck RN  Outcome: Progressing     Problem: Safety - Adult  Goal: Free from fall injury  10/11/2024 0300 by Manuel Beck RN  Outcome: Progressing     Problem: Respiratory - Adult  Goal: Achieves optimal ventilation and oxygenation  10/11/2024 0300 by Manuel Beck RN  Outcome: Progressing     Problem: Pain  Goal: Verbalizes/displays adequate comfort level or baseline comfort level  10/11/2024 0300 by Manuel Beck RN  Outcome: Progressing     
  Problem: Chronic Conditions and Co-morbidities  Goal: Patient's chronic conditions and co-morbidity symptoms are monitored and maintained or improved  10/7/2024 2228 by Fransico Rivera RN  Outcome: Progressing  10/7/2024 1229 by Adele Hernández RN  Outcome: Progressing     Problem: Discharge Planning  Goal: Discharge to home or other facility with appropriate resources  10/7/2024 2228 by Fransico Rivera RN  Outcome: Progressing  Flowsheets (Taken 10/7/2024 2028)  Discharge to home or other facility with appropriate resources: Identify barriers to discharge with patient and caregiver  10/7/2024 1229 by Adele Hernández RN  Outcome: Progressing     Problem: Skin/Tissue Integrity  Goal: Absence of new skin breakdown  Description: 1.  Monitor for areas of redness and/or skin breakdown  2.  Assess vascular access sites hourly  3.  Every 4-6 hours minimum:  Change oxygen saturation probe site  4.  Every 4-6 hours:  If on nasal continuous positive airway pressure, respiratory therapy assess nares and determine need for appliance change or resting period.  10/7/2024 2228 by Fransico Rivera RN  Outcome: Progressing  10/7/2024 1229 by Adele Hernández RN  Outcome: Progressing     Problem: ABCDS Injury Assessment  Goal: Absence of physical injury  10/7/2024 2228 by Fransico Rivera RN  Outcome: Progressing  10/7/2024 1229 by Adele Hernández RN  Outcome: Progressing     Problem: Safety - Adult  Goal: Free from fall injury  Outcome: Progressing  Flowsheets (Taken 10/7/2024 2227)  Free From Fall Injury: Instruct family/caregiver on patient safety     
  Problem: Chronic Conditions and Co-morbidities  Goal: Patient's chronic conditions and co-morbidity symptoms are monitored and maintained or improved  10/7/2024 2228 by Fransico Rivera RN  Outcome: Progressing  10/7/2024 1229 by Adele Hernández RN  Outcome: Progressing     Problem: Discharge Planning  Goal: Discharge to home or other facility with appropriate resources  10/7/2024 2228 by Fransico Rivera RN  Outcome: Progressing  Flowsheets (Taken 10/7/2024 2028)  Discharge to home or other facility with appropriate resources: Identify barriers to discharge with patient and caregiver  10/7/2024 1229 by Adele Hernández RN  Outcome: Progressing     Problem: Skin/Tissue Integrity  Goal: Absence of new skin breakdown  Description: 1.  Monitor for areas of redness and/or skin breakdown  2.  Assess vascular access sites hourly  3.  Every 4-6 hours minimum:  Change oxygen saturation probe site  4.  Every 4-6 hours:  If on nasal continuous positive airway pressure, respiratory therapy assess nares and determine need for appliance change or resting period.  10/7/2024 2228 by Fransico Rivera RN  Outcome: Progressing  10/7/2024 1229 by Adele Hernández RN  Outcome: Progressing     Problem: ABCDS Injury Assessment  Goal: Absence of physical injury  10/7/2024 2228 by Fransico Rivera RN  Outcome: Progressing  10/7/2024 1229 by Adele Hernández RN  Outcome: Progressing     Problem: Safety - Adult  Goal: Free from fall injury  Outcome: Progressing  Flowsheets (Taken 10/7/2024 2227)  Free From Fall Injury: Instruct family/caregiver on patient safety     Problem: Respiratory - Adult  Goal: Achieves optimal ventilation and oxygenation  Outcome: Progressing     
  Problem: Chronic Conditions and Co-morbidities  Goal: Patient's chronic conditions and co-morbidity symptoms are monitored and maintained or improved  10/8/2024 1001 by Adele Hernández RN  Outcome: Progressing  10/7/2024 2228 by Fransico Rivera RN  Outcome: Progressing     Problem: Discharge Planning  Goal: Discharge to home or other facility with appropriate resources  10/8/2024 1001 by Adele Hernández RN  Outcome: Progressing  10/7/2024 2228 by Fransico Rivera RN  Outcome: Progressing  Flowsheets (Taken 10/7/2024 2028)  Discharge to home or other facility with appropriate resources: Identify barriers to discharge with patient and caregiver     Problem: Skin/Tissue Integrity  Goal: Absence of new skin breakdown  Description: 1.  Monitor for areas of redness and/or skin breakdown  2.  Assess vascular access sites hourly  3.  Every 4-6 hours minimum:  Change oxygen saturation probe site  4.  Every 4-6 hours:  If on nasal continuous positive airway pressure, respiratory therapy assess nares and determine need for appliance change or resting period.  10/8/2024 1001 by Adele Hernández RN  Outcome: Progressing  10/7/2024 2228 by Fransico Rivera RN  Outcome: Progressing     Problem: ABCDS Injury Assessment  Goal: Absence of physical injury  10/8/2024 1001 by Adele Hernández RN  Outcome: Progressing  10/7/2024 2228 by Fransico Rivera RN  Outcome: Progressing     Problem: Safety - Adult  Goal: Free from fall injury  10/8/2024 1001 by Adele Hernández RN  Outcome: Progressing  10/7/2024 2228 by Fransico Rivera RN  Outcome: Progressing  Flowsheets (Taken 10/7/2024 2227)  Free From Fall Injury: Instruct family/caregiver on patient safety     Problem: Respiratory - Adult  Goal: Achieves optimal ventilation and oxygenation  10/8/2024 1001 by Adele Hernández RN  Outcome: Progressing  10/7/2024 2228 by Fransico Rivera RN  Outcome: Progressing     
  Problem: Chronic Conditions and Co-morbidities  Goal: Patient's chronic conditions and co-morbidity symptoms are monitored and maintained or improved  10/8/2024 2154 by Dina Levi RN  Outcome: Progressing  10/8/2024 1001 by Adele Hernández RN  Outcome: Progressing     Problem: Discharge Planning  Goal: Discharge to home or other facility with appropriate resources  10/8/2024 2154 by Dina Levi RN  Outcome: Progressing  10/8/2024 1001 by Adele Hernández RN  Outcome: Progressing     Problem: Skin/Tissue Integrity  Goal: Absence of new skin breakdown  Description: 1.  Monitor for areas of redness and/or skin breakdown  2.  Assess vascular access sites hourly  3.  Every 4-6 hours minimum:  Change oxygen saturation probe site  4.  Every 4-6 hours:  If on nasal continuous positive airway pressure, respiratory therapy assess nares and determine need for appliance change or resting period.  10/8/2024 2154 by Dina Levi RN  Outcome: Progressing  10/8/2024 1001 by Adele Hernández RN  Outcome: Progressing     
  Problem: Chronic Conditions and Co-morbidities  Goal: Patient's chronic conditions and co-morbidity symptoms are monitored and maintained or improved  10/9/2024 2140 by Dina Levi RN  Outcome: Progressing  10/9/2024 1126 by Donna Serrano RN  Outcome: Progressing  Flowsheets (Taken 10/9/2024 0748)  Care Plan - Patient's Chronic Conditions and Co-Morbidity Symptoms are Monitored and Maintained or Improved: Monitor and assess patient's chronic conditions and comorbid symptoms for stability, deterioration, or improvement     Problem: Discharge Planning  Goal: Discharge to home or other facility with appropriate resources  10/9/2024 2140 by Dina Levi RN  Outcome: Progressing  10/9/2024 1126 by Donna Serrano RN  Outcome: Progressing  Flowsheets (Taken 10/9/2024 0748)  Discharge to home or other facility with appropriate resources: Identify barriers to discharge with patient and caregiver     Problem: Skin/Tissue Integrity  Goal: Absence of new skin breakdown  Description: 1.  Monitor for areas of redness and/or skin breakdown  2.  Assess vascular access sites hourly  3.  Every 4-6 hours minimum:  Change oxygen saturation probe site  4.  Every 4-6 hours:  If on nasal continuous positive airway pressure, respiratory therapy assess nares and determine need for appliance change or resting period.  10/9/2024 2140 by Dina Levi RN  Outcome: Progressing  10/9/2024 1126 by Donna Serrano RN  Outcome: Progressing     Problem: ABCDS Injury Assessment  Goal: Absence of physical injury  10/9/2024 1126 by Donna Serrano RN  Outcome: Progressing     Problem: Safety - Adult  Goal: Free from fall injury  10/9/2024 2140 by Dina Levi RN  Outcome: Progressing  10/9/2024 1126 by Donna Serrano RN  Outcome: Progressing     Problem: Respiratory - Adult  Goal: Achieves optimal ventilation and oxygenation  10/9/2024 2140 by Dina Levi RN  Outcome: Progressing  10/9/2024 1126 by Donna Serrano 
  Problem: Chronic Conditions and Co-morbidities  Goal: Patient's chronic conditions and co-morbidity symptoms are monitored and maintained or improved  Outcome: Progressing     Problem: Discharge Planning  Goal: Discharge to home or other facility with appropriate resources  Outcome: Progressing     Problem: Skin/Tissue Integrity  Goal: Absence of new skin breakdown  Description: 1.  Monitor for areas of redness and/or skin breakdown  2.  Assess vascular access sites hourly  3.  Every 4-6 hours minimum:  Change oxygen saturation probe site  4.  Every 4-6 hours:  If on nasal continuous positive airway pressure, respiratory therapy assess nares and determine need for appliance change or resting period.  Outcome: Progressing     Problem: ABCDS Injury Assessment  Goal: Absence of physical injury  Outcome: Progressing     
  Problem: Chronic Conditions and Co-morbidities  Goal: Patient's chronic conditions and co-morbidity symptoms are monitored and maintained or improved  Outcome: Progressing     Problem: Safety - Adult  Goal: Free from fall injury  Outcome: Progressing     Problem: Respiratory - Adult  Goal: Achieves optimal ventilation and oxygenation  10/10/2024 1437 by Marifer Almaraz, RN  Outcome: Progressing  10/10/2024 1436 by Marifer Almaraz RN  Outcome: Progressing     Problem: Pain  Goal: Verbalizes/displays adequate comfort level or baseline comfort level  Outcome: Progressing     
level  Outcome: Progressing

## 2024-10-11 NOTE — PROGRESS NOTES
CLINICAL PHARMACY NOTE: MEDS TO BEDS    Total # of Prescriptions Filled: 2   The following medications were delivered to the patient:  Azithromycin 250mg  Prednisone 10mg    Additional Documentation:  Minerva delivered to patient 10-11-24

## 2024-10-11 NOTE — DISCHARGE SUMMARY
long-term current use of insulin (HCC)    Polysubstance abuse (HCC)    COPD exacerbation (HCC)    Cocaine use disorder (HCC)    Tobacco abuse    Obstructive sleep apnea syndrome noncompliant with positive pressure ventilation    Class 1 obesity due to excess calories with serious comorbidity and body mass index (BMI) of 34.0 to 34.9 in adult    Metabolic encephalopathy  Resolved Problems:    * No resolved hospital problems. *       PLAN:    COPD exacerbation, acute hypercapnic and hypoxic respiratory failure  Admit  IV steroids transition to prednisone  Nebulizers  IV ABX transition to p.o.  proCalcitonin   NIV wean as tolerated-attempted intermittent BiPAP however patient CO2 increased this morning, resume AVAPS  Repeat ABG at 12 and 5-reviewed NIV adjusted discussed with respiratory therapy    Metabolic encephalopathy secondary to CO2 retention, polysubstance abuse and withdrawal  Improving on NIV  Monitor for withdrawal    Type 2 diabetes uncontrolled exacerbated by steroid  MBS, SSI, A1c increase Lantus    Polysubstance abuse, cocaine use disorder, Hx cocaine use pneumonitis required mechanical ventilation 6/2020   UDS + fentanyl, cocaine  Counseled cessation, recommend peer support group attendance such as WARREN, [place]EMMA, [place]  Peer recovery    JANESSA noncompliant with CPAP  Positive pressure ventilation as above  Counseled patient to start using his CPAP again.  Extensive discussion regarding complications associated with untreated sleep apnea patient voices understanding    Obesity  weight loss through healthy diet and increased activity     Tobacco abuse cessation counseled   Nicotine patch as needed  Tobacco cessation referral with discharge    Discharge Exam:  See progress note from today    Condition:  Stable    Disposition: home    Patient Instructions:   Current Discharge Medication List        START taking these medications    Details   azithromycin (ZITHROMAX) 250 MG tablet Take 1 tablet by mouth daily for  2 doses  Qty: 2 tablet, Refills: 0           CONTINUE these medications which have CHANGED    Details   predniSONE (DELTASONE) 10 MG tablet 4 POx3d, 3poX3d, 9tpw1cmcp, 8occ3olly  Qty: 30 tablet, Refills: 0           CONTINUE these medications which have NOT CHANGED    Details   naproxen (NAPROSYN) 500 MG tablet Take 1 tablet by mouth as needed for Pain      hydrOXYzine pamoate (VISTARIL) 50 MG capsule Take 1 capsule by mouth at bedtime      fluticasone (FLONASE) 50 MCG/ACT nasal spray 1 spray by Each Nostril route daily  Qty: 16 g, Refills: 3      guaiFENesin 400 MG tablet Take 1 tablet by mouth 4 times daily as needed for Cough  Qty: 56 tablet, Refills: 0      sodium chloride (OCEAN, BABY AYR) 0.65 % nasal spray 1 spray by Nasal route every 2 hours as needed for Congestion  Qty: 1 each, Refills: 0      finasteride (PROSCAR) 5 MG tablet Take 1 tablet by mouth daily  Qty: 30 tablet, Refills: 3      melatonin 3 MG TABS tablet Take 10 mg by mouth nightly as needed (insomnia)      aspirin 81 MG EC tablet Take 1 tablet by mouth daily      atorvastatin (LIPITOR) 10 MG tablet Take 1 tablet by mouth at bedtime      tamsulosin (FLOMAX) 0.4 MG capsule Take 2 capsules by mouth daily      amLODIPine (NORVASC) 10 MG tablet Take 1 tablet by mouth daily.  Qty: 30 tablet, Refills: 2    Associated Diagnoses: HTN (hypertension)      buPROPion (WELLBUTRIN XL) 300 MG extended release tablet Take 1 tablet by mouth every morning      gabapentin (NEURONTIN) 300 MG capsule Take 3 capsules by mouth 3 times daily.      albuterol sulfate HFA (PROVENTIL;VENTOLIN;PROAIR) 108 (90 Base) MCG/ACT inhaler Inhale 2 puffs into the lungs every 6 hours as needed for Wheezing      nicotine (NICODERM CQ) 7 MG/24HR Place 1 patch onto the skin daily  Qty: 30 patch, Refills: 3      ipratropium 0.5 mg-albuterol 2.5 mg (DUONEB) 0.5-2.5 (3) MG/3ML SOLN nebulizer solution Inhale 3 mLs into the lungs every 4 hours as needed for Shortness of Breath or

## 2024-10-11 NOTE — CARE COORDINATION
Peer Recovery Support Note       Name:  Ryan Mcghee   Date:    10/11/2024        Chief Complaint   Patient presents with    Shortness of Breath     Increased sob X2-3 days worse this am with chest tightness and congestion, 80% on RA by EMS arrived on 6L nc received 125sul med and duoneb by EMS            Peer Support met with patient.  [x] Support and education provided  [x] Resources provided   [] Treatment referral:   [x] Other:   [] Patient declined peer recovery services      Referred By: Mariajose Padilla ()     Notes:  Peer met with patient. Patient was willing to engage and communicate with peer. Patient willing to share his desire to get better and to refrain from the use of substances. Peer shared lived experience and encouraged patient to focus on the solution and not the problem. Peer suggested patient become involved with Community Peer Support through the [facility]. Peer contacted Toby @ [phone] and submitted a request for a peer supporter from the [facility] to reach out to patient in the upcoming week to begin services. Peer communicated this news with patient.

## 2024-10-11 NOTE — CARE COORDINATION
CM Update: Discharge order noted. Pt planning to return home with no needs. Family will provide transport (TF)            AMELIA Santiago,RN  Case Management  [phone]

## 2024-10-11 NOTE — PROGRESS NOTES
Hospitalist Progress Note      Synopsis: Patient admitted on 10/7/2024 54 y.o. male patient of no PCP on file history of polysubstance abuse, COPD, obesity, sleep apnea noncompliant with nocturnal positive pressure ventilation who presents with shortness of breath.  Patient presents ED with complaint of multiple shortness of breath associated with nonproductive cough no fevers or chills patient continues to drink alcohol, smokes cigarettes and abuse cocaine and THC + wheezing.     In ED patient is initially 94% on 6 L nasal cannula.  Pulse 104, respirations 22, temp 98.  Initial ABG demonstrates pH 7.3/CO2 52/O2 64 on 6 L nasal cannula.  Patient placed on BiPAP repeat ABG 7.26/77.7/277.7-BiPAP was changed to AVAPS.  Repeat ABG 7.25/70.7/159.  Chest x-ray NAD, EKG  Initial laboratory evaluation reveals increased bicarb at 31, troponin 14, 15, AST 60, glucose 120.  CBC is unremarkable. EKG normal sinus rhythm.  Patient is admitted for further evaluation and treatment.    ASSESSMENT:    Principal Problem:    Acute respiratory failure with hypoxia and hypercapnia  Active Problems:    Type 2 diabetes mellitus, without long-term current use of insulin (HCC)    Polysubstance abuse (HCC)    COPD exacerbation (HCC)    Cocaine use disorder (HCC)    Tobacco abuse    Obstructive sleep apnea syndrome noncompliant with positive pressure ventilation    Class 1 obesity due to excess calories with serious comorbidity and body mass index (BMI) of 34.0 to 34.9 in adult    Metabolic encephalopathy  Resolved Problems:    * No resolved hospital problems. *       PLAN:    COPD exacerbation, acute hypercapnic and hypoxic respiratory failure  Admit  IV steroids transition to prednisone  Nebulizers  IV ABX transition to p.o.  proCalcitonin   NIV wean as tolerated-attempted intermittent BiPAP however patient CO2 increased this morning, resume AVAPS  Repeat ABG at 12 and 5-reviewed NIV adjusted discussed with respiratory  therapy    Metabolic encephalopathy secondary to CO2 retention, polysubstance abuse and withdrawal  Improving on NIV  Monitor for withdrawal    Type 2 diabetes uncontrolled exacerbated by steroid  MBS, SSI, A1c increase Lantus    Polysubstance abuse, cocaine use disorder, Hx cocaine use pneumonitis required mechanical ventilation 6/2020   UDS + fentanyl, cocaine  Counseled cessation, recommend peer support group attendance such as WARREN, JEMMA, [place]  Peer recovery    JANESSA noncompliant with CPAP  Positive pressure ventilation as above  Counseled patient to start using his CPAP again.  Extensive discussion regarding complications associated with untreated sleep apnea patient voices understanding    Obesity  weight loss through healthy diet and increased activity     Tobacco cessation counseled   Nicotine patch as needed  Tobacco cessation referral with discharge    Diet: ADULT DIET; Regular; 4 carb choices (60 gm/meal)  Code Status: Full Code  PT/OT Eval Status:     DVT Prophylaxis:     Recommended disposition at discharge:  abulatory Pox OK on RA. DC home    Subjective    Feeling better without complaint breathing is improved  No CP or SOB  No fever or chills   No uncontrolled pain  No vomiting or diarrhea   No events reported overnight     Exam:  /70   Pulse 71   Temp 97.4 °F (36.3 °C) (Temporal)   Resp 17   Ht 1.753 m (5' 9\")   Wt 106.6 kg (235 lb)   SpO2 100%   PF (!) 18 L/min   BMI 34.70 kg/m²   General appearance: No apparent distress, appears stated age and cooperative.  NIV answering questions appropriately  HEENT: Pupils equal, round, and reactive to light. Conjunctivae/corneas clear.  Neck: Supple. No jugular venous distention. Trachea midline.  Respiratory:  Normal respiratory effort. Clear to auscultation, bilaterally without Rales/Wheezes/Rhonchi.  Cardiovascular: Regular rate and rhythm with normal S1/S2 without murmurs, rubs or gallops.  Abdomen: Soft, non-tender, non-distended with normal

## 2024-10-11 NOTE — PROGRESS NOTES
10/10/24 2210   NIV Type   NIV Started/Stopped On   Equipment Type v60   Mode AVAPS   Mask Type Full face mask   Mask Size Large   Assessment   Level of Consciousness 0   Comfort Level Good   Using Accessory Muscles No   Mask Compliance Good   Skin Assessment Clean, dry, & intact   Skin Protection for O2 Device Yes   Orientation Middle   Location Nose   Intervention(s) Skin Barrier   Settings/Measurements   IPAP 15 cmH20   CPAP/EPAP 8 cmH2O   Vt (Measured) 456 mL   Rate Ordered 16   FiO2  40 %   Minute Volume (L/min) 13.6 Liters   Mask Leak (lpm) 20 lpm   Patient's Home Machine No   Alarm Settings   Alarms On Y   Low Pressure (cmH2O) 8 cmH2O   High Pressure (cmH2O) 36 cmH2O   RR Low (bpm) 14   RR High (bpm) 35 br/min     Date: 10/10/2024    Time: 10:12 PM    Patient Placed On BIPAP/CPAP/ Non-Invasive Ventilation?  Yes    If no must comment.  Facial area red/color change? No           If YES are Blister/Lesion present?No   If yes must notify nursing staff  BIPAP/CPAP skin barrier?  Yes    Skin barrier type:mepilexlite       Comments:        Natasha Gallardo RCP

## 2024-10-14 ENCOUNTER — HOSPITAL ENCOUNTER (INPATIENT)
Age: 54
LOS: 4 days | Discharge: PSYCHIATRIC OR SUBSTANCE USE FACILITY | End: 2024-10-18
Attending: EMERGENCY MEDICINE | Admitting: INTERNAL MEDICINE
Payer: MEDICARE

## 2024-10-14 ENCOUNTER — CARE COORDINATION (OUTPATIENT)
Dept: CARE COORDINATION | Age: 54
End: 2024-10-14

## 2024-10-14 ENCOUNTER — APPOINTMENT (OUTPATIENT)
Dept: GENERAL RADIOLOGY | Age: 54
End: 2024-10-14
Payer: MEDICARE

## 2024-10-14 DIAGNOSIS — J44.1 COPD EXACERBATION (HCC): ICD-10-CM

## 2024-10-14 DIAGNOSIS — J96.02 ACUTE RESPIRATORY FAILURE WITH HYPERCAPNIA: Primary | ICD-10-CM

## 2024-10-14 PROBLEM — R40.0 SOMNOLENCE: Status: ACTIVE | Noted: 2024-10-14

## 2024-10-14 LAB
ALBUMIN SERPL-MCNC: 4.2 G/DL (ref 3.5–5.2)
ALLEN TEST: POSITIVE
ALP SERPL-CCNC: 87 U/L (ref 40–129)
ALT SERPL-CCNC: 53 U/L (ref 0–40)
AMPHET UR QL SCN: NEGATIVE
ANION GAP SERPL CALCULATED.3IONS-SCNC: 13 MMOL/L (ref 7–16)
APAP SERPL-MCNC: <5 UG/ML (ref 10–30)
AST SERPL-CCNC: 63 U/L (ref 0–39)
B.E.: 5.5 MMOL/L (ref -3–3)
B.E.: 7.5 MMOL/L (ref -3–3)
BARBITURATES UR QL SCN: NEGATIVE
BASOPHILS # BLD: 0.01 K/UL (ref 0–0.2)
BASOPHILS NFR BLD: 0 % (ref 0–2)
BENZODIAZ UR QL: NEGATIVE
BILIRUB SERPL-MCNC: 0.7 MG/DL (ref 0–1.2)
BNP SERPL-MCNC: 158 PG/ML (ref 0–125)
BUN SERPL-MCNC: 11 MG/DL (ref 6–20)
BUPRENORPHINE UR QL: NEGATIVE
CALCIUM SERPL-MCNC: 9.2 MG/DL (ref 8.6–10.2)
CANNABINOIDS UR QL SCN: NEGATIVE
CHLORIDE SERPL-SCNC: 88 MMOL/L (ref 98–107)
CO2 SERPL-SCNC: 31 MMOL/L (ref 22–29)
COCAINE UR QL SCN: POSITIVE
COHB: 3 % (ref 0–1.5)
COHB: 5.3 % (ref 0–1.5)
CREAT SERPL-MCNC: 0.7 MG/DL (ref 0.7–1.2)
CRITICAL: ABNORMAL
CRITICAL: ABNORMAL
D-DIMER QUANTITATIVE: <200 NG/ML DDU (ref 0–230)
DATE ANALYZED: ABNORMAL
DATE ANALYZED: ABNORMAL
DATE OF COLLECTION: ABNORMAL
DATE OF COLLECTION: ABNORMAL
EOSINOPHIL # BLD: 0.01 K/UL (ref 0.05–0.5)
EOSINOPHILS RELATIVE PERCENT: 0 % (ref 0–6)
ERYTHROCYTE [DISTWIDTH] IN BLOOD BY AUTOMATED COUNT: 14.2 % (ref 11.5–15)
ETHANOLAMINE SERPL-MCNC: 21 MG/DL (ref 0–0.08)
FENTANYL UR QL: NEGATIVE
FIO2: 40 %
FIO2: 50
GFR, ESTIMATED: >90 ML/MIN/1.73M2
GLUCOSE BLD-MCNC: 147 MG/DL (ref 74–99)
GLUCOSE SERPL-MCNC: 160 MG/DL (ref 74–99)
HCO3: 31.7 MMOL/L (ref 22–26)
HCO3: 34.8 MMOL/L (ref 22–26)
HCT VFR BLD AUTO: 44.7 % (ref 37–54)
HGB BLD-MCNC: 13.8 G/DL (ref 12.5–16.5)
HHB: 1.8 % (ref 0–5)
HHB: 6.2 % (ref 0–5)
IMM GRANULOCYTES # BLD AUTO: 0.04 K/UL (ref 0–0.58)
IMM GRANULOCYTES NFR BLD: 1 % (ref 0–5)
INFLUENZA A BY PCR: NOT DETECTED
INFLUENZA B BY PCR: NOT DETECTED
LAB: ABNORMAL
LAB: ABNORMAL
LYMPHOCYTES NFR BLD: 1.03 K/UL (ref 1.5–4)
LYMPHOCYTES RELATIVE PERCENT: 12 % (ref 20–42)
Lab: 1540
Lab: 2228
MAGNESIUM SERPL-MCNC: 2.1 MG/DL (ref 1.6–2.6)
MCH RBC QN AUTO: 26.4 PG (ref 26–35)
MCHC RBC AUTO-ENTMCNC: 30.9 G/DL (ref 32–34.5)
MCV RBC AUTO: 85.5 FL (ref 80–99.9)
METHADONE UR QL: NEGATIVE
METHB: 0.3 % (ref 0–1.5)
METHB: 0.3 % (ref 0–1.5)
MODE: ABNORMAL
MONOCYTES NFR BLD: 0.6 K/UL (ref 0.1–0.95)
MONOCYTES NFR BLD: 7 % (ref 2–12)
NEUTROPHILS NFR BLD: 81 % (ref 43–80)
NEUTS SEG NFR BLD: 7.14 K/UL (ref 1.8–7.3)
O2 CONTENT: 18.5 ML/DL
O2 CONTENT: 19.6 ML/DL
O2 DELIVERY DEVICE: ABNORMAL
O2 SATURATION: 93.4 % (ref 92–98.5)
O2 SATURATION: 98.1 % (ref 92–98.5)
O2HB: 88.2 % (ref 94–97)
O2HB: 94.9 % (ref 94–97)
OPERATOR ID: 420
OPERATOR ID: ABNORMAL
OPIATES UR QL SCN: NEGATIVE
OXYCODONE UR QL SCN: NEGATIVE
PATIENT TEMP: 37 C
PATIENT TEMP: 37 C
PCO2: 52.1 MMHG (ref 35–45)
PCO2: 60.3 MMHG (ref 35–45)
PCP UR QL SCN: NEGATIVE
PEEP/CPAP: 5 CMH2O
PEEP: 6
PFO2: 2.84 MMHG/%
PH BLOOD GAS: 7.38 (ref 7.35–7.45)
PH BLOOD GAS: 7.4 (ref 7.35–7.45)
PLATELET # BLD AUTO: 382 K/UL (ref 130–450)
PMV BLD AUTO: 8.8 FL (ref 7–12)
PO2: 113.6 MMHG (ref 75–100)
PO2: 71.7 MMHG (ref 75–100)
POC HCO3: 34.5 MMOL/L (ref 22–26)
POC O2 SATURATION: 97.5 % (ref 92–98.5)
POC PCO2: 60.2 MM HG (ref 35–45)
POC PH: 7.37 (ref 7.35–7.45)
POC PO2: 103.7 MM HG (ref 80–100)
POSITIVE BASE EXCESS, ART: 6.7 MMOL/L (ref 0–3)
POTASSIUM SERPL-SCNC: 3.4 MMOL/L (ref 3.5–5)
PRESSURE SUPPORT: 12
PROT SERPL-MCNC: 7.1 G/DL (ref 6.4–8.3)
RBC # BLD AUTO: 5.23 M/UL (ref 3.8–5.8)
RI(T): 0.9
RR MECHANICAL: 22 B/MIN
SALICYLATES SERPL-MCNC: <0.3 MG/DL (ref 0–30)
SARS-COV-2 RDRP RESP QL NAA+PROBE: NOT DETECTED
SODIUM SERPL-SCNC: 132 MMOL/L (ref 132–146)
SOURCE, BLOOD GAS: ABNORMAL
SOURCE, BLOOD GAS: ABNORMAL
SPECIMEN DESCRIPTION: NORMAL
TEST INFORMATION: ABNORMAL
THB: 14.6 G/DL (ref 11.5–16.5)
THB: 14.9 G/DL (ref 11.5–16.5)
TIME ANALYZED: 1552
TIME ANALYZED: 2235
TOXIC TRICYCLIC SC,BLOOD: NEGATIVE
TROPONIN I SERPL HS-MCNC: 18 NG/L (ref 0–11)
TROPONIN I SERPL HS-MCNC: 22 NG/L (ref 0–11)
VT MECHANICAL: 500 ML
WBC OTHER # BLD: 8.8 K/UL (ref 4.5–11.5)

## 2024-10-14 PROCEDURE — 85025 COMPLETE CBC W/AUTO DIFF WBC: CPT

## 2024-10-14 PROCEDURE — 85379 FIBRIN DEGRADATION QUANT: CPT

## 2024-10-14 PROCEDURE — 80143 DRUG ASSAY ACETAMINOPHEN: CPT

## 2024-10-14 PROCEDURE — 82962 GLUCOSE BLOOD TEST: CPT

## 2024-10-14 PROCEDURE — 5A09357 ASSISTANCE WITH RESPIRATORY VENTILATION, LESS THAN 24 CONSECUTIVE HOURS, CONTINUOUS POSITIVE AIRWAY PRESSURE: ICD-10-PCS | Performed by: INTERNAL MEDICINE

## 2024-10-14 PROCEDURE — 99223 1ST HOSP IP/OBS HIGH 75: CPT | Performed by: INTERNAL MEDICINE

## 2024-10-14 PROCEDURE — 6370000000 HC RX 637 (ALT 250 FOR IP): Performed by: INTERNAL MEDICINE

## 2024-10-14 PROCEDURE — 84484 ASSAY OF TROPONIN QUANT: CPT

## 2024-10-14 PROCEDURE — 87635 SARS-COV-2 COVID-19 AMP PRB: CPT

## 2024-10-14 PROCEDURE — 80179 DRUG ASSAY SALICYLATE: CPT

## 2024-10-14 PROCEDURE — G0480 DRUG TEST DEF 1-7 CLASSES: HCPCS

## 2024-10-14 PROCEDURE — 82805 BLOOD GASES W/O2 SATURATION: CPT

## 2024-10-14 PROCEDURE — 83735 ASSAY OF MAGNESIUM: CPT

## 2024-10-14 PROCEDURE — 83880 ASSAY OF NATRIURETIC PEPTIDE: CPT

## 2024-10-14 PROCEDURE — 80307 DRUG TEST PRSMV CHEM ANLYZR: CPT

## 2024-10-14 PROCEDURE — 94640 AIRWAY INHALATION TREATMENT: CPT

## 2024-10-14 PROCEDURE — 99285 EMERGENCY DEPT VISIT HI MDM: CPT

## 2024-10-14 PROCEDURE — 80053 COMPREHEN METABOLIC PANEL: CPT

## 2024-10-14 PROCEDURE — 6360000002 HC RX W HCPCS: Performed by: INTERNAL MEDICINE

## 2024-10-14 PROCEDURE — 87502 INFLUENZA DNA AMP PROBE: CPT

## 2024-10-14 PROCEDURE — 94660 CPAP INITIATION&MGMT: CPT

## 2024-10-14 PROCEDURE — 93005 ELECTROCARDIOGRAM TRACING: CPT

## 2024-10-14 PROCEDURE — 2580000003 HC RX 258: Performed by: INTERNAL MEDICINE

## 2024-10-14 PROCEDURE — 71045 X-RAY EXAM CHEST 1 VIEW: CPT

## 2024-10-14 PROCEDURE — 82803 BLOOD GASES ANY COMBINATION: CPT

## 2024-10-14 PROCEDURE — 94664 DEMO&/EVAL PT USE INHALER: CPT

## 2024-10-14 PROCEDURE — 6370000000 HC RX 637 (ALT 250 FOR IP)

## 2024-10-14 PROCEDURE — 2060000000 HC ICU INTERMEDIATE R&B

## 2024-10-14 RX ORDER — ATORVASTATIN CALCIUM 10 MG/1
10 TABLET, FILM COATED ORAL NIGHTLY
Status: DISCONTINUED | OUTPATIENT
Start: 2024-10-14 | End: 2024-10-18 | Stop reason: HOSPADM

## 2024-10-14 RX ORDER — IPRATROPIUM BROMIDE AND ALBUTEROL SULFATE 2.5; .5 MG/3ML; MG/3ML
1 SOLUTION RESPIRATORY (INHALATION) EVERY 4 HOURS PRN
Status: DISCONTINUED | OUTPATIENT
Start: 2024-10-14 | End: 2024-10-18 | Stop reason: HOSPADM

## 2024-10-14 RX ORDER — PANTOPRAZOLE SODIUM 40 MG/1
40 TABLET, DELAYED RELEASE ORAL DAILY
Status: DISCONTINUED | OUTPATIENT
Start: 2024-10-15 | End: 2024-10-18 | Stop reason: HOSPADM

## 2024-10-14 RX ORDER — IPRATROPIUM BROMIDE AND ALBUTEROL SULFATE 2.5; .5 MG/3ML; MG/3ML
1 SOLUTION RESPIRATORY (INHALATION)
Status: DISCONTINUED | OUTPATIENT
Start: 2024-10-15 | End: 2024-10-18 | Stop reason: HOSPADM

## 2024-10-14 RX ORDER — BUPROPION HYDROCHLORIDE 300 MG/1
300 TABLET ORAL EVERY MORNING
Status: DISCONTINUED | OUTPATIENT
Start: 2024-10-15 | End: 2024-10-18 | Stop reason: HOSPADM

## 2024-10-14 RX ORDER — ASPIRIN 81 MG/1
81 TABLET ORAL DAILY
Status: DISCONTINUED | OUTPATIENT
Start: 2024-10-15 | End: 2024-10-18 | Stop reason: HOSPADM

## 2024-10-14 RX ORDER — METHYLPREDNISOLONE SODIUM SUCCINATE 40 MG/ML
40 INJECTION, POWDER, LYOPHILIZED, FOR SOLUTION INTRAMUSCULAR; INTRAVENOUS 2 TIMES DAILY
Status: DISCONTINUED | OUTPATIENT
Start: 2024-10-14 | End: 2024-10-17

## 2024-10-14 RX ORDER — FINASTERIDE 5 MG/1
5 TABLET, FILM COATED ORAL DAILY
Status: DISCONTINUED | OUTPATIENT
Start: 2024-10-15 | End: 2024-10-18 | Stop reason: HOSPADM

## 2024-10-14 RX ORDER — TAMSULOSIN HYDROCHLORIDE 0.4 MG/1
0.8 CAPSULE ORAL DAILY
Status: DISCONTINUED | OUTPATIENT
Start: 2024-10-15 | End: 2024-10-16 | Stop reason: CLARIF

## 2024-10-14 RX ORDER — 0.9 % SODIUM CHLORIDE 0.9 %
1000 INTRAVENOUS SOLUTION INTRAVENOUS ONCE
Status: COMPLETED | OUTPATIENT
Start: 2024-10-14 | End: 2024-10-15

## 2024-10-14 RX ORDER — IPRATROPIUM BROMIDE AND ALBUTEROL SULFATE 2.5; .5 MG/3ML; MG/3ML
3 SOLUTION RESPIRATORY (INHALATION) ONCE
Status: COMPLETED | OUTPATIENT
Start: 2024-10-14 | End: 2024-10-14

## 2024-10-14 RX ORDER — AMLODIPINE BESYLATE 10 MG/1
10 TABLET ORAL DAILY
Status: DISCONTINUED | OUTPATIENT
Start: 2024-10-15 | End: 2024-10-18 | Stop reason: HOSPADM

## 2024-10-14 RX ORDER — POTASSIUM CHLORIDE 1500 MG/1
40 TABLET, EXTENDED RELEASE ORAL ONCE
Status: COMPLETED | OUTPATIENT
Start: 2024-10-14 | End: 2024-10-14

## 2024-10-14 RX ADMIN — METHYLPREDNISOLONE SODIUM SUCCINATE 40 MG: 40 INJECTION INTRAMUSCULAR; INTRAVENOUS at 23:45

## 2024-10-14 RX ADMIN — SODIUM CHLORIDE 1000 ML: 9 INJECTION, SOLUTION INTRAVENOUS at 23:49

## 2024-10-14 RX ADMIN — ATORVASTATIN CALCIUM 10 MG: 10 TABLET, FILM COATED ORAL at 23:45

## 2024-10-14 RX ADMIN — POTASSIUM CHLORIDE 40 MEQ: 1500 TABLET, EXTENDED RELEASE ORAL at 23:45

## 2024-10-14 RX ADMIN — IPRATROPIUM BROMIDE AND ALBUTEROL SULFATE 3 DOSE: 2.5; .5 SOLUTION RESPIRATORY (INHALATION) at 16:07

## 2024-10-14 ASSESSMENT — PAIN DESCRIPTION - DESCRIPTORS: DESCRIPTORS: DISCOMFORT;OTHER (COMMENT)

## 2024-10-14 ASSESSMENT — PAIN - FUNCTIONAL ASSESSMENT
PAIN_FUNCTIONAL_ASSESSMENT: 0-10
PAIN_FUNCTIONAL_ASSESSMENT: ACTIVITIES ARE NOT PREVENTED

## 2024-10-14 ASSESSMENT — PAIN SCALES - GENERAL
PAINLEVEL_OUTOF10: 7
PAINLEVEL_OUTOF10: 0

## 2024-10-14 ASSESSMENT — PAIN DESCRIPTION - ORIENTATION: ORIENTATION: MID

## 2024-10-14 ASSESSMENT — PAIN DESCRIPTION - LOCATION: LOCATION: CHEST

## 2024-10-14 ASSESSMENT — PAIN DESCRIPTION - FREQUENCY: FREQUENCY: CONTINUOUS

## 2024-10-14 ASSESSMENT — PAIN DESCRIPTION - ONSET: ONSET: ON-GOING

## 2024-10-14 ASSESSMENT — PAIN DESCRIPTION - PAIN TYPE: TYPE: ACUTE PAIN

## 2024-10-14 NOTE — CARE COORDINATION
Care Transitions Note    Initial Call - Call within 2 business days of discharge: Yes    Patient Current Location:  Home: [address] [place] [id number]    Care Transition Nurse contacted the patient by telephone to perform post hospital discharge assessment, verified name and  as identifiers. Provided introduction to self, and explanation of the Care Transition Nurse role.     Patient: Ryan Mcghee    Patient : 1970   MRN: [id number]    Reason for Admission: [facility] 10/7-10/11/24 Acute Respiratory Failure with Hypoxia/Hypercapnia; COPD Exacerbation  Discharge Date: 10/11/24  RURS: Readmission Risk Score: 13.9    Last Discharge Facility       Date Complaint Diagnosis Description Type Department Provider    10/7/24 Shortness of Breath COPD exacerbation (HCC) ... ED to Hosp-Admission (Discharged) (ADMITTED) [facility]S PICU Emmanuel Connelly MD; Justin, ...          Was this an external facility discharge? No    Care Summary Note: [facility] call, Post Hospital discharge. Pt informed this nurse that he is not doing so good and was headed to the ED. Pt ended call.     Carola Josehp LPN

## 2024-10-14 NOTE — PROGRESS NOTES
Date: 10/14/2024    Time: 4:34 PM    Patient Placed On BIPAP/CPAP/ Non-Invasive Ventilation?  Yes    If no must comment.  Facial area red/color change? No           If YES are Blister/Lesion present?No   If yes must notify nursing staff  BIPAP/CPAP skin barrier?  Yes    Skin barrier type:mepilexlite     Comments: Bipap 12/6 50%     10/14/24 1628   NIV Type   NIV Started/Stopped On   Equipment Type v60   Mode Bilevel   Mask Type Full face mask   Mask Size Large   Assessment   Pulse (!) 101   Respirations 19   SpO2 92 %   Level of Consciousness 1.0   Comfort Level Good   Using Accessory Muscles No   Mask Compliance Good   Skin Assessment Clean, dry, & intact   Skin Protection for O2 Device Yes   Orientation Middle   Location Nose   Intervention(s) Skin Barrier   Settings/Measurements   PIP Observed 13 cm H20   IPAP 12 cmH20   CPAP/EPAP 6 cmH2O   Vt (Measured) 549 mL   Rate Ordered 14   Insp Rise Time (%) 3 %   FiO2  50 %   I Time/ I Time % 0.8 s   Minute Volume (L/min) 8.9 Liters   Mask Leak (lpm) 24 lpm   Patient's Home Machine No   Alarm Settings   Alarms On Y   Low Pressure (cmH2O) 8 cmH2O   High Pressure (cmH2O) 40 cmH2O   RR Low (bpm) 12   RR High (bpm) 40 br/min   Patient Observation   Patient Observations red luli Stahl RCP

## 2024-10-14 NOTE — H&P
[facility] Hospitalist Group History and Physical        Chief Complaint:  sob  History of Present Illness   The patient is a 54 y.o. male     [facility]  +tobacco use +COPD inc bmi 36 restritive lung  Hx of JANESSA- non compliance bipap  Copd, chronic hypoxia?  Chronic hypercapnea, not using home o2  +etoh and polysubtance abuse hx.    Admissted for similar hypxic respiratory failure, solmenence also on 10.7/24    Prior admission +rhino virus and copd exac in August    This admission reportedly he was walking around [place] , reports 2 days SOB, terry in by EMS,  no fevers, no chills    He has breathing treatments at home but is not taking any.      He denies any chest pain, nausea, vomiting, fevers.  Patient had SpO2 of 84% on room air and was placed on 2 L nasal cannula.    He had mild expiratory wheezes bilaterally.  He was slightly tachycardic but otherwise regular rhythm.     Patient states that is not on oxygen at home.- later in ER placed on bipap/AVAPs per ER doc, he perked up with this.  But I doubt he truly woke up    +cocaine- but no opiods/benzos in urine-- narcan not given in ER  Non talking in ER to me  Barely responsive ?fights eye opening?? Repeat urine drug screen sometime tonight  Avoid sedating meds for now  ?OD    During  admitted on 10/7/2024 till 10./11.24 \"... presents ED with complaint of multiple shortness of breath associated with nonproductive cough no fevers or chills patient continues to drink alcohol, smokes cigarettes and abuse cocaine and THC + wheezing.  In ED patient is initially 94% on 6 L nasal cannula.  Pulse 104, respirations 22, temp 98.  Initial ABG demonstrates pH 7.3/CO2 52/O2 64 on 6 L nasal cannula.  Patient placed on BiPAP repeat ABG 7.26/77.7/277.7-BiPAP was changed to AVAPS.  Repeat ABG 7.25/70.7/159.  Chest x-ray NAD, EKG  Initial laboratory evaluation reveals increased bicarb at 31, troponin 14, 15, AST 60, glucose 120.  CBC is unremarkable. +  Metabolic

## 2024-10-14 NOTE — ED PROVIDER NOTES
[facility]S INTERMEDIATE  EMERGENCY DEPARTMENT ENCOUNTER        Pt Name: Ryan Mcghee  MRN: [id number]  Birthdate 1970  Date of evaluation: 10/14/2024  Provider: Bahman Tejada DO  PCP: No, Pcp  Note Started: 2:59 PM EDT 10/14/24    CHIEF COMPLAINT       Chief Complaint   Patient presents with    Shortness of Breath     Patient found walking downtown [place]. Was discharged from hospital on 10/11/24. Has nebs at home, did not take.       HISTORY OF PRESENT ILLNESS: 1 or more Elements   History From: patient    Limitations to history : None    Ryan Mcghee is a 54 y.o. male who presents with shortness of breath and chest tightness.  [facility] and was subsequently brought here by EMS.  He states that he was discharged from the hospital on 10/11/2024 for COPD.  He has had shortness of breath for the past 2 days.  He has breathing treatments at home but is not taking any.  He denies any chest pain, nausea, vomiting, fevers.  Patient had SpO2 of 84% on room air and was placed on 2 L nasal cannula.  Patient states that is not on oxygen at home.    Patient denies fever, chills, headache,  chest pain, abdominal pain, nausea, vomiting, diarrhea, lightheadedness, dysuria, hematuria, hematochezia, and melena.    Nursing Notes were all reviewed and agreed with or any disagreements were addressed in the HPI.      REVIEW OF SYSTEMS :           Positives and Pertinent negatives as per HPI.     SURGICAL HISTORY     Past Surgical History:   Procedure Laterality Date    COLONOSCOPY      2010 neg    NERVE BLOCK Left 08/27/2018    lumbar epidural #1 paramedian    [place] NJX DX/THER SBST INTRLMNR LMBR/SAC W/IMG GDN Left 8/27/2018    LUMBAR EPIDURAL STEROID INJECTION L4-5 LEFT PARAMEDIAN #1 performed by Yordan Chacon MD at [facility] OR    WISDOM TOOTH EXTRACTION         CURRENTMEDICATIONS       Current Discharge Medication List

## 2024-10-14 NOTE — PROGRESS NOTES
AVAPS 22 500 40% +5       10/14/24 1837   NIV Type   NIV Started/Stopped On   Mode (S)  AVAPS   Mask Type Full face mask   Mask Size Large   Assessment   Pulse 89   Respirations 17   SpO2 98 %   Comfort Level Good   Using Accessory Muscles No   Mask Compliance Good   Skin Assessment Clean, dry, & intact   Skin Protection for O2 Device Yes   Orientation Middle   Location Nose   Settings/Measurements   PIP Observed 18 cm H20   CPAP/EPAP 5 cmH2O   IPAP Min 18 cmH2O   IPAP Max 30 cmH2O   Vt (Set, mL) 500 mL   Vt (Measured) 555 mL   Rate Ordered 22   Insp Rise Time (%) 3 %   FiO2  40 %   I Time/ I Time % 0.8 s   Minute Volume (L/min) 12.8 Liters   Mask Leak (lpm) 19 lpm   Patient's Home Machine No   Alarm Settings   Alarms On Y   Low Pressure (cmH2O) 8 cmH2O   High Pressure (cmH2O) 40 cmH2O   RR Low (bpm) 12   RR High (bpm) 40 br/min

## 2024-10-15 ENCOUNTER — CARE COORDINATION (OUTPATIENT)
Dept: CARE COORDINATION | Age: 54
End: 2024-10-15

## 2024-10-15 PROBLEM — F39 MOOD DISORDER (HCC): Status: ACTIVE | Noted: 2024-10-15

## 2024-10-15 LAB
ALBUMIN SERPL-MCNC: 3.9 G/DL (ref 3.5–5.2)
ALP SERPL-CCNC: 79 U/L (ref 40–129)
ALT SERPL-CCNC: 46 U/L (ref 0–40)
AMPHET UR QL SCN: NEGATIVE
ANION GAP SERPL CALCULATED.3IONS-SCNC: 7 MMOL/L (ref 7–16)
AST SERPL-CCNC: 37 U/L (ref 0–39)
B PARAP IS1001 DNA NPH QL NAA+NON-PROBE: NOT DETECTED
B PERT DNA SPEC QL NAA+PROBE: NOT DETECTED
BARBITURATES UR QL SCN: NEGATIVE
BASOPHILS # BLD: 0 K/UL (ref 0–0.2)
BASOPHILS NFR BLD: 0 % (ref 0–2)
BENZODIAZ UR QL: NEGATIVE
BILIRUB SERPL-MCNC: 0.7 MG/DL (ref 0–1.2)
BUN SERPL-MCNC: 12 MG/DL (ref 6–20)
BUPRENORPHINE UR QL: NEGATIVE
C PNEUM DNA NPH QL NAA+NON-PROBE: NOT DETECTED
CALCIUM SERPL-MCNC: 9.1 MG/DL (ref 8.6–10.2)
CANNABINOIDS UR QL SCN: NEGATIVE
CHLORIDE SERPL-SCNC: 95 MMOL/L (ref 98–107)
CO2 SERPL-SCNC: 33 MMOL/L (ref 22–29)
COCAINE UR QL SCN: POSITIVE
CREAT SERPL-MCNC: 0.7 MG/DL (ref 0.7–1.2)
EKG ATRIAL RATE: 101 BPM
EKG P AXIS: 60 DEGREES
EKG P-R INTERVAL: 148 MS
EKG Q-T INTERVAL: 360 MS
EKG QRS DURATION: 92 MS
EKG QTC CALCULATION (BAZETT): 466 MS
EKG R AXIS: -60 DEGREES
EKG T AXIS: 63 DEGREES
EKG VENTRICULAR RATE: 101 BPM
EOSINOPHIL # BLD: 0 K/UL (ref 0.05–0.5)
EOSINOPHILS RELATIVE PERCENT: 0 % (ref 0–6)
ERYTHROCYTE [DISTWIDTH] IN BLOOD BY AUTOMATED COUNT: 14.1 % (ref 11.5–15)
FENTANYL UR QL: NEGATIVE
FLUAV RNA NPH QL NAA+NON-PROBE: NOT DETECTED
FLUBV RNA NPH QL NAA+NON-PROBE: NOT DETECTED
GFR, ESTIMATED: >90 ML/MIN/1.73M2
GLUCOSE BLD-MCNC: 156 MG/DL (ref 74–99)
GLUCOSE BLD-MCNC: 167 MG/DL (ref 74–99)
GLUCOSE BLD-MCNC: 186 MG/DL (ref 74–99)
GLUCOSE BLD-MCNC: 215 MG/DL (ref 74–99)
GLUCOSE BLD-MCNC: 314 MG/DL (ref 74–99)
GLUCOSE SERPL-MCNC: 177 MG/DL (ref 74–99)
HADV DNA NPH QL NAA+NON-PROBE: NOT DETECTED
HCOV 229E RNA NPH QL NAA+NON-PROBE: NOT DETECTED
HCOV HKU1 RNA NPH QL NAA+NON-PROBE: NOT DETECTED
HCOV NL63 RNA NPH QL NAA+NON-PROBE: NOT DETECTED
HCOV OC43 RNA NPH QL NAA+NON-PROBE: NOT DETECTED
HCT VFR BLD AUTO: 45.4 % (ref 37–54)
HGB BLD-MCNC: 13.7 G/DL (ref 12.5–16.5)
HMPV RNA NPH QL NAA+NON-PROBE: NOT DETECTED
HPIV1 RNA NPH QL NAA+NON-PROBE: NOT DETECTED
HPIV2 RNA NPH QL NAA+NON-PROBE: NOT DETECTED
HPIV3 RNA NPH QL NAA+NON-PROBE: NOT DETECTED
HPIV4 RNA NPH QL NAA+NON-PROBE: NOT DETECTED
LYMPHOCYTES NFR BLD: 0.36 K/UL (ref 1.5–4)
LYMPHOCYTES RELATIVE PERCENT: 5 % (ref 20–42)
M PNEUMO DNA NPH QL NAA+NON-PROBE: NOT DETECTED
MCH RBC QN AUTO: 26.3 PG (ref 26–35)
MCHC RBC AUTO-ENTMCNC: 30.2 G/DL (ref 32–34.5)
MCV RBC AUTO: 87.3 FL (ref 80–99.9)
METHADONE UR QL: NEGATIVE
MONOCYTES NFR BLD: 0.54 K/UL (ref 0.1–0.95)
MONOCYTES NFR BLD: 8 % (ref 2–12)
NEUTROPHILS NFR BLD: 87 % (ref 43–80)
NEUTS SEG NFR BLD: 5.99 K/UL (ref 1.8–7.3)
NUCLEATED RED BLOOD CELLS: 1 PER 100 WBC
OPIATES UR QL SCN: NEGATIVE
OXYCODONE UR QL SCN: NEGATIVE
PCP UR QL SCN: NEGATIVE
PLATELET # BLD AUTO: 342 K/UL (ref 130–450)
PMV BLD AUTO: 9 FL (ref 7–12)
POTASSIUM SERPL-SCNC: 4.8 MMOL/L (ref 3.5–5)
PROCALCITONIN SERPL-MCNC: 0.04 NG/ML (ref 0–0.08)
PROT SERPL-MCNC: 7 G/DL (ref 6.4–8.3)
RBC # BLD AUTO: 5.2 M/UL (ref 3.8–5.8)
RBC # BLD: ABNORMAL 10*6/UL
RSV RNA NPH QL NAA+NON-PROBE: NOT DETECTED
RV+EV RNA NPH QL NAA+NON-PROBE: NOT DETECTED
SARS-COV-2 RNA NPH QL NAA+NON-PROBE: NOT DETECTED
SODIUM SERPL-SCNC: 135 MMOL/L (ref 132–146)
SPECIMEN DESCRIPTION: NORMAL
TEST INFORMATION: ABNORMAL
WBC OTHER # BLD: 6.9 K/UL (ref 4.5–11.5)

## 2024-10-15 PROCEDURE — 80307 DRUG TEST PRSMV CHEM ANLYZR: CPT

## 2024-10-15 PROCEDURE — 6370000000 HC RX 637 (ALT 250 FOR IP): Performed by: PSYCHIATRY & NEUROLOGY

## 2024-10-15 PROCEDURE — 6360000002 HC RX W HCPCS: Performed by: INTERNAL MEDICINE

## 2024-10-15 PROCEDURE — 0202U NFCT DS 22 TRGT SARS-COV-2: CPT

## 2024-10-15 PROCEDURE — 6370000000 HC RX 637 (ALT 250 FOR IP): Performed by: INTERNAL MEDICINE

## 2024-10-15 PROCEDURE — 80053 COMPREHEN METABOLIC PANEL: CPT

## 2024-10-15 PROCEDURE — 2060000000 HC ICU INTERMEDIATE R&B

## 2024-10-15 PROCEDURE — 99221 1ST HOSP IP/OBS SF/LOW 40: CPT | Performed by: PSYCHIATRY & NEUROLOGY

## 2024-10-15 PROCEDURE — 94660 CPAP INITIATION&MGMT: CPT

## 2024-10-15 PROCEDURE — 94640 AIRWAY INHALATION TREATMENT: CPT

## 2024-10-15 PROCEDURE — 6360000002 HC RX W HCPCS

## 2024-10-15 PROCEDURE — 2700000000 HC OXYGEN THERAPY PER DAY

## 2024-10-15 PROCEDURE — 84145 PROCALCITONIN (PCT): CPT

## 2024-10-15 PROCEDURE — 99232 SBSQ HOSP IP/OBS MODERATE 35: CPT | Performed by: INTERNAL MEDICINE

## 2024-10-15 PROCEDURE — 82962 GLUCOSE BLOOD TEST: CPT

## 2024-10-15 PROCEDURE — 93010 ELECTROCARDIOGRAM REPORT: CPT | Performed by: INTERNAL MEDICINE

## 2024-10-15 PROCEDURE — 36415 COLL VENOUS BLD VENIPUNCTURE: CPT

## 2024-10-15 PROCEDURE — 85025 COMPLETE CBC W/AUTO DIFF WBC: CPT

## 2024-10-15 RX ORDER — CALCIUM CARBONATE 500 MG/1
500 TABLET, CHEWABLE ORAL 3 TIMES DAILY PRN
Status: DISCONTINUED | OUTPATIENT
Start: 2024-10-15 | End: 2024-10-18 | Stop reason: HOSPADM

## 2024-10-15 RX ORDER — INSULIN LISPRO 100 [IU]/ML
0-4 INJECTION, SOLUTION INTRAVENOUS; SUBCUTANEOUS
Status: DISCONTINUED | OUTPATIENT
Start: 2024-10-15 | End: 2024-10-16

## 2024-10-15 RX ORDER — GLUCAGON 1 MG/ML
1 KIT INJECTION PRN
Status: DISCONTINUED | OUTPATIENT
Start: 2024-10-15 | End: 2024-10-18 | Stop reason: HOSPADM

## 2024-10-15 RX ORDER — BUDESONIDE 0.5 MG/2ML
0.5 INHALANT ORAL
Status: DISCONTINUED | OUTPATIENT
Start: 2024-10-15 | End: 2024-10-18 | Stop reason: HOSPADM

## 2024-10-15 RX ORDER — DEXTROSE MONOHYDRATE 100 MG/ML
INJECTION, SOLUTION INTRAVENOUS CONTINUOUS PRN
Status: DISCONTINUED | OUTPATIENT
Start: 2024-10-15 | End: 2024-10-18 | Stop reason: HOSPADM

## 2024-10-15 RX ORDER — ARFORMOTEROL TARTRATE 15 UG/2ML
15 SOLUTION RESPIRATORY (INHALATION)
Status: DISCONTINUED | OUTPATIENT
Start: 2024-10-15 | End: 2024-10-18 | Stop reason: HOSPADM

## 2024-10-15 RX ORDER — QUETIAPINE FUMARATE 25 MG/1
25 TABLET, FILM COATED ORAL NIGHTLY
Status: DISCONTINUED | OUTPATIENT
Start: 2024-10-15 | End: 2024-10-18 | Stop reason: HOSPADM

## 2024-10-15 RX ADMIN — AMLODIPINE BESYLATE 10 MG: 10 TABLET ORAL at 09:22

## 2024-10-15 RX ADMIN — INSULIN LISPRO 1 UNITS: 100 INJECTION, SOLUTION INTRAVENOUS; SUBCUTANEOUS at 19:55

## 2024-10-15 RX ADMIN — IPRATROPIUM BROMIDE AND ALBUTEROL SULFATE 1 DOSE: 2.5; .5 SOLUTION RESPIRATORY (INHALATION) at 07:46

## 2024-10-15 RX ADMIN — TAMSULOSIN HYDROCHLORIDE 0.8 MG: 0.4 CAPSULE ORAL at 09:23

## 2024-10-15 RX ADMIN — INSULIN LISPRO 3 UNITS: 100 INJECTION, SOLUTION INTRAVENOUS; SUBCUTANEOUS at 11:25

## 2024-10-15 RX ADMIN — IPRATROPIUM BROMIDE AND ALBUTEROL SULFATE 1 DOSE: 2.5; .5 SOLUTION RESPIRATORY (INHALATION) at 12:10

## 2024-10-15 RX ADMIN — ASPIRIN 81 MG: 81 TABLET, COATED ORAL at 09:22

## 2024-10-15 RX ADMIN — BUPROPION HYDROCHLORIDE 300 MG: 300 TABLET, EXTENDED RELEASE ORAL at 09:38

## 2024-10-15 RX ADMIN — QUETIAPINE FUMARATE 25 MG: 25 TABLET ORAL at 19:45

## 2024-10-15 RX ADMIN — IPRATROPIUM BROMIDE AND ALBUTEROL SULFATE 1 DOSE: 2.5; .5 SOLUTION RESPIRATORY (INHALATION) at 19:15

## 2024-10-15 RX ADMIN — ATORVASTATIN CALCIUM 10 MG: 10 TABLET, FILM COATED ORAL at 19:45

## 2024-10-15 RX ADMIN — IPRATROPIUM BROMIDE AND ALBUTEROL SULFATE 1 DOSE: 2.5; .5 SOLUTION RESPIRATORY (INHALATION) at 16:00

## 2024-10-15 RX ADMIN — METHYLPREDNISOLONE SODIUM SUCCINATE 40 MG: 40 INJECTION INTRAMUSCULAR; INTRAVENOUS at 09:23

## 2024-10-15 RX ADMIN — BUDESONIDE 500 MCG: 0.5 SUSPENSION RESPIRATORY (INHALATION) at 19:15

## 2024-10-15 RX ADMIN — PANTOPRAZOLE SODIUM 40 MG: 40 TABLET, DELAYED RELEASE ORAL at 09:22

## 2024-10-15 RX ADMIN — METHYLPREDNISOLONE SODIUM SUCCINATE 40 MG: 40 INJECTION INTRAMUSCULAR; INTRAVENOUS at 19:45

## 2024-10-15 RX ADMIN — FINASTERIDE 5 MG: 5 TABLET, FILM COATED ORAL at 09:38

## 2024-10-15 RX ADMIN — ARFORMOTEROL TARTRATE 15 MCG: 15 SOLUTION RESPIRATORY (INHALATION) at 19:15

## 2024-10-15 NOTE — ACP (ADVANCE CARE PLANNING)
Advance Care Planning   Healthcare Decision Maker:    Primary Decision Maker: Mariajose Waller - Other - [phone]    Secondary Decision Maker: Oliver Mcghee - Aunt/Uncle - [phone]    Click here to complete Healthcare Decision Makers including selection of the Healthcare Decision Maker Relationship (ie \"Primary\").  Today we documented Decision Maker(s) consistent with Legal Next of Kin hierarchy.

## 2024-10-15 NOTE — CARE COORDINATION
Peer Recovery Support Note    Name: Ryan Mcghee  Date: 10/15/2024    Chief Complaint   Patient presents with    Shortness of Breath     Patient found walking downtown [place]. Was discharged from hospital on 10/11/24. Has nebs at home, did not take.       Peer Support met with patient.  [x] Support and education provided  [x] Resources provided   [] Treatment referral:   [x] Other:   [] Patient declined peer recovery services     Referred By: Pam (NETO/CM)    Notes: Peer met with patient who admits he has struggled with drugs (cocaine mostly) and alcohol for many, many years. He reports short periods of \"doing well\", but states he \"always ends up caught back up\". Patient does verbalize that he would like help; and goes on to express concerns about his mental health and his medication management. He appears to be more concerned with the MH aspect that the JESSICA side at this time. Peer encouraged patient, and commended him for wanting to seek help with this matter. Peer also indulged using personal experiences, about how our mental health can often be severely impacted by our drug and alcohol use, and we don't even realize it until we sober up for a period of time. Peer then inquired about inpatient treatment at a Dual Dx facility, and patient states he would possibly be agreeable to this, so long as they would address his mental health. However, patient then goes on to talk about having a history at [facility], in addition to a plethora of other treatment facilities over the years. Patient voices interest in going to Crisis Unit rather than inpatient treatment, but Peer explained that she is unsure at this time if that is an option for him. Patient also expresses possibly wanting treatment in [place] ([place] where his daughter/family is residing, even though they are not currently speaking with him d/t his recent choices/behavior. Peer explained this may be an option, but would have to investigate  further as to whether or not out of state insurance would be accepted. Peer would recommend Dual Dx facility ([place]) or intense MH/JESSICA counseling. Peer also notes there is a psych consult pending, and will monitor progress and assist with discharge planning if necessary. Peer provided support and facility/12-step resources to patient, and will follow up once he has been assessed  by other staff. Peer will follow along, but please feel free to reach out with any needs in meantime. Thank you.    Signed: Edyta London, 10/15/2024      [phone]

## 2024-10-15 NOTE — PROGRESS NOTES
While rounding unable to complete visit due to staff addressing the patient at the time. Silently prayer was offered for the patient and  remains available for support.

## 2024-10-15 NOTE — PROGRESS NOTES
Ryan Mcghee was ordered linaclotide (Linzess) which is a nonformulary medication. Nurse is going to check with patient to see if home supply of this medication will be brought in to the hospital for inpatient use.  A pharmacist will follow-up with the nurse of the patient to assess the capability of the patient to bring in the medication.  If it is determined that the patient cannot supply the medication and it is not available to be dispensed from the pharmacy, a call will be placed to the ordering provider to discuss alternative options.     Dre Ornelas, PharmD  10/14/24 9:28 PM

## 2024-10-15 NOTE — CARE COORDINATION
-No reattempt to reach pt made for care transitions as pt is currently readmitted.  -CTN signing off and resolving the CT program at this time.

## 2024-10-15 NOTE — CONSULTS
epistaxis, sore throat, and hoarseness.  Cardiovascular: Denies palpitations, chest pain, and chest pressure.  Respiratory: Shortness of breath  Gastrointestinal: Denies nausea, vomiting, poor appetite, diarrhea, heartburn or reflux  Genitourinary: Denies dysuria, frequency, urgency or hematuria  Musculoskeletal: Denies myalgias, muscle weakness, and bone pain  Neurological: Denies dizziness, vertigo, headache, and focal weakness  Psychological: Denies anxiety and depression  Endocrine: Denies heat intolerance and cold intolerance  Hematopoietic/Lymphatic: Denies bleeding problems and blood transfusions    OBJECTIVE:   /87   Pulse 76   Temp 98.8 °F (37.1 °C) (Oral)   Resp 20   Ht 1.753 m (5' 9\")   Wt 110.8 kg (244 lb 4.3 oz)   SpO2 93%   BMI 36.07 kg/m²   SpO2 Readings from Last 1 Encounters:   10/15/24 93%        I/O:  No intake or output data in the 24 hours ending 10/15/24 1407          IPAP: 12 cmH20  CPAP/EPAP: 5 cmH2O     Physical Exam:  General: The patient is lying in bed comfortably without any distress.  Breathing is not labored  HEENT: Pupils are equal round and reactive to light, there are no oral lesions and no post-nasal drip   Neck: supple without adenopathy  Cardiovascular: regular rate and rhythm without murmur or gallop  Respiratory: Clear to auscultation bilaterally without wheezing or crackles.  Air entry is symmetric  Abdomen: soft, non-tender, non-distended, normal bowel sounds  Extremities: warm, no edema, no clubbing  Skin: no rash or lesion  Neurologic: CN II-XII grossly intact, no focal deficits    Pulmonary Function Testing - 2018  Narrative    DATA: Spirometry done in the office today demonstrates an FVC of 2.69 liters which is 66 % of predicted with an FEV1 of  1.52 liters which is 47 % of predicted.  FEV1/FVC ratio is57. Mid expiratory flow rates are 20% of predicted.  Maximum voluntary ventilation is decreased at 66 liters per minute or 44% of predicted. Total lung  Key issues of the case were discussed among consultants.  Review of CNP documentation was conducted and revisions were made as appropriate. I agree with the above documented exam, problem list and plan of care with the following additions:    Add Brovana and Pulmicort nebs, continue steroids.  Previous PFTs show COPD however patient states that he was sick during these and that they may be false.  Regardless we will treat as COPD flare as above.  Patient wishes to be discharged on Trelegy, as he uses in the past and states that it worked better for him than Breztri.  Long discussion had regarding smoking cessation and follow-up with pulmonology    Je Monreal MD

## 2024-10-15 NOTE — CARE COORDINATION
Social Work / Discharge Planning : RE-ADMIT: SW met with patient and explained role as discharge planner/ transition of care. Patient admitted with COPD exacerbation. Currently Bi-pap at night and 2 liters of 02. Patient admitted from HOME where he resides independently. Patient had a positive drug screen: Cocaine and ETOH.. Referral made to Edyta from Peer Recovery. Edyta met with patient and updated SW that they discussed services discussed services and patient leaning towards more mental health treatment vs Substance treatment. Patient also has Mental health hx . Psyche has been consulted. Await Psyche recommendations. SW and patient also discussed this as well and patient deciding on what he will need at time of discharge: Mental health vs Peer support. SW will assist with transportation for which he decides. Meanwhile , SW reached out to [facility] to verify respiratory DME.SW spoke to Zamzam at [facility] [phone] and she verified patient is active with them and has a Concentrator ONLY as well as nebulizer. Nebulizer was from 2020 and Concentrator is since 2022. If patient needs 02 at discharge, will need NEW DME order, documentation and Pulse ox. They also can get patient a portable concentrator and will need to add this as well as DME order. Patient does NOT have Bi-pap. Pulmonology has been consulted and await Pulmonology plan. Patient currently on RA and did state he wont need 02 at discharge. AWait Pulse ox. Patient verified he uses [facility] in [facility] and sees Dorita Wild in [place]. He goes back and forth between [place] and [place]> he has family in [place]. Await plan. SW to follow. Electronically signed by GABINO Bradford on 10/15/24 at 11:58 AM EDT

## 2024-10-15 NOTE — CONSULTS
PSYCHIATRY ATTENDING CONSULT    REASON FOR CONSULT:  Patient request for depression    REQUESTING PHYSICIAN:  Dr. Kapadia     CHIEF COMPLAINT: \"Depressed.\"    HISTORY OF PRESENT ILLNESS:  Ryan Mcghee is a 54 y.o. male who was admitted on 10/14/24 due to hypoxia secondary to COPD exacerbation. Chart reviewed. Note patient on Wellbutrin  mg daily at home. Urine toxicology positive for cocaine. Currently patient is found resting but easily awakens on approach. Patient is superficially cooperative but evasive with the history and becomes irritable with routine questions. He tells me is currently on Wellbutrin although gives conflicting reports about who is prescribing this. He denies currently seeing a psychiatrist. Patient claims the \"medication isn't working\" and endorses vague depressive symptoms. He denies suicidal ideations, intentions or plans. He mentions possible inpatient psychiatric admission but I explained he does not meet criteria for this. Patient is not manic or overtly psychotic. He complains of poor sleep and reports he took low dose Seroquel in the past with good results.     PAST PSYCHIATRIC HISTORY:  At least one psychiatric admission in the past for depression. Reports prior suicidal ideations but is unclear when asked about attempts.     PAST MEDICAL HISTORY:       Diagnosis Date    Acid reflux     Asthma     Asthma     COPD (chronic obstructive pulmonary disease) (HCC)     Hypertension     Pancreatitis     Prediabetes     Psychiatric problem     depressed    Sleep apnea     Substance abuse (HCC)     alcohol and cocaine     PAST SURGICAL HISTORY:       Procedure Laterality Date    COLONOSCOPY      2010 neg    NERVE BLOCK Left 08/27/2018    lumbar epidural #1 paramedian    [place] NJX DX/THER SBST INTRLMNR LMBR/SAC W/IMG GDN Left 8/27/2018    LUMBAR EPIDURAL STEROID INJECTION L4-5 LEFT PARAMEDIAN #1 performed by Yordan Chacon MD at [facility] OR    WISDOM TOOTH EXTRACTION       MEDICATIONS:

## 2024-10-15 NOTE — PROGRESS NOTES
[facility] Quality Flow/Interdisciplinary Rounds Progress Note        Quality Flow Rounds held on October 15, 2024    Disciplines Attending:  Bedside Nurse, , , and Nursing Unit Leadership    Ryan Mcghee was admitted on 10/14/2024  2:45 PM    Anticipated Discharge Date:       Disposition:    Jaycob Score:  Jaycob Scale Score: 20    Readmission Risk              Risk of Unplanned Readmission:  22           Discussed patient goal for the day, patient clinical progression, and barriers to discharge.  The following Goal(s) of the Day/Commitment(s) have been identified:   iv steroids, await resp panel, wean oxgyen as able.       Marifer Welsh RN  October 15, 2024

## 2024-10-15 NOTE — ED NOTES
ED to Inpatient Handoff Report    Notified Gloria that electronic handoff available and patient ready for transport to room 638.    Safety Risks: None identified    Patient in Restraints: no    Constant Observer or Patient : no    Telemetry Monitoring Ordered: Yes     Cardiac Rhythm: Sinus tachy    Order to transfer to unit without monitor: NO    Last MEWS: 2 Time completed: 20:08    Deterioration Index: 34.75    Vitals:    10/14/24 1628 10/14/24 1837 10/14/24 1841 10/14/24 2008   BP:   (!) 129/94 131/89   Pulse: (!) 101 89 90 88   Resp: 19 17 21 22   Temp:    97.4 °F (36.3 °C)   TempSrc:    Axillary   SpO2: 92% 98% 97% 96%   Weight:       Height:           Opportunity for questions and clarification was provided.

## 2024-10-15 NOTE — PROGRESS NOTES
4 Eyes Skin Assessment     NAME:  Ryan Mcghee  YOB: 1970  MEDICAL RECORD NUMBER:  [id number]    The patient is being assessed for  Admission    I agree that at least one RN has performed a thorough Head to Toe Skin Assessment on the patient. ALL assessment sites listed below have been assessed.      Areas assessed by both nurses:    Head, Face, Ears, Shoulders, Back, Chest, Arms, Elbows, Hands, Sacrum. Buttock, Coccyx, Ischium, and Legs. Feet and Heels        Does the Patient have a Wound? No noted wound(s)       Jaycob Prevention initiated by RN: No  Wound Care Orders initiated by RN: No    Pressure Injury (Stage 3,4, Unstageable, DTI, NWPT, and Complex wounds) if present, place Wound referral order by RN under : No    New Ostomies, if present place, Ostomy referral order under : No     Nurse 1 eSignature: Electronically signed by Maegan Cardenas RN on 10/14/24 at 10:44 PM EDT    **SHARE this note so that the co-signing nurse can place an eSignature**    Nurse 2 eSignature: Electronically signed by Sonny Cho RN on 10/14/24 at 10:45 PM EDT

## 2024-10-15 NOTE — PLAN OF CARE
Problem: Chronic Conditions and Co-morbidities  Goal: Patient's chronic conditions and co-morbidity symptoms are monitored and maintained or improved  Outcome: Progressing  Flowsheets (Taken 10/15/2024 0900)  Care Plan - Patient's Chronic Conditions and Co-Morbidity Symptoms are Monitored and Maintained or Improved: Monitor and assess patient's chronic conditions and comorbid symptoms for stability, deterioration, or improvement     Problem: Respiratory - Adult  Goal: Achieves optimal ventilation and oxygenation  Outcome: Progressing  Flowsheets (Taken 10/15/2024 0900)  Achieves optimal ventilation and oxygenation:   Assess for changes in respiratory status   Assess for changes in mentation and behavior   Position to facilitate oxygenation and minimize respiratory effort     Problem: Safety - Adult  Goal: Free from fall injury  Outcome: Progressing

## 2024-10-15 NOTE — PROGRESS NOTES
[facility] Hospitalist Progress Note    Admitting Date and Time: 10/14/2024  2:45 PM  Admit Dx: COPD exacerbation (HCC) [J44.1]  Acute respiratory failure with hypercapnia [J96.02]    Subjective:  Patient is being followed for COPD exacerbation (HCC) [J44.1]  Acute respiratory failure with hypercapnia [J96.02]   Pt seen and examined this morning  No acute events overnight  In no acute distress  On 2 L NC    ROS: denies fever, chills, cp, sob, n/v, HA unless stated above.      insulin lispro  0-4 Units SubCUTAneous 4x Daily AC & HS    amLODIPine  10 mg Oral Daily    aspirin  81 mg Oral Daily    atorvastatin  10 mg Oral Nightly    buPROPion  300 mg Oral QAM    finasteride  5 mg Oral Daily    nicotine  1 patch TransDERmal Daily    pantoprazole  40 mg Oral Daily    tamsulosin  0.8 mg Oral Daily    methylPREDNISolone  40 mg IntraVENous BID    ipratropium 0.5 mg-albuterol 2.5 mg  1 Dose Inhalation Q4H WA RT     glucose, 4 tablet, PRN  dextrose bolus, 125 mL, PRN   Or  dextrose bolus, 250 mL, PRN  glucagon (rDNA), 1 mg, PRN  dextrose, , Continuous PRN  benzocaine-menthol, 1 lozenge, Q2H PRN  calcium carbonate, 500 mg, TID PRN  linaclotide, 145 mcg, Daily PRN  ipratropium 0.5 mg-albuterol 2.5 mg, 1 Dose, Q4H PRN         Objective:    /87   Pulse 80   Temp 98.8 °F (37.1 °C) (Oral)   Resp 20   Ht 1.753 m (5' 9\")   Wt 110.8 kg (244 lb 4.3 oz)   SpO2 95%   BMI 36.07 kg/m²     General Appearance: alert and oriented to person, place and time and in no acute distress  Skin: warm and dry  Head: normocephalic and atraumatic  Eyes: pupils equal, round, and reactive to light, extraocular eye movements intact, conjunctivae normal  Neck: neck supple and non tender without mass   Pulmonary/Chest: clear to auscultation bilaterally- no wheezes, rales or rhonchi, normal air movement, no respiratory distress  Cardiovascular: normal rate, normal S1 and S2 and no carotid bruits  Abdomen: soft, non-tender, non-distended,

## 2024-10-16 LAB
ALBUMIN SERPL-MCNC: 3.8 G/DL (ref 3.5–5.2)
ALP SERPL-CCNC: 99 U/L (ref 40–129)
ALT SERPL-CCNC: 39 U/L (ref 0–40)
ANION GAP SERPL CALCULATED.3IONS-SCNC: 12 MMOL/L (ref 7–16)
AST SERPL-CCNC: 19 U/L (ref 0–39)
BASOPHILS # BLD: 0.01 K/UL (ref 0–0.2)
BASOPHILS NFR BLD: 0 % (ref 0–2)
BILIRUB SERPL-MCNC: 0.7 MG/DL (ref 0–1.2)
BUN SERPL-MCNC: 16 MG/DL (ref 6–20)
CALCIUM SERPL-MCNC: 9.5 MG/DL (ref 8.6–10.2)
CHLORIDE SERPL-SCNC: 97 MMOL/L (ref 98–107)
CK SERPL-CCNC: 296 U/L (ref 20–200)
CO2 SERPL-SCNC: 26 MMOL/L (ref 22–29)
CREAT SERPL-MCNC: 0.6 MG/DL (ref 0.7–1.2)
EOSINOPHIL # BLD: 0 K/UL (ref 0.05–0.5)
EOSINOPHILS RELATIVE PERCENT: 0 % (ref 0–6)
ERYTHROCYTE [DISTWIDTH] IN BLOOD BY AUTOMATED COUNT: 14.3 % (ref 11.5–15)
GFR, ESTIMATED: >90 ML/MIN/1.73M2
GLUCOSE BLD-MCNC: 203 MG/DL (ref 74–99)
GLUCOSE BLD-MCNC: 303 MG/DL (ref 74–99)
GLUCOSE BLD-MCNC: 330 MG/DL (ref 74–99)
GLUCOSE BLD-MCNC: 400 MG/DL (ref 74–99)
GLUCOSE SERPL-MCNC: 275 MG/DL (ref 74–99)
HCT VFR BLD AUTO: 46.2 % (ref 37–54)
HGB BLD-MCNC: 13.9 G/DL (ref 12.5–16.5)
IMM GRANULOCYTES # BLD AUTO: 0.08 K/UL (ref 0–0.58)
IMM GRANULOCYTES NFR BLD: 1 % (ref 0–5)
LYMPHOCYTES NFR BLD: 0.69 K/UL (ref 1.5–4)
LYMPHOCYTES RELATIVE PERCENT: 5 % (ref 20–42)
MCH RBC QN AUTO: 26.5 PG (ref 26–35)
MCHC RBC AUTO-ENTMCNC: 30.1 G/DL (ref 32–34.5)
MCV RBC AUTO: 88.2 FL (ref 80–99.9)
MONOCYTES NFR BLD: 0.83 K/UL (ref 0.1–0.95)
MONOCYTES NFR BLD: 6 % (ref 2–12)
NEUTROPHILS NFR BLD: 88 % (ref 43–80)
NEUTS SEG NFR BLD: 12.21 K/UL (ref 1.8–7.3)
PLATELET # BLD AUTO: 384 K/UL (ref 130–450)
PMV BLD AUTO: 9.6 FL (ref 7–12)
POTASSIUM SERPL-SCNC: 4.8 MMOL/L (ref 3.5–5)
PROT SERPL-MCNC: 6.7 G/DL (ref 6.4–8.3)
RBC # BLD AUTO: 5.24 M/UL (ref 3.8–5.8)
SODIUM SERPL-SCNC: 135 MMOL/L (ref 132–146)
WBC OTHER # BLD: 13.8 K/UL (ref 4.5–11.5)

## 2024-10-16 PROCEDURE — 80053 COMPREHEN METABOLIC PANEL: CPT

## 2024-10-16 PROCEDURE — 6370000000 HC RX 637 (ALT 250 FOR IP)

## 2024-10-16 PROCEDURE — 82550 ASSAY OF CK (CPK): CPT

## 2024-10-16 PROCEDURE — 94640 AIRWAY INHALATION TREATMENT: CPT

## 2024-10-16 PROCEDURE — 85025 COMPLETE CBC W/AUTO DIFF WBC: CPT

## 2024-10-16 PROCEDURE — 6360000002 HC RX W HCPCS: Performed by: INTERNAL MEDICINE

## 2024-10-16 PROCEDURE — 6370000000 HC RX 637 (ALT 250 FOR IP): Performed by: PSYCHIATRY & NEUROLOGY

## 2024-10-16 PROCEDURE — 6370000000 HC RX 637 (ALT 250 FOR IP): Performed by: INTERNAL MEDICINE

## 2024-10-16 PROCEDURE — 99232 SBSQ HOSP IP/OBS MODERATE 35: CPT | Performed by: INTERNAL MEDICINE

## 2024-10-16 PROCEDURE — 6360000002 HC RX W HCPCS

## 2024-10-16 PROCEDURE — 2700000000 HC OXYGEN THERAPY PER DAY

## 2024-10-16 PROCEDURE — 36415 COLL VENOUS BLD VENIPUNCTURE: CPT

## 2024-10-16 PROCEDURE — 2060000000 HC ICU INTERMEDIATE R&B

## 2024-10-16 PROCEDURE — 82962 GLUCOSE BLOOD TEST: CPT

## 2024-10-16 PROCEDURE — 94660 CPAP INITIATION&MGMT: CPT

## 2024-10-16 RX ORDER — HYDROXYZINE PAMOATE 25 MG/1
50 CAPSULE ORAL NIGHTLY
Status: DISCONTINUED | OUTPATIENT
Start: 2024-10-16 | End: 2024-10-18 | Stop reason: HOSPADM

## 2024-10-16 RX ORDER — FLUTICASONE FUROATE, UMECLIDINIUM BROMIDE AND VILANTEROL TRIFENATATE 100; 62.5; 25 UG/1; UG/1; UG/1
1 POWDER RESPIRATORY (INHALATION) DAILY
Qty: 1 EACH | Refills: 3 | Status: SHIPPED | OUTPATIENT
Start: 2024-10-16

## 2024-10-16 RX ORDER — TAMSULOSIN HYDROCHLORIDE 0.4 MG/1
0.4 CAPSULE ORAL 2 TIMES DAILY
Status: DISCONTINUED | OUTPATIENT
Start: 2024-10-16 | End: 2024-10-18 | Stop reason: HOSPADM

## 2024-10-16 RX ORDER — ALBUTEROL SULFATE 0.83 MG/ML
2.5 SOLUTION RESPIRATORY (INHALATION) 4 TIMES DAILY PRN
Qty: 120 EACH | Refills: 3 | Status: SHIPPED | OUTPATIENT
Start: 2024-10-16

## 2024-10-16 RX ORDER — INSULIN LISPRO 100 [IU]/ML
0-8 INJECTION, SOLUTION INTRAVENOUS; SUBCUTANEOUS
Status: DISCONTINUED | OUTPATIENT
Start: 2024-10-16 | End: 2024-10-17

## 2024-10-16 RX ORDER — PREDNISONE 10 MG/1
TABLET ORAL
Qty: 30 TABLET | Refills: 0 | Status: SHIPPED | OUTPATIENT
Start: 2024-10-16

## 2024-10-16 RX ADMIN — AMLODIPINE BESYLATE 10 MG: 10 TABLET ORAL at 08:44

## 2024-10-16 RX ADMIN — INSULIN LISPRO 6 UNITS: 100 INJECTION, SOLUTION INTRAVENOUS; SUBCUTANEOUS at 17:17

## 2024-10-16 RX ADMIN — TAMSULOSIN HYDROCHLORIDE 0.4 MG: 0.4 CAPSULE ORAL at 08:44

## 2024-10-16 RX ADMIN — ASPIRIN 81 MG: 81 TABLET, COATED ORAL at 08:44

## 2024-10-16 RX ADMIN — INSULIN LISPRO 4 UNITS: 100 INJECTION, SOLUTION INTRAVENOUS; SUBCUTANEOUS at 06:10

## 2024-10-16 RX ADMIN — ARFORMOTEROL TARTRATE 15 MCG: 15 SOLUTION RESPIRATORY (INHALATION) at 19:52

## 2024-10-16 RX ADMIN — TAMSULOSIN HYDROCHLORIDE 0.4 MG: 0.4 CAPSULE ORAL at 20:24

## 2024-10-16 RX ADMIN — QUETIAPINE FUMARATE 25 MG: 25 TABLET ORAL at 20:24

## 2024-10-16 RX ADMIN — METHYLPREDNISOLONE SODIUM SUCCINATE 40 MG: 40 INJECTION INTRAMUSCULAR; INTRAVENOUS at 20:24

## 2024-10-16 RX ADMIN — FINASTERIDE 5 MG: 5 TABLET, FILM COATED ORAL at 08:54

## 2024-10-16 RX ADMIN — HYDROXYZINE PAMOATE 50 MG: 25 CAPSULE ORAL at 20:24

## 2024-10-16 RX ADMIN — INSULIN LISPRO 2 UNITS: 100 INJECTION, SOLUTION INTRAVENOUS; SUBCUTANEOUS at 11:43

## 2024-10-16 RX ADMIN — BUDESONIDE 500 MCG: 0.5 SUSPENSION RESPIRATORY (INHALATION) at 19:52

## 2024-10-16 RX ADMIN — IPRATROPIUM BROMIDE AND ALBUTEROL SULFATE 1 DOSE: 2.5; .5 SOLUTION RESPIRATORY (INHALATION) at 16:02

## 2024-10-16 RX ADMIN — ARFORMOTEROL TARTRATE 15 MCG: 15 SOLUTION RESPIRATORY (INHALATION) at 07:38

## 2024-10-16 RX ADMIN — BUPROPION HYDROCHLORIDE 300 MG: 300 TABLET, EXTENDED RELEASE ORAL at 08:54

## 2024-10-16 RX ADMIN — INSULIN LISPRO 4 UNITS: 100 INJECTION, SOLUTION INTRAVENOUS; SUBCUTANEOUS at 06:18

## 2024-10-16 RX ADMIN — IPRATROPIUM BROMIDE AND ALBUTEROL SULFATE 1 DOSE: 2.5; .5 SOLUTION RESPIRATORY (INHALATION) at 19:52

## 2024-10-16 RX ADMIN — ATORVASTATIN CALCIUM 10 MG: 10 TABLET, FILM COATED ORAL at 20:24

## 2024-10-16 RX ADMIN — CALCIUM CARBONATE 500 MG: 500 TABLET, CHEWABLE ORAL at 20:41

## 2024-10-16 RX ADMIN — IPRATROPIUM BROMIDE AND ALBUTEROL SULFATE 1 DOSE: 2.5; .5 SOLUTION RESPIRATORY (INHALATION) at 12:01

## 2024-10-16 RX ADMIN — IPRATROPIUM BROMIDE AND ALBUTEROL SULFATE 1 DOSE: 2.5; .5 SOLUTION RESPIRATORY (INHALATION) at 07:38

## 2024-10-16 RX ADMIN — BUDESONIDE 500 MCG: 0.5 SUSPENSION RESPIRATORY (INHALATION) at 07:38

## 2024-10-16 RX ADMIN — PANTOPRAZOLE SODIUM 40 MG: 40 TABLET, DELAYED RELEASE ORAL at 08:44

## 2024-10-16 RX ADMIN — INSULIN LISPRO 6 UNITS: 100 INJECTION, SOLUTION INTRAVENOUS; SUBCUTANEOUS at 20:24

## 2024-10-16 RX ADMIN — METHYLPREDNISOLONE SODIUM SUCCINATE 40 MG: 40 INJECTION INTRAMUSCULAR; INTRAVENOUS at 08:45

## 2024-10-16 NOTE — PLAN OF CARE
Problem: Chronic Conditions and Co-morbidities  Goal: Patient's chronic conditions and co-morbidity symptoms are monitored and maintained or improved  10/16/2024 1115 by Sisi Cui RN  Outcome: Progressing  Flowsheets (Taken 10/16/2024 0900)  Care Plan - Patient's Chronic Conditions and Co-Morbidity Symptoms are Monitored and Maintained or Improved:   Monitor and assess patient's chronic conditions and comorbid symptoms for stability, deterioration, or improvement   Collaborate with multidisciplinary team to address chronic and comorbid conditions and prevent exacerbation or deterioration  10/15/2024 2200 by Aj Jennings RN  Outcome: Progressing  Flowsheets (Taken 10/15/2024 2000)  Care Plan - Patient's Chronic Conditions and Co-Morbidity Symptoms are Monitored and Maintained or Improved: Monitor and assess patient's chronic conditions and comorbid symptoms for stability, deterioration, or improvement     Problem: Pain  Goal: Verbalizes/displays adequate comfort level or baseline comfort level  10/16/2024 1115 by Sisi Cui RN  Outcome: Progressing  10/15/2024 2200 by Aj Jennings RN  Outcome: Progressing     Problem: Respiratory - Adult  Goal: Achieves optimal ventilation and oxygenation  10/16/2024 1115 by Sisi Cui RN  Outcome: Progressing  Flowsheets (Taken 10/16/2024 0900)  Achieves optimal ventilation and oxygenation:   Assess for changes in respiratory status   Assess for changes in mentation and behavior  10/15/2024 2200 by Aj Jennings RN  Outcome: Progressing  Flowsheets (Taken 10/15/2024 2000)  Achieves optimal ventilation and oxygenation: Assess for changes in respiratory status     Problem: Safety - Adult  Goal: Free from fall injury  10/15/2024 2200 by Aj Jennings RN  Outcome: Progressing

## 2024-10-16 NOTE — PROGRESS NOTES
Kvng Hernández M.D.,[facility]  Jefry Weiner D.O., MARIALUISA., [facility]  Kristine Whitten M.D.  Grace Ocampo M.D.   Lino Davenport D.O.  Je Monreal M.D.         Daily Pulmonary Progress Note    Patient:  Ryan Mcghee 54 y.o. male MRN: [id number]            Synopsis     We are following patient for COPD exacerbation    \"CC\" hypersomnolence    Code status: Full      Subjective      Patient was seen and examined.  Oxygen 2 L nasal cannula.  Improved mentation.  Using AVAPS for respiratory support volume 500 EPAP +5 backup rate 22.  Urine drug screen positive for cocaine.Prior ABG 7.37/60/113/34/7.5/98% on 10/14/2024.      Review of Systems:  Constitutional: Denies fever, weight loss, night sweats, and fatigue  Skin: Denies pigmentation, dark lesions, and rashes   HEENT: Denies hearing loss, tinnitus, ear drainage, epistaxis, sore throat, and hoarseness.  Cardiovascular: Denies palpitations, chest pain, and chest pressure.  Respiratory: Denies cough, dyspnea at rest, hemoptysis, apnea, and choking.  Improved dyspnea, improved hypercapnia  Gastrointestinal: Denies nausea, vomiting, poor appetite, diarrhea, heartburn or reflux  Genitourinary: Denies dysuria, frequency, urgency or hematuria  Musculoskeletal: Denies myalgias, muscle weakness, and bone pain  Neurological: Denies dizziness, vertigo, headache, and focal weakness  Psychological: Denies anxiety and depression  Endocrine: Denies heat intolerance and cold intolerance  Hematopoietic/Lymphatic: Denies bleeding problems and blood transfusions    24-hour events:  None    Objective   OBJECTIVE:   /74   Pulse 83   Temp 98.2 °F (36.8 °C) (Oral)   Resp 20   Ht 1.753 m (5' 9\")   Wt 110.8 kg (244 lb 4.3 oz)   SpO2 100%   BMI 36.07 kg/m²   SpO2 Readings from Last 1 Encounters:   10/16/24 100%        I/O:    Intake/Output Summary (Last 24 hours) at 10/16/2024 0974  Last data filed at 10/16/2024 0715  Gross per 24 hour   Intake --   Output 2500 ml   Net

## 2024-10-16 NOTE — PROGRESS NOTES
Call placed to Dr. Kapadia and notified him that patient is having suicidal thoughts with no plans.  Order placed for constant observer. All things that can be removed from room have been and personal belongings also taken out taken to nurses station. Dr. Kapadia will be up to sign pink slip for patient, pink slip placed in soft chart.

## 2024-10-16 NOTE — PROGRESS NOTES
CLINICAL PHARMACY NOTE: MEDS TO BEDS    Total # of Prescriptions Filled: 1   The following medications were delivered to the patient:  Albuterol 2.5mg/ 3 ml    Additional Documentation:

## 2024-10-16 NOTE — PROGRESS NOTES
[facility] Hospitalist Progress Note    Admitting Date and Time: 10/14/2024  2:45 PM  Admit Dx: COPD exacerbation (HCC) [J44.1]  Acute respiratory failure with hypercapnia [J96.02]    Subjective:  Patient is being followed for COPD exacerbation (HCC) [J44.1]  Acute respiratory failure with hypercapnia [J96.02]   Pt seen and examined this morning  No acute events overnight  In no acute distress  On 2 L NC    ROS: denies fever, chills, cp, sob, n/v, HA unless stated above.      tamsulosin  0.4 mg Oral BID    insulin lispro  0-8 Units SubCUTAneous With meals & nightly    hydrOXYzine pamoate  50 mg Oral Nightly    arformoterol tartrate  15 mcg Nebulization BID RT    budesonide  0.5 mg Nebulization BID RT    QUEtiapine  25 mg Oral Nightly    amLODIPine  10 mg Oral Daily    aspirin  81 mg Oral Daily    atorvastatin  10 mg Oral Nightly    buPROPion  300 mg Oral QAM    finasteride  5 mg Oral Daily    nicotine  1 patch TransDERmal Daily    pantoprazole  40 mg Oral Daily    methylPREDNISolone  40 mg IntraVENous BID    ipratropium 0.5 mg-albuterol 2.5 mg  1 Dose Inhalation Q4H WA RT     glucose, 4 tablet, PRN  dextrose bolus, 125 mL, PRN   Or  dextrose bolus, 250 mL, PRN  glucagon (rDNA), 1 mg, PRN  dextrose, , Continuous PRN  benzocaine-menthol, 1 lozenge, Q2H PRN  calcium carbonate, 500 mg, TID PRN  linaclotide, 145 mcg, Daily PRN  ipratropium 0.5 mg-albuterol 2.5 mg, 1 Dose, Q4H PRN         Objective:    /74   Pulse 83   Temp 98.2 °F (36.8 °C) (Oral)   Resp 20   Ht 1.753 m (5' 9\")   Wt 110.8 kg (244 lb 4.3 oz)   SpO2 100%   BMI 36.07 kg/m²     General Appearance: alert and oriented to person, place and time and in no acute distress  Skin: warm and dry  Head: normocephalic and atraumatic  Eyes: pupils equal, round, and reactive to light, extraocular eye movements intact, conjunctivae normal  Neck: neck supple and non tender without mass   Pulmonary/Chest: clear to auscultation bilaterally- no wheezes,

## 2024-10-16 NOTE — PROGRESS NOTES
[facility] Quality Flow/Interdisciplinary Rounds Progress Note        Quality Flow Rounds held on October 16, 2024    Disciplines Attending:  Bedside Nurse, , , and Nursing Unit Leadership    Ryan Mcghee was admitted on 10/14/2024  2:45 PM    Anticipated Discharge Date:       Disposition:    Jaycob Score:  Jaycob Scale Score: 20    Readmission Risk              Risk of Unplanned Readmission:  25           Discussed patient goal for the day, patient clinical progression, and barriers to discharge.  The following Goal(s) of the Day/Commitment(s) have been identified:   discharge planning, pulm plan, Bipap, psych, peer recovery       Husam Woods RN  October 16, 2024

## 2024-10-16 NOTE — PLAN OF CARE
Problem: Chronic Conditions and Co-morbidities  Goal: Patient's chronic conditions and co-morbidity symptoms are monitored and maintained or improved  10/15/2024 2200 by Aj Jennings RN  Outcome: Progressing  Flowsheets (Taken 10/15/2024 2000)  Care Plan - Patient's Chronic Conditions and Co-Morbidity Symptoms are Monitored and Maintained or Improved: Monitor and assess patient's chronic conditions and comorbid symptoms for stability, deterioration, or improvement     Problem: Pain  Goal: Verbalizes/displays adequate comfort level or baseline comfort level  Outcome: Progressing     Problem: Respiratory - Adult  Goal: Achieves optimal ventilation and oxygenation  10/15/2024 2200 by Aj Jennings, RN  Outcome: Progressing  Flowsheets (Taken 10/15/2024 2000)  Achieves optimal ventilation and oxygenation: Assess for changes in respiratory status     Problem: Safety - Adult  Goal: Free from fall injury  10/15/2024 2200 by Aj Jennings, RN  Outcome: Progressing

## 2024-10-16 NOTE — PROGRESS NOTES
Spoke with Dr. Villatoro, made him aware that patient is requesting to speak with him and that he would like to go downtown to inpatient rehab. Dr. Villatoro stated 'No he needs to go to [facility]\"       Asked if Dr. Villatoro could speak with the patient. Awaiting call back.

## 2024-10-16 NOTE — CARE COORDINATION
Peer Recovery Support Note    Name: Ryan Mcghee  Date: 10/16/2024    Chief Complaint   Patient presents with    Shortness of Breath     Patient found walking downtown [place]. Was discharged from hospital on 10/11/24. Has nebs at home, did not take.       Peer Support met with patient.  [x] Support and education provided  [] Resources provided   [] Treatment referral:   [x] Other:   [] Patient declined peer recovery services     Referred By: Iman MCDONALD)    Notes: Peer made a follow up visit to patient who appears to be flustered by \"being bombarded\" all day by staff. Peer revisited the idea of possibly getting engaged with a dual dx facility, however, patient is not interested at this time in going to inpatient Substance Abuse treatment; patient is adamant that he will not go anywhere until his mental health concerns are addressed. Peer explained that going into treatment could definitely assist in this area, but patient continues to adamantly refuse this as an option and is only interested in getting psychiatric help at this time. [facility] noting [facility] he has an upcoming appointment with them on Oct. 21 at 8:30am for counseling and Oct. 29th for medication management. Patient states that outpatient services and programs are not sufficient for him at this time, and goes on to express thoughts of committing suicide and thoughts of \"not wanting to live anymore\". Peer let patient know that she would be obligated to report this, and patient states that \"that's what I've BEEN saying\".     Peer is unable to provide placement for this patient d/t the fact that he is not agreeable to [facility] assistance or any dual programs, and the fact that he is now voicing SI. Peer will be on-site to offer support to patient if necessary. Patient desires to get into an inpatient psychiatric facility, and even states that he is more than willing to sign himself in voluntarily. He

## 2024-10-16 NOTE — PROGRESS NOTES
Spoke with Dr. Villatoro about patient having suicidal thoughts. Per Dr. Villatoro patient does not need to be pink slipped to have a constant observer but he is not pink slipping the patient as he spoke with the patient yesterday and he does not believe that the patient needs to be inpatient psych downtown.

## 2024-10-16 NOTE — CARE COORDINATION
Social Work / Discharge Planning :NETO updated by Edyta at [facility] patient is declining substance assist and would prefer inpatient for his mental health. [facility] did reports patient having suicidal thoughts as well. SW followed up with Ryan. Discussed options of [facility] in patient. Patient asking again about inpatient downtown. Patient is expressing suicidal thoughts in his mind that he cannot get rid of. Wanting not to wake up tomorrow am. Patient asking for help for his mental health stating his meds are just not right while tapping on the side of his head. Can't get these suicidal thoughts out of my head. Did NOT express a  plan but does have suicidal Hx. Patient forthcoming and starting to get tearful needing and asking for help. Support provided. Nursing updated to update Psychiatrist. . AWait plan after update. SW to follow. Electronically signed by GABINO Bradford on 10/16/24 at 2:35 PM EDT

## 2024-10-17 LAB
ALBUMIN SERPL-MCNC: 3.7 G/DL (ref 3.5–5.2)
ALP SERPL-CCNC: 111 U/L (ref 40–129)
ALT SERPL-CCNC: 35 U/L (ref 0–40)
ANION GAP SERPL CALCULATED.3IONS-SCNC: 9 MMOL/L (ref 7–16)
AST SERPL-CCNC: 13 U/L (ref 0–39)
BASOPHILS # BLD: 0.01 K/UL (ref 0–0.2)
BASOPHILS NFR BLD: 0 % (ref 0–2)
BILIRUB SERPL-MCNC: 0.4 MG/DL (ref 0–1.2)
BUN SERPL-MCNC: 14 MG/DL (ref 6–20)
CALCIUM SERPL-MCNC: 9.3 MG/DL (ref 8.6–10.2)
CHLORIDE SERPL-SCNC: 96 MMOL/L (ref 98–107)
CO2 SERPL-SCNC: 29 MMOL/L (ref 22–29)
CREAT SERPL-MCNC: 0.7 MG/DL (ref 0.7–1.2)
EOSINOPHIL # BLD: 0 K/UL (ref 0.05–0.5)
EOSINOPHILS RELATIVE PERCENT: 0 % (ref 0–6)
ERYTHROCYTE [DISTWIDTH] IN BLOOD BY AUTOMATED COUNT: 14.4 % (ref 11.5–15)
GFR, ESTIMATED: >90 ML/MIN/1.73M2
GLUCOSE BLD-MCNC: 276 MG/DL (ref 74–99)
GLUCOSE BLD-MCNC: 278 MG/DL (ref 74–99)
GLUCOSE BLD-MCNC: 346 MG/DL (ref 74–99)
GLUCOSE BLD-MCNC: 352 MG/DL (ref 74–99)
GLUCOSE SERPL-MCNC: 361 MG/DL (ref 74–99)
HCT VFR BLD AUTO: 44.5 % (ref 37–54)
HGB BLD-MCNC: 13.3 G/DL (ref 12.5–16.5)
IMM GRANULOCYTES # BLD AUTO: 0.09 K/UL (ref 0–0.58)
IMM GRANULOCYTES NFR BLD: 1 % (ref 0–5)
LYMPHOCYTES NFR BLD: 0.65 K/UL (ref 1.5–4)
LYMPHOCYTES RELATIVE PERCENT: 5 % (ref 20–42)
MCH RBC QN AUTO: 26.3 PG (ref 26–35)
MCHC RBC AUTO-ENTMCNC: 29.9 G/DL (ref 32–34.5)
MCV RBC AUTO: 87.9 FL (ref 80–99.9)
MONOCYTES NFR BLD: 0.67 K/UL (ref 0.1–0.95)
MONOCYTES NFR BLD: 5 % (ref 2–12)
NEUTROPHILS NFR BLD: 89 % (ref 43–80)
NEUTS SEG NFR BLD: 11.23 K/UL (ref 1.8–7.3)
PLATELET # BLD AUTO: 362 K/UL (ref 130–450)
PMV BLD AUTO: 9.3 FL (ref 7–12)
POTASSIUM SERPL-SCNC: 4.7 MMOL/L (ref 3.5–5)
PROT SERPL-MCNC: 6.4 G/DL (ref 6.4–8.3)
RBC # BLD AUTO: 5.06 M/UL (ref 3.8–5.8)
SODIUM SERPL-SCNC: 134 MMOL/L (ref 132–146)
WBC OTHER # BLD: 12.7 K/UL (ref 4.5–11.5)

## 2024-10-17 PROCEDURE — 2060000000 HC ICU INTERMEDIATE R&B

## 2024-10-17 PROCEDURE — 6360000002 HC RX W HCPCS: Performed by: INTERNAL MEDICINE

## 2024-10-17 PROCEDURE — 6370000000 HC RX 637 (ALT 250 FOR IP): Performed by: PSYCHIATRY & NEUROLOGY

## 2024-10-17 PROCEDURE — 6370000000 HC RX 637 (ALT 250 FOR IP): Performed by: INTERNAL MEDICINE

## 2024-10-17 PROCEDURE — 99232 SBSQ HOSP IP/OBS MODERATE 35: CPT | Performed by: INTERNAL MEDICINE

## 2024-10-17 PROCEDURE — 2700000000 HC OXYGEN THERAPY PER DAY

## 2024-10-17 PROCEDURE — 94640 AIRWAY INHALATION TREATMENT: CPT

## 2024-10-17 PROCEDURE — 85025 COMPLETE CBC W/AUTO DIFF WBC: CPT

## 2024-10-17 PROCEDURE — 80053 COMPREHEN METABOLIC PANEL: CPT

## 2024-10-17 PROCEDURE — 82962 GLUCOSE BLOOD TEST: CPT

## 2024-10-17 PROCEDURE — 6360000002 HC RX W HCPCS

## 2024-10-17 PROCEDURE — 94660 CPAP INITIATION&MGMT: CPT

## 2024-10-17 PROCEDURE — 99442 PR PHYS/QHP TELEPHONE EVALUATION 11-20 MIN: CPT | Performed by: PSYCHIATRY & NEUROLOGY

## 2024-10-17 PROCEDURE — 36415 COLL VENOUS BLD VENIPUNCTURE: CPT

## 2024-10-17 RX ORDER — INSULIN LISPRO 100 [IU]/ML
0-16 INJECTION, SOLUTION INTRAVENOUS; SUBCUTANEOUS ONCE
Status: COMPLETED | OUTPATIENT
Start: 2024-10-17 | End: 2024-10-17

## 2024-10-17 RX ORDER — INSULIN LISPRO 100 [IU]/ML
0-16 INJECTION, SOLUTION INTRAVENOUS; SUBCUTANEOUS
Status: DISCONTINUED | OUTPATIENT
Start: 2024-10-17 | End: 2024-10-18 | Stop reason: HOSPADM

## 2024-10-17 RX ORDER — PREDNISONE 20 MG/1
40 TABLET ORAL DAILY
Status: DISCONTINUED | OUTPATIENT
Start: 2024-10-18 | End: 2024-10-18 | Stop reason: HOSPADM

## 2024-10-17 RX ADMIN — HYDROXYZINE PAMOATE 50 MG: 25 CAPSULE ORAL at 20:27

## 2024-10-17 RX ADMIN — BUDESONIDE 500 MCG: 0.5 SUSPENSION RESPIRATORY (INHALATION) at 08:06

## 2024-10-17 RX ADMIN — IPRATROPIUM BROMIDE AND ALBUTEROL SULFATE 1 DOSE: 2.5; .5 SOLUTION RESPIRATORY (INHALATION) at 08:05

## 2024-10-17 RX ADMIN — ARFORMOTEROL TARTRATE 15 MCG: 15 SOLUTION RESPIRATORY (INHALATION) at 08:06

## 2024-10-17 RX ADMIN — IPRATROPIUM BROMIDE AND ALBUTEROL SULFATE 1 DOSE: 2.5; .5 SOLUTION RESPIRATORY (INHALATION) at 12:06

## 2024-10-17 RX ADMIN — QUETIAPINE FUMARATE 25 MG: 25 TABLET ORAL at 20:27

## 2024-10-17 RX ADMIN — FINASTERIDE 5 MG: 5 TABLET, FILM COATED ORAL at 08:27

## 2024-10-17 RX ADMIN — TAMSULOSIN HYDROCHLORIDE 0.4 MG: 0.4 CAPSULE ORAL at 20:27

## 2024-10-17 RX ADMIN — INSULIN LISPRO 16 UNITS: 100 INJECTION, SOLUTION INTRAVENOUS; SUBCUTANEOUS at 08:27

## 2024-10-17 RX ADMIN — BUPROPION HYDROCHLORIDE 300 MG: 300 TABLET, EXTENDED RELEASE ORAL at 08:27

## 2024-10-17 RX ADMIN — ASPIRIN 81 MG: 81 TABLET, COATED ORAL at 08:26

## 2024-10-17 RX ADMIN — AMLODIPINE BESYLATE 10 MG: 10 TABLET ORAL at 08:26

## 2024-10-17 RX ADMIN — PANTOPRAZOLE SODIUM 40 MG: 40 TABLET, DELAYED RELEASE ORAL at 08:26

## 2024-10-17 RX ADMIN — ATORVASTATIN CALCIUM 10 MG: 10 TABLET, FILM COATED ORAL at 20:27

## 2024-10-17 RX ADMIN — INSULIN LISPRO 16 UNITS: 100 INJECTION, SOLUTION INTRAVENOUS; SUBCUTANEOUS at 13:34

## 2024-10-17 RX ADMIN — IPRATROPIUM BROMIDE AND ALBUTEROL SULFATE 1 DOSE: 2.5; .5 SOLUTION RESPIRATORY (INHALATION) at 15:36

## 2024-10-17 RX ADMIN — INSULIN LISPRO 8 UNITS: 100 INJECTION, SOLUTION INTRAVENOUS; SUBCUTANEOUS at 16:34

## 2024-10-17 RX ADMIN — BUDESONIDE 500 MCG: 0.5 SUSPENSION RESPIRATORY (INHALATION) at 20:21

## 2024-10-17 RX ADMIN — IPRATROPIUM BROMIDE AND ALBUTEROL SULFATE 1 DOSE: 2.5; .5 SOLUTION RESPIRATORY (INHALATION) at 20:21

## 2024-10-17 RX ADMIN — METHYLPREDNISOLONE SODIUM SUCCINATE 40 MG: 40 INJECTION INTRAMUSCULAR; INTRAVENOUS at 08:27

## 2024-10-17 RX ADMIN — TAMSULOSIN HYDROCHLORIDE 0.4 MG: 0.4 CAPSULE ORAL at 08:26

## 2024-10-17 RX ADMIN — ARFORMOTEROL TARTRATE 15 MCG: 15 SOLUTION RESPIRATORY (INHALATION) at 20:21

## 2024-10-17 RX ADMIN — INSULIN LISPRO 8 UNITS: 100 INJECTION, SOLUTION INTRAVENOUS; SUBCUTANEOUS at 20:28

## 2024-10-17 NOTE — PROGRESS NOTES
Date: 10/16/2024    Time: 11:28 PM    Patient Placed On BIPAP/CPAP/ Non-Invasive Ventilation?  Yes    If no must comment.  Facial area red/color change? No           If YES are Blister/Lesion present?No   If yes must notify nursing staff  BIPAP/CPAP skin barrier?  Yes    Skin barrier type:mepilexlite       Comments:        Natalee Parham, JACK     10/16/24 9718   NIV Type   NIV Started/Stopped On   Equipment Type v60   Mode AVAPS   Mask Type Full face mask   Mask Size Large   Assessment   Level of Consciousness 0   Comfort Level Good   Using Accessory Muscles No   Mask Compliance Good   Skin Assessment Clean, dry, & intact   Skin Protection for O2 Device Yes   Orientation Middle   Location Nose   Intervention(s) Skin Barrier   Settings/Measurements   PIP Observed 18 cm H20   CPAP/EPAP 5 cmH2O   IPAP Min 18 cmH2O   IPAP Max 30 cmH2O   Vt (Set, mL) 500 mL   Vt (Measured) 668 mL   Rate Ordered 22   Insp Rise Time (%) 3 %   FiO2  40 %   I Time/ I Time % 0.8 s   Minute Volume (L/min) 17.3 Liters   Mask Leak (lpm) 26 lpm   Patient's Home Machine No   Alarm Settings   Alarms On Y

## 2024-10-17 NOTE — PROGRESS NOTES
[facility] Hospitalist   Progress Note    Admitting Date and Time: 10/14/2024  2:45 PM  Admit Dx: COPD exacerbation (HCC) [J44.1]  Acute respiratory failure with hypercapnia [J96.02]    Seen for follow on problems as listed below     Subjective:  Feeling depressed , now pink slipped, psych following, sob & chest tightness persists but improving.D/w nursing. Uneventful night.     insulin lispro  0-16 Units SubCUTAneous 4x Daily AC & HS    tamsulosin  0.4 mg Oral BID    hydrOXYzine pamoate  50 mg Oral Nightly    arformoterol tartrate  15 mcg Nebulization BID RT    budesonide  0.5 mg Nebulization BID RT    QUEtiapine  25 mg Oral Nightly    amLODIPine  10 mg Oral Daily    aspirin  81 mg Oral Daily    atorvastatin  10 mg Oral Nightly    buPROPion  300 mg Oral QAM    finasteride  5 mg Oral Daily    nicotine  1 patch TransDERmal Daily    pantoprazole  40 mg Oral Daily    methylPREDNISolone  40 mg IntraVENous BID    ipratropium 0.5 mg-albuterol 2.5 mg  1 Dose Inhalation Q4H WA RT     glucose, 4 tablet, PRN  dextrose bolus, 125 mL, PRN   Or  dextrose bolus, 250 mL, PRN  glucagon (rDNA), 1 mg, PRN  dextrose, , Continuous PRN  benzocaine-menthol, 1 lozenge, Q2H PRN  calcium carbonate, 500 mg, TID PRN  linaclotide, 145 mcg, Daily PRN  ipratropium 0.5 mg-albuterol 2.5 mg, 1 Dose, Q4H PRN         Objective:    /79   Pulse 75   Temp 97.8 °F (36.6 °C) (Axillary)   Resp 19   Ht 1.753 m (5' 9\")   Wt 110.8 kg (244 lb 4.3 oz)   SpO2 91%   BMI 36.07 kg/m²   General Appearance: alert and oriented to person, place and time, in no acute distress  Skin: warm and dry  Head: normocephalic and atraumatic  Eyes: extraocular eye movements intact, conjunctivae normal  Neck: supple and non-tender without mass  Pulmonary/Chest: Diminished BS  bilaterally  Cardiovascular: normal rate,normal S1 and S2  Abdomen: soft, non-tender, non-distended, normal bowel sounds, no masses or organomegaly  Extremities: no cyanosis,

## 2024-10-17 NOTE — PLAN OF CARE
Problem: Chronic Conditions and Co-morbidities  Goal: Patient's chronic conditions and co-morbidity symptoms are monitored and maintained or improved  10/17/2024 0956 by Brian Herman RN  Outcome: Progressing  10/16/2024 2054 by Margaret Camacho RN  Outcome: Progressing     Problem: Pain  Goal: Verbalizes/displays adequate comfort level or baseline comfort level  10/17/2024 0956 by Brian Herman RN  Outcome: Progressing  10/16/2024 2054 by Margaret Camacho RN  Outcome: Progressing     Problem: Respiratory - Adult  Goal: Achieves optimal ventilation and oxygenation  10/17/2024 0956 by Brian Herman RN  Outcome: Progressing  10/16/2024 2054 by Margaret Camacho RN  Outcome: Progressing     Problem: Safety - Adult  Goal: Free from fall injury  10/17/2024 0956 by Brian Herman RN  Outcome: Progressing  10/16/2024 2054 by aMrgaret Camacho RN  Outcome: Progressing

## 2024-10-17 NOTE — CONSULTS
PSYCHIATRY ATTENDING NOTE    CC: \"I need to get my meds right.\"    S: Psychiatry re-consulted due to patient being pink slipped. Initial consultation was completed on 10/15/24 and low dose Seroquel was resumed for sleep. Patient denied SI at the time and I recommended dual diagnosis intensive outpatient treatment for depression and cocaine addiction. Yesterday I was contacted by nursing staff because the patient started to endorse vague suicidal ideation; at that time he was agreeable with going to [facility] for inpatient treatment and was willing to sign in voluntarily. At some point patient then refused to go there and was ultimately pink slipped by primary service. Discussed case with nursing and spoke to patient via telephone. Patient location [facility]. Provider location [facility]. Ryan reports he is sleeping well with the Seroquel but still feels very depressed and hopeless. He feels he needs inpatient psychiatric treatment to get his medications adjusted. He acknowledges passive suicidal ideations of not wanting to wake up but does not offer any specific plan as to how he may try to harm himself. Patient reports he has been through a lot of trauma he needs to process. He was admitted to our psychiatric unit in the past and prefers to go back there instead of [facility].     VITALS:  BP (!) 146/81   Pulse 85   Temp 98 °F (36.7 °C) (Oral)   Resp 20   Ht 1.753 m (5' 9\")   Wt 110.8 kg (244 lb 4.3 oz)   SpO2 95%   BMI 36.07 kg/m²     MSE: Male. Cooperative. Mood depressed. Speech clear. Thought process generally organized. Content with passive SI without specific plan or intent. No paranoia, overt delusions or hallucinations. Orientation, concentration, recent and remote memory are grossly intact. Fund of knowledge fair. Language use fair. Insight and judgment questionable.     MEDICATIONS: Current Facility-Administered Medications: insulin lispro (HUMALOG,ADMELOG) injection vial 0-16 Units,

## 2024-10-17 NOTE — PLAN OF CARE
Problem: Chronic Conditions and Co-morbidities  Goal: Patient's chronic conditions and co-morbidity symptoms are monitored and maintained or improved  10/16/2024 2054 by Margaret Camacho RN  Outcome: Progressing  10/16/2024 1115 by Sisi Cui RN  Outcome: Progressing  Flowsheets (Taken 10/16/2024 0900)  Care Plan - Patient's Chronic Conditions and Co-Morbidity Symptoms are Monitored and Maintained or Improved:   Monitor and assess patient's chronic conditions and comorbid symptoms for stability, deterioration, or improvement   Collaborate with multidisciplinary team to address chronic and comorbid conditions and prevent exacerbation or deterioration     Problem: Pain  Goal: Verbalizes/displays adequate comfort level or baseline comfort level  10/16/2024 2054 by Margaret Camacho RN  Outcome: Progressing  10/16/2024 1115 by iSsi Cui RN  Outcome: Progressing     Problem: Respiratory - Adult  Goal: Achieves optimal ventilation and oxygenation  10/16/2024 2054 by Margaret Camacho RN  Outcome: Progressing  10/16/2024 1115 by Sisi Cui RN  Outcome: Progressing  Flowsheets (Taken 10/16/2024 0900)  Achieves optimal ventilation and oxygenation:   Assess for changes in respiratory status   Assess for changes in mentation and behavior     Problem: Safety - Adult  Goal: Free from fall injury  Outcome: Progressing

## 2024-10-17 NOTE — ED NOTES
SW spoke with RN about Pt transferring to inpatient psych, Pt not medically clear at this time. Attending stated they will follow up with Pulmonology tomorrow and if clear will discharge at that time. RN to call [facility] back when medically clear and ready for psych admission.   GABINO Odom

## 2024-10-17 NOTE — PROGRESS NOTES
Patient stated he has been feeling depressed and hopeless, no will. He stated he used to see a doctor in the past and is wanting to start again and readjust his medications. He is willing to seek help.

## 2024-10-17 NOTE — PLAN OF CARE
Problem: Chronic Conditions and Co-morbidities  Goal: Patient's chronic conditions and co-morbidity symptoms are monitored and maintained or improved  10/17/2024 1959 by Margaret Camacho RN  Outcome: Progressing  10/17/2024 0956 by Brian Herman RN  Outcome: Progressing     Problem: Pain  Goal: Verbalizes/displays adequate comfort level or baseline comfort level  10/17/2024 1959 by Margaret Camacho RN  Outcome: Progressing  10/17/2024 0956 by Brian Herman RN  Outcome: Progressing     Problem: Respiratory - Adult  Goal: Achieves optimal ventilation and oxygenation  10/17/2024 1959 by Margaret Camacho RN  Outcome: Progressing  10/17/2024 0956 by Brian Herman RN  Outcome: Progressing     Problem: Safety - Adult  Goal: Free from fall injury  10/17/2024 1959 by Margaret Camacho RN  Outcome: Progressing  10/17/2024 0956 by Brian Herman RN  Outcome: Progressing

## 2024-10-17 NOTE — PROGRESS NOTES
[facility] Quality Flow/Interdisciplinary Rounds Progress Note        Quality Flow Rounds held on October 17, 2024    Disciplines Attending:  Bedside Nurse, , , and Nursing Unit Leadership    Ryan Mcghee was admitted on 10/14/2024  2:45 PM    Anticipated Discharge Date:       Disposition:    Jaycob Score:  Jaycob Scale Score: 20    Readmission Risk              Risk of Unplanned Readmission:  26           Discussed patient goal for the day, patient clinical progression, and barriers to discharge.  The following Goal(s) of the Day/Commitment(s) have been identified:   pink slip, discharge planning, IV steroids      Husam Woods RN  October 17, 2024

## 2024-10-18 VITALS
OXYGEN SATURATION: 91 % | SYSTOLIC BLOOD PRESSURE: 132 MMHG | RESPIRATION RATE: 20 BRPM | WEIGHT: 244.27 LBS | BODY MASS INDEX: 36.18 KG/M2 | DIASTOLIC BLOOD PRESSURE: 89 MMHG | HEIGHT: 69 IN | HEART RATE: 76 BPM | TEMPERATURE: 98.1 F

## 2024-10-18 LAB
ALBUMIN SERPL-MCNC: 3.6 G/DL (ref 3.5–5.2)
ALP SERPL-CCNC: 113 U/L (ref 40–129)
ALT SERPL-CCNC: 29 U/L (ref 0–40)
ANION GAP SERPL CALCULATED.3IONS-SCNC: 10 MMOL/L (ref 7–16)
AST SERPL-CCNC: 15 U/L (ref 0–39)
BASOPHILS # BLD: 0.03 K/UL (ref 0–0.2)
BASOPHILS NFR BLD: 0 % (ref 0–2)
BILIRUB SERPL-MCNC: 0.5 MG/DL (ref 0–1.2)
BUN SERPL-MCNC: 16 MG/DL (ref 6–20)
CALCIUM SERPL-MCNC: 9.1 MG/DL (ref 8.6–10.2)
CHLORIDE SERPL-SCNC: 96 MMOL/L (ref 98–107)
CO2 SERPL-SCNC: 29 MMOL/L (ref 22–29)
CREAT SERPL-MCNC: 0.7 MG/DL (ref 0.7–1.2)
EOSINOPHIL # BLD: 0.03 K/UL (ref 0.05–0.5)
EOSINOPHILS RELATIVE PERCENT: 0 % (ref 0–6)
ERYTHROCYTE [DISTWIDTH] IN BLOOD BY AUTOMATED COUNT: 14.3 % (ref 11.5–15)
GFR, ESTIMATED: >90 ML/MIN/1.73M2
GLUCOSE BLD-MCNC: 216 MG/DL (ref 74–99)
GLUCOSE BLD-MCNC: 331 MG/DL (ref 74–99)
GLUCOSE SERPL-MCNC: 305 MG/DL (ref 74–99)
HCT VFR BLD AUTO: 44.7 % (ref 37–54)
HGB BLD-MCNC: 13.7 G/DL (ref 12.5–16.5)
IMM GRANULOCYTES # BLD AUTO: 0.11 K/UL (ref 0–0.58)
IMM GRANULOCYTES NFR BLD: 1 % (ref 0–5)
LYMPHOCYTES NFR BLD: 2.25 K/UL (ref 1.5–4)
LYMPHOCYTES RELATIVE PERCENT: 16 % (ref 20–42)
MCH RBC QN AUTO: 26.8 PG (ref 26–35)
MCHC RBC AUTO-ENTMCNC: 30.6 G/DL (ref 32–34.5)
MCV RBC AUTO: 87.3 FL (ref 80–99.9)
MONOCYTES NFR BLD: 1.1 K/UL (ref 0.1–0.95)
MONOCYTES NFR BLD: 8 % (ref 2–12)
NEUTROPHILS NFR BLD: 75 % (ref 43–80)
NEUTS SEG NFR BLD: 10.46 K/UL (ref 1.8–7.3)
PLATELET # BLD AUTO: 365 K/UL (ref 130–450)
PMV BLD AUTO: 9.5 FL (ref 7–12)
POTASSIUM SERPL-SCNC: 4.5 MMOL/L (ref 3.5–5)
PROT SERPL-MCNC: 6.2 G/DL (ref 6.4–8.3)
RBC # BLD AUTO: 5.12 M/UL (ref 3.8–5.8)
SODIUM SERPL-SCNC: 135 MMOL/L (ref 132–146)
WBC OTHER # BLD: 14 K/UL (ref 4.5–11.5)

## 2024-10-18 PROCEDURE — 94640 AIRWAY INHALATION TREATMENT: CPT

## 2024-10-18 PROCEDURE — 99239 HOSP IP/OBS DSCHRG MGMT >30: CPT | Performed by: INTERNAL MEDICINE

## 2024-10-18 PROCEDURE — 6370000000 HC RX 637 (ALT 250 FOR IP): Performed by: INTERNAL MEDICINE

## 2024-10-18 PROCEDURE — 6360000002 HC RX W HCPCS

## 2024-10-18 PROCEDURE — 82962 GLUCOSE BLOOD TEST: CPT

## 2024-10-18 PROCEDURE — 85025 COMPLETE CBC W/AUTO DIFF WBC: CPT

## 2024-10-18 PROCEDURE — 2700000000 HC OXYGEN THERAPY PER DAY

## 2024-10-18 PROCEDURE — 6370000000 HC RX 637 (ALT 250 FOR IP): Performed by: NURSE PRACTITIONER

## 2024-10-18 PROCEDURE — 94660 CPAP INITIATION&MGMT: CPT

## 2024-10-18 PROCEDURE — 80053 COMPREHEN METABOLIC PANEL: CPT

## 2024-10-18 RX ORDER — HYDROXYZINE PAMOATE 25 MG/1
50 CAPSULE ORAL NIGHTLY
OUTPATIENT
Start: 2024-10-18

## 2024-10-18 RX ORDER — PANTOPRAZOLE SODIUM 40 MG/1
40 TABLET, DELAYED RELEASE ORAL DAILY
OUTPATIENT
Start: 2024-10-19

## 2024-10-18 RX ORDER — TAMSULOSIN HYDROCHLORIDE 0.4 MG/1
0.4 CAPSULE ORAL 2 TIMES DAILY
OUTPATIENT
Start: 2024-10-18

## 2024-10-18 RX ORDER — BUDESONIDE 0.5 MG/2ML
0.5 INHALANT ORAL
OUTPATIENT
Start: 2024-10-18

## 2024-10-18 RX ORDER — INSULIN LISPRO 100 [IU]/ML
0-16 INJECTION, SOLUTION INTRAVENOUS; SUBCUTANEOUS
OUTPATIENT
Start: 2024-10-18

## 2024-10-18 RX ORDER — ARFORMOTEROL TARTRATE 15 UG/2ML
15 SOLUTION RESPIRATORY (INHALATION)
OUTPATIENT
Start: 2024-10-18

## 2024-10-18 RX ORDER — ATORVASTATIN CALCIUM 10 MG/1
10 TABLET, FILM COATED ORAL NIGHTLY
OUTPATIENT
Start: 2024-10-18

## 2024-10-18 RX ORDER — GLUCAGON 1 MG/ML
1 KIT INJECTION PRN
OUTPATIENT
Start: 2024-10-18

## 2024-10-18 RX ORDER — AMLODIPINE BESYLATE 10 MG/1
10 TABLET ORAL DAILY
OUTPATIENT
Start: 2024-10-19

## 2024-10-18 RX ORDER — PREDNISONE 20 MG/1
40 TABLET ORAL DAILY
OUTPATIENT
Start: 2024-10-19 | End: 2024-10-24

## 2024-10-18 RX ORDER — 0.9 % SODIUM CHLORIDE 0.9 %
1000 INTRAVENOUS SOLUTION INTRAVENOUS ONCE
Status: DISCONTINUED | OUTPATIENT
Start: 2024-10-18 | End: 2024-10-18 | Stop reason: HOSPADM

## 2024-10-18 RX ORDER — ASPIRIN 81 MG/1
81 TABLET ORAL DAILY
OUTPATIENT
Start: 2024-10-19

## 2024-10-18 RX ORDER — DEXTROSE MONOHYDRATE 100 MG/ML
INJECTION, SOLUTION INTRAVENOUS CONTINUOUS PRN
OUTPATIENT
Start: 2024-10-18

## 2024-10-18 RX ORDER — QUETIAPINE FUMARATE 25 MG/1
25 TABLET, FILM COATED ORAL NIGHTLY
Qty: 30 TABLET | Refills: 1
Start: 2024-10-18

## 2024-10-18 RX ORDER — BUPROPION HYDROCHLORIDE 300 MG/1
300 TABLET ORAL EVERY MORNING
OUTPATIENT
Start: 2024-10-19

## 2024-10-18 RX ORDER — CALCIUM CARBONATE 500 MG/1
500 TABLET, CHEWABLE ORAL 3 TIMES DAILY PRN
OUTPATIENT
Start: 2024-10-18

## 2024-10-18 RX ORDER — FINASTERIDE 5 MG/1
5 TABLET, FILM COATED ORAL DAILY
OUTPATIENT
Start: 2024-10-19

## 2024-10-18 RX ORDER — INSULIN LISPRO 100 [IU]/ML
0-16 INJECTION, SOLUTION INTRAVENOUS; SUBCUTANEOUS
Qty: 1 EACH | Refills: 1
Start: 2024-10-18

## 2024-10-18 RX ORDER — IPRATROPIUM BROMIDE AND ALBUTEROL SULFATE 2.5; .5 MG/3ML; MG/3ML
1 SOLUTION RESPIRATORY (INHALATION) EVERY 4 HOURS PRN
OUTPATIENT
Start: 2024-10-18

## 2024-10-18 RX ORDER — QUETIAPINE FUMARATE 25 MG/1
25 TABLET, FILM COATED ORAL NIGHTLY
OUTPATIENT
Start: 2024-10-18

## 2024-10-18 RX ORDER — IPRATROPIUM BROMIDE AND ALBUTEROL SULFATE 2.5; .5 MG/3ML; MG/3ML
1 SOLUTION RESPIRATORY (INHALATION)
OUTPATIENT
Start: 2024-10-18

## 2024-10-18 RX ADMIN — ARFORMOTEROL TARTRATE 15 MCG: 15 SOLUTION RESPIRATORY (INHALATION) at 07:56

## 2024-10-18 RX ADMIN — AMLODIPINE BESYLATE 10 MG: 10 TABLET ORAL at 08:25

## 2024-10-18 RX ADMIN — BUDESONIDE 500 MCG: 0.5 SUSPENSION RESPIRATORY (INHALATION) at 07:56

## 2024-10-18 RX ADMIN — ASPIRIN 81 MG: 81 TABLET, COATED ORAL at 08:25

## 2024-10-18 RX ADMIN — PANTOPRAZOLE SODIUM 40 MG: 40 TABLET, DELAYED RELEASE ORAL at 08:25

## 2024-10-18 RX ADMIN — INSULIN LISPRO 4 UNITS: 100 INJECTION, SOLUTION INTRAVENOUS; SUBCUTANEOUS at 08:33

## 2024-10-18 RX ADMIN — IPRATROPIUM BROMIDE AND ALBUTEROL SULFATE 1 DOSE: 2.5; .5 SOLUTION RESPIRATORY (INHALATION) at 07:56

## 2024-10-18 RX ADMIN — IPRATROPIUM BROMIDE AND ALBUTEROL SULFATE 1 DOSE: 2.5; .5 SOLUTION RESPIRATORY (INHALATION) at 12:20

## 2024-10-18 RX ADMIN — TAMSULOSIN HYDROCHLORIDE 0.4 MG: 0.4 CAPSULE ORAL at 08:25

## 2024-10-18 RX ADMIN — INSULIN LISPRO 12 UNITS: 100 INJECTION, SOLUTION INTRAVENOUS; SUBCUTANEOUS at 11:19

## 2024-10-18 RX ADMIN — BUPROPION HYDROCHLORIDE 300 MG: 300 TABLET, EXTENDED RELEASE ORAL at 08:26

## 2024-10-18 RX ADMIN — FINASTERIDE 5 MG: 5 TABLET, FILM COATED ORAL at 08:25

## 2024-10-18 RX ADMIN — PREDNISONE 40 MG: 20 TABLET ORAL at 08:25

## 2024-10-18 NOTE — CARE COORDINATION
Social Work / Discharge Planning :Patient accepted to Mental Health facility in [facility].. Patient NOW agreeable to [facility] locally. Referral called and faxed to Nilda. Will have an answer within 15 minutes of review. If they cannot accept, patient will be pink slipped to [place]. Patient aware. SW to follow. Electronically signed by GABINO Bradford on 10/18/24 at 1:40 PM EDT     Addendum: Nilda from [facility] updated SW they cannot accept patient due to a [facility] patient. SW pointed out patient is BC/[employer] primary insurance. Nilda aware but still cannot accept due to [facility] affiliation. Nursing updated. Plan [place] [facility] to [facility]. SW to follow. Electronically signed by GABINO Bradford on 10/18/24 at 2:14 PM EDT

## 2024-10-18 NOTE — PROGRESS NOTES
Attempted to call Section G as patient is now medically cleared. Per [facility] G they do not have a  there today and they do not know how to help me.

## 2024-10-18 NOTE — PROGRESS NOTES
[facility] Quality Flow/Interdisciplinary Rounds Progress Note        Quality Flow Rounds held on October 18, 2024    Disciplines Attending:  Bedside Nurse, , , and Nursing Unit Leadership    Ryan Mcghee was admitted on 10/14/2024  2:45 PM    Anticipated Discharge Date:       Disposition:    Jaycob Score:  Jaycob Scale Score: 20    Readmission Risk              Risk of Unplanned Readmission:  26           Discussed patient goal for the day, patient clinical progression, and barriers to discharge.  The following Goal(s) of the Day/Commitment(s) have been identified:   await transfer to inpatient psych      Husam Woods RN  October 18, 2024

## 2024-10-18 NOTE — CARE COORDINATION
BRIT called section G to attempt to initiate a transfer to [facility] psych, [phone] but per Casey he states, there is no SW to admit patients. CM called [facility]'s transfer/access center to initiate transfer. CM completed psych assessment w/Henry who will update CM where pt can be accepted.   Iman Corbin, JUANN, RN  [facility] Case Management  [phone]

## 2024-10-18 NOTE — DISCHARGE SUMMARY
SEROQUEL  Take 1 tablet by mouth nightly     Trelegy Ellipta 100-62.5-25 MCG/ACT Aepb inhaler  Generic drug: fluticasone-umeclidin-vilant  Inhale 1 puff into the lungs daily            CHANGE how you take these medications      * albuterol sulfate  (90 Base) MCG/ACT inhaler  Commonly known as: PROVENTIL;VENTOLIN;PROAIR  What changed: Another medication with the same name was added. Make sure you understand how and when to take each.     * albuterol (2.5 MG/3ML) 0.083% nebulizer solution  Commonly known as: PROVENTIL  Take 3 mLs by nebulization 4 times daily as needed for Wheezing  What changed: You were already taking a medication with the same name, and this prescription was added. Make sure you understand how and when to take each.     predniSONE 10 MG tablet  Commonly known as: DELTASONE  Take 4 tabs daily x 3 days then 3 tabs daily x 3 days then 2 tabs daily x 3 days then 1 tab daily x 3 days then stop  What changed: additional instructions           * This list has 2 medication(s) that are the same as other medications prescribed for you. Read the directions carefully, and ask your doctor or other care provider to review them with you.                CONTINUE taking these medications      amLODIPine 10 MG tablet  Commonly known as: Norvasc  Take 1 tablet by mouth daily.     aspirin 81 MG EC tablet     atorvastatin 10 MG tablet  Commonly known as: LIPITOR     buPROPion 300 MG extended release tablet  Commonly known as: WELLBUTRIN XL     finasteride 5 MG tablet  Commonly known as: PROSCAR  Take 1 tablet by mouth daily     fluticasone 50 MCG/ACT nasal spray  Commonly known as: FLONASE  1 spray by Each Nostril route daily     gabapentin 300 MG capsule  Commonly known as: NEURONTIN     guaiFENesin 400 MG tablet  Take 1 tablet by mouth 4 times daily as needed for Cough     hydrOXYzine pamoate 50 MG capsule  Commonly known as: VISTARIL     Linzess 145 MCG capsule  Generic drug: linaclotide     metFORMIN 500 MG  tablet  Commonly known as: GLUCOPHAGE     nicotine 7 MG/24HR  Commonly known as: NICODERM CQ  Place 1 patch onto the skin daily     pantoprazole 40 MG tablet  Commonly known as: PROTONIX     sodium chloride 0.65 % nasal spray  Commonly known as: OCEAN, BABY AYR  1 spray by Nasal route every 2 hours as needed for Congestion     tamsulosin 0.4 MG capsule  Commonly known as: FLOMAX            STOP taking these medications      azithromycin 250 MG tablet  Commonly known as: ZITHROMAX     Breztri Aerosphere 160-9-4.8 MCG/ACT Aero  Generic drug: Budeson-Glycopyrrol-Formoterol     ipratropium 0.5 mg-albuterol 2.5 mg 0.5-2.5 (3) MG/3ML Soln nebulizer solution  Commonly known as: DUONEB     melatonin 3 MG Tabs tablet     naproxen 500 MG tablet  Commonly known as: NAPROSYN     zolpidem 5 MG tablet  Commonly known as: AMBIEN               Where to Get Your Medications        These medications were sent to [address] -  [phone] - F [phone]  [address] [place] [address]      Phone: [phone]   albuterol (2.5 MG/3ML) 0.083% nebulizer solution  predniSONE 10 MG tablet  Trelegy Ellipta 100-62.5-25 MCG/ACT Aepb inhaler       You can get these medications from any pharmacy    You don't need a prescription for these medications  glucose 4 g chewable tablet       Information about where to get these medications is not yet available    Ask your nurse or doctor about these medications  insulin lispro 100 UNIT/ML Soln injection vial  QUEtiapine 25 MG tablet           Note that more than 30 minutes was spent in preparing discharge papers, discussing discharge with patient, medication review, etc.    Signed:  Electronically signed by America Honeycutt MD on 10/18/2024 at 8:41 AM    NOTE: This report was transcribed using voice recognition software. Every effort was made to ensure accuracy; however, inadvertent computerized transcription errors may be present.

## 2024-10-18 NOTE — CARE COORDINATION
CM notified by DIDI, [place] that [facility] accepted pt. DIDI faxed pink slip to [facility].   JUAN GuerraN, RN  [facility] Case Management  [phone]

## 2024-10-18 NOTE — DISCHARGE INSTR - COC
F10.10    Hypokalemia E87.6    Acute respiratory failure with hypoxia and hypercapnia J96.01, J96.02    Moderate protein-calorie malnutrition (HCC) E44.0    Polysubstance abuse ([facility]) F19.10    COPD exacerbation ([facility]) J44.1    Chronic obstructive pulmonary disease ([facility]) J44.9    Alcohol induced acute pancreatitis without necrosis or infection K85.20    GERD (gastroesophageal reflux disease) K21.9    Mixed bipolar II disorder ([facility]) F31.81    Malingering Z76.5    Personality disorder ([facility]) F60.9    Chronic hypercapnic respiratory failure J96.12    Cocaine use disorder ([facility]) F14.10    Tobacco abuse Z72.0    QT prolongation R94.31    Obstructive sleep apnea syndrome noncompliant with positive pressure ventilation G47.33    Rhinovirus infection B34.8    Controlled type 2 diabetes mellitus without complication ([facility]) E11.9    Acute respiratory failure with hypoxia J96.01    Class 1 obesity due to excess calories with serious comorbidity and body mass index (BMI) of 34.0 to 34.9 in adult E66.811, E66.09, Z68.34    Metabolic encephalopathy G93.41    Somnolence R40.0    Mood disorder ([facility]) F39       Isolation/Infection:   Isolation            No Isolation          Patient Infection Status       Infection Onset Added Last Indicated Last Indicated By Review Planned Expiration Resolved Resolved By    None active    Resolved    Rhinovirus 24 Respiratory Panel, Molecular, with COVID-19 (Restricted: peds pts or suitable admitted adults)  history 24 Infection     COVID-19 22 COVID-19, Rapid   06/15/22 Infection                        Nurse Assessment:  Last Vital Signs: /89   Pulse 76   Temp 98.1 °F (36.7 °C) (Oral)   Resp 20   Ht 1.753 m (5' 9\")   Wt 110.8 kg (244 lb 4.3 oz)   SpO2 91%   BMI 36.07 kg/m²     Last documented pain score (0-10 scale): Pain Level: 0  Last Weight:   Wt Readings from Last 1 Encounters:   10/15/24 110.8 kg (244 lb 4.3 oz)      Mental Status:  oriented, alert, coherent, and logical    IV Access:  - None    Nursing Mobility/ADLs:  Walking   Independent  Transfer  Independent  Bathing  Independent  Dressing  Independent  Toileting  Independent  Feeding  Independent  Med Admin  Assisted  Med Delivery   whole    Wound Care Documentation and Therapy:        Elimination:  Continence:   Bowel: Yes  Bladder: Yes  Urinary Catheter: None   Colostomy/Ileostomy/Ileal Conduit: No       Date of Last BM: ***  No intake or output data in the 24 hours ending 10/18/24 1326  I/O last 3 completed shifts:  In: -   Out: 2525 [Urine:2525]    Safety Concerns:     None    Impairments/Disabilities:      None    Nutrition Therapy:  Current Nutrition Therapy:   - Oral Diet:  General    Routes of Feeding: Oral  Liquids: Thin Liquids  Daily Fluid Restriction: no  Last Modified Barium Swallow with Video (Video Swallowing Test): not done    Treatments at the Time of Hospital Discharge:   Respiratory Treatments: ***  Oxygen Therapy:  is on oxygen at 2 L/min per nasal cannula.  Ventilator:    { [employer] Vent List:997188731}    Rehab Therapies: {THERAPEUTIC INTERVENTION:5768274123}  Weight Bearing Status/Restrictions: {[facility] Weight Bearin}  Other Medical Equipment (for information only, NOT a DME order):  {EQUIPMENT:916686815}  Other Treatments: ***    Patient's personal belongings (please select all that are sent with patient):  {[facility] DME Belongings:072501589}    RN SIGNATURE:  Electronically signed by Gloria Dhaliwal RN on 10/18/24 at 1:28 PM EDT    CASE MANAGEMENT/SOCIAL WORK SECTION    Inpatient Status Date: ***    Readmission Risk Assessment Score:  Readmission Risk              Risk of Unplanned Readmission:  26           Discharging to Facility/ Agency   Name:   Address:  Phone:  Fax:    Dialysis Facility (if applicable)   Name:  Address:  Dialysis Schedule:  Phone:  Fax:    / signature: {Esignature:[id number]}    PHYSICIAN

## 2024-11-18 ENCOUNTER — APPOINTMENT (OUTPATIENT)
Dept: CARDIOLOGY | Facility: HOSPITAL | Age: 54
DRG: 286 | End: 2024-11-18
Payer: MEDICARE

## 2024-11-18 ENCOUNTER — APPOINTMENT (OUTPATIENT)
Dept: RADIOLOGY | Facility: HOSPITAL | Age: 54
DRG: 286 | End: 2024-11-18
Payer: MEDICARE

## 2024-11-18 ENCOUNTER — HOSPITAL ENCOUNTER (INPATIENT)
Facility: HOSPITAL | Age: 54
LOS: 1 days | Discharge: HOME | DRG: 286 | End: 2024-11-19
Attending: STUDENT IN AN ORGANIZED HEALTH CARE EDUCATION/TRAINING PROGRAM | Admitting: STUDENT IN AN ORGANIZED HEALTH CARE EDUCATION/TRAINING PROGRAM
Payer: MEDICARE

## 2024-11-18 DIAGNOSIS — I20.9 ANGINA PECTORIS, UNSPECIFIED: ICD-10-CM

## 2024-11-18 DIAGNOSIS — R94.30 ABNORMAL RESULT OF CARDIOVASCULAR FUNCTION STUDY, UNSPECIFIED: ICD-10-CM

## 2024-11-18 DIAGNOSIS — R06.09 DOE (DYSPNEA ON EXERTION): ICD-10-CM

## 2024-11-18 DIAGNOSIS — R07.9 CHEST PAIN: Primary | ICD-10-CM

## 2024-11-18 PROBLEM — J45.909 ASTHMA: Status: ACTIVE | Noted: 2024-11-18

## 2024-11-18 PROBLEM — E83.42 HYPOMAGNESEMIA: Status: ACTIVE | Noted: 2024-11-18

## 2024-11-18 PROBLEM — G93.41 METABOLIC ENCEPHALOPATHY: Status: ACTIVE | Noted: 2024-11-18

## 2024-11-18 LAB
ALBUMIN SERPL BCP-MCNC: 3.9 G/DL (ref 3.4–5)
ALP SERPL-CCNC: 59 U/L (ref 33–120)
ALT SERPL W P-5'-P-CCNC: 43 U/L (ref 10–52)
AMMONIA PLAS-SCNC: 29 UMOL/L (ref 16–53)
AMPHETAMINES UR QL SCN: NORMAL
ANION GAP BLDV CALCULATED.4IONS-SCNC: 6 MMOL/L (ref 10–25)
ANION GAP SERPL CALC-SCNC: 11 MMOL/L (ref 10–20)
AORTIC VALVE MEAN GRADIENT: 7 MMHG
AORTIC VALVE PEAK VELOCITY: 1.84 M/S
APPEARANCE UR: CLEAR
AST SERPL W P-5'-P-CCNC: 29 U/L (ref 9–39)
ATRIAL RATE: 95 BPM
AV PEAK GRADIENT: 14 MMHG
AVA (PEAK VEL): 2.68 CM2
AVA (VTI): 2.21 CM2
BARBITURATES UR QL SCN: NORMAL
BASE EXCESS BLDV CALC-SCNC: 8.1 MMOL/L (ref -2–3)
BASOPHILS # BLD AUTO: 0.03 X10*3/UL (ref 0–0.1)
BASOPHILS NFR BLD AUTO: 0.5 %
BENZODIAZ UR QL SCN: NORMAL
BILIRUB SERPL-MCNC: 0.5 MG/DL (ref 0–1.2)
BILIRUB UR STRIP.AUTO-MCNC: NEGATIVE MG/DL
BNP SERPL-MCNC: 46 PG/ML (ref 0–99)
BODY TEMPERATURE: 37 DEGREES CELSIUS
BUN SERPL-MCNC: 14 MG/DL (ref 6–23)
BZE UR QL SCN: NORMAL
CA-I BLD-SCNC: 1.14 MMOL/L (ref 1.1–1.33)
CA-I BLDV-SCNC: 1.2 MMOL/L (ref 1.1–1.33)
CALCIUM SERPL-MCNC: 8.5 MG/DL (ref 8.6–10.3)
CANNABINOIDS UR QL SCN: NORMAL
CARDIAC TROPONIN I PNL SERPL HS: 5 NG/L (ref 0–20)
CARDIAC TROPONIN I PNL SERPL HS: 6 NG/L (ref 0–20)
CHLORIDE BLDV-SCNC: 100 MMOL/L (ref 98–107)
CHLORIDE SERPL-SCNC: 100 MMOL/L (ref 98–107)
CO2 SERPL-SCNC: 32 MMOL/L (ref 21–32)
COLOR UR: NORMAL
CREAT SERPL-MCNC: 1.05 MG/DL (ref 0.5–1.3)
EGFRCR SERPLBLD CKD-EPI 2021: 83 ML/MIN/1.73M*2
EJECTION FRACTION APICAL 4 CHAMBER: 62.8
EJECTION FRACTION: 60 %
EOSINOPHIL # BLD AUTO: 0.06 X10*3/UL (ref 0–0.7)
EOSINOPHIL NFR BLD AUTO: 0.9 %
ERYTHROCYTE [DISTWIDTH] IN BLOOD BY AUTOMATED COUNT: 15.9 % (ref 11.5–14.5)
ETHANOL SERPL-MCNC: <10 MG/DL
FENTANYL+NORFENTANYL UR QL SCN: NORMAL
FLUAV RNA RESP QL NAA+PROBE: NOT DETECTED
FLUBV RNA RESP QL NAA+PROBE: NOT DETECTED
GLUCOSE BLDV-MCNC: 158 MG/DL (ref 74–99)
GLUCOSE SERPL-MCNC: 154 MG/DL (ref 74–99)
GLUCOSE UR STRIP.AUTO-MCNC: NORMAL MG/DL
HCO3 BLDV-SCNC: 33.9 MMOL/L (ref 22–26)
HCT VFR BLD AUTO: 39.7 % (ref 41–52)
HCT VFR BLD EST: 38 % (ref 41–52)
HGB BLD-MCNC: 12.2 G/DL (ref 13.5–17.5)
HGB BLDV-MCNC: 12.5 G/DL (ref 13.5–17.5)
IMM GRANULOCYTES # BLD AUTO: 0.06 X10*3/UL (ref 0–0.7)
IMM GRANULOCYTES NFR BLD AUTO: 0.9 % (ref 0–0.9)
INHALED O2 CONCENTRATION: 21 %
KETONES UR STRIP.AUTO-MCNC: NEGATIVE MG/DL
LACTATE BLDV-SCNC: 1.9 MMOL/L (ref 0.4–2)
LEFT ATRIUM VOLUME AREA LENGTH INDEX BSA: 15.8 ML/M2
LEFT VENTRICLE INTERNAL DIMENSION DIASTOLE: 4.68 CM (ref 3.5–6)
LEFT VENTRICULAR OUTFLOW TRACT DIAMETER: 2 CM
LEUKOCYTE ESTERASE UR QL STRIP.AUTO: NEGATIVE
LIPASE SERPL-CCNC: 25 U/L (ref 9–82)
LV EJECTION FRACTION BIPLANE: 60 %
LYMPHOCYTES # BLD AUTO: 2.39 X10*3/UL (ref 1.2–4.8)
LYMPHOCYTES NFR BLD AUTO: 36.7 %
MAGNESIUM SERPL-MCNC: 1.5 MG/DL (ref 1.6–2.4)
MCH RBC QN AUTO: 26.8 PG (ref 26–34)
MCHC RBC AUTO-ENTMCNC: 30.7 G/DL (ref 32–36)
MCV RBC AUTO: 87 FL (ref 80–100)
METHADONE UR QL SCN: NORMAL
MITRAL VALVE E/A RATIO: 1.1
MONOCYTES # BLD AUTO: 0.69 X10*3/UL (ref 0.1–1)
MONOCYTES NFR BLD AUTO: 10.6 %
MUCOUS THREADS #/AREA URNS AUTO: NORMAL /LPF
NEUTROPHILS # BLD AUTO: 3.29 X10*3/UL (ref 1.2–7.7)
NEUTROPHILS NFR BLD AUTO: 50.4 %
NITRITE UR QL STRIP.AUTO: NEGATIVE
NRBC BLD-RTO: 0 /100 WBCS (ref 0–0)
OPIATES UR QL SCN: NORMAL
OXYCODONE+OXYMORPHONE UR QL SCN: NORMAL
OXYHGB MFR BLDV: 84.4 % (ref 45–75)
P AXIS: 77 DEGREES
PCO2 BLDV: 51 MM HG (ref 41–51)
PCP UR QL SCN: NORMAL
PH BLDV: 7.43 PH (ref 7.33–7.43)
PH UR STRIP.AUTO: 8 [PH]
PLATELET # BLD AUTO: 234 X10*3/UL (ref 150–450)
PO2 BLDV: 60 MM HG (ref 35–45)
POTASSIUM BLDV-SCNC: 3.6 MMOL/L (ref 3.5–5.3)
POTASSIUM SERPL-SCNC: 3.6 MMOL/L (ref 3.5–5.3)
PR INTERVAL: 158 MS
PROT SERPL-MCNC: 6.2 G/DL (ref 6.4–8.2)
PROT UR STRIP.AUTO-MCNC: NORMAL MG/DL
Q ONSET: 253 MS
QRS COUNT: 15 BEATS
QRS DURATION: 147 MS
QT INTERVAL: 402 MS
QTC CALCULATION(BAZETT): 503 MS
QTC FREDERICIA: 466 MS
R AXIS: -82 DEGREES
RBC # BLD AUTO: 4.56 X10*6/UL (ref 4.5–5.9)
RBC # UR STRIP.AUTO: NEGATIVE /UL
RBC #/AREA URNS AUTO: NORMAL /HPF
RIGHT VENTRICLE FREE WALL PEAK S': 14.5 CM/S
RIGHT VENTRICLE PEAK SYSTOLIC PRESSURE: 39.6 MMHG
SAO2 % BLDV: 90 % (ref 45–75)
SODIUM BLDV-SCNC: 136 MMOL/L (ref 136–145)
SODIUM SERPL-SCNC: 139 MMOL/L (ref 136–145)
SP GR UR STRIP.AUTO: 1.03
T AXIS: 69 DEGREES
T OFFSET: 454 MS
TRICUSPID ANNULAR PLANE SYSTOLIC EXCURSION: 2.6 CM
UROBILINOGEN UR STRIP.AUTO-MCNC: NORMAL MG/DL
VENTRICULAR RATE: 94 BPM
WBC # BLD AUTO: 6.5 X10*3/UL (ref 4.4–11.3)
WBC #/AREA URNS AUTO: NORMAL /HPF

## 2024-11-18 PROCEDURE — 84484 ASSAY OF TROPONIN QUANT: CPT | Performed by: STUDENT IN AN ORGANIZED HEALTH CARE EDUCATION/TRAINING PROGRAM

## 2024-11-18 PROCEDURE — 71275 CT ANGIOGRAPHY CHEST: CPT

## 2024-11-18 PROCEDURE — A9502 TC99M TETROFOSMIN: HCPCS | Performed by: INTERNAL MEDICINE

## 2024-11-18 PROCEDURE — 85025 COMPLETE CBC W/AUTO DIFF WBC: CPT | Performed by: STUDENT IN AN ORGANIZED HEALTH CARE EDUCATION/TRAINING PROGRAM

## 2024-11-18 PROCEDURE — 78452 HT MUSCLE IMAGE SPECT MULT: CPT

## 2024-11-18 PROCEDURE — 94640 AIRWAY INHALATION TREATMENT: CPT | Mod: MUE

## 2024-11-18 PROCEDURE — 96375 TX/PRO/DX INJ NEW DRUG ADDON: CPT

## 2024-11-18 PROCEDURE — 70450 CT HEAD/BRAIN W/O DYE: CPT

## 2024-11-18 PROCEDURE — 2500000004 HC RX 250 GENERAL PHARMACY W/ HCPCS (ALT 636 FOR OP/ED): Performed by: STUDENT IN AN ORGANIZED HEALTH CARE EDUCATION/TRAINING PROGRAM

## 2024-11-18 PROCEDURE — 93306 TTE W/DOPPLER COMPLETE: CPT | Performed by: INTERNAL MEDICINE

## 2024-11-18 PROCEDURE — 83735 ASSAY OF MAGNESIUM: CPT | Performed by: STUDENT IN AN ORGANIZED HEALTH CARE EDUCATION/TRAINING PROGRAM

## 2024-11-18 PROCEDURE — 2500000004 HC RX 250 GENERAL PHARMACY W/ HCPCS (ALT 636 FOR OP/ED): Performed by: INTERNAL MEDICINE

## 2024-11-18 PROCEDURE — 2500000001 HC RX 250 WO HCPCS SELF ADMINISTERED DRUGS (ALT 637 FOR MEDICARE OP): Performed by: INTERNAL MEDICINE

## 2024-11-18 PROCEDURE — 82077 ASSAY SPEC XCP UR&BREATH IA: CPT | Performed by: STUDENT IN AN ORGANIZED HEALTH CARE EDUCATION/TRAINING PROGRAM

## 2024-11-18 PROCEDURE — 82140 ASSAY OF AMMONIA: CPT | Performed by: STUDENT IN AN ORGANIZED HEALTH CARE EDUCATION/TRAINING PROGRAM

## 2024-11-18 PROCEDURE — 93018 CV STRESS TEST I&R ONLY: CPT | Performed by: INTERNAL MEDICINE

## 2024-11-18 PROCEDURE — 83690 ASSAY OF LIPASE: CPT | Performed by: STUDENT IN AN ORGANIZED HEALTH CARE EDUCATION/TRAINING PROGRAM

## 2024-11-18 PROCEDURE — 93017 CV STRESS TEST TRACING ONLY: CPT

## 2024-11-18 PROCEDURE — 84132 ASSAY OF SERUM POTASSIUM: CPT | Performed by: STUDENT IN AN ORGANIZED HEALTH CARE EDUCATION/TRAINING PROGRAM

## 2024-11-18 PROCEDURE — 80307 DRUG TEST PRSMV CHEM ANLYZR: CPT | Performed by: STUDENT IN AN ORGANIZED HEALTH CARE EDUCATION/TRAINING PROGRAM

## 2024-11-18 PROCEDURE — 99222 1ST HOSP IP/OBS MODERATE 55: CPT | Performed by: STUDENT IN AN ORGANIZED HEALTH CARE EDUCATION/TRAINING PROGRAM

## 2024-11-18 PROCEDURE — 87502 INFLUENZA DNA AMP PROBE: CPT | Performed by: STUDENT IN AN ORGANIZED HEALTH CARE EDUCATION/TRAINING PROGRAM

## 2024-11-18 PROCEDURE — 93016 CV STRESS TEST SUPVJ ONLY: CPT | Performed by: INTERNAL MEDICINE

## 2024-11-18 PROCEDURE — 2500000001 HC RX 250 WO HCPCS SELF ADMINISTERED DRUGS (ALT 637 FOR MEDICARE OP): Performed by: STUDENT IN AN ORGANIZED HEALTH CARE EDUCATION/TRAINING PROGRAM

## 2024-11-18 PROCEDURE — 3430000001 HC RX 343 DIAGNOSTIC RADIOPHARMACEUTICALS: Performed by: INTERNAL MEDICINE

## 2024-11-18 PROCEDURE — 99233 SBSQ HOSP IP/OBS HIGH 50: CPT | Performed by: INTERNAL MEDICINE

## 2024-11-18 PROCEDURE — 81001 URINALYSIS AUTO W/SCOPE: CPT | Performed by: STUDENT IN AN ORGANIZED HEALTH CARE EDUCATION/TRAINING PROGRAM

## 2024-11-18 PROCEDURE — 78452 HT MUSCLE IMAGE SPECT MULT: CPT | Performed by: INTERNAL MEDICINE

## 2024-11-18 PROCEDURE — 99285 EMERGENCY DEPT VISIT HI MDM: CPT | Mod: 25

## 2024-11-18 PROCEDURE — 1200000002 HC GENERAL ROOM WITH TELEMETRY DAILY

## 2024-11-18 PROCEDURE — S4991 NICOTINE PATCH NONLEGEND: HCPCS | Performed by: INTERNAL MEDICINE

## 2024-11-18 PROCEDURE — 71275 CT ANGIOGRAPHY CHEST: CPT | Performed by: STUDENT IN AN ORGANIZED HEALTH CARE EDUCATION/TRAINING PROGRAM

## 2024-11-18 PROCEDURE — 93005 ELECTROCARDIOGRAM TRACING: CPT

## 2024-11-18 PROCEDURE — 70450 CT HEAD/BRAIN W/O DYE: CPT | Performed by: STUDENT IN AN ORGANIZED HEALTH CARE EDUCATION/TRAINING PROGRAM

## 2024-11-18 PROCEDURE — 2550000001 HC RX 255 CONTRASTS: Performed by: STUDENT IN AN ORGANIZED HEALTH CARE EDUCATION/TRAINING PROGRAM

## 2024-11-18 PROCEDURE — C8929 TTE W OR WO FOL WCON,DOPPLER: HCPCS

## 2024-11-18 PROCEDURE — 36415 COLL VENOUS BLD VENIPUNCTURE: CPT | Performed by: STUDENT IN AN ORGANIZED HEALTH CARE EDUCATION/TRAINING PROGRAM

## 2024-11-18 PROCEDURE — 83880 ASSAY OF NATRIURETIC PEPTIDE: CPT | Performed by: STUDENT IN AN ORGANIZED HEALTH CARE EDUCATION/TRAINING PROGRAM

## 2024-11-18 PROCEDURE — 82330 ASSAY OF CALCIUM: CPT | Performed by: STUDENT IN AN ORGANIZED HEALTH CARE EDUCATION/TRAINING PROGRAM

## 2024-11-18 PROCEDURE — 2500000002 HC RX 250 W HCPCS SELF ADMINISTERED DRUGS (ALT 637 FOR MEDICARE OP, ALT 636 FOR OP/ED): Performed by: INTERNAL MEDICINE

## 2024-11-18 PROCEDURE — 96374 THER/PROPH/DIAG INJ IV PUSH: CPT

## 2024-11-18 RX ORDER — BISACODYL 5 MG
10 TABLET, DELAYED RELEASE (ENTERIC COATED) ORAL DAILY PRN
Status: DISCONTINUED | OUTPATIENT
Start: 2024-11-18 | End: 2024-11-19 | Stop reason: HOSPADM

## 2024-11-18 RX ORDER — IPRATROPIUM BROMIDE AND ALBUTEROL SULFATE 2.5; .5 MG/3ML; MG/3ML
3 SOLUTION RESPIRATORY (INHALATION)
Status: DISCONTINUED | OUTPATIENT
Start: 2024-11-18 | End: 2024-11-19 | Stop reason: HOSPADM

## 2024-11-18 RX ORDER — METOPROLOL TARTRATE 25 MG/1
12.5 TABLET, FILM COATED ORAL DAILY
COMMUNITY
End: 2024-12-21 | Stop reason: HOSPADM

## 2024-11-18 RX ORDER — TAMSULOSIN HYDROCHLORIDE 0.4 MG/1
0.8 CAPSULE ORAL DAILY
Status: DISCONTINUED | OUTPATIENT
Start: 2024-11-18 | End: 2024-11-19 | Stop reason: HOSPADM

## 2024-11-18 RX ORDER — FINASTERIDE 5 MG/1
5 TABLET, FILM COATED ORAL DAILY
Status: DISCONTINUED | OUTPATIENT
Start: 2024-11-18 | End: 2024-11-19 | Stop reason: HOSPADM

## 2024-11-18 RX ORDER — AMINOPHYLLINE 25 MG/ML
125 INJECTION, SOLUTION INTRAVENOUS AS NEEDED
Status: CANCELLED | OUTPATIENT
Start: 2024-11-18

## 2024-11-18 RX ORDER — GABAPENTIN 300 MG/1
300 CAPSULE ORAL 3 TIMES DAILY
Status: DISCONTINUED | OUTPATIENT
Start: 2024-11-18 | End: 2024-11-19 | Stop reason: HOSPADM

## 2024-11-18 RX ORDER — PANTOPRAZOLE SODIUM 40 MG/10ML
40 INJECTION, POWDER, LYOPHILIZED, FOR SOLUTION INTRAVENOUS DAILY
Status: DISCONTINUED | OUTPATIENT
Start: 2024-11-18 | End: 2024-11-19 | Stop reason: HOSPADM

## 2024-11-18 RX ORDER — ENOXAPARIN SODIUM 100 MG/ML
40 INJECTION SUBCUTANEOUS EVERY 24 HOURS
Status: DISCONTINUED | OUTPATIENT
Start: 2024-11-18 | End: 2024-11-19

## 2024-11-18 RX ORDER — MAGNESIUM SULFATE HEPTAHYDRATE 40 MG/ML
2 INJECTION, SOLUTION INTRAVENOUS ONCE
Status: COMPLETED | OUTPATIENT
Start: 2024-11-18 | End: 2024-11-18

## 2024-11-18 RX ORDER — QUETIAPINE FUMARATE 50 MG/1
50 TABLET, FILM COATED ORAL NIGHTLY
COMMUNITY
End: 2024-12-21 | Stop reason: HOSPADM

## 2024-11-18 RX ORDER — QUETIAPINE FUMARATE 25 MG/1
50 TABLET, FILM COATED ORAL NIGHTLY
Status: DISCONTINUED | OUTPATIENT
Start: 2024-11-18 | End: 2024-11-19 | Stop reason: HOSPADM

## 2024-11-18 RX ORDER — GUAIFENESIN 600 MG/1
600 TABLET, EXTENDED RELEASE ORAL EVERY 12 HOURS PRN
Status: DISCONTINUED | OUTPATIENT
Start: 2024-11-18 | End: 2024-11-19 | Stop reason: HOSPADM

## 2024-11-18 RX ORDER — REGADENOSON 0.08 MG/ML
0.4 INJECTION, SOLUTION INTRAVENOUS
Status: COMPLETED | OUTPATIENT
Start: 2024-11-18 | End: 2024-11-18

## 2024-11-18 RX ORDER — PANTOPRAZOLE SODIUM 40 MG/1
40 TABLET, DELAYED RELEASE ORAL DAILY
Status: DISCONTINUED | OUTPATIENT
Start: 2024-11-18 | End: 2024-11-19 | Stop reason: HOSPADM

## 2024-11-18 RX ORDER — ATORVASTATIN CALCIUM 10 MG/1
10 TABLET, FILM COATED ORAL DAILY
Status: DISCONTINUED | OUTPATIENT
Start: 2024-11-18 | End: 2024-11-19 | Stop reason: HOSPADM

## 2024-11-18 RX ORDER — THIAMINE HYDROCHLORIDE 100 MG/ML
100 INJECTION, SOLUTION INTRAMUSCULAR; INTRAVENOUS DAILY
Status: DISCONTINUED | OUTPATIENT
Start: 2024-11-18 | End: 2024-11-19 | Stop reason: HOSPADM

## 2024-11-18 RX ORDER — HYDROXYZINE HYDROCHLORIDE 25 MG/1
25 TABLET, FILM COATED ORAL EVERY 6 HOURS PRN
Status: DISCONTINUED | OUTPATIENT
Start: 2024-11-18 | End: 2024-11-19 | Stop reason: HOSPADM

## 2024-11-18 RX ORDER — BISACODYL 10 MG/1
10 SUPPOSITORY RECTAL DAILY PRN
Status: DISCONTINUED | OUTPATIENT
Start: 2024-11-18 | End: 2024-11-19 | Stop reason: HOSPADM

## 2024-11-18 RX ORDER — ASPIRIN 81 MG/1
81 TABLET ORAL DAILY
Status: DISCONTINUED | OUTPATIENT
Start: 2024-11-18 | End: 2024-11-19 | Stop reason: HOSPADM

## 2024-11-18 RX ORDER — LANOLIN ALCOHOL/MO/W.PET/CERES
100 CREAM (GRAM) TOPICAL DAILY
Status: DISCONTINUED | OUTPATIENT
Start: 2024-11-21 | End: 2024-11-19 | Stop reason: HOSPADM

## 2024-11-18 RX ORDER — HYDROXYZINE HYDROCHLORIDE 25 MG/1
25 TABLET, FILM COATED ORAL EVERY 6 HOURS PRN
COMMUNITY

## 2024-11-18 RX ORDER — NICOTINE 7MG/24HR
1 PATCH, TRANSDERMAL 24 HOURS TRANSDERMAL DAILY
Status: DISCONTINUED | OUTPATIENT
Start: 2025-01-13 | End: 2024-11-19 | Stop reason: HOSPADM

## 2024-11-18 RX ORDER — FINASTERIDE 5 MG/1
5 TABLET, FILM COATED ORAL DAILY
COMMUNITY

## 2024-11-18 RX ORDER — IBUPROFEN 200 MG
1 TABLET ORAL DAILY
Status: DISCONTINUED | OUTPATIENT
Start: 2024-12-30 | End: 2024-11-19 | Stop reason: HOSPADM

## 2024-11-18 RX ORDER — PANTOPRAZOLE SODIUM 40 MG/1
40 TABLET, DELAYED RELEASE ORAL
COMMUNITY

## 2024-11-18 RX ORDER — ONDANSETRON 4 MG/1
4 TABLET, FILM COATED ORAL EVERY 8 HOURS PRN
Status: DISCONTINUED | OUTPATIENT
Start: 2024-11-18 | End: 2024-11-19 | Stop reason: HOSPADM

## 2024-11-18 RX ORDER — LORAZEPAM 2 MG/ML
1 INJECTION INTRAMUSCULAR EVERY 2 HOUR PRN
Status: DISCONTINUED | OUTPATIENT
Start: 2024-11-18 | End: 2024-11-19 | Stop reason: HOSPADM

## 2024-11-18 RX ORDER — FLUTICASONE FUROATE AND VILANTEROL 100; 25 UG/1; UG/1
1 POWDER RESPIRATORY (INHALATION)
Status: DISCONTINUED | OUTPATIENT
Start: 2024-11-18 | End: 2024-11-19 | Stop reason: HOSPADM

## 2024-11-18 RX ORDER — IBUPROFEN 200 MG
1 TABLET ORAL DAILY
Status: DISCONTINUED | OUTPATIENT
Start: 2024-11-18 | End: 2024-11-19 | Stop reason: HOSPADM

## 2024-11-18 RX ORDER — LORAZEPAM 2 MG/ML
2 INJECTION INTRAMUSCULAR EVERY 2 HOUR PRN
Status: DISCONTINUED | OUTPATIENT
Start: 2024-11-18 | End: 2024-11-19 | Stop reason: HOSPADM

## 2024-11-18 RX ORDER — BUDESONIDE, GLYCOPYRROLATE, AND FORMOTEROL FUMARATE 160; 9; 4.8 UG/1; UG/1; UG/1
2 AEROSOL, METERED RESPIRATORY (INHALATION) 2 TIMES DAILY
COMMUNITY

## 2024-11-18 RX ORDER — GABAPENTIN 300 MG/1
300 CAPSULE ORAL 3 TIMES DAILY
COMMUNITY

## 2024-11-18 RX ORDER — AMLODIPINE BESYLATE 10 MG/1
10 TABLET ORAL DAILY
Status: DISCONTINUED | OUTPATIENT
Start: 2024-11-18 | End: 2024-11-19 | Stop reason: HOSPADM

## 2024-11-18 RX ORDER — TAMSULOSIN HYDROCHLORIDE 0.4 MG/1
0.8 CAPSULE ORAL DAILY
COMMUNITY

## 2024-11-18 RX ORDER — METFORMIN HYDROCHLORIDE 500 MG/1
1500 TABLET, EXTENDED RELEASE ORAL
Status: DISCONTINUED | OUTPATIENT
Start: 2024-11-18 | End: 2024-11-19

## 2024-11-18 RX ORDER — TALC
3 POWDER (GRAM) TOPICAL NIGHTLY PRN
Status: DISCONTINUED | OUTPATIENT
Start: 2024-11-18 | End: 2024-11-19 | Stop reason: HOSPADM

## 2024-11-18 RX ORDER — MULTIVIT-MIN/IRON FUM/FOLIC AC 7.5 MG-4
1 TABLET ORAL DAILY
Status: DISCONTINUED | OUTPATIENT
Start: 2024-11-18 | End: 2024-11-19 | Stop reason: HOSPADM

## 2024-11-18 RX ORDER — ATORVASTATIN CALCIUM 10 MG/1
10 TABLET, FILM COATED ORAL DAILY
COMMUNITY

## 2024-11-18 RX ORDER — AMLODIPINE BESYLATE 10 MG/1
10 TABLET ORAL DAILY
COMMUNITY

## 2024-11-18 RX ORDER — PANTOPRAZOLE SODIUM 40 MG/1
40 TABLET, DELAYED RELEASE ORAL
Status: DISCONTINUED | OUTPATIENT
Start: 2024-11-19 | End: 2024-11-18 | Stop reason: SDUPTHER

## 2024-11-18 RX ORDER — ONDANSETRON HYDROCHLORIDE 2 MG/ML
4 INJECTION, SOLUTION INTRAVENOUS EVERY 8 HOURS PRN
Status: DISCONTINUED | OUTPATIENT
Start: 2024-11-18 | End: 2024-11-19 | Stop reason: HOSPADM

## 2024-11-18 RX ORDER — ESCITALOPRAM OXALATE 10 MG/1
20 TABLET ORAL DAILY
Status: DISCONTINUED | OUTPATIENT
Start: 2024-11-18 | End: 2024-11-19 | Stop reason: HOSPADM

## 2024-11-18 RX ORDER — ASPIRIN 81 MG/1
81 TABLET ORAL DAILY
COMMUNITY

## 2024-11-18 RX ORDER — LORAZEPAM 2 MG/ML
0.5 INJECTION INTRAMUSCULAR EVERY 2 HOUR PRN
Status: DISCONTINUED | OUTPATIENT
Start: 2024-11-18 | End: 2024-11-19 | Stop reason: HOSPADM

## 2024-11-18 RX ORDER — IBUPROFEN 800 MG/1
800 TABLET ORAL 3 TIMES DAILY PRN
COMMUNITY
End: 2024-11-19 | Stop reason: HOSPADM

## 2024-11-18 RX ORDER — METFORMIN HYDROCHLORIDE 1000 MG/1
1500 TABLET, FILM COATED, EXTENDED RELEASE ORAL
COMMUNITY

## 2024-11-18 RX ORDER — METOPROLOL TARTRATE 25 MG/1
12.5 TABLET, FILM COATED ORAL DAILY
Status: DISCONTINUED | OUTPATIENT
Start: 2024-11-18 | End: 2024-11-19 | Stop reason: HOSPADM

## 2024-11-18 RX ORDER — ESCITALOPRAM OXALATE 20 MG/1
20 TABLET ORAL DAILY
COMMUNITY

## 2024-11-18 RX ORDER — FOLIC ACID 1 MG/1
1 TABLET ORAL DAILY
Status: DISCONTINUED | OUTPATIENT
Start: 2024-11-18 | End: 2024-11-19 | Stop reason: HOSPADM

## 2024-11-18 RX ADMIN — FOLIC ACID 1 MG: 1 TABLET ORAL at 09:43

## 2024-11-18 RX ADMIN — HYDROXYZINE HYDROCHLORIDE 25 MG: 25 TABLET ORAL at 20:02

## 2024-11-18 RX ADMIN — TETROFOSMIN 30 MILLICURIE: 0.23 INJECTION, POWDER, LYOPHILIZED, FOR SOLUTION INTRAVENOUS at 13:15

## 2024-11-18 RX ADMIN — ATORVASTATIN CALCIUM 10 MG: 10 TABLET, FILM COATED ORAL at 20:02

## 2024-11-18 RX ADMIN — REGADENOSON 0.4 MG: 0.08 INJECTION, SOLUTION INTRAVENOUS at 13:45

## 2024-11-18 RX ADMIN — PERFLUTREN 1.5 ML OF DILUTION: 6.52 INJECTION, SUSPENSION INTRAVENOUS at 11:54

## 2024-11-18 RX ADMIN — TAMSULOSIN HYDROCHLORIDE 0.8 MG: 0.4 CAPSULE ORAL at 15:13

## 2024-11-18 RX ADMIN — METFORMIN HYDROCHLORIDE 1500 MG: 500 TABLET, EXTENDED RELEASE ORAL at 17:06

## 2024-11-18 RX ADMIN — THIAMINE HYDROCHLORIDE 100 MG: 100 INJECTION, SOLUTION INTRAMUSCULAR; INTRAVENOUS at 09:42

## 2024-11-18 RX ADMIN — METOPROLOL TARTRATE 12.5 MG: 25 TABLET, FILM COATED ORAL at 15:12

## 2024-11-18 RX ADMIN — METHYLPREDNISOLONE SODIUM SUCCINATE 125 MG: 125 INJECTION, POWDER, FOR SOLUTION INTRAMUSCULAR; INTRAVENOUS at 02:52

## 2024-11-18 RX ADMIN — IPRATROPIUM BROMIDE AND ALBUTEROL SULFATE 3 ML: 2.5; .5 SOLUTION RESPIRATORY (INHALATION) at 15:35

## 2024-11-18 RX ADMIN — Medication 1 TABLET: at 09:43

## 2024-11-18 RX ADMIN — QUETIAPINE FUMARATE 50 MG: 25 TABLET ORAL at 20:02

## 2024-11-18 RX ADMIN — MAGNESIUM SULFATE HEPTAHYDRATE 2 G: 40 INJECTION, SOLUTION INTRAVENOUS at 03:50

## 2024-11-18 RX ADMIN — GABAPENTIN 300 MG: 300 CAPSULE ORAL at 15:13

## 2024-11-18 RX ADMIN — TETROFOSMIN 10 MILLICURIE: 0.23 INJECTION, POWDER, LYOPHILIZED, FOR SOLUTION INTRAVENOUS at 12:15

## 2024-11-18 RX ADMIN — NICOTINE 1 PATCH: 21 PATCH, EXTENDED RELEASE TRANSDERMAL at 17:07

## 2024-11-18 RX ADMIN — ASPIRIN 81 MG: 81 TABLET, COATED ORAL at 15:13

## 2024-11-18 RX ADMIN — FINASTERIDE 5 MG: 5 TABLET, FILM COATED ORAL at 15:11

## 2024-11-18 RX ADMIN — AMLODIPINE BESYLATE 10 MG: 10 TABLET ORAL at 15:13

## 2024-11-18 RX ADMIN — METHYLPREDNISOLONE SODIUM SUCCINATE 40 MG: 40 INJECTION, POWDER, FOR SOLUTION INTRAMUSCULAR; INTRAVENOUS at 19:51

## 2024-11-18 RX ADMIN — TIOTROPIUM BROMIDE INHALATION SPRAY 2 PUFF: 3.12 SPRAY, METERED RESPIRATORY (INHALATION) at 21:10

## 2024-11-18 RX ADMIN — GABAPENTIN 300 MG: 300 CAPSULE ORAL at 20:02

## 2024-11-18 RX ADMIN — IPRATROPIUM BROMIDE AND ALBUTEROL SULFATE 3 ML: 2.5; .5 SOLUTION RESPIRATORY (INHALATION) at 20:30

## 2024-11-18 RX ADMIN — ENOXAPARIN SODIUM 40 MG: 40 INJECTION SUBCUTANEOUS at 09:42

## 2024-11-18 RX ADMIN — IOHEXOL 75 ML: 350 INJECTION, SOLUTION INTRAVENOUS at 03:45

## 2024-11-18 RX ADMIN — ESCITALOPRAM OXALATE 20 MG: 10 TABLET ORAL at 15:13

## 2024-11-18 RX ADMIN — FLUTICASONE FUROATE AND VILANTEROL TRIFENATATE 1 PUFF: 100; 25 POWDER RESPIRATORY (INHALATION) at 21:10

## 2024-11-18 RX ADMIN — PANTOPRAZOLE SODIUM 40 MG: 40 TABLET, DELAYED RELEASE ORAL at 09:43

## 2024-11-18 SDOH — SOCIAL STABILITY: SOCIAL INSECURITY: HAVE YOU HAD THOUGHTS OF HARMING ANYONE ELSE?: NO

## 2024-11-18 SDOH — ECONOMIC STABILITY: FOOD INSECURITY: WITHIN THE PAST 12 MONTHS, YOU WORRIED THAT YOUR FOOD WOULD RUN OUT BEFORE YOU GOT THE MONEY TO BUY MORE.: NEVER TRUE

## 2024-11-18 SDOH — SOCIAL STABILITY: SOCIAL INSECURITY
WITHIN THE LAST YEAR, HAVE YOU BEEN RAPED OR FORCED TO HAVE ANY KIND OF SEXUAL ACTIVITY BY YOUR PARTNER OR EX-PARTNER?: NO

## 2024-11-18 SDOH — ECONOMIC STABILITY: FOOD INSECURITY: WITHIN THE PAST 12 MONTHS, THE FOOD YOU BOUGHT JUST DIDN'T LAST AND YOU DIDN'T HAVE MONEY TO GET MORE.: NEVER TRUE

## 2024-11-18 SDOH — SOCIAL STABILITY: SOCIAL INSECURITY: WITHIN THE LAST YEAR, HAVE YOU BEEN HUMILIATED OR EMOTIONALLY ABUSED IN OTHER WAYS BY YOUR PARTNER OR EX-PARTNER?: NO

## 2024-11-18 SDOH — SOCIAL STABILITY: SOCIAL INSECURITY: WITHIN THE LAST YEAR, HAVE YOU BEEN AFRAID OF YOUR PARTNER OR EX-PARTNER?: NO

## 2024-11-18 SDOH — ECONOMIC STABILITY: INCOME INSECURITY: IN THE PAST 12 MONTHS HAS THE ELECTRIC, GAS, OIL, OR WATER COMPANY THREATENED TO SHUT OFF SERVICES IN YOUR HOME?: NO

## 2024-11-18 SDOH — SOCIAL STABILITY: SOCIAL INSECURITY: WERE YOU ABLE TO COMPLETE ALL THE BEHAVIORAL HEALTH SCREENINGS?: YES

## 2024-11-18 SDOH — ECONOMIC STABILITY: HOUSING INSECURITY: IN THE PAST 12 MONTHS, HOW MANY TIMES HAVE YOU MOVED WHERE YOU WERE LIVING?: 1

## 2024-11-18 SDOH — SOCIAL STABILITY: SOCIAL INSECURITY: HAS ANYONE EVER THREATENED TO HURT YOUR FAMILY OR YOUR PETS?: NO

## 2024-11-18 SDOH — ECONOMIC STABILITY: HOUSING INSECURITY: AT ANY TIME IN THE PAST 12 MONTHS, WERE YOU HOMELESS OR LIVING IN A SHELTER (INCLUDING NOW)?: NO

## 2024-11-18 SDOH — ECONOMIC STABILITY: FOOD INSECURITY: HOW HARD IS IT FOR YOU TO PAY FOR THE VERY BASICS LIKE FOOD, HOUSING, MEDICAL CARE, AND HEATING?: SOMEWHAT HARD

## 2024-11-18 SDOH — SOCIAL STABILITY: SOCIAL INSECURITY: ARE YOU OR HAVE YOU BEEN THREATENED OR ABUSED PHYSICALLY, EMOTIONALLY, OR SEXUALLY BY ANYONE?: NO

## 2024-11-18 SDOH — SOCIAL STABILITY: SOCIAL INSECURITY: DOES ANYONE TRY TO KEEP YOU FROM HAVING/CONTACTING OTHER FRIENDS OR DOING THINGS OUTSIDE YOUR HOME?: NO

## 2024-11-18 SDOH — SOCIAL STABILITY: SOCIAL INSECURITY
WITHIN THE LAST YEAR, HAVE YOU BEEN KICKED, HIT, SLAPPED, OR OTHERWISE PHYSICALLY HURT BY YOUR PARTNER OR EX-PARTNER?: NO

## 2024-11-18 SDOH — ECONOMIC STABILITY: TRANSPORTATION INSECURITY: IN THE PAST 12 MONTHS, HAS LACK OF TRANSPORTATION KEPT YOU FROM MEDICAL APPOINTMENTS OR FROM GETTING MEDICATIONS?: NO

## 2024-11-18 SDOH — SOCIAL STABILITY: SOCIAL INSECURITY: ABUSE: ADULT

## 2024-11-18 SDOH — SOCIAL STABILITY: SOCIAL INSECURITY: ARE THERE ANY APPARENT SIGNS OF INJURIES/BEHAVIORS THAT COULD BE RELATED TO ABUSE/NEGLECT?: NO

## 2024-11-18 SDOH — ECONOMIC STABILITY: HOUSING INSECURITY: IN THE LAST 12 MONTHS, WAS THERE A TIME WHEN YOU WERE NOT ABLE TO PAY THE MORTGAGE OR RENT ON TIME?: YES

## 2024-11-18 SDOH — SOCIAL STABILITY: SOCIAL INSECURITY: DO YOU FEEL UNSAFE GOING BACK TO THE PLACE WHERE YOU ARE LIVING?: NO

## 2024-11-18 SDOH — SOCIAL STABILITY: SOCIAL INSECURITY: DO YOU FEEL ANYONE HAS EXPLOITED OR TAKEN ADVANTAGE OF YOU FINANCIALLY OR OF YOUR PERSONAL PROPERTY?: NO

## 2024-11-18 SDOH — SOCIAL STABILITY: SOCIAL INSECURITY: HAVE YOU HAD ANY THOUGHTS OF HARMING ANYONE ELSE?: NO

## 2024-11-18 ASSESSMENT — LIFESTYLE VARIABLES
HOW OFTEN DURING THE LAST YEAR HAVE YOU FOUND THAT YOU WERE NOT ABLE TO STOP DRINKING ONCE YOU HAD STARTED: WEEKLY
TREMOR: NO TREMOR
ORIENTATION AND CLOUDING OF SENSORIUM: ORIENTED AND CAN DO SERIAL ADDITIONS
NAUSEA AND VOMITING: NO NAUSEA AND NO VOMITING
AGITATION: NORMAL ACTIVITY
AUDITORY DISTURBANCES: NOT PRESENT
ORIENTATION AND CLOUDING OF SENSORIUM: ORIENTED AND CAN DO SERIAL ADDITIONS
TOTAL SCORE: 0
HEADACHE, FULLNESS IN HEAD: NOT PRESENT
PAROXYSMAL SWEATS: NO SWEAT VISIBLE
PAROXYSMAL SWEATS: NO SWEAT VISIBLE
ORIENTATION AND CLOUDING OF SENSORIUM: ORIENTED AND CAN DO SERIAL ADDITIONS
HEADACHE, FULLNESS IN HEAD: NOT PRESENT
HOW OFTEN DO YOU HAVE 6 OR MORE DRINKS ON ONE OCCASION: WEEKLY
AUDIT TOTAL SCORE: 18
VISUAL DISTURBANCES: NOT PRESENT
NAUSEA AND VOMITING: NO NAUSEA AND NO VOMITING
ORIENTATION AND CLOUDING OF SENSORIUM: ORIENTED AND CAN DO SERIAL ADDITIONS
HAVE YOU OR SOMEONE ELSE BEEN INJURED AS A RESULT OF YOUR DRINKING: NO
TREMOR: NO TREMOR
AGITATION: NORMAL ACTIVITY
TREMOR: NO TREMOR
AGITATION: NORMAL ACTIVITY
ANXIETY: MILDLY ANXIOUS
VISUAL DISTURBANCES: NOT PRESENT
HOW MANY STANDARD DRINKS CONTAINING ALCOHOL DO YOU HAVE ON A TYPICAL DAY: 5 OR 6
AUDITORY DISTURBANCES: NOT PRESENT
HOW OFTEN DURING THE LAST YEAR HAVE YOU FAILED TO DO WHAT WAS NORMALLY EXPECTED FROM YOU BECAUSE OF DRINKING: LESS THAN MONTHLY
TREMOR: NO TREMOR
AUDITORY DISTURBANCES: NOT PRESENT
TOTAL SCORE: 0
HOW OFTEN DURING THE LAST YEAR HAVE YOU NEEDED AN ALCOHOLIC DRINK FIRST THING IN THE MORNING TO GET YOURSELF GOING AFTER A NIGHT OF HEAVY DRINKING: NEVER
TREMOR: NO TREMOR
PAROXYSMAL SWEATS: NO SWEAT VISIBLE
PULSE: 74
HAVE YOU EVER FELT YOU SHOULD CUT DOWN ON YOUR DRINKING: NO
HOW OFTEN DO YOU HAVE A DRINK CONTAINING ALCOHOL: 2-4 TIMES A MONTH
VISUAL DISTURBANCES: NOT PRESENT
ANXIETY: NO ANXIETY, AT EASE
PULSE: 74
AUDIT TOTAL SCORE: 11
EVER HAD A DRINK FIRST THING IN THE MORNING TO STEADY YOUR NERVES TO GET RID OF A HANGOVER: NO
PAROXYSMAL SWEATS: NO SWEAT VISIBLE
AUDITORY DISTURBANCES: NOT PRESENT
PAROXYSMAL SWEATS: NO SWEAT VISIBLE
TOTAL SCORE: 0
NAUSEA AND VOMITING: NO NAUSEA AND NO VOMITING
TOTAL SCORE: 0
AGITATION: NORMAL ACTIVITY
ANXIETY: NO ANXIETY, AT EASE
VISUAL DISTURBANCES: NOT PRESENT
TOTAL SCORE: 2
SKIP TO QUESTIONS 9-10: 0
AUDIT-C TOTAL SCORE: 7
HEADACHE, FULLNESS IN HEAD: NOT PRESENT
HEADACHE, FULLNESS IN HEAD: NOT PRESENT
HAS A RELATIVE, FRIEND, DOCTOR, OR ANOTHER HEALTH PROFESSIONAL EXPRESSED CONCERN ABOUT YOUR DRINKING OR SUGGESTED YOU CUT DOWN: YES, DURING THE LAST YEAR
TOTAL SCORE: 0
HAVE PEOPLE ANNOYED YOU BY CRITICIZING YOUR DRINKING: NO
ANXIETY: NO ANXIETY, AT EASE
AGITATION: NORMAL ACTIVITY
NAUSEA AND VOMITING: NO NAUSEA AND NO VOMITING
HOW OFTEN DURING THE LAST YEAR HAVE YOU HAD A FEELING OF GUILT OR REMORSE AFTER DRINKING: LESS THAN MONTHLY
TACTILE DISTURBANCES: VERY MILD ITCHING, PINS AND NEEDLES, BURNING OR NUMBNESS
EVER FELT BAD OR GUILTY ABOUT YOUR DRINKING: NO
HOW OFTEN DURING THE LAST YEAR HAVE YOU BEEN UNABLE TO REMEMBER WHAT HAPPENED THE NIGHT BEFORE BECAUSE YOU HAD BEEN DRINKING: MONTHLY
BLOOD PRESSURE: 135/93
ANXIETY: NO ANXIETY, AT EASE
AUDITORY DISTURBANCES: NOT PRESENT
NAUSEA AND VOMITING: NO NAUSEA AND NO VOMITING
ORIENTATION AND CLOUDING OF SENSORIUM: ORIENTED AND CAN DO SERIAL ADDITIONS
HEADACHE, FULLNESS IN HEAD: NOT PRESENT
VISUAL DISTURBANCES: NOT PRESENT
AUDIT-C TOTAL SCORE: 7

## 2024-11-18 ASSESSMENT — ACTIVITIES OF DAILY LIVING (ADL)
LACK_OF_TRANSPORTATION: NO
GROOMING: INDEPENDENT
HEARING - RIGHT EAR: FUNCTIONAL
BATHING: INDEPENDENT
ADEQUATE_TO_COMPLETE_ADL: YES
WALKS IN HOME: INDEPENDENT
FEEDING YOURSELF: INDEPENDENT
JUDGMENT_ADEQUATE_SAFELY_COMPLETE_DAILY_ACTIVITIES: YES
TOILETING: INDEPENDENT
LACK_OF_TRANSPORTATION: NO
HEARING - LEFT EAR: FUNCTIONAL
PATIENT'S MEMORY ADEQUATE TO SAFELY COMPLETE DAILY ACTIVITIES?: YES
DRESSING YOURSELF: INDEPENDENT

## 2024-11-18 ASSESSMENT — COGNITIVE AND FUNCTIONAL STATUS - GENERAL
DAILY ACTIVITIY SCORE: 24
MOBILITY SCORE: 24
PATIENT BASELINE BEDBOUND: NO
MOBILITY SCORE: 24
MOBILITY SCORE: 24
DAILY ACTIVITIY SCORE: 24
DAILY ACTIVITIY SCORE: 24

## 2024-11-18 ASSESSMENT — PAIN - FUNCTIONAL ASSESSMENT
PAIN_FUNCTIONAL_ASSESSMENT: 0-10

## 2024-11-18 ASSESSMENT — PATIENT HEALTH QUESTIONNAIRE - PHQ9
1. LITTLE INTEREST OR PLEASURE IN DOING THINGS: SEVERAL DAYS
2. FEELING DOWN, DEPRESSED OR HOPELESS: SEVERAL DAYS
SUM OF ALL RESPONSES TO PHQ9 QUESTIONS 1 & 2: 2

## 2024-11-18 ASSESSMENT — PAIN SCALES - GENERAL
PAINLEVEL_OUTOF10: 0 - NO PAIN
PAINLEVEL_OUTOF10: 5 - MODERATE PAIN
PAINLEVEL_OUTOF10: 5 - MODERATE PAIN
PAINLEVEL_OUTOF10: 0 - NO PAIN

## 2024-11-18 NOTE — ED PROVIDER NOTES
HPI   Chief Complaint   Patient presents with    Chest Pain     Pt has been experiencing chest tightness for over a week that has progressively gotten worse, especially with exertion. Pt also has been experiencing a SOB that is more frequent than the chest tightness. Pt denies oxygen supplementation at home minus the past month (3L). Pt currently 93% on RA.       This is a 54-year-old male with past medical history of asthma who presents to the Emergency Department for shortness of breath and chest tightness.  Patient said his chest tightness has been ongoing for about a week and he has been feeling short of breath since he had COVID about 3 months ago.  He states that at that time he was admitted for COVID and he had COVID-pneumonia.  He reports that his oxygenation went to 60% and his pulse ox he was feeling short of breath.  EMS gave patient DuoNeb treatment and aspirin.  He states that shortness of breath occurs mostly when he does light activity.  He reports that he will intermittently use oxygen and when he does it is 3 L.  He denies any nausea, vomiting, coughing no swelling of his legs.                          Shaan Coma Scale Score: 15                  Patient History   No past medical history on file.  No past surgical history on file.  No family history on file.  Social History     Tobacco Use    Smoking status: Not on file    Smokeless tobacco: Not on file   Substance Use Topics    Alcohol use: Not on file    Drug use: Not on file       Physical Exam   ED Triage Vitals [11/18/24 0235]   Temperature Heart Rate Respirations BP   37 °C (98.6 °F) 94 18 (!) 134/92      Pulse Ox Temp Source Heart Rate Source Patient Position   94 % Temporal Monitor Sitting      BP Location FiO2 (%)     Left arm --       Physical Exam  Constitutional:       General: He is not in acute distress.  HENT:      Head: Normocephalic and atraumatic.      Right Ear: Tympanic membrane normal.      Left Ear: Tympanic membrane normal.       Mouth/Throat:      Mouth: Mucous membranes are moist.   Eyes:      Extraocular Movements: Extraocular movements intact.      Conjunctiva/sclera: Conjunctivae normal.      Pupils: Pupils are equal, round, and reactive to light.   Cardiovascular:      Rate and Rhythm: Normal rate and regular rhythm.      Heart sounds: No murmur heard.  Pulmonary:      Effort: Pulmonary effort is normal. No respiratory distress.      Breath sounds: Normal breath sounds. No stridor. No wheezing or rales.   Abdominal:      General: Bowel sounds are normal. There is no distension.      Tenderness: There is no abdominal tenderness. There is no guarding or rebound.   Musculoskeletal:         General: No swelling, tenderness or deformity. Normal range of motion.   Skin:     General: Skin is warm and dry.      Coloration: Skin is not jaundiced.      Findings: No bruising or lesion.   Neurological:      General: No focal deficit present.      Mental Status: He is alert and oriented to person, place, and time. Mental status is at baseline.      Cranial Nerves: No cranial nerve deficit.      Motor: No weakness.   Psychiatric:         Mood and Affect: Mood normal.       Labs Reviewed   CBC WITH AUTO DIFFERENTIAL   MAGNESIUM   COMPREHENSIVE METABOLIC PANEL   LIPASE   TROPONIN SERIES- (INITIAL, 1 HR)    Narrative:     The following orders were created for panel order Troponin I Series, High Sensitivity (0, 1 HR).  Procedure                               Abnormality         Status                     ---------                               -----------         ------                     Troponin I, High Sensiti...[[id number]]                                                 Troponin, High Sensitivi...[[id number]]                                                   Please view results for these tests on the individual orders.   B-TYPE NATRIURETIC PEPTIDE   BLOOD GAS VENOUS FULL PANEL   INFLUENZA A AND B PCR   SERIAL TROPONIN-INITIAL   SERIAL TROPONIN,  1 HOUR     CT angio chest for pulmonary embolism    (Results Pending)       ED Course & MDM   ED Course as of 11/18/24 0408   Mon Nov 18, 2024 0254 EKG on my read shows sinus rhythm with a right bundle branch block pattern at a rate of 94 bpm, ST elevations in leads II, III and aVF no ST depressions seen.  FL interval was 158 and QTc is 503 [CF]   0254 I reach out to Dr. Villarreal with interventional cardiology due to the right upper branch block pattern with ST elevations he does not believe this is a STEMI. [CF]   0355 IMPRESSION:  1.  No evidence of pulmonary emboli or acute chest pathology.  Moderate coronary artery atherosclerotic calcifications.   [CF]      ED Course User Index  [CF] Bonnie Thomson MD       Medical Decision Making  This is a 54-year-old male who presents emergency department for shortness of breath and chest pain.  He did receive aspirin 324 mg and DuoNeb treatment by EMS.  Here his lungs clear to auscultation but he continues to have chest pressure.  He is EKG had ST elevations in his inferior leads with a right bundle branch block pattern I did reach out to interventional cardiology Dr. Villarreal who stated that this was not a STEMI.  His troponin has been negative here.  COVID and flu are also negative.  He has no signs of CO2 retention his pulse ox is within normal limits and his labs are all reassuring.  CT angio showed no signs of pulmonary embolism, pneumonia or pneumothorax.  Patient continues to intermittently have chest pressure and shortness of breath with minimal exertion at this time will admit patient for chest pain on exertion.        Procedure  Procedures     Bonnie Thomson MD  11/18/24 0408

## 2024-11-18 NOTE — PROGRESS NOTES
"Daniel Hopkins is a 54 y.o. male on day 0 of admission presenting with Chest pain.      Subjective   Daniel Hopkins is a 54 y.o. male with PMHx s/f asthma presenting with chest pain. Pt reportedly told the ER physician he had been having chest tightness for about a week and shortness of breath since he was diagnosed with COVID about 3 months ago. He was reportedly admitted to a hospital somewhere with COVID pneumonia. His pulse ox was reportedly 60% at that time. He uses 3 L O2 intermittently when he performs light activity. EMS gave him a duoneb and aspirin. He denies nausea, vomiting, coughing and leg swelling. Pt for me on interview is extremely somnolent and does not awaken much to sternal rub or other physical or verbal stimuli. He simply mumbles that he is \"ok\" and that he has been \"short of breath for a few does.\" Immediately falls back asleep after giving single sentences. No other history available.     ED Course (Summary - please note all labs, imaging studies, and interventions noted below have been personally reviewed and/or interpreted on day of admission):   Vitals on presentation: 98.6 F, 94 bpm, 18 rr, 134/92, 94% on Ra  Labs: CMP glu 154, Mag 1.5  BNP 46  Lipase 25  Trop 5 -> 6  CBC Hg 12.2  Flu negative  VBG pH 7.43, pCO2 51, pO2 60, lactate 1.9  EKG: sinus rhythm at 94 bpm, RBBB  Imaging: CTA PE - No evidence of pulmonary emboli or acute chest pathology.   Moderate coronary artery atherosclerotic calcifications.   Interventions: Solumedrol 125 mg, Mag sulfate 2 g, Pt admitted for further care      11/18: Patient was evaluated this morning, feeling better, still have some shortness of breath with ambulation.  Denies any chest pain at rest.       Objective     Last Recorded Vitals  /88 (BP Location: Right arm, Patient Position: Lying)   Pulse 92   Temp 35.9 °C (96.6 °F) (Temporal)   Resp 20   Wt 110 kg (242 lb 8 oz)   SpO2 91%   Intake/Output last 3 Shifts:    Intake/Output Summary (Last 24 " hours) at 11/18/2024 1225  Last data filed at 11/18/2024 1002  Gross per 24 hour   Intake 290 ml   Output --   Net 290 ml       Admission Weight  Weight: 111 kg (245 lb) (11/18/24 0235)    Daily Weight  11/18/24 : 110 kg (242 lb 8 oz)    Image Results  ECG 12 lead  Sinus rhythm  RBBB and LAFB  Inferior infarct, acute  Lateral leads are also involved  >>> Acute MI <<<    See ED provider note for full interpretation and clinical correlation  Confirmed by Jenn Alfaro ([id number]) on 11/18/2024 10:51:53 AM  CT head wo IV contrast  Narrative: Interpreted By:  Cristi Triana,   STUDY:  CT HEAD WO IV CONTRAST;  11/18/2024 5:48 am      INDICATION:  Signs/Symptoms:altered mental status.      COMPARISON:  None.      ACCESSION NUMBER(S):  [id number]      ORDERING CLINICIAN:  MARÍA COOK      TECHNIQUE:  Axial noncontrast CT images of the head.      FINDINGS:  Gray-white matter differentiation is maintained. There are no  extra-axial collections. No mass effect or midline shift. There is no  hydrocephalus. There is cavum pellucidum and cavum vergae. Mild  cerebral involutional change.      HEMORRHAGE: No acute intracranial hemorrhage.      CALVARIUM: No depressed skull fracture.      EXTRACRANIAL SOFT TISSUES:.. Unremarkable.      PARANASAL SINUSES/MASTOIDS: The visualized paranasal sinuses are well  aerated. Mastoid air cells are clear.      ORBITS: Grossly normal.      Impression: No acute cortical infarct or acute intracranial hemorrhage.          Signed by: Cristi Triana 11/18/2024 5:53 AM  Dictation workstation:   [id number]  CT angio chest for pulmonary embolism  Narrative: Interpreted By:  Cristi Triana,   STUDY:  CT ANGIO CHEST FOR PULMONARY EMBOLISM;  11/18/2024 3:45 am      INDICATION:  Signs/Symptoms:SOB.      ACCESSION NUMBER(S):  [id number]      ORDERING CLINICIAN:  AZEEM SAUCEDA      TECHNIQUE:  Helical data acquisition of the chest was obtained contrast volume:  75 mL IV contrast Omnipaque 350.       Axial contiguous images were reformatted in coronal and sagittal  planes. Axial and coronal MIP images were created and reviewed.      FINDINGS:  POTENTIAL LIMITATIONS OF THE STUDY: Motion and mixing artifact which  limits evaluation of the distal branch vessels.      HEART AND VESSELS:  No discrete filling defects within the main pulmonary artery or its  branches.      Main pulmonary artery and its branches are normal in caliber.      The thoracic aorta is of normal course and caliber.      Moderate coronary artery calcifications are seen.The study is not  optimized for evaluation of coronary arteries.      The cardiac chambers are not enlarged.      No evidence of pericardial effusion.      MEDIASTINUM AND EBONI, LOWER NECK AND AXILLA:  The visualized thyroid gland is within normal limits.      No evidence of thoracic lymphadenopathy by CT criteria.      Esophagus appears within normal limits as seen.      LUNGS AND AIRWAYS:  The trachea and central airways are patent. No endobronchial lesion.      Lungs are clear. Mild bibasilar dependent and linear atelectasis.  There is no pleural effusion or pneumothorax.      UPPER ABDOMEN:  There is hepatomegaly.      CHEST WALL AND OSSEOUS STRUCTURES:  There are no suspicious osseous lesions. The visualized osseous  structures are intact.      Impression: 1.  No evidence of pulmonary emboli or acute chest pathology.  Moderate coronary artery atherosclerotic calcifications.          Signed by: Cristi Triana 11/18/2024 3:49 AM  Dictation workstation:   [id number]      Physical Exam  Patient is awake and orient, not in apparent distress  Eyes: PERRLA, no conjunctival congestion  Chest: Bilateral decreased air entry with expiratory wheezing.  Heart: s1S2 regular, no murmur  Abdomen: Soft, non tender, BS present  Ext:    Relevant Results               Assessment/Plan      Acute hypoxic respiratory failure secondary to bronchial asthma exacerbation  Continue oxygen  supplementation and wean as tolerated  Incentive spirometry and pulm hygiene  Monitor  Bronchial asthma exacerbation  Continue on IV steroids, DuoNeb treatment  Monitor    Chest pain  Negative EKG and troponin  Will obtain a stress and echocardiogram to rule out ACS      Hypomagnesemia  Replace and monitor            Pawan Byers MD

## 2024-11-18 NOTE — PROGRESS NOTES
11/18/24 1109   Discharge Planning   Living Arrangements Alone   Support Systems Family members;Friends/neighbors   Assistance Needed none   Type of Residence Private residence   Home or Post Acute Services None   Expected Discharge Disposition Home   Does the patient need discharge transport arranged? Yes   RoundTrip coordination needed? Yes     Met with patient at bedside, introduced self and role as RN TCC. Patient lives at home alone in an apartment. He states he is independent in his own care. He is a , patient attempting to establish with Anish Edmonds at [facility] tomorrow. Patient states he has good transportation through his insurance.  Patient admitted for chest tightness, SOB. Patient has O2 concentrator and nebulizer at home. He is requesting breathing treatments here, Dr Byers is aware. ECHO and stress test is pending as well. ADOD today vs tomorrow per Dr Byers. Patient prefers to discharge home when medically ready. No needs at this time. Will follow.

## 2024-11-18 NOTE — PROGRESS NOTES
Daniel Hopkins is a 54 y.o. male admitted for Chest pain. Pharmacy reviewed the patient's vujqf-ha-odwpgplby medications and allergies for accuracy.    The list below reflects the PTA list prior to pharmacy medication history. A summary a changes to the PTA medication list has been listed below. Please review each medication in order reconciliation for additional clarification and justification.    Source of information:  T2P    Medications added:  Ibuprofen 800mg tid prn  Breztri 2p bid    Medications modified:  Hydroxyzine 25mg tid prn --> 25mg q6 prn  Metoprolol tart 12.5mg bid --> 25mg 0.5t every day     Medications to be removed:    Medications of concern:      Prior to Admission Medications   Prescriptions Last Dose Informant Patient Reported? Taking?   QUEtiapine (SEROquel) 50 mg tablet   Yes No   Sig: Take 1 tablet (50 mg) by mouth once daily at bedtime.   amLODIPine (Norvasc) 10 mg tablet   Yes No   Sig: Take 1 tablet (10 mg) by mouth once daily.   aspirin 81 mg EC tablet   Yes No   Sig: Take 1 tablet (81 mg) by mouth once daily.   atorvastatin (Lipitor) 10 mg tablet   Yes No   Sig: Take 1 tablet (10 mg) by mouth once daily.   escitalopram (Lexapro) 20 mg tablet   Yes No   Sig: Take 1 tablet (20 mg) by mouth once daily.   finasteride (Proscar) 5 mg tablet   Yes No   Sig: Take 1 tablet (5 mg) by mouth once daily. Do not crush, chew, or split.   gabapentin (Neurontin) 300 mg capsule   Yes No   Sig: Take 1 capsule (300 mg) by mouth 3 times a day.   hydrOXYzine HCL (Atarax) 25 mg tablet   Yes No   Sig: Take 1 tablet (25 mg) by mouth 3 times a day as needed for anxiety.   metFORMIN, MOD, (Glumetza) 1,000 mg 24 hr tablet   Yes No   Sig: Take 1,500 mg by mouth once daily in the evening. Take with meals. Do not crush, chew, or split.   metoprolol tartrate (Lopressor) 12.5 mg split tablet   Yes No   Sig: Take 1 half tablet (12.5 mg) by mouth 2 times a day.   pantoprazole (ProtoNix) 40 mg EC tablet   Yes No   Sig: Take  1 tablet (40 mg) by mouth once daily in the morning. Take before meals. Do not crush, chew, or split.   tamsulosin (Flomax) 0.4 mg 24 hr capsule   Yes No   Sig: Take 2 capsules (0.8 mg) by mouth once daily.      Facility-Administered Medications: None       Selma Lerner

## 2024-11-18 NOTE — PROGRESS NOTES
Social work consult placed for positive medical risk screen for etoh and housing instability. SW reviewed pt's chart and communicated with TCC. SW attempted to met with pt, pt off floor at this time. SW to follow as time permits.     Rosy Tompkins, MSW, LSW ([id number])   Care Transitions

## 2024-11-18 NOTE — H&P
"[facility] - GENERAL MEDICINE HISTORY AND PHYSICAL    History Obtained From (Primary Source): ED physician  Collateral History (Secondary Sources): D/w    History Of Present Illness (HPI):  Daniel Hopkins is a 54 y.o. male with PMHx s/f asthma presenting with chest pain. Pt reportedly told the ER physician he had been having chest tightness for about a week and shortness of breath since he was diagnosed with COVID about 3 months ago. He was reportedly admitted to a hospital somewhere with COVID pneumonia. His pulse ox was reportedly 60% at that time. He uses 3 L O2 intermittently when he performs light activity. EMS gave him a duoneb and aspirin. He denies nausea, vomiting, coughing and leg swelling. Pt for me on interview is extremely somnolent and does not awaken much to sternal rub or other physical or verbal stimuli. He simply mumbles that he is \"ok\" and that he has been \"short of breath for a few does.\" Immediately falls back asleep after giving single sentences. No other history available.    ED Course (Summary - please note all labs, imaging studies, and interventions noted below have been personally reviewed and/or interpreted on day of admission):   Vitals on presentation: 98.6 F, 94 bpm, 18 rr, 134/92, 94% on Ra  Labs: CMP glu 154, Mag 1.5  BNP 46  Lipase 25  Trop 5 -> 6  CBC Hg 12.2  Flu negative  VBG pH 7.43, pCO2 51, pO2 60, lactate 1.9  EKG: sinus rhythm at 94 bpm, RBBB  Imaging: CTA PE - No evidence of pulmonary emboli or acute chest pathology.   Moderate coronary artery atherosclerotic calcifications.   Interventions: Solumedrol 125 mg, Mag sulfate 2 g, Pt admitted for further care    12-point ROS reviewed and found to be negative aside from aforementioned positives in HPI and/or noted in dedicated ROS section below.     ED Course (From ED Provider):  ED Course as of 11/18/24 0515   Mon Nov 18, 2024   0254 EKG on my read shows sinus rhythm with a right bundle branch block pattern at a " rate of 94 bpm, ST elevations in leads II, III and aVF no ST depressions seen.  CT interval was 158 and QTc is 503 [CF]   9694 I reach out to Dr. Villarreal with interventional cardiology due to the right upper branch block pattern with ST elevations he does not believe this is a STEMI. [CF]   6138 IMPRESSION:  1.  No evidence of pulmonary emboli or acute chest pathology.  Moderate coronary artery atherosclerotic calcifications.   [CF]      ED Course User Index  [CF] Bonnie Thomson MD         Diagnoses as of 11/18/24 0515   Chest pain     Relevant Results  Results for orders placed or performed during the hospital encounter of 11/18/24 (from the past 24 hours)   CBC and Auto Differential   Result Value Ref Range    WBC 6.5 4.4 - 11.3 x10*3/uL    nRBC 0.0 0.0 - 0.0 /100 WBCs    RBC 4.56 4.50 - 5.90 x10*6/uL    Hemoglobin 12.2 (L) 13.5 - 17.5 g/dL    Hematocrit 39.7 (L) 41.0 - 52.0 %    MCV 87 80 - 100 fL    MCH 26.8 26.0 - 34.0 pg    MCHC 30.7 (L) 32.0 - 36.0 g/dL    RDW 15.9 (H) 11.5 - 14.5 %    Platelets 234 150 - 450 x10*3/uL    Neutrophils % 50.4 40.0 - 80.0 %    Immature Granulocytes %, Automated 0.9 0.0 - 0.9 %    Lymphocytes % 36.7 13.0 - 44.0 %    Monocytes % 10.6 2.0 - 10.0 %    Eosinophils % 0.9 0.0 - 6.0 %    Basophils % 0.5 0.0 - 2.0 %    Neutrophils Absolute 3.29 1.20 - 7.70 x10*3/uL    Immature Granulocytes Absolute, Automated 0.06 0.00 - 0.70 x10*3/uL    Lymphocytes Absolute 2.39 1.20 - 4.80 x10*3/uL    Monocytes Absolute 0.69 0.10 - 1.00 x10*3/uL    Eosinophils Absolute 0.06 0.00 - 0.70 x10*3/uL    Basophils Absolute 0.03 0.00 - 0.10 x10*3/uL   Magnesium   Result Value Ref Range    Magnesium 1.50 (L) 1.60 - 2.40 mg/dL   Comprehensive metabolic panel   Result Value Ref Range    Glucose 154 (H) 74 - 99 mg/dL    Sodium 139 136 - 145 mmol/L    Potassium 3.6 3.5 - 5.3 mmol/L    Chloride 100 98 - 107 mmol/L    Bicarbonate 32 21 - 32 mmol/L    Anion Gap 11 10 - 20 mmol/L    Urea Nitrogen 14 6 - 23 mg/dL     Creatinine 1.05 0.50 - 1.30 mg/dL    eGFR 83 >60 mL/min/1.73m*2    Calcium 8.5 (L) 8.6 - 10.3 mg/dL    Albumin 3.9 3.4 - 5.0 g/dL    Alkaline Phosphatase 59 33 - 120 U/L    Total Protein 6.2 (L) 6.4 - 8.2 g/dL    AST 29 9 - 39 U/L    Bilirubin, Total 0.5 0.0 - 1.2 mg/dL    ALT 43 10 - 52 U/L   Lipase   Result Value Ref Range    Lipase 25 9 - 82 U/L   B-Type Natriuretic Peptide   Result Value Ref Range    BNP 46 0 - 99 pg/mL   Blood Gas Venous Full Panel   Result Value Ref Range    POCT pH, Venous 7.43 7.33 - 7.43 pH    POCT pCO2, Venous 51 41 - 51 mm Hg    POCT pO2, Venous 60 (H) 35 - 45 mm Hg    POCT SO2, Venous 90 (H) 45 - 75 %    POCT Oxy Hemoglobin, Venous 84.4 (H) 45.0 - 75.0 %    POCT Hematocrit Calculated, Venous 38.0 (L) 41.0 - 52.0 %    POCT Sodium, Venous 136 136 - 145 mmol/L    POCT Potassium, Venous 3.6 3.5 - 5.3 mmol/L    POCT Chloride, Venous 100 98 - 107 mmol/L    POCT Ionized Calicum, Venous 1.20 1.10 - 1.33 mmol/L    POCT Glucose, Venous 158 (H) 74 - 99 mg/dL    POCT Lactate, Venous 1.9 0.4 - 2.0 mmol/L    POCT Base Excess, Venous 8.1 (H) -2.0 - 3.0 mmol/L    POCT HCO3 Calculated, Venous 33.9 (H) 22.0 - 26.0 mmol/L    POCT Hemoglobin, Venous 12.5 (L) 13.5 - 17.5 g/dL    POCT Anion Gap, Venous 6.0 (L) 10.0 - 25.0 mmol/L    Patient Temperature 37.0 degrees Celsius    FiO2 21 %   Troponin I, High Sensitivity, Initial   Result Value Ref Range    Troponin I, High Sensitivity 5 0 - 20 ng/L   Influenza A, and B PCR   Result Value Ref Range    Flu A Result Not Detected Not Detected    Flu B Result Not Detected Not Detected   Calcium, Ionized   Result Value Ref Range    POCT Calcium, Ionized 1.14 1.1 - 1.33 mmol/L   Troponin, High Sensitivity, 1 Hour   Result Value Ref Range    Troponin I, High Sensitivity 6 0 - 20 ng/L      CT angio chest for pulmonary embolism    Result Date: 11/18/2024  Interpreted By:  Cristi Triana, STUDY: CT ANGIO CHEST FOR PULMONARY EMBOLISM;  11/18/2024 3:45 am   INDICATION:  Signs/Symptoms:SOB.   ACCESSION NUMBER(S): [id number]   ORDERING CLINICIAN: AZEEM SAUCEDA   TECHNIQUE: Helical data acquisition of the chest was obtained contrast volume: 75 mL IV contrast Omnipaque 350.   Axial contiguous images were reformatted in coronal and sagittal planes. Axial and coronal MIP images were created and reviewed.   FINDINGS: POTENTIAL LIMITATIONS OF THE STUDY: Motion and mixing artifact which limits evaluation of the distal branch vessels.   HEART AND VESSELS: No discrete filling defects within the main pulmonary artery or its branches.   Main pulmonary artery and its branches are normal in caliber.   The thoracic aorta is of normal course and caliber.   Moderate coronary artery calcifications are seen.The study is not optimized for evaluation of coronary arteries.   The cardiac chambers are not enlarged.   No evidence of pericardial effusion.   MEDIASTINUM AND EBONI, LOWER NECK AND AXILLA: The visualized thyroid gland is within normal limits.   No evidence of thoracic lymphadenopathy by CT criteria.   Esophagus appears within normal limits as seen.   LUNGS AND AIRWAYS: The trachea and central airways are patent. No endobronchial lesion.   Lungs are clear. Mild bibasilar dependent and linear atelectasis. There is no pleural effusion or pneumothorax.   UPPER ABDOMEN: There is hepatomegaly.   CHEST WALL AND OSSEOUS STRUCTURES: There are no suspicious osseous lesions. The visualized osseous structures are intact.       1.  No evidence of pulmonary emboli or acute chest pathology. Moderate coronary artery atherosclerotic calcifications.     Signed by: Cristi Triana 11/18/2024 3:49 AM Dictation workstation:   [id number]    Scheduled medications:  enoxaparin, 40 mg, subcutaneous, q24h  magnesium sulfate, 2 g, intravenous, Once  pantoprazole, 40 mg, oral, Daily before breakfast   Or  pantoprazole, 40 mg, intravenous, Daily before breakfast      Continuous medications:     PRN medications:  PRN  medications: bisacodyl, bisacodyl, guaiFENesin, melatonin, ondansetron **OR** ondansetron     Past Medical History  He has no past medical history on file.    Surgical History  He has no past surgical history on file.     Social History  He has no history on file for tobacco use, alcohol use, and drug use.    Family History  No family history on file.    Allergies  Patient has no known allergies.    Code Status  Full Code     Review of Systems   Reason unable to perform ROS: somnolence.       Last Recorded Vitals  BP (!) 135/93   Pulse 75   Temp 37 °C (98.6 °F) (Temporal)   Resp 16   Wt 111 kg (245 lb)   SpO2 (!) 92%      Physical Exam:  Vital signs and nursing notes reviewed.   Constitutional: Laying in bed in no acute distress. somnolent  Skin: Warm and dry; no obvious lesions, rashes, pallor, or jaundice.   Eyes: EOMI. Anicteric sclera.   ENT: Mucous membranes moist; no obvious injury or deformity appreciated.   Head and Neck: Normocephalic, atraumatic. ROM preserved. Trachea midline. No appreciable JVD.   Respiratory: Nonlabored on RA. Lungs with mild wheezing bilaterally without obvious adventitious sounds. Chest rise is equal.  Cardiovascular: RRR. No gross murmur, gallop, or rub. Extremities are warm and well-perfused with good capillary refill (< 3 seconds). No chest wall tenderness.   GI: Abdomen soft, nontender, nondistended. No obvious organomegaly appreciated. Bowel sounds are present.  : No CVA tenderness.   MSK: No gross abnormalities appreciated. No limitations to AROM/PROM appreciated.   Extremities: No cyanosis, edema, or clubbing evident. Neurovascularly intact.   Neuro: A&Ox0. CN 2-12 grossly intact. No acute focal neurologic deficits appreciated. Minimally conversant and responsive.  Psych: unable to assess    Assessment/Plan     54 y.o. male with PMHx s/f asthma presenting with chest pain.    Plan:  Admit to [facility] with tele    Altered mental status/Somnolence/Metabolic  encephalopathy:  Appears to be a new finding since arrival in ED. Unclear if this represents an organic cause or simple somnolence/tiredness/psychological issue. Will check CT head and ammonia. His labs and VBG in the ED have been acceptable as of now.  Check urine drug screen/UA    Chest pain:  EKG showing no ST changes. Troponins WNL X2  Will like need stress test once he is able to give more of a history.  TTE ordered.    Hypomag - replete    Continue appropriate home medications once med rec completed    Asthma: Solumedrol given in ER. Currently on room air.    Diet: regular  DVT Prophylaxis: Lovenox SQ  Code Status: Full code  Case Discussed With: ED provider  Additional Sources Reviewed: N/a    Anticipated Length of Stay (LOS): 2-3 days for chest pain workup.     DO Tesfaye Dalton dictation software was used to dictate this note and thus there may be minor errors in translation/transcription including garbled speech or misspellings. Please contact for clarification if needed.

## 2024-11-18 NOTE — CARE PLAN
The patient's goals for the shift include      The clinical goals for the shift include Patient Spo2 will remain above 90%      Problem: Pain - Adult  Goal: Verbalizes/displays adequate comfort level or baseline comfort level  Outcome: Progressing     Problem: Safety - Adult  Goal: Free from fall injury  Outcome: Progressing     Problem: Discharge Planning  Goal: Discharge to home or other facility with appropriate resources  Outcome: Progressing     Problem: Chronic Conditions and Co-morbidities  Goal: Patient's chronic conditions and co-morbidity symptoms are monitored and maintained or improved  Outcome: Progressing     Problem: Pain  Goal: Takes deep breaths with improved pain control throughout the shift  Outcome: Progressing  Goal: Turns in bed with improved pain control throughout the shift  Outcome: Progressing  Goal: Walks with improved pain control throughout the shift  Outcome: Progressing  Goal: Performs ADL's with improved pain control throughout shift  Outcome: Progressing  Goal: Participates in PT with improved pain control throughout the shift  Outcome: Progressing  Goal: Free from opioid side effects throughout the shift  Outcome: Progressing  Goal: Free from acute confusion related to pain meds throughout the shift  Outcome: Progressing

## 2024-11-19 VITALS
OXYGEN SATURATION: 92 % | BODY MASS INDEX: 35.92 KG/M2 | DIASTOLIC BLOOD PRESSURE: 85 MMHG | RESPIRATION RATE: 18 BRPM | WEIGHT: 242.5 LBS | HEIGHT: 69 IN | HEART RATE: 76 BPM | TEMPERATURE: 98.3 F | SYSTOLIC BLOOD PRESSURE: 139 MMHG

## 2024-11-19 PROBLEM — J45.909 ASTHMA: Status: RESOLVED | Noted: 2024-11-18 | Resolved: 2024-11-19

## 2024-11-19 PROBLEM — R07.9 CHEST PAIN: Status: RESOLVED | Noted: 2024-11-18 | Resolved: 2024-11-19

## 2024-11-19 PROBLEM — E83.42 HYPOMAGNESEMIA: Status: RESOLVED | Noted: 2024-11-18 | Resolved: 2024-11-19

## 2024-11-19 PROBLEM — G93.41 METABOLIC ENCEPHALOPATHY: Status: RESOLVED | Noted: 2024-11-18 | Resolved: 2024-11-19

## 2024-11-19 LAB
ANION GAP SERPL CALC-SCNC: 11 MMOL/L (ref 10–20)
BUN SERPL-MCNC: 14 MG/DL (ref 6–23)
CALCIUM SERPL-MCNC: 9.3 MG/DL (ref 8.6–10.3)
CHLORIDE SERPL-SCNC: 99 MMOL/L (ref 98–107)
CO2 SERPL-SCNC: 32 MMOL/L (ref 21–32)
CREAT SERPL-MCNC: 0.73 MG/DL (ref 0.5–1.3)
EGFRCR SERPLBLD CKD-EPI 2021: >90 ML/MIN/1.73M*2
ERYTHROCYTE [DISTWIDTH] IN BLOOD BY AUTOMATED COUNT: 15.6 % (ref 11.5–14.5)
GLUCOSE SERPL-MCNC: 209 MG/DL (ref 74–99)
HCT VFR BLD AUTO: 42.8 % (ref 41–52)
HGB BLD-MCNC: 13.1 G/DL (ref 13.5–17.5)
MAGNESIUM SERPL-MCNC: 1.78 MG/DL (ref 1.6–2.4)
MCH RBC QN AUTO: 26.3 PG (ref 26–34)
MCHC RBC AUTO-ENTMCNC: 30.6 G/DL (ref 32–36)
MCV RBC AUTO: 86 FL (ref 80–100)
NRBC BLD-RTO: 0 /100 WBCS (ref 0–0)
PLATELET # BLD AUTO: 260 X10*3/UL (ref 150–450)
POTASSIUM SERPL-SCNC: 4.4 MMOL/L (ref 3.5–5.3)
RBC # BLD AUTO: 4.99 X10*6/UL (ref 4.5–5.9)
SODIUM SERPL-SCNC: 138 MMOL/L (ref 136–145)
WBC # BLD AUTO: 10.9 X10*3/UL (ref 4.4–11.3)

## 2024-11-19 PROCEDURE — S4991 NICOTINE PATCH NONLEGEND: HCPCS | Performed by: NURSE PRACTITIONER

## 2024-11-19 PROCEDURE — 2500000001 HC RX 250 WO HCPCS SELF ADMINISTERED DRUGS (ALT 637 FOR MEDICARE OP): Performed by: INTERNAL MEDICINE

## 2024-11-19 PROCEDURE — 4A023N7 MEASUREMENT OF CARDIAC SAMPLING AND PRESSURE, LEFT HEART, PERCUTANEOUS APPROACH: ICD-10-PCS | Performed by: INTERNAL MEDICINE

## 2024-11-19 PROCEDURE — C1894 INTRO/SHEATH, NON-LASER: HCPCS | Performed by: INTERNAL MEDICINE

## 2024-11-19 PROCEDURE — 99239 HOSP IP/OBS DSCHRG MGMT >30: CPT | Performed by: INTERNAL MEDICINE

## 2024-11-19 PROCEDURE — 2500000004 HC RX 250 GENERAL PHARMACY W/ HCPCS (ALT 636 FOR OP/ED): Performed by: NURSE PRACTITIONER

## 2024-11-19 PROCEDURE — 2500000001 HC RX 250 WO HCPCS SELF ADMINISTERED DRUGS (ALT 637 FOR MEDICARE OP): Performed by: NURSE PRACTITIONER

## 2024-11-19 PROCEDURE — 2720000007 HC OR 272 NO HCPCS: Performed by: INTERNAL MEDICINE

## 2024-11-19 PROCEDURE — B2151ZZ FLUOROSCOPY OF LEFT HEART USING LOW OSMOLAR CONTRAST: ICD-10-PCS | Performed by: INTERNAL MEDICINE

## 2024-11-19 PROCEDURE — 2550000001 HC RX 255 CONTRASTS: Performed by: INTERNAL MEDICINE

## 2024-11-19 PROCEDURE — 2500000004 HC RX 250 GENERAL PHARMACY W/ HCPCS (ALT 636 FOR OP/ED): Performed by: INTERNAL MEDICINE

## 2024-11-19 PROCEDURE — 2500000001 HC RX 250 WO HCPCS SELF ADMINISTERED DRUGS (ALT 637 FOR MEDICARE OP): Performed by: STUDENT IN AN ORGANIZED HEALTH CARE EDUCATION/TRAINING PROGRAM

## 2024-11-19 PROCEDURE — 83735 ASSAY OF MAGNESIUM: CPT | Performed by: STUDENT IN AN ORGANIZED HEALTH CARE EDUCATION/TRAINING PROGRAM

## 2024-11-19 PROCEDURE — 36415 COLL VENOUS BLD VENIPUNCTURE: CPT | Performed by: STUDENT IN AN ORGANIZED HEALTH CARE EDUCATION/TRAINING PROGRAM

## 2024-11-19 PROCEDURE — 99222 1ST HOSP IP/OBS MODERATE 55: CPT | Performed by: INTERNAL MEDICINE

## 2024-11-19 PROCEDURE — C1887 CATHETER, GUIDING: HCPCS | Performed by: INTERNAL MEDICINE

## 2024-11-19 PROCEDURE — 93458 L HRT ARTERY/VENTRICLE ANGIO: CPT | Performed by: INTERNAL MEDICINE

## 2024-11-19 PROCEDURE — 94640 AIRWAY INHALATION TREATMENT: CPT | Mod: MUE

## 2024-11-19 PROCEDURE — C1760 CLOSURE DEV, VASC: HCPCS | Performed by: INTERNAL MEDICINE

## 2024-11-19 PROCEDURE — 85027 COMPLETE CBC AUTOMATED: CPT | Performed by: STUDENT IN AN ORGANIZED HEALTH CARE EDUCATION/TRAINING PROGRAM

## 2024-11-19 PROCEDURE — 2500000002 HC RX 250 W HCPCS SELF ADMINISTERED DRUGS (ALT 637 FOR MEDICARE OP, ALT 636 FOR OP/ED): Performed by: NURSE PRACTITIONER

## 2024-11-19 PROCEDURE — 80048 BASIC METABOLIC PNL TOTAL CA: CPT | Performed by: STUDENT IN AN ORGANIZED HEALTH CARE EDUCATION/TRAINING PROGRAM

## 2024-11-19 PROCEDURE — B2111ZZ FLUOROSCOPY OF MULTIPLE CORONARY ARTERIES USING LOW OSMOLAR CONTRAST: ICD-10-PCS | Performed by: INTERNAL MEDICINE

## 2024-11-19 PROCEDURE — 99152 MOD SED SAME PHYS/QHP 5/>YRS: CPT | Performed by: INTERNAL MEDICINE

## 2024-11-19 PROCEDURE — G0378 HOSPITAL OBSERVATION PER HR: HCPCS

## 2024-11-19 PROCEDURE — 2500000002 HC RX 250 W HCPCS SELF ADMINISTERED DRUGS (ALT 637 FOR MEDICARE OP, ALT 636 FOR OP/ED): Performed by: INTERNAL MEDICINE

## 2024-11-19 RX ORDER — LIDOCAINE HYDROCHLORIDE 20 MG/ML
INJECTION, SOLUTION INFILTRATION; PERINEURAL AS NEEDED
Status: DISCONTINUED | OUTPATIENT
Start: 2024-11-19 | End: 2024-11-19 | Stop reason: HOSPADM

## 2024-11-19 RX ORDER — FENTANYL CITRATE 50 UG/ML
INJECTION, SOLUTION INTRAMUSCULAR; INTRAVENOUS AS NEEDED
Status: DISCONTINUED | OUTPATIENT
Start: 2024-11-19 | End: 2024-11-19 | Stop reason: HOSPADM

## 2024-11-19 RX ORDER — METFORMIN HYDROCHLORIDE 500 MG/1
1500 TABLET, EXTENDED RELEASE ORAL
Status: DISCONTINUED | OUTPATIENT
Start: 2024-11-22 | End: 2024-11-19 | Stop reason: HOSPADM

## 2024-11-19 RX ORDER — PREDNISONE 20 MG/1
TABLET ORAL
Qty: 20 TABLET | Refills: 0 | Status: SHIPPED | OUTPATIENT
Start: 2024-11-19 | End: 2024-11-30

## 2024-11-19 RX ORDER — IPRATROPIUM BROMIDE AND ALBUTEROL SULFATE 2.5; .5 MG/3ML; MG/3ML
3 SOLUTION RESPIRATORY (INHALATION)
Qty: 180 ML | Refills: 11 | Status: SHIPPED | OUTPATIENT
Start: 2024-11-19

## 2024-11-19 RX ORDER — HEPARIN SODIUM 1000 [USP'U]/ML
INJECTION, SOLUTION INTRAVENOUS; SUBCUTANEOUS AS NEEDED
Status: DISCONTINUED | OUTPATIENT
Start: 2024-11-19 | End: 2024-11-19 | Stop reason: HOSPADM

## 2024-11-19 RX ORDER — ENOXAPARIN SODIUM 100 MG/ML
40 INJECTION SUBCUTANEOUS EVERY 24 HOURS
Status: DISCONTINUED | OUTPATIENT
Start: 2024-11-20 | End: 2024-11-19 | Stop reason: HOSPADM

## 2024-11-19 RX ORDER — MIDAZOLAM HYDROCHLORIDE 1 MG/ML
INJECTION, SOLUTION INTRAMUSCULAR; INTRAVENOUS AS NEEDED
Status: DISCONTINUED | OUTPATIENT
Start: 2024-11-19 | End: 2024-11-19 | Stop reason: HOSPADM

## 2024-11-19 RX ADMIN — BENZOCAINE AND MENTHOL 1 LOZENGE: 15; 3.6 LOZENGE ORAL at 12:44

## 2024-11-19 RX ADMIN — NICOTINE 1 PATCH: 21 PATCH, EXTENDED RELEASE TRANSDERMAL at 11:02

## 2024-11-19 RX ADMIN — ESCITALOPRAM OXALATE 20 MG: 10 TABLET ORAL at 10:58

## 2024-11-19 RX ADMIN — ASPIRIN 81 MG: 81 TABLET, COATED ORAL at 08:46

## 2024-11-19 RX ADMIN — IPRATROPIUM BROMIDE AND ALBUTEROL SULFATE 3 ML: 2.5; .5 SOLUTION RESPIRATORY (INHALATION) at 07:23

## 2024-11-19 RX ADMIN — IPRATROPIUM BROMIDE AND ALBUTEROL SULFATE 3 ML: 2.5; .5 SOLUTION RESPIRATORY (INHALATION) at 02:00

## 2024-11-19 RX ADMIN — GABAPENTIN 300 MG: 300 CAPSULE ORAL at 14:59

## 2024-11-19 RX ADMIN — AMLODIPINE BESYLATE 10 MG: 10 TABLET ORAL at 11:00

## 2024-11-19 RX ADMIN — METHYLPREDNISOLONE SODIUM SUCCINATE 40 MG: 40 INJECTION, POWDER, FOR SOLUTION INTRAMUSCULAR; INTRAVENOUS at 03:07

## 2024-11-19 RX ADMIN — PANTOPRAZOLE SODIUM 40 MG: 40 TABLET, DELAYED RELEASE ORAL at 10:59

## 2024-11-19 RX ADMIN — GABAPENTIN 300 MG: 300 CAPSULE ORAL at 10:59

## 2024-11-19 RX ADMIN — ATORVASTATIN CALCIUM 10 MG: 10 TABLET, FILM COATED ORAL at 10:59

## 2024-11-19 RX ADMIN — IPRATROPIUM BROMIDE AND ALBUTEROL SULFATE 3 ML: 2.5; .5 SOLUTION RESPIRATORY (INHALATION) at 15:34

## 2024-11-19 RX ADMIN — FINASTERIDE 5 MG: 5 TABLET, FILM COATED ORAL at 10:59

## 2024-11-19 RX ADMIN — METOPROLOL TARTRATE 12.5 MG: 25 TABLET, FILM COATED ORAL at 10:59

## 2024-11-19 RX ADMIN — Medication 1 TABLET: at 10:59

## 2024-11-19 RX ADMIN — THIAMINE HYDROCHLORIDE 100 MG: 100 INJECTION, SOLUTION INTRAMUSCULAR; INTRAVENOUS at 11:02

## 2024-11-19 RX ADMIN — FOLIC ACID 1 MG: 1 TABLET ORAL at 11:00

## 2024-11-19 RX ADMIN — METHYLPREDNISOLONE SODIUM SUCCINATE 40 MG: 40 INJECTION, POWDER, FOR SOLUTION INTRAMUSCULAR; INTRAVENOUS at 11:00

## 2024-11-19 RX ADMIN — TAMSULOSIN HYDROCHLORIDE 0.8 MG: 0.4 CAPSULE ORAL at 10:59

## 2024-11-19 ASSESSMENT — LIFESTYLE VARIABLES
ORIENTATION AND CLOUDING OF SENSORIUM: ORIENTED AND CAN DO SERIAL ADDITIONS
AGITATION: NORMAL ACTIVITY
PAROXYSMAL SWEATS: NO SWEAT VISIBLE
VISUAL DISTURBANCES: NOT PRESENT
TOTAL SCORE: 0
ANXIETY: NO ANXIETY, AT EASE
HEADACHE, FULLNESS IN HEAD: NOT PRESENT
NAUSEA AND VOMITING: NO NAUSEA AND NO VOMITING
TREMOR: NO TREMOR
AUDITORY DISTURBANCES: NOT PRESENT

## 2024-11-19 ASSESSMENT — PAIN SCALES - GENERAL
PAINLEVEL_OUTOF10: 0 - NO PAIN

## 2024-11-19 ASSESSMENT — PAIN - FUNCTIONAL ASSESSMENT
PAIN_FUNCTIONAL_ASSESSMENT: 0-10

## 2024-11-19 NOTE — POST-PROCEDURE NOTE
Physician Transition of Care Summary  Invasive Cardiovascular Lab    Procedure Date: 11/19/2024  Attending:    Shane Walker - Primary  Resident/Fellow/Other Assistant: Surgeons and Role:  * No surgeons found with a matching role *    Indications:   Pre-op Diagnosis      * Chest pain [R07.9]    Post-procedure diagnosis:   Post-op Diagnosis     * Chest pain [R07.9]    Procedure(s):   Left Heart Cath  80989 - IA L HRT CATH W/NJX L VENTRICULOGRAPHY IMG S&I        Procedure Findings:   Coronaries are clean per angiography, LVEDP 15   See full cath report for details    Description of the Procedure:   LHC   RRA>TR band     Complications:   None     Stents/Implants:   None     Anticoagulation/Antiplatelet Plan:   Aspirin 81 mg daily, okay to resume lovenox on Wednesday 11/20/24     Estimated Blood Loss:   * No values recorded between 11/19/2024  9:40 AM and 11/19/2024 10:03 AM *    Anesthesia: Moderate Sedation Anesthesia Staff: No anesthesia staff entered.    Any Specimen(s) Removed:   None     Disposition:   SDU      Electronically signed by: REFUGIO Díaz, 11/19/2024 10:03 AM

## 2024-11-19 NOTE — CONSULTS
"Inpatient consult to Cardiology  Consult performed by: Jeffrey Walker MD  Consult ordered by: Pawan Byers MD        History Of Present Illness:    Daniel Hopkins is a 54 y.o. male  with PMHx s/f asthma presenting with chest pain. Pt reportedly told the ER physician he had been having chest tightness for about a week and shortness of breath since he was diagnosed with COVID about 3 months ago. He was reportedly admitted to a hospital somewhere with COVID pneumonia. His pulse ox was reportedly 60% at that time. He uses 3 L O2 intermittently when he performs light activity. EMS gave him a duoneb and aspirin. He denies nausea, vomiting, coughing and leg swelling. Pt for me on interview is extremely somnolent and does not awaken much to sternal rub or other physical or verbal stimuli. He simply mumbles that he is \"ok\" and that he has been \"short of breath for a few does.\" Immediately falls back asleep after giving single sentences. No other history available.     Stress test positive for ischemia     Last Recorded Vitals:  Vitals:    11/19/24 0301 11/19/24 0519 11/19/24 0723 11/19/24 0825   BP: 121/80 125/85  142/82   BP Location: Left arm Left arm  Left arm   Patient Position: Lying Lying  Lying   Pulse: 77 84     Resp: 18 18  16   Temp: 36.2 °C (97.1 °F) 36.1 °C (97 °F)  36.6 °C (97.9 °F)   TempSrc: Temporal Temporal  Temporal   SpO2: 94% 95% 95% 95%   Weight:       Height:           Last Labs:  CBC - 11/19/2024:  5:06 AM  10.9 13.1 260    42.8      CMP - 11/19/2024:  5:06 AM  9.3 6.2 29 --- 0.5   _ 3.9 43 59      PTT - No results in last year.  _   _ _     Troponin I, High Sensitivity   Date/Time Value Ref Range Status   11/18/2024 03:55 AM 6 0 - 20 ng/L Final   11/18/2024 02:46 AM 5 0 - 20 ng/L Final     BNP   Date/Time Value Ref Range Status   11/18/2024 02:46 AM 46 0 - 99 pg/mL Final      Last I/O:  I/O last 3 completed shifts:  In: 590 (5.4 mL/kg) [P.O.:540; I.V.:50 (0.5 mL/kg)]  Out: 0 (0 mL/kg)   Weight: 110 kg "     Past Cardiology Tests (Last 3 Years):  EKG:  ECG 12 lead 11/18/2024    Echo:  Transthoracic Echo (TTE) Complete 11/18/2024    Ejection Fractions:  EF   Date/Time Value Ref Range Status   11/18/2024 12:10 PM 60 %      Cath:  No results found for this or any previous visit from the past 1095 days.    Stress Test:  Nuclear Stress Test 11/18/2024    Cardiac Imaging:  No results found for this or any previous visit from the past 1095 days.      Past Medical History:  He has a past medical history of Asthma.    Past Surgical History:  He has no past surgical history on file.      Social History:  He reports that he has been smoking cigarettes. He started smoking about 25 years ago. He has a 6.5 pack-year smoking history. He has never used smokeless tobacco. He reports current alcohol use. No history on file for drug use.    Family History:  No family history on file.     Allergies:  Patient has no known allergies.    Inpatient Medications:  Scheduled medications   Medication Dose Route Frequency    amLODIPine  10 mg oral Daily    aspirin  81 mg oral Daily    atorvastatin  10 mg oral Daily    enoxaparin  40 mg subcutaneous q24h    escitalopram  20 mg oral Daily    finasteride  5 mg oral Daily    tiotropium  2 puff inhalation Daily    And    fluticasone furoate-vilanteroL  1 puff inhalation Daily    folic acid  1 mg oral Daily    gabapentin  300 mg oral TID    metFORMIN (MOD)  1,500 mg oral Daily with evening meal    methylPREDNISolone sodium succinate (PF)  40 mg intravenous q8h    metoprolol tartrate  12.5 mg oral Daily    multivitamin with minerals  1 tablet oral Daily    nicotine  1 patch transdermal Daily    Followed by    [START ON 12/30/2024] nicotine  1 patch transdermal Daily    Followed by    [START ON 1/13/2025] nicotine  1 patch transdermal Daily    pantoprazole  40 mg oral Daily    Or    pantoprazole  40 mg intravenous Daily    QUEtiapine  50 mg oral Nightly    tamsulosin  0.8 mg oral Daily    [START ON  11/21/2024] thiamine  100 mg oral Daily    thiamine  100 mg intravenous Daily     PRN medications   Medication    bisacodyl    bisacodyl    guaiFENesin    hydrOXYzine HCL    ipratropium-albuteroL    LORazepam    Or    LORazepam    Or    LORazepam    melatonin    ondansetron    Or    ondansetron    oxygen     Continuous Medications   Medication Dose Last Rate     Outpatient Medications:  Current Outpatient Medications   Medication Instructions    amLODIPine (NORVASC) 10 mg, oral, Daily    aspirin 81 mg, oral, Daily    atorvastatin (LIPITOR) 10 mg, oral, Daily    budesonide-glycopyr-formoterol (Breztri Aerosphere) 160-9-4.8 mcg/actuation HFA aerosol inhaler 2 puffs, inhalation, 2 times daily    escitalopram (LEXAPRO) 20 mg, oral, Daily    finasteride (PROSCAR) 5 mg, oral, Daily, Do not crush, chew, or split.    gabapentin (NEURONTIN) 300 mg, oral, 3 times daily    hydrOXYzine HCL (ATARAX) 25 mg, oral, Every 6 hours PRN    ibuprofen 800 mg, oral, 3 times daily PRN    metFORMIN (MOD) (GLUMETZA) 1,500 mg, oral, Daily with evening meal, Do not crush, chew, or split.    metoprolol tartrate (LOPRESSOR) 12.5 mg, oral, Daily    pantoprazole (PROTONIX) 40 mg, oral, Daily before breakfast, Do not crush, chew, or split.    QUEtiapine (SEROQUEL) 50 mg, oral, Nightly    tamsulosin (FLOMAX) 0.8 mg, oral, Daily       Physical Exam:  Patient is awake and orient, not in apparent distress  Eyes: PERRLA, no conjunctival congestion  Chest: Bilateral decreased air entry with expiratory wheezing.  Heart: s1S2 regular, no murmur  Abdomen: Soft, non tender, BS present  Ext:  No edema     Assessment/Plan   1) Abnormal stress test /CP  Plan for Left heartcath  Risks,benefits and alternatives of Cardiac Cath possible PTCA including risk of bleeding,stroke,renal failure were explained to patient and he/she agrees to proceed   Peripheral IV 11/18/24 20 G Right Forearm (Active)   Site Assessment Clean;Dry;Intact 11/18/24 2002   Dressing Type  Transparent 11/18/24 2002   Line Status Flushed;Saline locked 11/18/24 2002   Dressing Status Clean;Dry;Occlusive 11/18/24 2002   Number of days: 1       Code Status:  Full Code    I spent 30 minutes in the professional and overall care of this patient.        Jeffrey Walker MD

## 2024-11-19 NOTE — PROGRESS NOTES
"Daniel Hopkins is a 54 y.o. male on day 1 of admission presenting with Chest pain.      Subjective   Daniel Hopkins is a 54 y.o. male with PMHx s/f asthma presenting with chest pain. Pt reportedly told the ER physician he had been having chest tightness for about a week and shortness of breath since he was diagnosed with COVID about 3 months ago. He was reportedly admitted to a hospital somewhere with COVID pneumonia. His pulse ox was reportedly 60% at that time. He uses 3 L O2 intermittently when he performs light activity. EMS gave him a duoneb and aspirin. He denies nausea, vomiting, coughing and leg swelling. Pt for me on interview is extremely somnolent and does not awaken much to sternal rub or other physical or verbal stimuli. He simply mumbles that he is \"ok\" and that he has been \"short of breath for a few does.\" Immediately falls back asleep after giving single sentences. No other history available.     ED Course (Summary - please note all labs, imaging studies, and interventions noted below have been personally reviewed and/or interpreted on day of admission):   Vitals on presentation: 98.6 F, 94 bpm, 18 rr, 134/92, 94% on Ra  Labs: CMP glu 154, Mag 1.5  BNP 46  Lipase 25  Trop 5 -> 6  CBC Hg 12.2  Flu negative  VBG pH 7.43, pCO2 51, pO2 60, lactate 1.9  EKG: sinus rhythm at 94 bpm, RBBB  Imaging: CTA PE - No evidence of pulmonary emboli or acute chest pathology.   Moderate coronary artery atherosclerotic calcifications.   Interventions: Solumedrol 125 mg, Mag sulfate 2 g, Pt admitted for further care      11/18: Patient was evaluated this morning, feeling better, still have some shortness of breath with ambulation.  Denies any chest pain at rest.  11/19: Stress test was positive for possible MS or ischemia-cards consulted and underwent cardiac after normal coronaries.  Patient is feeling better, shortness of breath is improving.       Objective     Last Recorded Vitals  /83 (BP Location: Left arm, " Patient Position: Lying)   Pulse 77   Temp 37 °C (98.6 °F) (Temporal)   Resp 18   Wt 110 kg (242 lb 8 oz)   SpO2 94%   Intake/Output last 3 Shifts:    Intake/Output Summary (Last 24 hours) at 11/19/2024 1227  Last data filed at 11/19/2024 1006  Gross per 24 hour   Intake 540 ml   Output 10 ml   Net 530 ml       Admission Weight  Weight: 111 kg (245 lb) (11/18/24 0235)    Daily Weight  11/18/24 : 110 kg (242 lb 8 oz)    Image Results  Cardiac Catheterization Procedure         [facility], Cath Lab  [address] [place] [address]     Phone [phone] Fax [phone]    Cardiovascular Catheterization Report    Patient Name:      PERLA HARDY       Performing Physician:  Emily Walker MD  Study Date:        11/19/2024         Verifying Physician:   Emily Walker MD  MRN/PID:           [id number]           Cardiologist/Co-Scrub:  Accession#:        [id number]       Ordering Provider:     Emily WALKER  Date of Birth/Age: 1970 / 54      Cardiologist:                     years  Gender:            M                  Fellow:  Encounter#:        [id number]         Surgeon:       Study: Left Heart Cath       Indications:  PERLA HARDY is a 55 year old male who presents with dyslipidemia, hypertension and obesity. PERLA HARDY is a 55 year old male who presents with dyslipidemia, hypertension and a chest pain assessment of typical angina. Worsening angina. Objective evidence of ischemia.     Procedure Description:  After infiltration with 2% Lidocaine, the right radial artery was cannulated with a modified Seldinger technique. Subsequently a 6 Armenian sheath was placed in the right radial artery. Selective coronary catheterization was performed using a 6 Fr catheter(s) exchanged over a guide wire to cannulate the coronary arteries. A 6 Fr Cristi catheter was used  for left and right coronary artery injections.  After completion of the procedure, the arterial sheath was pulled and a TR Band Radial Compression Device was utilized to obtain patent hemostasis.     Coronary Angiography:  The coronary circulation is right dominant.     Left Main Coronary Artery:  The left main coronary artery is a normal caliber vessel. The left main arises normally from the left coronary sinus of Valsalva and bifurcates into the LAD and circumflex coronary arteries. The left main coronary artery showed no significant disease or stenosis greater than 30%.     Left Anterior Descending Coronary Artery Distribution:  The left anterior descending coronary artery is a normal caliber vessel. The LAD arises normally from the left main coronary artery. The LAD demonstrated no significant disease or stenosis greater than 30%.     Circumflex Coronary Artery Distribution:  The circumflex coronary artery is a normal caliber vessel. The circumflex arises normally from the left main coronary artery and terminates in the AV groove. The circumflex revealed no significant disease or stenosis greater than 30%.     Right Coronary Artery Distribution:    The right coronary artery is a normal caliber vessel. The RCA arises normally from the right sinus of Valsalva. The RCA showed mild atherosclerotic disease.       Valve Findings:  No aortic valve stenosis is visualized.     Hemo Personnel:  +---------------+---------+  Name           Duty       +---------------+---------+  Jeffrey Walker MD 1  +---------------+---------+       Hemodynamic Pressures:     +----+---------------+----------+-------------+--------------+-------+---------+  Site   Date Time   Phase NameSystolic mmHgDiastolic mmHgED mmHgMean mmHg  +----+---------------+----------+-------------+--------------+-------+---------+    AO     11/19/2024  AIR REST          127            68              87           9:57:50 AM                                                        +----+---------------+----------+-------------+--------------+-------+---------+    LV     11/19/2024  AIR REST          156            10     22                   10:00:17 AM                                                       +----+---------------+----------+-------------+--------------+-------+---------+   LVp     11/19/2024  AIR REST          143            11     20                   10:00:26 AM                                                       +----+---------------+----------+-------------+--------------+-------+---------+   AOp     11/19/2024  AIR REST          126            78              99          10:00:33 AM                                                       +----+---------------+----------+-------------+--------------+-------+---------+         Oxygen Saturation %:  +-----------+------------+  Sample SiteHB (g/100ml)  +-----------+------------+      SYS ART        13.1  +-----------+------------+      SYS SATYA        13.1  +-----------+------------+      PUL ART        13.1  +-----------+------------+      PUL SATYA        13.1  +-----------+------------+       Complications:  No in-lab complications observed.     Cardiac Cath Post Procedure Notes:  Post Procedure           Mild CAD.  Diagnosis:  Blood Loss:              Estimated blood loss during the procedure was <10 ml                           mls.  Specimens Removed:       Number of specimen(s) removed: none.       Recommendations:  Maximize medical therapy.    ____________________________________________________________________________________  CONCLUSIONS:   1. Mild non-obstructive coronary artery disease.   2. Elevated LV filling pressures.   3. Left Ventricular end-diastolic pressure = 22.    ICD 10 Codes:  Abnormal result of cardiovascular function study, unspecified-R94.30; Angina pectoris, unspecified-I20.9     CPT  Codes:  Left Heart Cath (visualization of coronaries) and LV-26550; Moderate Sedation Services initial 15 minutes patient >5 years-[id number]     [id number] Jeffrey Walker MD  Performing Physician  Electronically signed by [id number] Jeffrey Walker MD on 11/19/2024 at 10:47:22 AM         ** Final **  Nuclear Stress Test  Narrative: Interpreted By:  Yassine Fonseca,   STUDY:  NUCLEAR STRESS TEST;  11/18/2024 2:14 pm      INDICATION:  Signs/Symptoms:Chest pain.      COMPARISON:  None.      ACCESSION NUMBER(S):  [id number]      ORDERING CLINICIAN:  AMRIK PACE      TECHNIQUE:  DIVISION OF NUCLEAR MEDICINE  PHARMACOLOGIC STRESS MYOCARDIAL PERFUSION SCAN, ONE DAY PROTOCOL      The patient received an intravenous dose of  11.7 mCi of Tc-99m  tetrofosmin and resting emission tomographic (SPECT) images of the  myocardium were acquired. The patient then received an intravenous  infusion of 0.4mg regadenoson (Lexiscan) followed by an additional  dose of  35.5 mCi of Tc-99m tetrofosmin. Stress phase SPECT images of  the myocardium were then acquired. These included ECG-gated images to  assess and quantify ventricular function. In addition, low-dose CT  attenuation correction was applied.      FINDINGS:  Stress and rest images both demonstrate a small area of partially  reversible perfusion defect of the apical, apical anterior and apical  septal segments, moderate in intensity.      ECG-gated images demonstrate normal LV size and myocardial  contractility with an LV ejection fraction of   64 % (normal above 50  percent). Rest gated ejection fraction is 61%. Summed stress score 6  summed rest score 1 summed difference score 5.      Impression: 1. Small area of partially reversible perfusion defect of the apical,  apical anterior and apical septal segments, consistent with ischemia  in LAD territory.  2. Well-maintained left ventricular function.  3. Low-dose non gated CT images with mild coronary artery  calcification.              Signed  by: Martelloj Raymundo 11/18/2024 4:50 PM  Dictation workstation:   [id number]      Physical Exam  Patient is awake and orient, not in apparent distress  Eyes: PERRLA, no conjunctival congestion  Chest: Bilateral decreased air entry with expiratory wheezing.  Heart: s1S2 regular, no murmur  Abdomen: Soft, non tender, BS present  Ext:    Relevant Results               Assessment/Plan   This patient currently has cardiac telemetry ordered; if you would like to modify or discontinue the telemetry order, click here to go to the orders activity to modify/discontinue the order.  Acute hypoxic respiratory failure secondary to bronchial asthma exacerbation  Continue oxygen supplementation and wean as tolerated  Incentive spirometry and pulm hygiene  Monitor  Discharge planning once patient able to maintain saturation on room air    Bronchial asthma exacerbation  Continue on IV steroids, DuoNeb treatment  Monitor    Chest pain  Negative EKG and troponin  Echocardiogram shows normal LV systolic function with no regional wall motion abnormalities.  Abnormal stress test-normal coronaries on angiogram      Hypomagnesemia  Replace and monitor            Pawan Byers MD

## 2024-11-19 NOTE — NURSING NOTE
Pt taken for heart cath, tele removed, report called, all belongings sent with pt per cath lab, states pt will likely not be returning to this unit. No further questions at this time.

## 2024-11-19 NOTE — DISCHARGE SUMMARY
"Discharge Diagnosis  Acute hypoxic respiratory failure secondary to COPD/asthma exacerbation-treated with oxygen supplementation, IV steroid and DuoNeb treatment.  Nonspecific chest pain-stress test was abnormal, echo normal, underwent cardiac cath which shows normal coronaries  Active smoker-nicotine patch, counseled against smoking      This discharge took greater than 35 minutes.    Test Results Pending At Discharge  Pending Labs       No current pending labs.            Hospital Course     Daniel Hopkins is a 54 y.o. male with PMHx s/f asthma presenting with chest pain. Pt reportedly told the ER physician he had been having chest tightness for about a week and shortness of breath since he was diagnosed with COVID about 3 months ago. He was reportedly admitted to a hospital somewhere with COVID pneumonia. His pulse ox was reportedly 60% at that time. He uses 3 L O2 intermittently when he performs light activity. EMS gave him a duoneb and aspirin. He denies nausea, vomiting, coughing and leg swelling. Pt for me on interview is extremely somnolent and does not awaken much to sternal rub or other physical or verbal stimuli. He simply mumbles that he is \"ok\" and that he has been \"short of breath for a few does.\" Immediately falls back asleep after giving single sentences. No other history available.     ED Course (Summary - please note all labs, imaging studies, and interventions noted below have been personally reviewed and/or interpreted on day of admission):   Vitals on presentation: 98.6 F, 94 bpm, 18 rr, 134/92, 94% on Ra  Labs: CMP glu 154, Mag 1.5  BNP 46  Lipase 25  Trop 5 -> 6  CBC Hg 12.2  Flu negative  VBG pH 7.43, pCO2 51, pO2 60, lactate 1.9  EKG: sinus rhythm at 94 bpm, RBBB  Imaging: CTA PE - No evidence of pulmonary emboli or acute chest pathology.   Moderate coronary artery atherosclerotic calcifications.   Interventions: Solumedrol 125 mg, Mag sulfate 2 g, Pt admitted for further care        11/18: " Patient was evaluated this morning, feeling better, still have some shortness of breath with ambulation.  Denies any chest pain at rest.  11/19: Stress test was positive for possible  ischemia-cards consulted and underwent cardiac after normal coronaries.  With treatment for asthma/COPD exacerbation patient started feeling better , shortness of breath is improving.  He was able to maintain saturation on room air.  He desired going home-patient will be discharged home with advice of following up with pulmonology and primary care as an outpatient.     Pertinent Physical Exam At Time of Discharge  Physical Exam  Patient is awake and orient, not in apparent distress  Eyes: PERRLA, no conjunctival congestion  Chest: Bilateral Air entry, no crackles or wheezing  Heart: s1S2 regular, no murmur  Abdomen: Soft, non tender, BS present  Ext:    Home Medications     Medication List      START taking these medications     ipratropium-albuteroL 0.5-2.5 mg/3 mL nebulizer solution; Commonly known   as: Duo-Neb; Take 3 mL by nebulization every 3 hours if needed for   wheezing or shortness of breath.   predniSONE 20 mg tablet; Commonly known as: Deltasone; Take 3 tablets   (60 mg) by mouth once daily for 3 days, THEN 2 tablets (40 mg) once daily   for 3 days, THEN 1 tablet (20 mg) once daily for 3 days, THEN 0.5 tablets   (10 mg) once daily for 3 days.; Start taking on: November 19, 2024     CONTINUE taking these medications     amLODIPine 10 mg tablet; Commonly known as: Norvasc   aspirin 81 mg EC tablet   atorvastatin 10 mg tablet; Commonly known as: Lipitor   Breztri Aerosphere 160-9-4.8 mcg/actuation HFA aerosol inhaler; Generic   drug: budesonide-glycopyr-formoterol   escitalopram 20 mg tablet; Commonly known as: Lexapro   finasteride 5 mg tablet; Commonly known as: Proscar   gabapentin 300 mg capsule; Commonly known as: Neurontin   hydrOXYzine HCL 25 mg tablet; Commonly known as: Atarax   metFORMIN (MOD) 1,000 mg 24 hr tablet;  Commonly known as: Glumetza   metoprolol tartrate 25 mg tablet; Commonly known as: Lopressor   pantoprazole 40 mg EC tablet; Commonly known as: ProtoNix   QUEtiapine 50 mg tablet; Commonly known as: SEROquel   tamsulosin 0.4 mg 24 hr capsule; Commonly known as: Flomax     STOP taking these medications     ibuprofen 800 mg tablet       Outpatient Follow-Up  No follow-ups on file.     Pawan Byers MD  11/19/2024  3:33 PM

## 2024-11-19 NOTE — PROGRESS NOTES
Social work consult placed for positive medical risk screen for etoh and housing instability. SW reviewed pt's chart and communicated with TCC. SW met with pt to assess needs and provide support, introduced self and my role as  with care transition team. Pt stated he recently moved to area about a month ago and and reported no financial concerns. Pt stated he has struggled with drinking for some time and reported drinking 1/2 gallon of vodka every 3 days until a few weeks ago. Pt stated he has an appointment with [facility] tomorrow for counseling assessment. SW inquired about any additional resources pt may be looking for. Pt stated he is a  and [facility] and is who connected him to [facility]. Pt agreeable to [facility] Resource Guide and plans to discharge home today. Pt did not identify any further SW needs/concerns. SW signing off,  pt and care team aware of SW availability while inpt.      Rosy Tompkins, MSW, LSW ([id number])   Care Transitions

## 2024-11-19 NOTE — PROGRESS NOTES
"Cardiology Preprocedure Note    Daniel Saleeme   Indication for procedure: The primary encounter diagnosis was Chest pain. A diagnosis of MANN (dyspnea on exertion) was also pertinent to this visit. Here for [facility] r/o obstructive CAD.         /82 (BP Location: Left arm, Patient Position: Lying)   Pulse 91   Temp 37.2 °C (98.9 °F)   Resp 18   Ht 1.753 m (5' 9\")   Wt 110 kg (242 lb 8 oz)   SpO2 93%   BMI 35.81 kg/m²    Relevant Labs:   Lab Results   Component Value Date    CREATININE 0.73 11/19/2024    EGFR >90 11/19/2024       Planned Sedation/Anesthesia: Moderate    Airway assessment: normal    Directed physical examination: General: Alert and Oriented, No distress, cooperative. Lungs: Clear to auscultation bilaterally, no wheezes, rhonci, or rales. respirations unlabored Heart: regular rate and rhythm, S1 and S2, no murmur   Mallampati: III (soft and hard palate and base of uvula visible)    ASA Score: ASA 1 - Normal health patient    Benefits, risks and alternatives of procedure and planned sedation have been discussed with the patient and/or their representative. All questions answered and they agree to proceed.     Maddie Salazar, APRN-CNP   "

## 2024-12-19 ENCOUNTER — APPOINTMENT (OUTPATIENT)
Dept: RADIOLOGY | Facility: HOSPITAL | Age: 54
DRG: 190 | End: 2024-12-19
Payer: MEDICARE

## 2024-12-19 ENCOUNTER — HOSPITAL ENCOUNTER (INPATIENT)
Facility: HOSPITAL | Age: 54
LOS: 1 days | Discharge: HOME | DRG: 190 | End: 2024-12-21
Attending: STUDENT IN AN ORGANIZED HEALTH CARE EDUCATION/TRAINING PROGRAM | Admitting: STUDENT IN AN ORGANIZED HEALTH CARE EDUCATION/TRAINING PROGRAM
Payer: MEDICARE

## 2024-12-19 ENCOUNTER — APPOINTMENT (OUTPATIENT)
Dept: CARDIOLOGY | Facility: HOSPITAL | Age: 54
DRG: 190 | End: 2024-12-19
Payer: MEDICARE

## 2024-12-19 DIAGNOSIS — Z72.0 TOBACCO ABUSE: ICD-10-CM

## 2024-12-19 DIAGNOSIS — J44.9 COPD (CHRONIC OBSTRUCTIVE PULMONARY DISEASE) (MULTI): Primary | ICD-10-CM

## 2024-12-19 DIAGNOSIS — J96.01 ACUTE RESPIRATORY FAILURE WITH HYPOXIA (MULTI): ICD-10-CM

## 2024-12-19 DIAGNOSIS — K22.9 ESOPHAGEAL ABNORMALITY: ICD-10-CM

## 2024-12-19 LAB
ALBUMIN SERPL BCP-MCNC: 4.1 G/DL (ref 3.4–5)
ALP SERPL-CCNC: 53 U/L (ref 33–120)
ALT SERPL W P-5'-P-CCNC: 27 U/L (ref 10–52)
ANION GAP BLDV CALCULATED.4IONS-SCNC: 9 MMOL/L (ref 10–25)
ANION GAP SERPL CALC-SCNC: 14 MMOL/L (ref 10–20)
AST SERPL W P-5'-P-CCNC: 27 U/L (ref 9–39)
BASE EXCESS BLDV CALC-SCNC: 4.9 MMOL/L (ref -2–3)
BASOPHILS # BLD AUTO: 0.02 X10*3/UL (ref 0–0.1)
BASOPHILS NFR BLD AUTO: 0.2 %
BILIRUB SERPL-MCNC: 0.5 MG/DL (ref 0–1.2)
BNP SERPL-MCNC: 34 PG/ML (ref 0–99)
BODY TEMPERATURE: 37 DEGREES CELSIUS
BUN SERPL-MCNC: 7 MG/DL (ref 6–23)
CA-I BLDV-SCNC: 1.13 MMOL/L (ref 1.1–1.33)
CALCIUM SERPL-MCNC: 8.5 MG/DL (ref 8.6–10.3)
CARDIAC TROPONIN I PNL SERPL HS: 7 NG/L (ref 0–20)
CARDIAC TROPONIN I PNL SERPL HS: 7 NG/L (ref 0–20)
CHLORIDE BLDV-SCNC: 96 MMOL/L (ref 98–107)
CHLORIDE SERPL-SCNC: 97 MMOL/L (ref 98–107)
CO2 SERPL-SCNC: 28 MMOL/L (ref 21–32)
CREAT SERPL-MCNC: 0.83 MG/DL (ref 0.5–1.3)
D DIMER PPP FEU-MCNC: 307 NG/ML FEU
EGFRCR SERPLBLD CKD-EPI 2021: >90 ML/MIN/1.73M*2
EOSINOPHIL # BLD AUTO: 0.02 X10*3/UL (ref 0–0.7)
EOSINOPHIL NFR BLD AUTO: 0.2 %
ERYTHROCYTE [DISTWIDTH] IN BLOOD BY AUTOMATED COUNT: 15.8 % (ref 11.5–14.5)
FLUAV RNA RESP QL NAA+PROBE: NOT DETECTED
FLUBV RNA RESP QL NAA+PROBE: NOT DETECTED
GLUCOSE BLDV-MCNC: 120 MG/DL (ref 74–99)
GLUCOSE SERPL-MCNC: 115 MG/DL (ref 74–99)
HCO3 BLDV-SCNC: 31.6 MMOL/L (ref 22–26)
HCT VFR BLD AUTO: 39.5 % (ref 41–52)
HCT VFR BLD EST: 35 % (ref 41–52)
HGB BLD-MCNC: 12.1 G/DL (ref 13.5–17.5)
HGB BLDV-MCNC: 11.8 G/DL (ref 13.5–17.5)
IMM GRANULOCYTES # BLD AUTO: 0.07 X10*3/UL (ref 0–0.7)
IMM GRANULOCYTES NFR BLD AUTO: 0.9 % (ref 0–0.9)
INHALED O2 CONCENTRATION: 32 %
LACTATE BLDV-SCNC: 1.7 MMOL/L (ref 0.4–2)
LACTATE SERPL-SCNC: 1.3 MMOL/L (ref 0.4–2)
LYMPHOCYTES # BLD AUTO: 1.03 X10*3/UL (ref 1.2–4.8)
LYMPHOCYTES NFR BLD AUTO: 12.7 %
MAGNESIUM SERPL-MCNC: 1.86 MG/DL (ref 1.6–2.4)
MCH RBC QN AUTO: 26.8 PG (ref 26–34)
MCHC RBC AUTO-ENTMCNC: 30.6 G/DL (ref 32–36)
MCV RBC AUTO: 88 FL (ref 80–100)
MONOCYTES # BLD AUTO: 0.52 X10*3/UL (ref 0.1–1)
MONOCYTES NFR BLD AUTO: 6.4 %
NEUTROPHILS # BLD AUTO: 6.43 X10*3/UL (ref 1.2–7.7)
NEUTROPHILS NFR BLD AUTO: 79.6 %
NRBC BLD-RTO: 0 /100 WBCS (ref 0–0)
OXYHGB MFR BLDV: 90.5 % (ref 45–75)
PCO2 BLDV: 56 MM HG (ref 41–51)
PH BLDV: 7.36 PH (ref 7.33–7.43)
PLATELET # BLD AUTO: 313 X10*3/UL (ref 150–450)
PO2 BLDV: 68 MM HG (ref 35–45)
POTASSIUM BLDV-SCNC: 3.7 MMOL/L (ref 3.5–5.3)
POTASSIUM SERPL-SCNC: 4 MMOL/L (ref 3.5–5.3)
PROT SERPL-MCNC: 6.8 G/DL (ref 6.4–8.2)
RBC # BLD AUTO: 4.51 X10*6/UL (ref 4.5–5.9)
SAO2 % BLDV: 95 % (ref 45–75)
SARS-COV-2 RNA RESP QL NAA+PROBE: NOT DETECTED
SODIUM BLDV-SCNC: 133 MMOL/L (ref 136–145)
SODIUM SERPL-SCNC: 135 MMOL/L (ref 136–145)
WBC # BLD AUTO: 8.1 X10*3/UL (ref 4.4–11.3)

## 2024-12-19 PROCEDURE — 83880 ASSAY OF NATRIURETIC PEPTIDE: CPT | Performed by: STUDENT IN AN ORGANIZED HEALTH CARE EDUCATION/TRAINING PROGRAM

## 2024-12-19 PROCEDURE — 82435 ASSAY OF BLOOD CHLORIDE: CPT | Performed by: STUDENT IN AN ORGANIZED HEALTH CARE EDUCATION/TRAINING PROGRAM

## 2024-12-19 PROCEDURE — 71046 X-RAY EXAM CHEST 2 VIEWS: CPT

## 2024-12-19 PROCEDURE — 36415 COLL VENOUS BLD VENIPUNCTURE: CPT | Performed by: STUDENT IN AN ORGANIZED HEALTH CARE EDUCATION/TRAINING PROGRAM

## 2024-12-19 PROCEDURE — 85025 COMPLETE CBC W/AUTO DIFF WBC: CPT | Performed by: STUDENT IN AN ORGANIZED HEALTH CARE EDUCATION/TRAINING PROGRAM

## 2024-12-19 PROCEDURE — 84132 ASSAY OF SERUM POTASSIUM: CPT | Performed by: STUDENT IN AN ORGANIZED HEALTH CARE EDUCATION/TRAINING PROGRAM

## 2024-12-19 PROCEDURE — 84484 ASSAY OF TROPONIN QUANT: CPT | Performed by: STUDENT IN AN ORGANIZED HEALTH CARE EDUCATION/TRAINING PROGRAM

## 2024-12-19 PROCEDURE — 71046 X-RAY EXAM CHEST 2 VIEWS: CPT | Performed by: RADIOLOGY

## 2024-12-19 PROCEDURE — 85379 FIBRIN DEGRADATION QUANT: CPT | Performed by: STUDENT IN AN ORGANIZED HEALTH CARE EDUCATION/TRAINING PROGRAM

## 2024-12-19 PROCEDURE — 83605 ASSAY OF LACTIC ACID: CPT | Performed by: STUDENT IN AN ORGANIZED HEALTH CARE EDUCATION/TRAINING PROGRAM

## 2024-12-19 PROCEDURE — 87636 SARSCOV2 & INF A&B AMP PRB: CPT | Performed by: STUDENT IN AN ORGANIZED HEALTH CARE EDUCATION/TRAINING PROGRAM

## 2024-12-19 PROCEDURE — 2500000002 HC RX 250 W HCPCS SELF ADMINISTERED DRUGS (ALT 637 FOR MEDICARE OP, ALT 636 FOR OP/ED): Performed by: STUDENT IN AN ORGANIZED HEALTH CARE EDUCATION/TRAINING PROGRAM

## 2024-12-19 PROCEDURE — 71260 CT THORAX DX C+: CPT

## 2024-12-19 PROCEDURE — 99285 EMERGENCY DEPT VISIT HI MDM: CPT | Mod: 25 | Performed by: STUDENT IN AN ORGANIZED HEALTH CARE EDUCATION/TRAINING PROGRAM

## 2024-12-19 PROCEDURE — 94640 AIRWAY INHALATION TREATMENT: CPT

## 2024-12-19 PROCEDURE — 83735 ASSAY OF MAGNESIUM: CPT | Performed by: STUDENT IN AN ORGANIZED HEALTH CARE EDUCATION/TRAINING PROGRAM

## 2024-12-19 PROCEDURE — 93005 ELECTROCARDIOGRAM TRACING: CPT

## 2024-12-19 RX ORDER — IPRATROPIUM BROMIDE AND ALBUTEROL SULFATE 2.5; .5 MG/3ML; MG/3ML
3 SOLUTION RESPIRATORY (INHALATION) ONCE
Status: COMPLETED | OUTPATIENT
Start: 2024-12-19 | End: 2024-12-19

## 2024-12-19 ASSESSMENT — PAIN SCALES - GENERAL
PAINLEVEL_OUTOF10: 5 - MODERATE PAIN
PAINLEVEL_OUTOF10: 0 - NO PAIN
PAINLEVEL_OUTOF10: 1

## 2024-12-19 ASSESSMENT — COLUMBIA-SUICIDE SEVERITY RATING SCALE - C-SSRS
6. HAVE YOU EVER DONE ANYTHING, STARTED TO DO ANYTHING, OR PREPARED TO DO ANYTHING TO END YOUR LIFE?: NO
2. HAVE YOU ACTUALLY HAD ANY THOUGHTS OF KILLING YOURSELF?: NO
1. IN THE PAST MONTH, HAVE YOU WISHED YOU WERE DEAD OR WISHED YOU COULD GO TO SLEEP AND NOT WAKE UP?: NO

## 2024-12-19 ASSESSMENT — PAIN DESCRIPTION - DESCRIPTORS
DESCRIPTORS: PRESSURE

## 2024-12-19 ASSESSMENT — PAIN DESCRIPTION - PROGRESSION: CLINICAL_PROGRESSION: GRADUALLY IMPROVING

## 2024-12-19 ASSESSMENT — PAIN DESCRIPTION - PAIN TYPE: TYPE: ACUTE PAIN

## 2024-12-19 ASSESSMENT — PAIN - FUNCTIONAL ASSESSMENT: PAIN_FUNCTIONAL_ASSESSMENT: 0-10

## 2024-12-19 ASSESSMENT — PAIN DESCRIPTION - LOCATION: LOCATION: CHEST

## 2024-12-19 NOTE — ED PROVIDER NOTES
"HPI   Chief Complaint   Patient presents with    Chest Pain     With hypoxia, dizziness, and shortness of breath. X1 nitroglycerin 324 ASA given by EMS PTA. Pt reports not feeling well for \"a while\" but worsening today.        This is a 54-year-old male past medical history of hypertension, diabetes, hyperlipidemia who presents to the emergency department for chest pain, shortness of breath.  Patient also has a history of asthma.  He states that for the past 4 to 5 days he has been coughing and his primary care provider gave him Z-Lalo and prednisone he noticed 3 days today other.  Reports that he feels that his heart rate shooting up he feels short of breath whenever he exerts himself.  EMS gave patient nitroglycerin which he believes may be helped him but he is unsure.  Currently not having any chest pain.  No swelling.  No travel recently.  Of note patient had a left heart catheterization on November 19, 2024 which showed no stenosis in his coronaries.                           No data recorded                Patient History   Past Medical History:   Diagnosis Date    Asthma      Past Surgical History:   Procedure Laterality Date    CARDIAC CATHETERIZATION N/A 11/19/2024    Procedure: Left Heart Cath;  Surgeon: Jeffrey Walker MD;  Location: [facility] Cardiac Cath Lab;  Service: Cardiovascular;  Laterality: N/A;     Family History   Problem Relation Name Age of Onset    Heart disease Mother's Brother       Social History     Tobacco Use    Smoking status: Every Day     Current packs/day: 0.25     Average packs/day: 0.3 packs/day for 26.0 years (6.5 ttl pk-yrs)     Types: Cigarettes     Start date: 1999    Smokeless tobacco: Never   Substance Use Topics    Alcohol use: Yes    Drug use: Not Currently     Types: Cocaine, Marijuana     Comment: history       Physical Exam   ED Triage Vitals [12/19/24 1827]   Temperature Heart Rate Respirations BP   36.7 °C (98.1 °F) 98 16 118/77      Pulse Ox Temp Source Heart Rate Source " Patient Position   96 % Temporal Monitor --      BP Location FiO2 (%)     -- --       Physical Exam  Constitutional:       General: He is not in acute distress.  HENT:      Head: Normocephalic and atraumatic.      Right Ear: Tympanic membrane normal.      Left Ear: Tympanic membrane normal.      Mouth/Throat:      Mouth: Mucous membranes are moist.   Eyes:      Extraocular Movements: Extraocular movements intact.      Conjunctiva/sclera: Conjunctivae normal.      Pupils: Pupils are equal, round, and reactive to light.   Cardiovascular:      Rate and Rhythm: Normal rate and regular rhythm.      Heart sounds: No murmur heard.  Pulmonary:      Effort: Pulmonary effort is normal. No respiratory distress.      Breath sounds: Normal breath sounds. No stridor. No wheezing or rales.   Abdominal:      General: Bowel sounds are normal. There is no distension.      Tenderness: There is no abdominal tenderness. There is no guarding or rebound.   Musculoskeletal:         General: No swelling, tenderness or deformity. Normal range of motion.   Skin:     General: Skin is warm and dry.      Coloration: Skin is not jaundiced.      Findings: No bruising or lesion.   Neurological:      General: No focal deficit present.      Mental Status: He is alert and oriented to person, place, and time. Mental status is at baseline.      Cranial Nerves: No cranial nerve deficit.      Motor: No weakness.   Psychiatric:         Mood and Affect: Mood normal.       Labs Reviewed - No data to display  No orders to display       ED Course & [facility]   ED Course as of 12/20/24 0130   Thu Dec 19, 2024   2523 EKG shows a normal rate and rhythm, normal axis, normal intervals. And normal ST and T wave pattern with no evidence of acute ischemia or other acute findings rhythm at a rate of 106 bpm, right bundle branch block pattern and left a of PE, no ST elevation seen and no concurrent depressions.  Otherwise normal intervals. Similar to prior EKG [CF]   2006  IMPRESSION:  Subsegmental atelectasis versus scarring in the left lower lobe.   [CF]   Fri Dec 20, 2024   0110 Patient continues to require 3 L nasal cannula.  At this time her mentation for new oxygen requirement [CF]      ED Course User Index  [CF] Bonnie Thomson MD         Diagnoses as of 12/20/24 0130   COPD (chronic obstructive pulmonary disease) (Multi)       Medical Decision Making  54-year-old male who presents emergency department for chest pain and worsening shortness of breath feeling lightheaded intermittently.  His EKG he had a PCI done did not require any stent placed.  No signs of anemia he is not wheezing on exam I agree with treatment fortunately patient is still requiring 3 L of oxygen COVID and flu are negative no signs of pulmonary edema.  No signs of pneumonia but CT scan was also ordered given his persistent hypoxia.        Procedure  Procedures     Bonnie Thomson MD  12/20/24 0130

## 2024-12-20 ENCOUNTER — APPOINTMENT (OUTPATIENT)
Dept: CARDIOLOGY | Facility: HOSPITAL | Age: 54
DRG: 190 | End: 2024-12-20
Payer: MEDICARE

## 2024-12-20 ENCOUNTER — APPOINTMENT (OUTPATIENT)
Dept: PULMONOLOGY | Facility: HOSPITAL | Age: 54
End: 2024-12-20
Payer: MEDICARE

## 2024-12-20 PROBLEM — J44.9 COPD (CHRONIC OBSTRUCTIVE PULMONARY DISEASE) (MULTI): Status: ACTIVE | Noted: 2024-12-20

## 2024-12-20 LAB
ALBUMIN SERPL BCP-MCNC: 3.7 G/DL (ref 3.4–5)
ALP SERPL-CCNC: 54 U/L (ref 33–120)
ALT SERPL W P-5'-P-CCNC: 23 U/L (ref 10–52)
AMPHETAMINES UR QL SCN: ABNORMAL
ANION GAP SERPL CALC-SCNC: 12 MMOL/L (ref 10–20)
AST SERPL W P-5'-P-CCNC: 23 U/L (ref 9–39)
BARBITURATES UR QL SCN: ABNORMAL
BENZODIAZ UR QL SCN: ABNORMAL
BILIRUB SERPL-MCNC: 0.4 MG/DL (ref 0–1.2)
BUN SERPL-MCNC: 8 MG/DL (ref 6–23)
BZE UR QL SCN: ABNORMAL
CALCIUM SERPL-MCNC: 8.7 MG/DL (ref 8.6–10.3)
CANNABINOIDS UR QL SCN: ABNORMAL
CHLORIDE SERPL-SCNC: 100 MMOL/L (ref 98–107)
CO2 SERPL-SCNC: 32 MMOL/L (ref 21–32)
CREAT SERPL-MCNC: 0.91 MG/DL (ref 0.5–1.3)
EGFRCR SERPLBLD CKD-EPI 2021: >90 ML/MIN/1.73M*2
ERYTHROCYTE [DISTWIDTH] IN BLOOD BY AUTOMATED COUNT: 15.9 % (ref 11.5–14.5)
FENTANYL+NORFENTANYL UR QL SCN: ABNORMAL
GLUCOSE BLD MANUAL STRIP-MCNC: 186 MG/DL (ref 74–99)
GLUCOSE BLD MANUAL STRIP-MCNC: 193 MG/DL (ref 74–99)
GLUCOSE BLD MANUAL STRIP-MCNC: 197 MG/DL (ref 74–99)
GLUCOSE SERPL-MCNC: 126 MG/DL (ref 74–99)
HCT VFR BLD AUTO: 38.1 % (ref 41–52)
HGB BLD-MCNC: 11.7 G/DL (ref 13.5–17.5)
MCH RBC QN AUTO: 27.5 PG (ref 26–34)
MCHC RBC AUTO-ENTMCNC: 30.7 G/DL (ref 32–36)
MCV RBC AUTO: 89 FL (ref 80–100)
METHADONE UR QL SCN: ABNORMAL
NRBC BLD-RTO: 0 /100 WBCS (ref 0–0)
OPIATES UR QL SCN: ABNORMAL
OXYCODONE+OXYMORPHONE UR QL SCN: ABNORMAL
PCP UR QL SCN: ABNORMAL
PLATELET # BLD AUTO: 343 X10*3/UL (ref 150–450)
POTASSIUM SERPL-SCNC: 4.1 MMOL/L (ref 3.5–5.3)
PROT SERPL-MCNC: 6.1 G/DL (ref 6.4–8.2)
RBC # BLD AUTO: 4.26 X10*6/UL (ref 4.5–5.9)
SODIUM SERPL-SCNC: 140 MMOL/L (ref 136–145)
WBC # BLD AUTO: 8.6 X10*3/UL (ref 4.4–11.3)

## 2024-12-20 PROCEDURE — 71260 CT THORAX DX C+: CPT | Mod: FOREIGN READ | Performed by: RADIOLOGY

## 2024-12-20 PROCEDURE — 36415 COLL VENOUS BLD VENIPUNCTURE: CPT | Performed by: STUDENT IN AN ORGANIZED HEALTH CARE EDUCATION/TRAINING PROGRAM

## 2024-12-20 PROCEDURE — 82947 ASSAY GLUCOSE BLOOD QUANT: CPT

## 2024-12-20 PROCEDURE — 93005 ELECTROCARDIOGRAM TRACING: CPT

## 2024-12-20 PROCEDURE — 85027 COMPLETE CBC AUTOMATED: CPT | Performed by: STUDENT IN AN ORGANIZED HEALTH CARE EDUCATION/TRAINING PROGRAM

## 2024-12-20 PROCEDURE — 84075 ASSAY ALKALINE PHOSPHATASE: CPT | Performed by: STUDENT IN AN ORGANIZED HEALTH CARE EDUCATION/TRAINING PROGRAM

## 2024-12-20 PROCEDURE — 2500000001 HC RX 250 WO HCPCS SELF ADMINISTERED DRUGS (ALT 637 FOR MEDICARE OP): Performed by: STUDENT IN AN ORGANIZED HEALTH CARE EDUCATION/TRAINING PROGRAM

## 2024-12-20 PROCEDURE — 99233 SBSQ HOSP IP/OBS HIGH 50: CPT | Performed by: INTERNAL MEDICINE

## 2024-12-20 PROCEDURE — 2500000004 HC RX 250 GENERAL PHARMACY W/ HCPCS (ALT 636 FOR OP/ED): Performed by: STUDENT IN AN ORGANIZED HEALTH CARE EDUCATION/TRAINING PROGRAM

## 2024-12-20 PROCEDURE — 2500000001 HC RX 250 WO HCPCS SELF ADMINISTERED DRUGS (ALT 637 FOR MEDICARE OP): Performed by: REGISTERED NURSE

## 2024-12-20 PROCEDURE — 2500000001 HC RX 250 WO HCPCS SELF ADMINISTERED DRUGS (ALT 637 FOR MEDICARE OP): Performed by: INTERNAL MEDICINE

## 2024-12-20 PROCEDURE — 99223 1ST HOSP IP/OBS HIGH 75: CPT | Performed by: STUDENT IN AN ORGANIZED HEALTH CARE EDUCATION/TRAINING PROGRAM

## 2024-12-20 PROCEDURE — 2550000001 HC RX 255 CONTRASTS: Performed by: STUDENT IN AN ORGANIZED HEALTH CARE EDUCATION/TRAINING PROGRAM

## 2024-12-20 PROCEDURE — 1200000002 HC GENERAL ROOM WITH TELEMETRY DAILY

## 2024-12-20 PROCEDURE — 80307 DRUG TEST PRSMV CHEM ANLYZR: CPT | Performed by: REGISTERED NURSE

## 2024-12-20 PROCEDURE — 94640 AIRWAY INHALATION TREATMENT: CPT

## 2024-12-20 PROCEDURE — 93010 ELECTROCARDIOGRAM REPORT: CPT | Performed by: INTERNAL MEDICINE

## 2024-12-20 PROCEDURE — 2500000002 HC RX 250 W HCPCS SELF ADMINISTERED DRUGS (ALT 637 FOR MEDICARE OP, ALT 636 FOR OP/ED): Performed by: STUDENT IN AN ORGANIZED HEALTH CARE EDUCATION/TRAINING PROGRAM

## 2024-12-20 RX ORDER — IPRATROPIUM BROMIDE AND ALBUTEROL SULFATE 2.5; .5 MG/3ML; MG/3ML
3 SOLUTION RESPIRATORY (INHALATION) EVERY 6 HOURS PRN
Status: DISCONTINUED | OUTPATIENT
Start: 2024-12-20 | End: 2024-12-21 | Stop reason: HOSPADM

## 2024-12-20 RX ORDER — LANOLIN ALCOHOL/MO/W.PET/CERES
100 CREAM (GRAM) TOPICAL DAILY
Status: DISCONTINUED | OUTPATIENT
Start: 2024-12-23 | End: 2024-12-21 | Stop reason: HOSPADM

## 2024-12-20 RX ORDER — ONDANSETRON HYDROCHLORIDE 2 MG/ML
4 INJECTION, SOLUTION INTRAVENOUS EVERY 8 HOURS PRN
Status: DISCONTINUED | OUTPATIENT
Start: 2024-12-20 | End: 2024-12-21 | Stop reason: HOSPADM

## 2024-12-20 RX ORDER — GABAPENTIN 300 MG/1
300 CAPSULE ORAL 3 TIMES DAILY
Status: DISCONTINUED | OUTPATIENT
Start: 2024-12-20 | End: 2024-12-21 | Stop reason: HOSPADM

## 2024-12-20 RX ORDER — ENOXAPARIN SODIUM 100 MG/ML
40 INJECTION SUBCUTANEOUS DAILY
Status: DISCONTINUED | OUTPATIENT
Start: 2024-12-20 | End: 2024-12-21 | Stop reason: HOSPADM

## 2024-12-20 RX ORDER — TRAZODONE HYDROCHLORIDE 50 MG/1
50 TABLET ORAL NIGHTLY PRN
Status: DISCONTINUED | OUTPATIENT
Start: 2024-12-20 | End: 2024-12-21 | Stop reason: HOSPADM

## 2024-12-20 RX ORDER — PANTOPRAZOLE SODIUM 40 MG/1
40 TABLET, DELAYED RELEASE ORAL
Status: DISCONTINUED | OUTPATIENT
Start: 2024-12-20 | End: 2024-12-21 | Stop reason: HOSPADM

## 2024-12-20 RX ORDER — ESCITALOPRAM OXALATE 10 MG/1
20 TABLET ORAL DAILY
Status: DISCONTINUED | OUTPATIENT
Start: 2024-12-20 | End: 2024-12-21 | Stop reason: HOSPADM

## 2024-12-20 RX ORDER — ATORVASTATIN CALCIUM 10 MG/1
10 TABLET, FILM COATED ORAL DAILY
Status: DISCONTINUED | OUTPATIENT
Start: 2024-12-20 | End: 2024-12-21 | Stop reason: HOSPADM

## 2024-12-20 RX ORDER — MULTIVIT-MIN/IRON FUM/FOLIC AC 7.5 MG-4
1 TABLET ORAL DAILY
Status: DISCONTINUED | OUTPATIENT
Start: 2024-12-20 | End: 2024-12-21 | Stop reason: HOSPADM

## 2024-12-20 RX ORDER — TALC
3 POWDER (GRAM) TOPICAL NIGHTLY PRN
Status: DISCONTINUED | OUTPATIENT
Start: 2024-12-20 | End: 2024-12-21 | Stop reason: HOSPADM

## 2024-12-20 RX ORDER — FINASTERIDE 5 MG/1
5 TABLET, FILM COATED ORAL DAILY
Status: DISCONTINUED | OUTPATIENT
Start: 2024-12-20 | End: 2024-12-21 | Stop reason: HOSPADM

## 2024-12-20 RX ORDER — METOPROLOL TARTRATE 25 MG/1
12.5 TABLET, FILM COATED ORAL DAILY
Status: DISCONTINUED | OUTPATIENT
Start: 2024-12-20 | End: 2024-12-21 | Stop reason: HOSPADM

## 2024-12-20 RX ORDER — ONDANSETRON 4 MG/1
4 TABLET, FILM COATED ORAL EVERY 8 HOURS PRN
Status: DISCONTINUED | OUTPATIENT
Start: 2024-12-20 | End: 2024-12-21 | Stop reason: HOSPADM

## 2024-12-20 RX ORDER — AMLODIPINE BESYLATE 10 MG/1
10 TABLET ORAL DAILY
Status: DISCONTINUED | OUTPATIENT
Start: 2024-12-20 | End: 2024-12-21 | Stop reason: HOSPADM

## 2024-12-20 RX ORDER — THIAMINE HYDROCHLORIDE 100 MG/ML
100 INJECTION, SOLUTION INTRAMUSCULAR; INTRAVENOUS DAILY
Status: DISCONTINUED | OUTPATIENT
Start: 2024-12-20 | End: 2024-12-20

## 2024-12-20 RX ORDER — ACETAMINOPHEN 650 MG/1
650 SUPPOSITORY RECTAL EVERY 4 HOURS PRN
Status: DISCONTINUED | OUTPATIENT
Start: 2024-12-20 | End: 2024-12-21 | Stop reason: HOSPADM

## 2024-12-20 RX ORDER — IPRATROPIUM BROMIDE AND ALBUTEROL SULFATE 2.5; .5 MG/3ML; MG/3ML
3 SOLUTION RESPIRATORY (INHALATION)
Status: DISCONTINUED | OUTPATIENT
Start: 2024-12-20 | End: 2024-12-21 | Stop reason: HOSPADM

## 2024-12-20 RX ORDER — IPRATROPIUM BROMIDE AND ALBUTEROL SULFATE 2.5; .5 MG/3ML; MG/3ML
3 SOLUTION RESPIRATORY (INHALATION)
Status: DISCONTINUED | OUTPATIENT
Start: 2024-12-20 | End: 2024-12-20

## 2024-12-20 RX ORDER — TRAZODONE HYDROCHLORIDE 100 MG/1
100 TABLET ORAL NIGHTLY PRN
COMMUNITY
Start: 2024-12-04

## 2024-12-20 RX ORDER — FOLIC ACID 1 MG/1
1 TABLET ORAL DAILY
Status: DISCONTINUED | OUTPATIENT
Start: 2024-12-20 | End: 2024-12-21 | Stop reason: HOSPADM

## 2024-12-20 RX ORDER — ASPIRIN 81 MG/1
81 TABLET ORAL DAILY
Status: DISCONTINUED | OUTPATIENT
Start: 2024-12-20 | End: 2024-12-21 | Stop reason: HOSPADM

## 2024-12-20 RX ORDER — INSULIN LISPRO 100 [IU]/ML
0-5 INJECTION, SOLUTION INTRAVENOUS; SUBCUTANEOUS
Status: DISCONTINUED | OUTPATIENT
Start: 2024-12-20 | End: 2024-12-21 | Stop reason: HOSPADM

## 2024-12-20 RX ORDER — ACETAMINOPHEN 325 MG/1
650 TABLET ORAL EVERY 4 HOURS PRN
Status: DISCONTINUED | OUTPATIENT
Start: 2024-12-20 | End: 2024-12-21 | Stop reason: HOSPADM

## 2024-12-20 RX ORDER — HYDROXYZINE HYDROCHLORIDE 25 MG/1
25 TABLET, FILM COATED ORAL EVERY 6 HOURS PRN
Status: DISCONTINUED | OUTPATIENT
Start: 2024-12-20 | End: 2024-12-21 | Stop reason: HOSPADM

## 2024-12-20 RX ORDER — ACETAMINOPHEN 160 MG/5ML
650 SUSPENSION ORAL EVERY 4 HOURS PRN
Status: DISCONTINUED | OUTPATIENT
Start: 2024-12-20 | End: 2024-12-21 | Stop reason: HOSPADM

## 2024-12-20 RX ORDER — LEVOFLOXACIN 5 MG/ML
750 INJECTION, SOLUTION INTRAVENOUS EVERY 24 HOURS
Status: DISCONTINUED | OUTPATIENT
Start: 2024-12-20 | End: 2024-12-21 | Stop reason: HOSPADM

## 2024-12-20 RX ORDER — MICONAZOLE NITRATE 2 %
2 CREAM (GRAM) TOPICAL EVERY 2 HOUR PRN
Status: DISCONTINUED | OUTPATIENT
Start: 2024-12-20 | End: 2024-12-21 | Stop reason: HOSPADM

## 2024-12-20 RX ORDER — TAMSULOSIN HYDROCHLORIDE 0.4 MG/1
0.8 CAPSULE ORAL DAILY
Status: DISCONTINUED | OUTPATIENT
Start: 2024-12-20 | End: 2024-12-21 | Stop reason: HOSPADM

## 2024-12-20 RX ORDER — IBUPROFEN 200 MG
1 TABLET ORAL DAILY
Status: DISCONTINUED | OUTPATIENT
Start: 2024-12-21 | End: 2024-12-21 | Stop reason: HOSPADM

## 2024-12-20 RX ORDER — DEXTROSE 50 % IN WATER (D50W) INTRAVENOUS SYRINGE
25
Status: DISCONTINUED | OUTPATIENT
Start: 2024-12-20 | End: 2024-12-21 | Stop reason: HOSPADM

## 2024-12-20 RX ORDER — POLYETHYLENE GLYCOL 3350 17 G/17G
17 POWDER, FOR SOLUTION ORAL DAILY PRN
Status: DISCONTINUED | OUTPATIENT
Start: 2024-12-20 | End: 2024-12-21 | Stop reason: HOSPADM

## 2024-12-20 RX ORDER — DEXTROSE 50 % IN WATER (D50W) INTRAVENOUS SYRINGE
12.5
Status: DISCONTINUED | OUTPATIENT
Start: 2024-12-20 | End: 2024-12-21 | Stop reason: HOSPADM

## 2024-12-20 SDOH — SOCIAL STABILITY: SOCIAL INSECURITY: WITHIN THE LAST YEAR, HAVE YOU BEEN HUMILIATED OR EMOTIONALLY ABUSED IN OTHER WAYS BY YOUR PARTNER OR EX-PARTNER?: NO

## 2024-12-20 SDOH — SOCIAL STABILITY: SOCIAL INSECURITY: DOES ANYONE TRY TO KEEP YOU FROM HAVING/CONTACTING OTHER FRIENDS OR DOING THINGS OUTSIDE YOUR HOME?: NO

## 2024-12-20 SDOH — SOCIAL STABILITY: SOCIAL INSECURITY: DO YOU FEEL ANYONE HAS EXPLOITED OR TAKEN ADVANTAGE OF YOU FINANCIALLY OR OF YOUR PERSONAL PROPERTY?: NO

## 2024-12-20 SDOH — SOCIAL STABILITY: SOCIAL INSECURITY: WITHIN THE LAST YEAR, HAVE YOU BEEN AFRAID OF YOUR PARTNER OR EX-PARTNER?: NO

## 2024-12-20 SDOH — HEALTH STABILITY: MENTAL HEALTH: HOW OFTEN DO YOU HAVE SIX OR MORE DRINKS ON ONE OCCASION?: WEEKLY

## 2024-12-20 SDOH — ECONOMIC STABILITY: FOOD INSECURITY
WITHIN THE PAST 12 MONTHS, YOU WORRIED THAT YOUR FOOD WOULD RUN OUT BEFORE YOU GOT THE MONEY TO BUY MORE.: SOMETIMES TRUE

## 2024-12-20 SDOH — HEALTH STABILITY: MENTAL HEALTH: HOW MANY DRINKS CONTAINING ALCOHOL DO YOU HAVE ON A TYPICAL DAY WHEN YOU ARE DRINKING?: 10 OR MORE

## 2024-12-20 SDOH — ECONOMIC STABILITY: FOOD INSECURITY: WITHIN THE PAST 12 MONTHS, THE FOOD YOU BOUGHT JUST DIDN'T LAST AND YOU DIDN'T HAVE MONEY TO GET MORE.: SOMETIMES TRUE

## 2024-12-20 SDOH — ECONOMIC STABILITY: INCOME INSECURITY: IN THE PAST 12 MONTHS HAS THE ELECTRIC, GAS, OIL, OR WATER COMPANY THREATENED TO SHUT OFF SERVICES IN YOUR HOME?: YES

## 2024-12-20 SDOH — HEALTH STABILITY: MENTAL HEALTH: HOW OFTEN DO YOU HAVE A DRINK CONTAINING ALCOHOL?: 2-3 TIMES A WEEK

## 2024-12-20 SDOH — SOCIAL STABILITY: SOCIAL INSECURITY: ARE THERE ANY APPARENT SIGNS OF INJURIES/BEHAVIORS THAT COULD BE RELATED TO ABUSE/NEGLECT?: NO

## 2024-12-20 SDOH — SOCIAL STABILITY: SOCIAL INSECURITY: HAVE YOU HAD THOUGHTS OF HARMING ANYONE ELSE?: NO

## 2024-12-20 SDOH — SOCIAL STABILITY: SOCIAL INSECURITY: ARE YOU OR HAVE YOU BEEN THREATENED OR ABUSED PHYSICALLY, EMOTIONALLY, OR SEXUALLY BY ANYONE?: NO

## 2024-12-20 SDOH — SOCIAL STABILITY: SOCIAL INSECURITY: WERE YOU ABLE TO COMPLETE ALL THE BEHAVIORAL HEALTH SCREENINGS?: YES

## 2024-12-20 SDOH — SOCIAL STABILITY: SOCIAL INSECURITY: DO YOU FEEL UNSAFE GOING BACK TO THE PLACE WHERE YOU ARE LIVING?: NO

## 2024-12-20 SDOH — SOCIAL STABILITY: SOCIAL INSECURITY: HAS ANYONE EVER THREATENED TO HURT YOUR FAMILY OR YOUR PETS?: NO

## 2024-12-20 SDOH — SOCIAL STABILITY: SOCIAL INSECURITY: HAVE YOU HAD ANY THOUGHTS OF HARMING ANYONE ELSE?: NO

## 2024-12-20 SDOH — SOCIAL STABILITY: SOCIAL INSECURITY: ABUSE: ADULT

## 2024-12-20 ASSESSMENT — LIFESTYLE VARIABLES
HEADACHE, FULLNESS IN HEAD: NOT PRESENT
TREMOR: NOT VISIBLE, BUT CAN BE FELT FINGERTIP TO FINGERTIP
ORIENTATION AND CLOUDING OF SENSORIUM: CANNOT DO SERIAL ADDITIONS OR IS UNCERTAIN ABOUT DATE
VISUAL DISTURBANCES: NOT PRESENT
TOTAL SCORE: 0
AGITATION: NORMAL ACTIVITY
ANXIETY: NO ANXIETY, AT EASE
ORIENTATION AND CLOUDING OF SENSORIUM: ORIENTED AND CAN DO SERIAL ADDITIONS
VISUAL DISTURBANCES: NOT PRESENT
PAROXYSMAL SWEATS: NO SWEAT VISIBLE
ORIENTATION AND CLOUDING OF SENSORIUM: ORIENTED AND CAN DO SERIAL ADDITIONS
NAUSEA AND VOMITING: NO NAUSEA AND NO VOMITING
AGITATION: NORMAL ACTIVITY
AUDITORY DISTURBANCES: NOT PRESENT
ANXIETY: NO ANXIETY, AT EASE
PAROXYSMAL SWEATS: NO SWEAT VISIBLE
ORIENTATION AND CLOUDING OF SENSORIUM: ORIENTED AND CAN DO SERIAL ADDITIONS
AGITATION: NORMAL ACTIVITY
NAUSEA AND VOMITING: NO NAUSEA AND NO VOMITING
TOTAL SCORE: 0
AUDIT-C TOTAL SCORE: 10
HEADACHE, FULLNESS IN HEAD: NOT PRESENT
VISUAL DISTURBANCES: NOT PRESENT
PAROXYSMAL SWEATS: NO SWEAT VISIBLE
AUDITORY DISTURBANCES: NOT PRESENT
TREMOR: NO TREMOR
AUDITORY DISTURBANCES: NOT PRESENT
ORIENTATION AND CLOUDING OF SENSORIUM: CANNOT DO SERIAL ADDITIONS OR IS UNCERTAIN ABOUT DATE
ANXIETY: NO ANXIETY, AT EASE
AGITATION: NORMAL ACTIVITY
HEADACHE, FULLNESS IN HEAD: NOT PRESENT
TREMOR: NOT VISIBLE, BUT CAN BE FELT FINGERTIP TO FINGERTIP
ORIENTATION AND CLOUDING OF SENSORIUM: CANNOT DO SERIAL ADDITIONS OR IS UNCERTAIN ABOUT DATE
AUDITORY DISTURBANCES: NOT PRESENT
AGITATION: NORMAL ACTIVITY
NAUSEA AND VOMITING: NO NAUSEA AND NO VOMITING
TREMOR: NOT VISIBLE, BUT CAN BE FELT FINGERTIP TO FINGERTIP
PAROXYSMAL SWEATS: NO SWEAT VISIBLE
HEADACHE, FULLNESS IN HEAD: NOT PRESENT
AGITATION: NORMAL ACTIVITY
ANXIETY: NO ANXIETY, AT EASE
VISUAL DISTURBANCES: NOT PRESENT
TREMOR: NOT VISIBLE, BUT CAN BE FELT FINGERTIP TO FINGERTIP
PAROXYSMAL SWEATS: NO SWEAT VISIBLE
TREMOR: NOT VISIBLE, BUT CAN BE FELT FINGERTIP TO FINGERTIP
HEADACHE, FULLNESS IN HEAD: NOT PRESENT
AUDITORY DISTURBANCES: NOT PRESENT
HEADACHE, FULLNESS IN HEAD: NOT PRESENT
NAUSEA AND VOMITING: NO NAUSEA AND NO VOMITING
PAROXYSMAL SWEATS: NO SWEAT VISIBLE
NAUSEA AND VOMITING: NO NAUSEA AND NO VOMITING
HEADACHE, FULLNESS IN HEAD: NOT PRESENT
TOTAL SCORE: 1
AUDIT-C TOTAL SCORE: 10
VISUAL DISTURBANCES: NOT PRESENT
AUDITORY DISTURBANCES: NOT PRESENT
TREMOR: NOT VISIBLE, BUT CAN BE FELT FINGERTIP TO FINGERTIP
TOTAL SCORE: 2
AUDIT-C TOTAL SCORE: 10
HEADACHE, FULLNESS IN HEAD: NOT PRESENT
TOTAL SCORE: 2
ORIENTATION AND CLOUDING OF SENSORIUM: ORIENTED AND CAN DO SERIAL ADDITIONS
TREMOR: NOT VISIBLE, BUT CAN BE FELT FINGERTIP TO FINGERTIP
NAUSEA AND VOMITING: NO NAUSEA AND NO VOMITING
AGITATION: NORMAL ACTIVITY
TREMOR: NO TREMOR
TOTAL SCORE: 2
HOW OFTEN DO YOU HAVE A DRINK CONTAINING ALCOHOL: 2-3 TIMES A WEEK
VISUAL DISTURBANCES: NOT PRESENT
NAUSEA AND VOMITING: NO NAUSEA AND NO VOMITING
AGITATION: NORMAL ACTIVITY
VISUAL DISTURBANCES: NOT PRESENT
ORIENTATION AND CLOUDING OF SENSORIUM: ORIENTED AND CAN DO SERIAL ADDITIONS
NAUSEA AND VOMITING: NO NAUSEA AND NO VOMITING
NAUSEA AND VOMITING: NO NAUSEA AND NO VOMITING
AGITATION: NORMAL ACTIVITY
ORIENTATION AND CLOUDING OF SENSORIUM: ORIENTED AND CAN DO SERIAL ADDITIONS
TREMOR: NOT VISIBLE, BUT CAN BE FELT FINGERTIP TO FINGERTIP
HOW MANY STANDARD DRINKS CONTAINING ALCOHOL DO YOU HAVE ON A TYPICAL DAY: 10 OR MORE
NAUSEA AND VOMITING: NO NAUSEA AND NO VOMITING
VISUAL DISTURBANCES: NOT PRESENT
ANXIETY: NO ANXIETY, AT EASE
HOW OFTEN DO YOU HAVE 6 OR MORE DRINKS ON ONE OCCASION: WEEKLY
AUDITORY DISTURBANCES: NOT PRESENT
SKIP TO QUESTIONS 9-10: 0
TOTAL SCORE: 1
ORIENTATION AND CLOUDING OF SENSORIUM: ORIENTED AND CAN DO SERIAL ADDITIONS
SKIP TO QUESTIONS 9-10: 0
PAROXYSMAL SWEATS: NO SWEAT VISIBLE
ANXIETY: NO ANXIETY, AT EASE
AUDITORY DISTURBANCES: NOT PRESENT
ANXIETY: NO ANXIETY, AT EASE
HEADACHE, FULLNESS IN HEAD: NOT PRESENT
PAROXYSMAL SWEATS: NO SWEAT VISIBLE
ANXIETY: NO ANXIETY, AT EASE
VISUAL DISTURBANCES: NOT PRESENT
PAROXYSMAL SWEATS: NO SWEAT VISIBLE
AGITATION: NORMAL ACTIVITY
TOTAL SCORE: 1
PAROXYSMAL SWEATS: NO SWEAT VISIBLE
TOTAL SCORE: 1
ANXIETY: NO ANXIETY, AT EASE
VISUAL DISTURBANCES: NOT PRESENT
ANXIETY: NO ANXIETY, AT EASE
AUDITORY DISTURBANCES: NOT PRESENT
AUDITORY DISTURBANCES: NOT PRESENT
TOTAL SCORE: 1
HEADACHE, FULLNESS IN HEAD: NOT PRESENT

## 2024-12-20 ASSESSMENT — COGNITIVE AND FUNCTIONAL STATUS - GENERAL
PATIENT BASELINE BEDBOUND: NO
MOBILITY SCORE: 24
MOBILITY SCORE: 24
DAILY ACTIVITIY SCORE: 24
MOBILITY SCORE: 24

## 2024-12-20 ASSESSMENT — PAIN SCALES - GENERAL
PAINLEVEL_OUTOF10: 0 - NO PAIN

## 2024-12-20 ASSESSMENT — ACTIVITIES OF DAILY LIVING (ADL)
TOILETING: INDEPENDENT
HEARING - LEFT EAR: FUNCTIONAL
FEEDING YOURSELF: INDEPENDENT
HEARING - RIGHT EAR: FUNCTIONAL
WALKS IN HOME: INDEPENDENT
GROOMING: INDEPENDENT
DRESSING YOURSELF: INDEPENDENT
JUDGMENT_ADEQUATE_SAFELY_COMPLETE_DAILY_ACTIVITIES: YES
PATIENT'S MEMORY ADEQUATE TO SAFELY COMPLETE DAILY ACTIVITIES?: YES
ADEQUATE_TO_COMPLETE_ADL: YES
LACK_OF_TRANSPORTATION: YES
BATHING: INDEPENDENT

## 2024-12-20 ASSESSMENT — PAIN - FUNCTIONAL ASSESSMENT
PAIN_FUNCTIONAL_ASSESSMENT: 0-10

## 2024-12-20 ASSESSMENT — PATIENT HEALTH QUESTIONNAIRE - PHQ9
2. FEELING DOWN, DEPRESSED OR HOPELESS: SEVERAL DAYS
1. LITTLE INTEREST OR PLEASURE IN DOING THINGS: SEVERAL DAYS
SUM OF ALL RESPONSES TO PHQ9 QUESTIONS 1 & 2: 2

## 2024-12-20 NOTE — ASSESSMENT & PLAN NOTE
54M hx CAD, HTN, COPD, DM2, GERD, neuropathy, BPH, depression/anxiety, etoh use d/o hospitalized for copd exacerbation     #acute hypoxemic hypercapnic respiratory failure:  #COPD exacerbation:  moderate. hds, compensated. no respiratory distress. ct chest w/o acute pathology. likely 2/2 weather, ongoing smoking. no e/o pe, chf, acs, pna. covid/flu/rsv(-)  - duonebs scheduled/prn; hold home inhalers  - s/p methylpred 125 x1; prednisone 60mg, levofloxacin 750mg (12/20 - )  - supp o2 prn; slp eval;  on smoking cessation; outpt pfts     #CAD: stable; home asa, statin, mtp; f/u ekg  #HTN: stable; mtp, amlodipine  #DM2: bg at goal; hold mtf; iss  #GERD: home ppi; ct w/ ?esophageal dysmotility; gi referral outpt  #neuropathy: home gabapentin  #BPH: home tamsulosin, finasteride  #depression/anxiety: home lexapro; hold seroquel (drowsy)  #etoh use d/o: no e/o withdrawal;  on cessation; thiamine/folate/mv  #tobacco use d/o:  on cessation     #FEN/GI: diabetic  #PPx: lovenox  #Lines/Tubes/Drains: piv  #Analgesia/Sedation: tylenol  #Code: full  #Dispo: pending clinical improvement  #Contact: rafael (friend) [phone]  
Yes

## 2024-12-20 NOTE — H&P
"Medicine History & Physical:    CC: sob    HPI:  54M hx CAD, HTN, COPD, DM2, GERD, neuropathy, BPH, depression/anxiety, etoh use d/o presents for sob    worsened sob for a few days. productive cough, no blood. no allergy sx. no travel, sick contacts. no f/c, cp, n/v, diarrhea, dysuria, rash. no recent travel, sick contacts    PMH: CAD, HTN, COPD, DM2, GERD, neuropathy, BPH, depression/anxiety, etoh use d/o  PSH: [facility]  FH: n/a  SH: lives in [place]. daily 1/3 ppd smoking. unspecified etoh use. no drugs  Meds: see med rec for full.  Allergies: nkda    ED Course:  hypoxemia requiring 2L supp o2. no respiratory distress. labs/imaging w/ nl cmp, cbc, trop, bnp, dd, covid/flu/rsv. vbg w/ pco2 56. ekg wnl. ct chest w/o lung pathology. s/p duonebs. admitted    ROS reviewed and negative other than noted in HPI    Visit Vitals  /82 (BP Location: Left arm, Patient Position: Lying)   Pulse 82   Temp 36.6 °C (97.8 °F) (Temporal)   Resp 16   Ht 1.753 m (5' 9.02\")   Wt 120 kg (264 lb 1.8 oz)   SpO2 95%   BMI 38.98 kg/m²   Smoking Status Every Day   BSA 2.42 m²     Physical Exam:  General: NAD, cooperative. no jaundice, pallor, rash, bruises  HEENT: NC/AT, MMM, no oral lesions or pharyngeal erythema. PERRL. no scleral icterus or conjunctival injection. neck soft w/o masses or LAD.  CV: RRR, no murmurs, rubs, or gallops. No JVD.  Chest: breathing unlabored. CTAB w/ adequate air-entry.  Abd: non-distended. BS+. soft, non-tender. no masses or organomegaly.  Extr: wwp, 2+ and symmetric peripheral pulses. no LE edema.  Neuro: AAOx3. CNII-XII intact. no focal findings.     Results:  - all relevant laboratory and radiographic data reviewed    Assessment/Plan:  54M hx CAD, HTN, COPD, DM2, GERD, neuropathy, BPH, depression/anxiety, etoh use d/o hospitalized for copd exacerbation    #acute hypoxemic hypercapnic respiratory failure:  #COPD exacerbation:  moderate. hds, compensated. no respiratory distress. ct chest w/o acute pathology. " likely 2/2 weather, ongoing smoking. no e/o pe, chf, acs, pna. covid/flu/rsv(-)  - duonebs scheduled/prn; hold home inhalers  - s/p methylpred 125 x1; prednisone 60mg, levofloxacin 750mg (12/20 - )  - supp o2 prn; slp eval;  on smoking cessation; outpt pfts    #CAD: stable; home asa, statin, mtp; f/u ekg  #HTN: stable; mtp, amlodipine  #DM2: bg at goal; hold mtf; iss  #GERD: home ppi; ct w/ ?esophageal dysmotility; gi referral outpt  #neuropathy: home gabapentin  #BPH: home tamsulosin, finasteride  #depression/anxiety: home lexapro; hold seroquel (drowsy)  #etoh use d/o: no e/o withdrawal;  on cessation; thiamine/folate/mv  #tobacco use d/o:  on cessation    #FEN/GI: diabetic  #PPx: lovenox  #Lines/Tubes/Drains: piv  #Analgesia/Sedation: tylenol  #Code: full  #Dispo: pending clinical improvement  #Contact: rafael (friend) [phone]    Anderson Renner MD

## 2024-12-20 NOTE — CARE PLAN
Problem: Pain - Adult  Goal: Verbalizes/displays adequate comfort level or baseline comfort level  Outcome: Progressing  Flowsheets (Taken 12/20/2024 0245)  Verbalizes/displays adequate comfort level or baseline comfort level:   Encourage patient to monitor pain and request assistance   Assess pain using appropriate pain scale   Administer analgesics based on type and severity of pain and evaluate response   Implement non-pharmacological measures as appropriate and evaluate response     Problem: Safety - Adult  Goal: Free from fall injury  Outcome: Progressing     Problem: Discharge Planning  Goal: Discharge to home or other facility with appropriate resources  Outcome: Progressing     Problem: Chronic Conditions and Co-morbidities  Goal: Patient's chronic conditions and co-morbidity symptoms are monitored and maintained or improved  Outcome: Progressing     Problem: Fall/Injury  Goal: Not fall by end of shift  Outcome: Progressing  Goal: Be free from injury by end of the shift  Outcome: Progressing  Goal: Verbalize understanding of personal risk factors for fall in the hospital  Outcome: Progressing  Goal: Verbalize understanding of risk factor reduction measures to prevent injury from fall in the home  Outcome: Progressing  Goal: Use assistive devices by end of the shift  Outcome: Progressing  Goal: Pace activities to prevent fatigue by end of the shift  Outcome: Progressing     Problem: Respiratory  Goal: Clear secretions with interventions this shift  Outcome: Progressing  Goal: Minimize anxiety/maximize coping throughout shift  Outcome: Progressing  Goal: Minimal/no exertional discomfort or dyspnea this shift  Outcome: Progressing  Goal: No signs of respiratory distress (eg. Use of accessory muscles. Peds grunting)  Outcome: Progressing  Goal: Patent airway maintained this shift  Outcome: Progressing  Goal: Tolerate mechanical ventilation evidenced by VS/agitation level this shift  Outcome: Progressing  Goal:  Tolerate pulmonary toileting this shift  Outcome: Progressing  Goal: Verbalize decreased shortness of breath this shift  Outcome: Progressing  Goal: Wean oxygen to maintain O2 saturation per order/standard this shift  Outcome: Progressing  Goal: Increase self care and/or family involvement in next 24 hours  Outcome: Progressing     Problem: Pain  Goal: Takes deep breaths with improved pain control throughout the shift  Outcome: Progressing  Goal: Turns in bed with improved pain control throughout the shift  Outcome: Progressing  Goal: Walks with improved pain control throughout the shift  Outcome: Progressing  Goal: Performs ADL's with improved pain control throughout shift  Outcome: Progressing  Goal: Participates in PT with improved pain control throughout the shift  Outcome: Progressing  Goal: Free from opioid side effects throughout the shift  Outcome: Progressing  Goal: Free from acute confusion related to pain meds throughout the shift  Outcome: Progressing   The patient's goals for the shift include      The clinical goals for the shift include Pt will maintain an SpO2 of 92% or higher during my shift.

## 2024-12-20 NOTE — PROGRESS NOTES
"   12/20/24 1400   Discharge Planning   Living Arrangements Alone   Support Systems Shelter;Organized support group (Comment)   Type of Residence Homeless  ([facility])   Home or Post Acute Services None   Expected Discharge Disposition Home  ([facility])   Does the patient need discharge transport arranged? Yes   RoundTrip coordination needed? Yes   Has discharge transport been arranged? No     SW met with pt to complete discharge planning assessment and provide support, introduced self and role with Care Transitions. Pt [facility] at [facility], he has lived their for the past two months. Pt reports being independent in all ADL/IALDs, [facility] a  [facility]. Pt prefers to return to [facility] at discharge. Pt is on O2 as needed. SW discussed drinking behaviors with pt, pt does report continued drinking since last admission. Informed SW after the new year he has therapy appointments scheduled and will be starting an IOP program three times a week. SW discussed pt's drug screen, positive for Cannabis and Opiates, pt stated he took a marijuana edible he bought from a store and that it was \"laced with something because I don't do Opiates\". SW explained medical and recreational marijuana products sold in stores would not contain any other substances, pt adamant he does not use opiates. SW encouraged pt to discuss with the provider about possible reasons for positive opiate screen. Pt expressed understanding, no other needs identified at this time, Care Transitions to follow as needed.    Clarence Christensen, MSW, LSW ([id number])   Care Transitions   "

## 2024-12-20 NOTE — PROGRESS NOTES
Speech-Language Pathology                 Therapy Communication Note    Patient Name: Daniel Hopkins  MRN: [id number]  Department: [facility] 3 E  Room: [facility]/333-A  Today's Date: 12/20/2024     Discipline: Speech Language Pathology          Missed Visit Reason: Missed Time Reason:  (Screen/DISCH)    Missed Time: Attempt    Comment:  Pt had a CT of the chest and it showed a moderate abound of debris in the esophagus.      Pt reports having recently eaten prior to having his CT of the Chest.  Pt had to lay flat for CT.  Pt endorses GERD and takes prescription meds.     Pt denies any oropharyngeal swallowing issues.   Pt completed 3oz water challenge via straw without coughing or choking.     SLP notified Dr. Denney of no overt oropharyngeal dysphagia and pt may be better suited for an esophagram.   Per Dr. Denney's noted pt deferred esophagram as inpt and would prefer to follow up as outpt.      No further SLP services are indicated at this time.

## 2024-12-20 NOTE — PROGRESS NOTES
Daniel Hopkins is a 54 y.o. male on day 0 of admission presenting with COPD (chronic obstructive pulmonary disease) (Multi).      Subjective   54M hx CAD, HTN, COPD, DM2, GERD, neuropathy, BPH, depression/anxiety, etoh use d/o presents for sob   worsened sob for a few days. productive cough, no blood. no allergy sx. no travel, sick contacts. no f/c, cp, n/v, diarrhea, dysuria, rash. no recent travel, sick contacts   12/20: Patient was seen and examined.  Saturating well on room air.  Patient has diffuse wheezing and diminished breath sounds on examination.  He would like to be discharged today if possible.  I will order an ambulatory pulse oximetry.  CT of the chest showed some debris in the esophagus.  I have ordered an esophagogram but patient will prefer to do that outpatient with possible.    Objective     Last Recorded Vitals  /86 (BP Location: Left arm, Patient Position: Lying)   Pulse 82   Temp 36.4 °C (97.5 °F) (Temporal)   Resp 19   Wt 120 kg (264 lb 1.8 oz)   SpO2 93%   Intake/Output last 3 Shifts:    Intake/Output Summary (Last 24 hours) at 12/20/2024 1222  Last data filed at 12/20/2024 1125  Gross per 24 hour   Intake 910 ml   Output --   Net 910 ml       Admission Weight  Weight: 117 kg (258 lb) (12/19/24 1848)    Daily Weight  12/20/24 : 120 kg (264 lb 1.8 oz)    Image Results  ECG 12 lead  Sinus rhythm  RBBB and LAFB  CT chest w IV contrast  Narrative: STUDY:  CT Chest with IV Contrast; 12/20/2024 at 12:31 AM  INDICATION:  Shortness of breath.  COMPARISON:  None available.  ACCESSION NUMBER(S):  [id number]  ORDERING CLINICIAN:  AZEEM SAUCEDA  TECHNIQUE:  CT of the chest was performed with intravenous contrast.    Omnipaque 350 75 mL was administered intravenously.  Automated mA/kV exposure control was utilized and patient examination  was performed in strict accordance with principles of ALARA.  FINDINGS:  MEDIASTINUM:  The heart is normal in size without pericardial effusion.   No  significant contrast material within the Central vascular structures.   There is trace contrast material in the right subclavian and axillary  veins.  LUNGS/PLEURA:  There is no pleural effusion, pleural thickening, or pneumothorax.   The airways are patent.  Trace scarring/atelectasis the lung bases and the lingula segments.   No focal consolidation.  LYMPH NODES:  Thoracic lymph nodes are not enlarged.  UPPER ABDOMEN:  Upper abdomen demonstrates no acute pathology.  BONES:  There are no acute fractures.  No suspicious bony lesions.  Impression: Minimal if any contrast material is present within the field-of-view.   This may reflect mistimed acquisition, dysfunction of the injector or  extravasation of contrast material at the injection site.  Recommend  clinical correlation for any possible extravasation injury.  Moderate amount of ingested material throughout the esophagus  suggesting gastroesophageal dysfunction or esophageal motility  disorder.  Trace scarring/atelectasis in the lung bases and lingula segments.  Signed by Daniel Luis MD      Physical Exam  Vitals:    12/20/24 1206   BP:    Pulse:    Resp:    Temp:    SpO2: 93%     General: NAD, cooperative. no jaundice, pallor, rash, bruises  HEENT: NC/AT, MMM, no oral lesions or pharyngeal erythema. PERRL. no scleral icterus or conjunctival injection. neck soft w/o masses or LAD.  CV: RRR, no murmurs, rubs, or gallops. No JVD.  Chest: breathing unlabored.  Diminished breath sounds bilaterally few wheezes w/ adequate air-entry.  Abd: non-distended. BS+. soft, non-tender. no masses or organomegaly.  Extr: wwp, 2+ and symmetric peripheral pulses. no LE edema.  Neuro: AAOx3. CNII-XII intact. no focal findings.   Relevant Results               Assessment/Plan                  Assessment & Plan  COPD (chronic obstructive pulmonary disease) (Multi)  54M hx CAD, HTN, COPD, DM2, GERD, neuropathy, BPH, depression/anxiety, etoh use d/o hospitalized for copd  exacerbation     #acute hypoxemic hypercapnic respiratory failure:  #COPD exacerbation:  moderate. hds, compensated. no respiratory distress. ct chest w/o acute pathology. likely 2/2 weather, ongoing smoking. no e/o pe, chf, acs, pna. covid/flu/rsv(-)  - duonebs scheduled/prn; hold home inhalers  - s/p methylpred 125 x1; prednisone 60mg, levofloxacin 750mg (12/20 - )  - supp o2 prn; slp eval;  on smoking cessation; outpt pfts     #CAD: stable; home asa, statin, mtp; f/u ekg  #HTN: stable; mtp, amlodipine  #DM2: bg at goal; hold mtf; iss  #GERD: home ppi; ct w/ ?esophageal dysmotility; gi referral outpt  #neuropathy: home gabapentin  #BPH: home tamsulosin, finasteride  #depression/anxiety: home lexapro; hold seroquel (drowsy)  #etoh use d/o: no e/o withdrawal;  on cessation; thiamine/folate/mv  #tobacco use d/o:  on cessation     #FEN/GI: diabetic  #PPx: lovenox  #Lines/Tubes/Drains: piv  #Analgesia/Sedation: tylenol  #Code: full  #Dispo: pending clinical improvement  #Contact: rafael (friend) [phone]       Spent 35 minutes in the follow-up management of this patient today       Claudio Denney MD

## 2024-12-21 ENCOUNTER — PHARMACY VISIT (OUTPATIENT)
Dept: PHARMACY | Facility: CLINIC | Age: 54
End: 2024-12-21
Payer: COMMERCIAL

## 2024-12-21 VITALS
SYSTOLIC BLOOD PRESSURE: 130 MMHG | HEART RATE: 87 BPM | DIASTOLIC BLOOD PRESSURE: 82 MMHG | TEMPERATURE: 97.4 F | RESPIRATION RATE: 18 BRPM | BODY MASS INDEX: 39.12 KG/M2 | WEIGHT: 264.11 LBS | OXYGEN SATURATION: 94 % | HEIGHT: 69 IN

## 2024-12-21 PROBLEM — J44.9 COPD (CHRONIC OBSTRUCTIVE PULMONARY DISEASE) (MULTI): Status: RESOLVED | Noted: 2024-12-20 | Resolved: 2024-12-21

## 2024-12-21 LAB
ALBUMIN SERPL BCP-MCNC: 3.7 G/DL (ref 3.4–5)
ALP SERPL-CCNC: 63 U/L (ref 33–120)
ALT SERPL W P-5'-P-CCNC: 20 U/L (ref 10–52)
ANION GAP SERPL CALC-SCNC: 13 MMOL/L (ref 10–20)
AST SERPL W P-5'-P-CCNC: 17 U/L (ref 9–39)
ATRIAL RATE: 82 BPM
BILIRUB SERPL-MCNC: 0.4 MG/DL (ref 0–1.2)
BUN SERPL-MCNC: 13 MG/DL (ref 6–23)
CALCIUM SERPL-MCNC: 8.9 MG/DL (ref 8.6–10.3)
CHLORIDE SERPL-SCNC: 102 MMOL/L (ref 98–107)
CO2 SERPL-SCNC: 30 MMOL/L (ref 21–32)
CREAT SERPL-MCNC: 0.72 MG/DL (ref 0.5–1.3)
EGFRCR SERPLBLD CKD-EPI 2021: >90 ML/MIN/1.73M*2
ERYTHROCYTE [DISTWIDTH] IN BLOOD BY AUTOMATED COUNT: 15.4 % (ref 11.5–14.5)
GLUCOSE BLD MANUAL STRIP-MCNC: 128 MG/DL (ref 74–99)
GLUCOSE BLD MANUAL STRIP-MCNC: 194 MG/DL (ref 74–99)
GLUCOSE SERPL-MCNC: 138 MG/DL (ref 74–99)
HCT VFR BLD AUTO: 38.5 % (ref 41–52)
HGB BLD-MCNC: 11.8 G/DL (ref 13.5–17.5)
MCH RBC QN AUTO: 27.5 PG (ref 26–34)
MCHC RBC AUTO-ENTMCNC: 30.6 G/DL (ref 32–36)
MCV RBC AUTO: 90 FL (ref 80–100)
NRBC BLD-RTO: 0 /100 WBCS (ref 0–0)
P AXIS: 39 DEGREES
PLATELET # BLD AUTO: 291 X10*3/UL (ref 150–450)
POTASSIUM SERPL-SCNC: 4.4 MMOL/L (ref 3.5–5.3)
PR INTERVAL: 165 MS
PROT SERPL-MCNC: 6.2 G/DL (ref 6.4–8.2)
Q ONSET: 252 MS
QRS COUNT: 13 BEATS
QRS DURATION: 148 MS
QT INTERVAL: 412 MS
QTC CALCULATION(BAZETT): 482 MS
QTC FREDERICIA: 457 MS
R AXIS: -81 DEGREES
RBC # BLD AUTO: 4.29 X10*6/UL (ref 4.5–5.9)
SODIUM SERPL-SCNC: 141 MMOL/L (ref 136–145)
T AXIS: 51 DEGREES
T OFFSET: 458 MS
VENTRICULAR RATE: 82 BPM
WBC # BLD AUTO: 10 X10*3/UL (ref 4.4–11.3)

## 2024-12-21 PROCEDURE — 2500000001 HC RX 250 WO HCPCS SELF ADMINISTERED DRUGS (ALT 637 FOR MEDICARE OP): Performed by: STUDENT IN AN ORGANIZED HEALTH CARE EDUCATION/TRAINING PROGRAM

## 2024-12-21 PROCEDURE — 94640 AIRWAY INHALATION TREATMENT: CPT

## 2024-12-21 PROCEDURE — 2500000001 HC RX 250 WO HCPCS SELF ADMINISTERED DRUGS (ALT 637 FOR MEDICARE OP): Performed by: REGISTERED NURSE

## 2024-12-21 PROCEDURE — 2500000004 HC RX 250 GENERAL PHARMACY W/ HCPCS (ALT 636 FOR OP/ED): Performed by: STUDENT IN AN ORGANIZED HEALTH CARE EDUCATION/TRAINING PROGRAM

## 2024-12-21 PROCEDURE — 82947 ASSAY GLUCOSE BLOOD QUANT: CPT

## 2024-12-21 PROCEDURE — 36415 COLL VENOUS BLD VENIPUNCTURE: CPT | Performed by: STUDENT IN AN ORGANIZED HEALTH CARE EDUCATION/TRAINING PROGRAM

## 2024-12-21 PROCEDURE — 2500000002 HC RX 250 W HCPCS SELF ADMINISTERED DRUGS (ALT 637 FOR MEDICARE OP, ALT 636 FOR OP/ED): Performed by: STUDENT IN AN ORGANIZED HEALTH CARE EDUCATION/TRAINING PROGRAM

## 2024-12-21 PROCEDURE — 85027 COMPLETE CBC AUTOMATED: CPT | Performed by: STUDENT IN AN ORGANIZED HEALTH CARE EDUCATION/TRAINING PROGRAM

## 2024-12-21 PROCEDURE — S4991 NICOTINE PATCH NONLEGEND: HCPCS | Performed by: STUDENT IN AN ORGANIZED HEALTH CARE EDUCATION/TRAINING PROGRAM

## 2024-12-21 PROCEDURE — 2500000001 HC RX 250 WO HCPCS SELF ADMINISTERED DRUGS (ALT 637 FOR MEDICARE OP): Performed by: INTERNAL MEDICINE

## 2024-12-21 PROCEDURE — 99239 HOSP IP/OBS DSCHRG MGMT >30: CPT | Performed by: INTERNAL MEDICINE

## 2024-12-21 PROCEDURE — 2500000004 HC RX 250 GENERAL PHARMACY W/ HCPCS (ALT 636 FOR OP/ED): Performed by: INTERNAL MEDICINE

## 2024-12-21 PROCEDURE — 80053 COMPREHEN METABOLIC PANEL: CPT | Performed by: STUDENT IN AN ORGANIZED HEALTH CARE EDUCATION/TRAINING PROGRAM

## 2024-12-21 PROCEDURE — RXMED WILLOW AMBULATORY MEDICATION CHARGE

## 2024-12-21 RX ORDER — DEXAMETHASONE 4 MG/1
4 TABLET ORAL EVERY MORNING
Qty: 4 TABLET | Refills: 0 | Status: SHIPPED | OUTPATIENT
Start: 2024-12-21 | End: 2024-12-25

## 2024-12-21 RX ORDER — ASPIRIN/CALCIUM CARB/MAGNESIUM 325 MG
4 TABLET ORAL EVERY 2 HOUR PRN
Qty: 72 LOZENGE | Refills: 0 | Status: SHIPPED | OUTPATIENT
Start: 2024-12-21

## 2024-12-21 RX ORDER — TRIAMCINOLONE ACETONIDE 40 MG/ML
120 INJECTION, SUSPENSION INTRA-ARTICULAR; INTRAMUSCULAR ONCE
Status: COMPLETED | OUTPATIENT
Start: 2024-12-21 | End: 2024-12-21

## 2024-12-21 RX ORDER — LEVOFLOXACIN 750 MG/1
750 TABLET ORAL EVERY 24 HOURS
Qty: 3 TABLET | Refills: 0 | Status: SHIPPED | OUTPATIENT
Start: 2024-12-21

## 2024-12-21 RX ORDER — ACETAMINOPHEN 325 MG/1
650 TABLET ORAL EVERY 4 HOURS PRN
Qty: 30 TABLET | Refills: 0 | Status: SHIPPED | OUTPATIENT
Start: 2024-12-21

## 2024-12-21 ASSESSMENT — LIFESTYLE VARIABLES
VISUAL DISTURBANCES: NOT PRESENT
VISUAL DISTURBANCES: NOT PRESENT
PAROXYSMAL SWEATS: BARELY PERCEPTIBLE SWEATING, PALMS MOIST
TOTAL SCORE: 2
VISUAL DISTURBANCES: NOT PRESENT
TREMOR: NOT VISIBLE, BUT CAN BE FELT FINGERTIP TO FINGERTIP
ANXIETY: NO ANXIETY, AT EASE
AGITATION: NORMAL ACTIVITY
AUDITORY DISTURBANCES: NOT PRESENT
HEADACHE, FULLNESS IN HEAD: NOT PRESENT
NAUSEA AND VOMITING: NO NAUSEA AND NO VOMITING
NAUSEA AND VOMITING: NO NAUSEA AND NO VOMITING
AGITATION: NORMAL ACTIVITY
TREMOR: NO TREMOR
HEADACHE, FULLNESS IN HEAD: NOT PRESENT
NAUSEA AND VOMITING: NO NAUSEA AND NO VOMITING
TOTAL SCORE: 1
PAROXYSMAL SWEATS: 2
HEADACHE, FULLNESS IN HEAD: NOT PRESENT
VISUAL DISTURBANCES: NOT PRESENT
AUDITORY DISTURBANCES: NOT PRESENT
AGITATION: NORMAL ACTIVITY
AGITATION: NORMAL ACTIVITY
ANXIETY: NO ANXIETY, AT EASE
TOTAL SCORE: 3
HEADACHE, FULLNESS IN HEAD: NOT PRESENT
HEADACHE, FULLNESS IN HEAD: NOT PRESENT
AUDITORY DISTURBANCES: NOT PRESENT
PAROXYSMAL SWEATS: NO SWEAT VISIBLE
PAROXYSMAL SWEATS: BARELY PERCEPTIBLE SWEATING, PALMS MOIST
PAROXYSMAL SWEATS: BARELY PERCEPTIBLE SWEATING, PALMS MOIST
NAUSEA AND VOMITING: NO NAUSEA AND NO VOMITING
VISUAL DISTURBANCES: NOT PRESENT
HEADACHE, FULLNESS IN HEAD: NOT PRESENT
ANXIETY: MILDLY ANXIOUS
AUDITORY DISTURBANCES: NOT PRESENT
ORIENTATION AND CLOUDING OF SENSORIUM: ORIENTED AND CAN DO SERIAL ADDITIONS
AUDITORY DISTURBANCES: NOT PRESENT
TOTAL SCORE: 3
ORIENTATION AND CLOUDING OF SENSORIUM: ORIENTED AND CAN DO SERIAL ADDITIONS
AUDITORY DISTURBANCES: NOT PRESENT
ANXIETY: MILDLY ANXIOUS
ORIENTATION AND CLOUDING OF SENSORIUM: ORIENTED AND CAN DO SERIAL ADDITIONS
ORIENTATION AND CLOUDING OF SENSORIUM: ORIENTED AND CAN DO SERIAL ADDITIONS
TREMOR: NOT VISIBLE, BUT CAN BE FELT FINGERTIP TO FINGERTIP
ORIENTATION AND CLOUDING OF SENSORIUM: ORIENTED AND CAN DO SERIAL ADDITIONS
TREMOR: NOT VISIBLE, BUT CAN BE FELT FINGERTIP TO FINGERTIP
HEADACHE, FULLNESS IN HEAD: NOT PRESENT
AUDITORY DISTURBANCES: NOT PRESENT
TOTAL SCORE: 2
ANXIETY: MILDLY ANXIOUS
AGITATION: NORMAL ACTIVITY
ORIENTATION AND CLOUDING OF SENSORIUM: ORIENTED AND CAN DO SERIAL ADDITIONS
TREMOR: NO TREMOR
TOTAL SCORE: 3
NAUSEA AND VOMITING: NO NAUSEA AND NO VOMITING
VISUAL DISTURBANCES: NOT PRESENT
ORIENTATION AND CLOUDING OF SENSORIUM: ORIENTED AND CAN DO SERIAL ADDITIONS
PAROXYSMAL SWEATS: NO SWEAT VISIBLE
NAUSEA AND VOMITING: NO NAUSEA AND NO VOMITING
VISUAL DISTURBANCES: NOT PRESENT
TOTAL SCORE: 2
TREMOR: NO TREMOR
PAROXYSMAL SWEATS: 2
NAUSEA AND VOMITING: NO NAUSEA AND NO VOMITING
ANXIETY: 2
TREMOR: NOT VISIBLE, BUT CAN BE FELT FINGERTIP TO FINGERTIP
ANXIETY: NO ANXIETY, AT EASE

## 2024-12-21 ASSESSMENT — COGNITIVE AND FUNCTIONAL STATUS - GENERAL
MOBILITY SCORE: 24
DAILY ACTIVITIY SCORE: 24

## 2024-12-21 ASSESSMENT — PAIN SCALES - GENERAL: PAINLEVEL_OUTOF10: 0 - NO PAIN

## 2024-12-21 ASSESSMENT — PAIN - FUNCTIONAL ASSESSMENT: PAIN_FUNCTIONAL_ASSESSMENT: 0-10

## 2024-12-21 NOTE — CARE PLAN
Problem: Pain - Adult  Goal: Verbalizes/displays adequate comfort level or baseline comfort level  Outcome: Progressing  Flowsheets (Taken 12/21/2024 4475)  Verbalizes/displays adequate comfort level or baseline comfort level:   Encourage patient to monitor pain and request assistance   Assess pain using appropriate pain scale   Administer analgesics based on type and severity of pain and evaluate response   Implement non-pharmacological measures as appropriate and evaluate response     Problem: Safety - Adult  Goal: Free from fall injury  Outcome: Progressing     Problem: Discharge Planning  Goal: Discharge to home or other facility with appropriate resources  Outcome: Progressing     Problem: Chronic Conditions and Co-morbidities  Goal: Patient's chronic conditions and co-morbidity symptoms are monitored and maintained or improved  Outcome: Progressing     Problem: Fall/Injury  Goal: Not fall by end of shift  Outcome: Progressing  Goal: Be free from injury by end of the shift  Outcome: Progressing  Goal: Verbalize understanding of personal risk factors for fall in the hospital  Outcome: Progressing  Goal: Verbalize understanding of risk factor reduction measures to prevent injury from fall in the home  Outcome: Progressing  Goal: Use assistive devices by end of the shift  Outcome: Progressing  Goal: Pace activities to prevent fatigue by end of the shift  Outcome: Progressing     Problem: Respiratory  Goal: Clear secretions with interventions this shift  Outcome: Progressing  Goal: Minimize anxiety/maximize coping throughout shift  Outcome: Progressing  Goal: Minimal/no exertional discomfort or dyspnea this shift  Outcome: Progressing  Goal: No signs of respiratory distress (eg. Use of accessory muscles. Peds grunting)  Outcome: Progressing  Goal: Patent airway maintained this shift  Outcome: Progressing  Goal: Tolerate mechanical ventilation evidenced by VS/agitation level this shift  Outcome: Progressing  Goal:  Tolerate pulmonary toileting this shift  Outcome: Progressing  Goal: Verbalize decreased shortness of breath this shift  Outcome: Progressing  Goal: Wean oxygen to maintain O2 saturation per order/standard this shift  Outcome: Progressing  Goal: Increase self care and/or family involvement in next 24 hours  Outcome: Progressing     Problem: Pain  Goal: Takes deep breaths with improved pain control throughout the shift  Outcome: Progressing  Goal: Turns in bed with improved pain control throughout the shift  Outcome: Progressing  Goal: Walks with improved pain control throughout the shift  Outcome: Progressing  Goal: Performs ADL's with improved pain control throughout shift  Outcome: Progressing  Goal: Participates in PT with improved pain control throughout the shift  Outcome: Progressing  Goal: Free from opioid side effects throughout the shift  Outcome: Progressing  Goal: Free from acute confusion related to pain meds throughout the shift  Outcome: Progressing   The patient's goals for the shift include      The clinical goals for the shift include Pt will maintain an SpO2 of 92% or above.

## 2024-12-21 NOTE — PROGRESS NOTES
Daniel Hopkins is a 54 y.o. male on day 1 of admission presenting with COPD (chronic obstructive pulmonary disease) (Multi).      Subjective   54M hx CAD, HTN, COPD, DM2, GERD, neuropathy, BPH, depression/anxiety, etoh use d/o presents for sob   worsened sob for a few days. productive cough, no blood. no allergy sx. no travel, sick contacts. no f/c, cp, n/v, diarrhea, dysuria, rash. no recent travel, sick contacts   12/20: Patient was seen and examined.  Saturating well on room air.  Patient has diffuse wheezing and diminished breath sounds on examination.  He would like to be discharged today if possible.  I will order an ambulatory pulse oximetry.  CT of the chest showed some debris in the esophagus.  I have ordered an esophagogram but patient will prefer to do that outpatient with possible.  9/21: Patient saturating well on room air now.  Will send him out with a short course of Levaquin and Decadron.  Did discuss smoking cessation with him we will prescribe nicotine lozenges.    See after visit summary for complete plan okay to discharge home    35 minutes spent in the care of this patient.  Objective     Last Recorded Vitals  /82 (BP Location: Left arm, Patient Position: Lying)   Pulse 87   Temp 36.3 °C (97.4 °F) (Temporal)   Resp 18   Wt 120 kg (264 lb 1.8 oz)   SpO2 94%   Intake/Output last 3 Shifts:    Intake/Output Summary (Last 24 hours) at 12/21/2024 1521  Last data filed at 12/21/2024 1325  Gross per 24 hour   Intake 1580 ml   Output 300 ml   Net 1280 ml       Admission Weight  Weight: 117 kg (258 lb) (12/19/24 1848)    Daily Weight  12/20/24 : 120 kg (264 lb 1.8 oz)    Image Results  ECG 12 lead  Sinus rhythm  RBBB and LAFB    Confirmed by Severo Georges ([id number]) on 12/21/2024 9:14:20 AM      Physical Exam  Vitals:    12/21/24 1325   BP: 130/82   Pulse: 87   Resp: 18   Temp: 36.3 °C (97.4 °F)   SpO2: 94%     General: NAD, cooperative. no jaundice, pallor, rash, bruises  HEENT: NC/AT, MMM, no oral  lesions or pharyngeal erythema. PERRL. no scleral icterus or conjunctival injection. neck soft w/o masses or LAD.  CV: RRR, no murmurs, rubs, or gallops. No JVD.  Chest: breathing unlabored.  Diminished breath sounds bilaterally few whee 6 lungs clear bilaterally.  Abd: non-distended. BS+. soft, non-tender. no masses or organomegaly.  Extr: wwp, 2+ and symmetric peripheral pulses. no LE edema.  Neuro: AAOx3. CNII-XII intact. no focal findings.   Relevant Results               Assessment/Plan          COPD exacerbation  Acute hypoxic respiratory failure  Recent history of COVID  Coronary disease  Hypertension  GERD  Type 2 diabetes  BPH    See after visit summary for complete plan okay to discharge home.                    Denton Hutchison MD

## 2024-12-21 NOTE — CARE PLAN
Problem: Pain - Adult  Goal: Verbalizes/displays adequate comfort level or baseline comfort level  Outcome: Progressing     Problem: Safety - Adult  Goal: Free from fall injury  Outcome: Progressing     Problem: Discharge Planning  Goal: Discharge to home or other facility with appropriate resources  Outcome: Progressing     Problem: Chronic Conditions and Co-morbidities  Goal: Patient's chronic conditions and co-morbidity symptoms are monitored and maintained or improved  Outcome: Progressing     Problem: Fall/Injury  Goal: Not fall by end of shift  Outcome: Progressing     Problem: Fall/Injury  Goal: Not fall by end of shift  Outcome: Progressing  Goal: Be free from injury by end of the shift  Outcome: Progressing  Goal: Verbalize understanding of personal risk factors for fall in the hospital  Outcome: Progressing  Goal: Verbalize understanding of risk factor reduction measures to prevent injury from fall in the home  Outcome: Progressing  Goal: Use assistive devices by end of the shift  Outcome: Progressing  Goal: Pace activities to prevent fatigue by end of the shift  Outcome: Progressing     Problem: Respiratory  Goal: Clear secretions with interventions this shift  Outcome: Progressing  Goal: Minimize anxiety/maximize coping throughout shift  Outcome: Progressing  Goal: Minimal/no exertional discomfort or dyspnea this shift  Outcome: Progressing  Goal: No signs of respiratory distress (eg. Use of accessory muscles. Peds grunting)  Outcome: Progressing  Goal: Patent airway maintained this shift  Outcome: Progressing  Goal: Tolerate mechanical ventilation evidenced by VS/agitation level this shift  Outcome: Progressing  Goal: Tolerate pulmonary toileting this shift  Outcome: Progressing  Goal: Verbalize decreased shortness of breath this shift  Outcome: Progressing  Goal: Wean oxygen to maintain O2 saturation per order/standard this shift  Outcome: Progressing  Goal: Increase self care and/or family  involvement in next 24 hours  Outcome: Progressing     Problem: Pain  Goal: Takes deep breaths with improved pain control throughout the shift  Outcome: Progressing  Goal: Turns in bed with improved pain control throughout the shift  Outcome: Progressing  Goal: Walks with improved pain control throughout the shift  Outcome: Progressing  Goal: Performs ADL's with improved pain control throughout shift  Outcome: Progressing  Goal: Participates in PT with improved pain control throughout the shift  Outcome: Progressing  Goal: Free from opioid side effects throughout the shift  Outcome: Progressing  Goal: Free from acute confusion related to pain meds throughout the shift  Outcome: Progressing       The clinical goals for the shift include patient will maintain o2 sats >92% on 2L per n/c throughout this shift.

## 2024-12-21 NOTE — DISCHARGE SUMMARY
Diagnosis    COPD exacerbation  Acute hypoxic respiratory failure  Recent history of COVID  Coronary disease  Hypertension  GERD  Type 2 diabetes  BPH    Issues Requiring Follow-Up  See after visit summary for complete plan.  Give dose of Kenalog 120 mg IM prior to discharge.    Discharge Meds     Medication List      START taking these medications     acetaminophen 325 mg tablet; Commonly known as: Tylenol; Take 2 tablets   (650 mg) by mouth every 4 hours if needed for mild pain (1 - 3), moderate   pain (4 - 6), headaches or fever (temp greater than 38.0 C).   dexAMETHasone 4 mg tablet; Commonly known as: Decadron; Take 1 tablet (4   mg) by mouth once daily in the morning for 4 days.   levoFLOXacin 750 mg tablet; Commonly known as: Levaquin; Take 1 tablet   (750 mg) by mouth once every 24 hours.   nicotine polacrilex 4 mg lozenge; Commonly known as: Commit; Dissolve 1   lozenge (4 mg) in the mouth every 2 hours if needed for smoking cessation.     CONTINUE taking these medications     amLODIPine 10 mg tablet; Commonly known as: Norvasc   aspirin 81 mg EC tablet   atorvastatin 10 mg tablet; Commonly known as: Lipitor   Breztri Aerosphere 160-9-4.8 mcg/actuation HFA aerosol inhaler; Generic   drug: budesonide-glycopyr-formoterol   escitalopram 20 mg tablet; Commonly known as: Lexapro   finasteride 5 mg tablet; Commonly known as: Proscar   gabapentin 300 mg capsule; Commonly known as: Neurontin   hydrOXYzine HCL 25 mg tablet; Commonly known as: Atarax   ipratropium-albuteroL 0.5-2.5 mg/3 mL nebulizer solution; Commonly known   as: Duo-Neb; Take 3 mL by nebulization every 3 hours if needed for   wheezing or shortness of breath.   metFORMIN (MOD) 1,000 mg 24 hr tablet; Commonly known as: Glumetza   pantoprazole 40 mg EC tablet; Commonly known as: ProtoNix   tamsulosin 0.4 mg 24 hr capsule; Commonly known as: Flomax   traZODone 100 mg tablet; Commonly known as: Desyrel     STOP taking these medications     metoprolol  tartrate 25 mg tablet; Commonly known as: Lopressor   QUEtiapine 50 mg tablet; Commonly known as: SEROquel       Test Results Pending At Discharge  Pending Labs       No current pending labs.            Hospital Course      Subjective  54M hx CAD, HTN, COPD, DM2, GERD, neuropathy, BPH, depression/anxiety, etoh use d/o presents for sob   worsened sob for a few days. productive cough, no blood. no allergy sx. no travel, sick contacts. no f/c, cp, n/v, diarrhea, dysuria, rash. no recent travel, sick contacts     12/20: Patient was seen and examined.  Saturating well on room air.  Patient has diffuse wheezing and diminished breath sounds on examination.  He would like to be discharged today if possible.  I will order an ambulatory pulse oximetry.  CT of the chest showed some debris in the esophagus.  I have ordered an esophagogram but patient will prefer to do that outpatient with possible.  9/21: Patient saturating well on room air now.  Will send him out with a short course of Levaquin and Decadron.  Did discuss smoking cessation with him we will prescribe nicotine lozenges.     See after visit summary for complete plan okay to discharge home     35 minutes spent in the care of this patient.       Pertinent Physical Exam At Time of Discharge  Physical Exam  See today's progress note for more physical exam findings.  Outpatient Follow-Up  No future appointments.      Denton Hutchison MD

## 2024-12-22 NOTE — NURSING NOTE
Spoke with pt regarding Home Med (Augmentin) left behind. Okay to dispose of. Pt sent home on Levofloxacin. Meds disposed of.

## 2024-12-23 LAB
ATRIAL RATE: 106 BPM
P AXIS: 52 DEGREES
PR INTERVAL: 160 MS
Q ONSET: 251 MS
QRS COUNT: 17 BEATS
QRS DURATION: 147 MS
QT INTERVAL: 370 MS
QTC CALCULATION(BAZETT): 492 MS
QTC FREDERICIA: 447 MS
R AXIS: -88 DEGREES
T AXIS: 56 DEGREES
T OFFSET: 436 MS
VENTRICULAR RATE: 106 BPM

## 2025-01-15 ENCOUNTER — APPOINTMENT (OUTPATIENT)
Dept: RADIOLOGY | Facility: HOSPITAL | Age: 55
End: 2025-01-15
Payer: MEDICARE

## 2025-01-20 ENCOUNTER — HOSPITAL ENCOUNTER (OUTPATIENT)
Dept: RADIOLOGY | Facility: HOSPITAL | Age: 55
Discharge: HOME | End: 2025-01-20
Payer: MEDICARE

## 2025-01-20 DIAGNOSIS — J96.01 ACUTE RESPIRATORY FAILURE WITH HYPOXIA (MULTI): ICD-10-CM

## 2025-01-20 DIAGNOSIS — K22.9 ESOPHAGEAL ABNORMALITY: ICD-10-CM

## 2025-01-20 LAB — GLUCOSE BLD MANUAL STRIP-MCNC: 141 MG/DL (ref 74–99)

## 2025-01-20 PROCEDURE — 74220 X-RAY XM ESOPHAGUS 1CNTRST: CPT

## 2025-01-20 PROCEDURE — 2500000005 HC RX 250 GENERAL PHARMACY W/O HCPCS: Performed by: INTERNAL MEDICINE

## 2025-01-20 PROCEDURE — 82947 ASSAY GLUCOSE BLOOD QUANT: CPT

## 2025-01-20 RX ADMIN — BARIUM SULFATE 700 MG: 700 TABLET ORAL at 09:24

## 2025-01-20 RX ADMIN — BARIUM SULFATE 148 ML: 0.6 SUSPENSION ORAL at 09:24

## 2025-01-27 ENCOUNTER — OFFICE VISIT (OUTPATIENT)
Dept: PULMONOLOGY | Facility: HOSPITAL | Age: 55
End: 2025-01-27
Payer: MEDICARE

## 2025-01-27 VITALS
OXYGEN SATURATION: 93 % | HEIGHT: 69 IN | DIASTOLIC BLOOD PRESSURE: 84 MMHG | WEIGHT: 264 LBS | RESPIRATION RATE: 16 BRPM | HEART RATE: 90 BPM | BODY MASS INDEX: 39.1 KG/M2 | SYSTOLIC BLOOD PRESSURE: 131 MMHG

## 2025-01-27 DIAGNOSIS — J45.50 SEVERE PERSISTENT ASTHMA, UNSPECIFIED WHETHER COMPLICATED (MULTI): ICD-10-CM

## 2025-01-27 DIAGNOSIS — J44.9 CHRONIC OBSTRUCTIVE PULMONARY DISEASE, UNSPECIFIED COPD TYPE (MULTI): Primary | ICD-10-CM

## 2025-01-27 DIAGNOSIS — F17.210 CIGARETTE SMOKER: ICD-10-CM

## 2025-01-27 DIAGNOSIS — R06.09 DOE (DYSPNEA ON EXERTION): ICD-10-CM

## 2025-01-27 DIAGNOSIS — J30.9 ALLERGIC RHINITIS, UNSPECIFIED SEASONALITY, UNSPECIFIED TRIGGER: ICD-10-CM

## 2025-01-27 PROCEDURE — 3008F BODY MASS INDEX DOCD: CPT | Performed by: NURSE PRACTITIONER

## 2025-01-27 PROCEDURE — 99214 OFFICE O/P EST MOD 30 MIN: CPT | Performed by: NURSE PRACTITIONER

## 2025-01-27 PROCEDURE — 99204 OFFICE O/P NEW MOD 45 MIN: CPT | Performed by: NURSE PRACTITIONER

## 2025-01-27 RX ORDER — FLUTICASONE FUROATE AND VILANTEROL 200; 25 UG/1; UG/1
1 POWDER RESPIRATORY (INHALATION) DAILY
Qty: 1 EACH | Refills: 11 | Status: SHIPPED | OUTPATIENT
Start: 2025-01-27

## 2025-01-27 RX ORDER — TIOTROPIUM BROMIDE INHALATION SPRAY 3.12 UG/1
2 SPRAY, METERED RESPIRATORY (INHALATION) DAILY
Qty: 4 G | Refills: 11 | Status: SHIPPED | OUTPATIENT
Start: 2025-01-27

## 2025-01-27 RX ORDER — PREDNISONE 5 MG/1
5 TABLET ORAL DAILY
Qty: 30 TABLET | Refills: 1 | Status: SHIPPED | OUTPATIENT
Start: 2025-01-27

## 2025-01-27 ASSESSMENT — ENCOUNTER SYMPTOMS
UNEXPECTED WEIGHT CHANGE: 0
FEVER: 0
RHINORRHEA: 0
CHILLS: 0
FATIGUE: 0

## 2025-01-27 NOTE — PROGRESS NOTES
Subjective   Patient ID: Daniel Hopkins is a 54 y.o. male who presents for NPV for COPD.     HPI: Patient has PMH of COPD, asthma, CAD, HTN, DMII, GERD, depression, and anxiety. He moved recently and is here to establish with new pulmonary group for COPD and asthma. He has been on several different inhalers including Breztri, Trelegy, Breo, and others in the past. He is currently on Breztri but is unsure this helps much. He states that he feels Breo has helped the most. He states that he has had asthma all throughout his life. He states that he typically has exacerbations several times per year requiring prednisone. He states his prior pulmonologist had started to discuss the need for biologics. He had COVID last month and states this is when his breathing has most recently worsened. The cold weather also worsens this. He does get seasonal allergies. He is a current smoker, down to 1/2 ppd but has smoked the past 32 years at 1/2-1 ppd. He denies prior occupational exposures.     Review of Systems   Constitutional:  Negative for chills, fatigue, fever and unexpected weight change.   HENT:  Negative for congestion, postnasal drip and rhinorrhea.    Respiratory:  Positive for cough (denies hemoptysis.), shortness of breath and wheezing.    Cardiovascular:  Negative for chest pain and leg swelling.   All other systems reviewed and are negative.      Objective   Physical Exam  Vitals reviewed.   Constitutional:       Appearance: Normal appearance.   HENT:      Head: Normocephalic.   Cardiovascular:      Rate and Rhythm: Normal rate and regular rhythm.   Pulmonary:      Effort: Pulmonary effort is normal.      Breath sounds: Decreased breath sounds present.   Skin:     General: Skin is warm and dry.   Neurological:      Mental Status: He is alert.         Assessment/Plan   Bronchial asthma, severe persistent   COPD  Nicotine dependence  Allergic rhinitis  Obesity    Plan:    -He states he just had PFTs recently with prior  pulmonologist. Will request records. He knows he has been told he has had severe asthma in the past.   -Change Breztri to Breo 200 one puff daily and Spiriva 2 puffs once a day, he felt Breo has worked the best for him.   -He has had several doses of prednisone the past year, he does not like the side effects from this but it significantly improves his breathing. I recommend that we keep him on a 5 mg dose daily for now, he prefers to avoid another taper.   -IgE/RAST, Eos ordered. I suspect that he does need to start on biologics soon, he reports frequent exacerbations and his asthma is currently controlled. I will obtain testing today and get records from prior pulmonologist.   -He is currently smoking at 1/2 ppd he mostly smoked 1/2-1 ppd x 32 years. Encouraged complete cessation for him.   -CT chest reviewed from December 2024, discussed he will qualify for LDCT December 2025.     Overall I will optimize management for him and obtain testing and notes from prior pulmonologist. I will bring him back for close follow up in 4-6 weeks as asthma is currently uncontrolled. I instructed patient to call sooner if needed.      Total time:  45 min.

## 2025-01-27 NOTE — PATIENT INSTRUCTIONS
Start on prednisone 5 mg daily as discussed.  Stop Trelegy and start on Breo one puff once a day, everyday.   Start on Spiriva 2 puffs once a day, everyday.  Please get blood work today if possible.   I will get your records from your prior pulmonologist.  Call me with any questions or concerns.  Follow up with me in 4-6 weeks.

## 2025-01-28 ASSESSMENT — ENCOUNTER SYMPTOMS
COUGH: 1
SHORTNESS OF BREATH: 1
WHEEZING: 1

## 2025-02-27 ENCOUNTER — APPOINTMENT (OUTPATIENT)
Dept: CARDIOLOGY | Facility: HOSPITAL | Age: 55
End: 2025-02-27
Payer: MEDICARE

## 2025-02-27 ENCOUNTER — APPOINTMENT (OUTPATIENT)
Dept: RADIOLOGY | Facility: HOSPITAL | Age: 55
End: 2025-02-27
Payer: MEDICARE

## 2025-02-27 ENCOUNTER — HOSPITAL ENCOUNTER (EMERGENCY)
Facility: HOSPITAL | Age: 55
Discharge: HOME | End: 2025-02-27
Attending: STUDENT IN AN ORGANIZED HEALTH CARE EDUCATION/TRAINING PROGRAM
Payer: MEDICARE

## 2025-02-27 ENCOUNTER — PHARMACY VISIT (OUTPATIENT)
Dept: PHARMACY | Facility: CLINIC | Age: 55
End: 2025-02-27
Payer: COMMERCIAL

## 2025-02-27 VITALS
HEIGHT: 69 IN | TEMPERATURE: 97.2 F | SYSTOLIC BLOOD PRESSURE: 133 MMHG | HEART RATE: 96 BPM | WEIGHT: 244.71 LBS | BODY MASS INDEX: 36.24 KG/M2 | OXYGEN SATURATION: 94 % | RESPIRATION RATE: 18 BRPM | DIASTOLIC BLOOD PRESSURE: 96 MMHG

## 2025-02-27 DIAGNOSIS — J44.1 COPD EXACERBATION (MULTI): Primary | ICD-10-CM

## 2025-02-27 LAB
ALBUMIN SERPL BCP-MCNC: 3.9 G/DL (ref 3.4–5)
ALP SERPL-CCNC: 59 U/L (ref 33–120)
ALT SERPL W P-5'-P-CCNC: 15 U/L (ref 10–52)
ANION GAP BLDV CALCULATED.4IONS-SCNC: -1 MMOL/L (ref 10–25)
ANION GAP SERPL CALC-SCNC: 13 MMOL/L (ref 10–20)
AST SERPL W P-5'-P-CCNC: 20 U/L (ref 9–39)
BASE EXCESS BLDV CALC-SCNC: 12.9 MMOL/L (ref -2–3)
BASOPHILS # BLD AUTO: 0.02 X10*3/UL (ref 0–0.1)
BASOPHILS NFR BLD AUTO: 0.4 %
BILIRUB SERPL-MCNC: 0.7 MG/DL (ref 0–1.2)
BNP SERPL-MCNC: 40 PG/ML (ref 0–99)
BODY TEMPERATURE: 37 DEGREES CELSIUS
BUN SERPL-MCNC: 9 MG/DL (ref 6–23)
CA-I BLDV-SCNC: 1.19 MMOL/L (ref 1.1–1.33)
CALCIUM SERPL-MCNC: 8.8 MG/DL (ref 8.6–10.3)
CARDIAC TROPONIN I PNL SERPL HS: 5 NG/L (ref 0–20)
CARDIAC TROPONIN I PNL SERPL HS: 6 NG/L (ref 0–20)
CHLORIDE BLDV-SCNC: 101 MMOL/L (ref 98–107)
CHLORIDE SERPL-SCNC: 97 MMOL/L (ref 98–107)
CO2 SERPL-SCNC: 33 MMOL/L (ref 21–32)
CREAT SERPL-MCNC: 0.88 MG/DL (ref 0.5–1.3)
D DIMER PPP FEU-MCNC: 440 NG/ML FEU
EGFRCR SERPLBLD CKD-EPI 2021: >90 ML/MIN/1.73M*2
EOSINOPHIL # BLD AUTO: 0.03 X10*3/UL (ref 0–0.7)
EOSINOPHIL NFR BLD AUTO: 0.5 %
ERYTHROCYTE [DISTWIDTH] IN BLOOD BY AUTOMATED COUNT: 14.8 % (ref 11.5–14.5)
FLUAV RNA RESP QL NAA+PROBE: NOT DETECTED
FLUBV RNA RESP QL NAA+PROBE: NOT DETECTED
GLUCOSE BLDV-MCNC: 238 MG/DL (ref 74–99)
GLUCOSE SERPL-MCNC: 220 MG/DL (ref 74–99)
HCO3 BLDV-SCNC: 38.1 MMOL/L (ref 22–26)
HCT VFR BLD AUTO: 39 % (ref 41–52)
HCT VFR BLD EST: 36 % (ref 41–52)
HGB BLD-MCNC: 11.9 G/DL (ref 13.5–17.5)
HGB BLDV-MCNC: 12 G/DL (ref 13.5–17.5)
IMM GRANULOCYTES # BLD AUTO: 0.02 X10*3/UL (ref 0–0.7)
IMM GRANULOCYTES NFR BLD AUTO: 0.4 % (ref 0–0.9)
INHALED O2 CONCENTRATION: 21 %
LACTATE BLDV-SCNC: 1.6 MMOL/L (ref 0.4–2)
LACTATE BLDV-SCNC: 2.1 MMOL/L (ref 0.4–2)
LIPASE SERPL-CCNC: 12 U/L (ref 9–82)
LYMPHOCYTES # BLD AUTO: 1.16 X10*3/UL (ref 1.2–4.8)
LYMPHOCYTES NFR BLD AUTO: 20.6 %
MAGNESIUM SERPL-MCNC: 1.61 MG/DL (ref 1.6–2.4)
MCH RBC QN AUTO: 26.4 PG (ref 26–34)
MCHC RBC AUTO-ENTMCNC: 30.5 G/DL (ref 32–36)
MCV RBC AUTO: 87 FL (ref 80–100)
MONOCYTES # BLD AUTO: 0.53 X10*3/UL (ref 0.1–1)
MONOCYTES NFR BLD AUTO: 9.4 %
NEUTROPHILS # BLD AUTO: 3.87 X10*3/UL (ref 1.2–7.7)
NEUTROPHILS NFR BLD AUTO: 68.7 %
NRBC BLD-RTO: 0 /100 WBCS (ref 0–0)
OXYHGB MFR BLDV: 85.4 % (ref 45–75)
PCO2 BLDV: 50 MM HG (ref 41–51)
PH BLDV: 7.49 PH (ref 7.33–7.43)
PLATELET # BLD AUTO: 280 X10*3/UL (ref 150–450)
PO2 BLDV: 56 MM HG (ref 35–45)
POTASSIUM BLDV-SCNC: 3.5 MMOL/L (ref 3.5–5.3)
POTASSIUM SERPL-SCNC: 3.5 MMOL/L (ref 3.5–5.3)
PROT SERPL-MCNC: 6.5 G/DL (ref 6.4–8.2)
RBC # BLD AUTO: 4.5 X10*6/UL (ref 4.5–5.9)
SAO2 % BLDV: 89 % (ref 45–75)
SARS-COV-2 RNA RESP QL NAA+PROBE: NOT DETECTED
SODIUM BLDV-SCNC: 135 MMOL/L (ref 136–145)
SODIUM SERPL-SCNC: 139 MMOL/L (ref 136–145)
WBC # BLD AUTO: 5.6 X10*3/UL (ref 4.4–11.3)

## 2025-02-27 PROCEDURE — 87636 SARSCOV2 & INF A&B AMP PRB: CPT | Performed by: STUDENT IN AN ORGANIZED HEALTH CARE EDUCATION/TRAINING PROGRAM

## 2025-02-27 PROCEDURE — 85379 FIBRIN DEGRADATION QUANT: CPT | Performed by: STUDENT IN AN ORGANIZED HEALTH CARE EDUCATION/TRAINING PROGRAM

## 2025-02-27 PROCEDURE — 83690 ASSAY OF LIPASE: CPT | Performed by: STUDENT IN AN ORGANIZED HEALTH CARE EDUCATION/TRAINING PROGRAM

## 2025-02-27 PROCEDURE — 83605 ASSAY OF LACTIC ACID: CPT | Performed by: STUDENT IN AN ORGANIZED HEALTH CARE EDUCATION/TRAINING PROGRAM

## 2025-02-27 PROCEDURE — 84484 ASSAY OF TROPONIN QUANT: CPT | Performed by: STUDENT IN AN ORGANIZED HEALTH CARE EDUCATION/TRAINING PROGRAM

## 2025-02-27 PROCEDURE — 85025 COMPLETE CBC W/AUTO DIFF WBC: CPT | Performed by: STUDENT IN AN ORGANIZED HEALTH CARE EDUCATION/TRAINING PROGRAM

## 2025-02-27 PROCEDURE — 93005 ELECTROCARDIOGRAM TRACING: CPT

## 2025-02-27 PROCEDURE — 83880 ASSAY OF NATRIURETIC PEPTIDE: CPT | Performed by: STUDENT IN AN ORGANIZED HEALTH CARE EDUCATION/TRAINING PROGRAM

## 2025-02-27 PROCEDURE — 83735 ASSAY OF MAGNESIUM: CPT | Performed by: STUDENT IN AN ORGANIZED HEALTH CARE EDUCATION/TRAINING PROGRAM

## 2025-02-27 PROCEDURE — 99285 EMERGENCY DEPT VISIT HI MDM: CPT | Mod: 25 | Performed by: STUDENT IN AN ORGANIZED HEALTH CARE EDUCATION/TRAINING PROGRAM

## 2025-02-27 PROCEDURE — 84132 ASSAY OF SERUM POTASSIUM: CPT | Mod: 59 | Performed by: STUDENT IN AN ORGANIZED HEALTH CARE EDUCATION/TRAINING PROGRAM

## 2025-02-27 PROCEDURE — 71046 X-RAY EXAM CHEST 2 VIEWS: CPT

## 2025-02-27 PROCEDURE — 84132 ASSAY OF SERUM POTASSIUM: CPT | Performed by: STUDENT IN AN ORGANIZED HEALTH CARE EDUCATION/TRAINING PROGRAM

## 2025-02-27 PROCEDURE — 36415 COLL VENOUS BLD VENIPUNCTURE: CPT | Performed by: STUDENT IN AN ORGANIZED HEALTH CARE EDUCATION/TRAINING PROGRAM

## 2025-02-27 PROCEDURE — 71046 X-RAY EXAM CHEST 2 VIEWS: CPT | Performed by: RADIOLOGY

## 2025-02-27 PROCEDURE — 2500000002 HC RX 250 W HCPCS SELF ADMINISTERED DRUGS (ALT 637 FOR MEDICARE OP, ALT 636 FOR OP/ED): Performed by: STUDENT IN AN ORGANIZED HEALTH CARE EDUCATION/TRAINING PROGRAM

## 2025-02-27 PROCEDURE — RXMED WILLOW AMBULATORY MEDICATION CHARGE

## 2025-02-27 RX ORDER — AZITHROMYCIN 250 MG/1
250 TABLET, FILM COATED ORAL DAILY
Qty: 4 TABLET | Refills: 0 | Status: SHIPPED | OUTPATIENT
Start: 2025-02-28

## 2025-02-27 RX ORDER — PREDNISONE 20 MG/1
40 TABLET ORAL DAILY
Qty: 10 TABLET | Refills: 0 | Status: SHIPPED | OUTPATIENT
Start: 2025-02-27 | End: 2025-03-04

## 2025-02-27 RX ORDER — AZITHROMYCIN 250 MG/1
500 TABLET, FILM COATED ORAL ONCE
Status: COMPLETED | OUTPATIENT
Start: 2025-02-27 | End: 2025-02-27

## 2025-02-27 RX ADMIN — AZITHROMYCIN 500 MG: 250 TABLET, FILM COATED ORAL at 14:10

## 2025-02-27 ASSESSMENT — PAIN SCALES - GENERAL: PAINLEVEL_OUTOF10: 8

## 2025-02-27 ASSESSMENT — PAIN DESCRIPTION - FREQUENCY: FREQUENCY: CONSTANT/CONTINUOUS

## 2025-02-27 ASSESSMENT — COLUMBIA-SUICIDE SEVERITY RATING SCALE - C-SSRS
1. IN THE PAST MONTH, HAVE YOU WISHED YOU WERE DEAD OR WISHED YOU COULD GO TO SLEEP AND NOT WAKE UP?: NO
2. HAVE YOU ACTUALLY HAD ANY THOUGHTS OF KILLING YOURSELF?: NO
6. HAVE YOU EVER DONE ANYTHING, STARTED TO DO ANYTHING, OR PREPARED TO DO ANYTHING TO END YOUR LIFE?: NO

## 2025-02-27 ASSESSMENT — PAIN - FUNCTIONAL ASSESSMENT: PAIN_FUNCTIONAL_ASSESSMENT: 0-10

## 2025-02-27 ASSESSMENT — PAIN DESCRIPTION - LOCATION: LOCATION: CHEST

## 2025-02-27 ASSESSMENT — PAIN DESCRIPTION - PAIN TYPE: TYPE: ACUTE PAIN

## 2025-02-27 ASSESSMENT — PAIN DESCRIPTION - DESCRIPTORS: DESCRIPTORS: TIGHTNESS

## 2025-02-27 NOTE — PROGRESS NOTES
Medication Education     Medication education for Daniel Hopkins was provided to the patient  for the following medication(s):  Azithromycin  Prednisone      Medication education provided by a Pharmacist:  ADR Counseling Dose, frequency, storage How to take and what to do if a dose is missed Proper dose, indication, possible ADRs Benefits of taking the medication     Identified potential barriers to education:  None    Method(s) of Education:  Verbal    An opportunity to ask questions and receive answers was provided.     Assessment of understanding the patient :  2= meets goals/outcomes    Additional Notes (if applicable):     Walter Ferrell, PharmD

## 2025-02-27 NOTE — ED PROVIDER NOTES
HPI   Chief Complaint   Patient presents with    Shortness of Breath    Cough    Chest Pain       This is a 55-year-old male with a past medical history of COPD, coronary disease, hypertension, diabetes, GERD, neuropathy, BPH, depression, alcohol use disorder who presents to the emergency department for shortness of breath.  Patient states that 3 weeks ago he was given doxycycline for cold but he is continue to have symptoms.  He states that he recently got over the flu.  He also reports that he called his primary care's office to get seen but they want him to get a chest x-ray show he just came here.  He states been having chest tightness for the whole week which is worse when he coughs.  He has not noticed any swelling of his legs recently.  He has reports of shortness of breath that he has been taking his inhalers as instructed.  No other issues otherwise no fevers or chills.                          [place] Coma Scale Score: 15                  Patient History   Past Medical History:   Diagnosis Date    Asthma     Hypertension     Sleep apnea      Past Surgical History:   Procedure Laterality Date    CARDIAC CATHETERIZATION N/A 11/19/2024    Procedure: Left Heart Cath;  Surgeon: Jeffrey Walker MD;  Location: [facility];  Service: Cardiovascular;  Laterality: N/A;     Family History   Problem Relation Name Age of Onset    Heart disease Mother's Brother       Social History     Tobacco Use    Smoking status: Every Day     Current packs/day: 0.50     Average packs/day: 0.3 packs/day for 26.2 years (6.8 ttl pk-yrs)     Types: Cigarettes     Start date: 1999    Smokeless tobacco: Never   Substance Use Topics    Alcohol use: Yes    Drug use: Not Currently     Types: Cocaine, Marijuana     Comment: history       Physical Exam   ED Triage Vitals [02/27/25 1050]   Temperature Heart Rate Respirations BP   36.2 °C (97.2 °F) (!) 108 18 121/82      Pulse Ox Temp Source Heart Rate Source Patient Position   (!) 90 %  Temporal -- --      BP Location FiO2 (%)     -- --       Physical Exam  Constitutional:       General: He is not in acute distress.  HENT:      Head: Normocephalic and atraumatic.      Right Ear: Tympanic membrane normal.      Left Ear: Tympanic membrane normal.      Mouth/Throat:      Mouth: Mucous membranes are moist.   Eyes:      Extraocular Movements: Extraocular movements intact.      Conjunctiva/sclera: Conjunctivae normal.      Pupils: Pupils are equal, round, and reactive to light.   Cardiovascular:      Rate and Rhythm: Normal rate and regular rhythm.      Heart sounds: No murmur heard.  Pulmonary:      Effort: Pulmonary effort is normal. No respiratory distress.      Breath sounds: No stridor. Rhonchi present. No wheezing or rales.   Abdominal:      General: Bowel sounds are normal. There is no distension.      Tenderness: There is no abdominal tenderness. There is no guarding or rebound.   Musculoskeletal:         General: No swelling, tenderness or deformity. Normal range of motion.   Skin:     General: Skin is warm and dry.      Coloration: Skin is not jaundiced.      Findings: No bruising or lesion.   Neurological:      General: No focal deficit present.      Mental Status: He is alert and oriented to person, place, and time. Mental status is at baseline.      Cranial Nerves: No cranial nerve deficit.      Motor: No weakness.   Psychiatric:         Mood and Affect: Mood normal.       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  Most likely secondary to his block.  QTc is 502 otherwise normal intervals. [CF]   1245 IMPRESSION:  1.  No evidence of acute cardiopulmonary process.   [CF]      ED Course User Index  [CF] Bonnie  MD Berto         Diagnoses as of 02/27/25 1337   COPD exacerbation (Multi)       Medical Decision Making  This is a 55-year-old male past ministry of COPD, coronary disease, hypertension, diabetes who presents emergency department for shortness of breath that has been intermittent for the past 3 weeks.  He is not requiring oxygen here.  His lungs are clear on my exam chest x-ray shows no signs of pneumonia.  COVID and flu are negative.  EKG is similar to his priors and troponins are negative I have low suspicion for ACS he also recently had a heart catheterization done that showed no disease.  He has no signs of volume overload and his D-dimer is negative less likely PE.  He ambulated here he was around 90%.  Given his COPD this is most likely normal for him.  He was given prednisone and azithromycin and will follow-up with his primary care provider.  He verbalized understands return precautions.        Procedure  Procedures     Bonnie Thomson MD  02/27/25 9736

## 2025-02-28 LAB
ATRIAL RATE: 103 BPM
P AXIS: 53 DEGREES
PR INTERVAL: 165 MS
Q ONSET: 251 MS
QRS COUNT: 16 BEATS
QRS DURATION: 156 MS
QT INTERVAL: 386 MS
QTC CALCULATION(BAZETT): 506 MS
QTC FREDERICIA: 462 MS
R AXIS: 261 DEGREES
T AXIS: 60 DEGREES
T OFFSET: 444 MS
VENTRICULAR RATE: 103 BPM

## 2025-03-04 ENCOUNTER — APPOINTMENT (OUTPATIENT)
Dept: PULMONOLOGY | Facility: HOSPITAL | Age: 55
End: 2025-03-04
Payer: MEDICARE

## 2025-03-12 ENCOUNTER — APPOINTMENT (OUTPATIENT)
Dept: RADIOLOGY | Facility: HOSPITAL | Age: 55
DRG: 190 | End: 2025-03-12
Payer: MEDICARE

## 2025-03-12 ENCOUNTER — APPOINTMENT (OUTPATIENT)
Dept: CARDIOLOGY | Facility: HOSPITAL | Age: 55
DRG: 190 | End: 2025-03-12
Payer: MEDICARE

## 2025-03-12 ENCOUNTER — PHARMACY VISIT (OUTPATIENT)
Dept: PHARMACY | Facility: CLINIC | Age: 55
End: 2025-03-12
Payer: MEDICARE

## 2025-03-12 ENCOUNTER — HOSPITAL ENCOUNTER (INPATIENT)
Facility: HOSPITAL | Age: 55
LOS: 2 days | Discharge: HOME | DRG: 190 | End: 2025-03-14
Attending: STUDENT IN AN ORGANIZED HEALTH CARE EDUCATION/TRAINING PROGRAM | Admitting: INTERNAL MEDICINE
Payer: MEDICARE

## 2025-03-12 DIAGNOSIS — F33.9 RECURRENT MAJOR DEPRESSIVE DISORDER, REMISSION STATUS UNSPECIFIED (CMS-HCC): ICD-10-CM

## 2025-03-12 DIAGNOSIS — J44.1 COPD EXACERBATION (MULTI): Primary | ICD-10-CM

## 2025-03-12 LAB
ALBUMIN SERPL BCP-MCNC: 3.7 G/DL (ref 3.4–5)
ALP SERPL-CCNC: 62 U/L (ref 33–120)
ALT SERPL W P-5'-P-CCNC: 40 U/L (ref 10–52)
ANION GAP SERPL CALC-SCNC: 10 MMOL/L (ref 10–20)
APAP SERPL-MCNC: <10 UG/ML
AST SERPL W P-5'-P-CCNC: 40 U/L (ref 9–39)
ATRIAL RATE: 100 BPM
BASE EXCESS BLDV CALC-SCNC: 14 MMOL/L (ref -2–3)
BASOPHILS # BLD AUTO: 0.02 X10*3/UL (ref 0–0.1)
BASOPHILS NFR BLD AUTO: 0.2 %
BILIRUB SERPL-MCNC: 0.7 MG/DL (ref 0–1.2)
BNP SERPL-MCNC: 72 PG/ML (ref 0–99)
BODY TEMPERATURE: 37 DEGREES CELSIUS
BUN SERPL-MCNC: 12 MG/DL (ref 6–23)
CALCIUM SERPL-MCNC: 9.1 MG/DL (ref 8.6–10.3)
CARDIAC TROPONIN I PNL SERPL HS: 12 NG/L (ref 0–20)
CARDIAC TROPONIN I PNL SERPL HS: 12 NG/L (ref 0–20)
CHLORIDE SERPL-SCNC: 93 MMOL/L (ref 98–107)
CO2 SERPL-SCNC: 38 MMOL/L (ref 21–32)
CREAT SERPL-MCNC: 0.87 MG/DL (ref 0.5–1.3)
EGFRCR SERPLBLD CKD-EPI 2021: >90 ML/MIN/1.73M*2
EOSINOPHIL # BLD AUTO: 0.08 X10*3/UL (ref 0–0.7)
EOSINOPHIL NFR BLD AUTO: 1 %
ERYTHROCYTE [DISTWIDTH] IN BLOOD BY AUTOMATED COUNT: 15.5 % (ref 11.5–14.5)
ETHANOL SERPL-MCNC: <10 MG/DL
FLUAV RNA RESP QL NAA+PROBE: NOT DETECTED
FLUBV RNA RESP QL NAA+PROBE: NOT DETECTED
GLUCOSE BLD MANUAL STRIP-MCNC: 182 MG/DL (ref 74–99)
GLUCOSE BLD MANUAL STRIP-MCNC: 255 MG/DL (ref 74–99)
GLUCOSE SERPL-MCNC: 157 MG/DL (ref 74–99)
HCO3 BLDV-SCNC: 39.6 MMOL/L (ref 22–26)
HCT VFR BLD AUTO: 41.1 % (ref 41–52)
HGB BLD-MCNC: 12.9 G/DL (ref 13.5–17.5)
IMM GRANULOCYTES # BLD AUTO: 0.03 X10*3/UL (ref 0–0.7)
IMM GRANULOCYTES NFR BLD AUTO: 0.4 % (ref 0–0.9)
INHALED O2 CONCENTRATION: 32 %
LACTATE SERPL-SCNC: 2 MMOL/L (ref 0.4–2)
LYMPHOCYTES # BLD AUTO: 1.99 X10*3/UL (ref 1.2–4.8)
LYMPHOCYTES NFR BLD AUTO: 24.1 %
MCH RBC QN AUTO: 27.2 PG (ref 26–34)
MCHC RBC AUTO-ENTMCNC: 31.4 G/DL (ref 32–36)
MCV RBC AUTO: 87 FL (ref 80–100)
MONOCYTES # BLD AUTO: 0.84 X10*3/UL (ref 0.1–1)
MONOCYTES NFR BLD AUTO: 10.2 %
NEUTROPHILS # BLD AUTO: 5.29 X10*3/UL (ref 1.2–7.7)
NEUTROPHILS NFR BLD AUTO: 64.1 %
NRBC BLD-RTO: 0 /100 WBCS (ref 0–0)
OXYHGB MFR BLDV: 86.2 % (ref 45–75)
P AXIS: 27 DEGREES
PCO2 BLDV: 52 MM HG (ref 41–51)
PH BLDV: 7.49 PH (ref 7.33–7.43)
PLATELET # BLD AUTO: 332 X10*3/UL (ref 150–450)
PO2 BLDV: 59 MM HG (ref 35–45)
POTASSIUM SERPL-SCNC: 3.2 MMOL/L (ref 3.5–5.3)
PR INTERVAL: 155 MS
PROT SERPL-MCNC: 6.3 G/DL (ref 6.4–8.2)
Q ONSET: 251 MS
QRS COUNT: 16 BEATS
QRS DURATION: 150 MS
QT INTERVAL: 400 MS
QTC CALCULATION(BAZETT): 516 MS
QTC FREDERICIA: 474 MS
R AXIS: 269 DEGREES
RBC # BLD AUTO: 4.75 X10*6/UL (ref 4.5–5.9)
RSV RNA RESP QL NAA+PROBE: NOT DETECTED
SALICYLATES SERPL-MCNC: <3 MG/DL
SAO2 % BLDV: 90 % (ref 45–75)
SARS-COV-2 RNA RESP QL NAA+PROBE: NOT DETECTED
SODIUM SERPL-SCNC: 138 MMOL/L (ref 136–145)
T AXIS: 57 DEGREES
T OFFSET: 451 MS
TSH SERPL-ACNC: 2.37 MIU/L (ref 0.44–3.98)
VENTRICULAR RATE: 100 BPM
WBC # BLD AUTO: 8.3 X10*3/UL (ref 4.4–11.3)

## 2025-03-12 PROCEDURE — 99285 EMERGENCY DEPT VISIT HI MDM: CPT | Mod: 25 | Performed by: STUDENT IN AN ORGANIZED HEALTH CARE EDUCATION/TRAINING PROGRAM

## 2025-03-12 PROCEDURE — 87040 BLOOD CULTURE FOR BACTERIA: CPT | Mod: PORLAB | Performed by: STUDENT IN AN ORGANIZED HEALTH CARE EDUCATION/TRAINING PROGRAM

## 2025-03-12 PROCEDURE — 93005 ELECTROCARDIOGRAM TRACING: CPT

## 2025-03-12 PROCEDURE — 71275 CT ANGIOGRAPHY CHEST: CPT

## 2025-03-12 PROCEDURE — 36415 COLL VENOUS BLD VENIPUNCTURE: CPT | Performed by: STUDENT IN AN ORGANIZED HEALTH CARE EDUCATION/TRAINING PROGRAM

## 2025-03-12 PROCEDURE — 2500000002 HC RX 250 W HCPCS SELF ADMINISTERED DRUGS (ALT 637 FOR MEDICARE OP, ALT 636 FOR OP/ED): Performed by: NURSE PRACTITIONER

## 2025-03-12 PROCEDURE — 80320 DRUG SCREEN QUANTALCOHOLS: CPT | Performed by: STUDENT IN AN ORGANIZED HEALTH CARE EDUCATION/TRAINING PROGRAM

## 2025-03-12 PROCEDURE — 94660 CPAP INITIATION&MGMT: CPT

## 2025-03-12 PROCEDURE — 83605 ASSAY OF LACTIC ACID: CPT | Performed by: STUDENT IN AN ORGANIZED HEALTH CARE EDUCATION/TRAINING PROGRAM

## 2025-03-12 PROCEDURE — 2550000001 HC RX 255 CONTRASTS: Performed by: STUDENT IN AN ORGANIZED HEALTH CARE EDUCATION/TRAINING PROGRAM

## 2025-03-12 PROCEDURE — 2500000002 HC RX 250 W HCPCS SELF ADMINISTERED DRUGS (ALT 637 FOR MEDICARE OP, ALT 636 FOR OP/ED)

## 2025-03-12 PROCEDURE — S4991 NICOTINE PATCH NONLEGEND: HCPCS

## 2025-03-12 PROCEDURE — 85025 COMPLETE CBC W/AUTO DIFF WBC: CPT | Performed by: STUDENT IN AN ORGANIZED HEALTH CARE EDUCATION/TRAINING PROGRAM

## 2025-03-12 PROCEDURE — 1210000001 HC SEMI-PRIVATE ROOM DAILY

## 2025-03-12 PROCEDURE — 71275 CT ANGIOGRAPHY CHEST: CPT | Mod: FOREIGN READ | Performed by: RADIOLOGY

## 2025-03-12 PROCEDURE — 96374 THER/PROPH/DIAG INJ IV PUSH: CPT | Mod: 59

## 2025-03-12 PROCEDURE — 96365 THER/PROPH/DIAG IV INF INIT: CPT

## 2025-03-12 PROCEDURE — 82947 ASSAY GLUCOSE BLOOD QUANT: CPT

## 2025-03-12 PROCEDURE — 82805 BLOOD GASES W/O2 SATURATION: CPT | Performed by: STUDENT IN AN ORGANIZED HEALTH CARE EDUCATION/TRAINING PROGRAM

## 2025-03-12 PROCEDURE — 71045 X-RAY EXAM CHEST 1 VIEW: CPT

## 2025-03-12 PROCEDURE — 2500000001 HC RX 250 WO HCPCS SELF ADMINISTERED DRUGS (ALT 637 FOR MEDICARE OP): Performed by: NURSE PRACTITIONER

## 2025-03-12 PROCEDURE — 84443 ASSAY THYROID STIM HORMONE: CPT | Performed by: STUDENT IN AN ORGANIZED HEALTH CARE EDUCATION/TRAINING PROGRAM

## 2025-03-12 PROCEDURE — 94640 AIRWAY INHALATION TREATMENT: CPT

## 2025-03-12 PROCEDURE — 84484 ASSAY OF TROPONIN QUANT: CPT | Performed by: STUDENT IN AN ORGANIZED HEALTH CARE EDUCATION/TRAINING PROGRAM

## 2025-03-12 PROCEDURE — 2500000001 HC RX 250 WO HCPCS SELF ADMINISTERED DRUGS (ALT 637 FOR MEDICARE OP): Performed by: STUDENT IN AN ORGANIZED HEALTH CARE EDUCATION/TRAINING PROGRAM

## 2025-03-12 PROCEDURE — 71045 X-RAY EXAM CHEST 1 VIEW: CPT | Mod: FOREIGN READ | Performed by: RADIOLOGY

## 2025-03-12 PROCEDURE — 96375 TX/PRO/DX INJ NEW DRUG ADDON: CPT

## 2025-03-12 PROCEDURE — 2500000004 HC RX 250 GENERAL PHARMACY W/ HCPCS (ALT 636 FOR OP/ED): Performed by: STUDENT IN AN ORGANIZED HEALTH CARE EDUCATION/TRAINING PROGRAM

## 2025-03-12 PROCEDURE — 2500000004 HC RX 250 GENERAL PHARMACY W/ HCPCS (ALT 636 FOR OP/ED): Mod: JZ | Performed by: NURSE PRACTITIONER

## 2025-03-12 PROCEDURE — 2500000002 HC RX 250 W HCPCS SELF ADMINISTERED DRUGS (ALT 637 FOR MEDICARE OP, ALT 636 FOR OP/ED): Performed by: STUDENT IN AN ORGANIZED HEALTH CARE EDUCATION/TRAINING PROGRAM

## 2025-03-12 PROCEDURE — 87637 SARSCOV2&INF A&B&RSV AMP PRB: CPT | Performed by: NURSE PRACTITIONER

## 2025-03-12 PROCEDURE — 83880 ASSAY OF NATRIURETIC PEPTIDE: CPT | Performed by: STUDENT IN AN ORGANIZED HEALTH CARE EDUCATION/TRAINING PROGRAM

## 2025-03-12 PROCEDURE — 84075 ASSAY ALKALINE PHOSPHATASE: CPT | Performed by: STUDENT IN AN ORGANIZED HEALTH CARE EDUCATION/TRAINING PROGRAM

## 2025-03-12 PROCEDURE — 99222 1ST HOSP IP/OBS MODERATE 55: CPT | Performed by: NURSE PRACTITIONER

## 2025-03-12 PROCEDURE — RXMED WILLOW AMBULATORY MEDICATION CHARGE

## 2025-03-12 RX ORDER — DIPHENHYDRAMINE HCL 25 MG
25 CAPSULE ORAL EVERY 6 HOURS PRN
Qty: 12 CAPSULE | Refills: 0 | OUTPATIENT
Start: 2025-03-12 | End: 2025-03-14 | Stop reason: HOSPADM

## 2025-03-12 RX ORDER — TRAZODONE HYDROCHLORIDE 50 MG/1
50 TABLET ORAL NIGHTLY PRN
Status: DISCONTINUED | OUTPATIENT
Start: 2025-03-12 | End: 2025-03-14 | Stop reason: HOSPADM

## 2025-03-12 RX ORDER — IBUPROFEN 200 MG
1 TABLET ORAL DAILY
Status: DISCONTINUED | OUTPATIENT
Start: 2025-03-12 | End: 2025-03-14 | Stop reason: HOSPADM

## 2025-03-12 RX ORDER — SENNOSIDES 8.6 MG/1
2 TABLET ORAL 2 TIMES DAILY
Status: DISCONTINUED | OUTPATIENT
Start: 2025-03-12 | End: 2025-03-14 | Stop reason: HOSPADM

## 2025-03-12 RX ORDER — MULTIVIT-MIN/IRON FUM/FOLIC AC 7.5 MG-4
1 TABLET ORAL DAILY
Status: DISCONTINUED | OUTPATIENT
Start: 2025-03-12 | End: 2025-03-14 | Stop reason: HOSPADM

## 2025-03-12 RX ORDER — PHENOBARBITAL 16.2 MG/1
TABLET ORAL
Qty: 42 TABLET | Refills: 0 | OUTPATIENT
Start: 2025-03-12 | End: 2025-03-14 | Stop reason: HOSPADM

## 2025-03-12 RX ORDER — PHENOBARBITAL 32.4 MG/1
32.4 TABLET ORAL EVERY 8 HOURS
Status: DISCONTINUED | OUTPATIENT
Start: 2025-03-15 | End: 2025-03-14 | Stop reason: HOSPADM

## 2025-03-12 RX ORDER — IPRATROPIUM BROMIDE AND ALBUTEROL SULFATE 2.5; .5 MG/3ML; MG/3ML
3 SOLUTION RESPIRATORY (INHALATION)
Status: DISCONTINUED | OUTPATIENT
Start: 2025-03-12 | End: 2025-03-14 | Stop reason: HOSPADM

## 2025-03-12 RX ORDER — FOLIC ACID 1 MG/1
1 TABLET ORAL DAILY
Status: DISCONTINUED | OUTPATIENT
Start: 2025-03-12 | End: 2025-03-14 | Stop reason: HOSPADM

## 2025-03-12 RX ORDER — HYDROXYZINE HYDROCHLORIDE 50 MG/1
50 TABLET, FILM COATED ORAL EVERY 8 HOURS PRN
Qty: 30 TABLET | Refills: 0 | OUTPATIENT
Start: 2025-03-12

## 2025-03-12 RX ORDER — PHENOBARBITAL 16.2 MG/1
16.2 TABLET ORAL EVERY 8 HOURS
Status: DISCONTINUED | OUTPATIENT
Start: 2025-03-16 | End: 2025-03-14 | Stop reason: HOSPADM

## 2025-03-12 RX ORDER — PHENOBARBITAL 32.4 MG/1
32.4 TABLET ORAL EVERY 6 HOURS
Status: DISCONTINUED | OUTPATIENT
Start: 2025-03-14 | End: 2025-03-14 | Stop reason: HOSPADM

## 2025-03-12 RX ORDER — NAPROXEN SODIUM 220 MG/1
324 TABLET, FILM COATED ORAL ONCE
Status: COMPLETED | OUTPATIENT
Start: 2025-03-12 | End: 2025-03-12

## 2025-03-12 RX ORDER — INSULIN LISPRO 100 [IU]/ML
0-5 INJECTION, SOLUTION INTRAVENOUS; SUBCUTANEOUS
Status: DISCONTINUED | OUTPATIENT
Start: 2025-03-12 | End: 2025-03-14 | Stop reason: HOSPADM

## 2025-03-12 RX ORDER — TRAZODONE HYDROCHLORIDE 50 MG/1
50 TABLET ORAL NIGHTLY PRN
Qty: 10 TABLET | Refills: 0 | OUTPATIENT
Start: 2025-03-12

## 2025-03-12 RX ORDER — LANOLIN ALCOHOL/MO/W.PET/CERES
100 CREAM (GRAM) TOPICAL DAILY
Status: DISCONTINUED | OUTPATIENT
Start: 2025-03-12 | End: 2025-03-14 | Stop reason: HOSPADM

## 2025-03-12 RX ORDER — IPRATROPIUM BROMIDE AND ALBUTEROL SULFATE 2.5; .5 MG/3ML; MG/3ML
3 SOLUTION RESPIRATORY (INHALATION) ONCE
Status: COMPLETED | OUTPATIENT
Start: 2025-03-12 | End: 2025-03-12

## 2025-03-12 RX ORDER — CEFTRIAXONE 2 G/50ML
2 INJECTION, SOLUTION INTRAVENOUS ONCE
Status: COMPLETED | OUTPATIENT
Start: 2025-03-12 | End: 2025-03-12

## 2025-03-12 RX ORDER — ESCITALOPRAM OXALATE 10 MG/1
20 TABLET ORAL DAILY
Status: DISCONTINUED | OUTPATIENT
Start: 2025-03-12 | End: 2025-03-14 | Stop reason: HOSPADM

## 2025-03-12 RX ORDER — PHENOBARBITAL 32.4 MG/1
64.8 TABLET ORAL EVERY 8 HOURS
Status: COMPLETED | OUTPATIENT
Start: 2025-03-12 | End: 2025-03-13

## 2025-03-12 RX ORDER — DEXTROSE 50 % IN WATER (D50W) INTRAVENOUS SYRINGE
12.5
Status: DISCONTINUED | OUTPATIENT
Start: 2025-03-12 | End: 2025-03-14 | Stop reason: HOSPADM

## 2025-03-12 RX ORDER — DEXTROSE 50 % IN WATER (D50W) INTRAVENOUS SYRINGE
25
Status: DISCONTINUED | OUTPATIENT
Start: 2025-03-12 | End: 2025-03-14 | Stop reason: HOSPADM

## 2025-03-12 RX ORDER — TALC
3 POWDER (GRAM) TOPICAL NIGHTLY PRN
Qty: 14 TABLET | Refills: 0 | OUTPATIENT
Start: 2025-03-12 | End: 2025-03-14 | Stop reason: HOSPADM

## 2025-03-12 RX ORDER — GUAIFENESIN 600 MG/1
600 TABLET, EXTENDED RELEASE ORAL 2 TIMES DAILY PRN
Status: DISCONTINUED | OUTPATIENT
Start: 2025-03-12 | End: 2025-03-14 | Stop reason: HOSPADM

## 2025-03-12 RX ORDER — BUPROPION HYDROCHLORIDE 150 MG/1
150 TABLET ORAL DAILY
Status: DISCONTINUED | OUTPATIENT
Start: 2025-03-12 | End: 2025-03-13

## 2025-03-12 RX ORDER — MAGNESIUM SULFATE HEPTAHYDRATE 40 MG/ML
2 INJECTION, SOLUTION INTRAVENOUS ONCE
Status: COMPLETED | OUTPATIENT
Start: 2025-03-12 | End: 2025-03-12

## 2025-03-12 RX ORDER — PROMETHAZINE HYDROCHLORIDE 12.5 MG/1
12.5 TABLET ORAL EVERY 6 HOURS PRN
Qty: 20 TABLET | Refills: 0 | OUTPATIENT
Start: 2025-03-12 | End: 2025-03-14 | Stop reason: HOSPADM

## 2025-03-12 RX ORDER — ENOXAPARIN SODIUM 100 MG/ML
40 INJECTION SUBCUTANEOUS EVERY 24 HOURS
Status: DISCONTINUED | OUTPATIENT
Start: 2025-03-12 | End: 2025-03-14 | Stop reason: HOSPADM

## 2025-03-12 RX ORDER — IBUPROFEN 400 MG/1
400 TABLET ORAL EVERY 6 HOURS PRN
Qty: 16 TABLET | Refills: 0 | OUTPATIENT
Start: 2025-03-12

## 2025-03-12 RX ORDER — HYDROXYZINE HYDROCHLORIDE 25 MG/1
50 TABLET, FILM COATED ORAL EVERY 8 HOURS PRN
Status: DISCONTINUED | OUTPATIENT
Start: 2025-03-12 | End: 2025-03-14 | Stop reason: HOSPADM

## 2025-03-12 RX ORDER — BISMUTH SUBSALICYLATE 262 MG
1 TABLET,CHEWABLE ORAL DAILY
Qty: 14 TABLET | Refills: 0 | OUTPATIENT
Start: 2025-03-12

## 2025-03-12 RX ORDER — DIAZEPAM 5 MG/1
5 TABLET ORAL EVERY 2 HOUR PRN
Status: DISCONTINUED | OUTPATIENT
Start: 2025-03-12 | End: 2025-03-12

## 2025-03-12 RX ORDER — LORAZEPAM 2 MG/ML
2 INJECTION INTRAMUSCULAR EVERY 4 HOURS PRN
Status: DISCONTINUED | OUTPATIENT
Start: 2025-03-12 | End: 2025-03-14 | Stop reason: HOSPADM

## 2025-03-12 RX ORDER — LORAZEPAM 1 MG/1
2 TABLET ORAL EVERY 4 HOURS PRN
Qty: 6 TABLET | Refills: 0 | OUTPATIENT
Start: 2025-03-12

## 2025-03-12 RX ORDER — ACETAMINOPHEN 325 MG/1
325 TABLET ORAL EVERY 4 HOURS PRN
Qty: 24 TABLET | Refills: 0 | OUTPATIENT
Start: 2025-03-12 | End: 2025-03-14 | Stop reason: HOSPADM

## 2025-03-12 RX ORDER — MICONAZOLE NITRATE 2 %
2 CREAM (GRAM) TOPICAL EVERY 4 HOURS PRN
Qty: 20 EACH | Refills: 0 | OUTPATIENT
Start: 2025-03-12

## 2025-03-12 RX ORDER — CLONIDINE HYDROCHLORIDE 0.1 MG/1
0.1 TABLET ORAL EVERY 6 HOURS PRN
Qty: 20 TABLET | Refills: 0 | OUTPATIENT
Start: 2025-03-12 | End: 2025-03-14 | Stop reason: HOSPADM

## 2025-03-12 RX ORDER — IPRATROPIUM BROMIDE AND ALBUTEROL SULFATE 2.5; .5 MG/3ML; MG/3ML
3 SOLUTION RESPIRATORY (INHALATION)
Status: DISCONTINUED | OUTPATIENT
Start: 2025-03-12 | End: 2025-03-12

## 2025-03-12 RX ORDER — ACETAMINOPHEN 325 MG/1
650 TABLET ORAL EVERY 4 HOURS PRN
Status: DISCONTINUED | OUTPATIENT
Start: 2025-03-12 | End: 2025-03-14 | Stop reason: HOSPADM

## 2025-03-12 RX ADMIN — IOHEXOL 75 ML: 350 INJECTION, SOLUTION INTRAVENOUS at 11:09

## 2025-03-12 RX ADMIN — PHENOBARBITAL 64.8 MG: 32.4 TABLET ORAL at 22:19

## 2025-03-12 RX ADMIN — CEFTRIAXONE 2 G: 2 INJECTION, SOLUTION INTRAVENOUS at 12:37

## 2025-03-12 RX ADMIN — IPRATROPIUM BROMIDE AND ALBUTEROL SULFATE 3 ML: 2.5; .5 SOLUTION RESPIRATORY (INHALATION) at 20:20

## 2025-03-12 RX ADMIN — TRAZODONE HYDROCHLORIDE 50 MG: 50 TABLET ORAL at 22:27

## 2025-03-12 RX ADMIN — METHYLPREDNISOLONE SODIUM SUCCINATE 125 MG: 125 INJECTION, POWDER, FOR SOLUTION INTRAMUSCULAR; INTRAVENOUS at 09:41

## 2025-03-12 RX ADMIN — METHYLPREDNISOLONE SODIUM SUCCINATE 40 MG: 40 INJECTION, POWDER, FOR SOLUTION INTRAMUSCULAR; INTRAVENOUS at 22:19

## 2025-03-12 RX ADMIN — DOXYCYCLINE 100 MG: 100 INJECTION, POWDER, LYOPHILIZED, FOR SOLUTION INTRAVENOUS at 22:19

## 2025-03-12 RX ADMIN — MAGNESIUM SULFATE HEPTAHYDRATE 2 G: 40 INJECTION, SOLUTION INTRAVENOUS at 12:33

## 2025-03-12 RX ADMIN — SODIUM CHLORIDE 1000 ML: 900 INJECTION, SOLUTION INTRAVENOUS at 12:25

## 2025-03-12 RX ADMIN — ASPIRIN 81 MG CHEWABLE TABLET 324 MG: 81 TABLET CHEWABLE at 09:48

## 2025-03-12 RX ADMIN — IPRATROPIUM BROMIDE AND ALBUTEROL SULFATE 3 ML: 2.5; .5 SOLUTION RESPIRATORY (INHALATION) at 09:40

## 2025-03-12 RX ADMIN — NICOTINE 1 PATCH: 14 PATCH, EXTENDED RELEASE TRANSDERMAL at 22:20

## 2025-03-12 RX ADMIN — SENNOSIDES 17.2 MG: 8.6 TABLET, FILM COATED ORAL at 22:19

## 2025-03-12 RX ADMIN — PHENOBARBITAL 64.8 MG: 32.4 TABLET ORAL at 15:35

## 2025-03-12 RX ADMIN — DOXYCYCLINE 100 MG: 100 INJECTION, POWDER, LYOPHILIZED, FOR SOLUTION INTRAVENOUS at 13:15

## 2025-03-12 SDOH — SOCIAL STABILITY: SOCIAL INSECURITY: WERE YOU ABLE TO COMPLETE ALL THE BEHAVIORAL HEALTH SCREENINGS?: YES

## 2025-03-12 SDOH — ECONOMIC STABILITY: HOUSING INSECURITY: IN THE LAST 12 MONTHS, WAS THERE A TIME WHEN YOU WERE NOT ABLE TO PAY THE MORTGAGE OR RENT ON TIME?: YES

## 2025-03-12 SDOH — ECONOMIC STABILITY: HOUSING INSECURITY: AT ANY TIME IN THE PAST 12 MONTHS, WERE YOU HOMELESS OR LIVING IN A SHELTER (INCLUDING NOW)?: YES

## 2025-03-12 SDOH — ECONOMIC STABILITY: HOUSING INSECURITY: IN THE PAST 12 MONTHS, HOW MANY TIMES HAVE YOU MOVED WHERE YOU WERE LIVING?: 3

## 2025-03-12 SDOH — SOCIAL STABILITY: SOCIAL INSECURITY: WITHIN THE LAST YEAR, HAVE YOU BEEN HUMILIATED OR EMOTIONALLY ABUSED IN OTHER WAYS BY YOUR PARTNER OR EX-PARTNER?: NO

## 2025-03-12 SDOH — ECONOMIC STABILITY: FOOD INSECURITY: HOW HARD IS IT FOR YOU TO PAY FOR THE VERY BASICS LIKE FOOD, HOUSING, MEDICAL CARE, AND HEATING?: HARD

## 2025-03-12 SDOH — ECONOMIC STABILITY: INCOME INSECURITY: IN THE PAST 12 MONTHS HAS THE ELECTRIC, GAS, OIL, OR WATER COMPANY THREATENED TO SHUT OFF SERVICES IN YOUR HOME?: YES

## 2025-03-12 SDOH — SOCIAL STABILITY: SOCIAL INSECURITY: WITHIN THE LAST YEAR, HAVE YOU BEEN AFRAID OF YOUR PARTNER OR EX-PARTNER?: NO

## 2025-03-12 SDOH — ECONOMIC STABILITY: FOOD INSECURITY: WITHIN THE PAST 12 MONTHS, THE FOOD YOU BOUGHT JUST DIDN'T LAST AND YOU DIDN'T HAVE MONEY TO GET MORE.: SOMETIMES TRUE

## 2025-03-12 SDOH — SOCIAL STABILITY: SOCIAL INSECURITY: HAVE YOU HAD THOUGHTS OF HARMING ANYONE ELSE?: NO

## 2025-03-12 SDOH — ECONOMIC STABILITY: TRANSPORTATION INSECURITY: IN THE PAST 12 MONTHS, HAS LACK OF TRANSPORTATION KEPT YOU FROM MEDICAL APPOINTMENTS OR FROM GETTING MEDICATIONS?: YES

## 2025-03-12 ASSESSMENT — COGNITIVE AND FUNCTIONAL STATUS - GENERAL
DAILY ACTIVITIY SCORE: 24
MOBILITY SCORE: 24
DAILY ACTIVITIY SCORE: 24
PATIENT BASELINE BEDBOUND: NO
MOBILITY SCORE: 24

## 2025-03-12 ASSESSMENT — LIFESTYLE VARIABLES
HOW OFTEN DURING THE LAST YEAR HAVE YOU BEEN UNABLE TO REMEMBER WHAT HAPPENED THE NIGHT BEFORE BECAUSE YOU HAD BEEN DRINKING: MONTHLY
AUDIT TOTAL SCORE: 21
SKIP TO QUESTIONS 9-10: 0
AUDITORY DISTURBANCES: NOT PRESENT
HOW OFTEN DO YOU HAVE A DRINK CONTAINING ALCOHOL: 4 OR MORE TIMES A WEEK
TOTAL SCORE: 2
AGITATION: NORMAL ACTIVITY
ANXIETY: NO ANXIETY, AT EASE
ORIENTATION AND CLOUDING OF SENSORIUM: ORIENTED AND CAN DO SERIAL ADDITIONS
HOW OFTEN DURING THE LAST YEAR HAVE YOU HAD A FEELING OF GUILT OR REMORSE AFTER DRINKING: NEVER
VISUAL DISTURBANCES: NOT PRESENT
HOW OFTEN DO YOU HAVE 6 OR MORE DRINKS ON ONE OCCASION: WEEKLY
HAVE YOU OR SOMEONE ELSE BEEN INJURED AS A RESULT OF YOUR DRINKING: NO
AUDIT TOTAL SCORE: 10
HEADACHE, FULLNESS IN HEAD: NOT PRESENT
AUDIT-C TOTAL SCORE: 11
ORIENTATION AND CLOUDING OF SENSORIUM: ORIENTED AND CAN DO SERIAL ADDITIONS
PAROXYSMAL SWEATS: BARELY PERCEPTIBLE SWEATING, PALMS MOIST
AUDIT-C TOTAL SCORE: 11
AUDITORY DISTURBANCES: NOT PRESENT
HAS A RELATIVE, FRIEND, DOCTOR, OR ANOTHER HEALTH PROFESSIONAL EXPRESSED CONCERN ABOUT YOUR DRINKING OR SUGGESTED YOU CUT DOWN: NO
VISUAL DISTURBANCES: NOT PRESENT
NAUSEA AND VOMITING: NO NAUSEA AND NO VOMITING
HEADACHE, FULLNESS IN HEAD: NOT PRESENT
TREMOR: NO TREMOR
HOW OFTEN DURING THE LAST YEAR HAVE YOU FAILED TO DO WHAT WAS NORMALLY EXPECTED FROM YOU BECAUSE OF DRINKING: WEEKLY
TOTAL SCORE: 1
TREMOR: NO TREMOR
PAROXYSMAL SWEATS: BARELY PERCEPTIBLE SWEATING, PALMS MOIST
PRESCIPTION_ABUSE_PAST_12_MONTHS: NO
NAUSEA AND VOMITING: NO NAUSEA AND NO VOMITING
SUBSTANCE_ABUSE_PAST_12_MONTHS: NO
HOW MANY STANDARD DRINKS CONTAINING ALCOHOL DO YOU HAVE ON A TYPICAL DAY: 10 OR MORE
HOW OFTEN DURING THE LAST YEAR HAVE YOU NEEDED AN ALCOHOLIC DRINK FIRST THING IN THE MORNING TO GET YOURSELF GOING AFTER A NIGHT OF HEAVY DRINKING: MONTHLY
ANXIETY: MILDLY ANXIOUS
AGITATION: NORMAL ACTIVITY
HOW OFTEN DURING THE LAST YEAR HAVE YOU FOUND THAT YOU WERE NOT ABLE TO STOP DRINKING ONCE YOU HAD STARTED: WEEKLY

## 2025-03-12 ASSESSMENT — ACTIVITIES OF DAILY LIVING (ADL)
FEEDING YOURSELF: INDEPENDENT
LACK_OF_TRANSPORTATION: YES
HEARING - LEFT EAR: FUNCTIONAL
WALKS IN HOME: INDEPENDENT
DRESSING YOURSELF: INDEPENDENT
HEARING - RIGHT EAR: FUNCTIONAL
TOILETING: INDEPENDENT
PATIENT'S MEMORY ADEQUATE TO SAFELY COMPLETE DAILY ACTIVITIES?: YES
JUDGMENT_ADEQUATE_SAFELY_COMPLETE_DAILY_ACTIVITIES: YES
LACK_OF_TRANSPORTATION: YES
BATHING: INDEPENDENT
ADEQUATE_TO_COMPLETE_ADL: YES
GROOMING: INDEPENDENT

## 2025-03-12 ASSESSMENT — PAIN SCALES - GENERAL
PAINLEVEL_OUTOF10: 0 - NO PAIN
PAINLEVEL_OUTOF10: 0 - NO PAIN
PAINLEVEL_OUTOF10: 7
PAINLEVEL_OUTOF10: 0 - NO PAIN

## 2025-03-12 ASSESSMENT — COLUMBIA-SUICIDE SEVERITY RATING SCALE - C-SSRS
2. HAVE YOU ACTUALLY HAD ANY THOUGHTS OF KILLING YOURSELF?: NO
6. HAVE YOU EVER DONE ANYTHING, STARTED TO DO ANYTHING, OR PREPARED TO DO ANYTHING TO END YOUR LIFE?: NO
1. IN THE PAST MONTH, HAVE YOU WISHED YOU WERE DEAD OR WISHED YOU COULD GO TO SLEEP AND NOT WAKE UP?: NO

## 2025-03-12 ASSESSMENT — PAIN - FUNCTIONAL ASSESSMENT
PAIN_FUNCTIONAL_ASSESSMENT: 0-10

## 2025-03-12 ASSESSMENT — PATIENT HEALTH QUESTIONNAIRE - PHQ9
SUM OF ALL RESPONSES TO PHQ9 QUESTIONS 1 & 2: 2
1. LITTLE INTEREST OR PLEASURE IN DOING THINGS: SEVERAL DAYS
2. FEELING DOWN, DEPRESSED OR HOPELESS: SEVERAL DAYS

## 2025-03-12 NOTE — CARE PLAN
The patient's goals for the shift include      The clinical goals for the shift include Patient will maintain safety and sp02 above 92%    Over the shift, the patient did not make progress toward the following goals. Barriers to progression include non-compliance. Recommendations to address these barriers include education.

## 2025-03-12 NOTE — H&P
[facility] MEDICINE HISTORY AND PHYSICAL    History Of Present Illness     Daniel Hopkins is a 55 y.o. male with PMHx of COPD (not on home O2), ETOH abuse, HTN and diabetes who presented with dyspnea. He was drinking 1/2 gallon of vodka every other day for about a week and was admitted to inpatient detox at [place] yesterday. He was sent from [place] for a low pulse ox reading. He says he was SOB for 5 days and had a cough productive of clear sputum.   ED workup was significant for potassium 3.2. VBG showing a metabolic alkalosis. Respiratory screens are negative. CXR was nonacute and CTA chest revealed a 0.7 cm nodular opacity abutting the right minor fissure, new since 12/20/2024. The pt was tachycardic to 108 bpm, and hypoxic to 87% on RA. The pt received ceftriaxone, magnesium and NS.   Remainder of ROS reviewed and negative except as indicated in HPI.     Objective     Past Medical History  He has a past medical history of Anxiety and depression, BPH (benign prostatic hyperplasia), CAD (coronary artery disease), COPD (chronic obstructive pulmonary disease) (Multi), Diabetes (Multi), ETOH abuse, HLD (hyperlipidemia), Hypertension, and Sleep apnea.    Surgical History  He has a past surgical history that includes Cardiac catheterization (N/A, 11/19/2024).    Social History     Tobacco Use    Smoking status: Every Day     Current packs/day: 0.50     Average packs/day: 0.3 packs/day for 26.2 years (6.8 ttl pk-yrs)     Types: Cigarettes     Start date: 1999    Smokeless tobacco: Never   Substance Use Topics    Alcohol use: Yes    Drug use: Not Currently     Types: Cocaine, Marijuana     Comment: history       Family History  Family History   Problem Relation Name Age of Onset    Heart disease Mother's Brother         Allergies  Patient has no known allergies.    Vitals:    03/12/25 1240   BP: 123/82   Pulse: 91   Resp: 16   Temp:    SpO2: 98%       Vitals:    03/12/25 0914   Weight: 109 kg (240 lb)        Scheduled medications  buPROPion XL, 150 mg, oral, Daily  doxycycline, 100 mg, intravenous, Once  doxycycline, 100 mg, intravenous, q12h  enoxaparin, 40 mg, subcutaneous, q24h  escitalopram, 20 mg, oral, Daily  folic acid, 1 mg, oral, Daily  insulin lispro, 0-5 Units, subcutaneous, TID AC  ipratropium-albuteroL, 3 mL, nebulization, q6h  [START ON 3/13/2025] linaCLOtide, 145 mcg, oral, Daily before breakfast  magnesium sulfate, 2 g, intravenous, Once  methylPREDNISolone sodium succinate (PF), 40 mg, intravenous, q12h  multivitamin with minerals, 1 tablet, oral, Daily  sennosides, 2 tablet, oral, BID  sodium chloride, 1,000 mL, intravenous, Once  thiamine, 100 mg, oral, Daily      Continuous medications     PRN medications  PRN medications: acetaminophen, dextrose, dextrose, diazePAM, glucagon, glucagon, guaiFENesin    Results for orders placed or performed during the hospital encounter of 03/12/25 (from the past 24 hours)   ECG 12 lead   Result Value Ref Range    Ventricular Rate 100 BPM    Atrial Rate 100 BPM    AL Interval 155 ms    QRS Duration 150 ms    QT Interval 400 ms    QTC Calculation(Bazett) 516 ms    P Axis 27 degrees    R Axis 269 degrees    T Axis 57 degrees    QRS Count 16 beats    Q Onset 251 ms    T Offset 451 ms    QTC Fredericia 474 ms   CBC and Auto Differential   Result Value Ref Range    WBC 8.3 4.4 - 11.3 x10*3/uL    nRBC 0.0 0.0 - 0.0 /100 WBCs    RBC 4.75 4.50 - 5.90 x10*6/uL    Hemoglobin 12.9 (L) 13.5 - 17.5 g/dL    Hematocrit 41.1 41.0 - 52.0 %    MCV 87 80 - 100 fL    MCH 27.2 26.0 - 34.0 pg    MCHC 31.4 (L) 32.0 - 36.0 g/dL    RDW 15.5 (H) 11.5 - 14.5 %    Platelets 332 150 - 450 x10*3/uL    Neutrophils % 64.1 40.0 - 80.0 %    Immature Granulocytes %, Automated 0.4 0.0 - 0.9 %    Lymphocytes % 24.1 13.0 - 44.0 %    Monocytes % 10.2 2.0 - 10.0 %    Eosinophils % 1.0 0.0 - 6.0 %    Basophils % 0.2 0.0 - 2.0 %    Neutrophils Absolute 5.29 1.20 - 7.70 x10*3/uL    Immature Granulocytes  Absolute, Automated 0.03 0.00 - 0.70 x10*3/uL    Lymphocytes Absolute 1.99 1.20 - 4.80 x10*3/uL    Monocytes Absolute 0.84 0.10 - 1.00 x10*3/uL    Eosinophils Absolute 0.08 0.00 - 0.70 x10*3/uL    Basophils Absolute 0.02 0.00 - 0.10 x10*3/uL   Comprehensive metabolic panel   Result Value Ref Range    Glucose 157 (H) 74 - 99 mg/dL    Sodium 138 136 - 145 mmol/L    Potassium 3.2 (L) 3.5 - 5.3 mmol/L    Chloride 93 (L) 98 - 107 mmol/L    Bicarbonate 38 (H) 21 - 32 mmol/L    Anion Gap 10 10 - 20 mmol/L    Urea Nitrogen 12 6 - 23 mg/dL    Creatinine 0.87 0.50 - 1.30 mg/dL    eGFR >90 >60 mL/min/1.73m*2    Calcium 9.1 8.6 - 10.3 mg/dL    Albumin 3.7 3.4 - 5.0 g/dL    Alkaline Phosphatase 62 33 - 120 U/L    Total Protein 6.3 (L) 6.4 - 8.2 g/dL    AST 40 (H) 9 - 39 U/L    Bilirubin, Total 0.7 0.0 - 1.2 mg/dL    ALT 40 10 - 52 U/L   B-Type Natriuretic Peptide   Result Value Ref Range    BNP 72 0 - 99 pg/mL   Blood Gas Venous   Result Value Ref Range    POCT pH, Venous 7.49 (H) 7.33 - 7.43 pH    POCT pCO2, Venous 52 (H) 41 - 51 mm Hg    POCT pO2, Venous 59 (H) 35 - 45 mm Hg    POCT SO2, Venous 90 (H) 45 - 75 %    POCT Oxy Hemoglobin, Venous 86.2 (H) 45.0 - 75.0 %    POCT Base Excess, Venous 14.0 (H) -2.0 - 3.0 mmol/L    POCT HCO3 Calculated, Venous 39.6 (H) 22.0 - 26.0 mmol/L    Patient Temperature 37.0 degrees Celsius    FiO2 32 %   Lactate   Result Value Ref Range    Lactate 2.0 0.4 - 2.0 mmol/L   Troponin I, High Sensitivity, Initial   Result Value Ref Range    Troponin I, High Sensitivity 12 0 - 20 ng/L   Acute Toxicology Panel, Blood   Result Value Ref Range    Acetaminophen <10.0 10.0 - 30.0 ug/mL    Salicylate  <3 4 - 20 mg/dL    Alcohol <10 <=10 mg/dL   TSH with reflex to Free T4 if abnormal   Result Value Ref Range    Thyroid Stimulating Hormone 2.37 0.44 - 3.98 mIU/L   Troponin, High Sensitivity, 1 Hour   Result Value Ref Range    Troponin I, High Sensitivity 12 0 - 20 ng/L   Sars-CoV-2 and Influenza A/B PCR    Result Value Ref Range    Flu A Result Not Detected Not Detected    Flu B Result Not Detected Not Detected    Coronavirus 2019, PCR Not Detected Not Detected   RSV PCR   Result Value Ref Range    RSV PCR Not Detected Not Detected         I personally reviewed all pertinent labwork, imaging and vital signs, as well as medications, nursing, therapy, discharge planning and consult notes.     Constitutional: Well developed, sleepy but arouseable, oriented x4, no acute distress, cooperative   Eyes: EOMI, clear sclerae   ENMT: mucous membranes moist, no lesions seen   Head/Neck: Neck supple, no apparent injury, head atraumatic   Respiratory/Thorax: Diminished/fine wheezes/rales t/o, good chest expansion, respirations even and unlabored, pt on 2 lpm O2   Cardiovascular: Regular rate and rhythm, no murmurs/rubs/gallops, normal S1 and S 2   Gastrointestinal: Abdomen nondistended, soft, nontender, +BS, no bruits   Musculoskeletal: ROM intact, no joint swelling, normal  strength   Extremities: no cyanosis, contusions or clubbing, or edema   Neurological: no focal deficit, pt alert and oriented x4   Psychological: Appropriate affect and behavior, pleasant   Skin: Warm and dry, no lesions, no rashes       Assessment/Plan     AECOPD   Acute hypoxic respiratory failure  Pt is not on home O2  Wean O2 as tolerated to maintain O2 sat >91%, pt may need home O2 eval   Continue doxycycline, Duoneb, Mucinex and Solumedrol    ETOH abuse  Pt was drinking 1/2 gallon of vodka every other day for about a week and was admitted to inpatient detox at [place] the day PTA  Start CIWA protocol, pt was started on phenobarb taper at [facility] today, will continue here, start folate/thiamine/MVI    HTN   BP controlled, pt no longer on scheduled meds and takes clonidine PRN    Diabetes  Pt no longer on metformin, start insulin sliding scale    Lung nodule  CTA chest revealed a 0.7 cm nodular opacity abutting the right minor fissure, new since  12/20/2024  Will refer to lung nodule navigator on discharge    Anxiety/depression/COPD/HLD/BPH  Pt no longer has active prescriptions for these issues    Discharge disposition  Home when stable      Josselyn Crain CNP  [facility]

## 2025-03-12 NOTE — ED PROVIDER NOTES
HPI   Chief Complaint   Patient presents with    Shortness of Breath     Pt reports SOB off and on since Wednesday. Pt has a hx of asthma and COPD. Pt was sent from [place] for a low pulse ox.        HPI        Patient History   Past Medical History:   Diagnosis Date    Asthma     Hypertension     Sleep apnea      Past Surgical History:   Procedure Laterality Date    CARDIAC CATHETERIZATION N/A 11/19/2024    Procedure: Left Heart Cath;  Surgeon: Jeffrey Walker MD;  Location: [facility] Cardiac Cath Lab;  Service: Cardiovascular;  Laterality: N/A;     Family History   Problem Relation Name Age of Onset    Heart disease Mother's Brother       Social History     Tobacco Use    Smoking status: Every Day     Current packs/day: 0.50     Average packs/day: 0.3 packs/day for 26.2 years (6.8 ttl pk-yrs)     Types: Cigarettes     Start date: 1999    Smokeless tobacco: Never   Substance Use Topics    Alcohol use: Yes    Drug use: Not Currently     Types: Cocaine, Marijuana     Comment: history       Physical Exam   ED Triage Vitals [03/12/25 0914]   Temperature Heart Rate Respirations BP   36.9 °C (98.4 °F) (!) 105 20 135/86      Pulse Ox Temp Source Heart Rate Source Patient Position   (!) 87 % Temporal Monitor --      BP Location FiO2 (%)     -- --       Physical Exam      ED Course & MDM                  No data recorded     [place] Coma Scale Score: 15 (03/12/25 0917 : Dora Renner III, RN)                           Medical Decision Making      Procedure  Procedures

## 2025-03-13 LAB
ANION GAP SERPL CALC-SCNC: 9 MMOL/L (ref 10–20)
APPEARANCE UR: CLEAR
BILIRUB UR STRIP.AUTO-MCNC: NEGATIVE MG/DL
BUN SERPL-MCNC: 12 MG/DL (ref 6–23)
CALCIUM SERPL-MCNC: 8.7 MG/DL (ref 8.6–10.3)
CHLORIDE SERPL-SCNC: 98 MMOL/L (ref 98–107)
CO2 SERPL-SCNC: 34 MMOL/L (ref 21–32)
COLOR UR: ABNORMAL
CREAT SERPL-MCNC: 0.74 MG/DL (ref 0.5–1.3)
EGFRCR SERPLBLD CKD-EPI 2021: >90 ML/MIN/1.73M*2
ERYTHROCYTE [DISTWIDTH] IN BLOOD BY AUTOMATED COUNT: 15.3 % (ref 11.5–14.5)
GLUCOSE BLD MANUAL STRIP-MCNC: 161 MG/DL (ref 74–99)
GLUCOSE BLD MANUAL STRIP-MCNC: 189 MG/DL (ref 74–99)
GLUCOSE BLD MANUAL STRIP-MCNC: 257 MG/DL (ref 74–99)
GLUCOSE BLD MANUAL STRIP-MCNC: 263 MG/DL (ref 74–99)
GLUCOSE SERPL-MCNC: 214 MG/DL (ref 74–99)
GLUCOSE UR STRIP.AUTO-MCNC: ABNORMAL MG/DL
HCT VFR BLD AUTO: 40.3 % (ref 41–52)
HGB BLD-MCNC: 12.2 G/DL (ref 13.5–17.5)
HOLD SPECIMEN: NORMAL
KETONES UR STRIP.AUTO-MCNC: NEGATIVE MG/DL
LEUKOCYTE ESTERASE UR QL STRIP.AUTO: NEGATIVE
MCH RBC QN AUTO: 26.7 PG (ref 26–34)
MCHC RBC AUTO-ENTMCNC: 30.3 G/DL (ref 32–36)
MCV RBC AUTO: 88 FL (ref 80–100)
NITRITE UR QL STRIP.AUTO: NEGATIVE
NRBC BLD-RTO: 0 /100 WBCS (ref 0–0)
PH UR STRIP.AUTO: 6.5 [PH]
PLATELET # BLD AUTO: 300 X10*3/UL (ref 150–450)
POTASSIUM SERPL-SCNC: 3.9 MMOL/L (ref 3.5–5.3)
PROT UR STRIP.AUTO-MCNC: NEGATIVE MG/DL
RBC # BLD AUTO: 4.57 X10*6/UL (ref 4.5–5.9)
RBC # UR STRIP.AUTO: NEGATIVE MG/DL
SODIUM SERPL-SCNC: 137 MMOL/L (ref 136–145)
SP GR UR STRIP.AUTO: 1.01
UROBILINOGEN UR STRIP.AUTO-MCNC: NORMAL MG/DL
WBC # BLD AUTO: 10.8 X10*3/UL (ref 4.4–11.3)

## 2025-03-13 PROCEDURE — 99233 SBSQ HOSP IP/OBS HIGH 50: CPT

## 2025-03-13 PROCEDURE — 2500000001 HC RX 250 WO HCPCS SELF ADMINISTERED DRUGS (ALT 637 FOR MEDICARE OP): Performed by: NURSE PRACTITIONER

## 2025-03-13 PROCEDURE — 99232 SBSQ HOSP IP/OBS MODERATE 35: CPT | Performed by: NURSE PRACTITIONER

## 2025-03-13 PROCEDURE — 80048 BASIC METABOLIC PNL TOTAL CA: CPT | Performed by: NURSE PRACTITIONER

## 2025-03-13 PROCEDURE — S4991 NICOTINE PATCH NONLEGEND: HCPCS

## 2025-03-13 PROCEDURE — 2500000002 HC RX 250 W HCPCS SELF ADMINISTERED DRUGS (ALT 637 FOR MEDICARE OP, ALT 636 FOR OP/ED): Performed by: NURSE PRACTITIONER

## 2025-03-13 PROCEDURE — 81003 URINALYSIS AUTO W/O SCOPE: CPT | Performed by: STUDENT IN AN ORGANIZED HEALTH CARE EDUCATION/TRAINING PROGRAM

## 2025-03-13 PROCEDURE — 94660 CPAP INITIATION&MGMT: CPT

## 2025-03-13 PROCEDURE — 2500000002 HC RX 250 W HCPCS SELF ADMINISTERED DRUGS (ALT 637 FOR MEDICARE OP, ALT 636 FOR OP/ED)

## 2025-03-13 PROCEDURE — 94640 AIRWAY INHALATION TREATMENT: CPT

## 2025-03-13 PROCEDURE — 1210000001 HC SEMI-PRIVATE ROOM DAILY

## 2025-03-13 PROCEDURE — 36415 COLL VENOUS BLD VENIPUNCTURE: CPT | Performed by: NURSE PRACTITIONER

## 2025-03-13 PROCEDURE — 2500000004 HC RX 250 GENERAL PHARMACY W/ HCPCS (ALT 636 FOR OP/ED): Performed by: NURSE PRACTITIONER

## 2025-03-13 PROCEDURE — 85027 COMPLETE CBC AUTOMATED: CPT | Performed by: NURSE PRACTITIONER

## 2025-03-13 PROCEDURE — 82947 ASSAY GLUCOSE BLOOD QUANT: CPT

## 2025-03-13 RX ORDER — IBUPROFEN 400 MG/1
400 TABLET ORAL EVERY 6 HOURS PRN
Status: DISCONTINUED | OUTPATIENT
Start: 2025-03-13 | End: 2025-03-14 | Stop reason: HOSPADM

## 2025-03-13 RX ORDER — FLUTICASONE FUROATE AND VILANTEROL 200; 25 UG/1; UG/1
1 POWDER RESPIRATORY (INHALATION) DAILY
Status: DISCONTINUED | OUTPATIENT
Start: 2025-03-13 | End: 2025-03-14 | Stop reason: HOSPADM

## 2025-03-13 RX ORDER — TAMSULOSIN HYDROCHLORIDE 0.4 MG/1
0.8 CAPSULE ORAL DAILY
Status: DISCONTINUED | OUTPATIENT
Start: 2025-03-13 | End: 2025-03-14 | Stop reason: HOSPADM

## 2025-03-13 RX ORDER — CLONIDINE HYDROCHLORIDE 0.1 MG/1
0.1 TABLET ORAL EVERY 6 HOURS PRN
Status: DISCONTINUED | OUTPATIENT
Start: 2025-03-13 | End: 2025-03-14 | Stop reason: HOSPADM

## 2025-03-13 RX ORDER — AMLODIPINE BESYLATE 10 MG/1
10 TABLET ORAL DAILY
Status: DISCONTINUED | OUTPATIENT
Start: 2025-03-13 | End: 2025-03-14 | Stop reason: HOSPADM

## 2025-03-13 RX ORDER — GABAPENTIN 300 MG/1
300 CAPSULE ORAL 3 TIMES DAILY
Status: DISCONTINUED | OUTPATIENT
Start: 2025-03-13 | End: 2025-03-14 | Stop reason: HOSPADM

## 2025-03-13 RX ORDER — MICONAZOLE NITRATE 2 %
2 CREAM (GRAM) TOPICAL EVERY 4 HOURS PRN
Status: DISCONTINUED | OUTPATIENT
Start: 2025-03-13 | End: 2025-03-14 | Stop reason: HOSPADM

## 2025-03-13 RX ORDER — BUPROPION HYDROCHLORIDE 150 MG/1
300 TABLET ORAL DAILY
Status: DISCONTINUED | OUTPATIENT
Start: 2025-03-14 | End: 2025-03-14 | Stop reason: HOSPADM

## 2025-03-13 RX ORDER — FINASTERIDE 5 MG/1
5 TABLET, FILM COATED ORAL DAILY
Status: DISCONTINUED | OUTPATIENT
Start: 2025-03-13 | End: 2025-03-14 | Stop reason: HOSPADM

## 2025-03-13 RX ORDER — PREDNISONE 20 MG/1
60 TABLET ORAL DAILY
Status: DISCONTINUED | OUTPATIENT
Start: 2025-03-14 | End: 2025-03-14 | Stop reason: HOSPADM

## 2025-03-13 RX ORDER — PANTOPRAZOLE SODIUM 40 MG/1
40 TABLET, DELAYED RELEASE ORAL
Status: DISCONTINUED | OUTPATIENT
Start: 2025-03-13 | End: 2025-03-14 | Stop reason: HOSPADM

## 2025-03-13 RX ADMIN — FINASTERIDE 5 MG: 5 TABLET, FILM COATED ORAL at 09:21

## 2025-03-13 RX ADMIN — INSULIN LISPRO 3 UNITS: 100 INJECTION, SOLUTION INTRAVENOUS; SUBCUTANEOUS at 12:22

## 2025-03-13 RX ADMIN — TIOTROPIUM BROMIDE INHALATION SPRAY 2 PUFF: 3.12 SPRAY, METERED RESPIRATORY (INHALATION) at 09:20

## 2025-03-13 RX ADMIN — PHENOBARBITAL 48.6 MG: 16.2 TABLET ORAL at 16:03

## 2025-03-13 RX ADMIN — THIAMINE HCL TAB 100 MG 100 MG: 100 TAB at 09:19

## 2025-03-13 RX ADMIN — IPRATROPIUM BROMIDE AND ALBUTEROL SULFATE 3 ML: 2.5; .5 SOLUTION RESPIRATORY (INHALATION) at 08:28

## 2025-03-13 RX ADMIN — FOLIC ACID 1 MG: 1 TABLET ORAL at 09:18

## 2025-03-13 RX ADMIN — IPRATROPIUM BROMIDE AND ALBUTEROL SULFATE 3 ML: 2.5; .5 SOLUTION RESPIRATORY (INHALATION) at 20:09

## 2025-03-13 RX ADMIN — DOXYCYCLINE 100 MG: 100 INJECTION, POWDER, LYOPHILIZED, FOR SOLUTION INTRAVENOUS at 09:20

## 2025-03-13 RX ADMIN — FLUTICASONE FUROATE AND VILANTEROL TRIFENATATE 1 PUFF: 200; 25 POWDER RESPIRATORY (INHALATION) at 09:20

## 2025-03-13 RX ADMIN — PHENOBARBITAL 64.8 MG: 32.4 TABLET ORAL at 06:07

## 2025-03-13 RX ADMIN — GABAPENTIN 300 MG: 300 CAPSULE ORAL at 09:19

## 2025-03-13 RX ADMIN — ENOXAPARIN SODIUM 40 MG: 100 INJECTION SUBCUTANEOUS at 21:42

## 2025-03-13 RX ADMIN — METHYLPREDNISOLONE SODIUM SUCCINATE 40 MG: 40 INJECTION, POWDER, FOR SOLUTION INTRAMUSCULAR; INTRAVENOUS at 09:18

## 2025-03-13 RX ADMIN — DOXYCYCLINE 100 MG: 100 INJECTION, POWDER, LYOPHILIZED, FOR SOLUTION INTRAVENOUS at 21:41

## 2025-03-13 RX ADMIN — SENNOSIDES 17.2 MG: 8.6 TABLET, FILM COATED ORAL at 21:42

## 2025-03-13 RX ADMIN — AMLODIPINE BESYLATE 10 MG: 10 TABLET ORAL at 09:19

## 2025-03-13 RX ADMIN — NICOTINE 1 PATCH: 14 PATCH, EXTENDED RELEASE TRANSDERMAL at 21:41

## 2025-03-13 RX ADMIN — NICOTINE POLACRILEX 2 MG: 2 GUM, CHEWING BUCCAL at 16:04

## 2025-03-13 RX ADMIN — GABAPENTIN 300 MG: 300 CAPSULE ORAL at 21:42

## 2025-03-13 RX ADMIN — GABAPENTIN 300 MG: 300 CAPSULE ORAL at 16:04

## 2025-03-13 RX ADMIN — ESCITALOPRAM OXALATE 20 MG: 10 TABLET ORAL at 09:20

## 2025-03-13 RX ADMIN — BUPROPION HYDROCHLORIDE 150 MG: 150 TABLET, EXTENDED RELEASE ORAL at 09:20

## 2025-03-13 RX ADMIN — LINACLOTIDE 145 MCG: 145 CAPSULE, GELATIN COATED ORAL at 06:07

## 2025-03-13 RX ADMIN — PANTOPRAZOLE SODIUM 40 MG: 40 TABLET, DELAYED RELEASE ORAL at 09:20

## 2025-03-13 RX ADMIN — HYDROXYZINE HYDROCHLORIDE 50 MG: 25 TABLET, FILM COATED ORAL at 16:04

## 2025-03-13 RX ADMIN — TAMSULOSIN HYDROCHLORIDE 0.8 MG: 0.4 CAPSULE ORAL at 09:19

## 2025-03-13 RX ADMIN — Medication 1 TABLET: at 09:20

## 2025-03-13 RX ADMIN — IPRATROPIUM BROMIDE AND ALBUTEROL SULFATE 3 ML: 2.5; .5 SOLUTION RESPIRATORY (INHALATION) at 11:51

## 2025-03-13 ASSESSMENT — ENCOUNTER SYMPTOMS
HEMATOLOGIC/LYMPHATIC NEGATIVE: 1
WHEEZING: 1
SLEEP DISTURBANCE: 1
ENDOCRINE NEGATIVE: 1
ALLERGIC/IMMUNOLOGIC NEGATIVE: 1
CARDIOVASCULAR NEGATIVE: 1
GASTROINTESTINAL NEGATIVE: 1
DYSPHORIC MOOD: 1
SHORTNESS OF BREATH: 1
MUSCULOSKELETAL NEGATIVE: 1
EYES NEGATIVE: 1
NEUROLOGICAL NEGATIVE: 1
NERVOUS/ANXIOUS: 1
CONSTITUTIONAL NEGATIVE: 1

## 2025-03-13 ASSESSMENT — LIFESTYLE VARIABLES
AGITATION: NORMAL ACTIVITY
TOTAL SCORE: 0
NAUSEA AND VOMITING: NO NAUSEA AND NO VOMITING
AGITATION: NORMAL ACTIVITY
AGITATION: NORMAL ACTIVITY
PAROXYSMAL SWEATS: NO SWEAT VISIBLE
ORIENTATION AND CLOUDING OF SENSORIUM: ORIENTED AND CAN DO SERIAL ADDITIONS
TOTAL SCORE: 0
AGITATION: NORMAL ACTIVITY
VISUAL DISTURBANCES: NOT PRESENT
TREMOR: NO TREMOR
ORIENTATION AND CLOUDING OF SENSORIUM: ORIENTED AND CAN DO SERIAL ADDITIONS
ORIENTATION AND CLOUDING OF SENSORIUM: ORIENTED AND CAN DO SERIAL ADDITIONS
PAROXYSMAL SWEATS: NO SWEAT VISIBLE
AGITATION: NORMAL ACTIVITY
TOTAL SCORE: 0
HEADACHE, FULLNESS IN HEAD: NOT PRESENT
NAUSEA AND VOMITING: NO NAUSEA AND NO VOMITING
HEADACHE, FULLNESS IN HEAD: NOT PRESENT
NAUSEA AND VOMITING: NO NAUSEA AND NO VOMITING
NAUSEA AND VOMITING: NO NAUSEA AND NO VOMITING
VISUAL DISTURBANCES: NOT PRESENT
AUDITORY DISTURBANCES: NOT PRESENT
TOTAL SCORE: 4
AUDITORY DISTURBANCES: NOT PRESENT
AUDITORY DISTURBANCES: NOT PRESENT
PAROXYSMAL SWEATS: NO SWEAT VISIBLE
TREMOR: NO TREMOR
AGITATION: NORMAL ACTIVITY
VISUAL DISTURBANCES: NOT PRESENT
VISUAL DISTURBANCES: NOT PRESENT
AUDITORY DISTURBANCES: NOT PRESENT
HEADACHE, FULLNESS IN HEAD: NOT PRESENT
HEADACHE, FULLNESS IN HEAD: NOT PRESENT
ORIENTATION AND CLOUDING OF SENSORIUM: ORIENTED AND CAN DO SERIAL ADDITIONS
HEADACHE, FULLNESS IN HEAD: NOT PRESENT
HEADACHE, FULLNESS IN HEAD: NOT PRESENT
VISUAL DISTURBANCES: NOT PRESENT
AGITATION: NORMAL ACTIVITY
HEADACHE, FULLNESS IN HEAD: NOT PRESENT
TREMOR: NO TREMOR
ANXIETY: NO ANXIETY, AT EASE
VISUAL DISTURBANCES: NOT PRESENT
VISUAL DISTURBANCES: NOT PRESENT
ORIENTATION AND CLOUDING OF SENSORIUM: ORIENTED AND CAN DO SERIAL ADDITIONS
AUDITORY DISTURBANCES: NOT PRESENT
ORIENTATION AND CLOUDING OF SENSORIUM: ORIENTED AND CAN DO SERIAL ADDITIONS
ORIENTATION AND CLOUDING OF SENSORIUM: ORIENTED AND CAN DO SERIAL ADDITIONS
ANXIETY: NO ANXIETY, AT EASE
TOTAL SCORE: 0
VISUAL DISTURBANCES: NOT PRESENT
TOTAL SCORE: 0
HEADACHE, FULLNESS IN HEAD: NOT PRESENT
ANXIETY: NO ANXIETY, AT EASE
TOTAL SCORE: 2
ANXIETY: NO ANXIETY, AT EASE
ORIENTATION AND CLOUDING OF SENSORIUM: ORIENTED AND CAN DO SERIAL ADDITIONS
PAROXYSMAL SWEATS: NO SWEAT VISIBLE
VISUAL DISTURBANCES: NOT PRESENT
PAROXYSMAL SWEATS: NO SWEAT VISIBLE
TREMOR: NO TREMOR
ANXIETY: MODERATELY ANXIOUS, OR GUARDED, SO ANXIETY IS INFERRED
TREMOR: NO TREMOR
PAROXYSMAL SWEATS: NO SWEAT VISIBLE
ANXIETY: NO ANXIETY, AT EASE
ANXIETY: 2
HEADACHE, FULLNESS IN HEAD: NOT PRESENT
AUDITORY DISTURBANCES: NOT PRESENT
AUDITORY DISTURBANCES: NOT PRESENT
TOTAL SCORE: 0
AGITATION: NORMAL ACTIVITY
ORIENTATION AND CLOUDING OF SENSORIUM: ORIENTED AND CAN DO SERIAL ADDITIONS
PAROXYSMAL SWEATS: NO SWEAT VISIBLE
TOTAL SCORE: 0
ANXIETY: NO ANXIETY, AT EASE
TOTAL SCORE: 0
AUDITORY DISTURBANCES: NOT PRESENT
NAUSEA AND VOMITING: NO NAUSEA AND NO VOMITING
ORIENTATION AND CLOUDING OF SENSORIUM: ORIENTED AND CAN DO SERIAL ADDITIONS
PAROXYSMAL SWEATS: NO SWEAT VISIBLE
VISUAL DISTURBANCES: NOT PRESENT
AUDITORY DISTURBANCES: NOT PRESENT
AGITATION: NORMAL ACTIVITY
NAUSEA AND VOMITING: NO NAUSEA AND NO VOMITING
PAROXYSMAL SWEATS: NO SWEAT VISIBLE
AGITATION: NORMAL ACTIVITY
NAUSEA AND VOMITING: NO NAUSEA AND NO VOMITING
NAUSEA AND VOMITING: NO NAUSEA AND NO VOMITING
TREMOR: NO TREMOR
TREMOR: NO TREMOR
AUDITORY DISTURBANCES: NOT PRESENT
ANXIETY: NO ANXIETY, AT EASE
NAUSEA AND VOMITING: NO NAUSEA AND NO VOMITING
PAROXYSMAL SWEATS: NO SWEAT VISIBLE
ANXIETY: NO ANXIETY, AT EASE
TREMOR: NO TREMOR
NAUSEA AND VOMITING: NO NAUSEA AND NO VOMITING
HEADACHE, FULLNESS IN HEAD: NOT PRESENT

## 2025-03-13 ASSESSMENT — PAIN SCALES - GENERAL
PAINLEVEL_OUTOF10: 0 - NO PAIN
PAINLEVEL_OUTOF10: 0 - NO PAIN

## 2025-03-13 ASSESSMENT — PAIN - FUNCTIONAL ASSESSMENT: PAIN_FUNCTIONAL_ASSESSMENT: 0-10

## 2025-03-13 NOTE — PROGRESS NOTES
Daniel Hopkins is a 55 y.o. male on day 1 of admission presenting with COPD exacerbation (Multi).      Subjective   Daniel Hopkins is a 55 y.o. male with PMHx of COPD (not on home O2), ETOH abuse, HTN and diabetes who presented with dyspnea. He was drinking 1/2 gallon of vodka every other day for about a week and was admitted to inpatient detox at [place] yesterday. He was sent from [place] for a low pulse ox reading. He says he was SOB for 5 days and had a cough productive of clear sputum.   ED workup was significant for potassium 3.2. VBG showing a metabolic alkalosis. Respiratory screens are negative. CXR was nonacute and CTA chest revealed a 0.7 cm nodular opacity abutting the right minor fissure, new since 12/20/2024. The pt was tachycardic to 108 bpm, and hypoxic to 87% on RA. The pt received ceftriaxone, magnesium and NS.   Remainder of ROS reviewed and negative except as indicated in HPI.     3.13.25  The patient has been weaned off supplemental oxygen. Reports MANN, feels tight. Also states he has bad symptoms of depression and anxiety, recent binge of ETOH. He would like psychiatry consult for medications.        Review of Systems   Respiratory:  Positive for shortness of breath and wheezing.    Psychiatric/Behavioral:  Positive for dysphoric mood and sleep disturbance.           Objective     Last Recorded Vitals  /87 (BP Location: Right arm, Patient Position: Lying)   Pulse 85   Temp 36.3 °C (97.3 °F) (Temporal)   Resp 18   Wt 109 kg (240 lb)   SpO2 96%     Image Results  CT angio chest for pulmonary embolism    Result Date: 3/12/2025  STUDY: CT Angiogram of the Chest; 3/12/2025 11:17 AM INDICATION: Evaluate for pulmonary embolism.  Shortness of breath, tachycardia, new O2 requirement. COMPARISON: XR chest same day; 12/20/2024 CT chest; 11/18/2024 CTA chest. ACCESSION NUMBER(S): [id number] ORDERING CLINICIAN: ALISON MERCER TECHNIQUE:  CTA of the chest was performed with intravenous  contrast. Images are reviewed and processed at a workstation according to the CT angiogram protocol with 3-D and/or MIP post processing imaging generated.  Omnipaque 350 75 mL was administered intravenously.  Automated mA/kV exposure control was utilized and patient examination was performed in strict accordance with principles of ALARA. FINDINGS: Pulmonary arteries are adequately opacified without acute or chronic filling defects.  The thoracic aorta is normal in course and caliber without dissection or aneurysm. The heart is normal in size without pericardial effusion.  Left axillary node with a transverse axis of 1.0 cm.  Series #4 slice 140. There is no pleural effusion, pleural thickening, or pneumothorax. The airways are patent. There is a vaguely defined nodular opacity of 0.7 cm abutting the right minor fissure.  This is a new finding compared with the most recent study dated 12/20/2024.  There is scattered atelectasis within the lingula.  No interstitial disease, or suspicious nodules. Upper abdomen demonstrates no acute pathology. There are no acute fractures.  No suspicious bony lesions.    1. No acute or chronic pulmonary emboli. 2. No thoracic aortic aneurysm or dissection. 3. There is a vaguely defined nodular opacity of 0.7 cm abutting the right minor fissure. This is a new finding compared with the most recent study dated 12/20/2024. 4. Scattered atelectasis within the lingula. Signed by Tacho Jackson MD    ECG 12 lead    Result Date: 3/12/2025  Sinus tachycardia RBBB and LAFB Inferior infarct, acute Lateral leads are also involved >>> Acute MI <<< See ED provider note for full interpretation and clinical correlation Confirmed by Jenn Green ([id number]) on 3/12/2025 10:31:08 AM    XR chest 1 view    Result Date: 3/12/2025  STUDY: Chest Radiograph;  3/12/2025 9:58 AM INDICATION: Shortness of breath. COMPARISON: 2/27/2025 XR Chest, 12/19/2024 XR Chest. ACCESSION NUMBER(S): [id number] ORDERING  CLINICIAN: ALISON MERCER TECHNIQUE:  Frontal chest was obtained at 0958 hours. FINDINGS: CARDIOMEDIASTINAL SILHOUETTE: Cardiomediastinal silhouette is normal in size and configuration.  LUNGS: Lungs are clear.  ABDOMEN: No remarkable upper abdominal findings.  BONES: Degenerative changes right shoulder.    No regions of airspace consolidation.  Normal cardiac size. Signed by Schuyler Canas MD    ECG 12 lead    Result Date: 2/28/2025  Incomplete analysis due to missing data in precordial lead(s) Sinus tachycardia Nonspecific IVCD with LAD Inferior infarct, acute (LCx) Lateral leads are also involved Missing lead(s): V1 >>> Acute MI <<< See ED provider note for full interpretation and clinical correlation Confirmed by Alycia Still ([id number]) on 2/28/2025 11:42:49 AM    XR chest 2 views    Result Date: 2/27/2025  Interpreted By:  Chan Horner, STUDY: XR CHEST 2 VIEWS;  2/27/2025 12:21 pm   INDICATION: Signs/Symptoms:cough SOB.     COMPARISON: 12/19/2024.   ACCESSION NUMBER(S): [id number]   ORDERING CLINICIAN: AZEEM SAUCEDA   FINDINGS: CARDIOMEDIASTINAL SILHOUETTE: Cardiomediastinal silhouette is normal in size and configuration.   LUNGS: Lungs are clear.  No pleural effusion or pneumothorax.   ABDOMEN: No remarkable upper abdominal findings.   BONES: No acute osseous changes.       1.  No evidence of acute cardiopulmonary process.       MACRO: None.   Signed by: Chan Horner 2/27/2025 12:27 PM Dictation workstation:   [id number]       Lab Results  Results for orders placed or performed during the hospital encounter of 03/12/25 (from the past 24 hours)   CBC and Auto Differential   Result Value Ref Range    WBC 8.3 4.4 - 11.3 x10*3/uL    nRBC 0.0 0.0 - 0.0 /100 WBCs    RBC 4.75 4.50 - 5.90 x10*6/uL    Hemoglobin 12.9 (L) 13.5 - 17.5 g/dL    Hematocrit 41.1 41.0 - 52.0 %    MCV 87 80 - 100 fL    MCH 27.2 26.0 - 34.0 pg    MCHC 31.4 (L) 32.0 - 36.0 g/dL    RDW 15.5 (H) 11.5 - 14.5 %    Platelets  332 150 - 450 x10*3/uL    Neutrophils % 64.1 40.0 - 80.0 %    Immature Granulocytes %, Automated 0.4 0.0 - 0.9 %    Lymphocytes % 24.1 13.0 - 44.0 %    Monocytes % 10.2 2.0 - 10.0 %    Eosinophils % 1.0 0.0 - 6.0 %    Basophils % 0.2 0.0 - 2.0 %    Neutrophils Absolute 5.29 1.20 - 7.70 x10*3/uL    Immature Granulocytes Absolute, Automated 0.03 0.00 - 0.70 x10*3/uL    Lymphocytes Absolute 1.99 1.20 - 4.80 x10*3/uL    Monocytes Absolute 0.84 0.10 - 1.00 x10*3/uL    Eosinophils Absolute 0.08 0.00 - 0.70 x10*3/uL    Basophils Absolute 0.02 0.00 - 0.10 x10*3/uL   Comprehensive metabolic panel   Result Value Ref Range    Glucose 157 (H) 74 - 99 mg/dL    Sodium 138 136 - 145 mmol/L    Potassium 3.2 (L) 3.5 - 5.3 mmol/L    Chloride 93 (L) 98 - 107 mmol/L    Bicarbonate 38 (H) 21 - 32 mmol/L    Anion Gap 10 10 - 20 mmol/L    Urea Nitrogen 12 6 - 23 mg/dL    Creatinine 0.87 0.50 - 1.30 mg/dL    eGFR >90 >60 mL/min/1.73m*2    Calcium 9.1 8.6 - 10.3 mg/dL    Albumin 3.7 3.4 - 5.0 g/dL    Alkaline Phosphatase 62 33 - 120 U/L    Total Protein 6.3 (L) 6.4 - 8.2 g/dL    AST 40 (H) 9 - 39 U/L    Bilirubin, Total 0.7 0.0 - 1.2 mg/dL    ALT 40 10 - 52 U/L   B-Type Natriuretic Peptide   Result Value Ref Range    BNP 72 0 - 99 pg/mL   Blood Gas Venous   Result Value Ref Range    POCT pH, Venous 7.49 (H) 7.33 - 7.43 pH    POCT pCO2, Venous 52 (H) 41 - 51 mm Hg    POCT pO2, Venous 59 (H) 35 - 45 mm Hg    POCT SO2, Venous 90 (H) 45 - 75 %    POCT Oxy Hemoglobin, Venous 86.2 (H) 45.0 - 75.0 %    POCT Base Excess, Venous 14.0 (H) -2.0 - 3.0 mmol/L    POCT HCO3 Calculated, Venous 39.6 (H) 22.0 - 26.0 mmol/L    Patient Temperature 37.0 degrees Celsius    FiO2 32 %   Lactate   Result Value Ref Range    Lactate 2.0 0.4 - 2.0 mmol/L   Troponin I, High Sensitivity, Initial   Result Value Ref Range    Troponin I, High Sensitivity 12 0 - 20 ng/L   Acute Toxicology Panel, Blood   Result Value Ref Range    Acetaminophen <10.0 10.0 - 30.0 ug/mL     Salicylate  <3 4 - 20 mg/dL    Alcohol <10 <=10 mg/dL   TSH with reflex to Free T4 if abnormal   Result Value Ref Range    Thyroid Stimulating Hormone 2.37 0.44 - 3.98 mIU/L   Troponin, High Sensitivity, 1 Hour   Result Value Ref Range    Troponin I, High Sensitivity 12 0 - 20 ng/L   Sars-CoV-2 and Influenza A/B PCR   Result Value Ref Range    Flu A Result Not Detected Not Detected    Flu B Result Not Detected Not Detected    Coronavirus 2019, PCR Not Detected Not Detected   RSV PCR   Result Value Ref Range    RSV PCR Not Detected Not Detected   Blood Culture    Specimen: Peripheral Venipuncture; Blood culture   Result Value Ref Range    Blood Culture Loaded on Instrument - Culture in progress    Blood Culture    Specimen: Peripheral Venipuncture; Blood culture   Result Value Ref Range    Blood Culture Loaded on Instrument - Culture in progress    POCT GLUCOSE   Result Value Ref Range    POCT Glucose 255 (H) 74 - 99 mg/dL   POCT GLUCOSE   Result Value Ref Range    POCT Glucose 182 (H) 74 - 99 mg/dL   Urinalysis with Reflex Culture and Microscopic   Result Value Ref Range    Color, Urine Light-Yellow Light-Yellow, Yellow, Dark-Yellow    Appearance, Urine Clear Clear    Specific Gravity, Urine 1.015 1.005 - 1.035    pH, Urine 6.5 5.0, 5.5, 6.0, 6.5, 7.0, 7.5, 8.0    Protein, Urine NEGATIVE NEGATIVE, 10 (TRACE), 20 (TRACE) mg/dL    Glucose, Urine 500 (3+) (A) Normal mg/dL    Blood, Urine NEGATIVE NEGATIVE mg/dL    Ketones, Urine NEGATIVE NEGATIVE mg/dL    Bilirubin, Urine NEGATIVE NEGATIVE mg/dL    Urobilinogen, Urine Normal Normal mg/dL    Nitrite, Urine NEGATIVE NEGATIVE    Leukocyte Esterase, Urine NEGATIVE NEGATIVE   Basic metabolic panel   Result Value Ref Range    Glucose 214 (H) 74 - 99 mg/dL    Sodium 137 136 - 145 mmol/L    Potassium 3.9 3.5 - 5.3 mmol/L    Chloride 98 98 - 107 mmol/L    Bicarbonate 34 (H) 21 - 32 mmol/L    Anion Gap 9 (L) 10 - 20 mmol/L    Urea Nitrogen 12 6 - 23 mg/dL    Creatinine 0.74 0.50  - 1.30 mg/dL    eGFR >90 >60 mL/min/1.73m*2    Calcium 8.7 8.6 - 10.3 mg/dL   CBC   Result Value Ref Range    WBC 10.8 4.4 - 11.3 x10*3/uL    nRBC 0.0 0.0 - 0.0 /100 WBCs    RBC 4.57 4.50 - 5.90 x10*6/uL    Hemoglobin 12.2 (L) 13.5 - 17.5 g/dL    Hematocrit 40.3 (L) 41.0 - 52.0 %    MCV 88 80 - 100 fL    MCH 26.7 26.0 - 34.0 pg    MCHC 30.3 (L) 32.0 - 36.0 g/dL    RDW 15.3 (H) 11.5 - 14.5 %    Platelets 300 150 - 450 x10*3/uL   POCT GLUCOSE   Result Value Ref Range    POCT Glucose 257 (H) 74 - 99 mg/dL        Medications  Scheduled medications:  amLODIPine, 10 mg, oral, Daily  buPROPion XL, 150 mg, oral, Daily  doxycycline, 100 mg, intravenous, q12h  enoxaparin, 40 mg, subcutaneous, q24h  escitalopram, 20 mg, oral, Daily  finasteride, 5 mg, oral, Daily  fluticasone furoate-vilanteroL, 1 puff, inhalation, Daily  folic acid, 1 mg, oral, Daily  gabapentin, 300 mg, oral, TID  insulin lispro, 0-5 Units, subcutaneous, TID AC  ipratropium-albuteroL, 3 mL, nebulization, TID  linaCLOtide, 145 mcg, oral, Daily before breakfast  methylPREDNISolone sodium succinate (PF), 40 mg, intravenous, q12h  multivitamin with minerals, 1 tablet, oral, Daily  nicotine, 1 patch, transdermal, Daily  pantoprazole, 40 mg, oral, Daily before breakfast  [START ON 3/16/2025] PHENobarbital, 16.2 mg, oral, q8h  [START ON 3/14/2025] PHENobarbital, 32.4 mg, oral, q6h  [START ON 3/15/2025] PHENobarbital, 32.4 mg, oral, q8h  PHENobarbital, 48.6 mg, oral, q8h  sennosides, 2 tablet, oral, BID  tamsulosin, 0.8 mg, oral, Daily  thiamine, 100 mg, oral, Daily  tiotropium, 2 puff, inhalation, Daily      Continuous medications:     PRN medications:  PRN medications: acetaminophen, cloNIDine, dextrose, dextrose, glucagon, glucagon, guaiFENesin, hydrOXYzine HCL, ibuprofen, LORazepam, nicotine polacrilex, traZODone     Physical Exam  Vitals reviewed.   Constitutional:       General: He is not in acute distress.     Appearance: He is obese.   HENT:      Head:  Normocephalic and atraumatic.      Right Ear: External ear normal.      Left Ear: External ear normal.      Nose: Nose normal.      Mouth/Throat:      Mouth: Mucous membranes are moist.      Pharynx: Oropharynx is clear.   Eyes:      Extraocular Movements: Extraocular movements intact.      Conjunctiva/sclera: Conjunctivae normal.      Pupils: Pupils are equal, round, and reactive to light.   Cardiovascular:      Rate and Rhythm: Normal rate and regular rhythm.      Pulses: Normal pulses.      Heart sounds: Normal heart sounds.   Pulmonary:      Effort: Pulmonary effort is normal. No respiratory distress.      Breath sounds: Normal breath sounds. No wheezing, rhonchi or rales.   Chest:      Chest wall: No tenderness.   Abdominal:      General: Bowel sounds are normal. There is no distension.      Palpations: Abdomen is soft. There is no mass.      Tenderness: There is no abdominal tenderness. There is no rebound.   Musculoskeletal:         General: No swelling or deformity. Normal range of motion.      Cervical back: Normal range of motion.   Skin:     General: Skin is warm and dry.      Capillary Refill: Capillary refill takes less than 2 seconds.   Neurological:      General: No focal deficit present.      Mental Status: He is alert and oriented to person, place, and time.   Psychiatric:         Mood and Affect: Mood normal.         Behavior: Behavior normal.                  Assessment/Plan      AECOPD   Acute hypoxic respiratory failure  Continue antibiotic coverage  Pt off oxygen at rest  Breo  IV solumedrol today, transition to prednisone tomorrow  PRN duonebs     ETOH abuse  Pt was drinking 1/2 gallon of vodka every other day for about a week and was admitted to inpatient detox at [place] the day PTA  Start CIWA protocol, pt was started on phenobarb taper at [facility] today, will continue here, continue folate/thiamine/MVI    Anxiety/depression  Pt reports recent binging of ETOH, follows as an outpatient but  is requesting inpatient evaluation  Will request psychiatry consult     HTN   Start amlodipine     Diabetes  Pt no longer on metformin, start insulin sliding scale     Lung nodule  CTA chest revealed a 0.7 cm nodular opacity abutting the right minor fissure, new since 12/20/2024  Will refer to lung nodule navigator on discharge          Discharge disposition  Home when stable                 Code Status: Full Code        DVT ppx:      Please see orders for more complete plan    Nicolas Parham, APRN-CNP

## 2025-03-13 NOTE — PROGRESS NOTES
03/13/25 1545   Discharge Planning   Living Arrangements Alone   Type of Residence Private residence   Home or Post Acute Services Community services   Expected Discharge Disposition Rehab   Does the patient need discharge transport arranged? No     SW familiar with pt from prior admission, met with pt to complete discharge planning assessment and assess any needs. Pt arrived from [place], KAITLYN inquired if pt plans to return to facility upon dsicharge, pt expressed being under the impression he was going to an inpatient psych facility. SW explained psych NP that evaluated pt is not recommending inpatient at this time, pt expressed frustration stating he need to be in a psych facility for his depression. SW utilized active listening and explained recommendation is for pt to follow up outpatient for depression concerns. KAITLYN provided pt with [facility] Resource Guide and encouraged him to contact  [facility] to inquire about voluntary inpatient options and other outpatient options, also discussed [facility] 24 hour services and psychiatric services. KAITLYN also provided pt with [facility] intake number and explained should [facility] decide to [facility] he will have to call and go through the intake process again. Pt expressed understanding. Care Transitions to follow as needed.    Clarence Christensen, MSW, LSW ([id number])   Care Transitions

## 2025-03-13 NOTE — CARE PLAN
The patient's goals for the shift include      The clinical goals for the shift include patient's spo2 will remain above 92%      Problem: Pain - Adult  Goal: Verbalizes/displays adequate comfort level or baseline comfort level  Outcome: Progressing     Problem: Safety - Adult  Goal: Free from fall injury  Outcome: Progressing     Problem: Discharge Planning  Goal: Discharge to home or other facility with appropriate resources  Outcome: Progressing     Problem: Chronic Conditions and Co-morbidities  Goal: Patient's chronic conditions and co-morbidity symptoms are monitored and maintained or improved  Outcome: Progressing     Problem: Nutrition  Goal: Nutrient intake appropriate for maintaining nutritional needs  Outcome: Progressing

## 2025-03-13 NOTE — CONSULTS
"Referring Provider: GERARDO Estrada-CNP  Date: 3/13/25    Reason For Consult: depression/anxiety patient requested consult      Chief Complaint: \"depression, anxiety, alcohol, and other drugs\"    History Of Present Illness  Daniel Hopkins is a 55 y.o. year old male patient that presented to [facility] ED on 3/12/25 for dyspnea.  Patient was at [facility] for inpatient treatment for alcohol abuse.  Patient admitted for acute hypoxic respiratory failure.  Psychiatry consulted for anxiety and depression.      Per H&P on 3/13/25    Daniel Hopkins is a 55 y.o. male with PMHx of COPD (not on home O2), ETOH abuse, HTN and diabetes who presented with dyspnea. He was drinking 1/2 gallon of vodka every other day for about a week and was admitted to inpatient detox at [facility] yesterday. He was sent from [facility] for a low pulse ox reading. He says he was SOB for 5 days and had a cough productive of clear sputum.   ED workup was significant for potassium 3.2. VBG showing a metabolic alkalosis. Respiratory screens are negative. CXR was nonacute and CTA chest revealed a 0.7 cm nodular opacity abutting the right minor fissure, new since 12/20/2024. The pt was tachycardic to 108 bpm, and hypoxic to 87% on RA. The pt received ceftriaxone, magnesium and NS.     During interview patient is alert and oriented x 4.  Calm and cooperative.  Patient keeps falling asleep during interview.  Patient reports [facility], states he follows his medications.  Verified medications at [facility] in [place], stated patient last filled medications trazodone 100 mg 1 tab PO at bedtime, lexapro 20 mg PO QDAY, hydroxyzine 25 mg PO BID PRN anxiety, bupropion 150 XL PO QDAY. All filled January 26 th for 90 days.       Patient was at [facility] about 5 months ago for depression where he was started on Lexapro.  Reports Wellbutrin was started a few weeks ago.  Reports no improvement in mood in the " past 5 months.        Reports drinking about 1/2 gallon everyday on a 4-5 day binge.  Reports alcohol withdrawal symptoms as the shakes, night sweats, and confusion.  Also reports using cocaine when drinking.       Patient reports experiencing worsening feelings of depression for the past 5 months, along with decreased sleep, decreased energy, decreased concentration, increased feelings of hopelessness, helplessness, worthlessness, guilt, low self esteem, and anhedonia.  Patient denies any thoughts of suicide.  Patient reports experiencing prior depressive episodes, but not manic symptoms, in the past. No hallucinations or paranoia were endorsed or noted.     Patient reports experiencing excessive worries about everyday activities that he can't control, and result in problems sleeping, concentrating, decreased energy, irritability, and restlessness.     PSYCHIATRIC REVIEW OF SYMPTOMS  Depressive Symptoms: depressed or irritable mood, diminished interest, insomnia or hypersomnia, fatigue or loss of energy, worthlessness or guilt, and poor concentration or indecisiveness  Manic Symptoms: negative  Anxiety Symptoms: excessive worry Worry Symptoms: difficulty concentrating due to worry, difficulty controlling worry, easily fatigued due to worry, irritability due to worry, restlessness or feeling on edge due to worry, and sleep disturbances due to worry  Psychotic Symptoms:  negative  Delirium/Altered Mental Status Symptoms:  negative  Other Symptoms/Concerns:  negative    Developmental Concerns:  negative  Disordered Eating Symptoms:  negative  Inattentive Symptoms:  negative  Hyperactive/Impulsive Symptoms:  negative  Conduct Issues:  negative      Past Medical History  Past Medical History:   Diagnosis Date    Anxiety and depression     BPH (benign prostatic hyperplasia)     CAD (coronary artery disease)     nonobstructive    COPD (chronic obstructive pulmonary disease) (Multi)     Diabetes (Multi)     ETOH abuse      HLD (hyperlipidemia)     Hypertension     Sleep apnea         Past Psychiatric History: 1) Past Dx: depression, anxiety, PTSD                                            2) Prior psychiatric hospitalizations- [facility]- 5 months ago- depression, reports several other hospitalizations.                                              3) No prior suicide attempts                                            4) No prior SIB                                            5) Prior rehab treatment programs- long time ago, currently at [facility].                                            6) Current psych meds: escitalopram 20 mg PO QDAY (2 or 3 months), bupropion  mg PO QDAY (restarted about 3 weeks ago), hydroxyzine 50 mg PO Q8H PRN anxiety, trazodone 50 mg PO at bedtime PRN sleep.          Past Psychiatric Meds: 1) caplyta- effective                                            Family History: 1) Sister- Bipolar                             2) No known suicides in the family.    Social History  Social History     Socioeconomic History    Marital status: Single     Spouse name: Not on file    Number of children: Not on file    Years of education: Not on file    Highest education level: Not on file   Occupational History    Not on file   Tobacco Use    Smoking status: Every Day     Current packs/day: 0.50     Average packs/day: 0.3 packs/day for 26.2 years (6.8 ttl pk-yrs)     Types: Cigarettes     Start date: 1999    Smokeless tobacco: Never   Substance and Sexual Activity    Alcohol use: Yes    Drug use: Not Currently     Types: Cocaine, Marijuana     Comment: history    Sexual activity: Not on file   Other Topics Concern    Not on file   Social History Narrative    Not on file     Social Drivers of Health     Financial Resource Strain: High Risk (3/12/2025)    Overall Financial Resource Strain (CARDIA)     Difficulty of Paying Living Expenses: Hard   Food Insecurity: Food Insecurity Present (3/12/2025)    Hunger Vital Sign      Worried About Running Out of Food in the Last Year: Sometimes true     Ran Out of Food in the Last Year: Sometimes true   Transportation Needs: Unmet Transportation Needs (3/12/2025)    PRAPARE - Transportation     Lack of Transportation (Medical): Yes     Lack of Transportation (Non-Medical): Yes   Physical Activity: Not on file   Stress: Not on file   Social Connections: Not on file   Intimate Partner Violence: Not At Risk (3/12/2025)    Humiliation, Afraid, Rape, and Kick questionnaire     Fear of Current or Ex-Partner: No     Emotionally Abused: No     Physically Abused: No     Sexually Abused: No   Housing Stability: High Risk (3/12/2025)    Housing Stability Vital Sign     Unable to Pay for Housing in the Last Year: Yes     Number of Times Moved in the Last Year: 3     Homeless in the Last Year: Yes        Substance Abuse History:  1) Tobacco - Cigarettes 1 pack every 2-3 days.    2) ETOH - 1/2 gallon vodka per day, binge drinking.    3) Cannabis - Denies  4) Cocaine, using when drinking.        The patient graduated high school. 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 Currently ,  one time. 3 children. No significant legal history. The patient lives alone.           Allergies  No Known Allergies     Scheduled medications  amLODIPine, 10 mg, oral, Daily  buPROPion XL, 150 mg, oral, Daily  doxycycline, 100 mg, intravenous, q12h  enoxaparin, 40 mg, subcutaneous, q24h  escitalopram, 20 mg, oral, Daily  finasteride, 5 mg, oral, Daily  fluticasone furoate-vilanteroL, 1 puff, inhalation, Daily  folic acid, 1 mg, oral, Daily  gabapentin, 300 mg, oral, TID  insulin lispro, 0-5 Units, subcutaneous, TID AC  ipratropium-albuteroL, 3 mL, nebulization, TID  linaCLOtide, 145 mcg, oral, Daily before breakfast  methylPREDNISolone sodium succinate (PF), 40 mg, intravenous, q12h  multivitamin with minerals, 1 tablet, oral, Daily  nicotine, 1 patch, transdermal,  "Daily  pantoprazole, 40 mg, oral, Daily before breakfast  [START ON 3/16/2025] PHENobarbital, 16.2 mg, oral, q8h  [START ON 3/14/2025] PHENobarbital, 32.4 mg, oral, q6h  [START ON 3/15/2025] PHENobarbital, 32.4 mg, oral, q8h  PHENobarbital, 48.6 mg, oral, q8h  sennosides, 2 tablet, oral, BID  tamsulosin, 0.8 mg, oral, Daily  thiamine, 100 mg, oral, Daily  tiotropium, 2 puff, inhalation, Daily      Continuous medications     PRN medications  PRN medications: acetaminophen, cloNIDine, dextrose, dextrose, glucagon, glucagon, guaiFENesin, hydrOXYzine HCL, ibuprofen, LORazepam, nicotine polacrilex, traZODone         Review of Systems   Review of Systems   Constitutional: Negative.    Eyes: Negative.    Respiratory:  Positive for shortness of breath.    Cardiovascular: Negative.    Gastrointestinal: Negative.    Endocrine: Negative.    Genitourinary: Negative.    Musculoskeletal: Negative.    Skin: Negative.    Allergic/Immunologic: Negative.    Neurological: Negative.    Hematological: Negative.    Psychiatric/Behavioral:  Positive for dysphoric mood. The patient is nervous/anxious.         Physical Exam  Mental Status Exam:   General: Appropriately groomed and dressed in casual attire.   Appearance: Appears stated age.   Attitude: Calm, cooperative.   Behavior: Appropriate eye contact.   Motor Activity: No agitation or retardation. No EPS/TD.  Normal gait and station. Normal muscle tone and bulk.   Speech: Regular rate, rhythm, volume and tone, spontaneous,  fluent. Non-pressured.   Mood: \"down\"   Affect: Neutral.   Thought Process: Organized, linear, goal directed.   Thought Content: Does not endorse suicidal ideation or any suicide plans.   Does not endorse homicidal ideation.  No overt delusions or paranoia elicited.    Thought Perception: Does not endorse auditory or  visual hallucinations, does not appear to be responding to hallucinatory stimuli.   Cognition: Alert, oriented x 4. No deficits noted.  Adequate fund " of knowledge. No deficit in recent and remote memory. No deficits in attention, concentration or language.   Insight: Fair, as patient recognizes symptoms of  illness and need for recommended treatments.    Judgment: Intact, as patient can make reasonable decisions about ordinary activities of daily living and necessary medical care recommendations.       Last Recorded Vitals  Visit Vitals  /84 (BP Location: Right arm, Patient Position: Lying)   Pulse 76   Temp 35.8 °C (96.5 °F) (Temporal)   Resp 20        Relevant Results  Results for orders placed or performed during the hospital encounter of 03/12/25 (from the past 24 hours)   Sars-CoV-2 and Influenza A/B PCR   Result Value Ref Range    Flu A Result Not Detected Not Detected    Flu B Result Not Detected Not Detected    Coronavirus 2019, PCR Not Detected Not Detected   RSV PCR   Result Value Ref Range    RSV PCR Not Detected Not Detected   Blood Culture    Specimen: Peripheral Venipuncture; Blood culture   Result Value Ref Range    Blood Culture Loaded on Instrument - Culture in progress    Blood Culture    Specimen: Peripheral Venipuncture; Blood culture   Result Value Ref Range    Blood Culture Loaded on Instrument - Culture in progress    POCT GLUCOSE   Result Value Ref Range    POCT Glucose 255 (H) 74 - 99 mg/dL   POCT GLUCOSE   Result Value Ref Range    POCT Glucose 182 (H) 74 - 99 mg/dL   Urinalysis with Reflex Culture and Microscopic   Result Value Ref Range    Color, Urine Light-Yellow Light-Yellow, Yellow, Dark-Yellow    Appearance, Urine Clear Clear    Specific Gravity, Urine 1.015 1.005 - 1.035    pH, Urine 6.5 5.0, 5.5, 6.0, 6.5, 7.0, 7.5, 8.0    Protein, Urine NEGATIVE NEGATIVE, 10 (TRACE), 20 (TRACE) mg/dL    Glucose, Urine 500 (3+) (A) Normal mg/dL    Blood, Urine NEGATIVE NEGATIVE mg/dL    Ketones, Urine NEGATIVE NEGATIVE mg/dL    Bilirubin, Urine NEGATIVE NEGATIVE mg/dL    Urobilinogen, Urine Normal Normal mg/dL    Nitrite, Urine NEGATIVE  "NEGATIVE    Leukocyte Esterase, Urine NEGATIVE NEGATIVE   Basic metabolic panel   Result Value Ref Range    Glucose 214 (H) 74 - 99 mg/dL    Sodium 137 136 - 145 mmol/L    Potassium 3.9 3.5 - 5.3 mmol/L    Chloride 98 98 - 107 mmol/L    Bicarbonate 34 (H) 21 - 32 mmol/L    Anion Gap 9 (L) 10 - 20 mmol/L    Urea Nitrogen 12 6 - 23 mg/dL    Creatinine 0.74 0.50 - 1.30 mg/dL    eGFR >90 >60 mL/min/1.73m*2    Calcium 8.7 8.6 - 10.3 mg/dL   CBC   Result Value Ref Range    WBC 10.8 4.4 - 11.3 x10*3/uL    nRBC 0.0 0.0 - 0.0 /100 WBCs    RBC 4.57 4.50 - 5.90 x10*6/uL    Hemoglobin 12.2 (L) 13.5 - 17.5 g/dL    Hematocrit 40.3 (L) 41.0 - 52.0 %    MCV 88 80 - 100 fL    MCH 26.7 26.0 - 34.0 pg    MCHC 30.3 (L) 32.0 - 36.0 g/dL    RDW 15.3 (H) 11.5 - 14.5 %    Platelets 300 150 - 450 x10*3/uL   POCT GLUCOSE   Result Value Ref Range    POCT Glucose 257 (H) 74 - 99 mg/dL             The patient is judged a minimal suicide risk due to: 1) No access to guns, 2) Denies any prior suicide attempts, 3) Denies any current suicidal ideation or suicide plans, 4) +plans for the future: \"be happy again, enjoy life\" and 5) Currently only moderate symptoms of Major Depressive disorder.        Diagnostic Impression:  1) Major depressive disorder, recurrent, moderate  2) Generalized anxiety disorder  3) Alcohol use disorder, moderate, dependence  4) Tobacco use disorder, moderate, dependence  5) Acute hypoxic respiratory failure  6) HTN      Recommendations:  1) Patient does not meet criteria for involuntary psychiatric admission.    2) Continue escitalopram 20 mg PO QDAY  3) Increase bupropion  mg--> 300 mg PO QDAY, reports he was on this dose prior.    4) Continue nicotine replacement.    5) Discussed with social work to give patient follow up information for [facility].    6) Alcohol withdraw managed per medicine team.    7) Patient can be discharged from a psychiatric standpoint back to [facility] and follow up outpatient for mental health.  "       I spent 120 minutes in the professional and overall care of this patient.        Elvira Browne, APRN, CNP, PMHNP

## 2025-03-14 ENCOUNTER — PHARMACY VISIT (OUTPATIENT)
Dept: PHARMACY | Facility: CLINIC | Age: 55
End: 2025-03-14
Payer: MEDICARE

## 2025-03-14 VITALS
TEMPERATURE: 97.7 F | HEART RATE: 87 BPM | OXYGEN SATURATION: 92 % | DIASTOLIC BLOOD PRESSURE: 92 MMHG | WEIGHT: 240 LBS | SYSTOLIC BLOOD PRESSURE: 137 MMHG | BODY MASS INDEX: 35.55 KG/M2 | HEIGHT: 69 IN | RESPIRATION RATE: 18 BRPM

## 2025-03-14 LAB
GLUCOSE BLD MANUAL STRIP-MCNC: 152 MG/DL (ref 74–99)
GLUCOSE BLD MANUAL STRIP-MCNC: 163 MG/DL (ref 74–99)
POC FINGERSTICK BLOOD GLUCOSE: NORMAL

## 2025-03-14 PROCEDURE — 2500000001 HC RX 250 WO HCPCS SELF ADMINISTERED DRUGS (ALT 637 FOR MEDICARE OP): Performed by: NURSE PRACTITIONER

## 2025-03-14 PROCEDURE — 94660 CPAP INITIATION&MGMT: CPT

## 2025-03-14 PROCEDURE — 99239 HOSP IP/OBS DSCHRG MGMT >30: CPT | Performed by: NURSE PRACTITIONER

## 2025-03-14 PROCEDURE — 94640 AIRWAY INHALATION TREATMENT: CPT

## 2025-03-14 PROCEDURE — 2500000002 HC RX 250 W HCPCS SELF ADMINISTERED DRUGS (ALT 637 FOR MEDICARE OP, ALT 636 FOR OP/ED): Performed by: NURSE PRACTITIONER

## 2025-03-14 PROCEDURE — 2500000004 HC RX 250 GENERAL PHARMACY W/ HCPCS (ALT 636 FOR OP/ED): Performed by: NURSE PRACTITIONER

## 2025-03-14 PROCEDURE — 82947 ASSAY GLUCOSE BLOOD QUANT: CPT

## 2025-03-14 PROCEDURE — 99232 SBSQ HOSP IP/OBS MODERATE 35: CPT

## 2025-03-14 PROCEDURE — RXMED WILLOW AMBULATORY MEDICATION CHARGE

## 2025-03-14 RX ORDER — BUPROPION HYDROCHLORIDE 300 MG/1
300 TABLET ORAL DAILY
Qty: 30 TABLET | Refills: 0 | Status: SHIPPED | OUTPATIENT
Start: 2025-03-15 | End: 2025-04-14

## 2025-03-14 RX ORDER — PREDNISONE 20 MG/1
TABLET ORAL
Qty: 15 TABLET | Refills: 0 | Status: SHIPPED | OUTPATIENT
Start: 2025-03-15 | End: 2025-03-27

## 2025-03-14 RX ORDER — GUAIFENESIN 600 MG/1
1200 TABLET, EXTENDED RELEASE ORAL 2 TIMES DAILY
Qty: 28 TABLET | Refills: 0 | Status: SHIPPED | OUTPATIENT
Start: 2025-03-14 | End: 2025-03-21

## 2025-03-14 RX ORDER — DOXYCYCLINE 100 MG/1
100 CAPSULE ORAL 2 TIMES DAILY
Qty: 10 CAPSULE | Refills: 0 | Status: SHIPPED | OUTPATIENT
Start: 2025-03-14 | End: 2025-03-19

## 2025-03-14 RX ADMIN — TIOTROPIUM BROMIDE INHALATION SPRAY 2 PUFF: 3.12 SPRAY, METERED RESPIRATORY (INHALATION) at 08:25

## 2025-03-14 RX ADMIN — IPRATROPIUM BROMIDE AND ALBUTEROL SULFATE 3 ML: 2.5; .5 SOLUTION RESPIRATORY (INHALATION) at 11:37

## 2025-03-14 RX ADMIN — THIAMINE HCL TAB 100 MG 100 MG: 100 TAB at 08:23

## 2025-03-14 RX ADMIN — TAMSULOSIN HYDROCHLORIDE 0.8 MG: 0.4 CAPSULE ORAL at 08:22

## 2025-03-14 RX ADMIN — FLUTICASONE FUROATE AND VILANTEROL TRIFENATATE 1 PUFF: 200; 25 POWDER RESPIRATORY (INHALATION) at 08:25

## 2025-03-14 RX ADMIN — GABAPENTIN 300 MG: 300 CAPSULE ORAL at 08:22

## 2025-03-14 RX ADMIN — PHENOBARBITAL 48.6 MG: 16.2 TABLET ORAL at 08:23

## 2025-03-14 RX ADMIN — LINACLOTIDE 145 MCG: 145 CAPSULE, GELATIN COATED ORAL at 05:42

## 2025-03-14 RX ADMIN — AMLODIPINE BESYLATE 10 MG: 10 TABLET ORAL at 08:22

## 2025-03-14 RX ADMIN — PREDNISONE 60 MG: 20 TABLET ORAL at 08:22

## 2025-03-14 RX ADMIN — PANTOPRAZOLE SODIUM 40 MG: 40 TABLET, DELAYED RELEASE ORAL at 05:42

## 2025-03-14 RX ADMIN — FINASTERIDE 5 MG: 5 TABLET, FILM COATED ORAL at 08:22

## 2025-03-14 RX ADMIN — FOLIC ACID 1 MG: 1 TABLET ORAL at 08:23

## 2025-03-14 RX ADMIN — Medication 1 TABLET: at 08:23

## 2025-03-14 ASSESSMENT — COGNITIVE AND FUNCTIONAL STATUS - GENERAL
DAILY ACTIVITIY SCORE: 24
MOBILITY SCORE: 24

## 2025-03-14 NOTE — DISCHARGE SUMMARY
Discharge Diagnosis  COPD exacerbation (Multi)  Lung nodule R lung  Major depression/anxiety  HTN  ETOH abuse  DM2    Issues Requiring Follow-Up  [place] for detox and chemical dependence treatments  Lung nodule clinic  Ongoing treatments for major depressive disorder    Discharge Meds     Medication List      START taking these medications     buPROPion  mg 24 hr tablet; Commonly known as: Wellbutrin XL; Take   1 tablet (300 mg) by mouth once daily. Do not crush, chew, or split.;   Start taking on: March 15, 2025   doxycycline 100 mg capsule; Commonly known as: Vibramycin; Take 1   capsule (100 mg) by mouth 2 times a day for 5 days. Take with at least 8   ounces (large glass) of water, do not lie down for 30 minutes after   guaiFENesin 600 mg 12 hr tablet; Commonly known as: Mucinex; Take 2   tablets (1,200 mg) by mouth 2 times a day for 14 doses. Do not crush,   chew, or split.   ibuprofen 400 mg tablet; Take 1 tablet (400 mg) by mouth every 6 hours   if needed.   LORazepam 1 mg tablet; Commonly known as: Ativan; Take 2 tablets (2 mg)   by mouth every 4 hours if needed.   multivitamin tablet; Take 1 tablet by mouth once daily.   predniSONE 20 mg tablet; Commonly known as: Deltasone; Take 2 tablets   (40 mg) by mouth once daily for 3 days, THEN 1.5 tablets (30 mg) once   daily for 3 days, THEN 1 tablet (20 mg) once daily for 3 days, THEN 0.5   tablets (10 mg) once daily for 3 days.; Start taking on: March 15, 2025     CHANGE how you take these medications     * hydrOXYzine HCL 25 mg tablet; Commonly known as: Atarax; What changed:   Another medication with the same name was added. Make sure you understand   how and when to take each.   * hydrOXYzine HCL 50 mg tablet; Commonly known as: Atarax; Take 1 tablet   (50 mg) by mouth every 8 hours if needed.; What changed: You were already   taking a medication with the same name, and this prescription was added.   Make sure you understand how and when to take  each.   * nicotine polacrilex 4 mg lozenge; Commonly known as: Commit; Dissolve   1 lozenge (4 mg) in the mouth every 2 hours if needed for smoking   cessation.; What changed: Another medication with the same name was added.   Make sure you understand how and when to take each.   * nicotine polacrilex 2 mg gum; Commonly known as: Nicorette; Chew 1   each (2 mg) every 4 hours if needed.; What changed: You were already   taking a medication with the same name, and this prescription was added.   Make sure you understand how and when to take each.   traZODone 50 mg tablet; Commonly known as: Desyrel; Take 1 tablet (50   mg) by mouth as needed at bedtime.; What changed: medication strength, how   much to take, reasons to take this  * This list has 4 medication(s) that are the same as other medications   prescribed for you. Read the directions carefully, and ask your doctor or   other care provider to review them with you.     CONTINUE taking these medications     acetaminophen 325 mg tablet; Commonly known as: Tylenol; Take 2 tablets   (650 mg) by mouth every 4 hours if needed for mild pain (1 - 3), moderate   pain (4 - 6), headaches or fever (temp greater than 38.0 C).   amLODIPine 10 mg tablet; Commonly known as: Norvasc   aspirin 81 mg EC tablet   escitalopram 20 mg tablet; Commonly known as: Lexapro   finasteride 5 mg tablet; Commonly known as: Proscar   fluticasone furoate-vilanteroL 200-25 mcg/dose inhaler; Commonly known   as: Breo Ellipta; Inhale 1 puff once daily.   gabapentin 300 mg capsule; Commonly known as: Neurontin   ipratropium-albuteroL 0.5-2.5 mg/3 mL nebulizer solution; Commonly known   as: Duo-Neb; Take 3 mL by nebulization every 3 hours if needed for   wheezing or shortness of breath.   metFORMIN (MOD) 1,000 mg 24 hr tablet; Commonly known as: Glumetza   pantoprazole 40 mg EC tablet; Commonly known as: ProtoNix   Spiriva Respimat 2.5 mcg/actuation inhaler; Generic drug: tiotropium;   Inhale 2 puffs  once daily.   tamsulosin 0.4 mg 24 hr capsule; Commonly known as: Flomax     STOP taking these medications     azithromycin 250 mg tablet; Commonly known as: Zithromax   dexAMETHasone 4 mg tablet; Commonly known as: Decadron   levoFLOXacin 750 mg tablet; Commonly known as: Levaquin       Test Results Pending At Discharge  Pending Labs       Order Current Status    POCT fingerstick glucose manually resulted In process    Blood Culture Preliminary result    Blood Culture Preliminary result            Hospital Course   Daniel Hopkins is a 55 y.o. male with PMHx of COPD (not on home O2), ETOH abuse, HTN and diabetes who presented with dyspnea. He was drinking 1/2 gallon of vodka every other day for about a week and was admitted to inpatient detox at [facility] yesterday. He was sent from [facility] for a low pulse ox reading. He says he was SOB for 5 days and had a cough productive of clear sputum.   ED workup was significant for potassium 3.2. VBG showing a metabolic alkalosis. Respiratory screens are negative. CXR was nonacute and CTA chest revealed a 0.7 cm nodular opacity abutting the right minor fissure, new since 12/20/2024. The pt was tachycardic to 108 bpm, and hypoxic to 87% on RA. The pt received ceftriaxone, magnesium and NS.   Remainder of ROS reviewed and negative except as indicated in HPI.      3.13.25  The patient has been weaned off supplemental oxygen. Reports MANN, feels tight. Also states he has bad symptoms of depression and anxiety, recent binge of ETOH. He would like psychiatry consult for medications.     3.14.25 The patient does not require oxygen, cough is more productive. Seen by psychiatry for increased depressive symptoms and welbutrin was increased. Pt advised to continue [facility] detox to help depressive symptoms. Will need follow up for nodule in lung.  Approximately 35 minutes were spent preparing discharge    Pertinent Physical Exam At Time of Discharge  Physical Exam    Outpatient  Follow-Up  No future appointments.      Nicolas Parham, APRN-CNP

## 2025-03-14 NOTE — PROGRESS NOTES
SW following up with [facility] to ensure pt is able to return. [facility] stated pt has not completed pre screening assessment and this would need to be done before they can accept him. Provided phone number to pt and assisted him with dialing number to [facility]. [facility] requesting clinicals to be faxed. SW to fax clinicals. SW following.    SIGIFREDO Carvalho ([id number])   Care Transitions    1216  Pt able to complete pre screening assessment and [facility] able to accept. Clinicals faxed over. No other needs identified at this time, SW signing off,  pt and care team aware of SW availability while inpt.

## 2025-03-14 NOTE — PROGRESS NOTES
"  Subjective   Daniel Hopkins is a 55 y.o. year old male patient who was personally seen and interviewed. The patient was interviewed alone in his room (interviewed sitting up in bed), and was easily engaged and cooperative. This morning, patient reports feeling \"not good\" and currently rates his depression at a 9 out of 10. No suicidal ideation or plans were elicited. He also rates his anxiety at a 9 out of 10. No hallucinations or paranoia were endorsed or noted. Patient had his eyes closed entire time during interview.  Plan is for patient to return to [place].  Called [facility] and they stated they will take him back once discharged.        Objective   Mental Status Exam:   General: Appropriately groomed and dressed in casual attire.   Appearance: Appears stated age.   Attitude: Calm, cooperative.   Behavior: Poor eye contact.   Motor Activity: No agitation or retardation. No EPS/TD.  Normal gait and station. Normal muscle tone and bulk.   Speech: Regular rate, rhythm, volume and tone, spontaneous,  fluent. Non-pressured.   Mood: \"not good\"   Affect: Neutral.   Thought Process: Organized, linear, goal directed.   Thought Content: Does not endorse suicidal ideation or any suicide plans.   Does not endorse homicidal ideation.  No overt delusions or paranoia elicited.    Thought Perception: Does not endorse auditory or visual hallucinations, does not appear to be responding to hallucinatory stimuli.   Cognition: Alert, oriented x 3. No deficits noted.  Adequate fund of knowledge. No deficit in recent and remote memory. No deficits in attention, concentration or language.   Insight: Fair, as patient recognizes symptoms of  illness and need for recommended treatments.    Judgment: Intact, as patient can make reasonable decisions about ordinary activities of daily living and necessary medical care recommendations.       LABS:  Results for orders placed or performed during the hospital encounter of 03/12/25 (from the " past 24 hours)   POCT GLUCOSE   Result Value Ref Range    POCT Glucose 263 (H) 74 - 99 mg/dL   POCT GLUCOSE   Result Value Ref Range    POCT Glucose 161 (H) 74 - 99 mg/dL   POCT GLUCOSE   Result Value Ref Range    POCT Glucose 189 (H) 74 - 99 mg/dL   POCT GLUCOSE   Result Value Ref Range    POCT Glucose 152 (H) 74 - 99 mg/dL   POCT fingerstick glucose manually resulted   Result Value Ref Range    POC Fingerstick Blood Glucose          Last Recorded Vitals  Visit Vitals  /90 (BP Location: Right arm, Patient Position: Lying)   Pulse 69   Temp 35.6 °C (96 °F) (Temporal)   Resp 15        Intake/Output last 3 Shifts:  No intake/output data recorded.    Relevant Results  Scheduled medications  amLODIPine, 10 mg, oral, Daily  buPROPion XL, 300 mg, oral, Daily  doxycycline, 100 mg, intravenous, q12h  enoxaparin, 40 mg, subcutaneous, q24h  escitalopram, 20 mg, oral, Daily  finasteride, 5 mg, oral, Daily  fluticasone furoate-vilanteroL, 1 puff, inhalation, Daily  folic acid, 1 mg, oral, Daily  gabapentin, 300 mg, oral, TID  insulin lispro, 0-5 Units, subcutaneous, TID AC  ipratropium-albuteroL, 3 mL, nebulization, TID  linaCLOtide, 145 mcg, oral, Daily before breakfast  multivitamin with minerals, 1 tablet, oral, Daily  nicotine, 1 patch, transdermal, Daily  pantoprazole, 40 mg, oral, Daily before breakfast  [START ON 3/16/2025] PHENobarbital, 16.2 mg, oral, q8h  PHENobarbital, 32.4 mg, oral, q6h  [START ON 3/15/2025] PHENobarbital, 32.4 mg, oral, q8h  PHENobarbital, 48.6 mg, oral, q8h  predniSONE, 60 mg, oral, Daily  sennosides, 2 tablet, oral, BID  tamsulosin, 0.8 mg, oral, Daily  thiamine, 100 mg, oral, Daily  tiotropium, 2 puff, inhalation, Daily      Continuous medications     PRN medications  PRN medications: acetaminophen, cloNIDine, dextrose, dextrose, glucagon, glucagon, guaiFENesin, hydrOXYzine HCL, ibuprofen, LORazepam, nicotine polacrilex, traZODone         The patient is judged a minimal suicide risk due  "to: 1) No access to guns, 2) Denies any prior suicide attempts, 3) Denies any current suicidal ideation or suicide plans, 4) +plans for the future: \"be happy again, enjoy life\" and 5) Currently only moderate symptoms of Major Depressive disorder.          Diagnostic Impression:  1) Major depressive disorder, recurrent, moderate  2) Generalized anxiety disorder  3) Alcohol use disorder, moderate, dependence  4) Tobacco use disorder, moderate, dependence  5) Acute hypoxic respiratory failure  6) HTN        Recommendations:  1) Patient does not meet criteria for involuntary psychiatric admission.    2) Continue escitalopram 20 mg PO QDAY  3) Increase bupropion  mg--> 300 mg PO QDAY, reports he was on this dose prior.    4) Continue nicotine replacement.    5) Discussed with social work to give patient follow up information for [facility], patient has outpatient resources for mental health follow up.  Patient agreeable to return to [place] today.    6) Alcohol withdraw managed per medicine team.    7) Patient can be discharged from a psychiatric standpoint back to [place] and follow up outpatient for mental health.                Elvira Browne, APRN, CNP, PMHNP   "

## 2025-03-14 NOTE — CARE PLAN
Problem: Pain - Adult  Goal: Verbalizes/displays adequate comfort level or baseline comfort level  Outcome: Progressing     Problem: Safety - Adult  Goal: Free from fall injury  Outcome: Progressing     Problem: Discharge Planning  Goal: Discharge to home or other facility with appropriate resources  Outcome: Progressing     Problem: Chronic Conditions and Co-morbidities  Goal: Patient's chronic conditions and co-morbidity symptoms are monitored and maintained or improved  Outcome: Progressing     Problem: Nutrition  Goal: Nutrient intake appropriate for maintaining nutritional needs  Outcome: Progressing   The patient's goals for the shift include  pt will remain free of injury this shift    The clinical goals for the shift include patient's spo2 will remain above 92%

## 2025-03-15 ENCOUNTER — PHARMACY VISIT (OUTPATIENT)
Dept: PHARMACY | Facility: CLINIC | Age: 55
End: 2025-03-15
Payer: MEDICARE

## 2025-03-15 PROCEDURE — RXMED WILLOW AMBULATORY MEDICATION CHARGE

## 2025-03-15 RX ORDER — AMLODIPINE BESYLATE 10 MG/1
10 TABLET ORAL EVERY MORNING
Qty: 7 TABLET | Refills: 0 | OUTPATIENT
Start: 2025-03-15

## 2025-03-15 RX ORDER — METFORMIN HYDROCHLORIDE 1000 MG/1
1000 TABLET ORAL EVERY MORNING
Qty: 7 TABLET | Refills: 0 | OUTPATIENT
Start: 2025-03-15

## 2025-03-15 RX ORDER — LORAZEPAM 1 MG/1
TABLET ORAL
Qty: 22 TABLET | Refills: 0 | OUTPATIENT
Start: 2025-03-15

## 2025-03-15 RX ORDER — METFORMIN HYDROCHLORIDE 500 MG/1
500 TABLET ORAL EVERY EVENING
Qty: 7 TABLET | Refills: 0 | OUTPATIENT
Start: 2025-03-15

## 2025-03-16 LAB
BACTERIA BLD CULT: NORMAL
BACTERIA BLD CULT: NORMAL

## 2025-03-17 ENCOUNTER — PHARMACY VISIT (OUTPATIENT)
Dept: PHARMACY | Facility: CLINIC | Age: 55
End: 2025-03-17
Payer: MEDICARE

## 2025-03-17 PROCEDURE — RXMED WILLOW AMBULATORY MEDICATION CHARGE

## 2025-03-17 RX ORDER — ESCITALOPRAM OXALATE 10 MG/1
20 TABLET ORAL EVERY MORNING
Qty: 14 TABLET | Refills: 0 | OUTPATIENT
Start: 2025-03-17

## 2025-03-17 RX ORDER — FAMOTIDINE 20 MG/1
20 TABLET, FILM COATED ORAL EVERY MORNING
Qty: 7 TABLET | Refills: 0 | OUTPATIENT
Start: 2025-03-17

## 2025-03-17 RX ORDER — FLUTICASONE PROPIONATE 50 MCG
1 SPRAY, SUSPENSION (ML) NASAL 2 TIMES DAILY
Qty: 16 G | Refills: 0 | OUTPATIENT
Start: 2025-03-17

## 2025-03-17 RX ORDER — MINERAL OIL
180 ENEMA (ML) RECTAL EVERY MORNING
Qty: 7 TABLET | Refills: 0 | OUTPATIENT
Start: 2025-03-17

## 2025-03-17 RX ORDER — LINACLOTIDE 145 UG/1
145 CAPSULE, GELATIN COATED ORAL EVERY MORNING
Qty: 30 CAPSULE | Refills: 0 | OUTPATIENT
Start: 2025-03-17

## 2025-03-17 RX ORDER — GABAPENTIN 300 MG/1
300 CAPSULE ORAL 3 TIMES DAILY
Qty: 21 CAPSULE | Refills: 0 | OUTPATIENT
Start: 2025-03-17

## 2025-03-21 ENCOUNTER — TELEPHONE (OUTPATIENT)
Dept: SLEEP MEDICINE | Facility: CLINIC | Age: 55
End: 2025-03-21
Payer: MEDICARE

## 2025-03-21 NOTE — TELEPHONE ENCOUNTER
Left voicemail for patient to call to schedule with the [facility]. Number provided. Patients address is in [place] and it appears as though he is established with GERARDO Valdes-CNP, patient asked to call to clarify

## 2025-03-28 ENCOUNTER — TELEPHONE (OUTPATIENT)
Dept: RADIOLOGY | Facility: HOSPITAL | Age: 55
End: 2025-03-28
Payer: MEDICARE

## 2025-03-28 NOTE — TELEPHONE ENCOUNTER
"3/28/25 0715  Patient found on imaging to have \"vaguely defined nodular opacity of 0.7cm abutting the right minor fissure\"; he is a current smoker. He had been followed by pulm but was a no show to his last appointment. [facility] staying in [place] while awaiting mental health tx and is [facility] to [facility]  at [facility]. I will follow up but I believe [facility] will [facility] for this. DELTA Tesfaye  "

## 2025-04-18 ENCOUNTER — TELEPHONE (OUTPATIENT)
Dept: PRIMARY CARE | Facility: CLINIC | Age: 55
End: 2025-04-18
Payer: MEDICARE

## 2025-06-08 ENCOUNTER — HOSPITAL ENCOUNTER (INPATIENT)
Facility: HOSPITAL | Age: 55
LOS: 4 days | Discharge: HOME | DRG: 190 | End: 2025-06-13
Attending: STUDENT IN AN ORGANIZED HEALTH CARE EDUCATION/TRAINING PROGRAM | Admitting: STUDENT IN AN ORGANIZED HEALTH CARE EDUCATION/TRAINING PROGRAM
Payer: MEDICARE

## 2025-06-08 DIAGNOSIS — J44.1 COPD EXACERBATION (MULTI): ICD-10-CM

## 2025-06-08 DIAGNOSIS — R06.03 RESPIRATORY DISTRESS: Primary | ICD-10-CM

## 2025-06-08 DIAGNOSIS — J96.22 ACUTE ON CHRONIC RESPIRATORY FAILURE WITH HYPERCAPNIA: ICD-10-CM

## 2025-06-08 DIAGNOSIS — R06.00 DYSPNEA, UNSPECIFIED: ICD-10-CM

## 2025-06-08 DIAGNOSIS — R06.02 SOB (SHORTNESS OF BREATH): ICD-10-CM

## 2025-06-08 PROCEDURE — 99291 CRITICAL CARE FIRST HOUR: CPT | Performed by: STUDENT IN AN ORGANIZED HEALTH CARE EDUCATION/TRAINING PROGRAM

## 2025-06-08 PROCEDURE — 93005 ELECTROCARDIOGRAM TRACING: CPT

## 2025-06-08 RX ORDER — IPRATROPIUM BROMIDE AND ALBUTEROL SULFATE 2.5; .5 MG/3ML; MG/3ML
3 SOLUTION RESPIRATORY (INHALATION) ONCE
Status: COMPLETED | OUTPATIENT
Start: 2025-06-08 | End: 2025-06-09

## 2025-06-08 RX ORDER — MAGNESIUM SULFATE HEPTAHYDRATE 40 MG/ML
2 INJECTION, SOLUTION INTRAVENOUS ONCE
Status: COMPLETED | OUTPATIENT
Start: 2025-06-08 | End: 2025-06-09

## 2025-06-08 RX ORDER — IPRATROPIUM BROMIDE AND ALBUTEROL SULFATE 2.5; .5 MG/3ML; MG/3ML
SOLUTION RESPIRATORY (INHALATION)
Status: COMPLETED
Start: 2025-06-08 | End: 2025-06-09

## 2025-06-08 ASSESSMENT — LIFESTYLE VARIABLES
EVER FELT BAD OR GUILTY ABOUT YOUR DRINKING: NO
HAVE PEOPLE ANNOYED YOU BY CRITICIZING YOUR DRINKING: NO
TOTAL SCORE: 1
EVER HAD A DRINK FIRST THING IN THE MORNING TO STEADY YOUR NERVES TO GET RID OF A HANGOVER: NO
HAVE YOU EVER FELT YOU SHOULD CUT DOWN ON YOUR DRINKING: YES

## 2025-06-08 ASSESSMENT — COLUMBIA-SUICIDE SEVERITY RATING SCALE - C-SSRS
1. IN THE PAST MONTH, HAVE YOU WISHED YOU WERE DEAD OR WISHED YOU COULD GO TO SLEEP AND NOT WAKE UP?: NO
6. HAVE YOU EVER DONE ANYTHING, STARTED TO DO ANYTHING, OR PREPARED TO DO ANYTHING TO END YOUR LIFE?: NO
2. HAVE YOU ACTUALLY HAD ANY THOUGHTS OF KILLING YOURSELF?: NO

## 2025-06-08 ASSESSMENT — PAIN DESCRIPTION - LOCATION: LOCATION: CHEST

## 2025-06-08 ASSESSMENT — PAIN DESCRIPTION - PAIN TYPE: TYPE: ACUTE PAIN

## 2025-06-08 ASSESSMENT — PAIN DESCRIPTION - ORIENTATION: ORIENTATION: LEFT

## 2025-06-08 ASSESSMENT — PAIN SCALES - GENERAL: PAINLEVEL_OUTOF10: 10 - WORST POSSIBLE PAIN

## 2025-06-08 ASSESSMENT — PAIN - FUNCTIONAL ASSESSMENT: PAIN_FUNCTIONAL_ASSESSMENT: 0-10

## 2025-06-09 ENCOUNTER — APPOINTMENT (OUTPATIENT)
Dept: CARDIOLOGY | Facility: HOSPITAL | Age: 55
DRG: 190 | End: 2025-06-09
Payer: MEDICARE

## 2025-06-09 ENCOUNTER — APPOINTMENT (OUTPATIENT)
Dept: RADIOLOGY | Facility: HOSPITAL | Age: 55
DRG: 190 | End: 2025-06-09
Payer: MEDICARE

## 2025-06-09 PROBLEM — R06.03 RESPIRATORY DISTRESS: Status: ACTIVE | Noted: 2025-06-09

## 2025-06-09 LAB
ALBUMIN SERPL BCP-MCNC: 4.2 G/DL (ref 3.4–5)
ALBUMIN SERPL BCP-MCNC: 4.4 G/DL (ref 3.4–5)
ALP SERPL-CCNC: 68 U/L (ref 33–120)
ALP SERPL-CCNC: 68 U/L (ref 33–120)
ALT SERPL W P-5'-P-CCNC: 47 U/L (ref 10–52)
ALT SERPL W P-5'-P-CCNC: 47 U/L (ref 10–52)
AMPHETAMINES UR QL SCN: ABNORMAL
ANION GAP BLDV CALCULATED.4IONS-SCNC: 1 MMOL/L (ref 10–25)
ANION GAP SERPL CALC-SCNC: 15 MMOL/L (ref 10–20)
ANION GAP SERPL CALC-SCNC: 16 MMOL/L (ref 10–20)
AST SERPL W P-5'-P-CCNC: 58 U/L (ref 9–39)
AST SERPL W P-5'-P-CCNC: 63 U/L (ref 9–39)
BARBITURATES UR QL SCN: ABNORMAL
BASE EXCESS BLDV CALC-SCNC: 10.7 MMOL/L (ref -2–3)
BASE EXCESS BLDV CALC-SCNC: 15.5 MMOL/L (ref -2–3)
BASE EXCESS BLDV CALC-SCNC: 9.1 MMOL/L (ref -2–3)
BASOPHILS # BLD AUTO: 0.04 X10*3/UL (ref 0–0.1)
BASOPHILS NFR BLD AUTO: 0.4 %
BENZODIAZ UR QL SCN: ABNORMAL
BILIRUB SERPL-MCNC: 0.6 MG/DL (ref 0–1.2)
BILIRUB SERPL-MCNC: 0.6 MG/DL (ref 0–1.2)
BNP SERPL-MCNC: 95 PG/ML (ref 0–99)
BODY TEMPERATURE: 37 DEGREES CELSIUS
BUN SERPL-MCNC: 7 MG/DL (ref 6–23)
BUN SERPL-MCNC: 8 MG/DL (ref 6–23)
BZE UR QL SCN: ABNORMAL
CA-I BLDV-SCNC: 1.18 MMOL/L (ref 1.1–1.33)
CALCIUM SERPL-MCNC: 8.9 MG/DL (ref 8.6–10.3)
CALCIUM SERPL-MCNC: 9.1 MG/DL (ref 8.6–10.3)
CANNABINOIDS UR QL SCN: ABNORMAL
CARDIAC TROPONIN I PNL SERPL HS: 30 NG/L (ref 0–20)
CARDIAC TROPONIN I PNL SERPL HS: 44 NG/L (ref 0–20)
CARDIAC TROPONIN I PNL SERPL HS: 44 NG/L (ref 0–20)
CHLORIDE BLDV-SCNC: 94 MMOL/L (ref 98–107)
CHLORIDE SERPL-SCNC: 90 MMOL/L (ref 98–107)
CHLORIDE SERPL-SCNC: 91 MMOL/L (ref 98–107)
CO2 SERPL-SCNC: 34 MMOL/L (ref 21–32)
CO2 SERPL-SCNC: 35 MMOL/L (ref 21–32)
CREAT SERPL-MCNC: 0.7 MG/DL (ref 0.5–1.3)
CREAT SERPL-MCNC: 0.7 MG/DL (ref 0.5–1.3)
D DIMER PPP FEU-MCNC: 515 NG/ML FEU
EGFRCR SERPLBLD CKD-EPI 2021: >90 ML/MIN/1.73M*2
EGFRCR SERPLBLD CKD-EPI 2021: >90 ML/MIN/1.73M*2
EJECTION FRACTION APICAL 4 CHAMBER: 81.2
EJECTION FRACTION: 77 %
EOSINOPHIL # BLD AUTO: 0.06 X10*3/UL (ref 0–0.7)
EOSINOPHIL NFR BLD AUTO: 0.6 %
ERYTHROCYTE [DISTWIDTH] IN BLOOD BY AUTOMATED COUNT: 15.6 % (ref 11.5–14.5)
ERYTHROCYTE [DISTWIDTH] IN BLOOD BY AUTOMATED COUNT: 15.6 % (ref 11.5–14.5)
ETHANOL SERPL-MCNC: 18 MG/DL
FENTANYL+NORFENTANYL UR QL SCN: ABNORMAL
FLUAV RNA RESP QL NAA+PROBE: NOT DETECTED
FLUBV RNA RESP QL NAA+PROBE: NOT DETECTED
GLUCOSE BLD MANUAL STRIP-MCNC: 141 MG/DL (ref 74–99)
GLUCOSE BLD MANUAL STRIP-MCNC: 198 MG/DL (ref 74–99)
GLUCOSE BLDV-MCNC: 160 MG/DL (ref 74–99)
GLUCOSE SERPL-MCNC: 100 MG/DL (ref 74–99)
GLUCOSE SERPL-MCNC: 119 MG/DL (ref 74–99)
HCO3 BLDV-SCNC: 38 MMOL/L (ref 22–26)
HCO3 BLDV-SCNC: 40.2 MMOL/L (ref 22–26)
HCO3 BLDV-SCNC: 42.8 MMOL/L (ref 22–26)
HCT VFR BLD AUTO: 43.8 % (ref 41–52)
HCT VFR BLD AUTO: 44.2 % (ref 41–52)
HCT VFR BLD EST: 40 % (ref 41–52)
HGB BLD-MCNC: 13.4 G/DL (ref 13.5–17.5)
HGB BLD-MCNC: 13.5 G/DL (ref 13.5–17.5)
HGB BLDV-MCNC: 13.3 G/DL (ref 13.5–17.5)
IMM GRANULOCYTES # BLD AUTO: 0.05 X10*3/UL (ref 0–0.7)
IMM GRANULOCYTES NFR BLD AUTO: 0.5 % (ref 0–0.9)
INHALED O2 CONCENTRATION: 21 %
INHALED O2 CONCENTRATION: 40 %
INHALED O2 CONCENTRATION: 40 %
LACTATE BLDV-SCNC: 1.2 MMOL/L (ref 0.4–2)
LACTATE SERPL-SCNC: 1.9 MMOL/L (ref 0.4–2)
LEFT VENTRICLE INTERNAL DIMENSION DIASTOLE: 3.7 CM (ref 3.5–6)
LV EJECTION FRACTION BIPLANE: 77 %
LYMPHOCYTES # BLD AUTO: 3.32 X10*3/UL (ref 1.2–4.8)
LYMPHOCYTES NFR BLD AUTO: 32.3 %
MAGNESIUM SERPL-MCNC: 2.32 MG/DL (ref 1.6–2.4)
MCH RBC QN AUTO: 25.8 PG (ref 26–34)
MCH RBC QN AUTO: 26.1 PG (ref 26–34)
MCHC RBC AUTO-ENTMCNC: 30.5 G/DL (ref 32–36)
MCHC RBC AUTO-ENTMCNC: 30.6 G/DL (ref 32–36)
MCV RBC AUTO: 84 FL (ref 80–100)
MCV RBC AUTO: 85 FL (ref 80–100)
METHADONE UR QL SCN: ABNORMAL
MITRAL VALVE E/A RATIO: 0.58
MONOCYTES # BLD AUTO: 0.98 X10*3/UL (ref 0.1–1)
MONOCYTES NFR BLD AUTO: 9.5 %
NEUTROPHILS # BLD AUTO: 5.84 X10*3/UL (ref 1.2–7.7)
NEUTROPHILS NFR BLD AUTO: 56.7 %
NRBC BLD-RTO: 0 /100 WBCS (ref 0–0)
NRBC BLD-RTO: 0 /100 WBCS (ref 0–0)
OPIATES UR QL SCN: ABNORMAL
OXYCODONE+OXYMORPHONE UR QL SCN: ABNORMAL
OXYHGB MFR BLDV: 91.7 % (ref 45–75)
OXYHGB MFR BLDV: 94.2 % (ref 45–75)
OXYHGB MFR BLDV: 95.1 % (ref 45–75)
PCO2 BLDV: 63 MM HG (ref 41–51)
PCO2 BLDV: 72 MM HG (ref 41–51)
PCO2 BLDV: 78 MM HG (ref 41–51)
PCP UR QL SCN: ABNORMAL
PH BLDV: 7.32 PH (ref 7.33–7.43)
PH BLDV: 7.33 PH (ref 7.33–7.43)
PH BLDV: 7.44 PH (ref 7.33–7.43)
PLATELET # BLD AUTO: 336 X10*3/UL (ref 150–450)
PLATELET # BLD AUTO: 380 X10*3/UL (ref 150–450)
PO2 BLDV: 106 MM HG (ref 35–45)
PO2 BLDV: 76 MM HG (ref 35–45)
PO2 BLDV: 88 MM HG (ref 35–45)
POTASSIUM BLDV-SCNC: 3.9 MMOL/L (ref 3.5–5.3)
POTASSIUM SERPL-SCNC: 3.1 MMOL/L (ref 3.5–5.3)
POTASSIUM SERPL-SCNC: 4 MMOL/L (ref 3.5–5.3)
PROT SERPL-MCNC: 7.3 G/DL (ref 6.4–8.2)
PROT SERPL-MCNC: 7.5 G/DL (ref 6.4–8.2)
RBC # BLD AUTO: 5.14 X10*6/UL (ref 4.5–5.9)
RBC # BLD AUTO: 5.24 X10*6/UL (ref 4.5–5.9)
SAO2 % BLDV: 96 % (ref 45–75)
SAO2 % BLDV: 97 % (ref 45–75)
SAO2 % BLDV: 99 % (ref 45–75)
SARS-COV-2 RNA RESP QL NAA+PROBE: NOT DETECTED
SODIUM BLDV-SCNC: 134 MMOL/L (ref 136–145)
SODIUM SERPL-SCNC: 137 MMOL/L (ref 136–145)
SODIUM SERPL-SCNC: 137 MMOL/L (ref 136–145)
WBC # BLD AUTO: 10.3 X10*3/UL (ref 4.4–11.3)
WBC # BLD AUTO: 9.6 X10*3/UL (ref 4.4–11.3)

## 2025-06-09 PROCEDURE — 94640 AIRWAY INHALATION TREATMENT: CPT

## 2025-06-09 PROCEDURE — 85379 FIBRIN DEGRADATION QUANT: CPT | Performed by: STUDENT IN AN ORGANIZED HEALTH CARE EDUCATION/TRAINING PROGRAM

## 2025-06-09 PROCEDURE — 2500000004 HC RX 250 GENERAL PHARMACY W/ HCPCS (ALT 636 FOR OP/ED): Performed by: STUDENT IN AN ORGANIZED HEALTH CARE EDUCATION/TRAINING PROGRAM

## 2025-06-09 PROCEDURE — 82077 ASSAY SPEC XCP UR&BREATH IA: CPT | Performed by: STUDENT IN AN ORGANIZED HEALTH CARE EDUCATION/TRAINING PROGRAM

## 2025-06-09 PROCEDURE — 71045 X-RAY EXAM CHEST 1 VIEW: CPT

## 2025-06-09 PROCEDURE — 82947 ASSAY GLUCOSE BLOOD QUANT: CPT

## 2025-06-09 PROCEDURE — 93321 DOPPLER ECHO F-UP/LMTD STD: CPT | Performed by: INTERNAL MEDICINE

## 2025-06-09 PROCEDURE — 2550000001 HC RX 255 CONTRASTS: Performed by: INTERNAL MEDICINE

## 2025-06-09 PROCEDURE — 2500000001 HC RX 250 WO HCPCS SELF ADMINISTERED DRUGS (ALT 637 FOR MEDICARE OP): Performed by: STUDENT IN AN ORGANIZED HEALTH CARE EDUCATION/TRAINING PROGRAM

## 2025-06-09 PROCEDURE — 99223 1ST HOSP IP/OBS HIGH 75: CPT | Performed by: STUDENT IN AN ORGANIZED HEALTH CARE EDUCATION/TRAINING PROGRAM

## 2025-06-09 PROCEDURE — 83735 ASSAY OF MAGNESIUM: CPT | Performed by: STUDENT IN AN ORGANIZED HEALTH CARE EDUCATION/TRAINING PROGRAM

## 2025-06-09 PROCEDURE — 36415 COLL VENOUS BLD VENIPUNCTURE: CPT | Performed by: STUDENT IN AN ORGANIZED HEALTH CARE EDUCATION/TRAINING PROGRAM

## 2025-06-09 PROCEDURE — 93325 DOPPLER ECHO COLOR FLOW MAPG: CPT | Performed by: INTERNAL MEDICINE

## 2025-06-09 PROCEDURE — 83880 ASSAY OF NATRIURETIC PEPTIDE: CPT | Performed by: STUDENT IN AN ORGANIZED HEALTH CARE EDUCATION/TRAINING PROGRAM

## 2025-06-09 PROCEDURE — 2500000005 HC RX 250 GENERAL PHARMACY W/O HCPCS: Performed by: STUDENT IN AN ORGANIZED HEALTH CARE EDUCATION/TRAINING PROGRAM

## 2025-06-09 PROCEDURE — 83605 ASSAY OF LACTIC ACID: CPT | Performed by: STUDENT IN AN ORGANIZED HEALTH CARE EDUCATION/TRAINING PROGRAM

## 2025-06-09 PROCEDURE — 2060000001 HC INTERMEDIATE ICU ROOM DAILY

## 2025-06-09 PROCEDURE — 82810 BLOOD GASES O2 SAT ONLY: CPT | Performed by: STUDENT IN AN ORGANIZED HEALTH CARE EDUCATION/TRAINING PROGRAM

## 2025-06-09 PROCEDURE — 96367 TX/PROPH/DG ADDL SEQ IV INF: CPT

## 2025-06-09 PROCEDURE — 84484 ASSAY OF TROPONIN QUANT: CPT | Performed by: STUDENT IN AN ORGANIZED HEALTH CARE EDUCATION/TRAINING PROGRAM

## 2025-06-09 PROCEDURE — 96365 THER/PROPH/DIAG IV INF INIT: CPT | Mod: 59

## 2025-06-09 PROCEDURE — 96375 TX/PRO/DX INJ NEW DRUG ADDON: CPT

## 2025-06-09 PROCEDURE — 87636 SARSCOV2 & INF A&B AMP PRB: CPT | Performed by: STUDENT IN AN ORGANIZED HEALTH CARE EDUCATION/TRAINING PROGRAM

## 2025-06-09 PROCEDURE — 80053 COMPREHEN METABOLIC PANEL: CPT | Performed by: STUDENT IN AN ORGANIZED HEALTH CARE EDUCATION/TRAINING PROGRAM

## 2025-06-09 PROCEDURE — 71275 CT ANGIOGRAPHY CHEST: CPT

## 2025-06-09 PROCEDURE — 71045 X-RAY EXAM CHEST 1 VIEW: CPT | Performed by: SURGERY

## 2025-06-09 PROCEDURE — 71275 CT ANGIOGRAPHY CHEST: CPT | Performed by: RADIOLOGY

## 2025-06-09 PROCEDURE — 93308 TTE F-UP OR LMTD: CPT | Performed by: INTERNAL MEDICINE

## 2025-06-09 PROCEDURE — 82805 BLOOD GASES W/O2 SATURATION: CPT | Performed by: STUDENT IN AN ORGANIZED HEALTH CARE EDUCATION/TRAINING PROGRAM

## 2025-06-09 PROCEDURE — 85025 COMPLETE CBC W/AUTO DIFF WBC: CPT | Performed by: STUDENT IN AN ORGANIZED HEALTH CARE EDUCATION/TRAINING PROGRAM

## 2025-06-09 PROCEDURE — 87040 BLOOD CULTURE FOR BACTERIA: CPT | Mod: PORLAB | Performed by: STUDENT IN AN ORGANIZED HEALTH CARE EDUCATION/TRAINING PROGRAM

## 2025-06-09 PROCEDURE — 2500000002 HC RX 250 W HCPCS SELF ADMINISTERED DRUGS (ALT 637 FOR MEDICARE OP, ALT 636 FOR OP/ED): Performed by: STUDENT IN AN ORGANIZED HEALTH CARE EDUCATION/TRAINING PROGRAM

## 2025-06-09 PROCEDURE — 84132 ASSAY OF SERUM POTASSIUM: CPT | Performed by: INTERNAL MEDICINE

## 2025-06-09 PROCEDURE — 85027 COMPLETE CBC AUTOMATED: CPT | Performed by: STUDENT IN AN ORGANIZED HEALTH CARE EDUCATION/TRAINING PROGRAM

## 2025-06-09 PROCEDURE — 80307 DRUG TEST PRSMV CHEM ANLYZR: CPT | Performed by: STUDENT IN AN ORGANIZED HEALTH CARE EDUCATION/TRAINING PROGRAM

## 2025-06-09 PROCEDURE — 93005 ELECTROCARDIOGRAM TRACING: CPT

## 2025-06-09 PROCEDURE — 2500000002 HC RX 250 W HCPCS SELF ADMINISTERED DRUGS (ALT 637 FOR MEDICARE OP, ALT 636 FOR OP/ED)

## 2025-06-09 PROCEDURE — 70450 CT HEAD/BRAIN W/O DYE: CPT | Performed by: RADIOLOGY

## 2025-06-09 PROCEDURE — 94660 CPAP INITIATION&MGMT: CPT

## 2025-06-09 PROCEDURE — 5A09357 ASSISTANCE WITH RESPIRATORY VENTILATION, LESS THAN 24 CONSECUTIVE HOURS, CONTINUOUS POSITIVE AIRWAY PRESSURE: ICD-10-PCS | Performed by: STUDENT IN AN ORGANIZED HEALTH CARE EDUCATION/TRAINING PROGRAM

## 2025-06-09 PROCEDURE — 93325 DOPPLER ECHO COLOR FLOW MAPG: CPT

## 2025-06-09 PROCEDURE — 70450 CT HEAD/BRAIN W/O DYE: CPT

## 2025-06-09 PROCEDURE — 96376 TX/PRO/DX INJ SAME DRUG ADON: CPT

## 2025-06-09 PROCEDURE — 94664 DEMO&/EVAL PT USE INHALER: CPT

## 2025-06-09 PROCEDURE — 96372 THER/PROPH/DIAG INJ SC/IM: CPT | Performed by: STUDENT IN AN ORGANIZED HEALTH CARE EDUCATION/TRAINING PROGRAM

## 2025-06-09 RX ORDER — ASPIRIN 81 MG/1
81 TABLET ORAL DAILY
Status: DISCONTINUED | OUTPATIENT
Start: 2025-06-09 | End: 2025-06-13 | Stop reason: HOSPADM

## 2025-06-09 RX ORDER — ENOXAPARIN SODIUM 100 MG/ML
40 INJECTION SUBCUTANEOUS EVERY 24 HOURS
Status: DISCONTINUED | OUTPATIENT
Start: 2025-06-09 | End: 2025-06-13 | Stop reason: HOSPADM

## 2025-06-09 RX ORDER — FAMOTIDINE 20 MG/1
20 TABLET, FILM COATED ORAL EVERY MORNING
Status: DISCONTINUED | OUTPATIENT
Start: 2025-06-09 | End: 2025-06-13 | Stop reason: HOSPADM

## 2025-06-09 RX ORDER — ESCITALOPRAM OXALATE 10 MG/1
20 TABLET ORAL EVERY MORNING
Status: DISCONTINUED | OUTPATIENT
Start: 2025-06-09 | End: 2025-06-13 | Stop reason: HOSPADM

## 2025-06-09 RX ORDER — TALC
3 POWDER (GRAM) TOPICAL NIGHTLY PRN
Status: DISCONTINUED | OUTPATIENT
Start: 2025-06-09 | End: 2025-06-13 | Stop reason: HOSPADM

## 2025-06-09 RX ORDER — DEXTROSE 50 % IN WATER (D50W) INTRAVENOUS SYRINGE
25
Status: DISCONTINUED | OUTPATIENT
Start: 2025-06-09 | End: 2025-06-13 | Stop reason: HOSPADM

## 2025-06-09 RX ORDER — IPRATROPIUM BROMIDE AND ALBUTEROL SULFATE 2.5; .5 MG/3ML; MG/3ML
3 SOLUTION RESPIRATORY (INHALATION) EVERY 6 HOURS PRN
Status: DISCONTINUED | OUTPATIENT
Start: 2025-06-09 | End: 2025-06-13 | Stop reason: HOSPADM

## 2025-06-09 RX ORDER — BUPROPION HYDROCHLORIDE 150 MG/1
300 TABLET ORAL DAILY
Status: DISCONTINUED | OUTPATIENT
Start: 2025-06-09 | End: 2025-06-13 | Stop reason: HOSPADM

## 2025-06-09 RX ORDER — THIAMINE HYDROCHLORIDE 100 MG/ML
100 INJECTION, SOLUTION INTRAMUSCULAR; INTRAVENOUS DAILY
Status: COMPLETED | OUTPATIENT
Start: 2025-06-09 | End: 2025-06-11

## 2025-06-09 RX ORDER — ACETAMINOPHEN 325 MG/1
650 TABLET ORAL EVERY 4 HOURS PRN
Status: DISCONTINUED | OUTPATIENT
Start: 2025-06-09 | End: 2025-06-13 | Stop reason: HOSPADM

## 2025-06-09 RX ORDER — LORAZEPAM 0.5 MG/1
0.5 TABLET ORAL EVERY 2 HOUR PRN
Status: DISCONTINUED | OUTPATIENT
Start: 2025-06-09 | End: 2025-06-13 | Stop reason: HOSPADM

## 2025-06-09 RX ORDER — FLUTICASONE PROPIONATE 50 MCG
1 SPRAY, SUSPENSION (ML) NASAL 2 TIMES DAILY
Status: DISCONTINUED | OUTPATIENT
Start: 2025-06-09 | End: 2025-06-13 | Stop reason: HOSPADM

## 2025-06-09 RX ORDER — MULTIVIT-MIN/IRON FUM/FOLIC AC 7.5 MG-4
1 TABLET ORAL DAILY
Status: DISCONTINUED | OUTPATIENT
Start: 2025-06-09 | End: 2025-06-13 | Stop reason: HOSPADM

## 2025-06-09 RX ORDER — FOLIC ACID 1 MG/1
1 TABLET ORAL DAILY
Status: DISCONTINUED | OUTPATIENT
Start: 2025-06-09 | End: 2025-06-13 | Stop reason: HOSPADM

## 2025-06-09 RX ORDER — LORAZEPAM 1 MG/1
2 TABLET ORAL EVERY 2 HOUR PRN
Status: DISCONTINUED | OUTPATIENT
Start: 2025-06-09 | End: 2025-06-13 | Stop reason: HOSPADM

## 2025-06-09 RX ORDER — LANOLIN ALCOHOL/MO/W.PET/CERES
100 CREAM (GRAM) TOPICAL DAILY
Status: DISCONTINUED | OUTPATIENT
Start: 2025-06-12 | End: 2025-06-13 | Stop reason: HOSPADM

## 2025-06-09 RX ORDER — DEXTROSE 50 % IN WATER (D50W) INTRAVENOUS SYRINGE
12.5
Status: DISCONTINUED | OUTPATIENT
Start: 2025-06-09 | End: 2025-06-13 | Stop reason: HOSPADM

## 2025-06-09 RX ORDER — TAMSULOSIN HYDROCHLORIDE 0.4 MG/1
0.8 CAPSULE ORAL DAILY
Status: DISCONTINUED | OUTPATIENT
Start: 2025-06-09 | End: 2025-06-13 | Stop reason: HOSPADM

## 2025-06-09 RX ORDER — CETIRIZINE HYDROCHLORIDE 10 MG/1
10 TABLET ORAL DAILY
Status: DISCONTINUED | OUTPATIENT
Start: 2025-06-09 | End: 2025-06-13 | Stop reason: HOSPADM

## 2025-06-09 RX ORDER — AMLODIPINE BESYLATE 10 MG/1
10 TABLET ORAL DAILY
Status: DISCONTINUED | OUTPATIENT
Start: 2025-06-09 | End: 2025-06-13 | Stop reason: HOSPADM

## 2025-06-09 RX ORDER — LORAZEPAM 1 MG/1
1 TABLET ORAL EVERY 2 HOUR PRN
Status: DISCONTINUED | OUTPATIENT
Start: 2025-06-09 | End: 2025-06-13 | Stop reason: HOSPADM

## 2025-06-09 RX ORDER — PANTOPRAZOLE SODIUM 40 MG/1
40 TABLET, DELAYED RELEASE ORAL
Status: DISCONTINUED | OUTPATIENT
Start: 2025-06-09 | End: 2025-06-13 | Stop reason: HOSPADM

## 2025-06-09 RX ORDER — ACETAMINOPHEN 650 MG/1
650 SUPPOSITORY RECTAL EVERY 4 HOURS PRN
Status: DISCONTINUED | OUTPATIENT
Start: 2025-06-09 | End: 2025-06-13 | Stop reason: HOSPADM

## 2025-06-09 RX ORDER — INSULIN LISPRO 100 [IU]/ML
0-5 INJECTION, SOLUTION INTRAVENOUS; SUBCUTANEOUS EVERY 6 HOURS
Status: DISCONTINUED | OUTPATIENT
Start: 2025-06-09 | End: 2025-06-13

## 2025-06-09 RX ORDER — IPRATROPIUM BROMIDE AND ALBUTEROL SULFATE 2.5; .5 MG/3ML; MG/3ML
3 SOLUTION RESPIRATORY (INHALATION) 3 TIMES DAILY
Status: DISCONTINUED | OUTPATIENT
Start: 2025-06-10 | End: 2025-06-09

## 2025-06-09 RX ORDER — METFORMIN HYDROCHLORIDE 1000 MG/1
1500 TABLET ORAL NIGHTLY
COMMUNITY

## 2025-06-09 RX ORDER — FINASTERIDE 5 MG/1
5 TABLET, FILM COATED ORAL DAILY
Status: DISCONTINUED | OUTPATIENT
Start: 2025-06-09 | End: 2025-06-13 | Stop reason: HOSPADM

## 2025-06-09 RX ORDER — POLYETHYLENE GLYCOL 3350 17 G/17G
17 POWDER, FOR SOLUTION ORAL DAILY PRN
Status: DISCONTINUED | OUTPATIENT
Start: 2025-06-09 | End: 2025-06-13 | Stop reason: HOSPADM

## 2025-06-09 RX ORDER — IPRATROPIUM BROMIDE AND ALBUTEROL SULFATE 2.5; .5 MG/3ML; MG/3ML
3 SOLUTION RESPIRATORY (INHALATION)
Status: DISCONTINUED | OUTPATIENT
Start: 2025-06-09 | End: 2025-06-09

## 2025-06-09 RX ORDER — ACETAMINOPHEN 160 MG/5ML
650 SUSPENSION ORAL EVERY 4 HOURS PRN
Status: DISCONTINUED | OUTPATIENT
Start: 2025-06-09 | End: 2025-06-13 | Stop reason: HOSPADM

## 2025-06-09 RX ADMIN — IOHEXOL 75 ML: 350 INJECTION, SOLUTION INTRAVENOUS at 17:17

## 2025-06-09 RX ADMIN — ASPIRIN 81 MG: 81 TABLET, COATED ORAL at 08:03

## 2025-06-09 RX ADMIN — IPRATROPIUM BROMIDE AND ALBUTEROL SULFATE 3 ML: 2.5; .5 SOLUTION RESPIRATORY (INHALATION) at 00:12

## 2025-06-09 RX ADMIN — MAGNESIUM SULFATE HEPTAHYDRATE 2 G: 40 INJECTION, SOLUTION INTRAVENOUS at 00:24

## 2025-06-09 RX ADMIN — TAMSULOSIN HYDROCHLORIDE 0.8 MG: 0.4 CAPSULE ORAL at 08:03

## 2025-06-09 RX ADMIN — FINASTERIDE 5 MG: 5 TABLET, FILM COATED ORAL at 08:03

## 2025-06-09 RX ADMIN — LORAZEPAM 0.5 MG: 0.5 TABLET ORAL at 18:09

## 2025-06-09 RX ADMIN — CETIRIZINE HYDROCHLORIDE 10 MG: 10 TABLET, FILM COATED ORAL at 08:03

## 2025-06-09 RX ADMIN — LORAZEPAM 2 MG: 1 TABLET ORAL at 10:04

## 2025-06-09 RX ADMIN — IPRATROPIUM BROMIDE AND ALBUTEROL SULFATE 3 ML: 2.5; .5 SOLUTION RESPIRATORY (INHALATION) at 14:38

## 2025-06-09 RX ADMIN — ENOXAPARIN SODIUM 40 MG: 100 INJECTION SUBCUTANEOUS at 04:57

## 2025-06-09 RX ADMIN — INSULIN LISPRO 1 UNITS: 100 INJECTION, SOLUTION INTRAVENOUS; SUBCUTANEOUS at 23:40

## 2025-06-09 RX ADMIN — THIAMINE HYDROCHLORIDE 100 MG: 100 INJECTION, SOLUTION INTRAMUSCULAR; INTRAVENOUS at 08:03

## 2025-06-09 RX ADMIN — Medication 4 L/MIN: at 02:46

## 2025-06-09 RX ADMIN — FAMOTIDINE 20 MG: 20 TABLET, FILM COATED ORAL at 08:03

## 2025-06-09 RX ADMIN — PANTOPRAZOLE SODIUM 40 MG: 40 TABLET, DELAYED RELEASE ORAL at 08:08

## 2025-06-09 RX ADMIN — Medication 40 PERCENT: at 15:59

## 2025-06-09 RX ADMIN — Medication 5 L/MIN: at 21:10

## 2025-06-09 RX ADMIN — Medication 6 L/MIN: at 15:53

## 2025-06-09 RX ADMIN — FOLIC ACID 1 MG: 1 TABLET ORAL at 08:03

## 2025-06-09 RX ADMIN — FLUTICASONE PROPIONATE 1 SPRAY: 50 SPRAY, METERED NASAL at 08:06

## 2025-06-09 RX ADMIN — DOXYCYCLINE 100 MG: 100 INJECTION, POWDER, LYOPHILIZED, FOR SOLUTION INTRAVENOUS at 17:50

## 2025-06-09 RX ADMIN — METHYLPREDNISOLONE SODIUM SUCCINATE 40 MG: 40 INJECTION, POWDER, FOR SOLUTION INTRAMUSCULAR; INTRAVENOUS at 07:16

## 2025-06-09 RX ADMIN — LORAZEPAM 1 MG: 1 TABLET ORAL at 08:19

## 2025-06-09 RX ADMIN — Medication 1 TABLET: at 08:03

## 2025-06-09 RX ADMIN — LINACLOTIDE 145 MCG: 145 CAPSULE, GELATIN COATED ORAL at 11:30

## 2025-06-09 RX ADMIN — DOXYCYCLINE 100 MG: 100 INJECTION, POWDER, LYOPHILIZED, FOR SOLUTION INTRAVENOUS at 07:16

## 2025-06-09 RX ADMIN — METHYLPREDNISOLONE SODIUM SUCCINATE 40 MG: 40 INJECTION, POWDER, FOR SOLUTION INTRAMUSCULAR; INTRAVENOUS at 17:49

## 2025-06-09 RX ADMIN — IPRATROPIUM BROMIDE AND ALBUTEROL SULFATE 3 ML: 2.5; .5 SOLUTION RESPIRATORY (INHALATION) at 18:09

## 2025-06-09 RX ADMIN — INSULIN LISPRO 1 UNITS: 100 INJECTION, SOLUTION INTRAVENOUS; SUBCUTANEOUS at 12:02

## 2025-06-09 RX ADMIN — AMLODIPINE BESYLATE 10 MG: 10 TABLET ORAL at 08:03

## 2025-06-09 RX ADMIN — IPRATROPIUM BROMIDE AND ALBUTEROL SULFATE 3 ML: 2.5; .5 SOLUTION RESPIRATORY (INHALATION) at 07:16

## 2025-06-09 RX ADMIN — IPRATROPIUM BROMIDE AND ALBUTEROL SULFATE 3 ML: 2.5; .5 SOLUTION RESPIRATORY (INHALATION) at 10:04

## 2025-06-09 RX ADMIN — Medication 40 PERCENT: at 07:23

## 2025-06-09 SDOH — SOCIAL STABILITY: SOCIAL INSECURITY: WERE YOU ABLE TO COMPLETE ALL THE BEHAVIORAL HEALTH SCREENINGS?: YES

## 2025-06-09 SDOH — SOCIAL STABILITY: SOCIAL INSECURITY: HAVE YOU HAD THOUGHTS OF HARMING ANYONE ELSE?: NO

## 2025-06-09 SDOH — SOCIAL STABILITY: SOCIAL INSECURITY: ABUSE: ADULT

## 2025-06-09 ASSESSMENT — COGNITIVE AND FUNCTIONAL STATUS - GENERAL
MOBILITY SCORE: 24
PATIENT BASELINE BEDBOUND: NO
DAILY ACTIVITIY SCORE: 24

## 2025-06-09 ASSESSMENT — LIFESTYLE VARIABLES
TREMOR: NO TREMOR
HEADACHE, FULLNESS IN HEAD: NOT PRESENT
ANXIETY: 3
AUDITORY DISTURBANCES: NOT PRESENT
HEADACHE, FULLNESS IN HEAD: NOT PRESENT
AUDIT TOTAL SCORE: 23
TOTAL SCORE: 10
AUDIT-C TOTAL SCORE: 11
HAVE YOU OR SOMEONE ELSE BEEN INJURED AS A RESULT OF YOUR DRINKING: YES, DURING THE LAST YEAR
TOTAL SCORE: 0
NAUSEA AND VOMITING: NO NAUSEA AND NO VOMITING
VISUAL DISTURBANCES: NOT PRESENT
TREMOR: NO TREMOR
HEADACHE, FULLNESS IN HEAD: NOT PRESENT
VISUAL DISTURBANCES: NOT PRESENT
HOW OFTEN DURING THE LAST YEAR HAVE YOU NEEDED AN ALCOHOLIC DRINK FIRST THING IN THE MORNING TO GET YOURSELF GOING AFTER A NIGHT OF HEAVY DRINKING: NEVER
ANXIETY: NO ANXIETY, AT EASE
TREMOR: NO TREMOR
AGITATION: NORMAL ACTIVITY
PAROXYSMAL SWEATS: NO SWEAT VISIBLE
AUDITORY DISTURBANCES: NOT PRESENT
TREMOR: NO TREMOR
ORIENTATION AND CLOUDING OF SENSORIUM: ORIENTED AND CAN DO SERIAL ADDITIONS
HOW OFTEN DO YOU HAVE 6 OR MORE DRINKS ON ONE OCCASION: WEEKLY
VISUAL DISTURBANCES: NOT PRESENT
NAUSEA AND VOMITING: NO NAUSEA AND NO VOMITING
TOTAL SCORE: 3
PAROXYSMAL SWEATS: 2
NAUSEA AND VOMITING: NO NAUSEA AND NO VOMITING
ORIENTATION AND CLOUDING OF SENSORIUM: ORIENTED AND CAN DO SERIAL ADDITIONS
HEADACHE, FULLNESS IN HEAD: NOT PRESENT
ORIENTATION AND CLOUDING OF SENSORIUM: ORIENTED AND CAN DO SERIAL ADDITIONS
AUDITORY DISTURBANCES: NOT PRESENT
ORIENTATION AND CLOUDING OF SENSORIUM: ORIENTED AND CAN DO SERIAL ADDITIONS
AGITATION: NORMAL ACTIVITY
HOW OFTEN DO YOU HAVE A DRINK CONTAINING ALCOHOL: 4 OR MORE TIMES A WEEK
SKIP TO QUESTIONS 9-10: 0
TREMOR: NO TREMOR
ANXIETY: MILDLY ANXIOUS
PAROXYSMAL SWEATS: NO SWEAT VISIBLE
AGITATION: NORMAL ACTIVITY
VISUAL DISTURBANCES: NOT PRESENT
TOTAL SCORE: 0
HOW OFTEN DURING THE LAST YEAR HAVE YOU FAILED TO DO WHAT WAS NORMALLY EXPECTED FROM YOU BECAUSE OF DRINKING: MONTHLY
TOTAL SCORE: 0
NAUSEA AND VOMITING: NO NAUSEA AND NO VOMITING
PAROXYSMAL SWEATS: NO SWEAT VISIBLE
PAROXYSMAL SWEATS: NO SWEAT VISIBLE
AGITATION: 5
PAROXYSMAL SWEATS: NO SWEAT VISIBLE
TREMOR: NO TREMOR
HOW OFTEN DURING THE LAST YEAR HAVE YOU HAD A FEELING OF GUILT OR REMORSE AFTER DRINKING: NEVER
HOW MANY STANDARD DRINKS CONTAINING ALCOHOL DO YOU HAVE ON A TYPICAL DAY: 10 OR MORE
HEADACHE, FULLNESS IN HEAD: NOT PRESENT
VISUAL DISTURBANCES: NOT PRESENT
AUDITORY DISTURBANCES: NOT PRESENT
AGITATION: MODERATELY FIDGETY AND RESTLESS
VISUAL DISTURBANCES: NOT PRESENT
TREMOR: NO TREMOR
NAUSEA AND VOMITING: NO NAUSEA AND NO VOMITING
ANXIETY: 3
VISUAL DISTURBANCES: NOT PRESENT
AGITATION: NORMAL ACTIVITY
ANXIETY: NO ANXIETY, AT EASE
HEADACHE, FULLNESS IN HEAD: VERY MILD
AUDITORY DISTURBANCES: NOT PRESENT
NAUSEA AND VOMITING: NO NAUSEA AND NO VOMITING
TOTAL SCORE: 7
TREMOR: NO TREMOR
HOW OFTEN DURING THE LAST YEAR HAVE YOU BEEN UNABLE TO REMEMBER WHAT HAPPENED THE NIGHT BEFORE BECAUSE YOU HAD BEEN DRINKING: MONTHLY
ORIENTATION AND CLOUDING OF SENSORIUM: ORIENTED AND CAN DO SERIAL ADDITIONS
AUDITORY DISTURBANCES: NOT PRESENT
PAROXYSMAL SWEATS: NO SWEAT VISIBLE
ANXIETY: NO ANXIETY, AT EASE
AGITATION: NORMAL ACTIVITY
AUDIT TOTAL SCORE: 12
NAUSEA AND VOMITING: NO NAUSEA AND NO VOMITING
HEADACHE, FULLNESS IN HEAD: NOT PRESENT
TOTAL SCORE: 3
TOTAL SCORE: 8
AUDITORY DISTURBANCES: NOT PRESENT
AUDIT-C TOTAL SCORE: 11
ORIENTATION AND CLOUDING OF SENSORIUM: ORIENTED AND CAN DO SERIAL ADDITIONS
ORIENTATION AND CLOUDING OF SENSORIUM: ORIENTED AND CAN DO SERIAL ADDITIONS
ANXIETY: 3
ANXIETY: 5
HOW OFTEN DURING THE LAST YEAR HAVE YOU FOUND THAT YOU WERE NOT ABLE TO STOP DRINKING ONCE YOU HAD STARTED: NEVER
VISUAL DISTURBANCES: NOT PRESENT
HAS A RELATIVE, FRIEND, DOCTOR, OR ANOTHER HEALTH PROFESSIONAL EXPRESSED CONCERN ABOUT YOUR DRINKING OR SUGGESTED YOU CUT DOWN: YES, DURING THE LAST YEAR
AUDITORY DISTURBANCES: NOT PRESENT
ORIENTATION AND CLOUDING OF SENSORIUM: ORIENTED AND CAN DO SERIAL ADDITIONS
HEADACHE, FULLNESS IN HEAD: NOT PRESENT
PAROXYSMAL SWEATS: 3
NAUSEA AND VOMITING: NO NAUSEA AND NO VOMITING
AGITATION: SOMEWHAT MORE THAN NORMAL ACTIVITY

## 2025-06-09 ASSESSMENT — ACTIVITIES OF DAILY LIVING (ADL)
BATHING: INDEPENDENT
PATIENT'S MEMORY ADEQUATE TO SAFELY COMPLETE DAILY ACTIVITIES?: YES
HEARING - RIGHT EAR: FUNCTIONAL
GROOMING: INDEPENDENT
WALKS IN HOME: INDEPENDENT
ADEQUATE_TO_COMPLETE_ADL: YES
JUDGMENT_ADEQUATE_SAFELY_COMPLETE_DAILY_ACTIVITIES: YES
TOILETING: INDEPENDENT
FEEDING YOURSELF: INDEPENDENT
DRESSING YOURSELF: INDEPENDENT
HEARING - LEFT EAR: FUNCTIONAL

## 2025-06-09 ASSESSMENT — PAIN - FUNCTIONAL ASSESSMENT: PAIN_FUNCTIONAL_ASSESSMENT: 0-10

## 2025-06-09 ASSESSMENT — PAIN SCALES - GENERAL: PAINLEVEL_OUTOF10: 0 - NO PAIN

## 2025-06-09 NOTE — PROGRESS NOTES
Daniel Hopkins is a 55 y.o. male on day 0 of admission presenting with Respiratory distress.      Subjective   Patient is a 55-year-old male with past medical history of COPD, type II DM, hypertension, CAD, BPH, neuropathy, anxiety/depression, alcohol use presented to ER with complaint of shortness of breath going on for 2 days associated with cough with mucus production.  Patient was also drinking heavily since last 2 days.  Patient was placed on BiPAP and admitted with a diagnosis of acute hypoxic/hypercarbic respiratory failure secondary to COPD exacerbation, acute encephalopathy secondary to alcohol intoxication and started on treatment as per protocol.    6/9/2025 patient was evaluated this afternoon-on BiPAP.         Objective     Last Recorded Vitals  /74   Pulse 77   Temp 36.4 °C (97.5 °F) (Temporal)   Resp 20   Wt 109 kg (240 lb)   SpO2 94%   Intake/Output last 3 Shifts:  No intake or output data in the 24 hours ending 06/09/25 0845    Admission Weight  Weight: 109 kg (240 lb) (06/08/25 2342)    Daily Weight  06/08/25 : 109 kg (240 lb)    Image Results  CT head wo IV contrast  Narrative: Interpreted By:  Otis Harvey,   STUDY:  CT HEAD WO IV CONTRAST;  6/9/2025 6:54 am      INDICATION:  Signs/Symptoms:ams.          COMPARISON:  11/18/2024      ACCESSION NUMBER(S):  [id number]      ORDERING CLINICIAN:  JACOB MONROY      TECHNIQUE:  Unenhanced images were obtained through the brain.      FINDINGS:  Ventricles are unchanged in size and position. There is a cavum  septum pellucidum and cavum vergae. Gray-white differentiation is  maintained. There is no mass effect or midline shift. No acute  intracranial hemorrhage is identified. No extra-axial fluid  collections are seen. No intraparenchymal mass lesions are  identified.  Bone windows demonstrate no evidence of an acute  calvarial fracture.      Impression: No evidence of an acute intracranial process.      MACRO:  None.      Signed by: Otis  Assaad 6/9/2025 8:08 AM  Dictation workstation:   [id number]  XR chest 1 view  Narrative: Interpreted By:  Jefe Monique,   STUDY:  XR CHEST 1 VIEW;  6/9/2025 12:44 am      INDICATION:  Signs/Symptoms:respiratory distress.          COMPARISON:  03/12/2025      ACCESSION NUMBER(S):  [id number]      ORDERING CLINICIAN:  ALISON MERCER      FINDINGS:  AP radiograph of the chest was provided.      Limited by portable technique and patient soft tissue attenuation  factors. Leads overlie the chest, partially obscuring the  field-of-view. Apical kyphosis.      CARDIOMEDIASTINAL SILHOUETTE:  Cardiomediastinal silhouette is normal in size and configuration.      LUNGS:  Lungs appear clear. No pleural effusion or pneumothorax      ABDOMEN:  No remarkable upper abdominal findings.      BONES:  No acute osseous changes.      Impression: 1.  No evidence of acute cardiopulmonary process.              MACRO:  None      Signed by: Jefe Monique 6/9/2025 12:53 AM  Dictation workstation:   [id number]      Physical Exam  Patient is sleepy, on BiPAP  Eyes: PERRLA, no conjunctival congestion  Chest: Bilateral Air entry, no crackles or wheezing  Heart: s1S2 regular, no murmur  Abdomen: Soft, non tender, BS present  Ext:    Relevant Results                              Assessment & Plan    Acute hypoxic/hypercarbic respiratory failure  Patient was placed on BiPAP on admission  Continue oxygen supplementation wean as tolerated  Incentive spirometry pulmonary hygiene    COPD exacerbation  Continue on IV steroid, doxycycline, DuoNeb as well as BiPAP as needed    Atypical chest pain  Minimally elevated troponin, EKG shows no acute ST changes  Will obtain CT chest PE protocol to rule out pulm embolism  Echocardiogram 6 months ago shows normal LV systolic function, will repeat limited echo    Acute toxic encephalopathy secondary to alcohol intoxication/hypercarbia  CT head negative for acute finding  Continue supportive treatment and  monitor  Watch for alcohol withdrawal will start on CIWA treatment protocol    Hypertension  Monitor blood pressure  Restart as needed    Type II DM  Monitor blood sugar with sliding scale coverage  Holding metformin    GERD/IBS  Stable  Continue home medication    Lung nodule  Pulmonary referral as an outpatient    Anxiety/depression  On Lexapro start Wellbutrin           Pawan Byers MD

## 2025-06-09 NOTE — ED PROVIDER NOTES
HPI   Chief Complaint   Patient presents with    Chest Pain    Respiratory Distress       HPI  54 yo M with history of COPD, HTN, CAD presents with chest pain and respiratory distress with shortness of breath. He states he is on CPAP at nights and on oxygen as needed during the day.  He began feeling more short of breath 2 days prior.  He states he has been coughing and the cough has been intermittently productive of mucus.  Patient arrives in moderate respiratory distress and is unable to answer further ROS questions.    Patient History   Medical History[1]  Surgical History[2]  Family History[3]  Social History[4]    Physical Exam   ED Triage Vitals [06/08/25 2342]   Temperature Heart Rate Respirations BP   36.4 °C (97.5 °F) (!) 108 (!) 24 (!) 142/105      Pulse Ox Temp Source Heart Rate Source Patient Position   97 % Temporal -- Sitting      BP Location FiO2 (%)     Left arm --       Physical Exam  Vitals reviewed.   Constitutional:       Appearance: He is toxic-appearing.   Eyes:      Pupils: Pupils are equal, round, and reactive to light.   Cardiovascular:      Rate and Rhythm: Normal rate and regular rhythm.   Pulmonary:      Effort: Tachypnea and accessory muscle usage present.      Breath sounds: No stridor. Decreased breath sounds, wheezing and rales present. No rhonchi.   Abdominal:      Palpations: Abdomen is soft.      Tenderness: There is no abdominal tenderness. There is no guarding or rebound.   Musculoskeletal:         General: Normal range of motion.      Cervical back: Normal range of motion.   Skin:     General: Skin is warm and dry.   Neurological:      General: No focal deficit present.      Mental Status: He is alert.           ED Course & MDM   ED Course as of 06/09/25 0832 Mon Jun 09, 2025   0129 pH 7.32 / CO2 78 - started on BiPap, will recheck [JG]      ED Course User Index  [JG] Fariha Harman MD         Diagnoses as of 06/09/25 0832   Respiratory distress   Acute on chronic  respiratory failure with hypercapnia                 No data recorded     Shaan Coma Scale Score: 15 (06/08/25 2347 : Rosy Kowalski, DELTA)                       Medical Decision Making  56 yo M with history of COPD, HTN, CAD presents with chest pain and respiratory distress with shortness of breath x 2 days with cough intermittently productive of mucus. He states he is on CPAP at nights and on oxygen as needed during the day.  Patient in moderate respiratory distress on arrival. + Accessory muscle usage, wheezing and diminished breath sounds throughout with slight rales to lower lung bases. Patient arrives on NRB, on arrival I placed him on BiPap. Initial VBG drawn on arrival shows respiratory acidosis, plan to continue bipap. Troponin stable at 44 - 44. CXR showing no acute abnormality. Lab workup showing no significant leukocytosis or anemia, renal dysfunction or electrolyte derangement. AST > ALT, does have history of EtOH use. Admitted to [facility] for acute on chronic respiratory failure on BiPap.    Procedure  Critical Care    Performed by: Fariha Harman MD  Authorized by: Fariha Harman MD    Critical care provider statement:     Critical care time (minutes):  45    Critical care time was exclusive of:  Separately billable procedures and treating other patients    Critical care was necessary to treat or prevent imminent or life-threatening deterioration of the following conditions:  Respiratory failure    Critical care was time spent personally by me on the following activities:  Ordering and performing treatments and interventions, ordering and review of laboratory studies, ordering and review of radiographic studies, re-evaluation of patient's condition, evaluation of patient's response to treatment, discussions with primary provider and examination of patient    Care discussed with: admitting provider    Comments:      Acute on chronic respiratory failure on bipap         [1]   Past Medical  History:  Diagnosis Date    Anxiety and depression     BPH (benign prostatic hyperplasia)     CAD (coronary artery disease)     nonobstructive    COPD (chronic obstructive pulmonary disease) (Multi)     Diabetes (Multi)     ETOH abuse     HLD (hyperlipidemia)     Hypertension     Sleep apnea    [2]   Past Surgical History:  Procedure Laterality Date    CARDIAC CATHETERIZATION N/A 11/19/2024    Procedure: Left Heart Cath;  Surgeon: Jeffrey Walker MD;  Location: [facility] Cardiac Cath Lab;  Service: Cardiovascular;  Laterality: N/A;   [3]   Family History  Problem Relation Name Age of Onset    Heart disease Mother's Brother     [4]   Social History  Tobacco Use    Smoking status: Every Day     Current packs/day: 0.50     Average packs/day: 0.3 packs/day for 26.4 years (7.0 ttl pk-yrs)     Types: Cigarettes     Start date: 1999    Smokeless tobacco: Never   Substance Use Topics    Alcohol use: Yes    Drug use: Not Currently     Types: Cocaine, Marijuana     Comment: history        Fariha Harman MD  06/09/25 0836

## 2025-06-09 NOTE — PROGRESS NOTES
Daniel Hopkins is a 55 y.o. male admitted for Respiratory distress. Pharmacy reviewed the patient's [id number] medications and allergies for accuracy.    The list below reflects the PTA list prior to pharmacy medication history. A summary a changes to the PTA medication list has been listed below. Please review each medication in order reconciliation for additional clarification and justification.    Source of information:  Discharge summary 3/25    Medications added:    Medications modified:  TAMSULOSIN 0.4MG -------> 1 QD    Medications to be removed:  Acetaminophen 325mg  FAMOTIDINE 20MG  FEXOFENADINE 180MG  FLUTICISONE 50MEQ NASAL SPRAY  BREO ELLIPTA  GABAPENTIN 300MG  LORAZEPAM 1MG   NICOTINE 4MG DINAH  NICOTINE 2MG GUM  Medications of concern:  Amlodipine 10mg  Bupropion XL 300MG    PT NONCOMPLIANT TAKING MEDS      Prior to Admission Medications   Prescriptions Last Dose Informant Patient Reported? Taking?   LORazepam (Ativan) 1 mg tablet   No No   Sig: Take 2 tablets by mouth every 6 hours for 6 doses, then 1 tablet every 6 hours for 4 doses, then 1 tablet every 8 hours for 3 doses, then 1 tablet every 12 hours for 2 doses, then 1 tablet after 25 hours   acetaminophen (Tylenol) 325 mg tablet   No No   Sig: Take 2 tablets (650 mg) by mouth every 4 hours if needed for mild pain (1 - 3), moderate pain (4 - 6), headaches or fever (temp greater than 38.0 C).   amLODIPine (Norvasc) 10 mg tablet   Yes No   Sig: Take 1 tablet (10 mg) by mouth once daily.   aspirin 81 mg EC tablet   Yes No   Sig: Take 1 tablet (81 mg) by mouth once daily.   buPROPion XL (Wellbutrin XL) 300 mg 24 hr tablet   No No   Sig: Take 1 tablet (300 mg) by mouth once daily. Do not crush, chew, or split.   escitalopram (Lexapro) 10 mg tablet   No No   Sig: Take 2 tablets (20 mg) by mouth once daily in the morning.   famotidine (Pepcid) 20 mg tablet   No No   Sig: Take 1 tablet (20 mg) by mouth once daily in the morning.   fexofenadine  (Allegra) 180 mg tablet   No No   Sig: Take 1 tablet (180 mg) by mouth once daily in the morning.   finasteride (Proscar) 5 mg tablet   Yes No   Sig: Take 1 tablet (5 mg) by mouth once daily. Do not crush, chew, or split.   fluticasone (Flonase) 50 mcg/actuation nasal spray   No No   Sig: Administer 1 spray into each nostril 2 times a day.   fluticasone furoate-vilanteroL (Breo Ellipta) 200-25 mcg/dose inhaler   No No   Sig: Inhale 1 puff once daily.   gabapentin (Neurontin) 300 mg capsule   Yes No   Sig: Take 1 capsule (300 mg) by mouth 3 times a day.   hydrOXYzine HCL (Atarax) 50 mg tablet   No No   Sig: Take 1 tablet (50 mg) by mouth every 8 hours if needed.   ipratropium-albuteroL (Duo-Neb) 0.5-2.5 mg/3 mL nebulizer solution   No No   Sig: Take 3 mL by nebulization every 3 hours if needed for wheezing or shortness of breath.   linaCLOtide (Linzess) 145 mcg capsule   No No   Sig: Take 1 capsule (145 mcg) by mouth once daily in the morning.   metFORMIN (Glucophage) 1,000 mg tablet   No No   Sig: Take 1 tablet (1,000 mg) by mouth once daily in the morning.   metFORMIN (Glucophage) 500 mg tablet   No No   Sig: Take 1 tablet (500 mg) by mouth once daily in the evening.   multivitamin tablet   No No   Sig: Take 1 tablet by mouth once daily.   nicotine polacrilex (Commit) 4 mg lozenge   No No   Sig: Dissolve 1 lozenge (4 mg) in the mouth every 2 hours if needed for smoking cessation.   nicotine polacrilex (Nicorette) 2 mg gum   No No   Sig: Chew 1 each (2 mg) every 4 hours if needed.   pantoprazole (ProtoNix) 40 mg EC tablet   Yes No   Sig: Take 1 tablet (40 mg) by mouth once daily in the morning. Take before meals. Do not crush, chew, or split.   tamsulosin (Flomax) 0.4 mg 24 hr capsule   Yes No   Sig: Take 2 capsules (0.8 mg) by mouth once daily.   tiotropium (Spiriva Respimat) 2.5 mcg/actuation inhaler   No No   Sig: Inhale 2 puffs once daily.   traZODone (Desyrel) 50 mg tablet   No No   Sig: Take 1 tablet (50 mg)  by mouth as needed at bedtime.      Facility-Administered Medications: None       Aleksandra Bradley

## 2025-06-09 NOTE — H&P
"Medicine History & Physical:    CC: sob    HPI:  55M hx CAD, HTN, COPD, DM2, GERD, neuropathy, BPH, depression/anxiety, etoh use d/o presents for sob    patient not cooperative w/ most questioning while on bipap. most of history obtained from chart review. pt came in for chest pain and sob for 2d. associated cough w/ mucous. apparently been drinking heavily last 2 days.     PMH: CAD, HTN, COPD, DM2, GERD, neuropathy, BPH, depression/anxiety, etoh use d/o  PSH: [facility]  FH: n/a  SH: lives in [place]. daily smoker, etoh use. no known drug use  Meds: see med rec for full. endorses taking as prescribed  Allergies: nkda    ED Course:  hr 108, rr 24, spo2 wnl, other vss. somnolent on exam. labs/imaging w/ stable cmp, cbc. pco2 78, ph wnl. ekg w/ stable rbbb. cxr wnl. s/p duonebs, mg. admitted to medicine.    ROS reviewed and negative other than noted in HPI    Visit Vitals  /90   Pulse 93   Temp 36.4 °C (97.5 °F) (Temporal)   Resp 20   Ht 1.753 m (5' 9\")   Wt 109 kg (240 lb)   SpO2 95%   BMI 35.44 kg/m²   Smoking Status Every Day   BSA 2.3 m²     Physical Exam:  General: NAD, cooperative. no jaundice, pallor, rash, bruises  HEENT: NC/AT, MMM, no oral lesions or pharyngeal erythema. PERRL. no scleral icterus or conjunctival injection. neck soft w/o masses or LAD.  CV: RRR, no murmurs, rubs, or gallops. No JVD.  Chest: breathing unlabored. CTAB w/ adequate air-entry.  Abd: non-distended. BS+. soft, non-tender. no masses or organomegaly.  Extr: wwp, 2+ and symmetric peripheral pulses. no LE edema.  Neuro: AAOx3. CNII-XII intact. no focal findings.     Results:  - all relevant laboratory and radiographic data reviewed    Assessment/Plan:  55M hx CAD, HTN, COPD, DM2, GERD, neuropathy, BPH, depression/anxiety, etoh use d/o hospitalized for copd exacerbation    #COPD exacerbation:  #acute on chronic hypercapnic respiratory failure:  moderate, hds. no respiratory distress. cxr, bnp, trop wnl. likely due to ongoing smoking, " non-compliance. less likely pe. no e/o chf, pna  - methylpred, doxy (6/9 - ); duonebs; bipap prn  - f/u covid/flu/rsv, d-dimer, repeat vbg    #?chest pain:  #hx CAD:  per hx, pt not cooperative w/ questioning. trop, ekg, cxr wnl.  - trend trop; f/u d-dimer, tele  - home asa; consider adding statin (unclear why off)    #acute encephalopathy/?etoh intoxication:  #hx etoh use disorder:   drowsiness, aaox3. no focal findings. co2 narcosis also likely contributing. no e/o withdrawal  - trend vbg; f/u uds, etoh, ct head  - ciwa; sw consulted; thiamine, folate, mv  - neuro checks, hob 30 degrees    #HTN: stable off meds  #DM2: bg at goal; iss; hold metformin  #GERD: home ppi, famotidine  #IBS: home linzess  #neuropathy: hold gabapentin  #BPH: home flomax, finasteride; f/u pvr  #lung nodule: outpt pulm referral  #allergic rhinitis: home allegra, flonase  #depression/anxiety: lexapro, wellbutrin; hold trazodone   #tobacco use d/o: nicotine prn,  on cessation    #FEN/GI: npo  #PPx: lovenox  #Lines/Tubes/Drains: piv  #Analgesia/Sedation: tylenol  #Code: full  #Dispo: pending clinical improvement  #Contact: [phone]    Anderson Renner MD

## 2025-06-10 LAB
ALBUMIN SERPL BCP-MCNC: 3.8 G/DL (ref 3.4–5)
ALP SERPL-CCNC: 69 U/L (ref 33–120)
ALT SERPL W P-5'-P-CCNC: 36 U/L (ref 10–52)
ANION GAP SERPL CALC-SCNC: 11 MMOL/L (ref 10–20)
AST SERPL W P-5'-P-CCNC: 25 U/L (ref 9–39)
BILIRUB SERPL-MCNC: 0.5 MG/DL (ref 0–1.2)
BUN SERPL-MCNC: 17 MG/DL (ref 6–23)
CALCIUM SERPL-MCNC: 9.3 MG/DL (ref 8.6–10.3)
CHLORIDE SERPL-SCNC: 92 MMOL/L (ref 98–107)
CO2 SERPL-SCNC: 41 MMOL/L (ref 21–32)
CREAT SERPL-MCNC: 0.7 MG/DL (ref 0.5–1.3)
EGFRCR SERPLBLD CKD-EPI 2021: >90 ML/MIN/1.73M*2
ERYTHROCYTE [DISTWIDTH] IN BLOOD BY AUTOMATED COUNT: 15.4 % (ref 11.5–14.5)
GLUCOSE BLD MANUAL STRIP-MCNC: 145 MG/DL (ref 74–99)
GLUCOSE BLD MANUAL STRIP-MCNC: 156 MG/DL (ref 74–99)
GLUCOSE BLD MANUAL STRIP-MCNC: 159 MG/DL (ref 74–99)
GLUCOSE BLD MANUAL STRIP-MCNC: 162 MG/DL (ref 74–99)
GLUCOSE BLD MANUAL STRIP-MCNC: 172 MG/DL (ref 74–99)
GLUCOSE BLD MANUAL STRIP-MCNC: 213 MG/DL (ref 74–99)
GLUCOSE SERPL-MCNC: 138 MG/DL (ref 74–99)
HCT VFR BLD AUTO: 42.3 % (ref 41–52)
HGB BLD-MCNC: 12.8 G/DL (ref 13.5–17.5)
MAGNESIUM SERPL-MCNC: 2.07 MG/DL (ref 1.6–2.4)
MCH RBC QN AUTO: 25.8 PG (ref 26–34)
MCHC RBC AUTO-ENTMCNC: 30.3 G/DL (ref 32–36)
MCV RBC AUTO: 85 FL (ref 80–100)
NRBC BLD-RTO: 0.2 /100 WBCS (ref 0–0)
PLATELET # BLD AUTO: 306 X10*3/UL (ref 150–450)
POTASSIUM SERPL-SCNC: 3.9 MMOL/L (ref 3.5–5.3)
PROT SERPL-MCNC: 6.6 G/DL (ref 6.4–8.2)
RBC # BLD AUTO: 4.96 X10*6/UL (ref 4.5–5.9)
SODIUM SERPL-SCNC: 140 MMOL/L (ref 136–145)
WBC # BLD AUTO: 12.7 X10*3/UL (ref 4.4–11.3)

## 2025-06-10 PROCEDURE — 2500000005 HC RX 250 GENERAL PHARMACY W/O HCPCS: Performed by: STUDENT IN AN ORGANIZED HEALTH CARE EDUCATION/TRAINING PROGRAM

## 2025-06-10 PROCEDURE — 82947 ASSAY GLUCOSE BLOOD QUANT: CPT

## 2025-06-10 PROCEDURE — 80053 COMPREHEN METABOLIC PANEL: CPT | Performed by: STUDENT IN AN ORGANIZED HEALTH CARE EDUCATION/TRAINING PROGRAM

## 2025-06-10 PROCEDURE — 36415 COLL VENOUS BLD VENIPUNCTURE: CPT | Performed by: STUDENT IN AN ORGANIZED HEALTH CARE EDUCATION/TRAINING PROGRAM

## 2025-06-10 PROCEDURE — 2060000001 HC INTERMEDIATE ICU ROOM DAILY

## 2025-06-10 PROCEDURE — 85027 COMPLETE CBC AUTOMATED: CPT | Performed by: STUDENT IN AN ORGANIZED HEALTH CARE EDUCATION/TRAINING PROGRAM

## 2025-06-10 PROCEDURE — 83735 ASSAY OF MAGNESIUM: CPT | Performed by: INTERNAL MEDICINE

## 2025-06-10 PROCEDURE — 2500000001 HC RX 250 WO HCPCS SELF ADMINISTERED DRUGS (ALT 637 FOR MEDICARE OP): Performed by: STUDENT IN AN ORGANIZED HEALTH CARE EDUCATION/TRAINING PROGRAM

## 2025-06-10 PROCEDURE — 2500000004 HC RX 250 GENERAL PHARMACY W/ HCPCS (ALT 636 FOR OP/ED): Performed by: STUDENT IN AN ORGANIZED HEALTH CARE EDUCATION/TRAINING PROGRAM

## 2025-06-10 PROCEDURE — 99233 SBSQ HOSP IP/OBS HIGH 50: CPT | Performed by: INTERNAL MEDICINE

## 2025-06-10 PROCEDURE — 94660 CPAP INITIATION&MGMT: CPT

## 2025-06-10 PROCEDURE — 2500000002 HC RX 250 W HCPCS SELF ADMINISTERED DRUGS (ALT 637 FOR MEDICARE OP, ALT 636 FOR OP/ED): Performed by: STUDENT IN AN ORGANIZED HEALTH CARE EDUCATION/TRAINING PROGRAM

## 2025-06-10 RX ADMIN — Medication 40 PERCENT: at 07:38

## 2025-06-10 RX ADMIN — FOLIC ACID 1 MG: 1 TABLET ORAL at 08:46

## 2025-06-10 RX ADMIN — ENOXAPARIN SODIUM 40 MG: 100 INJECTION SUBCUTANEOUS at 04:21

## 2025-06-10 RX ADMIN — TAMSULOSIN HYDROCHLORIDE 0.8 MG: 0.4 CAPSULE ORAL at 08:46

## 2025-06-10 RX ADMIN — DOXYCYCLINE 100 MG: 100 INJECTION, POWDER, LYOPHILIZED, FOR SOLUTION INTRAVENOUS at 18:28

## 2025-06-10 RX ADMIN — Medication 3 MG: at 20:56

## 2025-06-10 RX ADMIN — LINACLOTIDE 145 MCG: 145 CAPSULE, GELATIN COATED ORAL at 08:46

## 2025-06-10 RX ADMIN — Medication 1 TABLET: at 08:46

## 2025-06-10 RX ADMIN — METHYLPREDNISOLONE SODIUM SUCCINATE 40 MG: 40 INJECTION, POWDER, FOR SOLUTION INTRAMUSCULAR; INTRAVENOUS at 06:17

## 2025-06-10 RX ADMIN — FINASTERIDE 5 MG: 5 TABLET, FILM COATED ORAL at 08:46

## 2025-06-10 RX ADMIN — INSULIN LISPRO 1 UNITS: 100 INJECTION, SOLUTION INTRAVENOUS; SUBCUTANEOUS at 06:28

## 2025-06-10 RX ADMIN — FLUTICASONE PROPIONATE 1 SPRAY: 50 SPRAY, METERED NASAL at 20:56

## 2025-06-10 RX ADMIN — CETIRIZINE HYDROCHLORIDE 10 MG: 10 TABLET, FILM COATED ORAL at 08:46

## 2025-06-10 RX ADMIN — METHYLPREDNISOLONE SODIUM SUCCINATE 40 MG: 40 INJECTION, POWDER, FOR SOLUTION INTRAMUSCULAR; INTRAVENOUS at 18:28

## 2025-06-10 RX ADMIN — ESCITALOPRAM OXALATE 20 MG: 10 TABLET ORAL at 08:46

## 2025-06-10 RX ADMIN — ASPIRIN 81 MG: 81 TABLET, COATED ORAL at 08:46

## 2025-06-10 RX ADMIN — Medication 6 L/MIN: at 20:56

## 2025-06-10 RX ADMIN — AMLODIPINE BESYLATE 10 MG: 10 TABLET ORAL at 08:46

## 2025-06-10 RX ADMIN — DOXYCYCLINE 100 MG: 100 INJECTION, POWDER, LYOPHILIZED, FOR SOLUTION INTRAVENOUS at 06:17

## 2025-06-10 RX ADMIN — FLUTICASONE PROPIONATE 1 SPRAY: 50 SPRAY, METERED NASAL at 08:46

## 2025-06-10 RX ADMIN — FAMOTIDINE 20 MG: 20 TABLET, FILM COATED ORAL at 08:46

## 2025-06-10 RX ADMIN — BUPROPION HYDROCHLORIDE 300 MG: 150 TABLET, EXTENDED RELEASE ORAL at 08:46

## 2025-06-10 RX ADMIN — THIAMINE HYDROCHLORIDE 100 MG: 100 INJECTION, SOLUTION INTRAMUSCULAR; INTRAVENOUS at 08:46

## 2025-06-10 RX ADMIN — PANTOPRAZOLE SODIUM 40 MG: 40 TABLET, DELAYED RELEASE ORAL at 06:17

## 2025-06-10 SDOH — SOCIAL STABILITY: SOCIAL INSECURITY: WITHIN THE LAST YEAR, HAVE YOU BEEN AFRAID OF YOUR PARTNER OR EX-PARTNER?: NO

## 2025-06-10 SDOH — ECONOMIC STABILITY: INCOME INSECURITY
IN THE PAST 12 MONTHS HAS THE ELECTRIC, GAS, OIL, OR WATER COMPANY THREATENED TO SHUT OFF SERVICES IN YOUR HOME?: PATIENT DECLINED

## 2025-06-10 SDOH — ECONOMIC STABILITY: FOOD INSECURITY: HOW HARD IS IT FOR YOU TO PAY FOR THE VERY BASICS LIKE FOOD, HOUSING, MEDICAL CARE, AND HEATING?: PATIENT DECLINED

## 2025-06-10 SDOH — SOCIAL STABILITY: SOCIAL INSECURITY: WITHIN THE LAST YEAR, HAVE YOU BEEN HUMILIATED OR EMOTIONALLY ABUSED IN OTHER WAYS BY YOUR PARTNER OR EX-PARTNER?: NO

## 2025-06-10 SDOH — ECONOMIC STABILITY: HOUSING INSECURITY: IN THE LAST 12 MONTHS, WAS THERE A TIME WHEN YOU WERE NOT ABLE TO PAY THE MORTGAGE OR RENT ON TIME?: PATIENT DECLINED

## 2025-06-10 SDOH — ECONOMIC STABILITY: FOOD INSECURITY: WITHIN THE PAST 12 MONTHS, THE FOOD YOU BOUGHT JUST DIDN'T LAST AND YOU DIDN'T HAVE MONEY TO GET MORE.: SOMETIMES TRUE

## 2025-06-10 SDOH — ECONOMIC STABILITY: HOUSING INSECURITY: IN THE LAST 12 MONTHS, WAS THERE A TIME WHEN YOU WERE NOT ABLE TO PAY THE MORTGAGE OR RENT ON TIME?: NO

## 2025-06-10 SDOH — ECONOMIC STABILITY: HOUSING INSECURITY: AT ANY TIME IN THE PAST 12 MONTHS, WERE YOU HOMELESS OR LIVING IN A SHELTER (INCLUDING NOW)?: PATIENT DECLINED

## 2025-06-10 SDOH — ECONOMIC STABILITY: TRANSPORTATION INSECURITY
IN THE PAST 12 MONTHS, HAS LACK OF TRANSPORTATION KEPT YOU FROM MEDICAL APPOINTMENTS OR FROM GETTING MEDICATIONS?: PATIENT DECLINED

## 2025-06-10 SDOH — ECONOMIC STABILITY: HOUSING INSECURITY: IN THE PAST 12 MONTHS, HOW MANY TIMES HAVE YOU MOVED WHERE YOU WERE LIVING?: 1

## 2025-06-10 SDOH — ECONOMIC STABILITY: INCOME INSECURITY: IN THE PAST 12 MONTHS HAS THE ELECTRIC, GAS, OIL, OR WATER COMPANY THREATENED TO SHUT OFF SERVICES IN YOUR HOME?: NO

## 2025-06-10 SDOH — HEALTH STABILITY: PHYSICAL HEALTH: ON AVERAGE, HOW MANY DAYS PER WEEK DO YOU ENGAGE IN MODERATE TO STRENUOUS EXERCISE (LIKE A BRISK WALK)?: 0 DAYS

## 2025-06-10 SDOH — ECONOMIC STABILITY: FOOD INSECURITY: HOW HARD IS IT FOR YOU TO PAY FOR THE VERY BASICS LIKE FOOD, HOUSING, MEDICAL CARE, AND HEATING?: NOT VERY HARD

## 2025-06-10 SDOH — ECONOMIC STABILITY: HOUSING INSECURITY: AT ANY TIME IN THE PAST 12 MONTHS, WERE YOU HOMELESS OR LIVING IN A SHELTER (INCLUDING NOW)?: NO

## 2025-06-10 SDOH — ECONOMIC STABILITY: HOUSING INSECURITY: IN THE PAST 12 MONTHS, HOW MANY TIMES HAVE YOU MOVED WHERE YOU WERE LIVING?: 0

## 2025-06-10 SDOH — ECONOMIC STABILITY: FOOD INSECURITY: WITHIN THE PAST 12 MONTHS, THE FOOD YOU BOUGHT JUST DIDN'T LAST AND YOU DIDN'T HAVE MONEY TO GET MORE.: PATIENT DECLINED

## 2025-06-10 SDOH — ECONOMIC STABILITY: FOOD INSECURITY
WITHIN THE PAST 12 MONTHS, YOU WORRIED THAT YOUR FOOD WOULD RUN OUT BEFORE YOU GOT THE MONEY TO BUY MORE.: PATIENT DECLINED

## 2025-06-10 SDOH — HEALTH STABILITY: PHYSICAL HEALTH: ON AVERAGE, HOW MANY MINUTES DO YOU ENGAGE IN EXERCISE AT THIS LEVEL?: 0 MIN

## 2025-06-10 SDOH — ECONOMIC STABILITY: TRANSPORTATION INSECURITY: IN THE PAST 12 MONTHS, HAS LACK OF TRANSPORTATION KEPT YOU FROM MEDICAL APPOINTMENTS OR FROM GETTING MEDICATIONS?: NO

## 2025-06-10 ASSESSMENT — LIFESTYLE VARIABLES
ORIENTATION AND CLOUDING OF SENSORIUM: ORIENTED AND CAN DO SERIAL ADDITIONS
HEADACHE, FULLNESS IN HEAD: NOT PRESENT
ORIENTATION AND CLOUDING OF SENSORIUM: ORIENTED AND CAN DO SERIAL ADDITIONS
AUDITORY DISTURBANCES: NOT PRESENT
PAROXYSMAL SWEATS: NO SWEAT VISIBLE
ORIENTATION AND CLOUDING OF SENSORIUM: ORIENTED AND CAN DO SERIAL ADDITIONS
ANXIETY: 2
NAUSEA AND VOMITING: NO NAUSEA AND NO VOMITING
TOTAL SCORE: 0
AUDITORY DISTURBANCES: NOT PRESENT
AGITATION: NORMAL ACTIVITY
TOTAL SCORE: 0
ANXIETY: NO ANXIETY, AT EASE
AGITATION: SOMEWHAT MORE THAN NORMAL ACTIVITY
HEADACHE, FULLNESS IN HEAD: VERY MILD
ORIENTATION AND CLOUDING OF SENSORIUM: ORIENTED AND CAN DO SERIAL ADDITIONS
VISUAL DISTURBANCES: NOT PRESENT
HEADACHE, FULLNESS IN HEAD: NOT PRESENT
AGITATION: NORMAL ACTIVITY
AUDITORY DISTURBANCES: NOT PRESENT
ANXIETY: NO ANXIETY, AT EASE
VISUAL DISTURBANCES: NOT PRESENT
TOTAL SCORE: 4
NAUSEA AND VOMITING: NO NAUSEA AND NO VOMITING
PULSE: 90
PAROXYSMAL SWEATS: NO SWEAT VISIBLE
AGITATION: NORMAL ACTIVITY
TREMOR: NO TREMOR
VISUAL DISTURBANCES: NOT PRESENT
TREMOR: NO TREMOR
TREMOR: NO TREMOR
NAUSEA AND VOMITING: NO NAUSEA AND NO VOMITING
HEADACHE, FULLNESS IN HEAD: NOT PRESENT
TREMOR: NO TREMOR
VISUAL DISTURBANCES: NOT PRESENT
AUDITORY DISTURBANCES: NOT PRESENT
TOTAL SCORE: 0
VISUAL DISTURBANCES: NOT PRESENT
AUDITORY DISTURBANCES: NOT PRESENT
HEADACHE, FULLNESS IN HEAD: NOT PRESENT
NAUSEA AND VOMITING: NO NAUSEA AND NO VOMITING
TREMOR: NO TREMOR
PAROXYSMAL SWEATS: NO SWEAT VISIBLE
VISUAL DISTURBANCES: NOT PRESENT
ANXIETY: NO ANXIETY, AT EASE
TOTAL SCORE: 0
ANXIETY: NO ANXIETY, AT EASE
TOTAL SCORE: 0
NAUSEA AND VOMITING: NO NAUSEA AND NO VOMITING
AGITATION: NORMAL ACTIVITY
TREMOR: NO TREMOR
HEADACHE, FULLNESS IN HEAD: NOT PRESENT
ANXIETY: NO ANXIETY, AT EASE
ORIENTATION AND CLOUDING OF SENSORIUM: ORIENTED AND CAN DO SERIAL ADDITIONS
PAROXYSMAL SWEATS: NO SWEAT VISIBLE
ORIENTATION AND CLOUDING OF SENSORIUM: ORIENTED AND CAN DO SERIAL ADDITIONS
PAROXYSMAL SWEATS: NO SWEAT VISIBLE
NAUSEA AND VOMITING: NO NAUSEA AND NO VOMITING
PAROXYSMAL SWEATS: NO SWEAT VISIBLE
AGITATION: NORMAL ACTIVITY
AUDITORY DISTURBANCES: NOT PRESENT

## 2025-06-10 ASSESSMENT — PAIN SCALES - GENERAL
PAINLEVEL_OUTOF10: 0 - NO PAIN

## 2025-06-10 ASSESSMENT — COGNITIVE AND FUNCTIONAL STATUS - GENERAL: MOBILITY SCORE: 24

## 2025-06-10 ASSESSMENT — PAIN SCALES - WONG BAKER: WONGBAKER_NUMERICALRESPONSE: NO HURT

## 2025-06-10 ASSESSMENT — PAIN - FUNCTIONAL ASSESSMENT
PAIN_FUNCTIONAL_ASSESSMENT: 0-10

## 2025-06-10 ASSESSMENT — ACTIVITIES OF DAILY LIVING (ADL)
LACK_OF_TRANSPORTATION: PATIENT DECLINED
LACK_OF_TRANSPORTATION: NO

## 2025-06-10 NOTE — PROGRESS NOTES
6/10/25 1503   06/10/25 1502   Discharge Planning   Living Arrangements Alone   Assistance Needed none   Type of Residence Private residence   Expected Discharge Disposition Home   Does the patient need discharge transport arranged? Yes   RoundTrip coordination needed? Yes   Intensity of Service   Intensity of Service 0-30 min     Spoke with pt. Pt very lethargic during conversation. Pt states lives alone in an apartment. PCP is Mag Dowd. [facility] and [facility]% [facility]. Pt is agreeable to updates being sent to them if needed. Pt ambulates independently without any assistive devices. Pt states is on 4L NC at home but needs a new oxygen supplier. Pt unable to go into depth about reason for need for a new one and could not remember which oxygen company pt was active with. Pt may need a new Home oxygen eval. Pt uses the bus for transportation services and will need a ride home when medically cleared. Pt admits to smoking 1pk/day tobacco. Pt admits to binge drinking 3-4 times per week and drinks Vodka. Pt admits to using Cocaine couple days prior to admission. Pt is agreeable to speaking with SW and getting addiction resources at this time. SW will follow up when pt is less lethargic. Pt preferred plan is to return home when med ready.   Makayla Smith RN, BSN, Rachael/ Tamika SANDERS Supervisor

## 2025-06-10 NOTE — CARE PLAN
The patient's goals for the shift include  pt will rest comfortably through night.    The clinical goals for the shift include no falls    Over the shift, the patient did make progress toward the following goals. Barriers to progression include education. Recommendations to address these barriers include education reinforcement.

## 2025-06-10 NOTE — PROGRESS NOTES
Daniel Hopkins is a 55 y.o. male on day 1 of admission presenting with Respiratory distress.      Subjective   Patient is a 55-year-old male with past medical history of COPD, type II DM, hypertension, CAD, BPH, neuropathy, anxiety/depression, alcohol use presented to ER with complaint of shortness of breath going on for 2 days associated with cough with mucus production.  Patient was also drinking heavily since last 2 days.  Patient was placed on BiPAP and admitted with a diagnosis of acute hypoxic/hypercarbic respiratory failure secondary to COPD exacerbation, acute encephalopathy secondary to alcohol intoxication and started on treatment as per protocol.  CT chest PE protocol shows no acute pulmonary embolism or acute cardiopulmonary pathology.  Echocardiogram shows hyperdynamic LV systolic function with EF of 77%, moderately increased septal and moderately increased posterior left ventricular wall thickness.    6/10/2025 patient was evaluated this morning, feeling better, on and off BiPAP.  No chest pain at rest, continues to have some cough with shortness of breath         Objective     Last Recorded Vitals  BP (!) 142/99 (BP Location: Left arm, Patient Position: Lying)   Pulse 75   Temp 36.1 °C (97 °F) (Temporal)   Resp 20   Wt 104 kg (229 lb 0.9 oz)   SpO2 98%   Intake/Output last 3 Shifts:    Intake/Output Summary (Last 24 hours) at 6/10/2025 0832  Last data filed at 6/10/2025 0736  Gross per 24 hour   Intake 352.5 ml   Output 175 ml   Net 177.5 ml       Admission Weight  Weight: 109 kg (240 lb) (06/08/25 2342)    Daily Weight  06/10/25 : 104 kg (229 lb 0.9 oz)    Image Results  CT angio chest for pulmonary embolism  Narrative: Interpreted By:  Mayra Saldana,   STUDY:  CT ANGIO CHEST FOR PULMONARY EMBOLISM;  6/9/2025 5:21 pm      INDICATION:  Signs/Symptoms:Shortness of breath, elevated D-dimer and  troponin-rule out PE.          COMPARISON:  CT chest 03/12/2025.      ACCESSION NUMBER(S):  [id number]       ORDERING CLINICIAN:  MARIK PACE      TECHNIQUE:  Helical data acquisition of the chest was obtained after intravenous  administration of 75 ML Omnipaque 350, as per PE protocol. Images  were reformatted in coronal and sagittal planes. Axial and coronal  maximum intensity projection (MIP) images were created and reviewed.      FINDINGS:  POTENTIAL LIMITATIONS OF THE STUDY: None      HEART AND VESSELS:  There are no discrete filling defects within main pulmonary artery  and its branches to suggest acute pulmonary embolism. Main pulmonary  artery and its branches are normal in caliber.      Ascending aorta is ectatic to 3.9 cm in diameter and gradually tapers  to a normal caliber. Mild aortic arch atherosclerotic calcifications.      Heart is normal in size. No pericardial effusion. Mild coronary  artery atherosclerotic calcifications.      MEDIASTINUM AND EBONI, LOWER NECK AND AXILLA:  The visualized thyroid gland is within normal limits.  No evidence of thoracic lymphadenopathy by CT criteria.  Esophagus appears within normal limits as seen.      LUNGS AND AIRWAYS:  The trachea and central airways are patent. No endobronchial lesion  is seen.      Mild patchy linear and bandlike subsegmental atelectasis in the lung  bases. Previously seen vague nodular opacity in the inferior right  upper lobe abutting the right minor fissure is no longer visualized.  No focal consolidation or airspace opacity. No pleural effusion or  pneumothorax.      UPPER ABDOMEN:  The visualized subdiaphragmatic structures demonstrate no remarkable  findings.      CHEST WALL AND OSSEOUS STRUCTURES:  Chest wall soft tissues are unremarkable.      No acute bony abnormality or suspicious bone lesion. Mild multilevel  thoracic spondylosis.      Impression: 1. No evidence of acute pulmonary embolism or other acute  cardiopulmonary abnormality.  2. Ascending aortic ectasia measures 3.9 cm.  3. Mild coronary artery atherosclerotic  calcifications.  4. Mild patchy linear bibasilar subsegmental atelectasis.  5. Interval resolution of the previously seen nodular opacity in the  inferior right upper lobe abutting the right minor fissure.      MACRO:  None      Signed by: Mayra Saldana 6/9/2025 6:09 PM  Dictation workstation:   [id number]  Transthoracic Echo (TTE) Limited                [place] [id number]       Phone [phone] Fax [phone]    TRANSTHORACIC ECHOCARDIOGRAM REPORT    Patient Name:       PERLA HARDY         Reading Physician:    [id number] Yassine Fonseca MD  Study Date:         6/9/2025             Ordering Provider:    [id number] AMRIK PACE  MRN/PID:            [id number]             Fellow:  Accession#:         [id number]         Nurse:  Date of Birth/Age:  1970 / 55 years  Sonographer:          Chanelle Delaney RDCS  Gender Assigned at  M                    Additional Staff:  Birth:  Height:             175.26 cm            Admit Date:           6/8/2025  Weight:             108.86 kg            Admission Status:     Inpatient -                                                                 Routine  BSA / BMI:          2.23 m2 / 35.44      Department Location:  [facility]                      kg/m2  Blood Pressure: 132 /95 mmHg    Study Type:    TRANSTHORACIC ECHO (TTE) LIMITED  Diagnosis/ICD: Dyspnea, unspecified-R06.00  Indication:    Dyspnea  CPT Codes:     Echo Limited-76959; Color Doppler-39951; Doppler Limited-30330    Patient History:  Pertinent History: Dyspnea, COPD, CAD, HTN, Hyperlipidemia and Chest Pain.    Study Detail: The following Echo studies were performed: 2D, Doppler and color                flow. Technically challenging study due to body habitus, poor                acoustic windows and prominent lung artifact. Optison used as  a                contrast agent for endocardial border definition. Total contrast                used for this procedure was 4 mL via IV push. Unable to obtain                suprasternal notch view.       PHYSICIAN INTERPRETATION:  Left Ventricle: The left ventricular systolic function is hyperdynamic with a Subramanian's biplane calculated ejection fraction of 77%. There are no regional wall motion abnormalities. The left ventricular cavity size is normal. There is moderately increased septal and moderately increased posterior left ventricular wall thickness. There is left ventricular concentric remodeling. Left ventricular diastolic filling was not assessed. Possible hypo basal inferolateral and basal inferior segments. Possible D-shaped septum due to RV pressure volume overload.  Left Atrium: The left atrial size was not assessed.  Right Ventricle: The right ventricle is mildly enlarged. There is mildly reduced right ventricular systolic function.  Right Atrium: The right atrial size was not assessed.  Aortic Valve: The aortic valve was not assessed. Aortic valve regurgitation was not assessed.  Mitral Valve: The mitral valve was not assessed. Mitral valve regurgitation was not assessed. The E Vmax is 0.50 m/s.  Tricuspid Valve: The tricuspid valve is structurally normal. No evidence of tricuspid regurgitation.  Pulmonic Valve: The pulmonic valve was not assessed. The pulmonic valve regurgitation was not assessed.  Pericardium: No pericardial effusion noted.  Aorta: The aortic root was not assessed.       CONCLUSIONS:   1. The left ventricular systolic function is hyperdynamic with a Subramanian's biplane calculated ejection fraction of 77%.   2. Left ventricular diastolic filling was not assessed.   3. There is mildly reduced right ventricular systolic function.   4. Mildly enlarged right ventricle.   5. There is moderately increased septal and moderately increased posterior left ventricular wall  thickness.    QUANTITATIVE DATA SUMMARY:     2D MEASUREMENTS:             Normal Ranges:  IVSd:            1.50 cm     (0.6-1.1cm)  LVPWd:           1.50 cm     (0.6-1.1cm)  LVIDd:           3.70 cm     (3.9-5.9cm)  LVIDs:           1.90 cm  LV Mass Index:   93.5 g/m2  LVEDV Index:     39.10 ml/m2  LV % FS          48.6 %       LV SYSTOLIC FUNCTION:                       Normal Ranges:  EF-A4C View:    81 % (>=55%)  EF-A2C View:    68 %  EF-Biplane:     77 %  LV EF Reported: 77 %       LV DIASTOLIC FUNCTION:           Normal Ranges:  MV Peak E:             0.50 m/s  (0.7-1.2 m/s)  MV Peak A:             0.86 m/s  (0.42-0.7 m/s)  E/A Ratio:             0.58      (1.0-2.2)  MV e'                  0.065 m/s (>8.0)  MV lateral e'          0.08 m/s  MV medial e'           0.05 m/s  E/e' Ratio:            7.62      (<8.0)  LV IVRT:               69 msec   (<100ms)       MITRAL VALVE:          Normal Ranges:  MV DT:        132 msec (150-240msec)       RIGHT VENTRICLE:  RV Basal 4.00 cm  RV Mid   2.60 cm  RV Major 7.4 cm       TRICUSPID VALVE/RVSP:         Normal Ranges:  Est. RA Pressure:     3 mmHg  IVC Diam:             2.00 cm       [id number] Yassine Fonseca MD  Electronically signed on 6/9/2025 at 5:20:33 PM       ** Final **  CT head wo IV contrast  Narrative: Interpreted By:  Otis Harvey,   STUDY:  CT HEAD WO IV CONTRAST;  6/9/2025 6:54 am      INDICATION:  Signs/Symptoms:ams.          COMPARISON:  11/18/2024      ACCESSION NUMBER(S):  [id number]      ORDERING CLINICIAN:  JACOB MONROY      TECHNIQUE:  Unenhanced images were obtained through the brain.      FINDINGS:  Ventricles are unchanged in size and position. There is a cavum  septum pellucidum and cavum vergae. Gray-white differentiation is  maintained. There is no mass effect or midline shift. No acute  intracranial hemorrhage is identified. No extra-axial fluid  collections are seen. No intraparenchymal mass lesions are  identified.  Bone windows  demonstrate no evidence of an acute  calvarial fracture.      Impression: No evidence of an acute intracranial process.      MACRO:  None.      Signed by: Otis Harvey 6/9/2025 8:08 AM  Dictation workstation:   [id number]  XR chest 1 view  Narrative: Interpreted By:  Jefe Monique,   STUDY:  XR CHEST 1 VIEW;  6/9/2025 12:44 am      INDICATION:  Signs/Symptoms:respiratory distress.          COMPARISON:  03/12/2025      ACCESSION NUMBER(S):  [id number]      ORDERING CLINICIAN:  ALISON MERCER      FINDINGS:  AP radiograph of the chest was provided.      Limited by portable technique and patient soft tissue attenuation  factors. Leads overlie the chest, partially obscuring the  field-of-view. Apical kyphosis.      CARDIOMEDIASTINAL SILHOUETTE:  Cardiomediastinal silhouette is normal in size and configuration.      LUNGS:  Lungs appear clear. No pleural effusion or pneumothorax      ABDOMEN:  No remarkable upper abdominal findings.      BONES:  No acute osseous changes.      Impression: 1.  No evidence of acute cardiopulmonary process.              MACRO:  None      Signed by: Jefe Monique 6/9/2025 12:53 AM  Dictation workstation:   [id number]      Physical Exam  Patient is awake and alert, does not seem to be in acute distress.  Eyes: PERRLA, no conjunctival congestion  Chest: Bilateral Air entry, no crackles or wheezing  Heart: s1S2 regular, no murmur  Abdomen: Soft, non tender, BS present  Ext:    Relevant Results               This patient currently has cardiac telemetry ordered; if you would like to modify or discontinue the telemetry order, click here to go to the orders activity to modify/discontinue the order.              Assessment & Plan    Acute hypoxic/hypercarbic respiratory failure  Patient was placed on BiPAP on admission  Continue oxygen supplementation wean as tolerated  Incentive spirometry pulmonary hygiene    COPD exacerbation  Continue on IV steroid, doxycycline, DuoNeb as well as BiPAP as  needed    Atypical chest pain  Minimally elevated troponin, EKG shows no acute ST changes  CT chest negative for PE and acute cardiopulmonary pathology  Echocardiogram shows hyperdynamic LV function, no regional wall motion abnormalities    Acute toxic encephalopathy secondary to alcohol intoxication/hypercarbia  CT head negative for acute finding  Continue supportive treatment and monitor  Watch for alcohol withdrawal will start on CIWA treatment protocol    Hypertension  Monitor blood pressure  Restart as needed    Type II DM  Monitor blood sugar with sliding scale coverage  Holding metformin    GERD/IBS  Stable  Continue home medication      Anxiety/depression  On Lexapro start Wellbutrin           Pawan Byers MD

## 2025-06-10 NOTE — CARE PLAN
The patient's goals for the shift include  staying informed    The clinical goals for the shift include patient CIWA will be 5 or less throughout this shift    Over the shift, the patient did not make progress toward the following goals. Barriers to progression include understanding. Recommendations to address these barriers include education.

## 2025-06-10 NOTE — PROGRESS NOTES
6/10/25 1211   06/10/25 1210   Discharge Planning   Expected Discharge Disposition Home   Intensity of Service   Intensity of Service 0-30 min     Attempted to see pt twice. Pt noted to be on Bipap both times. First time pt was with staff. The second time pt appeared lethargic and requested for this CT Supervisor to come back at a later time.   Makayla Smith RN, BSN, [place]/ [employer] CT Supervisor

## 2025-06-11 LAB
ALBUMIN SERPL BCP-MCNC: 3.7 G/DL (ref 3.4–5)
ALP SERPL-CCNC: 65 U/L (ref 33–120)
ALT SERPL W P-5'-P-CCNC: 28 U/L (ref 10–52)
ANION GAP SERPL CALC-SCNC: 8 MMOL/L (ref 10–20)
AST SERPL W P-5'-P-CCNC: 17 U/L (ref 9–39)
ATRIAL RATE: 109 BPM
BILIRUB SERPL-MCNC: 0.6 MG/DL (ref 0–1.2)
BUN SERPL-MCNC: 20 MG/DL (ref 6–23)
CALCIUM SERPL-MCNC: 9.1 MG/DL (ref 8.6–10.3)
CHLORIDE SERPL-SCNC: 94 MMOL/L (ref 98–107)
CO2 SERPL-SCNC: 41 MMOL/L (ref 21–32)
CREAT SERPL-MCNC: 0.65 MG/DL (ref 0.5–1.3)
EGFRCR SERPLBLD CKD-EPI 2021: >90 ML/MIN/1.73M*2
ERYTHROCYTE [DISTWIDTH] IN BLOOD BY AUTOMATED COUNT: 15.4 % (ref 11.5–14.5)
GLUCOSE BLD MANUAL STRIP-MCNC: 167 MG/DL (ref 74–99)
GLUCOSE BLD MANUAL STRIP-MCNC: 189 MG/DL (ref 74–99)
GLUCOSE BLD MANUAL STRIP-MCNC: 190 MG/DL (ref 74–99)
GLUCOSE BLD MANUAL STRIP-MCNC: 207 MG/DL (ref 74–99)
GLUCOSE BLD MANUAL STRIP-MCNC: 224 MG/DL (ref 74–99)
GLUCOSE SERPL-MCNC: 137 MG/DL (ref 74–99)
HCT VFR BLD AUTO: 42.8 % (ref 41–52)
HGB BLD-MCNC: 12.7 G/DL (ref 13.5–17.5)
MAGNESIUM SERPL-MCNC: 1.83 MG/DL (ref 1.6–2.4)
MCH RBC QN AUTO: 25.8 PG (ref 26–34)
MCHC RBC AUTO-ENTMCNC: 29.7 G/DL (ref 32–36)
MCV RBC AUTO: 87 FL (ref 80–100)
NRBC BLD-RTO: 0.1 /100 WBCS (ref 0–0)
P AXIS: 27 DEGREES
PLATELET # BLD AUTO: 357 X10*3/UL (ref 150–450)
POTASSIUM SERPL-SCNC: 3.7 MMOL/L (ref 3.5–5.3)
PR INTERVAL: 151 MS
PROT SERPL-MCNC: 6.2 G/DL (ref 6.4–8.2)
Q ONSET: 251 MS
QRS COUNT: 18 BEATS
QRS DURATION: 147 MS
QT INTERVAL: 393 MS
QTC CALCULATION(BAZETT): 527 MS
QTC FREDERICIA: 478 MS
R AXIS: 269 DEGREES
RBC # BLD AUTO: 4.93 X10*6/UL (ref 4.5–5.9)
SODIUM SERPL-SCNC: 139 MMOL/L (ref 136–145)
T AXIS: 69 DEGREES
T OFFSET: 447 MS
VENTRICULAR RATE: 108 BPM
WBC # BLD AUTO: 13.8 X10*3/UL (ref 4.4–11.3)

## 2025-06-11 PROCEDURE — 94640 AIRWAY INHALATION TREATMENT: CPT

## 2025-06-11 PROCEDURE — 2500000005 HC RX 250 GENERAL PHARMACY W/O HCPCS: Performed by: STUDENT IN AN ORGANIZED HEALTH CARE EDUCATION/TRAINING PROGRAM

## 2025-06-11 PROCEDURE — S4991 NICOTINE PATCH NONLEGEND: HCPCS | Performed by: INTERNAL MEDICINE

## 2025-06-11 PROCEDURE — 99233 SBSQ HOSP IP/OBS HIGH 50: CPT | Performed by: INTERNAL MEDICINE

## 2025-06-11 PROCEDURE — 36415 COLL VENOUS BLD VENIPUNCTURE: CPT | Performed by: STUDENT IN AN ORGANIZED HEALTH CARE EDUCATION/TRAINING PROGRAM

## 2025-06-11 PROCEDURE — 94660 CPAP INITIATION&MGMT: CPT

## 2025-06-11 PROCEDURE — 2500000002 HC RX 250 W HCPCS SELF ADMINISTERED DRUGS (ALT 637 FOR MEDICARE OP, ALT 636 FOR OP/ED): Performed by: STUDENT IN AN ORGANIZED HEALTH CARE EDUCATION/TRAINING PROGRAM

## 2025-06-11 PROCEDURE — 83735 ASSAY OF MAGNESIUM: CPT | Performed by: INTERNAL MEDICINE

## 2025-06-11 PROCEDURE — 82947 ASSAY GLUCOSE BLOOD QUANT: CPT

## 2025-06-11 PROCEDURE — 2500000002 HC RX 250 W HCPCS SELF ADMINISTERED DRUGS (ALT 637 FOR MEDICARE OP, ALT 636 FOR OP/ED): Performed by: INTERNAL MEDICINE

## 2025-06-11 PROCEDURE — 2500000004 HC RX 250 GENERAL PHARMACY W/ HCPCS (ALT 636 FOR OP/ED): Performed by: STUDENT IN AN ORGANIZED HEALTH CARE EDUCATION/TRAINING PROGRAM

## 2025-06-11 PROCEDURE — 2060000001 HC INTERMEDIATE ICU ROOM DAILY

## 2025-06-11 PROCEDURE — 85027 COMPLETE CBC AUTOMATED: CPT | Performed by: STUDENT IN AN ORGANIZED HEALTH CARE EDUCATION/TRAINING PROGRAM

## 2025-06-11 PROCEDURE — 2500000001 HC RX 250 WO HCPCS SELF ADMINISTERED DRUGS (ALT 637 FOR MEDICARE OP): Performed by: STUDENT IN AN ORGANIZED HEALTH CARE EDUCATION/TRAINING PROGRAM

## 2025-06-11 PROCEDURE — 80053 COMPREHEN METABOLIC PANEL: CPT | Performed by: STUDENT IN AN ORGANIZED HEALTH CARE EDUCATION/TRAINING PROGRAM

## 2025-06-11 RX ORDER — IBUPROFEN 200 MG
1 TABLET ORAL DAILY
Status: DISCONTINUED | OUTPATIENT
Start: 2025-07-23 | End: 2025-06-13 | Stop reason: HOSPADM

## 2025-06-11 RX ORDER — NICOTINE 7MG/24HR
1 PATCH, TRANSDERMAL 24 HOURS TRANSDERMAL DAILY
Status: DISCONTINUED | OUTPATIENT
Start: 2025-08-06 | End: 2025-06-13 | Stop reason: HOSPADM

## 2025-06-11 RX ORDER — IBUPROFEN 200 MG
1 TABLET ORAL DAILY
Status: DISCONTINUED | OUTPATIENT
Start: 2025-06-11 | End: 2025-06-13 | Stop reason: HOSPADM

## 2025-06-11 RX ADMIN — IPRATROPIUM BROMIDE AND ALBUTEROL SULFATE 3 ML: 2.5; .5 SOLUTION RESPIRATORY (INHALATION) at 23:02

## 2025-06-11 RX ADMIN — ASPIRIN 81 MG: 81 TABLET, COATED ORAL at 08:31

## 2025-06-11 RX ADMIN — INSULIN LISPRO 2 UNITS: 100 INJECTION, SOLUTION INTRAVENOUS; SUBCUTANEOUS at 06:09

## 2025-06-11 RX ADMIN — Medication 40 PERCENT: at 20:35

## 2025-06-11 RX ADMIN — DOXYCYCLINE 100 MG: 100 INJECTION, POWDER, LYOPHILIZED, FOR SOLUTION INTRAVENOUS at 18:57

## 2025-06-11 RX ADMIN — PANTOPRAZOLE SODIUM 40 MG: 40 TABLET, DELAYED RELEASE ORAL at 06:09

## 2025-06-11 RX ADMIN — AMLODIPINE BESYLATE 10 MG: 10 TABLET ORAL at 08:32

## 2025-06-11 RX ADMIN — NICOTINE 1 PATCH: 21 PATCH, EXTENDED RELEASE TRANSDERMAL at 22:20

## 2025-06-11 RX ADMIN — FAMOTIDINE 20 MG: 20 TABLET, FILM COATED ORAL at 08:32

## 2025-06-11 RX ADMIN — LINACLOTIDE 145 MCG: 145 CAPSULE, GELATIN COATED ORAL at 08:32

## 2025-06-11 RX ADMIN — METHYLPREDNISOLONE SODIUM SUCCINATE 40 MG: 40 INJECTION, POWDER, FOR SOLUTION INTRAMUSCULAR; INTRAVENOUS at 18:57

## 2025-06-11 RX ADMIN — FLUTICASONE PROPIONATE 1 SPRAY: 50 SPRAY, METERED NASAL at 22:20

## 2025-06-11 RX ADMIN — FLUTICASONE PROPIONATE 1 SPRAY: 50 SPRAY, METERED NASAL at 08:35

## 2025-06-11 RX ADMIN — THIAMINE HYDROCHLORIDE 100 MG: 100 INJECTION, SOLUTION INTRAMUSCULAR; INTRAVENOUS at 08:32

## 2025-06-11 RX ADMIN — METHYLPREDNISOLONE SODIUM SUCCINATE 40 MG: 40 INJECTION, POWDER, FOR SOLUTION INTRAMUSCULAR; INTRAVENOUS at 06:09

## 2025-06-11 RX ADMIN — INSULIN LISPRO 1 UNITS: 100 INJECTION, SOLUTION INTRAVENOUS; SUBCUTANEOUS at 13:34

## 2025-06-11 RX ADMIN — ENOXAPARIN SODIUM 40 MG: 100 INJECTION SUBCUTANEOUS at 02:47

## 2025-06-11 RX ADMIN — LORAZEPAM 0.5 MG: 0.5 TABLET ORAL at 22:29

## 2025-06-11 RX ADMIN — Medication 1 TABLET: at 08:32

## 2025-06-11 RX ADMIN — IPRATROPIUM BROMIDE AND ALBUTEROL SULFATE 3 ML: 2.5; .5 SOLUTION RESPIRATORY (INHALATION) at 13:56

## 2025-06-11 RX ADMIN — Medication 3 MG: at 22:20

## 2025-06-11 RX ADMIN — INSULIN LISPRO 2 UNITS: 100 INJECTION, SOLUTION INTRAVENOUS; SUBCUTANEOUS at 00:02

## 2025-06-11 RX ADMIN — FINASTERIDE 5 MG: 5 TABLET, FILM COATED ORAL at 08:32

## 2025-06-11 RX ADMIN — CETIRIZINE HYDROCHLORIDE 10 MG: 10 TABLET, FILM COATED ORAL at 08:32

## 2025-06-11 RX ADMIN — INSULIN LISPRO 2 UNITS: 100 INJECTION, SOLUTION INTRAVENOUS; SUBCUTANEOUS at 18:42

## 2025-06-11 RX ADMIN — DOXYCYCLINE 100 MG: 100 INJECTION, POWDER, LYOPHILIZED, FOR SOLUTION INTRAVENOUS at 06:09

## 2025-06-11 RX ADMIN — TAMSULOSIN HYDROCHLORIDE 0.8 MG: 0.4 CAPSULE ORAL at 08:32

## 2025-06-11 RX ADMIN — BUPROPION HYDROCHLORIDE 300 MG: 150 TABLET, EXTENDED RELEASE ORAL at 08:31

## 2025-06-11 RX ADMIN — ESCITALOPRAM OXALATE 20 MG: 10 TABLET ORAL at 08:31

## 2025-06-11 RX ADMIN — Medication 3 L/MIN: at 08:34

## 2025-06-11 RX ADMIN — FOLIC ACID 1 MG: 1 TABLET ORAL at 08:32

## 2025-06-11 ASSESSMENT — LIFESTYLE VARIABLES
HEADACHE, FULLNESS IN HEAD: NOT PRESENT
VISUAL DISTURBANCES: NOT PRESENT
TREMOR: NO TREMOR
PAROXYSMAL SWEATS: NO SWEAT VISIBLE
ANXIETY: NO ANXIETY, AT EASE
TOTAL SCORE: 0
ANXIETY: 2
AUDITORY DISTURBANCES: NOT PRESENT
ANXIETY: NO ANXIETY, AT EASE
TREMOR: NO TREMOR
AUDITORY DISTURBANCES: NOT PRESENT
TREMOR: NO TREMOR
PAROXYSMAL SWEATS: NO SWEAT VISIBLE
TOTAL SCORE: 0
TOTAL SCORE: 0
TREMOR: NO TREMOR
TOTAL SCORE: 6
AUDITORY DISTURBANCES: NOT PRESENT
NAUSEA AND VOMITING: NO NAUSEA AND NO VOMITING
PAROXYSMAL SWEATS: NO SWEAT VISIBLE
AGITATION: NORMAL ACTIVITY
AGITATION: NORMAL ACTIVITY
ORIENTATION AND CLOUDING OF SENSORIUM: ORIENTED AND CAN DO SERIAL ADDITIONS
ORIENTATION AND CLOUDING OF SENSORIUM: ORIENTED AND CAN DO SERIAL ADDITIONS
AUDITORY DISTURBANCES: NOT PRESENT
HEADACHE, FULLNESS IN HEAD: NOT PRESENT
AGITATION: SOMEWHAT MORE THAN NORMAL ACTIVITY
AGITATION: NORMAL ACTIVITY
HEADACHE, FULLNESS IN HEAD: NOT PRESENT
NAUSEA AND VOMITING: NO NAUSEA AND NO VOMITING
VISUAL DISTURBANCES: NOT PRESENT
VISUAL DISTURBANCES: NOT PRESENT
HEADACHE, FULLNESS IN HEAD: NOT PRESENT
PAROXYSMAL SWEATS: 3
ORIENTATION AND CLOUDING OF SENSORIUM: ORIENTED AND CAN DO SERIAL ADDITIONS
ANXIETY: NO ANXIETY, AT EASE
VISUAL DISTURBANCES: NOT PRESENT
ORIENTATION AND CLOUDING OF SENSORIUM: ORIENTED AND CAN DO SERIAL ADDITIONS

## 2025-06-11 ASSESSMENT — PAIN SCALES - GENERAL
PAINLEVEL_OUTOF10: 0 - NO PAIN

## 2025-06-11 ASSESSMENT — PAIN SCALES - WONG BAKER: WONGBAKER_NUMERICALRESPONSE: NO HURT

## 2025-06-11 ASSESSMENT — PAIN - FUNCTIONAL ASSESSMENT
PAIN_FUNCTIONAL_ASSESSMENT: 0-10
PAIN_FUNCTIONAL_ASSESSMENT: 0-10

## 2025-06-11 NOTE — CARE PLAN
The patient's goals for the shift include  staying informed    The clinical goals for the shift include patient will tolerate wearing bipap throughout shift during naps     Over the shift, the patient did not make progress toward the following goals. Barriers to progression include understanding. Recommendations to address these barriers include education.

## 2025-06-11 NOTE — DOCUMENTATION CLARIFICATION NOTE
"    PATIENT:               PERLA HRADY  ACCT #:                  [id number]  MRN:                       [id number]  :                       1970  ADMIT DATE:       2025 11:42 PM  DISCH DATE:  RESPONDING PROVIDER #:        [id number]          PROVIDER RESPONSE TEXT:    Non-infectious SIRS with acute organ dysfunction of acute respiratory failure    CDI QUERY TEXT:    Clarification    Instruction:    Based on your assessment of the patient and the clinical information, please provide the requested documentation by clicking on the appropriate radio button and enter any additional information if prompted.    Question: Please further clarify if there is a diagnosis related to the clinical information    When answering this query, please exercise your independent professional judgment. The fact that a question is being asked, does not imply that any particular answer is desired or expected.    The patient's clinical indicators include:  Clinical Information:  56 yo presenting with shortness of breath and chest pain    Clinical Indicators:  H and P  \"patient not cooperative w/ most questioning while on bipap. most of history obtained from chart review. pt came in for chest pain and sob for 2d. associated cough w/ mucous. apparently been drinking heavily last 2 days\"    6/10/25    WBC  12.7     HR            RR  20-25    PN  25  \"Patient was placed on BiPAP and admitted with a diagnosis of acute hypoxic/hypercarbic respiratory failure secondary to COPD exacerbation, acute encephalopathy secondary to alcohol intoxication and started on treatment as per protocol\"    Treatment: BiPap, IV Magnesium sulfate 2 g, IV Solu-Medrol 25-25,    Risk Factors:  56 yo smoker with COPD  Options provided:  -- Non-infectious SIRS with acute organ dysfunction of acute respiratory failure  -- Non-infections SIRS with acute organ dysfunction please specify organ dysfunction below  -- Non-infectious SIRS without acute " organ dysfunction  -- Other - I will add my own diagnosis  -- Refer to Clinical Documentation Reviewer    Query created by: Naomi Tyson on 6/11/2025 10:04 AM      Electronically signed by:  AMRIK PACE MD 6/11/2025 10:56 AM

## 2025-06-11 NOTE — PROGRESS NOTES
Daniel Hopkins is a 55 y.o. male on day 2 of admission presenting with Respiratory distress.      Subjective   Patient is a 55-year-old male with past medical history of COPD, type II DM, hypertension, CAD, BPH, neuropathy, anxiety/depression, alcohol use presented to ER with complaint of shortness of breath going on for 2 days associated with cough with mucus production.  Patient was also drinking heavily since last 2 days.  Patient was placed on BiPAP and admitted with a diagnosis of acute hypoxic/hypercarbic respiratory failure secondary to COPD exacerbation, acute encephalopathy secondary to alcohol intoxication and started on treatment as per protocol.  CT chest PE protocol shows no acute pulmonary embolism or acute cardiopulmonary pathology.  Echocardiogram shows hyperdynamic LV systolic function with EF of 77%, moderately increased septal and moderately increased posterior left ventricular wall thickness.  Patient condition gradually improving, oxygen requirement is getting better.    6/11/2025 patient was evaluated this morning, feeling better, maintaining saturation on nasal cannula during daytime, uses BiPAP at night or when napping.       Objective     Last Recorded Vitals  /82 (BP Location: Left arm, Patient Position: Lying)   Pulse (!) 120 Comment: Rn notified  Temp 36.6 °C (97.9 °F) (Temporal)   Resp 20   Wt 104 kg (229 lb 0.9 oz)   SpO2 95%   Intake/Output last 3 Shifts:    Intake/Output Summary (Last 24 hours) at 6/11/2025 1402  Last data filed at 6/11/2025 0300  Gross per 24 hour   Intake --   Output 325 ml   Net -325 ml       Admission Weight  Weight: 109 kg (240 lb) (06/08/25 2342)    Daily Weight  06/10/25 : 104 kg (229 lb 0.9 oz)    Image Results  ECG 12 lead  Sinus tachycardia  RBBB and LAFB  Inferior infarct, acute  Lateral leads are also involved  >>> Acute MI <<<      Physical Exam  Patient is awake and alert, does not seem to be in acute distress.  Eyes: PERRLA, no conjunctival  congestion  Chest: Bilateral Air entry, no crackles or wheezing  Heart: s1S2 regular, no murmur  Abdomen: Soft, non tender, BS present  Ext:    Relevant Results               This patient currently has cardiac telemetry ordered; if you would like to modify or discontinue the telemetry order, click here to go to the orders activity to modify/discontinue the order.              Assessment & Plan    Acute hypoxic/hypercarbic respiratory failure  Patient was placed on BiPAP on admission-condition gradually improving, currently maintaining saturation on nasal cannula  Continue oxygen supplementation wean as tolerated  Incentive spirometry pulmonary hygiene    COPD exacerbation  Continue on IV steroid, doxycycline, DuoNeb as well as BiPAP as needed    Atypical chest pain  Minimally elevated troponin, EKG shows no acute ST changes  CT chest negative for PE and acute cardiopulmonary pathology  Echocardiogram shows hyperdynamic LV function, no regional wall motion abnormalities    Acute toxic encephalopathy secondary to alcohol intoxication/hypercarbia  CT head negative for acute finding  Continue supportive treatment and monitor  Watch for alcohol withdrawal will start on CIWA treatment protocol    Hypertension  Monitor blood pressure  Restart as needed    Type II DM  Monitor blood sugar with sliding scale coverage  Holding metformin    GERD/IBS  Stable  Continue home medication      Anxiety/depression  On Lexapro start Wellbutrin           Pawan Byers MD

## 2025-06-11 NOTE — PROGRESS NOTES
SW notified by CT Supervisor, RADHA Smith, pt admitted to binge drinking vodka 3-4 x/week and tox screen + for cocaine. SW attempted to meet with pt X2 today but was on BIPAP. Discussed with bedside RN. SW to follow up tomorrow morning to address etoh/substance abuse concerns.

## 2025-06-11 NOTE — CARE PLAN
The patient's goals for the shift include  pt will rest comfortably through night    The clinical goals for the shift include no falls    Over the shift, the patient did make progress toward the following goals. Barriers to progression include education. Recommendations to address these barriers include we education reinforcement.

## 2025-06-12 LAB
ALBUMIN SERPL BCP-MCNC: 3.7 G/DL (ref 3.4–5)
ALP SERPL-CCNC: 65 U/L (ref 33–120)
ALT SERPL W P-5'-P-CCNC: 26 U/L (ref 10–52)
ANION GAP SERPL CALC-SCNC: 9 MMOL/L (ref 10–20)
AST SERPL W P-5'-P-CCNC: 14 U/L (ref 9–39)
BILIRUB SERPL-MCNC: 0.6 MG/DL (ref 0–1.2)
BUN SERPL-MCNC: 17 MG/DL (ref 6–23)
CALCIUM SERPL-MCNC: 9 MG/DL (ref 8.6–10.3)
CHLORIDE SERPL-SCNC: 94 MMOL/L (ref 98–107)
CO2 SERPL-SCNC: 38 MMOL/L (ref 21–32)
CREAT SERPL-MCNC: 0.62 MG/DL (ref 0.5–1.3)
EGFRCR SERPLBLD CKD-EPI 2021: >90 ML/MIN/1.73M*2
ERYTHROCYTE [DISTWIDTH] IN BLOOD BY AUTOMATED COUNT: 15.2 % (ref 11.5–14.5)
GLUCOSE BLD MANUAL STRIP-MCNC: 133 MG/DL (ref 74–99)
GLUCOSE BLD MANUAL STRIP-MCNC: 238 MG/DL (ref 74–99)
GLUCOSE BLD MANUAL STRIP-MCNC: 246 MG/DL (ref 74–99)
GLUCOSE BLD MANUAL STRIP-MCNC: 346 MG/DL (ref 74–99)
GLUCOSE SERPL-MCNC: 211 MG/DL (ref 74–99)
HCT VFR BLD AUTO: 41.6 % (ref 41–52)
HGB BLD-MCNC: 12.5 G/DL (ref 13.5–17.5)
MCH RBC QN AUTO: 26 PG (ref 26–34)
MCHC RBC AUTO-ENTMCNC: 30 G/DL (ref 32–36)
MCV RBC AUTO: 87 FL (ref 80–100)
NRBC BLD-RTO: 0 /100 WBCS (ref 0–0)
PLATELET # BLD AUTO: 305 X10*3/UL (ref 150–450)
POTASSIUM SERPL-SCNC: 3.5 MMOL/L (ref 3.5–5.3)
PROT SERPL-MCNC: 6 G/DL (ref 6.4–8.2)
RBC # BLD AUTO: 4.8 X10*6/UL (ref 4.5–5.9)
SODIUM SERPL-SCNC: 137 MMOL/L (ref 136–145)
WBC # BLD AUTO: 13.1 X10*3/UL (ref 4.4–11.3)

## 2025-06-12 PROCEDURE — 2500000001 HC RX 250 WO HCPCS SELF ADMINISTERED DRUGS (ALT 637 FOR MEDICARE OP): Performed by: STUDENT IN AN ORGANIZED HEALTH CARE EDUCATION/TRAINING PROGRAM

## 2025-06-12 PROCEDURE — 85027 COMPLETE CBC AUTOMATED: CPT | Performed by: STUDENT IN AN ORGANIZED HEALTH CARE EDUCATION/TRAINING PROGRAM

## 2025-06-12 PROCEDURE — 2500000001 HC RX 250 WO HCPCS SELF ADMINISTERED DRUGS (ALT 637 FOR MEDICARE OP): Performed by: INTERNAL MEDICINE

## 2025-06-12 PROCEDURE — 82947 ASSAY GLUCOSE BLOOD QUANT: CPT

## 2025-06-12 PROCEDURE — 94640 AIRWAY INHALATION TREATMENT: CPT

## 2025-06-12 PROCEDURE — 80053 COMPREHEN METABOLIC PANEL: CPT | Performed by: STUDENT IN AN ORGANIZED HEALTH CARE EDUCATION/TRAINING PROGRAM

## 2025-06-12 PROCEDURE — 2500000004 HC RX 250 GENERAL PHARMACY W/ HCPCS (ALT 636 FOR OP/ED): Performed by: STUDENT IN AN ORGANIZED HEALTH CARE EDUCATION/TRAINING PROGRAM

## 2025-06-12 PROCEDURE — S4991 NICOTINE PATCH NONLEGEND: HCPCS | Performed by: INTERNAL MEDICINE

## 2025-06-12 PROCEDURE — 97161 PT EVAL LOW COMPLEX 20 MIN: CPT | Mod: GP

## 2025-06-12 PROCEDURE — 99233 SBSQ HOSP IP/OBS HIGH 50: CPT | Performed by: INTERNAL MEDICINE

## 2025-06-12 PROCEDURE — 2500000002 HC RX 250 W HCPCS SELF ADMINISTERED DRUGS (ALT 637 FOR MEDICARE OP, ALT 636 FOR OP/ED): Performed by: INTERNAL MEDICINE

## 2025-06-12 PROCEDURE — 2500000002 HC RX 250 W HCPCS SELF ADMINISTERED DRUGS (ALT 637 FOR MEDICARE OP, ALT 636 FOR OP/ED): Performed by: STUDENT IN AN ORGANIZED HEALTH CARE EDUCATION/TRAINING PROGRAM

## 2025-06-12 PROCEDURE — 1200000002 HC GENERAL ROOM WITH TELEMETRY DAILY

## 2025-06-12 PROCEDURE — 2500000005 HC RX 250 GENERAL PHARMACY W/O HCPCS: Performed by: STUDENT IN AN ORGANIZED HEALTH CARE EDUCATION/TRAINING PROGRAM

## 2025-06-12 PROCEDURE — 36415 COLL VENOUS BLD VENIPUNCTURE: CPT | Performed by: STUDENT IN AN ORGANIZED HEALTH CARE EDUCATION/TRAINING PROGRAM

## 2025-06-12 PROCEDURE — 94660 CPAP INITIATION&MGMT: CPT

## 2025-06-12 RX ORDER — METFORMIN HYDROCHLORIDE 1000 MG/1
1000 TABLET ORAL EVERY MORNING
Status: DISCONTINUED | OUTPATIENT
Start: 2025-06-12 | End: 2025-06-13 | Stop reason: HOSPADM

## 2025-06-12 RX ADMIN — TAMSULOSIN HYDROCHLORIDE 0.8 MG: 0.4 CAPSULE ORAL at 10:15

## 2025-06-12 RX ADMIN — Medication 3 MG: at 20:41

## 2025-06-12 RX ADMIN — METFORMIN HYDROCHLORIDE 1000 MG: 1000 TABLET ORAL at 10:15

## 2025-06-12 RX ADMIN — FOLIC ACID 1 MG: 1 TABLET ORAL at 10:15

## 2025-06-12 RX ADMIN — LINACLOTIDE 145 MCG: 145 CAPSULE, GELATIN COATED ORAL at 10:15

## 2025-06-12 RX ADMIN — PANTOPRAZOLE SODIUM 40 MG: 40 TABLET, DELAYED RELEASE ORAL at 05:35

## 2025-06-12 RX ADMIN — ENOXAPARIN SODIUM 40 MG: 100 INJECTION SUBCUTANEOUS at 05:35

## 2025-06-12 RX ADMIN — Medication 3 L/MIN: at 10:23

## 2025-06-12 RX ADMIN — ASPIRIN 81 MG: 81 TABLET, COATED ORAL at 10:15

## 2025-06-12 RX ADMIN — IPRATROPIUM BROMIDE AND ALBUTEROL SULFATE 3 ML: 2.5; .5 SOLUTION RESPIRATORY (INHALATION) at 23:27

## 2025-06-12 RX ADMIN — FAMOTIDINE 20 MG: 20 TABLET, FILM COATED ORAL at 10:15

## 2025-06-12 RX ADMIN — BUPROPION HYDROCHLORIDE 300 MG: 150 TABLET, EXTENDED RELEASE ORAL at 10:15

## 2025-06-12 RX ADMIN — DOXYCYCLINE 100 MG: 100 INJECTION, POWDER, LYOPHILIZED, FOR SOLUTION INTRAVENOUS at 17:45

## 2025-06-12 RX ADMIN — THIAMINE HCL TAB 100 MG 100 MG: 100 TAB at 05:36

## 2025-06-12 RX ADMIN — IPRATROPIUM BROMIDE AND ALBUTEROL SULFATE 3 ML: 2.5; .5 SOLUTION RESPIRATORY (INHALATION) at 15:22

## 2025-06-12 RX ADMIN — ESCITALOPRAM OXALATE 20 MG: 10 TABLET ORAL at 10:15

## 2025-06-12 RX ADMIN — LORAZEPAM 0.5 MG: 0.5 TABLET ORAL at 20:41

## 2025-06-12 RX ADMIN — AMLODIPINE BESYLATE 10 MG: 10 TABLET ORAL at 10:15

## 2025-06-12 RX ADMIN — NICOTINE 1 PATCH: 21 PATCH, EXTENDED RELEASE TRANSDERMAL at 10:16

## 2025-06-12 RX ADMIN — INSULIN LISPRO 2 UNITS: 100 INJECTION, SOLUTION INTRAVENOUS; SUBCUTANEOUS at 10:21

## 2025-06-12 RX ADMIN — FLUTICASONE PROPIONATE 1 SPRAY: 50 SPRAY, METERED NASAL at 10:24

## 2025-06-12 RX ADMIN — DOXYCYCLINE 100 MG: 100 INJECTION, POWDER, LYOPHILIZED, FOR SOLUTION INTRAVENOUS at 05:35

## 2025-06-12 RX ADMIN — FLUTICASONE PROPIONATE 1 SPRAY: 50 SPRAY, METERED NASAL at 20:41

## 2025-06-12 RX ADMIN — METHYLPREDNISOLONE SODIUM SUCCINATE 40 MG: 40 INJECTION, POWDER, FOR SOLUTION INTRAMUSCULAR; INTRAVENOUS at 17:45

## 2025-06-12 RX ADMIN — METHYLPREDNISOLONE SODIUM SUCCINATE 40 MG: 40 INJECTION, POWDER, FOR SOLUTION INTRAMUSCULAR; INTRAVENOUS at 05:36

## 2025-06-12 RX ADMIN — LORAZEPAM 0.5 MG: 0.5 TABLET ORAL at 06:02

## 2025-06-12 RX ADMIN — CETIRIZINE HYDROCHLORIDE 10 MG: 10 TABLET, FILM COATED ORAL at 10:15

## 2025-06-12 RX ADMIN — Medication 1 TABLET: at 10:15

## 2025-06-12 RX ADMIN — INSULIN LISPRO 4 UNITS: 100 INJECTION, SOLUTION INTRAVENOUS; SUBCUTANEOUS at 11:58

## 2025-06-12 RX ADMIN — Medication 2 L/MIN: at 20:04

## 2025-06-12 RX ADMIN — Medication 3 L/MIN: at 15:22

## 2025-06-12 RX ADMIN — FINASTERIDE 5 MG: 5 TABLET, FILM COATED ORAL at 10:15

## 2025-06-12 ASSESSMENT — LIFESTYLE VARIABLES
ANXIETY: 2
ORIENTATION AND CLOUDING OF SENSORIUM: ORIENTED AND CAN DO SERIAL ADDITIONS
NAUSEA AND VOMITING: NO NAUSEA AND NO VOMITING
AGITATION: SOMEWHAT MORE THAN NORMAL ACTIVITY
AUDITORY DISTURBANCES: NOT PRESENT
NAUSEA AND VOMITING: NO NAUSEA AND NO VOMITING
TOTAL SCORE: 6
ANXIETY: MILDLY ANXIOUS
VISUAL DISTURBANCES: NOT PRESENT
HEADACHE, FULLNESS IN HEAD: NOT PRESENT
HEADACHE, FULLNESS IN HEAD: NOT PRESENT
VISUAL DISTURBANCES: NOT PRESENT
VISUAL DISTURBANCES: NOT PRESENT
TOTAL SCORE: 1
HEADACHE, FULLNESS IN HEAD: NOT PRESENT
NAUSEA AND VOMITING: NO NAUSEA AND NO VOMITING
TREMOR: NO TREMOR
ORIENTATION AND CLOUDING OF SENSORIUM: ORIENTED AND CAN DO SERIAL ADDITIONS
AUDITORY DISTURBANCES: NOT PRESENT
AGITATION: NORMAL ACTIVITY
ORIENTATION AND CLOUDING OF SENSORIUM: ORIENTED AND CAN DO SERIAL ADDITIONS
TOTAL SCORE: 6
TOTAL SCORE: 1
PAROXYSMAL SWEATS: NO SWEAT VISIBLE
AUDITORY DISTURBANCES: NOT PRESENT
AGITATION: SOMEWHAT MORE THAN NORMAL ACTIVITY
HEADACHE, FULLNESS IN HEAD: NOT PRESENT
ANXIETY: 2
NAUSEA AND VOMITING: NO NAUSEA AND NO VOMITING
AUDITORY DISTURBANCES: NOT PRESENT
TREMOR: NO TREMOR
PAROXYSMAL SWEATS: 3
TREMOR: NO TREMOR
ORIENTATION AND CLOUDING OF SENSORIUM: ORIENTED AND CAN DO SERIAL ADDITIONS
PAROXYSMAL SWEATS: NO SWEAT VISIBLE
VISUAL DISTURBANCES: NOT PRESENT
TREMOR: NO TREMOR
ANXIETY: MILDLY ANXIOUS
PAROXYSMAL SWEATS: 2
AGITATION: NORMAL ACTIVITY
TACTILE DISTURBANCES: VERY MILD ITCHING, PINS AND NEEDLES, BURNING OR NUMBNESS

## 2025-06-12 ASSESSMENT — COGNITIVE AND FUNCTIONAL STATUS - GENERAL
MOVING TO AND FROM BED TO CHAIR: A LITTLE
WALKING IN HOSPITAL ROOM: A LITTLE
DAILY ACTIVITIY SCORE: 24
TURNING FROM BACK TO SIDE WHILE IN FLAT BAD: A LITTLE
MOBILITY SCORE: 19
DAILY ACTIVITIY SCORE: 24
MOBILITY SCORE: 24
CLIMB 3 TO 5 STEPS WITH RAILING: A LOT
MOBILITY SCORE: 24

## 2025-06-12 ASSESSMENT — ACTIVITIES OF DAILY LIVING (ADL): ADL_ASSISTANCE: INDEPENDENT

## 2025-06-12 ASSESSMENT — PAIN SCALES - GENERAL
PAINLEVEL_OUTOF10: 0 - NO PAIN

## 2025-06-12 NOTE — PROGRESS NOTES
Occupational Therapy                 Therapy Communication Note    Patient Name: Daniel Hopkins  MRN: [id number]  Department: [facility] 2 W  Room: 2028/2028-A  Today's Date: 6/12/2025     Discipline: Occupational Therapy    Missed Visit: OT Missed Visit: Yes     Attempt 1: 1309 Other (comment): Pt seen up in room with PCA stand by while pt was getting cleaned up in bathroom. Pt snagged IV and was bleeding/needs new IV placed. will re attempt as schedule allows.     Attempt 2: 5156 Missed Visit Reason: Missed Visit Reason: Patient refused (Pt just finished up with PT, requested OT to return tomorrow for eval. Pt reports he just got comfortable/ refuse OT eval at this time. Will re attempt FRI)    Missed Time: Attempt    Comment:

## 2025-06-12 NOTE — CARE PLAN
The patient's goals for the shift include  staying informed    The clinical goals for the shift include patient will remain free of falls throuhgout this shift    Over the shift, the patient did not make progress toward the following goals. Barriers to progression include understanding. Recommendations to address these barriers include education.

## 2025-06-12 NOTE — PROGRESS NOTES
Daniel Hopkins is a 55 y.o. male on day 3 of admission presenting with Respiratory distress.      Subjective   Patient is a 55-year-old male with past medical history of COPD, type II DM, hypertension, CAD, BPH, neuropathy, anxiety/depression, alcohol use presented to ER with complaint of shortness of breath going on for 2 days associated with cough with mucus production.  Patient was also drinking heavily since last 2 days.  Patient was placed on BiPAP and admitted with a diagnosis of acute hypoxic/hypercarbic respiratory failure secondary to COPD exacerbation, acute encephalopathy secondary to alcohol intoxication and started on treatment as per protocol.  CT chest PE protocol shows no acute pulmonary embolism or acute cardiopulmonary pathology.  Echocardiogram shows hyperdynamic LV systolic function with EF of 77%, moderately increased septal and moderately increased posterior left ventricular wall thickness.  Patient condition gradually improving, oxygen requirement is getting better.  He was taken off of BiPAP and currently maintaining saturation on nasal cannula.    6/12/2025 patient was evaluated this morning, feeling better, maintaining saturation on nasal cannula        Objective     Last Recorded Vitals  BP (!) 137/92 (BP Location: Left arm, Patient Position: Lying)   Pulse 93   Temp 36 °C (96.8 °F) (Temporal)   Resp 14   Wt 104 kg (229 lb 0.9 oz)   SpO2 91%   Intake/Output last 3 Shifts:    Intake/Output Summary (Last 24 hours) at 6/12/2025 1502  Last data filed at 6/12/2025 0806  Gross per 24 hour   Intake 1240 ml   Output 350 ml   Net 890 ml       Admission Weight  Weight: 109 kg (240 lb) (06/08/25 2342)    Daily Weight  06/10/25 : 104 kg (229 lb 0.9 oz)    Image Results  ECG 12 lead  Sinus tachycardia  RBBB and LAFB  Inferior infarct, acute  Lateral leads are also involved  >>> Acute MI <<<      Physical Exam  Patient is awake and alert, does not seem to be in acute distress.  Eyes: PERRLA, no  conjunctival congestion  Chest: Bilateral Air entry, no crackles or wheezing  Heart: s1S2 regular, no murmur  Abdomen: Soft, non tender, BS present  Ext:    Relevant Results                              Assessment & Plan    Acute hypoxic/hypercarbic respiratory failure  Patient was placed on BiPAP on admission-condition gradually improving, currently maintaining saturation on nasal cannula  Continue oxygen supplementation wean as tolerated  Incentive spirometry pulmonary hygiene  Discharge planning in a.m. if patient condition continues to improve    COPD exacerbation  Continue on IV steroid, doxycycline, DuoNeb as well as BiPAP as needed    Atypical chest pain  Minimally elevated troponin, EKG shows no acute ST changes  CT chest negative for PE and acute cardiopulmonary pathology  Echocardiogram shows hyperdynamic LV function, no regional wall motion abnormalities    Acute toxic encephalopathy secondary to alcohol intoxication/hypercarbia  CT head negative for acute finding  Continue supportive treatment and monitor  Watch for alcohol withdrawal will start on CIWA treatment protocol    Hypertension  Monitor blood pressure  Restart as needed    Type II DM  Monitor blood sugar with sliding scale coverage  Holding metformin    GERD/IBS  Stable  Continue home medication      Anxiety/depression  On Lexapro start Wellbutrin           Pawan Byers MD

## 2025-06-12 NOTE — PROGRESS NOTES
"Physical Therapy    Physical Therapy Evaluation    Patient Name: Daniel Hopkins  MRN: [id number]  Today's Date: 6/12/2025   Time Calculation  Start Time: 1333  Stop Time: 1346  Time Calculation (min): 13 min  2028/2028-A    Assessment/Plan   PT Assessment  PT Assessment Results: Decreased strength, Decreased endurance, Impaired balance, Decreased mobility, Impaired sensation  Rehab Prognosis: Good  Barriers to Discharge Home: No anticipated barriers  Evaluation/Treatment Tolerance: Patient limited by fatigue  Medical Staff Made Aware: Yes  End of Session Communication: Bedside nurse  Assessment Comment: Patient SBA sit to stand from chair with CGA x 1 5ft AMB. Patient stated he needs assistance at home, \"An aide if I can get one\". Patient cueing and education for energy conservation. Benefit from continued PT.  End of Session Patient Position: Up in chair, Alarm off, not on at start of session (Nurse notified, patient back in recliner chair)  IP OR SWING BED PT PLAN  Inpatient or Swing Bed: Inpatient  PT Plan  Treatment/Interventions: Transfer training, Gait training, Stair training, Balance training, Neuromuscular re-education, Strengthening, Endurance training, Therapeutic activity, Therapeutic exercise  PT Plan: Ongoing PT  PT Frequency: 3 times per week  PT Discharge Recommendations: Low intensity level of continued care (Patient requested home health aide if he is able to get one)  Equipment Recommended upon Discharge:  (TBD)  PT - OK to Discharge: Yes (When medically cleared)        General Visit Information:  General  Reason for Referral: Impaired mobility  Referred By: Modesta  Past Medical History Relevant to Rehab: PMHx: COPD, type II DM, hypertension, CAD, BPH, neuropathy, anxiety/depression, alcohol use presented to ER with complaint of shortness of breath going on for 2 days associated with cough with mucus production  Family/Caregiver Present: No  Prior to Session Communication: Bedside nurse  Patient " Position Received: Up in chair, Alarm off, not on at start of session  General Comment: Room 2028. Patient agreeable to participating in therapy. 3L O2 NC, tele on. Patient states intermittent use of 4L O2 at home.    Home Living:  Home Living  Type of Home: Apartment  Lives With: Alone  Home Layout: One level  Bathroom Shower/Tub: Tub/shower unit  Bathroom Equipment: Grab bars in shower, Shower chair with back  Home Living Comments: Patient lives alone. Uses bus or cab for transportation to appointments and into community. Stated difficulty occassionally paying for services.    Prior Level of Function:  Prior Function Per Pt/Caregiver Report  Level of [place]: Independent with ADLs and functional transfers, Independent with homemaking with ambulation  ADL Assistance: Independent  Homemaking Assistance: Independent  Ambulatory Assistance: Independent  Prior Function Comments: States increased difficulty recently with cooking and laundry due to SOB. States independent prior with mobility, no AD use    Precautions:  Precautions  Medical Precautions: Fall precautions, Oxygen therapy device and L/min    Vital Signs:     Objective     Pain:  Pain Assessment  0-10 (Numeric) Pain Score: 0 - No pain    Cognition:  Cognition  Orientation Level: Oriented X4  Attention: Within Functional Limits  Problem Solving: Within Functional Limits  Safety/Judgement: Within Functional Limits  Insight: Mild  Impulsive: Mildly  Processing Speed: Within funtional limits    General Assessments:  General Observation  General Observation: Observed patient earlier AMB in room with CGA x 1, stated he has increased SOB with activity.   Activity Tolerance  Endurance: Decreased tolerance for upright activites  Sensation  Sensation Comment: Stated numbness in thighs  Strength  Strength Comments: BLEs grossly 4-/5. Patient stated feeling weak in the legs with standing           Static Sitting Balance  Static Sitting-Comment/Number of Minutes:  Fair+ SBA  Dynamic Sitting Balance  Dynamic Sitting-Comments: Fair SBA  Static Standing Balance  Static Standing-Comment/Number of Minutes: Fair SBA  Dynamic Standing Balance  Dynamic Standing-Comments: Fair    Functional Assessments:     Bed Mobility  Bed Mobility:  (Not assessed. Patient up in recliner chair pre and post session.)  Transfers  Transfer:  (Sit to stand from standard height chair SBA x 1 with use of arm rest with cueing for safety.)  Ambulation/Gait Training  Ambulation/Gait Training Performed:  (AMB 5ft in room CGA x 1 for safety with cueing for energy conservation. Patient stated he did not want to AMB further, wanted to sit back down and rest. Mild increase in SOB.)  Stairs  Stairs: No       Extremity/Trunk Assessments:                Outcome Measures:     [facility] Basic Mobility  Turning from your back to your side while in a flat bed without using bedrails: None  Moving from lying on your back to sitting on the side of a flat bed without using bedrails: A little  Moving to and from bed to chair (including a wheelchair): A little  Standing up from a chair using your arms (e.g. wheelchair or bedside chair): None  To walk in hospital room: A little  Climbing 3-5 steps with railing: A lot  Basic Mobility - Total Score: 19                                                             Goals:  Encounter Problems       Encounter Problems (Active)       PT Problem       Gait       Start:  06/12/25    Expected End:  06/26/25       Patient will amb 75+ feet CGA x 1 for increased safety and function           Strengthening       Start:  06/12/25    Expected End:  06/26/25       Patient will perform 20+ reps of AROM/RROM for DONTRELL LE's to improve safety and functional independence               PT Problem       Balance       Start:  06/12/25    Expected End:  06/26/25       Patient SBA with higher level balance activities with AMB for increased safety and function            Pain - Adult            Education  Documentation  Precautions, taught by ALVARO Bone at 6/12/2025  2:46 PM.  Learner: Patient  Readiness: Acceptance  Method: Explanation  Response: Needs Reinforcement  Comment: For increased safety    Mobility Training, taught by ALVARO Bone at 6/12/2025  2:46 PM.  Learner: Patient  Readiness: Acceptance  Method: Explanation  Response: Needs Reinforcement  Comment: For increased safety    Education Comments  No comments found.        Completion of this session, clinical decision making, and documentation performed under the supervision/direction of Seble Russell.      ALVARO BONE

## 2025-06-12 NOTE — CARE PLAN
The patient's goals for the shift include      The clinical goals for the shift include Pt will remain free of falls throughout the shift.    Over the shift, the patient did not make progress toward the following goals

## 2025-06-12 NOTE — PROGRESS NOTES
"Social work consult placed for etoh/substance abuse, met with pt to assess needs and provide support, introduced self and my role as  with care transition team. Explored pt's thoughts toward drinking behaviors, awareness, and willingness for tx. Pt endorsed that he binge drinks 3-4x/week and intermittently uses cocaine. Pt expressed interest in addiction rehab. Pt stated he is having cravings but no other withdrawal symptoms. Pt inquired about residential treatment within the community. SW discussed [facility] with pt and he is interested in referral. SW provided pt with information on [facility] and informed that he would need to call for intake before SW able to send clinicals. Pt to call them after finishing his lunch. SW to follow up with pt this afternoon. Provider aware.    3776  SW following up with pt. Pt stated he had not yet called [facility]. SW inquired if he would like assistance in calling and pt declined. Pt stated, \"I just don't feel up to it right now.\" SW informed pt that unable to get him somewhere from hospital if he does not call and complete intake assessment with facility prior. Pt understanding and stated he will call [facility] tomorrow morning.    "

## 2025-06-13 ENCOUNTER — PHARMACY VISIT (OUTPATIENT)
Dept: PHARMACY | Facility: CLINIC | Age: 55
End: 2025-06-13
Payer: COMMERCIAL

## 2025-06-13 VITALS
HEIGHT: 69 IN | TEMPERATURE: 97.7 F | RESPIRATION RATE: 18 BRPM | BODY MASS INDEX: 35.66 KG/M2 | DIASTOLIC BLOOD PRESSURE: 95 MMHG | SYSTOLIC BLOOD PRESSURE: 155 MMHG | WEIGHT: 240.74 LBS | HEART RATE: 100 BPM | OXYGEN SATURATION: 97 %

## 2025-06-13 PROBLEM — R06.03 RESPIRATORY DISTRESS: Status: RESOLVED | Noted: 2025-06-09 | Resolved: 2025-06-13

## 2025-06-13 LAB
ALBUMIN SERPL BCP-MCNC: 3.5 G/DL (ref 3.4–5)
ALP SERPL-CCNC: 65 U/L (ref 33–120)
ALT SERPL W P-5'-P-CCNC: 21 U/L (ref 10–52)
ANION GAP SERPL CALC-SCNC: 19 MMOL/L (ref 10–20)
AST SERPL W P-5'-P-CCNC: 13 U/L (ref 9–39)
BACTERIA BLD CULT: NORMAL
BACTERIA BLD CULT: NORMAL
BILIRUB SERPL-MCNC: 0.6 MG/DL (ref 0–1.2)
BUN SERPL-MCNC: 16 MG/DL (ref 6–23)
CALCIUM SERPL-MCNC: 9 MG/DL (ref 8.6–10.3)
CHLORIDE SERPL-SCNC: 93 MMOL/L (ref 98–107)
CO2 SERPL-SCNC: 31 MMOL/L (ref 21–32)
CREAT SERPL-MCNC: 0.68 MG/DL (ref 0.5–1.3)
EGFRCR SERPLBLD CKD-EPI 2021: >90 ML/MIN/1.73M*2
ERYTHROCYTE [DISTWIDTH] IN BLOOD BY AUTOMATED COUNT: 15.5 % (ref 11.5–14.5)
GLUCOSE BLD MANUAL STRIP-MCNC: 193 MG/DL (ref 74–99)
GLUCOSE BLD MANUAL STRIP-MCNC: 315 MG/DL (ref 74–99)
GLUCOSE BLD MANUAL STRIP-MCNC: 319 MG/DL (ref 74–99)
GLUCOSE SERPL-MCNC: 246 MG/DL (ref 74–99)
HCT VFR BLD AUTO: 39.9 % (ref 41–52)
HGB BLD-MCNC: 12.3 G/DL (ref 13.5–17.5)
MAGNESIUM SERPL-MCNC: 1.6 MG/DL (ref 1.6–2.4)
MCH RBC QN AUTO: 26.6 PG (ref 26–34)
MCHC RBC AUTO-ENTMCNC: 30.8 G/DL (ref 32–36)
MCV RBC AUTO: 86 FL (ref 80–100)
NRBC BLD-RTO: 0 /100 WBCS (ref 0–0)
PLATELET # BLD AUTO: 300 X10*3/UL (ref 150–450)
POC FINGERSTICK BLOOD GLUCOSE: NORMAL
POTASSIUM SERPL-SCNC: 3.5 MMOL/L (ref 3.5–5.3)
PROT SERPL-MCNC: 5.9 G/DL (ref 6.4–8.2)
RBC # BLD AUTO: 4.62 X10*6/UL (ref 4.5–5.9)
SODIUM SERPL-SCNC: 139 MMOL/L (ref 136–145)
WBC # BLD AUTO: 13.2 X10*3/UL (ref 4.4–11.3)

## 2025-06-13 PROCEDURE — RXMED WILLOW AMBULATORY MEDICATION CHARGE

## 2025-06-13 PROCEDURE — 2500000005 HC RX 250 GENERAL PHARMACY W/O HCPCS: Performed by: STUDENT IN AN ORGANIZED HEALTH CARE EDUCATION/TRAINING PROGRAM

## 2025-06-13 PROCEDURE — 2500000004 HC RX 250 GENERAL PHARMACY W/ HCPCS (ALT 636 FOR OP/ED): Performed by: INTERNAL MEDICINE

## 2025-06-13 PROCEDURE — 94761 N-INVAS EAR/PLS OXIMETRY MLT: CPT

## 2025-06-13 PROCEDURE — 94640 AIRWAY INHALATION TREATMENT: CPT

## 2025-06-13 PROCEDURE — 2500000004 HC RX 250 GENERAL PHARMACY W/ HCPCS (ALT 636 FOR OP/ED): Performed by: STUDENT IN AN ORGANIZED HEALTH CARE EDUCATION/TRAINING PROGRAM

## 2025-06-13 PROCEDURE — S4991 NICOTINE PATCH NONLEGEND: HCPCS | Performed by: INTERNAL MEDICINE

## 2025-06-13 PROCEDURE — 85027 COMPLETE CBC AUTOMATED: CPT | Performed by: STUDENT IN AN ORGANIZED HEALTH CARE EDUCATION/TRAINING PROGRAM

## 2025-06-13 PROCEDURE — 80053 COMPREHEN METABOLIC PANEL: CPT | Performed by: STUDENT IN AN ORGANIZED HEALTH CARE EDUCATION/TRAINING PROGRAM

## 2025-06-13 PROCEDURE — 97116 GAIT TRAINING THERAPY: CPT | Mod: GP,CQ | Performed by: PHYSICAL THERAPY ASSISTANT

## 2025-06-13 PROCEDURE — 99239 HOSP IP/OBS DSCHRG MGMT >30: CPT | Performed by: INTERNAL MEDICINE

## 2025-06-13 PROCEDURE — 94660 CPAP INITIATION&MGMT: CPT

## 2025-06-13 PROCEDURE — 2500000001 HC RX 250 WO HCPCS SELF ADMINISTERED DRUGS (ALT 637 FOR MEDICARE OP): Performed by: INTERNAL MEDICINE

## 2025-06-13 PROCEDURE — 82947 ASSAY GLUCOSE BLOOD QUANT: CPT

## 2025-06-13 PROCEDURE — 2500000001 HC RX 250 WO HCPCS SELF ADMINISTERED DRUGS (ALT 637 FOR MEDICARE OP): Performed by: STUDENT IN AN ORGANIZED HEALTH CARE EDUCATION/TRAINING PROGRAM

## 2025-06-13 PROCEDURE — 36415 COLL VENOUS BLD VENIPUNCTURE: CPT | Performed by: STUDENT IN AN ORGANIZED HEALTH CARE EDUCATION/TRAINING PROGRAM

## 2025-06-13 PROCEDURE — 97112 NEUROMUSCULAR REEDUCATION: CPT | Mod: GP,CQ | Performed by: PHYSICAL THERAPY ASSISTANT

## 2025-06-13 PROCEDURE — 2500000002 HC RX 250 W HCPCS SELF ADMINISTERED DRUGS (ALT 637 FOR MEDICARE OP, ALT 636 FOR OP/ED): Performed by: STUDENT IN AN ORGANIZED HEALTH CARE EDUCATION/TRAINING PROGRAM

## 2025-06-13 PROCEDURE — 2500000002 HC RX 250 W HCPCS SELF ADMINISTERED DRUGS (ALT 637 FOR MEDICARE OP, ALT 636 FOR OP/ED): Performed by: INTERNAL MEDICINE

## 2025-06-13 PROCEDURE — 83735 ASSAY OF MAGNESIUM: CPT | Performed by: INTERNAL MEDICINE

## 2025-06-13 RX ORDER — LANOLIN ALCOHOL/MO/W.PET/CERES
100 CREAM (GRAM) TOPICAL DAILY
Qty: 30 TABLET | Refills: 11 | Status: SHIPPED | OUTPATIENT
Start: 2025-06-14

## 2025-06-13 RX ORDER — PREDNISONE 20 MG/1
40 TABLET ORAL DAILY
Status: DISCONTINUED | OUTPATIENT
Start: 2025-06-13 | End: 2025-06-13 | Stop reason: HOSPADM

## 2025-06-13 RX ORDER — PREDNISONE 5 MG/1
TABLET ORAL
Qty: 37 TABLET | Refills: 0 | Status: SHIPPED | OUTPATIENT
Start: 2025-06-14 | End: 2025-06-25

## 2025-06-13 RX ORDER — DOXYCYCLINE 100 MG/1
100 CAPSULE ORAL 2 TIMES DAILY
Qty: 10 CAPSULE | Refills: 0 | Status: SHIPPED | OUTPATIENT
Start: 2025-06-13 | End: 2025-06-18

## 2025-06-13 RX ORDER — PREDNISONE 5 MG/1
TABLET ORAL
Qty: 37 TABLET | Refills: 0 | Status: SHIPPED | OUTPATIENT
Start: 2025-06-14 | End: 2025-06-13

## 2025-06-13 RX ORDER — INSULIN LISPRO 100 [IU]/ML
0-5 INJECTION, SOLUTION INTRAVENOUS; SUBCUTANEOUS
Status: DISCONTINUED | OUTPATIENT
Start: 2025-06-13 | End: 2025-06-13 | Stop reason: HOSPADM

## 2025-06-13 RX ORDER — PREDNISONE 5 MG/1
5 TABLET ORAL DAILY
Status: DISCONTINUED | OUTPATIENT
Start: 2025-06-22 | End: 2025-06-13 | Stop reason: HOSPADM

## 2025-06-13 RX ORDER — DOXYCYCLINE 100 MG/1
100 CAPSULE ORAL 2 TIMES DAILY
Qty: 10 CAPSULE | Refills: 0 | Status: SHIPPED | OUTPATIENT
Start: 2025-06-13 | End: 2025-06-13 | Stop reason: HOSPADM

## 2025-06-13 RX ORDER — IBUPROFEN 200 MG
1 TABLET ORAL DAILY
Qty: 28 PATCH | Refills: 1 | Status: SHIPPED | OUTPATIENT
Start: 2025-06-14 | End: 2025-07-23

## 2025-06-13 RX ORDER — PREDNISONE 20 MG/1
20 TABLET ORAL DAILY
Status: DISCONTINUED | OUTPATIENT
Start: 2025-06-16 | End: 2025-06-13 | Stop reason: HOSPADM

## 2025-06-13 RX ORDER — PREDNISONE 10 MG/1
10 TABLET ORAL DAILY
Status: DISCONTINUED | OUTPATIENT
Start: 2025-06-19 | End: 2025-06-13 | Stop reason: HOSPADM

## 2025-06-13 RX ADMIN — Medication 2 L/MIN: at 11:09

## 2025-06-13 RX ADMIN — PREDNISONE 40 MG: 20 TABLET ORAL at 10:59

## 2025-06-13 RX ADMIN — ASPIRIN 81 MG: 81 TABLET, COATED ORAL at 10:59

## 2025-06-13 RX ADMIN — ENOXAPARIN SODIUM 40 MG: 100 INJECTION SUBCUTANEOUS at 05:18

## 2025-06-13 RX ADMIN — FOLIC ACID 1 MG: 1 TABLET ORAL at 11:00

## 2025-06-13 RX ADMIN — THIAMINE HCL TAB 100 MG 100 MG: 100 TAB at 11:00

## 2025-06-13 RX ADMIN — CETIRIZINE HYDROCHLORIDE 10 MG: 10 TABLET, FILM COATED ORAL at 11:00

## 2025-06-13 RX ADMIN — TAMSULOSIN HYDROCHLORIDE 0.8 MG: 0.4 CAPSULE ORAL at 10:59

## 2025-06-13 RX ADMIN — NICOTINE 1 PATCH: 21 PATCH, EXTENDED RELEASE TRANSDERMAL at 11:00

## 2025-06-13 RX ADMIN — AMLODIPINE BESYLATE 10 MG: 10 TABLET ORAL at 11:00

## 2025-06-13 RX ADMIN — FLUTICASONE PROPIONATE 1 SPRAY: 50 SPRAY, METERED NASAL at 10:59

## 2025-06-13 RX ADMIN — METFORMIN HYDROCHLORIDE 1000 MG: 1000 TABLET ORAL at 11:00

## 2025-06-13 RX ADMIN — Medication 1 TABLET: at 11:00

## 2025-06-13 RX ADMIN — BUPROPION HYDROCHLORIDE 300 MG: 150 TABLET, EXTENDED RELEASE ORAL at 11:00

## 2025-06-13 RX ADMIN — INSULIN LISPRO 4 UNITS: 100 INJECTION, SOLUTION INTRAVENOUS; SUBCUTANEOUS at 01:11

## 2025-06-13 RX ADMIN — IPRATROPIUM BROMIDE AND ALBUTEROL SULFATE 3 ML: 2.5; .5 SOLUTION RESPIRATORY (INHALATION) at 12:29

## 2025-06-13 RX ADMIN — FINASTERIDE 5 MG: 5 TABLET, FILM COATED ORAL at 11:00

## 2025-06-13 RX ADMIN — LINACLOTIDE 145 MCG: 145 CAPSULE, GELATIN COATED ORAL at 10:59

## 2025-06-13 RX ADMIN — FAMOTIDINE 20 MG: 20 TABLET, FILM COATED ORAL at 10:59

## 2025-06-13 RX ADMIN — INSULIN LISPRO 4 UNITS: 100 INJECTION, SOLUTION INTRAVENOUS; SUBCUTANEOUS at 13:02

## 2025-06-13 RX ADMIN — PANTOPRAZOLE SODIUM 40 MG: 40 TABLET, DELAYED RELEASE ORAL at 06:09

## 2025-06-13 RX ADMIN — METHYLPREDNISOLONE SODIUM SUCCINATE 40 MG: 40 INJECTION, POWDER, FOR SOLUTION INTRAMUSCULAR; INTRAVENOUS at 05:33

## 2025-06-13 RX ADMIN — ESCITALOPRAM OXALATE 20 MG: 10 TABLET ORAL at 10:59

## 2025-06-13 RX ADMIN — DOXYCYCLINE 100 MG: 100 INJECTION, POWDER, LYOPHILIZED, FOR SOLUTION INTRAVENOUS at 05:33

## 2025-06-13 RX ADMIN — LORAZEPAM 0.5 MG: 0.5 TABLET ORAL at 05:19

## 2025-06-13 RX ADMIN — INSULIN LISPRO 1 UNITS: 100 INJECTION, SOLUTION INTRAVENOUS; SUBCUTANEOUS at 05:45

## 2025-06-13 ASSESSMENT — COGNITIVE AND FUNCTIONAL STATUS - GENERAL
STANDING UP FROM CHAIR USING ARMS: A LITTLE
DAILY ACTIVITIY SCORE: 24
WALKING IN HOSPITAL ROOM: A LITTLE
MOVING TO AND FROM BED TO CHAIR: A LITTLE
MOBILITY SCORE: 24
MOBILITY SCORE: 20
CLIMB 3 TO 5 STEPS WITH RAILING: A LITTLE

## 2025-06-13 ASSESSMENT — LIFESTYLE VARIABLES
ORIENTATION AND CLOUDING OF SENSORIUM: ORIENTED AND CAN DO SERIAL ADDITIONS
AGITATION: NORMAL ACTIVITY
HEADACHE, FULLNESS IN HEAD: VERY MILD
ANXIETY: 2
TREMOR: NO TREMOR
TACTILE DISTURBANCES: VERY MILD ITCHING, PINS AND NEEDLES, BURNING OR NUMBNESS
TACTILE DISTURBANCES: VERY MILD ITCHING, PINS AND NEEDLES, BURNING OR NUMBNESS
VISUAL DISTURBANCES: NOT PRESENT
AUDITORY DISTURBANCES: NOT PRESENT
TOTAL SCORE: 7
AUDITORY DISTURBANCES: NOT PRESENT
ORIENTATION AND CLOUDING OF SENSORIUM: ORIENTED AND CAN DO SERIAL ADDITIONS
PAROXYSMAL SWEATS: BARELY PERCEPTIBLE SWEATING, PALMS MOIST
PAROXYSMAL SWEATS: BARELY PERCEPTIBLE SWEATING, PALMS MOIST
HEADACHE, FULLNESS IN HEAD: NOT PRESENT
TREMOR: NOT VISIBLE, BUT CAN BE FELT FINGERTIP TO FINGERTIP
NAUSEA AND VOMITING: NO NAUSEA AND NO VOMITING
NAUSEA AND VOMITING: NO NAUSEA AND NO VOMITING
VISUAL DISTURBANCES: NOT PRESENT
ANXIETY: MILDLY ANXIOUS
AGITATION: SOMEWHAT MORE THAN NORMAL ACTIVITY
TOTAL SCORE: 3

## 2025-06-13 ASSESSMENT — PAIN SCALES - GENERAL: PAINLEVEL_OUTOF10: 0 - NO PAIN

## 2025-06-13 ASSESSMENT — PAIN - FUNCTIONAL ASSESSMENT
PAIN_FUNCTIONAL_ASSESSMENT: 0-10
PAIN_FUNCTIONAL_ASSESSMENT: 0-10

## 2025-06-13 NOTE — CARE PLAN
The patient's goals for the shift include get rest and stay informed.    The clinical goals for the shift include Pt will remain HDS throughout the shift.    Over the shift, the patient did make progress toward the following goals. Barriers to progression include understanding. Recommendations to address these barriers include education.

## 2025-06-13 NOTE — CARE PLAN
The patient's goals for the shift include    Problem: Diabetes  Goal: Achieve decreasing blood glucose levels by end of shift  Outcome: Progressing  Goal: Increase stability of blood glucose readings by end of shift  Outcome: Progressing  Goal: Decrease in ketones present in urine by end of shift  Outcome: Progressing  Goal: Maintain electrolyte levels within acceptable range throughout shift  Outcome: Progressing  Flowsheets (Taken 6/13/2025 1116)  Maintain electrolyte levels within acceptable range throughout shift: Monitor urine output  Goal: Maintain glucose levels >70mg/dl to <250mg/dl throughout shift  Outcome: Progressing  Flowsheets (Taken 6/13/2025 1116)  Maintain glucose levels >70mg/dl to <250mg/dl throughout shift: Med administration/monitoring of effect  Goal: No changes in neurological exam by end of shift  Outcome: Progressing  Goal: Learn about and adhere to nutrition recommendations by end of shift  Outcome: Progressing  Goal: Vital signs within normal range for age by end of shift  Outcome: Progressing  Goal: Increase self care and/or family involovement by end of shift  Outcome: Progressing  Goal: Receive DSME education by end of shift  Outcome: Progressing     Problem: Pain - Adult  Goal: Verbalizes/displays adequate comfort level or baseline comfort level  Outcome: Progressing     Problem: Safety - Adult  Goal: Free from fall injury  Outcome: Progressing     Problem: Discharge Planning  Goal: Discharge to home or other facility with appropriate resources  Outcome: Progressing     Problem: Chronic Conditions and Co-morbidities  Goal: Patient's chronic conditions and co-morbidity symptoms are monitored and maintained or improved  Outcome: Progressing     Problem: Nutrition  Goal: Nutrient intake appropriate for maintaining nutritional needs  Outcome: Progressing     Problem: Skin  Goal: Decreased wound size/increased tissue granulation at next dressing change  Outcome: Progressing  Goal:  Participates in plan/prevention/treatment measures  Outcome: Progressing  Goal: Prevent/manage excess moisture  Outcome: Progressing  Goal: Prevent/minimize sheer/friction injuries  Outcome: Progressing  Flowsheets (Taken 6/13/2025 1116)  Prevent/minimize sheer/friction injuries:   Turn/reposition every 2 hours/use positioning/transfer devices   Use pull sheet  Goal: Promote/optimize nutrition  Outcome: Progressing  Flowsheets (Taken 6/13/2025 1116)  Promote/optimize nutrition:   Monitor/record intake including meals   Consume > 50% meals/supplements   Offer water/supplements/favorite foods  Goal: Promote skin healing  Outcome: Progressing     Problem: Fall/Injury  Goal: Not fall by end of shift  Outcome: Progressing  Goal: Be free from injury by end of the shift  Outcome: Progressing  Goal: Verbalize understanding of personal risk factors for fall in the hospital  Outcome: Progressing  Goal: Verbalize understanding of risk factor reduction measures to prevent injury from fall in the home  Outcome: Progressing  Goal: Use assistive devices by end of the shift  Outcome: Progressing  Goal: Pace activities to prevent fatigue by end of the shift  Outcome: Progressing       The clinical goals for the shift include CIWA <5 this shift.    See assessment and mar. Remains on 2 liters nc, baseline at home is 3 liters nc PRN. See blood sugars. Tele as ordered. Ciwa monitored. Pending home o2 eval.

## 2025-06-13 NOTE — PROGRESS NOTES
KAITLYN notified by OT, pt expressing interest in [facility]HA. SW discussed further with pt. Pt stated he was interested in [facility]HA to assist with household chores. Discussed [facility] and pt agreeable to referral. SW inquired about pt's interest in addiction rehab as discussed yesterday. Pt stated he would rather return home first and go on his own accord. Pt then stated he had to go to the bathroom and requested SW to return at later time. SW to follow up with pt this afternoon and offer any additional support.     1150  SW following up with pt and he plans to return home today. Pt not interested in addiction rehab at this time and stated he has the information and will reach out to them if he changes his mind. SW to place [facility] referral at discharge. Home 02 eval complete. Unclear who is pt's 02 supplier. Discussed with pt and he provided [phone] which is [facility]. KAITLYN spoke to Ainsley at [facility] who stated they have been trying to reach pt for the last year as they provided him with a concentrator. [facility] requesting updated information to coordinate services with pt. KAITLYN faxed orders to [phone]. [facility]. Pt follows with Anish Edmonds at [facility]. Pt to discharge home today. No other needs identified at this time, SW signing off,  pt and care team aware of SW availability while inpt.

## 2025-06-13 NOTE — PROGRESS NOTES
Physical Therapy    Physical Therapy Treatment    Patient Name: Daniel Hopkins  MRN: [id number]  Department: [address]  Room: [facility]-A  Today's Date: 6/13/2025  Time Calculation  Start Time: 1037  Stop Time: 1100  Time Calculation (min): 23 min         Assessment/Plan   PT Assessment  Barriers to Discharge Home: No anticipated barriers  End of Session Communication: Bedside nurse  Assessment Comment: pt demonstrating decreased assist  this session. pt  resistive to education and gets irritated at times.  End of Session Patient Position: Up in chair, Alarm off, not on at start of session     PT Plan  Treatment/Interventions: Transfer training, Gait training, Stair training, Balance training, Neuromuscular re-education, Strengthening, Endurance training, Therapeutic activity, Therapeutic exercise  PT Plan: Ongoing PT  PT Frequency: 3 times per week  PT Discharge Recommendations: Low intensity level of continued care (Patient requested home health aide if he is able to get one)  Equipment Recommended upon Discharge:  (TBD)  PT - OK to Discharge: Yes (When medically cleared)    PT Visit Info:  PT Received On: 06/13/25  Response to Previous Treatment: Patient with no complaints from previous session.     General Visit Information:   General  Reason for Referral: Impaired mobility  Referred By: Modesta  Past Medical History Relevant to Rehab: PMHx: COPD, type II DM, hypertension, CAD, BPH, neuropathy, anxiety/depression, alcohol use presented to ER with complaint of shortness of breath going on for 2 days associated with cough with mucus production  Prior to Session Communication: Bedside nurse  Patient Position Received: Up in chair, Alarm off, not on at start of session  Preferred Learning Style: verbal  General Comment: pt agreeable to PT. RN present and cleared pt for treatment. pt reporting frustration that everything happens at once.      Precautions:  Precautions  Medical Precautions: Fall precautions, Oxygen therapy  device and L/min              Pain:  Pain Assessment  Pain Assessment: 0-10  Cognition:  Cognition  Overall Cognitive Status: Within Functional Limits       Treatments:  Balance/Neuromuscular Re-Education  Balance/Neuromuscular Re-Education Activity Performed: Yes  Balance/Neuromuscular Re-Education Activity 1: pt performed static and dynamic balance activities tn seated outside [place] with distant S and no overt LOB. pt stood moult attempts and was able to perform static and dynamic activities reaching outside [place] and retrieving items. pt with close S with stand balance activities.    Bed Mobility  Bed Mobility: Yes  Bed Mobility 1  Bed Mobility 1: Supine to sitting, Sitting to supine, Scooting  Level of Assistance 1: Independent    Ambulation/Gait Training  Ambulation/Gait Training Performed: Yes  Ambulation/Gait Training 1  Surface 1: Level tile  Device 1: No device  Assistance 1: Close supervision  Comments/Distance (ft) 1: 150'x1 and 30'x2 with pulse ox and O2 levels between 89>92% throughout. pt with occasional cues for safety but no LOB.  Transfers  Transfer: Yes  Transfer 1  Technique 1: Sit to stand, Stand to sit  Transfer Level of Assistance 1: Distant supervision  Trials/Comments 1: cues for safety.    Outcome Measures:  [facility] Basic Mobility  Turning from your back to your side while in a flat bed without using bedrails: None  Moving from lying on your back to sitting on the side of a flat bed without using bedrails: None  Moving to and from bed to chair (including a wheelchair): A little  Standing up from a chair using your arms (e.g. wheelchair or bedside chair): A little  To walk in hospital room: A little  Climbing 3-5 steps with railing: A little  Basic Mobility - Total Score: 20    Education Documentation  Precautions, taught by Barbara Flynn PTA at 6/13/2025 11:02 AM.  Learner: Patient  Readiness: Acceptance  Method: Explanation  Response: Verbalizes Understanding    Mobility Training, taught by Barbara CORTES  [facility] Flynn at 6/13/2025 11:02 AM.  Learner: Patient  Readiness: Acceptance  Method: Explanation  Response: Verbalizes Understanding    Education Comments  No comments found.               Encounter Problems       Encounter Problems (Active)       PT Problem       Gait (Progressing)       Start:  06/12/25    Expected End:  06/26/25       Patient will amb 75+ feet CGA x 1 for increased safety and function           Strengthening (Progressing)       Start:  06/12/25    Expected End:  06/26/25       Patient will perform 20+ reps of AROM/RROM for DONTRELL LE's to improve safety and functional independence               PT Problem       Balance (Progressing)       Start:  06/12/25    Expected End:  06/26/25       Patient SBA with higher level balance activities with AMB for increased safety and function            Pain - Adult

## 2025-06-13 NOTE — PROGRESS NOTES
Occupational Therapy                 Therapy Communication Note    Patient Name: Daniel Hopkins  MRN: [id number]  Department: [facility] E  Room: [address] [place], [place] [address]A  Today's Date: 6/13/2025     Discipline: Occupational Therapy    OT  SCREEN: Pt resting in bed at this time. Refuse any OOB activity due to feeling fatigue and mildly SOB. Pt reports he has no concerns with ADLs and is independent at baseline with dressing,  bathing and functional mobility without devices. Pt reports he has a new apartment with a tub shower with no concerns transferring in and out of, unsure if he has a chair to go in the tub. Pt reported having interest in [facility]HA for larger household tasks. Pt could not identify specific needs other than feeling SOB or O2 related fatigue. No acute OT needs identified at this time, will complete OT orders.

## 2025-06-13 NOTE — DISCHARGE SUMMARY
Discharge Diagnosis  Acute on chronic hypoxic/hypercarbic respiratory failure  COPD exacerbation  Acute toxic encephalopathy secondary to alcohol intoxication/hypercarbia  Hypertension      This discharge took greater than 35 minutes.    Test Results Pending At Discharge  Pending Labs       Order Current Status    POCT fingerstick glucose manually resulted In process            Hospital Course   Patient is a 55-year-old male with past medical history of COPD, type II DM, hypertension, CAD, BPH, neuropathy, anxiety/depression, alcohol use presented to ER with complaint of shortness of breath going on for 2 days associated with cough with mucus production.  Patient was also drinking heavily since last 2 days.  Patient was placed on BiPAP and admitted with a diagnosis of acute hypoxic/hypercarbic respiratory failure secondary to COPD exacerbation, acute encephalopathy secondary to alcohol intoxication and started on treatment as per protocol.  CT chest PE protocol shows no acute pulmonary embolism or acute cardiopulmonary pathology.  Echocardiogram shows hyperdynamic LV systolic function with EF of 77%, moderately increased septal and moderately increased posterior left ventricular wall thickness.  Patient condition gradually improving, oxygen requirement is getting better.  He was taken off of BiPAP and currently maintaining saturation on nasal cannula.  Patient condition gradually improved, almost back to his baseline and is a going home.  Home oxygen evaluation was done and patient qualified for 2 L at rest and 4 L with exertion.  He will be discharged home on tapering dose of prednisone as well as doxycycline for 5 more days to complete the course.  Follow-up with PCP and pulmonology as an outpatient.    Pertinent Physical Exam At Time of Discharge  Physical Exam  Patient is awake and orient, not in apparent distress  Eyes: PERRLA, no conjunctival congestion  Chest: Bilateral Air entry, no crackles or  wheezing  Heart: s1S2 regular, no murmur  Abdomen: Soft, non tender, BS present  Ext:    Home Medications     Medication List      START taking these medications     doxycycline 100 mg capsule; Commonly known as: Vibramycin; Take 1   capsule (100 mg) by mouth 2 times a day for 5 days. Take with at least 8   ounces (large glass) of water, do not lie down for 30 minutes after   predniSONE 5 mg tablet; Commonly known as: Deltasone; Take 8 tablets (40   mg) by mouth once daily for 2 days, THEN 4 tablets (20 mg) once daily for   3 days, THEN 2 tablets (10 mg) once daily for 3 days, THEN 1 tablet (5 mg)   once daily for 3 days.; Start taking on: June 14, 2025     CHANGE how you take these medications     metFORMIN 1,000 mg tablet; Commonly known as: Glucophage; What changed:   Another medication with the same name was removed. Continue taking this   medication, and follow the directions you see here.     CONTINUE taking these medications     amLODIPine 10 mg tablet; Commonly known as: Norvasc   aspirin 81 mg EC tablet   buPROPion  mg 24 hr tablet; Commonly known as: Wellbutrin XL; Take   1 tablet (300 mg) by mouth once daily. Do not crush, chew, or split.   escitalopram 10 mg tablet; Commonly known as: Lexapro; Take 2 tablets   (20 mg) by mouth once daily in the morning.   finasteride 5 mg tablet; Commonly known as: Proscar   fluticasone furoate-vilanteroL 200-25 mcg/dose inhaler; Commonly known   as: Breo Ellipta; Inhale 1 puff once daily.   hydrOXYzine HCL 50 mg tablet; Commonly known as: Atarax; Take 1 tablet   (50 mg) by mouth every 8 hours if needed.   ipratropium-albuteroL 0.5-2.5 mg/3 mL nebulizer solution; Commonly known   as: Duo-Neb; Take 3 mL by nebulization every 3 hours if needed for   wheezing or shortness of breath.   multivitamin tablet; Take 1 tablet by mouth once daily.   pantoprazole 40 mg EC tablet; Commonly known as: ProtoNix   Spiriva Respimat 2.5 mcg/actuation inhaler; Generic drug:  tiotropium;   Inhale 2 puffs once daily.   tamsulosin 0.4 mg 24 hr capsule; Commonly known as: Flomax   traZODone 50 mg tablet; Commonly known as: Desyrel; Take 1 tablet (50   mg) by mouth as needed at bedtime.     STOP taking these medications     nicotine polacrilex 2 mg gum; Commonly known as: Nicorette     ASK your doctor about these medications     acetaminophen 325 mg tablet; Commonly known as: Tylenol; Take 2 tablets   (650 mg) by mouth every 4 hours if needed for mild pain (1 - 3), moderate   pain (4 - 6), headaches or fever (temp greater than 38.0 C).   famotidine 20 mg tablet; Commonly known as: Pepcid; Take 1 tablet (20   mg) by mouth once daily in the morning.   fexofenadine 180 mg tablet; Commonly known as: Allegra; Take 1 tablet   (180 mg) by mouth once daily in the morning.   fluticasone 50 mcg/actuation nasal spray; Commonly known as: Flonase;   Administer 1 spray into each nostril 2 times a day.   Linzess 145 mcg capsule; Generic drug: linaCLOtide; Take 1 capsule (145   mcg) by mouth once daily in the morning.   LORazepam 1 mg tablet; Commonly known as: Ativan; Take 2 tablets by   mouth every 6 hours for 6 doses, then 1 tablet every 6 hours for 4 doses,   then 1 tablet every 8 hours for 3 doses, then 1 tablet every 12 hours for   2 doses, then 1 tablet after 25 hours   nicotine polacrilex 4 mg lozenge; Commonly known as: Commit; Dissolve 1   lozenge (4 mg) in the mouth every 2 hours if needed for smoking cessation.       Outpatient Follow-Up  No follow-ups on file.     Pawan Byers MD  6/13/2025  1:02 PM

## 2025-06-13 NOTE — NURSING NOTE
Pt to be discharged home, awaiting uber pickup at 1600. Pt voiced understanding of discharge instructions, meds/scripts. scripts were dropped off by [facility] prior to leaving unit. Pt will leave unit via wheelchair and he is gathering his own belongings at this time. Pt also going home with portable oxygen tank.     1603, pt leaving unit at this time via wheelchair with portable tank.

## 2025-06-13 NOTE — CARE PLAN
Home O2 Evaluation: COMPLETED    SpO2 on room air at rest:    89 %    SpO2 on room air with exertion:     88%    SpO2 on oxygen at rest:    93 % 2LNC AT REST    SpO2 on oxygen with exertion:    94 % 4LNC WITH EXERTION    SpO2 on 4L/min O2 with exertion:    94% 4LNC WITH EXERTION    Ambulation distance:      200ft    Recommended O2 device:  NASAL CANNULA    Recommended O2 L/min: 2LNC AT REST 4LNC WITH EXERTION    Recommended FiO2 %:    (Sapna Verdugo at [facility] going O2.)

## 2025-06-25 LAB
ATRIAL RATE: 109 BPM
P AXIS: 27 DEGREES
PR INTERVAL: 151 MS
Q ONSET: 251 MS
QRS COUNT: 18 BEATS
QRS DURATION: 147 MS
QT INTERVAL: 393 MS
QTC CALCULATION(BAZETT): 527 MS
QTC FREDERICIA: 478 MS
R AXIS: 269 DEGREES
T AXIS: 69 DEGREES
T OFFSET: 447 MS
VENTRICULAR RATE: 108 BPM

## (undated) DEVICE — TRAY EPI SGL DOSE 18GA NDL CUST AULTMAN HOSP

## (undated) DEVICE — GLOVE ORANGE PI 7 1/2   MSG9075

## (undated) DEVICE — GUIDEWIRE, INQUIRE, J TIP, .035 X 210CM, FIXED CORE, DIAGNOSTIC

## (undated) DEVICE — NEEDLE SPNL 22GA BLK HUB S STL REG WALL FIT STYL W/ QNCKE

## (undated) DEVICE — BANDAGE ADH W1XL3IN NAT FAB WVN FLX DURABLE N ADH PD SEAL

## (undated) DEVICE — 6 ML SYRINGE LUER-LOCK TIP: Brand: MONOJECT

## (undated) DEVICE — NON-DEHP CATHETER EXTENSION SET, MALE LUER LOCK ADAPTER

## (undated) DEVICE — TR BAND, RADIAL COMPRESSION, STANDARD, 24CM

## (undated) DEVICE — NEEDLE HYPO 25GA L1.5IN BLU POLYPR HUB S STL REG BVL STR

## (undated) DEVICE — NEEDLE SPNL 22GA L3.5IN BLK HUB S STL REG WALL FIT STYL W/

## (undated) DEVICE — NEEDLE SPNL 22GA L5IN BLK HUB S STL W/ QNCKE PNT W/OUT

## (undated) DEVICE — Z DISCONTINUED APPLICATOR SURG PREP 0.35OZ 2% CHG 70% ISO ALC W/ HI LT

## (undated) DEVICE — GAUZE,SPONGE,4"X4",12PLY,STERILE,LF,2'S: Brand: MEDLINE

## (undated) DEVICE — 12 ML SYRINGE,LUER-LOCK TIP: Brand: MONOJECT

## (undated) DEVICE — CATHETER, OPTITORQUE, 6FR, JACKY, BL3.5/2H/100CM

## (undated) DEVICE — 3M™ RED DOT™ MONITORING ELECTRODE WITH FOAM TAPE AND STICKY GEL 2560, 50/BAG, 20/CASE, 72/PLT: Brand: RED DOT™

## (undated) DEVICE — Device: Brand: PORTEX

## (undated) DEVICE — SHEATH, GLIDESHEATH, SLENDER, 6FR 10CM

## (undated) DEVICE — NEEDLE HYPO 18GA L1.5IN PNK POLYPR HUB S STL THN WALL FILL

## (undated) DEVICE — NEEDLE SPNL 25GA L3.5IN BLU HUB S STL REG WALL FIT STYL W/

## (undated) DEVICE — 3 ML SYRINGE LUER-LOCK TIP: Brand: MONOJECT

## (undated) DEVICE — NEEDLE SPNL WEISS LNG 18 GAX5 IN MOD TUOHY PT TW PERISAFE